# Patient Record
Sex: FEMALE | Race: BLACK OR AFRICAN AMERICAN | NOT HISPANIC OR LATINO | Employment: UNEMPLOYED | URBAN - METROPOLITAN AREA
[De-identification: names, ages, dates, MRNs, and addresses within clinical notes are randomized per-mention and may not be internally consistent; named-entity substitution may affect disease eponyms.]

---

## 2017-06-23 DIAGNOSIS — Z12.31 SCREENING MAMMOGRAM, ENCOUNTER FOR: Primary | ICD-10-CM

## 2017-06-23 DIAGNOSIS — Z13.820 SPECIAL SCREENING FOR OSTEOPOROSIS: Primary | ICD-10-CM

## 2018-12-18 ENCOUNTER — HOSPITAL ENCOUNTER (OUTPATIENT)
Facility: HOSPITAL | Age: 68
Discharge: HOME | End: 2018-12-20
Attending: EMERGENCY MEDICINE | Admitting: INTERNAL MEDICINE
Payer: MEDICARE

## 2018-12-18 DIAGNOSIS — R10.9 ABDOMINAL PAIN: ICD-10-CM

## 2018-12-18 DIAGNOSIS — N17.9 AKI (ACUTE KIDNEY INJURY): ICD-10-CM

## 2018-12-18 DIAGNOSIS — E86.0 DEHYDRATION: ICD-10-CM

## 2018-12-18 DIAGNOSIS — E11.69 HYPERLIPIDEMIA ASSOCIATED WITH TYPE 2 DIABETES MELLITUS: Chronic | ICD-10-CM

## 2018-12-18 DIAGNOSIS — R10.84 GENERALIZED ABDOMINAL PAIN: ICD-10-CM

## 2018-12-18 DIAGNOSIS — E78.5 HYPERLIPIDEMIA ASSOCIATED WITH TYPE 2 DIABETES MELLITUS: Chronic | ICD-10-CM

## 2018-12-18 DIAGNOSIS — R53.1 WEAKNESS: ICD-10-CM

## 2018-12-18 DIAGNOSIS — I16.1 HYPERTENSIVE EMERGENCY: Primary | ICD-10-CM

## 2018-12-18 LAB
ALBUMIN SERPL BCP-MCNC: 3.1 G/DL
ALP SERPL-CCNC: 138 U/L
ALT SERPL W/O P-5'-P-CCNC: 17 U/L
ANION GAP SERPL CALC-SCNC: 15 MMOL/L
AST SERPL-CCNC: 20 U/L
BACTERIA #/AREA URNS HPF: ABNORMAL /HPF
BASOPHILS # BLD AUTO: 0.03 K/UL
BASOPHILS NFR BLD: 0.4 %
BILIRUB SERPL-MCNC: 0.5 MG/DL
BILIRUB UR QL STRIP: ABNORMAL
BNP SERPL-MCNC: 184 PG/ML
BUN SERPL-MCNC: 64 MG/DL
CALCIUM SERPL-MCNC: 10.8 MG/DL
CHLORIDE SERPL-SCNC: 105 MMOL/L
CLARITY UR: ABNORMAL
CO2 SERPL-SCNC: 16 MMOL/L
COLOR UR: YELLOW
CREAT SERPL-MCNC: 3.6 MG/DL
DIFFERENTIAL METHOD: ABNORMAL
EOSINOPHIL # BLD AUTO: 0.3 K/UL
EOSINOPHIL NFR BLD: 3.9 %
ERYTHROCYTE [DISTWIDTH] IN BLOOD BY AUTOMATED COUNT: 14.9 %
EST. GFR  (AFRICAN AMERICAN): 14 ML/MIN/1.73 M^2
EST. GFR  (NON AFRICAN AMERICAN): 12 ML/MIN/1.73 M^2
GLUCOSE SERPL-MCNC: 116 MG/DL
GLUCOSE UR QL STRIP: NEGATIVE
HCT VFR BLD AUTO: 37.5 %
HGB BLD-MCNC: 11.9 G/DL
HGB UR QL STRIP: ABNORMAL
HYALINE CASTS #/AREA URNS LPF: 0 /LPF
KETONES UR QL STRIP: ABNORMAL
LACTATE SERPL-SCNC: 1 MMOL/L
LEUKOCYTE ESTERASE UR QL STRIP: NEGATIVE
LIPASE SERPL-CCNC: 57 U/L
LYMPHOCYTES # BLD AUTO: 1.7 K/UL
LYMPHOCYTES NFR BLD: 22.4 %
MAGNESIUM SERPL-MCNC: 1.8 MG/DL
MCH RBC QN AUTO: 27.4 PG
MCHC RBC AUTO-ENTMCNC: 31.7 G/DL
MCV RBC AUTO: 86 FL
MICROSCOPIC COMMENT: ABNORMAL
MONOCYTES # BLD AUTO: 1.1 K/UL
MONOCYTES NFR BLD: 14.3 %
NEUTROPHILS # BLD AUTO: 4.5 K/UL
NEUTROPHILS NFR BLD: 58.9 %
NITRITE UR QL STRIP: NEGATIVE
PH UR STRIP: 6 [PH] (ref 5–8)
PHOSPHATE SERPL-MCNC: 5.2 MG/DL
PLATELET # BLD AUTO: 399 K/UL
PMV BLD AUTO: 9.4 FL
POCT GLUCOSE: 103 MG/DL (ref 70–110)
POTASSIUM SERPL-SCNC: 5.2 MMOL/L
PROT SERPL-MCNC: 7.4 G/DL
PROT UR QL STRIP: ABNORMAL
RBC # BLD AUTO: 4.34 M/UL
RBC #/AREA URNS HPF: 0 /HPF (ref 0–4)
SODIUM SERPL-SCNC: 136 MMOL/L
SP GR UR STRIP: >=1.03 (ref 1–1.03)
SQUAMOUS #/AREA URNS HPF: 6 /HPF
TROPONIN I SERPL DL<=0.01 NG/ML-MCNC: 0.01 NG/ML
TSH SERPL DL<=0.005 MIU/L-ACNC: 1.5 UIU/ML
URN SPEC COLLECT METH UR: ABNORMAL
UROBILINOGEN UR STRIP-ACNC: 1 EU/DL
WBC # BLD AUTO: 7.64 K/UL
WBC #/AREA URNS HPF: 1 /HPF (ref 0–5)

## 2018-12-18 PROCEDURE — 84484 ASSAY OF TROPONIN QUANT: CPT | Mod: 91

## 2018-12-18 PROCEDURE — 83735 ASSAY OF MAGNESIUM: CPT

## 2018-12-18 PROCEDURE — 85025 COMPLETE CBC W/AUTO DIFF WBC: CPT

## 2018-12-18 PROCEDURE — 80053 COMPREHEN METABOLIC PANEL: CPT

## 2018-12-18 PROCEDURE — 87205 SMEAR GRAM STAIN: CPT

## 2018-12-18 PROCEDURE — 93010 ELECTROCARDIOGRAM REPORT: CPT | Mod: ,,, | Performed by: STUDENT IN AN ORGANIZED HEALTH CARE EDUCATION/TRAINING PROGRAM

## 2018-12-18 PROCEDURE — 84443 ASSAY THYROID STIM HORMONE: CPT

## 2018-12-18 PROCEDURE — 83880 ASSAY OF NATRIURETIC PEPTIDE: CPT

## 2018-12-18 PROCEDURE — G0378 HOSPITAL OBSERVATION PER HR: HCPCS

## 2018-12-18 PROCEDURE — 84100 ASSAY OF PHOSPHORUS: CPT

## 2018-12-18 PROCEDURE — 25000003 PHARM REV CODE 250: Performed by: EMERGENCY MEDICINE

## 2018-12-18 PROCEDURE — 96374 THER/PROPH/DIAG INJ IV PUSH: CPT

## 2018-12-18 PROCEDURE — 81000 URINALYSIS NONAUTO W/SCOPE: CPT

## 2018-12-18 PROCEDURE — 99285 EMERGENCY DEPT VISIT HI MDM: CPT | Mod: 25

## 2018-12-18 PROCEDURE — 83605 ASSAY OF LACTIC ACID: CPT

## 2018-12-18 PROCEDURE — 82962 GLUCOSE BLOOD TEST: CPT

## 2018-12-18 PROCEDURE — 83690 ASSAY OF LIPASE: CPT

## 2018-12-18 PROCEDURE — 96361 HYDRATE IV INFUSION ADD-ON: CPT

## 2018-12-18 PROCEDURE — 93005 ELECTROCARDIOGRAM TRACING: CPT

## 2018-12-18 RX ORDER — METOPROLOL TARTRATE 1 MG/ML
5 INJECTION, SOLUTION INTRAVENOUS
Status: COMPLETED | OUTPATIENT
Start: 2018-12-18 | End: 2018-12-18

## 2018-12-18 RX ADMIN — METOPROLOL TARTRATE 5 MG: 1 INJECTION, SOLUTION INTRAVENOUS at 11:12

## 2018-12-18 RX ADMIN — SODIUM CHLORIDE 500 ML: 0.9 INJECTION, SOLUTION INTRAVENOUS at 08:12

## 2018-12-19 LAB
ALBUMIN SERPL BCP-MCNC: 2.4 G/DL
ALP SERPL-CCNC: 103 U/L
ALT SERPL W/O P-5'-P-CCNC: 13 U/L
ANION GAP SERPL CALC-SCNC: 10 MMOL/L
AORTIC ROOT ANNULUS: 2.92 CM
AORTIC VALVE CUSP SEPERATION: 1.82 CM
AST SERPL-CCNC: 17 U/L
AV INDEX (PROSTH): 0.81
AV MEAN GRADIENT: 7.68 MMHG
AV PEAK GRADIENT: 14.44 MMHG
AV VALVE AREA: 2.06 CM2
BASOPHILS # BLD AUTO: 0.02 K/UL
BASOPHILS NFR BLD: 0.3 %
BILIRUB SERPL-MCNC: 0.4 MG/DL
BSA FOR ECHO PROCEDURE: 1.93 M2
BUN SERPL-MCNC: 64 MG/DL
CALCIUM SERPL-MCNC: 9.7 MG/DL
CHLORIDE SERPL-SCNC: 109 MMOL/L
CO2 SERPL-SCNC: 16 MMOL/L
CREAT SERPL-MCNC: 3.5 MG/DL
CREAT UR-MCNC: 235 MG/DL
CV ECHO LV RWT: 0.69 CM
DIFFERENTIAL METHOD: ABNORMAL
DOP CALC AO PEAK VEL: 1.9 M/S
DOP CALC AO VTI: 27.81 CM
DOP CALC LVOT AREA: 2.54 CM2
DOP CALC LVOT DIAMETER: 1.8 CM
DOP CALC LVOT STROKE VOLUME: 57.23 CM3
DOP CALCLVOT PEAK VEL VTI: 22.5 CM
E WAVE DECELERATION TIME: 191.35 MSEC
E/A RATIO: 0.57
ECHO LV POSTERIOR WALL: 1.26 CM (ref 0.6–1.1)
EOSINOPHIL # BLD AUTO: 0.3 K/UL
EOSINOPHIL NFR BLD: 4.5 %
EOSINOPHIL URNS QL WRIGHT STN: NORMAL
ERYTHROCYTE [DISTWIDTH] IN BLOOD BY AUTOMATED COUNT: 15 %
EST. GFR  (AFRICAN AMERICAN): 15 ML/MIN/1.73 M^2
EST. GFR  (NON AFRICAN AMERICAN): 13 ML/MIN/1.73 M^2
ESTIMATED AVG GLUCOSE: 148 MG/DL
FERRITIN SERPL-MCNC: 94 NG/ML
FOLATE SERPL-MCNC: 5.4 NG/ML
FRACTIONAL SHORTENING: 36 % (ref 28–44)
GLUCOSE SERPL-MCNC: 106 MG/DL
HAV IGM SERPL QL IA: NEGATIVE
HBA1C MFR BLD HPLC: 6.8 %
HBV CORE IGM SERPL QL IA: NEGATIVE
HBV SURFACE AG SERPL QL IA: NEGATIVE
HCT VFR BLD AUTO: 30.3 %
HCV AB SERPL QL IA: NEGATIVE
HGB BLD-MCNC: 9.8 G/DL
HIV 1+2 AB+HIV1 P24 AG SERPL QL IA: NEGATIVE
INTERVENTRICULAR SEPTUM: 1.38 CM (ref 0.6–1.1)
IRON SERPL-MCNC: 37 UG/DL
LA MAJOR: 4.55 CM
LA MINOR: 4.97 CM
LA WIDTH: 3.27 CM
LEFT ATRIUM SIZE: 3.95 CM
LEFT ATRIUM VOLUME INDEX: 28.8 ML/M2
LEFT ATRIUM VOLUME: 52.16 CM3
LEFT INTERNAL DIMENSION IN SYSTOLE: 2.34 CM (ref 2.1–4)
LEFT VENTRICLE DIASTOLIC VOLUME INDEX: 30.85 ML/M2
LEFT VENTRICLE DIASTOLIC VOLUME: 55.79 ML
LEFT VENTRICLE MASS INDEX: 92.1 G/M2
LEFT VENTRICLE SYSTOLIC VOLUME INDEX: 10.5 ML/M2
LEFT VENTRICLE SYSTOLIC VOLUME: 18.97 ML
LEFT VENTRICULAR INTERNAL DIMENSION IN DIASTOLE: 3.64 CM (ref 3.5–6)
LEFT VENTRICULAR MASS: 166.54 G
LYMPHOCYTES # BLD AUTO: 1.9 K/UL
LYMPHOCYTES NFR BLD: 30.3 %
MAGNESIUM SERPL-MCNC: 1.9 MG/DL
MCH RBC QN AUTO: 27.8 PG
MCHC RBC AUTO-ENTMCNC: 32.3 G/DL
MCV RBC AUTO: 86 FL
MONOCYTES # BLD AUTO: 0.6 K/UL
MONOCYTES NFR BLD: 8.9 %
MV PEAK A VEL: 1.21 M/S
MV PEAK E VEL: 0.69 M/S
NEUTROPHILS # BLD AUTO: 3.5 K/UL
NEUTROPHILS NFR BLD: 55.8 %
PHOSPHATE SERPL-MCNC: 5.3 MG/DL
PISA TR MAX VEL: 3.09 M/S
PLATELET # BLD AUTO: 328 K/UL
PMV BLD AUTO: 9.2 FL
POCT GLUCOSE: 133 MG/DL (ref 70–110)
POCT GLUCOSE: 168 MG/DL (ref 70–110)
POCT GLUCOSE: 195 MG/DL (ref 70–110)
POCT GLUCOSE: 94 MG/DL (ref 70–110)
POTASSIUM SERPL-SCNC: 4.7 MMOL/L
PROT SERPL-MCNC: 6 G/DL
PULM VEIN S/D RATIO: 1.91
PV PEAK D VEL: 0.32 M/S
PV PEAK S VEL: 0.61 M/S
PV PEAK VELOCITY: 1.51 CM/S
RA MAJOR: 3.48 CM
RA PRESSURE: 3 MMHG
RBC # BLD AUTO: 3.53 M/UL
RIGHT VENTRICULAR END-DIASTOLIC DIMENSION: 2.63 CM
SATURATED IRON: 13 %
SODIUM SERPL-SCNC: 135 MMOL/L
SODIUM UR-SCNC: 30 MMOL/L
TOTAL IRON BINDING CAPACITY: 277 UG/DL
TR MAX PG: 38.19 MMHG
TRANSFERRIN SERPL-MCNC: 187 MG/DL
TROPONIN I SERPL DL<=0.01 NG/ML-MCNC: 0.01 NG/ML
TV REST PULMONARY ARTERY PRESSURE: 41 MMHG
UUN UR-MCNC: 545 MG/DL
WBC # BLD AUTO: 6.27 K/UL

## 2018-12-19 PROCEDURE — 94761 N-INVAS EAR/PLS OXIMETRY MLT: CPT

## 2018-12-19 PROCEDURE — 25000003 PHARM REV CODE 250: Performed by: STUDENT IN AN ORGANIZED HEALTH CARE EDUCATION/TRAINING PROGRAM

## 2018-12-19 PROCEDURE — 84300 ASSAY OF URINE SODIUM: CPT

## 2018-12-19 PROCEDURE — 97530 THERAPEUTIC ACTIVITIES: CPT

## 2018-12-19 PROCEDURE — 80053 COMPREHEN METABOLIC PANEL: CPT

## 2018-12-19 PROCEDURE — 84165 PROTEIN E-PHORESIS SERUM: CPT

## 2018-12-19 PROCEDURE — 97530 THERAPEUTIC ACTIVITIES: CPT | Performed by: PHYSICAL THERAPIST

## 2018-12-19 PROCEDURE — G0378 HOSPITAL OBSERVATION PER HR: HCPCS

## 2018-12-19 PROCEDURE — 82746 ASSAY OF FOLIC ACID SERUM: CPT

## 2018-12-19 PROCEDURE — 97161 PT EVAL LOW COMPLEX 20 MIN: CPT | Performed by: PHYSICAL THERAPIST

## 2018-12-19 PROCEDURE — G8979 MOBILITY GOAL STATUS: HCPCS | Mod: CJ | Performed by: PHYSICAL THERAPIST

## 2018-12-19 PROCEDURE — 36415 COLL VENOUS BLD VENIPUNCTURE: CPT

## 2018-12-19 PROCEDURE — 84540 ASSAY OF URINE/UREA-N: CPT

## 2018-12-19 PROCEDURE — G8988 SELF CARE GOAL STATUS: HCPCS | Mod: CH

## 2018-12-19 PROCEDURE — 84165 PROTEIN E-PHORESIS SERUM: CPT | Mod: 26,,, | Performed by: PATHOLOGY

## 2018-12-19 PROCEDURE — G8987 SELF CARE CURRENT STATUS: HCPCS | Mod: CL

## 2018-12-19 PROCEDURE — 97165 OT EVAL LOW COMPLEX 30 MIN: CPT

## 2018-12-19 PROCEDURE — 82607 VITAMIN B-12: CPT

## 2018-12-19 PROCEDURE — 83036 HEMOGLOBIN GLYCOSYLATED A1C: CPT

## 2018-12-19 PROCEDURE — 83735 ASSAY OF MAGNESIUM: CPT

## 2018-12-19 PROCEDURE — 84100 ASSAY OF PHOSPHORUS: CPT

## 2018-12-19 PROCEDURE — 97116 GAIT TRAINING THERAPY: CPT | Performed by: PHYSICAL THERAPIST

## 2018-12-19 PROCEDURE — 80074 ACUTE HEPATITIS PANEL: CPT

## 2018-12-19 PROCEDURE — 63600175 PHARM REV CODE 636 W HCPCS: Performed by: STUDENT IN AN ORGANIZED HEALTH CARE EDUCATION/TRAINING PROGRAM

## 2018-12-19 PROCEDURE — 85025 COMPLETE CBC W/AUTO DIFF WBC: CPT

## 2018-12-19 PROCEDURE — 86703 HIV-1/HIV-2 1 RESULT ANTBDY: CPT

## 2018-12-19 PROCEDURE — 82570 ASSAY OF URINE CREATININE: CPT

## 2018-12-19 PROCEDURE — 83540 ASSAY OF IRON: CPT

## 2018-12-19 PROCEDURE — G8978 MOBILITY CURRENT STATUS: HCPCS | Mod: CK | Performed by: PHYSICAL THERAPIST

## 2018-12-19 PROCEDURE — 82728 ASSAY OF FERRITIN: CPT

## 2018-12-19 PROCEDURE — 87522 HEPATITIS C REVRS TRNSCRPJ: CPT

## 2018-12-19 RX ORDER — OXYCODONE AND ACETAMINOPHEN 5; 325 MG/1; MG/1
1 TABLET ORAL EVERY 8 HOURS PRN
Status: DISCONTINUED | OUTPATIENT
Start: 2018-12-19 | End: 2018-12-20 | Stop reason: HOSPADM

## 2018-12-19 RX ORDER — HEPARIN SODIUM 5000 [USP'U]/ML
5000 INJECTION, SOLUTION INTRAVENOUS; SUBCUTANEOUS EVERY 8 HOURS
Status: DISCONTINUED | OUTPATIENT
Start: 2018-12-19 | End: 2018-12-20 | Stop reason: HOSPADM

## 2018-12-19 RX ORDER — GLUCAGON 1 MG
1 KIT INJECTION
Status: DISCONTINUED | OUTPATIENT
Start: 2018-12-19 | End: 2018-12-20 | Stop reason: HOSPADM

## 2018-12-19 RX ORDER — MAGNESIUM SULFATE 1 G/100ML
1 INJECTION INTRAVENOUS ONCE
Status: COMPLETED | OUTPATIENT
Start: 2018-12-19 | End: 2018-12-19

## 2018-12-19 RX ORDER — LOSARTAN POTASSIUM 50 MG/1
100 TABLET ORAL DAILY
Status: DISCONTINUED | OUTPATIENT
Start: 2018-12-19 | End: 2018-12-20 | Stop reason: HOSPADM

## 2018-12-19 RX ORDER — GLIPIZIDE 10 MG/1
5 TABLET ORAL
Status: ON HOLD | COMMUNITY
End: 2018-12-26 | Stop reason: HOSPADM

## 2018-12-19 RX ORDER — AMLODIPINE BESYLATE 10 MG/1
10 TABLET ORAL NIGHTLY
Status: ON HOLD | COMMUNITY
End: 2019-06-28 | Stop reason: HOSPADM

## 2018-12-19 RX ORDER — CLONIDINE HYDROCHLORIDE 0.2 MG/1
0.2 TABLET ORAL NIGHTLY
Status: ON HOLD | COMMUNITY
End: 2019-04-08 | Stop reason: HOSPADM

## 2018-12-19 RX ORDER — INSULIN ASPART 100 [IU]/ML
0-5 INJECTION, SOLUTION INTRAVENOUS; SUBCUTANEOUS
Status: DISCONTINUED | OUTPATIENT
Start: 2018-12-19 | End: 2018-12-20 | Stop reason: HOSPADM

## 2018-12-19 RX ORDER — IBUPROFEN 200 MG
24 TABLET ORAL
Status: DISCONTINUED | OUTPATIENT
Start: 2018-12-19 | End: 2018-12-20 | Stop reason: HOSPADM

## 2018-12-19 RX ORDER — SODIUM CHLORIDE 9 MG/ML
INJECTION, SOLUTION INTRAVENOUS CONTINUOUS
Status: DISCONTINUED | OUTPATIENT
Start: 2018-12-19 | End: 2018-12-19

## 2018-12-19 RX ORDER — LOSARTAN POTASSIUM 50 MG/1
100 TABLET ORAL DAILY
Status: DISCONTINUED | OUTPATIENT
Start: 2018-12-19 | End: 2018-12-19

## 2018-12-19 RX ORDER — METOPROLOL TARTRATE 1 MG/ML
5 INJECTION, SOLUTION INTRAVENOUS
Status: DISCONTINUED | OUTPATIENT
Start: 2018-12-19 | End: 2018-12-19

## 2018-12-19 RX ORDER — OXYCODONE AND ACETAMINOPHEN 5; 325 MG/1; MG/1
0.5 TABLET ORAL EVERY 8 HOURS PRN
Status: DISCONTINUED | OUTPATIENT
Start: 2018-12-19 | End: 2018-12-19

## 2018-12-19 RX ORDER — ATORVASTATIN CALCIUM 40 MG/1
40 TABLET, FILM COATED ORAL DAILY
Status: DISCONTINUED | OUTPATIENT
Start: 2018-12-19 | End: 2018-12-20 | Stop reason: HOSPADM

## 2018-12-19 RX ORDER — SODIUM CHLORIDE 0.9 % (FLUSH) 0.9 %
5 SYRINGE (ML) INJECTION
Status: DISCONTINUED | OUTPATIENT
Start: 2018-12-19 | End: 2018-12-20 | Stop reason: HOSPADM

## 2018-12-19 RX ORDER — LABETALOL HCL 20 MG/4 ML
20 SYRINGE (ML) INTRAVENOUS EVERY 4 HOURS PRN
Status: DISCONTINUED | OUTPATIENT
Start: 2018-12-19 | End: 2018-12-19

## 2018-12-19 RX ORDER — AMLODIPINE BESYLATE 5 MG/1
10 TABLET ORAL NIGHTLY
Status: DISCONTINUED | OUTPATIENT
Start: 2018-12-19 | End: 2018-12-20 | Stop reason: HOSPADM

## 2018-12-19 RX ORDER — IBUPROFEN 200 MG
16 TABLET ORAL
Status: DISCONTINUED | OUTPATIENT
Start: 2018-12-19 | End: 2018-12-20 | Stop reason: HOSPADM

## 2018-12-19 RX ORDER — ERGOCALCIFEROL 1.25 MG/1
50000 CAPSULE ORAL
COMMUNITY
End: 2019-02-04

## 2018-12-19 RX ORDER — LEVOCETIRIZINE DIHYDROCHLORIDE 5 MG/1
5 TABLET, FILM COATED ORAL NIGHTLY
Status: ON HOLD | COMMUNITY
End: 2018-12-25

## 2018-12-19 RX ORDER — LABETALOL HCL 20 MG/4 ML
10 SYRINGE (ML) INTRAVENOUS EVERY 4 HOURS PRN
Status: DISCONTINUED | OUTPATIENT
Start: 2018-12-19 | End: 2018-12-19

## 2018-12-19 RX ORDER — ATORVASTATIN CALCIUM 40 MG/1
40 TABLET, FILM COATED ORAL DAILY
COMMUNITY

## 2018-12-19 RX ORDER — FUROSEMIDE 40 MG/1
40 TABLET ORAL DAILY
Status: ON HOLD | COMMUNITY
End: 2019-01-16 | Stop reason: HOSPADM

## 2018-12-19 RX ORDER — LOSARTAN POTASSIUM 100 MG/1
100 TABLET ORAL DAILY
Status: ON HOLD | COMMUNITY
End: 2019-01-16 | Stop reason: HOSPADM

## 2018-12-19 RX ORDER — CLONIDINE HYDROCHLORIDE 0.2 MG/1
0.2 TABLET ORAL NIGHTLY
Status: DISCONTINUED | OUTPATIENT
Start: 2018-12-19 | End: 2018-12-20 | Stop reason: HOSPADM

## 2018-12-19 RX ADMIN — AMLODIPINE BESYLATE 10 MG: 5 TABLET ORAL at 08:12

## 2018-12-19 RX ADMIN — LOSARTAN POTASSIUM 100 MG: 50 TABLET, FILM COATED ORAL at 09:12

## 2018-12-19 RX ADMIN — HEPARIN SODIUM 5000 UNITS: 5000 INJECTION, SOLUTION INTRAVENOUS; SUBCUTANEOUS at 03:12

## 2018-12-19 RX ADMIN — CLONIDINE HYDROCHLORIDE 0.2 MG: 0.2 TABLET ORAL at 08:12

## 2018-12-19 RX ADMIN — AMLODIPINE BESYLATE 10 MG: 5 TABLET ORAL at 01:12

## 2018-12-19 RX ADMIN — SODIUM CHLORIDE: 0.9 INJECTION, SOLUTION INTRAVENOUS at 09:12

## 2018-12-19 RX ADMIN — CLONIDINE HYDROCHLORIDE 0.2 MG: 0.2 TABLET ORAL at 01:12

## 2018-12-19 RX ADMIN — MAGNESIUM SULFATE 1 G: 1 INJECTION INTRAVENOUS at 01:12

## 2018-12-19 RX ADMIN — HEPARIN SODIUM 5000 UNITS: 5000 INJECTION, SOLUTION INTRAVENOUS; SUBCUTANEOUS at 05:12

## 2018-12-19 RX ADMIN — LABETALOL HYDROCHLORIDE 20 MG: 5 INJECTION, SOLUTION INTRAVENOUS at 01:12

## 2018-12-19 RX ADMIN — HEPARIN SODIUM 5000 UNITS: 5000 INJECTION, SOLUTION INTRAVENOUS; SUBCUTANEOUS at 10:12

## 2018-12-19 RX ADMIN — ATORVASTATIN CALCIUM 40 MG: 40 TABLET, FILM COATED ORAL at 09:12

## 2018-12-19 RX ADMIN — OXYCODONE HYDROCHLORIDE AND ACETAMINOPHEN 1 TABLET: 5; 325 TABLET ORAL at 01:12

## 2018-12-19 NOTE — ED PROVIDER NOTES
"Encounter Date: 12/18/2018    SCRIBE #1 NOTE: I, Rikki Peralta, am scribing for, and in the presence of,  Dr. Aroldo Villareal. I have scribed the entire note.       History     Chief Complaint   Patient presents with    Weakness     68y F ambulatory to ED with c/o decreased appetite, vomitting, and falls for "months." pt also c/o back and chest pains "due to gas" and generally "not feeling well."     Brittaney Joseph is a 68 y.o. female who  has a past medical history of Diabetes mellitus, Hypertension, and Renal disorder.    The patient presents to the ED with decreased appetite x 1 month. Patient reports she is "tired of not eating" and was last seen at [facility] with reported "high kidney function". She admits to urinary frequency, back pain for years that is unchanged, and generally "feeling bad". Patient notes she had chest pain earlier today that resolved spontaneously and denies fever, cough, chills, SOB, abd pain, dysuria, leg swelling, focal numbness/tingling, or black/blood stool. States she last took Diabetes medication yesterday night but does not take it regularly because she "cant take medication without any food". States she does not take blood pressure medication because she does not feel well. Patient last ate a little piece of potato yesterday. She denies history of smoking, alcohol, or drug use.      The history is provided by the patient.     Review of patient's allergies indicates:  No Known Allergies  Past Medical History:   Diagnosis Date    Diabetes mellitus     Hypertension     Renal disorder      Past Surgical History:   Procedure Laterality Date    CHOLECYSTECTOMY       History reviewed. No pertinent family history.  Social History     Tobacco Use    Smoking status: Never Smoker   Substance Use Topics    Alcohol use: No     Frequency: Never    Drug use: No     Review of Systems   Constitutional: Positive for appetite change. Negative for chills and fever.        + weight loss   HENT: " Negative for congestion, rhinorrhea and sore throat.    Eyes: Negative for redness and visual disturbance.   Respiratory: Negative for cough, shortness of breath and wheezing.    Cardiovascular: Negative for chest pain and palpitations.   Gastrointestinal: Negative for abdominal pain, blood in stool, diarrhea, nausea and vomiting.   Genitourinary: Positive for frequency. Negative for dysuria and hematuria.   Musculoskeletal: Positive for back pain. Negative for myalgias and neck pain.   Skin: Negative for rash.   Neurological: Negative for dizziness, weakness and light-headedness.   Psychiatric/Behavioral: Negative for confusion.   All other systems reviewed and are negative.      Physical Exam     Initial Vitals [12/18/18 1939]   BP Pulse Resp Temp SpO2   (!) 207/86 106 16 99.3 °F (37.4 °C) 99 %      MAP       --         Physical Exam    Nursing note and vitals reviewed.  Constitutional: She appears well-developed and well-nourished. She is not diaphoretic. No distress.   Frail appearing.   HENT:   Head: Normocephalic and atraumatic.   Eyes: Conjunctivae and EOM are normal. Pupils are equal, round, and reactive to light.   Neck: Normal range of motion. Neck supple.   Cardiovascular: Normal rate, regular rhythm, normal heart sounds and intact distal pulses. Exam reveals no gallop and no friction rub.    No murmur heard.  Pulmonary/Chest: Breath sounds normal. No respiratory distress. She has no wheezes. She has no rales.   Abdominal: Soft. She exhibits no distension and no mass. There is no tenderness.   Musculoskeletal: Normal range of motion. She exhibits no edema.   Neurological: She is alert and oriented to person, place, and time. She has normal strength. No cranial nerve deficit or sensory deficit. GCS eye subscore is 4. GCS verbal subscore is 5. GCS motor subscore is 6.   CN 2-12 intact  Finger to nose within normal limits  Negative romberg  Gait normal  Equal strength to bilateral upper extremities,  SILT  Equal strength to bilateral lower extremities, SILT     Skin: Skin is warm and dry.         ED Course   Procedures  Labs Reviewed   CBC W/ AUTO DIFFERENTIAL - Abnormal; Notable for the following components:       Result Value    Hemoglobin 11.9 (*)     MCHC 31.7 (*)     RDW 14.9 (*)     Platelets 399 (*)     Mono # 1.1 (*)     All other components within normal limits   COMPREHENSIVE METABOLIC PANEL - Abnormal; Notable for the following components:    Potassium 5.2 (*)     CO2 16 (*)     Glucose 116 (*)     BUN, Bld 64 (*)     Creatinine 3.6 (*)     Calcium 10.8 (*)     Albumin 3.1 (*)     Alkaline Phosphatase 138 (*)     eGFR if  14 (*)     eGFR if non  12 (*)     All other components within normal limits   B-TYPE NATRIURETIC PEPTIDE - Abnormal; Notable for the following components:     (*)     All other components within normal limits   URINALYSIS, REFLEX TO URINE CULTURE - Abnormal; Notable for the following components:    Appearance, UA Cloudy (*)     Specific Gravity, UA >=1.030 (*)     Protein, UA 3+ (*)     Ketones, UA 1+ (*)     Bilirubin (UA) 2+ (*)     Occult Blood UA Trace (*)     All other components within normal limits    Narrative:     Preferred Collection Type->Urine, Clean Catch   PHOSPHORUS - Abnormal; Notable for the following components:    Phosphorus 5.2 (*)     All other components within normal limits   URINALYSIS MICROSCOPIC - Abnormal; Notable for the following components:    Bacteria, UA Moderate (*)     All other components within normal limits    Narrative:     Preferred Collection Type->Urine, Clean Catch   TROPONIN I   MAGNESIUM   PHOSPHORUS   MAGNESIUM   LIPASE   TSH   LIPASE   TSH   LACTIC ACID, PLASMA   TROPONIN I   POCT GLUCOSE   POCT GLUCOSE MONITORING CONTINUOUS     EKG Readings: (Independently Interpreted)   EKG: Rate: 100 bpm. Sinus tachycardia. No ST segment changes. Peaked T waves in v2.       Imaging Results          X-Ray Chest AP  Portable (Final result)  Result time 12/18/18 21:11:03    Final result by Raven Barrientos MD (12/18/18 21:11:03)                 Impression:      Minimal right basilar scarring or atelectasis, otherwise no acute cardiopulmonary process identified.      Electronically signed by: Raven Barrientos MD  Date:    12/18/2018  Time:    21:11             Narrative:    EXAMINATION:  XR CHEST AP PORTABLE    CLINICAL HISTORY:  weakness;    TECHNIQUE:  Single frontal view of the chest was performed.    COMPARISON:  None    FINDINGS:  Cardiac silhouette is normal in size.  There is elevation of the right hemidiaphragm.  Lungs are expanded.  Minimal scarring or plate atelectasis is seen within the right lower lobe.  No evidence of focal consolidative process, pneumothorax, or significant effusion.  No acute osseous abnormality identified.  Degenerative changes are noted involving the bilateral AC joints.                                 Medical Decision Making:   Differential Diagnosis:   Differential Diagnosis includes, but is not limited to:  , anemia/blood loss, cardiogenic shock, arrhythmia, orthostatic hypotension, dehydration, medication side effect, vitamin deficiency, liver disease, hypothyroidism, drug intoxication/withdrawal, metabolic derangement.    Clinical Tests:   Lab Tests: Reviewed and Ordered  Radiological Study: Ordered and Reviewed  Medical Tests: Ordered and Reviewed  ED Management:  9:33 PM  Discussed case with NP, Ricco, who reviewed patient's chart, history, labs, and exam.   Patient is not a CDU candidate.  Discussed with [facility] for admission.    68-year-old female history of generalized weakness for the past month patient is a CKD patient however has not followed up with nephrology for the past year she has not been compliant with her hypertension and diabetes medications additionally.  Patient's labs show decreased bicarb elevated specific gravity proteinuria elevated creatinine potassium phosphorus.   Patient's EKG is without significant ST changes mild peaked t waves in one lead.  Patient was given IV fluids given patient's history of renal disease and lab findings and non med compliance I believe the patient would benefit from observation for IV fluids for dehydration and nephrology follow up.                   ED Course as of Dec 18 1956   Tue Dec 18, 2018   1936 Sort note: Brittaney Joseph nontoxic/afebrile 68 y.o.  presented to the ED with c/o generalized weakness and fatigue for approximately one month.  Patient does complain of some generalized arthralgias.  Patient's family also reports decreased appetite and vomiting over this time.  Patient states that she has had decreased appetite as she does not tolerate by mouth.  She has not taken her medicines in some time.  Family reports frequent falls.  Patient is alert and oriented x3 at this time.  Patient does report a history of hypertension and chronic kidney  problems however is poor historian    Patient seen and medically screened by Physician assistant in Sort process due to ED crowding.  Appropriate tests and/or medications ordered.  Care transferred to an alternate provider when patient was placed in an Exam Room from the [facility] for physical exam, additional orders, and disposition. 7:38 PM. LC    [LC]      ED Course User Index  [LC] ABHISHEK Celis     Clinical Impression:     1. Hypertensive emergency    2. Weakness    3. Abdominal pain    4. Dehydration    5. Generalized abdominal pain            Disposition:   Disposition: Placed in Observation  Condition: Fair       I, Aroldo Villareal,  personally performed the services described in this documentation. All medical record entries made by the scribe were at my direction and in my presence.  I have reviewed the chart and agree that the record reflects my personal performance and is accurate and complete. Arlodo Villareal M.D. 11:55 PM12/18/2018                   Aroldo Villareal Jr., MD  12/18/18  [id number]       Aroldo Villareal Jr., MD  12/31/18 [id number]

## 2018-12-19 NOTE — PLAN OF CARE
Problem: Occupational Therapy Goal  Goal: Occupational Therapy Goal  Goals to be met by: 01/19/19     Patient will increase functional independence with ADLs by performing:    UE Dressing with Modified [place].  LE Dressing with Modified [place].  Grooming while standing at sink with Modified [place].  Toileting from toilet with Modified [place] for hygiene and clothing management.   Toilet transfer to toilet with Modified [place].  Increased functional strength to WFL for ADLs.    Outcome: Ongoing (interventions implemented as appropriate)  OT eval/tx completed this date. Pt lives w/ niece in trailer w/ 4STE & 0HR; has T/S combo. PLOF: (I) w/o DME w/ all fxnl tasks including sitting tub baths & light IADLs. Currently, pt perf the following: sup-->EOB w/ Mod A; don socks at EOB w/ Max A; standing, amb for short dist w/in room & sidestepping L via RW w/ Min A; SPT EOB-->WC for transport w/ Min A for controlled descent. Pt w/ signif slow mvmt throughout eval jairo w/ amb. Edu/tx re: general safety techs, rec TTB & HHS & HEP. Pt/fly verbalized understanding.    Pt presents w/ decreased overall endurance/conditioning, balance/mobility & coordination w/ subsequent decline in (I)/safety w/ BADLs, fxnl mobility & fxnl t/f's. OT 5x/wk to increase phys/fxnl status & maximize potential to achieve established goals for d/c TBD pending progress & rec TTB & BSC.

## 2018-12-19 NOTE — PT/OT/SLP EVAL
Occupational Therapy   Evaluation/tx    Name: Brittaney Joseph  MRN: [id number]  Admitting Diagnosis:  <principal problem not specified>      Recommendations:     Discharge Recommendations: (TBD pending progress)  Discharge Equipment Recommendations:  commode, tub bench  Barriers to discharge:  Inaccessible home environment    History:     Occupational Profile:  Living Environment: w/ niece in trailer w/ 4STE & 0STE; has T/S combo  Previous level of function: (I) w/o DME   Roles and Routines: sitting tub baths; IADLs  Equipment Used at Home:  none  Assistance upon Discharge: 24hr S/A    Past Medical History:   Diagnosis Date    Diabetes mellitus     Hypertension     Renal disorder        Past Surgical History:   Procedure Laterality Date    CHOLECYSTECTOMY         Subjective     Chief Complaint: weakness  Patient/Family Comments/goals: return to PLOF    Pain/Comfort:  · Pain Rating 1: 0/10  · Pain Rating Post-Intervention 1: 0/10    Patients cultural, spiritual, Buddhism conflicts given the current situation:      Objective:     Communicated with: nsanita prior to session.  Patient found with:  and bed alarm, peripheral IV upon OT entry to room.    General Precautions: Standard, fall   Orthopedic Precautions:N/A   Braces: N/A     Occupational Performance:    Bed Mobility:    · Patient completed Supine to Sit with moderate assistance    Functional Mobility/Transfers:  · Patient completed Sit <> Stand Transfer with minimum assistance  with  rolling walker   · Patient completed Bed <> Chair Transfer using Stand Pivot technique with minimum assistance with rolling walker  · Functional Mobility: via RW for short dist w/in room w/ CGA for balance & Min A for RW manip    Activities of Daily Living:  · Lower Body Dressing: maximal assistance don socks    Cognitive/Visual Perceptual:  AOx4    Physical Exam:  B shldr AROM ~90*; AAROM WFL  B elb-->Ds WFL    Sit balance: F to F+  Stand balance: F-    AMPAC 6 Click ADL:  AMPAC  "Total Score: 13    Treatment & Education:  OT eval/tx completed this date. Pt lives w/ niece in trailer w/ 4STE & 0HR; has T/S combo. PLOF: (I) w/o DME w/ all fxnl tasks including sitting tub baths & light IADLs. Currently, pt perf the following: sup-->EOB w/ Mod A; don socks at EOB w/ Max A; standing, amb for short dist w/in room & sidestepping L via RW w/ Min A; SPT EOB-->WC for transport w/ Min A for controlled descent. Pt w/ signif slow mvmt throughout eval jairo w/ amb. Edu/tx re: general safety techs, rec TTB & HHS & HEP. Pt/fly verbalized understanding.    Education:    Patient left in WC w/ transport with siblings & transport present    Assessment:   Pt presents w/ decreased overall endurance/conditioning, balance/mobility & coordination w/ subsequent decline in (I)/safety w/ BADLs, fxnl mobility & fxnl t/f's. OT 5x/wk to increase phys/fxnl status & maximize potential to achieve established goals for d/c TBD pending progress & rec TTB & BSC.    Brittaney Joseph is a 68 y.o. female with a medical diagnosis of <principal problem not specified>.  She presents with the following performance deficits affecting function: weakness, impaired endurance, impaired self care skills, impaired functional mobilty, decreased coordination, impaired balance, gait instability, decreased upper extremity function, decreased lower extremity function, decreased safety awareness, decreased ROM.      Rehab Prognosis: Good; patient would benefit from acute skilled OT services to address these deficits and reach maximum level of function.         Clinical Decision Makin.  OT Low:  "Pt evaluation falls under low complexity for evaluation coding due to performance deficits noted in 1-3 areas as stated above and 0 co-morbities affecting current functional status. Data obtained from problem focused assessments. No modifications or assistance was required for completion of evaluation. Only brief occupational profile and history " "review completed."     Plan:     Patient to be seen 5 x/week to address the above listed problems via self-care/home management, therapeutic activities, therapeutic exercises  · Plan of Care Expires: 01/19/19  · Plan of Care Reviewed with: patient, sibling    This Plan of care has been discussed with the patient who was involved in its development and understands and is in agreement with the identified goals and treatment plan    GOALS:   Multidisciplinary Problems     Occupational Therapy Goals        Problem: Occupational Therapy Goal    Goal Priority Disciplines Outcome Interventions   Occupational Therapy Goal     OT, PT/OT Ongoing (interventions implemented as appropriate)    Description:  Goals to be met by: 01/19/19     Patient will increase functional independence with ADLs by performing:    UE Dressing with Modified [place].  LE Dressing with Modified [place].  Grooming while standing at sink with Modified [place].  Toileting from toilet with Modified [place] for hygiene and clothing management.   Toilet transfer to toilet with Modified [place].  Increased functional strength to WFL for ADLs.                      Time Tracking:     OT Date of Treatment: 12/19/18  OT Start Time: 1338  OT Stop Time: 1401  OT Total Time (min): 23 min    Billable Minutes:Evaluation 10  Therapeutic Activity 13  Total Time 23    ANNI Reeder  12/19/2018    "

## 2018-12-19 NOTE — NURSING
Admitted 68 year old female patient from ED, brought in to the unit around 0030H via stretcher. Conscious , coherent to time, place date, and situation, with saline lock on the right hand gauge 20 , flushed patent, with clean and dry dressing.  Patient case of hypertensive urgency. BP upon arrival to the floor is 194/95mmHg, asymptomatic. Dr. العراقي informed regarding this admission. Patient has subjective complaint of generalized body ache, 8/10 upon movement and turning, percocet PRN administered noted mild relief. Telemetry NSR with tall T wave lead I, 70-80's. Oxygen saturation 97% on room air. Advised patient to call for any assistance. Safety fall precaution measures noted, Bed alarm ON. Call bell with in reach. Vaccines NOT updated. Serum magnesium (1.8), confirmed with Dr. Cifuentes due dose of Magnesium drip. He said their target mg is 2.0, given via pump. BP closely monitored,  labetalol 20 mg IV given as PRN. Will continue to monitor patient.

## 2018-12-19 NOTE — PT/OT/SLP EVAL
Physical Therapy Evaluation    Patient Name:  Brittaney Joseph   MRN:  [id number]    Recommendations:     Discharge Recommendations:  (Home with HH, HHPT)   Discharge Equipment Recommendations: Quad cane to utilize for up/down steps that have no railing and RW for inside trailer.  Barriers to discharge: None    Assessment:     Brittaney Joseph is a 68 y.o. female admitted with a medical diagnosis of The primary encounter diagnosis was Hypertensive emergency. Diagnoses of Weakness, Abdominal pain, and Dehydration were also pertinent to this visit..  She presents with the following impairments/functional limitations:  weakness, gait instability, impaired endurance, impaired balance, decreased lower extremity function, impaired self care skills, impaired functional mobilty Pt ambulated 20' x 1 and 15' x 1 very slowly with RW. Pt shaky upon 1st standing, decreased after standing/walking.    Rehab Prognosis: Good; patient would benefit from acute skilled PT services to address these deficits and reach maximum level of function.    Recent Surgery: * No surgery found *      Plan:     During this hospitalization, patient to be seen 6 x/week to address the identified rehab impairments via gait training, therapeutic activities, therapeutic exercises, neuromuscular re-education and progress toward the following goals:    GOALS:   Multidisciplinary Problems     Physical Therapy Goals        Problem: Physical Therapy Goal    Goal Priority Disciplines Outcome Goal Variances Interventions   Physical Therapy Goal     PT, PT/OT Ongoing (interventions implemented as appropriate)     Description:  1.  Supine to sit with Mod [place]  2.  Sit at EOB 20 minutes  3.  Ambulate 120' with or without AD with supervision.  4.  Ascend/descend 4 steps with WBQC with HHA/CG                    · Plan of Care Expires:  01/19/19    Subjective     Chief Complaint: not sure she can walk without a RW  Patient/Family Comments/goals: go back  home  Pain/Comfort:  · Pain Rating 1: 0/10    Patients cultural, spiritual, Anabaptism conflicts given the current situation: no    Living Environment:  Pt lives with her niece in a trailer in [place] with 4 steps and no railing  Prior to admission, patients level of function was Independent household ambulation without AD.  Equipment used at home - none.  DME owned (not currently used): none.  Upon discharge, patient will have assistance from her niece and other family.    Objective:     Communicated with nurse prior to session.  Patient found call button in reach, bed alarm on and nurse present peripheral IV  upon PT entry to room.    General Precautions: Standard, fall   Orthopedic Precautions:N/A   Braces: N/A     Exams:  · Pt is oriented x 4.  Pt has BLE ROM WFL.  Pt has 3+/5 hip strength BLE's.    Functional Mobility:  · Bed Mobility:     · Supine to Sit: minimum assistance  · Sit to Supine: stand by assistance  · Transfers:     · Sit to Stand:  stand by assistance with rolling walker  · Gait:  Pt ambulated 20' x 1 and 15' x 1 very slowly with RW. Pt shaky upon 1st standing, decreased after standing/walking.  · Balance: Pt has F+ dynamic standing balance with a RW      Therapeutic Activities and Exercises:   Pt ambulated to/from bathroom with CG/Supervision with RW with slow steps.  Pt sat on commode/EOB 5 min x 2.    AM-PAC 6 CLICK MOBILITY  Total Score:18     Patient left HOB elevated with call button in reach, bed alarm on and sister present.    GOALS:   Multidisciplinary Problems     Physical Therapy Goals        Problem: Physical Therapy Goal    Goal Priority Disciplines Outcome Goal Variances Interventions   Physical Therapy Goal     PT, PT/OT Ongoing (interventions implemented as appropriate)     Description:  1.  Supine to sit with Mod [place]  2.  Sit at EOB 20 minutes  3.  Ambulate 120' with or without AD with supervision.  4.  Ascend/descend 4 steps with WBQC with HHA/CG                     History:     Past Medical History:   Diagnosis Date    Diabetes mellitus     Hypertension     Renal disorder        Past Surgical History:   Procedure Laterality Date    CHOLECYSTECTOMY         Clinical Decision Making:     History  Co-morbidities and personal factors that may impact the plan of care Examination  Body Structures and Functions, activity limitations and participation restrictions that may impact the plan of care Clinical Presentation   Decision Making/ Complexity Score   Co-morbidities:   [] Time since onset of injury / illness / exacerbation  [x] Status of current condition  []Patient's cognitive status and safety concerns    [] Multiple Medical Problems (see med hx)  Personal Factors:   [] Patient's age  [] Prior Level of function   [] Patient's home situation (environment and family support)  [] Patient's level of motivation  [x] Expected progression of patient      HISTORY:(criteria)    [] 87796 - no personal factors/history    [] 37504 - has 1-2 personal factor/comorbidity     [x] 45719 - has >3 personal factor/comorbidity     Body Regions:  [] Objective examination findings  [] Head     []  Neck  [] Trunk   [] Upper Extremity  [x] Lower Extremity    Body Systems:  [] For communication ability, affect, cognition, language, and learning style: the assessment of the ability to make needs known, consciousness, orientation (person, place, and time), expected emotional /behavioral responses, and learning preferences (eg, learning barriers, education  needs)  [] For the neuromuscular system: a general assessment of gross coordinated movement (eg, balance, gait, locomotion, transfers, and transitions) and motor function  (motor control and motor learning)  [x] For the musculoskeletal system: the assessment of gross symmetry, gross range of motion, gross strength, height, and weight  [] For the integumentary system: the assessment of pliability(texture), presence of scar formation, skin color,  and skin integrity  [] For cardiovascular/pulmonary system: the assessment of heart rate, respiratory rate, blood pressure, and edema     Activity limitations:    [] Patient's cognitive status and saf ety concerns          [x] Status of current condition      [] Weight bearing restriction  [] Cardiopulmunary Restriction    Participation Restrictions:   [x] Goals and goal agreement with the patient     [] Rehab potential (prognosis) and probable outcome      Examination of Body System: (criteria)    [] 32212 - addressing 1-2 elements    [x] 92379 - addressing a total of 3 or more elements     [] 62254 -  Addressing a total of 4 or more elements         Clinical Presentation: (criteria)  Stable - 12494     On examination of body system using standardized tests and measures patient presents with 1-2 elements from any of the following: body structures and functions, activity limitations, and/or participation restrictions.  Leading to a clinical presentation that is considered stable and/or uncomplicated                              Clinical Decision Making  (Eval Complexity):  Low- 78615     Time Tracking:     PT Received On: 12/19/18  PT Start Time: 0901     PT Stop Time: 0945  PT Total Time (min): 44 min     Billable Minutes: Evaluation 14, Gait Training 15 and Therapeutic Activity 15      Ofelia Mcnulty, PT  12/19/2018

## 2018-12-19 NOTE — PLAN OF CARE
Pt lives with niece.  She was independent and driving prior to hospital stay.  She now states she needs assistance ambulating.  She has no equipment at home.  She has transportation home.     12/19/18 1620   Discharge Assessment   Assessment Type Discharge Planning Assessment   Assessment information obtained from? Patient   Communicated expected length of stay with patient/caregiver yes   Prior to hospitilization cognitive status: Alert/Oriented;No Deficits   Prior to hospitalization functional status: Independent   Current cognitive status: Alert/Oriented;No Deficits   Current Functional Status: Assistive Equipment;Needs Assistance   Facility Arrived From: home   Lives With other relative(s)   Able to Return to Prior Arrangements yes   Equipment Currently Used at Home none   Do you have any problems affording any of your prescribed medications? No   Is the patient taking medications as prescribed? yes   Does the patient have transportation home? Yes   Does the patient receive services at the [facility]? No   Discharge Plan A Home;Home with family   Discharge Plan B Home;Home with family   Patient/Family in Agreement with Plan yes

## 2018-12-19 NOTE — PROGRESS NOTES
"[facility] Medicine Progress Note     Admitting Team: [facility] Hospitalist Team B  Attending Physician: Kris Reyes MD  Resident: Dr. Morgan Pierre  Intern: Dr. Marisa العراقي     Date of Admit: 2018    Subjective:      Patient doing well this morning. She slept well, is hungry and hoping she will be able to eat this morning without becoming nauseous. She denied CP, SOB, headache. She has no complaints.    Objective:   Last 24 Hour Vital Signs:  BP  Min: 105/58  Max: 207/86  Temp  Av.6 °F (37 °C)  Min: 98 °F (36.7 °C)  Max: 99.3 °F (37.4 °C)  Pulse  Av.8  Min: 85  Max: 106  Resp  Av.7  Min: 16  Max: 20  SpO2  Av.6 %  Min: 97 %  Max: 100 %  Height  Av' 3" (160 cm)  Min: 5' 3" (160 cm)  Max: 5' 3" (160 cm)  Weight  Av.7 kg (177 lb 14.9 oz)  Min: 77.5 kg (170 lb 13.7 oz)  Max: 83.9 kg (185 lb)  Body mass index is 30.27 kg/m².  No intake/output data recorded.    Physical Examination:    BP (!) 105/58 (BP Location: Right arm)   Pulse 87   Temp 98 °F (36.7 °C)   Resp 18   Ht 5' 3" (1.6 m)   Wt 77.5 kg (170 lb 13.7 oz)   SpO2 99%   Breastfeeding? No   BMI 30.27 kg/m²     General Appearance:    Alert, cooperative, no distress, appears older than stated age   Head:    Normocephalic, without obvious abnormality, atraumatic   Eyes:    conjunctiva/corneas clear, EOM's intact   Nose:   Nares normal, septum midline, mucosa normal, no drainage    Throat:   Lips, mucosa, and tongue normal; dentures in place, MMM   Neck:   Supple, symmetrical, normal ROM   Lungs:     Clear to auscultation bilaterally, respirations unlabored    Heart:    Regular rate and rhythm, S1 and S2 normal, no murmur, rub   or gallop   Abdomen:     Soft, non-tender, bowel sounds active   Extremities:   Extremities normal, atraumatic, no cyanosis, BLE edema, 2+ radial and DP pulses bilaterally   Skin:   Warm and dry, no rashes   Neurologic:   A&Ox3, moving all extremities, no facial asymmetry         Laboratory:  Most " Recent Data:  CBC:   Lab Results   Component Value Date    WBC 7.64 12/18/2018    HGB 11.9 (L) 12/18/2018    HCT 37.5 12/18/2018     (H) 12/18/2018    MCV 86 12/18/2018    RDW 14.9 (H) 12/18/2018     WBC Differential: 58.9 % N, 22.4 % L, 14.3 % M, 3.9 % Eo, 0.4 % Baso    BMP:   Lab Results   Component Value Date     12/18/2018    K 5.2 (H) 12/18/2018     12/18/2018    CO2 16 (L) 12/18/2018    BUN 64 (H) 12/18/2018    CREATININE 3.6 (H) 12/18/2018     (H) 12/18/2018    CALCIUM 10.8 (H) 12/18/2018    MG 1.8 12/18/2018    PHOS 5.2 (H) 12/18/2018     LFTs:   Lab Results   Component Value Date    PROT 7.4 12/18/2018    ALBUMIN 3.1 (L) 12/18/2018    BILITOT 0.5 12/18/2018    AST 20 12/18/2018    ALKPHOS 138 (H) 12/18/2018    ALT 17 12/18/2018     Coags: No results found for: INR, PROTIME, PTT  FLP: No results found for: CHOL, HDL, LDLCALC, TRIG, CHOLHDL  DM:   Lab Results   Component Value Date    CREATININE 3.6 (H) 12/18/2018     Thyroid:   Lab Results   Component Value Date    TSH 1.497 12/18/2018     Anemia: No results found for: IRON, TIBC, FERRITIN, TGXQNIUB26, FOLATE  Cardiac:   Lab Results   Component Value Date    TROPONINI 0.015 12/18/2018     (H) 12/18/2018     Urinalysis:   Lab Results   Component Value Date    COLORU Yellow 12/18/2018    SPECGRAV >=1.030 (A) 12/18/2018    NITRITE Negative 12/18/2018    KETONESU 1+ (A) 12/18/2018    UROBILINOGEN 1.0 12/18/2018    WBCUA 1 12/18/2018       Trended Lab Data:  Recent Labs   Lab 12/18/18 2003   WBC 7.64   HGB 11.9*   HCT 37.5   *   MCV 86   RDW 14.9*      K 5.2*      CO2 16*   BUN 64*   CREATININE 3.6*   *   PROT 7.4   ALBUMIN 3.1*   BILITOT 0.5   AST 20   ALKPHOS 138*   ALT 17       Trended Cardiac Data:  Recent Labs   Lab 12/18/18 2003 12/18/18  2341   TROPONINI 0.006 0.015   *  --        Microbiology Data:  None      Radiology:  Imaging Results          X-Ray Abdomen AP 1 View (KUB) (Final  result)  Result time 12/18/18 22:16:06    Final result by Thuan Easton MD (12/18/18 22:16:06)                 Impression:      Nonobstructive bowel gas pattern.      Electronically signed by: Thuan Easton MD  Date:    12/18/2018  Time:    22:16             Narrative:    EXAMINATION:  XR ABDOMEN AP 1 VIEW    CLINICAL HISTORY:  Unspecified abdominal pain    TECHNIQUE:  AP View(s) of the abdomen was performed.    COMPARISON:  None    FINDINGS:  Monitoring leads overlie the lower thorax and abdomen.  Scattered air is seen in nondilated loops of small and large bowel. No central small bowel air fluid levels are appreciated.  No definite large volume of free intraperitoneal air allowing for technique.  Surgical clips project over the right upper abdomen.  There are degenerative changes of the visualized thoracolumbar spine as well as the bilateral sacroiliac joints and hips.  There is platelike atelectasis or scarring at the right lung base.  No large pleural effusion appreciated.                               X-Ray Chest AP Portable (Final result)  Result time 12/18/18 21:11:03    Final result by Raven Barrientos MD (12/18/18 21:11:03)                 Impression:      Minimal right basilar scarring or atelectasis, otherwise no acute cardiopulmonary process identified.      Electronically signed by: Raven Barrientos MD  Date:    12/18/2018  Time:    21:11             Narrative:    EXAMINATION:  XR CHEST AP PORTABLE    CLINICAL HISTORY:  weakness;    TECHNIQUE:  Single frontal view of the chest was performed.    COMPARISON:  None    FINDINGS:  Cardiac silhouette is normal in size.  There is elevation of the right hemidiaphragm.  Lungs are expanded.  Minimal scarring or plate atelectasis is seen within the right lower lobe.  No evidence of focal consolidative process, pneumothorax, or significant effusion.  No acute osseous abnormality identified.  Degenerative changes are noted involving the bilateral AC joints.                                  Assessment:     Brittaney Joseph is a 68 y.o. female with:  Patient Active Problem List    Diagnosis Date Noted    Hypertensive emergency 12/18/2018        Plan:     Hypertensive emergency  - /86 on presentation to ED  194/95 on admit  - troponin 0.006    lactate 1  TSH 1.497  urinalysis with 3+ protein  - CXR without signs of congestion or edema   - continue home amlodipine, clonidine, losartan, and metoprolol  - /69 this morning, continue home meds    CKD with possible ARIES  - Cr 3.6, GFR 14  unclear what patient's baseline is as there are no other labs listed prior to today  urinalysis with 3+ protein  - IVF bolus given in ED  - avoid nephrotoxic agents and renal dose meds as appropriate  - urine sodium 30  urine urea 545  no eosinophils on kayla's stain  - control BP, f/u kidney US and continue to monitor  - Cr 3.5 12/19  IVF @ 75 ml/hr, 500cc    Nausea/Vomiting  - patient not currently nauseous and has not vomited since day prior to admission  - abd xray without signs of obstruction  + BS, abdomen soft, non-distended  - continue to monitor and consider zofran prn if patient develops nausea    Protein gap  - total protein 7.4  albumin 3.1  - f/u HIV, Hep C, sPEP, and uPEP    Hyperkalemia, resolved  - K 5.2 on admission  no concerning features on EKG  - K 4.7 12/19/18    Hyperphosphatemia  - Phos 5.2 on admission  - likely secondary to CKD  - 5.3 12/19, continue to monitor    Normocytic anemia  - Hg 11.9, MCV 86  - f/u iron/anemia profile  - stable, continue to monitor    Thrombocytosis, resolved  - platelets 399 on admission  - likely reactive secondary to CKD  - 328 12/19    Diabetes type 2 complicated by CKD not requiring insulin   - patient taking glipizide at home  no A1c in chart, will order one  - hold glipizide while inpatient   - accuchecks AC & HS with low dose SSI  - A1c 6.8    Lower back pain  - patient stated she has  arthritis and chronic back pain but for the last 2 weeks has been having to take percocet daily for it  - takes 2.5 mg percocet twice a day  - continue percocet prn for her back pain  f/u sPEP and uPEP    Diet: Diabetic  DVT ppx: heparin    Dispo:  Follow up lab work, Kidney US      Code Status:     Full    Zoey Fabian MD  [facility] Internal Medicine HO-1    [facility] Medicine Hospitalist Pager numbers:   [facility] Hospitalist Medicine Team A (Joon/Raysa): [phone]  [facility] Hospitalist Medicine Team B (Amy/Surya):  [phone]

## 2018-12-19 NOTE — PLAN OF CARE
Problem: Adult Inpatient Plan of Care  Goal: Plan of Care Review  Outcome: Ongoing (interventions implemented as appropriate)  Patient's blood pressure stabilized. Telemetry no ectopy noted.Pain relieved with one dose of percocet over the night. Free from fall. IV linepatent.To start 24 hour urine collection this morning.Will endorse patient to day shift Nurse.

## 2018-12-19 NOTE — H&P
"[facility] Medicine H&P Note     Admitting Team: [facility] Hospitalist Team B  Attending Physician: Kris Reyes MD  Resident: Dr. Morgan Pierre  Intern: Dr. Marisa العراقي     Date of Admit: 12/18/2018    Chief Complaint     Weakness (68y F ambulatory to ED with c/o decreased appetite, vomitting, and falls for "months." pt also c/o back and chest pains "due to gas" and generally "not feeling well.")   for 1 month    Subjective:      History of Present Illness:  Brittaney Joseph is a 68 y.o.  female who  has a past medical history of Diabetes mellitus, Hypertension, and Renal disorder.. The patient presented to [facility] on 12/18/2018 with a primary complaint of Weakness (68y F ambulatory to ED with c/o decreased appetite, vomitting, and falls for "months." pt also c/o back and chest pains "due to gas" and generally "not feeling well.")    The patient was in their usual state of health until about a month ago when she noticed decreased appetite and hasn't been eating much. She has noted weight loss, she is unsure how much but her pants are loose and people have been telling her she has lost weight. She stated she was over 200lbs and her last doctor's visit she was 175lbs. For the last couple of weeks she has had generalized weakness. She went to the ED at an OSH a week ago and she stated they couldn't find anything wrong with her. The following day she fell because he foot gave out due to weakness. She denied hitting her head at that time. She has had nausea over the last few days and yesterday morning when she tried to eat breakfast she vomited. She hasn't been taking her medications regularly because she thought she wasn't suppose to take them on an empty stomach. She last took her anti-hypertensives yesterday. She has been having lower back pain for the last couple of weeks. She stated she has a history of lower back pain and arthritis, although she has been having to take percocet for the pain " daily. Positive headache yesterday. She denied nausea or headache currently. She denied abdominal pain, CP, SOB, dysuria.    Past Medical History:  Past Medical History:   Diagnosis Date    Diabetes mellitus     Hypertension     Renal disorder        Past Surgical History:  Past Surgical History:   Procedure Laterality Date    CHOLECYSTECTOMY         Allergies:  Review of patient's allergies indicates:  No Known Allergies    Home Medications:  amLODIPine (NORVASC) 10 MG tablet Take 10 mg by mouth nightly.   atorvastatin (LIPITOR) 40 MG tablet Take 40 mg by mouth once daily.   cloNIDine (CATAPRES) 0.2 MG tablet Take 0.2 mg by mouth nightly.   ergocalciferol (VITAMIN D2) 50,000 unit Cap Take 50,000 Units by mouth every Mon, Wed, Fri.   furosemide (LASIX) 40 MG tablet Take 40 mg by mouth once daily.   glipiZIDE (GLUCOTROL) 10 MG tablet Take 10 mg by mouth daily with breakfast.   levocetirizine (XYZAL) 5 MG tablet Take 5 mg by mouth every evening.   losartan (COZAAR) 100 MG tablet Take 100 mg by mouth once daily.         Family History:  History reviewed. No pertinent family history.    Social History:  Social History     Tobacco Use    Smoking status: Never Smoker   Substance Use Topics    Alcohol use: No     Frequency: Never    Drug use: No       Review of Systems:  Pertinent items are noted in HPI. All other systems are reviewed and are negative.    Health Maintaince :   Primary Care Physician: Leonie Conde     Immunizations:   TDap not UTD    Flu patient believes she is UTD   Pna unsure if UTD    Cancer Screening:  PAP: hysterectomy, no longer gets PAPs  MMG: last year  Colonoscopy: she believes she has had one within the last 10 years     Objective:   Last 24 Hour Vital Signs:  BP  Min: 105/58  Max: 207/86  Temp  Av.6 °F (37 °C)  Min: 98 °F (36.7 °C)  Max: 99.3 °F (37.4 °C)  Pulse  Av.8  Min: 85  Max: 106  Resp  Av.7  Min: 16  Max: 20  SpO2  Av.6 %  Min: 97 %  Max: 100 %  Height  Av'  "3" (160 cm)  Min: 5' 3" (160 cm)  Max: 5' 3" (160 cm)  Weight  Av.7 kg (177 lb 14.9 oz)  Min: 77.5 kg (170 lb 13.7 oz)  Max: 83.9 kg (185 lb)  Body mass index is 30.27 kg/m².  No intake/output data recorded.    Physical Examination:    BP (!) 105/58 (BP Location: Right arm)   Pulse 87   Temp 98 °F (36.7 °C)   Resp 18   Ht 5' 3" (1.6 m)   Wt 77.5 kg (170 lb 13.7 oz)   SpO2 99%   Breastfeeding? No   BMI 30.27 kg/m²     General Appearance:    Alert, cooperative, no distress, appears older than stated age   Head:    Normocephalic, without obvious abnormality, atraumatic   Eyes:    PERRL, conjunctiva/corneas clear, EOM's intact   Nose:   Nares normal, septum midline, mucosa normal, no drainage    Throat:   Lips, mucosa, and tongue normal; dentures in place, clear oropharynx, MMM   Neck:   Supple, symmetrical   Back:     Symmetric, no curvature   Lungs:     Clear to auscultation bilaterally, respirations unlabored   Chest Wall:    No tenderness or deformity    Heart:    Regular rate and rhythm, S1 and S2 normal, no murmur, rub   or gallop   Abdomen:     Soft, non-tender, bowel sounds active   Extremities:   Extremities normal, atraumatic, no cyanosis, BLE edema, 2+ radial and DP pulses bilaterally   Skin:   Warm and dry, no rashes   Neurologic:   A&Ox3, moving all extremities, no facial asymmetry         Laboratory:  Most Recent Data:  CBC:   Lab Results   Component Value Date    WBC 7.64 2018    HGB 11.9 (L) 2018    HCT 37.5 2018     (H) 2018    MCV 86 2018    RDW 14.9 (H) 2018     WBC Differential: 58.9 % N, 22.4 % L, 14.3 % M, 3.9 % Eo, 0.4 % Baso    BMP:   Lab Results   Component Value Date     2018    K 5.2 (H) 2018     2018    CO2 16 (L) 2018    BUN 64 (H) 2018    CREATININE 3.6 (H) 2018     (H) 2018    CALCIUM 10.8 (H) 2018    MG 1.8 2018    PHOS 5.2 (H) 2018     LFTs:   Lab Results "   Component Value Date    PROT 7.4 12/18/2018    ALBUMIN 3.1 (L) 12/18/2018    BILITOT 0.5 12/18/2018    AST 20 12/18/2018    ALKPHOS 138 (H) 12/18/2018    ALT 17 12/18/2018     Coags: No results found for: INR, PROTIME, PTT  FLP: No results found for: CHOL, HDL, LDLCALC, TRIG, CHOLHDL  DM:   Lab Results   Component Value Date    CREATININE 3.6 (H) 12/18/2018     Thyroid:   Lab Results   Component Value Date    TSH 1.497 12/18/2018     Anemia: No results found for: IRON, TIBC, FERRITIN, [id number], FOLATE  Cardiac:   Lab Results   Component Value Date    TROPONINI 0.015 12/18/2018     (H) 12/18/2018     Urinalysis:   Lab Results   Component Value Date    COLORU Yellow 12/18/2018    SPECGRAV >=1.030 (A) 12/18/2018    NITRITE Negative 12/18/2018    KETONESU 1+ (A) 12/18/2018    UROBILINOGEN 1.0 12/18/2018    WBCUA 1 12/18/2018       Trended Lab Data:  Recent Labs   Lab 12/18/18 2003   WBC 7.64   HGB 11.9*   HCT 37.5   *   MCV 86   RDW 14.9*      K 5.2*      CO2 16*   BUN 64*   CREATININE 3.6*   *   PROT 7.4   ALBUMIN 3.1*   BILITOT 0.5   AST 20   ALKPHOS 138*   ALT 17       Trended Cardiac Data:  Recent Labs   Lab 12/18/18 2003 12/18/18  2341   TROPONINI 0.006 0.015   *  --        Microbiology Data:  None    Other Results:  EKG: rate 102, sinus tach, normal axis, no signs of acute ischemia    Radiology:  Imaging Results          X-Ray Abdomen AP 1 View (KUB) (Final result)  Result time 12/18/18 22:16:06    Final result by Thuan Easton MD (12/18/18 22:16:06)                 Impression:      Nonobstructive bowel gas pattern.      Electronically signed by: Thuan Easton MD  Date:    12/18/2018  Time:    22:16             Narrative:    EXAMINATION:  XR ABDOMEN AP 1 VIEW    CLINICAL HISTORY:  Unspecified abdominal pain    TECHNIQUE:  AP View(s) of the abdomen was performed.    COMPARISON:  None    FINDINGS:  Monitoring leads overlie the lower thorax and abdomen.  Scattered  air is seen in nondilated loops of small and large bowel. No central small bowel air fluid levels are appreciated.  No definite large volume of free intraperitoneal air allowing for technique.  Surgical clips project over the right upper abdomen.  There are degenerative changes of the visualized thoracolumbar spine as well as the bilateral sacroiliac joints and hips.  There is platelike atelectasis or scarring at the right lung base.  No large pleural effusion appreciated.                               X-Ray Chest AP Portable (Final result)  Result time 12/18/18 21:11:03    Final result by Raven Barrientos MD (12/18/18 21:11:03)                 Impression:      Minimal right basilar scarring or atelectasis, otherwise no acute cardiopulmonary process identified.      Electronically signed by: Raven Barrientos MD  Date:    12/18/2018  Time:    21:11             Narrative:    EXAMINATION:  XR CHEST AP PORTABLE    CLINICAL HISTORY:  weakness;    TECHNIQUE:  Single frontal view of the chest was performed.    COMPARISON:  None    FINDINGS:  Cardiac silhouette is normal in size.  There is elevation of the right hemidiaphragm.  Lungs are expanded.  Minimal scarring or plate atelectasis is seen within the right lower lobe.  No evidence of focal consolidative process, pneumothorax, or significant effusion.  No acute osseous abnormality identified.  Degenerative changes are noted involving the bilateral AC joints.                                 Assessment:     Brittaney Joseph is a 68 y.o. female with:  Patient Active Problem List    Diagnosis Date Noted    Hypertensive emergency 12/18/2018        Plan:     Hypertensive emergency  - /86 on presentation to ED  194/95 on admit  - troponin 0.006    lactate 1  TSH 1.497  urinalysis with 3+ protein  - CXR without signs of congestion or edema    - continue home amlodipine, clonidine, losartan, and metoprolol  - labetalol prn for SBP >180    CKD with  possible ARIES  - Cr 3.6, GFR 14  unclear what patient's baseline is as there are no other labs listed prior to today  urinalysis with 3+ protein  - IVF bolus given in ED  - avoid nephrotoxic agents and renal dose meds as appropriate  - control BP, kidney US and continue to monitor    Nausea/Vomiting  - patient not currently nauseous and has not vomited since yesterday  - abd xray without signs of obstruction  + BS, abdomen soft, non-distended  - continue to monitor and consider zofran prn if patient develops nausea    Protein gap  - total protein 7.4  albumin 3.1  - will order HIV, Hep C, sPEP, and uPEP    Hyperkalemia  - K 5.2 on admission  no concerning features on EKG  - recheck in AM    Hyperphosphatemia  - Phos 5.2 on admission  - likely secondary to CKD  - recheck in AM    Normocytic anemia  - Hg 11.9, MCV 86  - will get iron/anemia profile  - continue to monitor    Thrombocytosis  - platelets 399 on admission  - likely reactive secondary to CKD  - continue to monitor for improvement    Diabetes type 2 complicated by CKD not requiring insulin   - patient taking glipizide at home  no A1c in chart, will order one  - hold glipizide while inpatient   - accuchecks AC & HS with low dose SSI    Lower back pain  - patient stated she has arthritis and chronic back pain but for the last 2 weeks has been having to take percocet daily for it  - takes 2.5 mg percocet twice a day  - continue percocet prn for her back pain  f/u sPEP and uPEP    Diet: Diabetic  DVT ppx: heparin    Dispo: Admit to [facility] Hospitalist Team B. Follow up lab work, Kidney US.      Code Status:     Full    Zoey Fabian MD  [facility] HO-1    \[facility]\"" Medicine Hospitalist Pager numbers:   \[facility]\"" Hospitalist Medicine Team A (Joon/Raysa): [phone]  \[facility] [place] [facility]\"" Hospitalist Medicine Team B (Amy/Surya):  [phone]

## 2018-12-19 NOTE — PLAN OF CARE
12/19/18 1100   OLMOS Message   Medicare Outpatient and Observation Notification regarding financial responsibility Given to patient/caregiver;Explained to patient/caregiver;Signed/date by patient/caregiver   Date OLMOS was signed 12/19/18   Time OLMOS was signed [id number]

## 2018-12-19 NOTE — PLAN OF CARE
Problem: Physical Therapy Goal  Goal: Physical Therapy Goal  1.  Supine to sit with Mod [place]  2.  Sit at EOB 20 minutes  3.  Ambulate 120' with or without AD with supervision.  4.  Ascend/descend 4 steps with WBQC with HHA/CG  Outcome: Ongoing (interventions implemented as appropriate)  Pt would benefit from PT to progress functional mobility.

## 2018-12-19 NOTE — NURSING
Patient is back to the unit from US. Tele box on. Bed alarm set. IV fluid starts to infuse. Will continue to monitor.

## 2018-12-20 VITALS
HEART RATE: 87 BPM | DIASTOLIC BLOOD PRESSURE: 76 MMHG | OXYGEN SATURATION: 97 % | BODY MASS INDEX: 30.79 KG/M2 | RESPIRATION RATE: 18 BRPM | WEIGHT: 173.75 LBS | SYSTOLIC BLOOD PRESSURE: 145 MMHG | TEMPERATURE: 97 F | HEIGHT: 63 IN

## 2018-12-20 PROBLEM — N17.9 AKI (ACUTE KIDNEY INJURY): Status: ACTIVE | Noted: 2018-12-20

## 2018-12-20 PROBLEM — E66.9 OBESITY (BMI 30-39.9): Status: ACTIVE | Noted: 2018-12-20

## 2018-12-20 PROBLEM — E78.5 HLD (HYPERLIPIDEMIA): Status: ACTIVE | Noted: 2018-12-20

## 2018-12-20 PROBLEM — I10 HTN (HYPERTENSION): Status: ACTIVE | Noted: 2018-12-20

## 2018-12-20 PROBLEM — E11.9 DM2 (DIABETES MELLITUS, TYPE 2): Status: ACTIVE | Noted: 2018-12-20

## 2018-12-20 PROBLEM — N18.30 CKD (CHRONIC KIDNEY DISEASE) STAGE 3, GFR 30-59 ML/MIN: Status: ACTIVE | Noted: 2018-12-20

## 2018-12-20 LAB
ALBUMIN SERPL BCP-MCNC: 2.4 G/DL
ALBUMIN SERPL ELPH-MCNC: 2.48 G/DL
ALP SERPL-CCNC: 104 U/L
ALPHA1 GLOB SERPL ELPH-MCNC: 0.33 G/DL
ALPHA2 GLOB SERPL ELPH-MCNC: 0.77 G/DL
ALT SERPL W/O P-5'-P-CCNC: 12 U/L
ANION GAP SERPL CALC-SCNC: 9 MMOL/L
AST SERPL-CCNC: 15 U/L
B-GLOBULIN SERPL ELPH-MCNC: 0.74 G/DL
BASOPHILS # BLD AUTO: 0.02 K/UL
BASOPHILS NFR BLD: 0.3 %
BILIRUB SERPL-MCNC: 0.3 MG/DL
BUN SERPL-MCNC: 61 MG/DL
CALCIUM SERPL-MCNC: 9.9 MG/DL
CHLORIDE SERPL-SCNC: 109 MMOL/L
CHLORIDE UR-SCNC: 38 MMOL/L
CO2 SERPL-SCNC: 16 MMOL/L
CREAT SERPL-MCNC: 3.2 MG/DL
DIFFERENTIAL METHOD: ABNORMAL
EOSINOPHIL # BLD AUTO: 0.3 K/UL
EOSINOPHIL NFR BLD: 5.5 %
ERYTHROCYTE [DISTWIDTH] IN BLOOD BY AUTOMATED COUNT: 15.3 %
EST. GFR  (AFRICAN AMERICAN): 17 ML/MIN/1.73 M^2
EST. GFR  (NON AFRICAN AMERICAN): 14 ML/MIN/1.73 M^2
GAMMA GLOB SERPL ELPH-MCNC: 1.28 G/DL
GLUCOSE SERPL-MCNC: 126 MG/DL
HCT VFR BLD AUTO: 32 %
HGB BLD-MCNC: 10.2 G/DL
LYMPHOCYTES # BLD AUTO: 1.5 K/UL
LYMPHOCYTES NFR BLD: 26.4 %
MAGNESIUM SERPL-MCNC: 1.7 MG/DL
MCH RBC QN AUTO: 27.5 PG
MCHC RBC AUTO-ENTMCNC: 31.9 G/DL
MCV RBC AUTO: 86 FL
MONOCYTES # BLD AUTO: 0.9 K/UL
MONOCYTES NFR BLD: 15.4 %
NEUTROPHILS # BLD AUTO: 3 K/UL
NEUTROPHILS NFR BLD: 52.2 %
PATHOLOGIST INTERPRETATION SPE: NORMAL
PHOSPHATE SERPL-MCNC: 4.2 MG/DL
PLATELET # BLD AUTO: 331 K/UL
PMV BLD AUTO: 9.3 FL
POCT GLUCOSE: 133 MG/DL (ref 70–110)
POCT GLUCOSE: 217 MG/DL (ref 70–110)
POTASSIUM SERPL-SCNC: 4.8 MMOL/L
POTASSIUM UR-SCNC: 23 MMOL/L
PROT 24H UR-MRATE: 851 MG/SPEC
PROT SERPL-MCNC: 5.6 G/DL
PROT SERPL-MCNC: 6.1 G/DL
PROT UR-MCNC: 162 MG/DL
RBC # BLD AUTO: 3.71 M/UL
SODIUM SERPL-SCNC: 134 MMOL/L
SODIUM UR-SCNC: 45 MMOL/L
URINE COLLECTION DURATION: 24 HR
URINE VOLUME: 525 ML
VIT B12 SERPL-MCNC: 864 NG/L
WBC # BLD AUTO: 5.79 K/UL

## 2018-12-20 PROCEDURE — 97535 SELF CARE MNGMENT TRAINING: CPT

## 2018-12-20 PROCEDURE — 84300 ASSAY OF URINE SODIUM: CPT

## 2018-12-20 PROCEDURE — 84166 PROTEIN E-PHORESIS/URINE/CSF: CPT | Mod: 26,,, | Performed by: PATHOLOGY

## 2018-12-20 PROCEDURE — 63600175 PHARM REV CODE 636 W HCPCS: Performed by: STUDENT IN AN ORGANIZED HEALTH CARE EDUCATION/TRAINING PROGRAM

## 2018-12-20 PROCEDURE — 63600175 PHARM REV CODE 636 W HCPCS: Performed by: INTERNAL MEDICINE

## 2018-12-20 PROCEDURE — 90662 IIV NO PRSV INCREASED AG IM: CPT | Performed by: INTERNAL MEDICINE

## 2018-12-20 PROCEDURE — 36415 COLL VENOUS BLD VENIPUNCTURE: CPT

## 2018-12-20 PROCEDURE — 83735 ASSAY OF MAGNESIUM: CPT

## 2018-12-20 PROCEDURE — G0378 HOSPITAL OBSERVATION PER HR: HCPCS

## 2018-12-20 PROCEDURE — G8989 SELF CARE D/C STATUS: HCPCS | Mod: CK

## 2018-12-20 PROCEDURE — 82436 ASSAY OF URINE CHLORIDE: CPT

## 2018-12-20 PROCEDURE — 94761 N-INVAS EAR/PLS OXIMETRY MLT: CPT

## 2018-12-20 PROCEDURE — 80053 COMPREHEN METABOLIC PANEL: CPT

## 2018-12-20 PROCEDURE — 25000003 PHARM REV CODE 250: Performed by: STUDENT IN AN ORGANIZED HEALTH CARE EDUCATION/TRAINING PROGRAM

## 2018-12-20 PROCEDURE — 84166 PROTEIN E-PHORESIS/URINE/CSF: CPT

## 2018-12-20 PROCEDURE — 97110 THERAPEUTIC EXERCISES: CPT

## 2018-12-20 PROCEDURE — G0008 ADMIN INFLUENZA VIRUS VAC: HCPCS | Performed by: INTERNAL MEDICINE

## 2018-12-20 PROCEDURE — G8988 SELF CARE GOAL STATUS: HCPCS | Mod: CH

## 2018-12-20 PROCEDURE — 84133 ASSAY OF URINE POTASSIUM: CPT

## 2018-12-20 PROCEDURE — 84100 ASSAY OF PHOSPHORUS: CPT

## 2018-12-20 PROCEDURE — 90471 IMMUNIZATION ADMIN: CPT | Performed by: INTERNAL MEDICINE

## 2018-12-20 PROCEDURE — 85025 COMPLETE CBC W/AUTO DIFF WBC: CPT

## 2018-12-20 PROCEDURE — 84156 ASSAY OF PROTEIN URINE: CPT

## 2018-12-20 RX ORDER — ACETAMINOPHEN 500 MG
500 TABLET ORAL ONCE
Status: COMPLETED | OUTPATIENT
Start: 2018-12-20 | End: 2018-12-20

## 2018-12-20 RX ADMIN — INFLUENZA A VIRUS A/MICHIGAN/45/2015 X-275 (H1N1) ANTIGEN (FORMALDEHYDE INACTIVATED), INFLUENZA A VIRUS A/SINGAPORE/INFIMH-16-0019/2016 IVR-186 (H3N2) ANTIGEN (FORMALDEHYDE INACTIVATED), AND INFLUENZA B VIRUS B/MARYLAND/15/2016 BX-69A (A B/COLORADO/6/2017-LIKE VIRUS) ANTIGEN (FORMALDEHYDE INACTIVATED) 0.5 ML: 60; 60; 60 INJECTION, SUSPENSION INTRAMUSCULAR at 03:12

## 2018-12-20 RX ADMIN — INSULIN ASPART 2 UNITS: 100 INJECTION, SOLUTION INTRAVENOUS; SUBCUTANEOUS at 11:12

## 2018-12-20 RX ADMIN — ATORVASTATIN CALCIUM 40 MG: 40 TABLET, FILM COATED ORAL at 08:12

## 2018-12-20 RX ADMIN — LOSARTAN POTASSIUM 100 MG: 50 TABLET, FILM COATED ORAL at 08:12

## 2018-12-20 RX ADMIN — HEPARIN SODIUM 5000 UNITS: 5000 INJECTION, SOLUTION INTRAVENOUS; SUBCUTANEOUS at 06:12

## 2018-12-20 RX ADMIN — HEPARIN SODIUM 5000 UNITS: 5000 INJECTION, SOLUTION INTRAVENOUS; SUBCUTANEOUS at 03:12

## 2018-12-20 RX ADMIN — ACETAMINOPHEN 500 MG: 500 TABLET ORAL at 12:12

## 2018-12-20 NOTE — PLAN OF CARE
Problem: Adult Inpatient Plan of Care  Goal: Plan of Care Review  Outcome: Ongoing (interventions implemented as appropriate)  Plan of care reviewed with patient. Verbal reported of understanding. NSR on monitor with no red alarms noted. No Falls or injuries throughout the shift. TTE and Kidney US completed today. Time urine collection starts at 1028 and keeping with ice. No complaints of pain throughout the shift. Due medications given per order. No acute distress noted. Bed on lowest position with alarm set. Head of bed elevated. Side rails x 2 are up. Patient will be monitored over night.

## 2018-12-20 NOTE — PLAN OF CARE
Problem: Occupational Therapy Goal  Goal: Occupational Therapy Goal  Goals to be met by: 01/19/19     Patient will increase functional independence with ADLs by performing:    UE Dressing with Modified [place].  LE Dressing with Modified [place].  Grooming while standing at sink with Modified [place].  Toileting from toilet with Modified [place] for hygiene and clothing management.   Toilet transfer to toilet with Modified [place].  Increased functional strength to WFL for ADLs.     Outcome: Ongoing (interventions implemented as appropriate)  Brittaney Joseph is a 68 y.o. female with a medical diagnosis of <principal problem not specified>.  She presents with the following performance deficits affecting function are weakness, impaired endurance, impaired self care skills, impaired functional mobilty, gait instability, impaired balance, decreased lower extremity function, decreased safety awareness.  Pt tolerated tx well.  She reports she is feeling stronger, however, poor tolerance of standing activities secondary to LE weakness. Continue OT services to address functional goals, progressing as able.      ANISH Kruse

## 2018-12-20 NOTE — PT/OT/SLP PROGRESS
Physical Therapy      Patient Name:  Brittaney Joseph   MRN:  [id number]    Patient not seen today secondary to (pt had a bad headache and refused rx). Will follow-up tomorrow.    Jone Chacon, PTA

## 2018-12-20 NOTE — PLAN OF CARE
Problem: Adult Inpatient Plan of Care  Goal: Plan of Care Review  Outcome: Ongoing (interventions implemented as appropriate)  Patient stable VS over the night.Telemetry no ectopy noted.No pain medication demand over the night. Free from fall. IV line patent, 24 hour urine collection due at 1015H today.Will endorse patient to day shift Nurse.

## 2018-12-20 NOTE — DISCHARGE SUMMARY
"[facility] Internal Medicine Discharge Summary    Primary Team: [facility] Internal Medicine Team B  Attending Physician: Kris Reyes MD  Resident: Ignacio  Intern: Dulce Maria    Date of Admit: 12/18/2018  Date of Discharge: 12/20/2018    Discharge to: Home with home heatlh  Condition: Improved    Discharge Diagnoses     Patient Active Problem List   Diagnosis    Hypertensive emergency    ARIES (acute kidney injury)    CKD (chronic kidney disease) stage 3, GFR 30-59 ml/min    HTN (hypertension)    HLD (hyperlipidemia)    DM2 (diabetes mellitus, type 2)    Obesity (BMI 30-39.9)       Consultants and Procedures     Consultants:  Nephrology    Procedures:   None    Brief History of Present Illness      Brittaney Joseph is a 68 y.o.  female who  has a past medical history of Diabetes mellitus, Hypertension, and Renal disorder.. The patient presented to [facility] on 12/18/2018 with a primary complaint of Weakness (68y F ambulatory to ED with c/o decreased appetite, vomitting, and falls for "months." pt also c/o back and chest pains "due to gas" and generally "not feeling well.")     The patient was in her usual state of health until about a month ago when she noticed decreased appetite and hasn't been eating much. She has noted weight loss, she is unsure how much but her pants are loose and people have been telling her she has lost weight. She stated she was over 200lbs and her last doctor's visit she was 175lbs. For the last couple of weeks she has had generalized weakness. She went to the ED at an OSH a week ago and she stated they couldn't find anything wrong with her. The following day she fell because he foot gave out due to weakness. She denied hitting her head at that time. She has had nausea over the last few days and yesterday morning when she tried to eat breakfast she vomited. She hasn't been taking her medications regularly because she thought she wasn't suppose to take them on an empty stomach. She " last took her anti-hypertensives yesterday. She has been having lower back pain for the last couple of weeks. She stated she has a history of lower back pain and arthritis, although she has been having to take percocet for the pain daily. Positive headache yesterday. She denied nausea or headache currently. She denied abdominal pain, CP, SOB, dysuria.        For the full HPI please refer to the History & Physical from this admission.    Hospital Course By Problem with Pertinent Findings     Hypertensive emergency  - creatinine of 3.6/ proteinuria as possible evidence of end organ damage   - /86 on presentation to ED  194/95 on admit  - troponin 0.006    lactate 1  TSH 1.497  urinalysis with 3+ protein  - CXR without signs of congestion or edema   - continue home amlodipine, clonidine, and losartan    Weakness  - PT/OT rec'd home health     CKD III/IV with possible ARIES  - Cr 3.6, GFR 14  unclear what patient's baseline is as there are no other labs listed prior to today  urinalysis with 3+ protein  - IVF bolus given in ED  - avoid nephrotoxic agents and renal dose meds as appropriate  - urine sodium 30  urine urea 545  no eosinophils on becker's stain  - urine ultrasound with evidence of cortical thinning  - nephrology consulted, rec follow-up soon with outpatient nephrologist     Non-Anion Gap Metabolic Acidosis  - unclear etiology; denying diarrhea, received a small amount of NS infusion   - urine electrolytes c/w renal pathology     Nausea/Vomiting  - patient not currently nauseous and has not vomited since day prior to admission  - abd xray without signs of obstruction  + BS, abdomen soft, non-distended     Protein gap  - total protein 7.4  albumin 3.1  -HIV, hepatitis panel negative  -f/u SPEP, UPEP      Hyperkalemia, resolved  - K 5.2 on admission  no concerning features on EKG  - resolved      Hyperphosphatemia  - Phos 5.2 on admission  - likely secondary to  CKD     Normocytic anemia  - Hg 11.9, MCV 86  - likely related to CKD   - stable, continue to monitor     Thrombocytosis, resolved  - platelets 399 on admission  - likely due to hemoconcentration      Diabetes type 2 complicated by CKD not requiring insulin   - patient taking glipizide at home  no A1c in chart, will order one  - hold glipizide while inpatient   - accuchecks AC & HS with low dose SSI  - A1c 6.8     Lower back pain  - patient stated she has arthritis and chronic back pain but for the last 2 weeks has been having to take percocet daily for it  - takes 2.5 mg percocet twice a day  -continue percocet prn for her back pain  f/u sPEP and uPEP        Discharge Medications        Medication List      CONTINUE taking these medications    amLODIPine 10 MG tablet  Commonly known as:  NORVASC     atorvastatin 40 MG tablet  Commonly known as:  LIPITOR     cloNIDine 0.2 MG tablet  Commonly known as:  CATAPRES     furosemide 40 MG tablet  Commonly known as:  LASIX     glipiZIDE 10 MG tablet  Commonly known as:  GLUCOTROL     levocetirizine 5 MG tablet  Commonly known as:  XYZAL     losartan 100 MG tablet  Commonly known as:  COZAAR     VITAMIN D2 50,000 unit Cap  Generic drug:  ergocalciferol            Discharge Information:   Diet:  DASH diet    Physical Activity:  Home PT/OT, home health    Instructions:  1. Take all medications as prescribed  2. Keep all follow-up appointments  3. Return to the hospital or call your primary care physicians if any worsening symptoms such as severe weakness, intractable nausea, vomiting, or diarrhea, chest pain, difficulty breathing, or any other other concerning symptoms occur.      Follow-Up Appointments:  Nephrology Dr. Mcdaniel within 2 weeks  PCP Dr. Conde within 1 week    Morgan Pierre  [facility] Internal Medicine, -3

## 2018-12-20 NOTE — PLAN OF CARE
BSC/Tub bench referral sent to [facility]. Pt does not have HH preference, HH referral sent to multiple agencies.       12/20/18 1508   Post-Acute Status   Post-Acute Authorization Home Health/Hospice;HME   HME Status Referrals Sent   Home Health/Hospice Status Referrals Sent   Farrah Roach RN-BC  Transitional Navigator  [phone]

## 2018-12-20 NOTE — PT/OT/SLP DISCHARGE
Occupational Therapy Discharge Summary    Brittaney Joseph  MRN: [id number]   Principal Problem: Hypertensive emergency      Patient Discharged from acute Occupational Therapy on 12/20.  Please refer to prior OT note dated 12/20 for functional status.    Assessment:      Patient appropriate for care in another setting.    Objective:     GOALS:   Multidisciplinary Problems     Occupational Therapy Goals        Problem: Occupational Therapy Goal    Goal Priority Disciplines Outcome Interventions   Occupational Therapy Goal     OT, PT/OT Ongoing (interventions implemented as appropriate)    Description:  Goals to be met by: 01/19/19     Patient will increase functional independence with ADLs by performing:    UE Dressing with Modified [place].  LE Dressing with Modified [place].  Grooming while standing at sink with Modified [place].  Toileting from toilet with Modified [place] for hygiene and clothing management.   Toilet transfer to toilet with Modified [place].  Increased functional strength to WFL for ADLs.                      Reasons for Discontinuation of Therapy Services  Transfer to alternate level of care.      Plan:     Patient Discharged to: Home with Home Health Service    Hesham Bautista OT  12/20/2018

## 2018-12-20 NOTE — PLAN OF CARE
Problem: Occupational Therapy Goal  Goal: Occupational Therapy Goal  Goals to be met by: 01/19/19     Patient will increase functional independence with ADLs by performing:    UE Dressing with Modified [place].  LE Dressing with Modified [place].  Grooming while standing at sink with Modified [place].  Toileting from toilet with Modified [place] for hygiene and clothing management.   Toilet transfer to toilet with Modified [place].  Increased functional strength to WFL for ADLs.     Outcome: Ongoing (interventions implemented as appropriate)  Rec home w/HH therapy and TTB, RW, and BSC

## 2018-12-20 NOTE — PLAN OF CARE
Discharge order noted. [facility] and [place] delivered BSC to pt's bedside. Pt's niece to schedule follow up appointments for pt.      Discharge rounds on patient. Discussed followup appointments, blue discharge folder, discharge nurse will go over home medications and reasons for medications and final discharge instructions. All patient/caregiver questions answered. Patient verbalized understanding.         12/20/18 8205   Final Note   Assessment Type Final Discharge Note   Anticipated Discharge Disposition Home-Health   What phone number can be called within the next 1-3 days to see how you are doing after discharge? [phone]   Hospital Follow Up  Appt(s) scheduled? (pt's niece to make appointments.)   Discharge plans and expectations educations in teach back method with documentation complete? Yes   Right Care Referral Info   Post Acute Recommendation Home-care   Farrah Roach RN-BC  Transitional Navigator  [phone]

## 2018-12-20 NOTE — NURSING
Received patient upon rounds, patient conscious , coherent to time, place date, and situation, with saline lock on the right hand gauge 20 , flushed patent, with clean and dry dressing. Patient has subjective complaint of lower back pain 5/10, percocet offered but refused to take a dose, patient verbalized it makes her so sleepy.Telemetry NSR  70-80's. Oxygen saturation 97% on room air. Advised patient to call for any assistance. Safety fall precaution measures noted, Bed alarm ON. Call bell with in reach.  Will continue to monitor patient.

## 2018-12-20 NOTE — PLAN OF CARE
Problem: Adult Inpatient Plan of Care  Goal: Plan of Care Review  Outcome: Ongoing (interventions implemented as appropriate)  Patient on RA, no repiratory distress noted.  Will continue to monitor.

## 2018-12-20 NOTE — PT/OT/SLP PROGRESS
Occupational Therapy   Treatment    Name: Brittaney Joseph  MRN: [id number]  Admitting Diagnosis:  <principal problem not specified>       Recommendations:     Discharge Recommendations: (TBD)  Discharge Equipment Recommendations:  commode, tub bench  Barriers to discharge:  Inaccessible home environment    Subjective     Pain/Comfort:  · Pain Rating 1: 0/10  · Pain Rating Post-Intervention 1: 0/10    Objective:     Communicated with: RN prior to session.  Patient found with all lines intact and bed alarm, peripheral IV, telemetry upon OT entry to room.    General Precautions: Standard, fall   Orthopedic Precautions:N/A   Braces: N/A     Occupational Performance:    Bed Mobility:    Pt found up in chair.    Functional Mobility/Transfers:  · Patient completed Sit <> Stand Transfer with stand by assistance  with  rolling walker   · Patient completed Bed <> Chair Transfer using Stand Pivot technique with stand by assistance and contact guard assistance with rolling walker  · Functional Mobility: Pt ambulated in room to and from sink with CGA-Min A using RW.    Activities of Daily Living:  · Grooming: contact guard assistance standing at sink      [facility] 6 Click ADL: 15    Treatment & Education:  Pt performed BUE AROM ex 2 x 10 reps all jts/planes. Pt tolerated well without complaints.    Sit<>stand with SBA x 3 trials using RW tolerating ~1 min each stand.     Patient left up in chair with all lines intact, call button in reach, chair alarm on and family present  Education:    Assessment:     Brittaney Joseph is a 68 y.o. female with a medical diagnosis of <principal problem not specified>.  She presents with the following performance deficits affecting function are weakness, impaired endurance, impaired self care skills, impaired functional mobilty, gait instability, impaired balance, decreased lower extremity function, decreased safety awareness.  Pt tolerated tx well.  She reports she is feeling stronger, however,  poor tolerance of standing activities secondary to LE weakness. Continue OT services to address functional goals, progressing as able.      Rehab Prognosis:  Good; patient would benefit from acute skilled OT services to address these deficits and reach maximum level of function.       Plan:     Patient to be seen 5 x/week to address the above listed problems via self-care/home management, therapeutic activities, therapeutic exercises  · Plan of Care Expires: 01/19/19  · Plan of Care Reviewed with: patient    This Plan of care has been discussed with the patient who was involved in its development and understands and is in agreement with the identified goals and treatment plan    GOALS:   Multidisciplinary Problems     Occupational Therapy Goals        Problem: Occupational Therapy Goal    Goal Priority Disciplines Outcome Interventions   Occupational Therapy Goal     OT, PT/OT Ongoing (interventions implemented as appropriate)    Description:  Goals to be met by: 01/19/19     Patient will increase functional independence with ADLs by performing:    UE Dressing with Modified [place].  LE Dressing with Modified [place].  Grooming while standing at sink with Modified [place].  Toileting from toilet with Modified [place] for hygiene and clothing management.   Toilet transfer to toilet with Modified [place].  Increased functional strength to WFL for ADLs.                      Time Tracking:     OT Date of Treatment: 12/20/18  OT Start Time: 1120  OT Stop Time: 1146  OT Total Time (min): 26 min    Billable Minutes:Self Care/Home Management 16  Therapeutic Exercise 10    ANISH Kruse  12/20/2018

## 2018-12-20 NOTE — CONSULTS
"[facility] Nephrology Consult    Consulting Physician:Kris Reyes MD  Date of Admission:12/18/2018  Date of Consult: 12/19/2018    Reason for Consult:  ARIES on CKD    Subjective:  History of Present Illness:  Brittaney Joseph is a 68 y.o.  female who  has a past medical history of Diabetes mellitus, Hypertension, and Renal disorder.. The patient presented on 12/18/2018 with a primary complaint of Weakness (68y F ambulatory to ED with c/o decreased appetite, vomitting, and falls for "months." pt also c/o back and chest pains "due to gas" and generally "not feeling well.")      Pt presents with not feeling well. Decreased appetite for the past few weeks with nausea and vomiting for the past several days. Also has been having some diarrhea. States that she has been drinking fine. Denies SOB, F/C, CP, swelling. She has been loosing weight.     Past Medical History:  Past Medical History:   Diagnosis Date    Diabetes mellitus     Hypertension     Renal disorder        Past Surgical History:  Past Surgical History:   Procedure Laterality Date    CHOLECYSTECTOMY         Allergies:  Review of patient's allergies indicates:  No Known Allergies    Medications:  In-Hospital Scheduled Medications:   amLODIPine  10 mg Oral Nightly    atorvastatin  40 mg Oral Daily    cloNIDine  0.2 mg Oral Nightly    heparin (porcine)  5,000 Units Subcutaneous Q8H    losartan  100 mg Oral Daily     In-Hospital PRN Medications:  dextrose 50%, dextrose 50%, glucagon (human recombinant), glucose, glucose, influenza, insulin aspart U-100, oxyCODONE-acetaminophen, pneumoc 13-margaret conj-dip cr(PF), sodium chloride 0.9%  In-Hospital IV Infusion Medications:    Home Medications:  Prior to Admission medications    Medication Sig Start Date End Date Taking? Authorizing Provider   amLODIPine (NORVASC) 10 MG tablet Take 10 mg by mouth nightly.   Yes Historical Provider, MD   atorvastatin (LIPITOR) 40 MG tablet Take 40 mg by mouth once daily.   Yes " Historical Provider, MD   cloNIDine (CATAPRES) 0.2 MG tablet Take 0.2 mg by mouth nightly.   Yes Historical Provider, MD   ergocalciferol (VITAMIN D2) 50,000 unit Cap Take 50,000 Units by mouth every Mon, Wed, Fri.   Yes Historical Provider, MD   furosemide (LASIX) 40 MG tablet Take 40 mg by mouth once daily.   Yes Historical Provider, MD   glipiZIDE (GLUCOTROL) 10 MG tablet Take 10 mg by mouth daily with breakfast.   Yes Historical Provider, MD   levocetirizine (XYZAL) 5 MG tablet Take 5 mg by mouth every evening.   Yes Historical Provider, MD   losartan (COZAAR) 100 MG tablet Take 100 mg by mouth once daily.   Yes Historical Provider, MD       Family History:  History reviewed. No pertinent family history.    Social History:  Social History     Tobacco Use    Smoking status: Never Smoker   Substance Use Topics    Alcohol use: No     Frequency: Never    Drug use: No       Review of Systems:  General - Denies fevers and chills.  Head - Denies dizziness, lightheadedness, headache  Throat - Denies dysphagia and sore throat  Pulmonary - Denies shortness of breath and cough  Cardiovascular- Denies chest pain and palpitations  Gastrointestonal - Nausea, vomiting, diarrhea  Genitourinary- Denies changes in urination, dysuria  Skin - Denies rashes, skin changes, and itching  Muskuloskeletal - Denies myalgias and arthralgias  Hematologic/Lymphatic - Denies bleeding and bruising  Neurological - Generalized weakness      Health Maintaince :  Immunizations:  Currently on File with [facility]: There is no immunization history for the selected administration types on file for this patient.    Objective:  Last 24 Hour Vital Signs:  BP  Min: 128/71  Max: 165/83  Temp  Av.4 °F (36.3 °C)  Min: 96.7 °F (35.9 °C)  Max: 98.9 °F (37.2 °C)  Pulse  Av.2  Min: 82  Max: 95  Resp  Av  Min: 18  Max: 18  SpO2  Av.3 %  Min: 97 %  Max: 98 %  Weight  Av.8 kg (173 lb 11.6 oz)  Min: 78.8 kg (173 lb 11.6 oz)  Max: 78.8 kg  (173 lb 11.6 oz)  I/O last 3 completed shifts:  In: 2610 [P.O.:380; I.V.:100; IV Piggyback:2130]  Out: 541 [Urine:541]    Physical Examination:  General - alert and cooperative  Head - Normocephalic and atraumatic  Eyes - Normal lids and lashes, anicteric sclera  Neck - No JVD, supple  Lungs - Bilateral crackles.  Heart - Regular rate and rhythm  Abd - Normoactive bowel sounds, nontender, non distended.  Ext - No clubbing, cyanosis. Trace edema  Pulse - 2+ and symmetric  Skin - Warm and dry  Psych - normal mood and affect      Laboratory Results:  Most Recent Data:  CBC:   Lab Results   Component Value Date    WBC 5.79 12/20/2018    HGB 10.2 (L) 12/20/2018    HCT 32.0 (L) 12/20/2018     12/20/2018    MCV 86 12/20/2018    RDW 15.3 (H) 12/20/2018     BMP:   Lab Results   Component Value Date     (L) 12/20/2018    K 4.8 12/20/2018     12/20/2018    CO2 16 (L) 12/20/2018    BUN 61 (H) 12/20/2018     (H) 12/20/2018    CALCIUM 9.9 12/20/2018    MG 1.7 12/20/2018    PHOS 4.2 12/20/2018     LFTs:   Lab Results   Component Value Date    PROT 6.1 12/20/2018    ALBUMIN 2.4 (L) 12/20/2018    BILITOT 0.3 12/20/2018    AST 15 12/20/2018    ALKPHOS 104 12/20/2018    ALT 12 12/20/2018     Coags: No results found for: INR, PROTIME, PTT  FLP: No results found for: CHOL, HDL, LDLCALC, TRIG, CHOLHDL  DM:   Lab Results   Component Value Date    HGBA1C 6.8 (H) 12/19/2018    CREATININE 3.2 (H) 12/20/2018     Thyroid:   Lab Results   Component Value Date    TSH 1.497 12/18/2018     Anemia:   Lab Results   Component Value Date    IRON 37 12/19/2018    TIBC 277 12/19/2018    FERRITIN 94 12/19/2018    XQAUYHIJ11 864 12/19/2018    FOLATE 5.4 12/19/2018     Cardiac:   Lab Results   Component Value Date    TROPONINI 0.015 12/18/2018     (H) 12/18/2018     Urinalysis:   Lab Results   Component Value Date    COLORU Yellow 12/18/2018    SPECGRAV >=1.030 (A) 12/18/2018    NITRITE Negative 12/18/2018    KETONESU 1+  (A) 12/18/2018    UROBILINOGEN 1.0 12/18/2018     Trended Lab Data:  Recent Labs   Lab 12/18/18 2003 12/19/18  0510 12/20/18  0441   WBC 7.64 6.27 5.79   HGB 11.9* 9.8* 10.2*   HCT 37.5 30.3* 32.0*   * 328 331   MCV 86 86 86   RDW 14.9* 15.0* 15.3*    135* 134*   K 5.2* 4.7 4.8    109 109   CO2 16* 16* 16*   BUN 64* 64* 61*   * 106 126*   PROT 7.4 6.0 6.1   ALBUMIN 3.1* 2.4* 2.4*   BILITOT 0.5 0.4 0.3   AST 20 17 15   ALKPHOS 138* 103 104   ALT 17 13 12     Trended Cardiac Data:  Recent Labs   Lab 12/18/18 2003 12/18/18  2341   TROPONINI 0.006 0.015   *  --        Other Results:  Radiology:  X-ray Abdomen Ap 1 View (kub)    Result Date: 12/18/2018  EXAMINATION: XR ABDOMEN AP 1 VIEW CLINICAL HISTORY: Unspecified abdominal pain TECHNIQUE: AP View(s) of the abdomen was performed. COMPARISON: None FINDINGS: Monitoring leads overlie the lower thorax and abdomen.  Scattered air is seen in nondilated loops of small and large bowel. No central small bowel air fluid levels are appreciated.  No definite large volume of free intraperitoneal air allowing for technique.  Surgical clips project over the right upper abdomen.  There are degenerative changes of the visualized thoracolumbar spine as well as the bilateral sacroiliac joints and hips.  There is platelike atelectasis or scarring at the right lung base.  No large pleural effusion appreciated.     Nonobstructive bowel gas pattern. Electronically signed by: Thuan Easton MD Date:    12/18/2018 Time:    22:16    X-ray Chest Ap Portable    Result Date: 12/18/2018  EXAMINATION: XR CHEST AP PORTABLE CLINICAL HISTORY: weakness; TECHNIQUE: Single frontal view of the chest was performed. COMPARISON: None FINDINGS: Cardiac silhouette is normal in size.  There is elevation of the right hemidiaphragm.  Lungs are expanded.  Minimal scarring or plate atelectasis is seen within the right lower lobe.  No evidence of focal consolidative process,  pneumothorax, or significant effusion.  No acute osseous abnormality identified.  Degenerative changes are noted involving the bilateral AC joints.     Minimal right basilar scarring or atelectasis, otherwise no acute cardiopulmonary process identified. Electronically signed by: Raven Barrientos MD Date:    12/18/2018 Time:    21:11    Us Kidney    Result Date: 12/19/2018  EXAMINATION: US KIDNEY CLINICAL HISTORY: ARIES; Hypertensive emergency TECHNIQUE: Ultrasound of the kidneys was performed including color flow and Doppler evaluation of the kidneys. COMPARISON: None. FINDINGS: The kidneys are normal in length.  The right kidney measures 11.1 cm, left kidney measures 10.7 cm.  The arterial resistive indices are within normal limits.  Renal cortical thinning bilaterally.  No renal masses or hydronephrosis.  Bladder grossly unremarkable.     Renal cortical thinning.  No hydronephrosis or renal masses. Electronically signed by: Fantasma Medina MD Date:    12/19/2018 Time:    10:54    Assessment:  Brittaney Joseph is a 68 y.o. female with:  Patient Active Problem List    Diagnosis Date Noted    ARIES (acute kidney injury) 12/20/2018    CKD (chronic kidney disease) stage 3, GFR 30-59 ml/min 12/20/2018    HTN (hypertension) 12/20/2018    HLD (hyperlipidemia) 12/20/2018    DM2 (diabetes mellitus, type 2) 12/20/2018    Obesity (BMI 30-39.9) 12/20/2018    Hypertensive emergency 12/18/2018       Plan:    ARIES - suspect that there is some prerenal component given GI symptoms. Would continue IV fluids vs encouraged increased PO intake.    CKD 3 - Cr back in April was 1.8 pt's daughter had reported to Dr. Mcdaniel's clinic that her primary care had told them that the pt's renal function was worsening. Would try to touch base with pt's PCP to find out what her renal function was. Some of this could be progression of there CKD. Renal US with bilateral cortical thinning.    HTN - continue home meds    Weight loss - Make sure that  "pt is up to date on cancer screening    Crackles on exam - CXR does not show pulmonary edema. "Minimal right basilar scarring or atelectasis." Cardiac echo with hyperdynamic LV with EF 65%. Concentric remodeling. Diastolic dysfunction. Normal RV. Some pulmonary hypertension with pulmonary artery systolic pressure of 41 mm Hg. Consider getting chest CT to evaluate for any underlying lung pathology.    Thank you for allowing us to participate in the care of this patient. Please contact me if you have any questions regarding this consult.    Brannon Watts MD12/20/2018  [facility] Nephrology PGY V  Pager number: [phone]  Cell: [phone]  "

## 2018-12-20 NOTE — PLAN OF CARE
Per approval from [facility] of [facility] , [place] pulled BSC out of DME closet, [place] delivered BSC to pt's bedside, education handout given, pt accepted by [facility].       12/20/18 1713   Post-Acute Status   Post-Acute Authorization HME;Home Health/Hospice   HME Status Set-up Complete   Home Health/Hospice Status Authorization Obtained   Farrah Roach RN-BC  Transitional Navigator  [phone]

## 2018-12-21 LAB
HCV RNA SERPL NAA+PROBE-LOG IU: <1.08 LOG (10) IU/ML
HCV RNA SERPL QL NAA+PROBE: NOT DETECTED IU/ML
HCV RNA SPEC NAA+PROBE-ACNC: <12 IU/ML

## 2018-12-21 NOTE — PT/OT/SLP DISCHARGE
Physical Therapy Discharge Summary    Name: Brittaney Joseph  MRN: [id number]   Principal Problem: Hypertensive emergency     Patient Discharged from acute Physical Therapy on 12/20/2018.  Please refer to prior PT noted date on 12/19/2018 for functional status.     Assessment:     Patient appropriate for care in another setting.    Objective:     GOALS:   Multidisciplinary Problems     Physical Therapy Goals        Problem: Physical Therapy Goal    Goal Priority Disciplines Outcome Goal Variances Interventions   Physical Therapy Goal     PT, PT/OT Ongoing (interventions implemented as appropriate)     Description:  1.  Supine to sit with Mod [place]  2.  Sit at EOB 20 minutes  3.  Ambulate 120' with or without AD with supervision.  4.  Ascend/descend 4 steps with WBQC with HHA/CG                    Reasons for Discontinuation of Therapy Services  Transfer to alternate level of care.      Plan:     Patient Discharged to: Home with Home Health Service.    Brannon Kern, PT  12/21/2018

## 2018-12-21 NOTE — NURSING
Discharge instructions and education provided. Patient verbal states of understanding. IV site and telemetry discontinued without adverse reaction. Patient discharged with her belongings and no acute distress noted.

## 2018-12-24 ENCOUNTER — HOSPITAL ENCOUNTER (INPATIENT)
Facility: HOSPITAL | Age: 68
LOS: 2 days | Discharge: HOME HEALTH | DRG: 638 | End: 2018-12-26
Attending: EMERGENCY MEDICINE | Admitting: INTERNAL MEDICINE
Payer: MEDICARE

## 2018-12-24 DIAGNOSIS — R62.7 FAILURE TO THRIVE IN ADULT: ICD-10-CM

## 2018-12-24 DIAGNOSIS — E87.20 METABOLIC ACIDOSIS, NORMAL ANION GAP (NAG): ICD-10-CM

## 2018-12-24 DIAGNOSIS — N18.30 CKD (CHRONIC KIDNEY DISEASE) STAGE 3, GFR 30-59 ML/MIN: ICD-10-CM

## 2018-12-24 DIAGNOSIS — E16.2 HYPOGLYCEMIA: Primary | ICD-10-CM

## 2018-12-24 DIAGNOSIS — E86.0 DEHYDRATION: ICD-10-CM

## 2018-12-24 DIAGNOSIS — T38.3X1A POISONING BY ORAL SULFONYLUREA DERIVATIVE, ACCIDENTAL OR UNINTENTIONAL, INITIAL ENCOUNTER: ICD-10-CM

## 2018-12-24 DIAGNOSIS — N17.9 AKI (ACUTE KIDNEY INJURY): ICD-10-CM

## 2018-12-24 DIAGNOSIS — R53.81 PHYSICAL DECONDITIONING: ICD-10-CM

## 2018-12-24 DIAGNOSIS — R45.89 SYMPTOMS OF DEPRESSION: ICD-10-CM

## 2018-12-24 DIAGNOSIS — R19.7 DIARRHEA, UNSPECIFIED TYPE: ICD-10-CM

## 2018-12-24 LAB
ALBUMIN SERPL BCP-MCNC: 3.2 G/DL
ALP SERPL-CCNC: 139 U/L
ALT SERPL W/O P-5'-P-CCNC: 37 U/L
ANION GAP SERPL CALC-SCNC: 12 MMOL/L
AST SERPL-CCNC: 37 U/L
BACTERIA #/AREA URNS HPF: ABNORMAL /HPF
BASOPHILS # BLD AUTO: 0.01 K/UL
BASOPHILS NFR BLD: 0.1 %
BILIRUB SERPL-MCNC: 0.3 MG/DL
BILIRUB UR QL STRIP: NEGATIVE
BUN SERPL-MCNC: 61 MG/DL
CALCIUM SERPL-MCNC: 11 MG/DL
CHLORIDE SERPL-SCNC: 106 MMOL/L
CHLORIDE UR-SCNC: 47 MMOL/L
CLARITY UR: ABNORMAL
CO2 SERPL-SCNC: 16 MMOL/L
COLOR UR: YELLOW
CREAT SERPL-MCNC: 3.4 MG/DL
DIFFERENTIAL METHOD: ABNORMAL
EOSINOPHIL # BLD AUTO: 0.1 K/UL
EOSINOPHIL NFR BLD: 1.2 %
ERYTHROCYTE [DISTWIDTH] IN BLOOD BY AUTOMATED COUNT: 15.1 %
EST. GFR  (AFRICAN AMERICAN): 15 ML/MIN/1.73 M^2
EST. GFR  (NON AFRICAN AMERICAN): 13 ML/MIN/1.73 M^2
ESTIMATED AVG GLUCOSE: 137 MG/DL
GLUCOSE SERPL-MCNC: 102 MG/DL
GLUCOSE UR QL STRIP: NEGATIVE
HBA1C MFR BLD HPLC: 6.4 %
HCT VFR BLD AUTO: 41 %
HGB BLD-MCNC: 13.4 G/DL
HGB UR QL STRIP: ABNORMAL
HYALINE CASTS #/AREA URNS LPF: 0 /LPF
KETONES UR QL STRIP: NEGATIVE
LEUKOCYTE ESTERASE UR QL STRIP: NEGATIVE
LYMPHOCYTES # BLD AUTO: 1.2 K/UL
LYMPHOCYTES NFR BLD: 10.1 %
MCH RBC QN AUTO: 28.3 PG
MCHC RBC AUTO-ENTMCNC: 32.7 G/DL
MCV RBC AUTO: 87 FL
MICROSCOPIC COMMENT: ABNORMAL
MONOCYTES # BLD AUTO: 0.8 K/UL
MONOCYTES NFR BLD: 6.9 %
NEUTROPHILS # BLD AUTO: 9.7 K/UL
NEUTROPHILS NFR BLD: 81.5 %
NITRITE UR QL STRIP: NEGATIVE
PATHOLOGIST INTERPRETATION UPE: NORMAL
PH UR STRIP: 6 [PH] (ref 5–8)
PLATELET # BLD AUTO: 390 K/UL
PMV BLD AUTO: 10 FL
POCT GLUCOSE: 106 MG/DL (ref 70–110)
POCT GLUCOSE: 124 MG/DL (ref 70–110)
POCT GLUCOSE: 27 MG/DL (ref 70–110)
POCT GLUCOSE: 50 MG/DL (ref 70–110)
POCT GLUCOSE: 85 MG/DL (ref 70–110)
POTASSIUM SERPL-SCNC: 4.7 MMOL/L
POTASSIUM UR-SCNC: 25 MMOL/L
PROT PATTERN UR ELPH-IMP: NORMAL
PROT SERPL-MCNC: 8.6 G/DL
PROT UR QL STRIP: ABNORMAL
RBC # BLD AUTO: 4.73 M/UL
RBC #/AREA URNS HPF: 1 /HPF (ref 0–4)
SODIUM SERPL-SCNC: 134 MMOL/L
SODIUM UR-SCNC: 41 MMOL/L
SP GR UR STRIP: >=1.03 (ref 1–1.03)
URN SPEC COLLECT METH UR: ABNORMAL
UROBILINOGEN UR STRIP-ACNC: NEGATIVE EU/DL
WBC # BLD AUTO: 11.86 K/UL
WBC #/AREA URNS HPF: 3 /HPF (ref 0–5)

## 2018-12-24 PROCEDURE — 96372 THER/PROPH/DIAG INJ SC/IM: CPT

## 2018-12-24 PROCEDURE — 99285 EMERGENCY DEPT VISIT HI MDM: CPT | Mod: 25

## 2018-12-24 PROCEDURE — 63600175 PHARM REV CODE 636 W HCPCS: Performed by: STUDENT IN AN ORGANIZED HEALTH CARE EDUCATION/TRAINING PROGRAM

## 2018-12-24 PROCEDURE — 85025 COMPLETE CBC W/AUTO DIFF WBC: CPT

## 2018-12-24 PROCEDURE — 11000001 HC ACUTE MED/SURG PRIVATE ROOM

## 2018-12-24 PROCEDURE — 82436 ASSAY OF URINE CHLORIDE: CPT

## 2018-12-24 PROCEDURE — 83036 HEMOGLOBIN GLYCOSYLATED A1C: CPT

## 2018-12-24 PROCEDURE — G0378 HOSPITAL OBSERVATION PER HR: HCPCS

## 2018-12-24 PROCEDURE — 25000003 PHARM REV CODE 250: Performed by: STUDENT IN AN ORGANIZED HEALTH CARE EDUCATION/TRAINING PROGRAM

## 2018-12-24 PROCEDURE — 80053 COMPREHEN METABOLIC PANEL: CPT

## 2018-12-24 PROCEDURE — 81000 URINALYSIS NONAUTO W/SCOPE: CPT

## 2018-12-24 PROCEDURE — 82962 GLUCOSE BLOOD TEST: CPT

## 2018-12-24 PROCEDURE — 25000003 PHARM REV CODE 250: Performed by: EMERGENCY MEDICINE

## 2018-12-24 PROCEDURE — 96361 HYDRATE IV INFUSION ADD-ON: CPT

## 2018-12-24 PROCEDURE — 84133 ASSAY OF URINE POTASSIUM: CPT

## 2018-12-24 PROCEDURE — 84681 ASSAY OF C-PEPTIDE: CPT

## 2018-12-24 PROCEDURE — 96374 THER/PROPH/DIAG INJ IV PUSH: CPT

## 2018-12-24 PROCEDURE — 84300 ASSAY OF URINE SODIUM: CPT

## 2018-12-24 RX ORDER — SODIUM CHLORIDE 0.9 % (FLUSH) 0.9 %
5 SYRINGE (ML) INJECTION
Status: DISCONTINUED | OUTPATIENT
Start: 2018-12-24 | End: 2018-12-26 | Stop reason: HOSPADM

## 2018-12-24 RX ORDER — IBUPROFEN 200 MG
16 TABLET ORAL
Status: DISCONTINUED | OUTPATIENT
Start: 2018-12-24 | End: 2018-12-26 | Stop reason: HOSPADM

## 2018-12-24 RX ORDER — CLONIDINE HYDROCHLORIDE 0.2 MG/1
0.2 TABLET ORAL NIGHTLY
Status: DISCONTINUED | OUTPATIENT
Start: 2018-12-24 | End: 2018-12-26 | Stop reason: HOSPADM

## 2018-12-24 RX ORDER — ERGOCALCIFEROL 1.25 MG/1
50000 CAPSULE ORAL
Status: DISCONTINUED | OUTPATIENT
Start: 2018-12-26 | End: 2018-12-26 | Stop reason: HOSPADM

## 2018-12-24 RX ORDER — CETIRIZINE HYDROCHLORIDE 5 MG/1
5 TABLET ORAL DAILY
Status: DISCONTINUED | OUTPATIENT
Start: 2018-12-25 | End: 2018-12-26 | Stop reason: HOSPADM

## 2018-12-24 RX ORDER — GLUCAGON 1 MG
1 KIT INJECTION
Status: DISCONTINUED | OUTPATIENT
Start: 2018-12-24 | End: 2018-12-26 | Stop reason: HOSPADM

## 2018-12-24 RX ORDER — LOSARTAN POTASSIUM 50 MG/1
100 TABLET ORAL DAILY
Status: DISCONTINUED | OUTPATIENT
Start: 2018-12-25 | End: 2018-12-24

## 2018-12-24 RX ORDER — IBUPROFEN 200 MG
24 TABLET ORAL
Status: DISCONTINUED | OUTPATIENT
Start: 2018-12-24 | End: 2018-12-26 | Stop reason: HOSPADM

## 2018-12-24 RX ORDER — FUROSEMIDE 40 MG/1
40 TABLET ORAL DAILY
Status: DISCONTINUED | OUTPATIENT
Start: 2018-12-25 | End: 2018-12-25

## 2018-12-24 RX ORDER — LOSARTAN POTASSIUM 50 MG/1
100 TABLET ORAL DAILY
Status: DISCONTINUED | OUTPATIENT
Start: 2018-12-24 | End: 2018-12-26 | Stop reason: HOSPADM

## 2018-12-24 RX ORDER — AMLODIPINE BESYLATE 5 MG/1
10 TABLET ORAL NIGHTLY
Status: DISCONTINUED | OUTPATIENT
Start: 2018-12-24 | End: 2018-12-26 | Stop reason: HOSPADM

## 2018-12-24 RX ORDER — HEPARIN SODIUM 5000 [USP'U]/ML
5000 INJECTION, SOLUTION INTRAVENOUS; SUBCUTANEOUS EVERY 12 HOURS
Status: DISCONTINUED | OUTPATIENT
Start: 2018-12-24 | End: 2018-12-26 | Stop reason: HOSPADM

## 2018-12-24 RX ORDER — SODIUM CHLORIDE 9 MG/ML
1000 INJECTION, SOLUTION INTRAVENOUS
Status: COMPLETED | OUTPATIENT
Start: 2018-12-24 | End: 2018-12-24

## 2018-12-24 RX ORDER — ATORVASTATIN CALCIUM 40 MG/1
40 TABLET, FILM COATED ORAL DAILY
Status: DISCONTINUED | OUTPATIENT
Start: 2018-12-25 | End: 2018-12-26 | Stop reason: HOSPADM

## 2018-12-24 RX ADMIN — AMLODIPINE BESYLATE 10 MG: 5 TABLET ORAL at 08:12

## 2018-12-24 RX ADMIN — DEXTROSE MONOHYDRATE 25 G: 25 INJECTION, SOLUTION INTRAVENOUS at 08:12

## 2018-12-24 RX ADMIN — SODIUM CHLORIDE 1000 ML: 0.9 INJECTION, SOLUTION INTRAVENOUS at 06:12

## 2018-12-24 RX ADMIN — HEPARIN SODIUM 5000 UNITS: 5000 INJECTION, SOLUTION INTRAVENOUS; SUBCUTANEOUS at 08:12

## 2018-12-24 RX ADMIN — CLONIDINE HYDROCHLORIDE 0.2 MG: 0.2 TABLET ORAL at 08:12

## 2018-12-24 RX ADMIN — LOSARTAN POTASSIUM 100 MG: 50 TABLET, FILM COATED ORAL at 10:12

## 2018-12-24 NOTE — ED PROVIDER NOTES
Encounter Date: 12/24/2018    SCRIBE #1 NOTE: I, Kaley Abreu, am scribing for, and in the presence of,  Dr. Lefort. I have scribed the entire note.       History     Chief Complaint   Patient presents with    Hypoglycemia     pt was hypoglycemic at home. initial cbg was 38mg/dl. improved to 71 after oral glucose and 1mg glucagon     Brittaney Joseph is a 68 y.o. female who  has a past medical history of Diabetes mellitus, Hypertension, and Renal disorder.    The patient presents to the ED due to hypoglycemia. Confusion and weakness reported by family, constant, gradual onset, severe.  Resolved PTA with administration of glucagon and oral glucose.  Patient also complains of nausea, vomiting, diarrhea, and a poor appetite that has occurred for one month. Recent admit for similar symptoms.  Denies any problems urinating. Per the family, patient has had an abnormally low mood for the past month.            The history is provided by the patient and a relative.     Review of patient's allergies indicates:  No Known Allergies  Past Medical History:   Diagnosis Date    Diabetes mellitus     Hypertension     Renal disorder      Past Surgical History:   Procedure Laterality Date    CHOLECYSTECTOMY       No family history on file.  Social History     Tobacco Use    Smoking status: Never Smoker   Substance Use Topics    Alcohol use: No     Frequency: Never    Drug use: No     Review of Systems   Constitutional: Positive for appetite change and chills. Negative for fever.   HENT: Negative for congestion, rhinorrhea and sore throat.    Eyes: Negative for redness and visual disturbance.   Respiratory: Negative for cough, shortness of breath and wheezing.    Cardiovascular: Negative for chest pain and palpitations.   Gastrointestinal: Positive for diarrhea, nausea and vomiting. Negative for abdominal pain.   Genitourinary: Negative for difficulty urinating, dysuria and hematuria.   Musculoskeletal: Negative for back  pain, myalgias and neck pain.   Skin: Negative for rash.   Neurological: Negative for dizziness, weakness and light-headedness.   Psychiatric/Behavioral: Negative for confusion.       Physical Exam     Initial Vitals [12/24/18 1615]   BP Pulse Resp Temp SpO2   115/86 69 18 98.1 °F (36.7 °C) 98 %      MAP       --         Physical Exam    Nursing note and vitals reviewed.  Constitutional: She appears well-developed and well-nourished. She is not diaphoretic. No distress.   HENT:   Head: Normocephalic and atraumatic.   DMM   Eyes: Conjunctivae and EOM are normal. Pupils are equal, round, and reactive to light.   Neck: Normal range of motion. Neck supple.   Cardiovascular: Normal rate, regular rhythm and normal heart sounds. Exam reveals no gallop and no friction rub.    No murmur heard.  Pulmonary/Chest: Breath sounds normal. She has no wheezes. She has no rhonchi. She has no rales.   Abdominal: Soft. Bowel sounds are normal. There is no tenderness. There is no rebound and no guarding.   Musculoskeletal: Normal range of motion. She exhibits no edema or tenderness.   Lymphadenopathy:     She has no cervical adenopathy.   Neurological: She is alert and oriented to person, place, and time. She has normal strength.   Skin: Skin is dry. Capillary refill takes less than 2 seconds. No rash noted.   Cool to touch.   Psychiatric: She expresses no suicidal ideation.   Flat affect.         ED Course   Critical Care  Date/Time: 12/24/2018 8:11 PM  Performed by: Guy J. Lefort, MD  Authorized by: Guy J. Lefort, MD   Total critical care time (exclusive of procedural time) : 35 minutes  Critical care was necessary to treat or prevent imminent or life-threatening deterioration of the following conditions: metabolic crisis.  Critical care was time spent personally by me on the following activities: blood draw for specimens, ordering and review of laboratory studies, ordering and review of radiographic studies, re-evaluation of  patient's condition, examination of patient, evaluation of patient's response to treatment and obtaining history from patient or surrogate.        Labs Reviewed   CBC W/ AUTO DIFFERENTIAL - Abnormal; Notable for the following components:       Result Value    RDW 15.1 (*)     Platelets 390 (*)     Gran # (ANC) 9.7 (*)     Gran% 81.5 (*)     Lymph% 10.1 (*)     All other components within normal limits   COMPREHENSIVE METABOLIC PANEL   POCT GLUCOSE   POCT GLUCOSE   POCT GLUCOSE MONITORING CONTINUOUS          Imaging Results    None          Medical Decision Making:   History:   Old Medical Records: I decided to obtain old medical records.  Initial Assessment:   VSS, repeat CBG ok, eating, flat affect  Differential Diagnosis:   Hypoglycemia, lytes, dehydration, infection, anorexia  Clinical Tests:   Lab Tests: Ordered and Reviewed  ED Management:  Hypoglycemia in setting of sulfonylurea use, will place in observation with iv hydration, and likely inpatient psychiatric consult for symptoms of depression potentially contributing to ongoing medical concerns.    Despite feeding, CBG dropped to 50 after discussing with admission team.  D50 given.                      Clinical Impression:     1. Hypoglycemia    2. Failure to thrive in adult    3. Symptoms of depression    4. ARIES (acute kidney injury)    5. Dehydration            Disposition:   Disposition: Placed in Observation  Condition: Stable    Scribe attestation I, Dr. Guy Lefort, personally performed the services described in this documentation. All medical record entries made by the scribe were at my direction and in my presence. I have reviewed the chart and agree that the record reflects my personal performance and is accurate and complete. Guy Lefort, MD.  6:59 PM 12/24/2018                      Guy J. Lefort, MD  12/24/18 1901       Guy J. Lefort, MD  12/24/18 2011

## 2018-12-24 NOTE — ED TRIAGE NOTES
Patient states she has had this happen before; she doesn't really remember what all happened remembers feeling bad then being here. Patient noted to have a flat affect.

## 2018-12-24 NOTE — ED NOTES
Tray given to patient and juice due to patient's blood sugar at this time. Family member at bedside giving patient marco.

## 2018-12-25 PROBLEM — R53.81 PHYSICAL DECONDITIONING: Status: ACTIVE | Noted: 2018-12-25

## 2018-12-25 LAB
ALBUMIN SERPL BCP-MCNC: 2.2 G/DL
ALP SERPL-CCNC: 94 U/L
ALT SERPL W/O P-5'-P-CCNC: 23 U/L
ANION GAP SERPL CALC-SCNC: 8 MMOL/L
AST SERPL-CCNC: 20 U/L
BASOPHILS # BLD AUTO: 0.02 K/UL
BASOPHILS NFR BLD: 0.2 %
BILIRUB SERPL-MCNC: 0.3 MG/DL
BUN SERPL-MCNC: 60 MG/DL
CALCIUM SERPL-MCNC: 9.4 MG/DL
CHLORIDE SERPL-SCNC: 108 MMOL/L
CO2 SERPL-SCNC: 16 MMOL/L
CREAT SERPL-MCNC: 3 MG/DL
DIFFERENTIAL METHOD: ABNORMAL
EOSINOPHIL # BLD AUTO: 0.1 K/UL
EOSINOPHIL NFR BLD: 1.6 %
ERYTHROCYTE [DISTWIDTH] IN BLOOD BY AUTOMATED COUNT: 15.3 %
EST. GFR  (AFRICAN AMERICAN): 18 ML/MIN/1.73 M^2
EST. GFR  (NON AFRICAN AMERICAN): 15 ML/MIN/1.73 M^2
GLUCOSE SERPL-MCNC: 65 MG/DL
HCT VFR BLD AUTO: 30.8 %
HGB BLD-MCNC: 9.8 G/DL
LYMPHOCYTES # BLD AUTO: 1.9 K/UL
LYMPHOCYTES NFR BLD: 22.3 %
MCH RBC QN AUTO: 27.5 PG
MCHC RBC AUTO-ENTMCNC: 31.8 G/DL
MCV RBC AUTO: 87 FL
MONOCYTES # BLD AUTO: 0.9 K/UL
MONOCYTES NFR BLD: 10.1 %
NEUTROPHILS # BLD AUTO: 5.6 K/UL
NEUTROPHILS NFR BLD: 65.6 %
PLATELET # BLD AUTO: 317 K/UL
PMV BLD AUTO: 9.4 FL
POCT GLUCOSE: 105 MG/DL (ref 70–110)
POCT GLUCOSE: 124 MG/DL (ref 70–110)
POCT GLUCOSE: 154 MG/DL (ref 70–110)
POCT GLUCOSE: 165 MG/DL (ref 70–110)
POCT GLUCOSE: 184 MG/DL (ref 70–110)
POCT GLUCOSE: 191 MG/DL (ref 70–110)
POCT GLUCOSE: 199 MG/DL (ref 70–110)
POCT GLUCOSE: 246 MG/DL (ref 70–110)
POCT GLUCOSE: 61 MG/DL (ref 70–110)
POTASSIUM SERPL-SCNC: 4.6 MMOL/L
PROT SERPL-MCNC: 5.9 G/DL
RBC # BLD AUTO: 3.56 M/UL
SODIUM SERPL-SCNC: 132 MMOL/L
WBC # BLD AUTO: 8.52 K/UL

## 2018-12-25 PROCEDURE — 25000003 PHARM REV CODE 250: Performed by: STUDENT IN AN ORGANIZED HEALTH CARE EDUCATION/TRAINING PROGRAM

## 2018-12-25 PROCEDURE — 63600175 PHARM REV CODE 636 W HCPCS: Performed by: STUDENT IN AN ORGANIZED HEALTH CARE EDUCATION/TRAINING PROGRAM

## 2018-12-25 PROCEDURE — 80053 COMPREHEN METABOLIC PANEL: CPT

## 2018-12-25 PROCEDURE — 94761 N-INVAS EAR/PLS OXIMETRY MLT: CPT

## 2018-12-25 PROCEDURE — 36415 COLL VENOUS BLD VENIPUNCTURE: CPT

## 2018-12-25 PROCEDURE — 11000001 HC ACUTE MED/SURG PRIVATE ROOM

## 2018-12-25 PROCEDURE — 85025 COMPLETE CBC W/AUTO DIFF WBC: CPT

## 2018-12-25 PROCEDURE — S5010 5% DEXTROSE AND 0.45% SALINE: HCPCS | Performed by: STUDENT IN AN ORGANIZED HEALTH CARE EDUCATION/TRAINING PROGRAM

## 2018-12-25 RX ORDER — DEXTROSE MONOHYDRATE AND SODIUM CHLORIDE 5; .45 G/100ML; G/100ML
INJECTION, SOLUTION INTRAVENOUS CONTINUOUS
Status: DISCONTINUED | OUTPATIENT
Start: 2018-12-25 | End: 2018-12-25

## 2018-12-25 RX ORDER — ACETAMINOPHEN 500 MG
500 TABLET ORAL EVERY 6 HOURS PRN
Status: DISCONTINUED | OUTPATIENT
Start: 2018-12-25 | End: 2018-12-26 | Stop reason: HOSPADM

## 2018-12-25 RX ORDER — DEXTROSE MONOHYDRATE AND SODIUM CHLORIDE 5; .9 G/100ML; G/100ML
INJECTION, SOLUTION INTRAVENOUS CONTINUOUS
Status: DISCONTINUED | OUTPATIENT
Start: 2018-12-25 | End: 2018-12-25

## 2018-12-25 RX ADMIN — FUROSEMIDE 40 MG: 40 TABLET ORAL at 07:12

## 2018-12-25 RX ADMIN — DEXTROSE AND SODIUM CHLORIDE: 5; .9 INJECTION, SOLUTION INTRAVENOUS at 08:12

## 2018-12-25 RX ADMIN — LOSARTAN POTASSIUM 100 MG: 50 TABLET, FILM COATED ORAL at 07:12

## 2018-12-25 RX ADMIN — HEPARIN SODIUM 5000 UNITS: 5000 INJECTION, SOLUTION INTRAVENOUS; SUBCUTANEOUS at 08:12

## 2018-12-25 RX ADMIN — CETIRIZINE HYDROCHLORIDE 5 MG: 5 TABLET ORAL at 07:12

## 2018-12-25 RX ADMIN — AMLODIPINE BESYLATE 10 MG: 5 TABLET ORAL at 08:12

## 2018-12-25 RX ADMIN — CLONIDINE HYDROCHLORIDE 0.2 MG: 0.2 TABLET ORAL at 08:12

## 2018-12-25 RX ADMIN — ACETAMINOPHEN 500 MG: 500 TABLET ORAL at 07:12

## 2018-12-25 RX ADMIN — ATORVASTATIN CALCIUM 40 MG: 40 TABLET, FILM COATED ORAL at 07:12

## 2018-12-25 RX ADMIN — DEXTROSE MONOHYDRATE 12.5 G: 25 INJECTION, SOLUTION INTRAVENOUS at 12:12

## 2018-12-25 RX ADMIN — DEXTROSE MONOHYDRATE 25 G: 25 INJECTION, SOLUTION INTRAVENOUS at 02:12

## 2018-12-25 RX ADMIN — SODIUM BICARBONATE: 84 INJECTION, SOLUTION INTRAVENOUS at 11:12

## 2018-12-25 RX ADMIN — DEXTROSE: 5 SOLUTION INTRAVENOUS at 02:12

## 2018-12-25 NOTE — PLAN OF CARE
Problem: Adult Inpatient Plan of Care  Goal: Plan of Care Review  Outcome: Ongoing (interventions implemented as appropriate)  2132H Received patient from ED per stretcher; awake and orientedx4. Care plan explained and verbalized understanding. On room air, O2 saturation 99%. Hooked to heart monitor running Sinus Rhythm. Complains of chills. Warm blanket provided, verbalized that she feels warm. Ambulated to bedside chair commode with assist. IV site to left antecubital 20G, flushed and saline locked. Blood sugar monitored q4. Instructed on fall risk precaution. Bed in lowest position, call light within reach and instructed to call for help when needed. Sister remain at bedside. Will continue to monitor.

## 2018-12-25 NOTE — NURSING
0223H Patient's younger sister asked Nurse to come and check the patient. Patient is awake with incomprehensible speech, not responding to questions. Blood sugar checked <20mg/dL. D5 given via IV. Blood sugar rechecked 199mg/dL. Patient verbalized that she does not know what happened. Patient drank  a cup of water and ate a cup of vanilla pudding.     Informed Dr. ARINA Corrales with orders to start fluids D5% 100ml/hr and monitor glucose q2 hours. Orders carried out.

## 2018-12-25 NOTE — H&P
U Internal Medicine History and Physical - Resident Note    Admitting Team: Medicine Team A  Attending Physician: Dr. London    Date of Admit: 12/24/2018    Chief Complaint     hypoglycemia    Subjective:      History of Present Illness:  69 y/o F with PMHx of DM2, HTN, and CKD II who presented to [facility] 12/24 for hypoglycemia. Pt was in her USOH until this afternoon when she was sitting with her niece and became unresponsive. Pt is unable to recall specifics of what happened. Niece is not present to relay information. However, niece reported that patient suddenly stopped speaking and became unresponsive during their conversation. EMS was contacted and upon EMS arrival, pt's glucose was measure at 38. She received oral glucose and 1mg of glucagon with improvement to glucose of 71.      Pt was recently discharged four days ago following admission for hypertensive emergency. She reports that since discharge, she has had watery diarrhea occurring about three times daily. Additionally, she reports that she has been having a decreased appetite for weeks. However, she reports eating a peanut butter and jelly sandwich this morning prior to episode of hypoglycemia. Her only diabetic medication is glipizide which she has been taking consistently as prescribed. Pt denies nausea and vomiting over past two days but reports she was experiencing both prior to this. She denies fevers, chills, cough, chest pain, abdominal pain, melena, hematochezia, dysuria, hematuria, or peripheral edema. She has no further complaints or concerns at this time.           Past Medical History:  Past Medical History:   Diagnosis Date    Diabetes mellitus     Hypertension     Renal disorder        Past Surgical History:  Past Surgical History:   Procedure Laterality Date    CHOLECYSTECTOMY         Allergies:  Review of patient's allergies indicates:  No Known Allergies    Home Medications:  Prior to Admission medications    Medication Sig Start  Date End Date Taking? Authorizing Provider   amLODIPine (NORVASC) 10 MG tablet Take 10 mg by mouth nightly.    Historical Provider, MD   atorvastatin (LIPITOR) 40 MG tablet Take 40 mg by mouth once daily.    Historical Provider, MD   cloNIDine (CATAPRES) 0.2 MG tablet Take 0.2 mg by mouth nightly.    Historical Provider, MD   ergocalciferol (VITAMIN D2) 50,000 unit Cap Take 50,000 Units by mouth every Mon, Wed, Fri.    Historical Provider, MD   furosemide (LASIX) 40 MG tablet Take 40 mg by mouth once daily.    Historical Provider, MD   glipiZIDE (GLUCOTROL) 10 MG tablet Take 10 mg by mouth daily with breakfast.    Historical Provider, MD   levocetirizine (XYZAL) 5 MG tablet Take 5 mg by mouth every evening.    Historical Provider, MD   losartan (COZAAR) 100 MG tablet Take 100 mg by mouth once daily.    Historical Provider, MD       Family History:  No family history on file.    Social History:  Social History     Tobacco Use    Smoking status: Never Smoker   Substance Use Topics    Alcohol use: No     Frequency: Never    Drug use: No       Review of Systems:  Pertinent positives and negatives are listed in HPI.  All other systems are reviewed and are negative.    Health Maintaince :   Primary Care Physician: Elton  Immunizations:   TDap not UTD    Fl UTD   Pna unsure if UTD    Cancer Screening:  PAP: no longer indicated s/p hysterectomy  MMG:UTD  Colonoscopy: unsure whether within past 10 years     Objective:   Last 24 Hour Vital Signs:  BP  Min: 115/86  Max: 171/92  Temp  Av.4 °F (36.9 °C)  Min: 98.1 °F (36.7 °C)  Max: 98.6 °F (37 °C)  Pulse  Av.5  Min: 69  Max: 79  Resp  Av  Min: 14  Max: 18  SpO2  Av.3 %  Min: 98 %  Max: 100 %  There is no height or weight on file to calculate BMI.  No intake/output data recorded.    Physical Examination:  General: Alert and awake in NAD intention tremors noted  Head:  Normocephalic and atraumatic  Eyes:  PERRL; EOMi with anicteric sclera and clear  conjunctivae  Mouth:  Oropharynx clear and without exudate; moist mucous membranes  Cardio:  Regular rate and rhythm with normal S1 and S2; no murmurs or rubs  Resp:  CTAB and unlabored; no wheezes, crackles or rhonchi  Abdom: Soft, NTND with normoactive bowel sounds  Extrem: WWP with no clubbing, cyanosis or edema  Skin:  No rashes, lesions, or color changes  Pulses: 2+ and symmetric distally  Neuro:  AAOx3; cooperative and pleasant with no focal deficits    Laboratory:  Most Recent Data:  CBC:   Lab Results   Component Value Date    WBC 11.86 12/24/2018    HGB 13.4 12/24/2018    HCT 41.0 12/24/2018     (H) 12/24/2018    MCV 87 12/24/2018    RDW 15.1 (H) 12/24/2018     BMP:   Lab Results   Component Value Date     (L) 12/24/2018    K 4.7 12/24/2018     12/24/2018    CO2 16 (L) 12/24/2018    BUN 61 (H) 12/24/2018    CREATININE 3.4 (H) 12/24/2018     12/24/2018    CALCIUM 11.0 (H) 12/24/2018    MG 1.7 12/20/2018    PHOS 4.2 12/20/2018     LFTs:   Lab Results   Component Value Date    PROT 8.6 (H) 12/24/2018    ALBUMIN 3.2 (L) 12/24/2018    BILITOT 0.3 12/24/2018    AST 37 12/24/2018    ALKPHOS 139 (H) 12/24/2018    ALT 37 12/24/2018     Urinalysis:   Lab Results   Component Value Date    COLORU Yellow 12/24/2018    SPECGRAV >=1.030 (A) 12/24/2018    NITRITE Negative 12/24/2018    KETONESU Negative 12/24/2018    UROBILINOGEN Negative 12/24/2018    WBCUA 3 12/24/2018       Trended Lab Data:  Recent Labs   Lab 12/19/18  0510 12/20/18  0441 12/24/18  1646   WBC 6.27 5.79 11.86   HGB 9.8* 10.2* 13.4   HCT 30.3* 32.0* 41.0    331 390*   MCV 86 86 87   RDW 15.0* 15.3* 15.1*   * 134* 134*   K 4.7 4.8 4.7    109 106   CO2 16* 16* 16*   BUN 64* 61* 61*   CREATININE 3.5* 3.2* 3.4*    126* 102   PROT 6.0 6.1 8.6*   ALBUMIN 2.4* 2.4* 3.2*   BILITOT 0.4 0.3 0.3   AST 17 15 37   ALKPHOS 103 104 139*   ALT 13 12 37       Trended Cardiac Data:  Recent Labs   Lab 12/18/18 2003  12/18/18  2341   TROPONINI 0.006 0.015   *  --        Microbiology Data:  UA 2+ protein, trace occult blood, moderate bacteria       Assessment:     67 y/o F with PMHx of DM2, HTN, and CKD III who presented to [facility] 12/24 for hypoglycemia who is currently stable.      Plan:     Hypoglycemia  -suddenly unresponsive per family reports  -CBG 38 on EMS arrival with improvement following glucagon and PO glucose administration  -continue to monitor with glucose checks q4h hypoglycemia protocol ordered  -discontinue home glipizide as this combined with decreased PO intake likely precipitated this event      DM2  -last HA1c 6.8 (12/19) well-controlled  -discontinuing home glipizide as above will consider initiation of other medication with less hypoglycemic side effect profile    HTN  -/84 at this time discharged 12/20 following hypertensive emergency hospitalization  -continue home norvasc, clonidine, losartan, and furosemide as prescribed    CKD III  -Cr 3.4 baseline Cr ~3.5  -continue to monitor and avoid nephrotoxic drugs  -outpatient nephrologist appointment scheduled at recent discharge    [facility] 2/2 to RTA  -bicarb 16  -urine AG 19-->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 Diff testing     Protein Gap  -gap of 5.4  -HIV/Hep negative during recent hospitalization spep unremarkable upep not obtained during that time      VTE ppx: Heparin  Diet: Diabetic  Dispo: pending resolution of hypoglycemia after observation    Melani Corrales  [facility] Internal Medicine HO-2  [facility] Medicine Service Team A    [facility] Medicine Hospitalist Pager numbers:   [facility] Hospitalist Medicine Team A (Joon/Raysa): [phone]  [facility] Hospitalist Medicine Team B (Amy/Surya):  [phone]

## 2018-12-25 NOTE — PROGRESS NOTES
"[facility] Internal Medicine- Resident Note    Admitting Team: Medicine Team A  Attending Physician: Dr. London    Date of Admit: 2018      Subjective:   Overnight, pt had another episode of hypoglycemia. Her family member at bedside noticed decreased alertness and CBG measured by nurse ~20. D5 gtt was initiated and patient doing better this AM. She denies any weakness, nausea, vomiting, or abdominal pain. Pt also denies any further episodes of diarrhea. She has no further complaints or concerns at this time.  Objective:   Last 24 Hour Vital Signs:  BP  Min: 115/86  Max: 188/81  Temp  Av °F (36.7 °C)  Min: 96.7 °F (35.9 °C)  Max: 98.6 °F (37 °C)  Pulse  Av.8  Min: 69  Max: 86  Resp  Av.6  Min: 14  Max: 20  SpO2  Av.6 %  Min: 98 %  Max: 100 %  Height  Av' 4" (162.6 cm)  Min: 5' 4" (162.6 cm)  Max: 5' 4" (162.6 cm)  Weight  Av.7 kg (173 lb 6.3 oz)  Min: 77.8 kg (171 lb 8.3 oz)  Max: 79.5 kg (175 lb 4.3 oz)  Body mass index is 30.08 kg/m².  No intake/output data recorded.    Physical Examination:  General: Alert and awake in NAD intention tremors noted  Head:  Normocephalic and atraumatic  Eyes:  PERRL; EOMi with anicteric sclera and clear conjunctivae  Mouth:  Oropharynx clear and without exudate; moist mucous membranes  Cardio:  Regular rate and rhythm with normal S1 and S2; no murmurs or rubs  Resp:  CTAB and unlabored; no wheezes, crackles or rhonchi  Abdom: Soft, NTND with normoactive bowel sounds  Extrem: WWP with no clubbing, cyanosis or edema  Skin:  No rashes, lesions, or color changes  Pulses: 2+ and symmetric distally  Neuro:  AAOx3; cooperative and pleasant with no focal deficits    Laboratory:  Most Recent Data:  CBC:   Lab Results   Component Value Date    WBC 11.86 2018    HGB 13.4 2018    HCT 41.0 2018     (H) 2018    MCV 87 2018    RDW 15.1 (H) 2018     BMP:   Lab Results   Component Value Date     (L) 2018    K 4.7 " 12/24/2018     12/24/2018    CO2 16 (L) 12/24/2018    BUN 61 (H) 12/24/2018    CREATININE 3.4 (H) 12/24/2018     12/24/2018    CALCIUM 11.0 (H) 12/24/2018    MG 1.7 12/20/2018    PHOS 4.2 12/20/2018     LFTs:   Lab Results   Component Value Date    PROT 8.6 (H) 12/24/2018    ALBUMIN 3.2 (L) 12/24/2018    BILITOT 0.3 12/24/2018    AST 37 12/24/2018    ALKPHOS 139 (H) 12/24/2018    ALT 37 12/24/2018     Urinalysis:   Lab Results   Component Value Date    COLORU Yellow 12/24/2018    SPECGRAV >=1.030 (A) 12/24/2018    NITRITE Negative 12/24/2018    KETONESU Negative 12/24/2018    UROBILINOGEN Negative 12/24/2018    WBCUA 3 12/24/2018       Trended Lab Data:  Recent Labs   Lab 12/19/18  0510 12/20/18  0441 12/24/18  1646   WBC 6.27 5.79 11.86   HGB 9.8* 10.2* 13.4   HCT 30.3* 32.0* 41.0    331 390*   MCV 86 86 87   RDW 15.0* 15.3* 15.1*   * 134* 134*   K 4.7 4.8 4.7    109 106   CO2 16* 16* 16*   BUN 64* 61* 61*   CREATININE 3.5* 3.2* 3.4*    126* 102   PROT 6.0 6.1 8.6*   ALBUMIN 2.4* 2.4* 3.2*   BILITOT 0.4 0.3 0.3   AST 17 15 37   ALKPHOS 103 104 139*   ALT 13 12 37       Trended Cardiac Data:  Recent Labs   Lab 12/18/18 2003 12/18/18  2341   TROPONINI 0.006 0.015   *  --        Microbiology Data:  UA 2+ protein, trace occult blood, moderate bacteria       Assessment:     69 y/o F with PMHx of DM2, HTN, and CKD III who presented to [facility] 12/24 for hypoglycemia who is currently stable.      Plan:     Hypoglycemia  -suddenly unresponsive per family reports  -CBG 38 on EMS arrival with improvement following glucagon and PO glucose administration  -serial accuchecks overnight with one accucheck being as low as ~20 started D5 gtt at 100cc/hr will discontinue this AM & monitor for hypoglycemia hypoglycemia protocol ordered  -discontinued home glipizide as this combined with decreased PO intake likely precipitated this event    DM2  -last HA1c 6.8 (12/19)  well-controlled  -discontinued home glipizide as above will consider initiation of other medication with less hypoglycemic side effect profile    HTN  -/84 at this time discharged 12/20 following hypertensive emergency hospitalization  -continue home norvasc, clonidine, losartan, and furosemide as prescribed    CKD III  -Cr 3.4 baseline Cr ~3.5  -continue to monitor and avoid nephrotoxic drugs  -outpatient nephrologist appointment scheduled at recent discharge    [facility]GMA 2/2 to RTA  -bicarb 16  -urine AG 19-->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 Diff testing     Protein Gap  -gap of 5.4  -HIV/Hep negative during recent hospitalization spep unremarkable upep not obtained during that time      VTE ppx: Heparin  Diet: Diabetic  Dispo: pending resolution of hypoglycemia after observation    Melani Corrales  [facility] Internal Medicine HO-2  [facility] Medicine Service Team A    [facility] Medicine Hospitalist Pager numbers:   [facility] Hospitalist Medicine Team A (Joon/Raysa): [phone]  [facility] Hospitalist Medicine Team B (Amy/Surya):  [phone]

## 2018-12-26 VITALS
SYSTOLIC BLOOD PRESSURE: 144 MMHG | TEMPERATURE: 99 F | OXYGEN SATURATION: 99 % | BODY MASS INDEX: 29.92 KG/M2 | RESPIRATION RATE: 19 BRPM | DIASTOLIC BLOOD PRESSURE: 80 MMHG | HEART RATE: 85 BPM | HEIGHT: 64 IN | WEIGHT: 175.25 LBS

## 2018-12-26 LAB
ALBUMIN SERPL BCP-MCNC: 2.2 G/DL
ALP SERPL-CCNC: 92 U/L
ALT SERPL W/O P-5'-P-CCNC: 19 U/L
ANION GAP SERPL CALC-SCNC: 8 MMOL/L
AST SERPL-CCNC: 19 U/L
BASOPHILS # BLD AUTO: 0.02 K/UL
BASOPHILS NFR BLD: 0.3 %
BILIRUB SERPL-MCNC: 0.2 MG/DL
BUN SERPL-MCNC: 56 MG/DL
C PEPTIDE SERPL-MCNC: 13.18 NG/ML
CALCIUM SERPL-MCNC: 9.6 MG/DL
CHLORIDE SERPL-SCNC: 109 MMOL/L
CO2 SERPL-SCNC: 16 MMOL/L
CREAT SERPL-MCNC: 2.9 MG/DL
DIFFERENTIAL METHOD: ABNORMAL
EOSINOPHIL # BLD AUTO: 0.3 K/UL
EOSINOPHIL NFR BLD: 4.1 %
ERYTHROCYTE [DISTWIDTH] IN BLOOD BY AUTOMATED COUNT: 15.5 %
EST. GFR  (AFRICAN AMERICAN): 19 ML/MIN/1.73 M^2
EST. GFR  (NON AFRICAN AMERICAN): 16 ML/MIN/1.73 M^2
GLUCOSE SERPL-MCNC: 132 MG/DL
HCT VFR BLD AUTO: 31 %
HGB BLD-MCNC: 10 G/DL
LYMPHOCYTES # BLD AUTO: 2 K/UL
LYMPHOCYTES NFR BLD: 29.6 %
MCH RBC QN AUTO: 27.8 PG
MCHC RBC AUTO-ENTMCNC: 32.3 G/DL
MCV RBC AUTO: 86 FL
MONOCYTES # BLD AUTO: 1 K/UL
MONOCYTES NFR BLD: 14.2 %
NEUTROPHILS # BLD AUTO: 3.5 K/UL
NEUTROPHILS NFR BLD: 51.7 %
PLATELET # BLD AUTO: 320 K/UL
PMV BLD AUTO: 9.3 FL
POCT GLUCOSE: 137 MG/DL (ref 70–110)
POCT GLUCOSE: 143 MG/DL (ref 70–110)
POCT GLUCOSE: 147 MG/DL (ref 70–110)
POCT GLUCOSE: 149 MG/DL (ref 70–110)
POCT GLUCOSE: 150 MG/DL (ref 70–110)
POCT GLUCOSE: 165 MG/DL (ref 70–110)
POCT GLUCOSE: 228 MG/DL (ref 70–110)
POTASSIUM SERPL-SCNC: 4.6 MMOL/L
PROT SERPL-MCNC: 5.9 G/DL
RBC # BLD AUTO: 3.6 M/UL
SODIUM SERPL-SCNC: 133 MMOL/L
WBC # BLD AUTO: 6.78 K/UL

## 2018-12-26 PROCEDURE — 97165 OT EVAL LOW COMPLEX 30 MIN: CPT

## 2018-12-26 PROCEDURE — 36415 COLL VENOUS BLD VENIPUNCTURE: CPT

## 2018-12-26 PROCEDURE — 97161 PT EVAL LOW COMPLEX 20 MIN: CPT

## 2018-12-26 PROCEDURE — G8987 SELF CARE CURRENT STATUS: HCPCS | Mod: CK

## 2018-12-26 PROCEDURE — 63600175 PHARM REV CODE 636 W HCPCS: Performed by: STUDENT IN AN ORGANIZED HEALTH CARE EDUCATION/TRAINING PROGRAM

## 2018-12-26 PROCEDURE — 25000003 PHARM REV CODE 250: Performed by: STUDENT IN AN ORGANIZED HEALTH CARE EDUCATION/TRAINING PROGRAM

## 2018-12-26 PROCEDURE — G8988 SELF CARE GOAL STATUS: HCPCS | Mod: CH

## 2018-12-26 PROCEDURE — 85025 COMPLETE CBC W/AUTO DIFF WBC: CPT

## 2018-12-26 PROCEDURE — 94761 N-INVAS EAR/PLS OXIMETRY MLT: CPT

## 2018-12-26 PROCEDURE — 97530 THERAPEUTIC ACTIVITIES: CPT

## 2018-12-26 PROCEDURE — 80053 COMPREHEN METABOLIC PANEL: CPT

## 2018-12-26 RX ORDER — GLIPIZIDE 2.5 MG/1
2.5 TABLET, EXTENDED RELEASE ORAL
Qty: 30 TABLET | Refills: 6 | Status: ON HOLD | OUTPATIENT
Start: 2018-12-26 | End: 2019-04-08 | Stop reason: HOSPADM

## 2018-12-26 RX ADMIN — LOSARTAN POTASSIUM 100 MG: 50 TABLET, FILM COATED ORAL at 09:12

## 2018-12-26 RX ADMIN — HEPARIN SODIUM 5000 UNITS: 5000 INJECTION, SOLUTION INTRAVENOUS; SUBCUTANEOUS at 09:12

## 2018-12-26 RX ADMIN — CETIRIZINE HYDROCHLORIDE 5 MG: 5 TABLET ORAL at 09:12

## 2018-12-26 RX ADMIN — ATORVASTATIN CALCIUM 40 MG: 40 TABLET, FILM COATED ORAL at 09:12

## 2018-12-26 NOTE — PLAN OF CARE
Problem: Adult Inpatient Plan of Care  Goal: Plan of Care Review  Outcome: Ongoing (interventions implemented as appropriate)  Patient is awake and orientedx4. Care plan explained and verbalized understanding. On room air, O2 saturation 99%. On continuous heart monitoring.  IV site to left antecubital 20G, IV fluid running at 100cc/hr. Trembling of hands noted. Blood sugar monitored q2. Instructed on fall risk precaution. Ambulated to bedside chair commode with assist. Bed in lowest position, call light within reach and instructed to call for help when needed. Sister remain at bedside. Will continue to monitor.    2009H Due medications given. Patient complains of feeling funny. Trembling still noted. Vital signs recheck, stable. Blood sugar 246mg/dL. Informed Dr. ARINA Pierre with order to decrease IV fluid rate to 50ml/hr. Order carried out. Will continue to monitor.

## 2018-12-26 NOTE — PLAN OF CARE
Problem: Occupational Therapy Goal  Goal: Occupational Therapy Goal  Goals to be met by: 01/26/19     Patient will increase functional independence with ADLs by performing:    UE Dressing with Modified [place].  LE Dressing with Modified [place].  Grooming while standing at sink with Modified [place].  Increased functional strength to WFL for ADLs.    Outcome: Ongoing (interventions implemented as appropriate)  OT eval/tx completed this date. Pt lives w/ niece in trailer w/ 4STE & 0HR; T/S combo. PLOF: MI w/o DME (except PRN A w/ LBD) w/ all fxnl tasks incl sitting tub baths & IADLs. Currently, pt perf the following: sup<->EOB w/ SBA; standing & amb w/in room w/o DME w/ SB-CGA 2/2 pt leaning to hold onto walls, furniture, doors; toileting on toilet w/ MI. Edu/tx re: rec TTB & HEP. Pt/sister verbalized understanding.    Pt presents w/ decreased overall endurance/conditioning, balance/mobility & coordination w/ subsequent decline in (I)/safety w/ BADLs, fxnl mobility & fxnl t/f's. OT 5x/wk to increase phys/fxnl status & maximize potential to achieve established goals for d/c-->home w/ HHOT/PT & rec TTB.

## 2018-12-26 NOTE — PT/OT/SLP EVAL
Physical Therapy Evaluation    Patient Name:  Brittaney Joseph   MRN:  [id number]    Recommendations:     Discharge Recommendations:  other (see comments)(home [facility] home health)   Discharge Equipment Recommendations: walker, rolling   Barriers to discharge: None    Assessment:     Brittaney Joseph is a 68 y.o. female admitted with a medical diagnosis of Hypoglycemia.  She presents with the following impairments/functional limitations:  weakness, gait instability, impaired balance, impaired self care skills, impaired functional mobilty, decreased lower extremity function, pain, impaired endurance . Patient able to ambulate with CG assist with RW ~ 30 ft, patient would benefit from post therapy sessions Home Health.    Rehab Prognosis: Good; patient would benefit from acute skilled PT services to address these deficits and reach maximum level of function.    Recent Surgery: * No surgery found *      Plan:     During this hospitalization, patient to be seen 6 x/week to address the identified rehab impairments via gait training, therapeutic activities, therapeutic exercises and progress toward the following goals:    · Plan of Care Expires:  01/26/19    Subjective     Chief Complaint: generalize weakness  Patient/Family Comments/goals: go home  Pain/Comfort:  · Pain Rating 1: other (see comments)(did not rate on scale)  · Location - Side 1: Bilateral  · Location - Orientation 1: generalized  · Location 1: back  · Pain Addressed 1: Distraction, Reposition    Patients cultural, spiritual, Anglican conflicts given the current situation:      Living Environment:  Lives with niece no concerns  Prior to admission, patients level of function was independent.  Equipment used at home: bedside commode.  DME owned (not currently used): none.  Upon discharge, patient will have assistance from family.    Objective:     Communicated with primary nurse prior to session.  Patient found call button in reach, bed alarm on and sibling   present telemetry, bed alarm  upon PT entry to room.    General Precautions: Standard, fall   Orthopedic Precautions:N/A   Braces: N/A     Exams:  · RLE ROM: WFL  · RLE Strength: +3/5 to 4/5  · LLE ROM: WFL  · LLE Strength: +3/5 to 4/5    Functional Mobility:  · Bed Mobility:     · Supine to Sit: supervision and stand by assistance  · Sit to Supine: contact guard assistance and minimum assistance  · Transfers:     · Sit to Stand:  stand by assistance with rolling walker  · Gait: 30 ft with CG assist and RW  · Balance: fair with RW      Therapeutic Activities and Exercises:   na    AM-PAC 6 CLICK MOBILITY  Total Score:21     Patient left HOB elevated with call button in reach and bed alarm on.    GOALS:   Multidisciplinary Problems     Physical Therapy Goals        Problem: Physical Therapy Goal    Goal Priority Disciplines Outcome Goal Variances Interventions   Physical Therapy Goal     PT, PT/OT Ongoing (interventions implemented as appropriate)     Description:  Goals to be met by: 2019     Patient will increase functional independence with mobility by performin. Supine to sit with Modified [place]  2. Sit to stand transfer with Modified [place]  3. Gait  x 200 feet with Modified [place] using Rolling Walker.                       History:     Past Medical History:   Diagnosis Date    Diabetes mellitus     Hypertension     Renal disorder        Past Surgical History:   Procedure Laterality Date    CHOLECYSTECTOMY         Clinical Decision Making:     History  Co-morbidities and personal factors that may impact the plan of care Examination  Body Structures and Functions, activity limitations and participation restrictions that may impact the plan of care Clinical Presentation   Decision Making/ Complexity Score   Co-morbidities:   [] Time since onset of injury / illness / exacerbation  [] Status of current condition  []Patient's cognitive status and safety concerns    [] Multiple  Medical Problems (see med hx)  Personal Factors:   [] Patient's age  [x] Prior Level of function   [] Patient's home situation (environment and family support)  [x] Patient's level of motivation  [] Expected progression of patient      HISTORY:(criteria)    [] 70721 - no personal factors/history    [x] 58738 - has 1-2 personal factor/comorbidity     [] 76614 - has >3 personal factor/comorbidity     Body Regions:  [] Objective examination findings  [] Head     []  Neck  [] Trunk   [] Upper Extremity  [x] Lower Extremity    Body Systems:  [] For communication ability, affect, cognition, language, and learning style: the assessment of the ability to make needs known, consciousness, orientation (person, place, and time), expected emotional /behavioral responses, and learning preferences (eg, learning barriers, education  needs)  [x] For the neuromuscular system: a general assessment of gross coordinated movement (eg, balance, gait, locomotion, transfers, and transitions) and motor function  (motor control and motor learning)  [x] For the musculoskeletal system: the assessment of gross symmetry, gross range of motion, gross strength, height, and weight  [] For the integumentary system: the assessment of pliability(texture), presence of scar formation, skin color, and skin integrity  [] For cardiovascular/pulmonary system: the assessment of heart rate, respiratory rate, blood pressure, and edema     Activity limitations:    [] Patient's cognitive status and saf ety concerns          [] Status of current condition      [] Weight bearing restriction  [] Cardiopulmunary Restriction    Participation Restrictions:   [] Goals and goal agreement with the patient     [] Rehab potential (prognosis) and probable outcome      Examination of Body System: (criteria)    [x] 62155 - addressing 1-2 elements    [] 28971 - addressing a total of 3 or more elements     [] 56433 -  Addressing a total of 4 or more elements         Clinical  Presentation: (criteria)  Stable - 21196     On examination of body system using standardized tests and measures patient presents with 1-2 elements from any of the following: body structures and functions, activity limitations, and/or participation restrictions.  Leading to a clinical presentation that is considered stable and/or uncomplicated                              Clinical Decision Making  (Eval Complexity):  Low- 26658     Time Tracking:     PT Received On: 12/26/18  PT Start Time: 0955     PT Stop Time: 1010  PT Total Time (min): 15 min     Billable Minutes: Evaluation 15      Brannon Kern, PT  12/26/2018

## 2018-12-26 NOTE — PLAN OF CARE
[facility] has [facility] and will resume with them upon discharge.  She lives with her niece and niece will provide transportation home.  I educated her on always having glucose tabs or hard candy with her at all times to prevent hypoglycemic event.  Will check on walker order prior to discharge.       12/26/18 1528   Discharge Assessment   Assessment Type Discharge Planning Assessment   Confirmed/corrected address and phone number on facesheet? Yes   Assessment information obtained from? Patient   Communicated expected length of stay with patient/caregiver yes   Prior to hospitilization cognitive status: Alert/Oriented;No Deficits   Prior to hospitalization functional status: Independent;Assistive Equipment   Current cognitive status: Alert/Oriented;No Deficits   Current Functional Status: Independent;Assistive Equipment   Facility Arrived From: home   Lives With other relative(s)   Able to Return to Prior Arrangements yes   Is patient able to care for self after discharge? Yes   Patient's perception of discharge disposition home health;home or selfcare   Readmission Within the Last 30 Days no previous admission in last 30 days   Patient currently being followed by outpatient case management? No   Patient currently receives any other outside agency services? No   Equipment Currently Used at Home none   Do you have any problems affording any of your prescribed medications? No   Is the patient taking medications as prescribed? yes   Does the patient have transportation home? Yes   Transportation Anticipated family or friend will provide   Discharge Plan A Home Health;Home with family   Discharge Plan B Home Health;Home with family   Patient/Family in Agreement with Plan yes

## 2018-12-26 NOTE — PLAN OF CARE
Problem: Physical Therapy Goal  Goal: Physical Therapy Goal  Goals to be met by: 2019     Patient will increase functional independence with mobility by performin. Supine to sit with Modified [place]  2. Sit to stand transfer with Modified [place]  3. Gait  x 200 feet with Modified [place] using Rolling Walker.     Outcome: Ongoing (interventions implemented as appropriate)  Recommend Home with Home Health  May benefit from RW

## 2018-12-26 NOTE — DISCHARGE INSTRUCTIONS
Glipizide tablets (English)      Blood Sugar, Low; Hypoglycemia (English)      Diabetes, Managing: The A1C Test (English)      Diabetes, Type 2, Understanding (English)      Getting into a Tub with a Walker, Step-by-Step (English)      Going Through a Door with a Walker, Step-by-Step (English)      Going Up and Down Curbs with a Walker, Step-by-Step (English)      Standing with a Walker (Non-Weight Bearing), Step-by-Step (English)      Using a Walker (Weight Bearing), Step-by-Step (English)      Sitting with a Walker (Weight Bearing), Step-by-Step (English)      Getting into a Car with a Walker, Step-by-Step (English)      Fitting a Walker, Step-by-Step (English)      Falls, Preventing, Moving Safely Using a Cane or Walker (English)

## 2018-12-26 NOTE — PT/OT/SLP EVAL
Occupational Therapy   Evaluation/tx    Name: Brittaney Joseph  MRN: [id number]  Admitting Diagnosis:  Hypoglycemia      Recommendations:     Discharge Recommendations: (home w/ HHOT/PT)  Discharge Equipment Recommendations:  walker, rolling, tub bench  Barriers to discharge:  None    History:     Occupational Profile:  Living Environment: w/ niece in trailer w/ 4STE & 0HR; has T/S combof  Previous level of function: MI w/o DME  Roles and Routines: sitting tub baths; IADLs  Equipment Used at Home:  bedside commode  Assistance upon Discharge: by fly    Past Medical History:   Diagnosis Date    Diabetes mellitus     Hypertension     Renal disorder        Past Surgical History:   Procedure Laterality Date    CHOLECYSTECTOMY         Subjective     Chief Complaint: FTT  Patient/Family Comments/goals: return home    Pain/Comfort:  · Pain Rating 1: 0/10  · Pain Rating Post-Intervention 1: 0/10    Patients cultural, spiritual, Congregational conflicts given the current situation:      Objective:     Communicated with: nsg prior to session.  Patient found with:  and bed alarm, SCD, telemetry upon OT entry to room.    General Precautions: Standard, fall   Orthopedic Precautions:N/A   Braces: N/A     Occupational Performance:    Bed Mobility:    · Patient completed Supine to Sit with stand by assistance  · Patient completed Sit to Supine with stand by assistance    Functional Mobility/Transfers:  · Patient completed Sit <> Stand Transfer with stand by assistance  with  no assistive device   · Patient completed Toilet Transfer Step Transfer technique with modified independence with  no AD  · Functional Mobility: w/o DME around room w/ SBA 2/2 pt using walls/doors/furniture for constant UE support    Activities of Daily Living:  · Lower Body Dressing: maximal assistance socks  · Toileting: modified independence on toilet    Cognitive/Visual Perceptual:  Grossly WFL    Physical Exam:  R UE WFL at 4-/5  L shldr ~140*; elb-->Ds WFL  "at 4-/5    Sit balance: F+  Stand balance: F- to F    AMPAC 6 Click ADL:  [facility] Total Score: 19    Treatment & Education:  OT eval/tx completed this date. Pt lives w/ niece in trailer w/ 4STE & 0HR; T/S combo. PLOF: MI w/o DME (except PRN A w/ LBD) w/ all fxnl tasks incl sitting tub baths & IADLs. Currently, pt perf the following: sup<->EOB w/ SBA; standing & amb w/in room w/o DME w/ SB-CGA 2/2 pt leaning to hold onto walls, furniture, doors; toileting on toilet w/ MI. Edu/tx re: rec TTB & HEP. Pt/sister verbalized understanding.    Education:    Patient left HOB elevated with all lines intact, call button in reach, bed alarm on and sister present    Assessment:   Pt presents w/ decreased overall endurance/conditioning, balance/mobility & coordination w/ subsequent decline in (I)/safety w/ BADLs, fxnl mobility & fxnl t/f's. OT 5x/wk to increase phys/fxnl status & maximize potential to achieve established goals for d/c-->home w/ HHOT/PT & rec TTB.    Brittaney Joseph is a 68 y.o. female with a medical diagnosis of Hypoglycemia.  She presents with the following performance deficits affecting function: weakness, impaired endurance, gait instability, impaired functional mobilty, impaired self care skills, impaired balance, decreased lower extremity function, decreased upper extremity function, decreased coordination, decreased safety awareness, decreased ROM.      Rehab Prognosis: Good; patient would benefit from acute skilled OT services to address these deficits and reach maximum level of function.         Clinical Decision Makin.  OT Low:  "Pt evaluation falls under low complexity for evaluation coding due to performance deficits noted in 1-3 areas as stated above and 0 co-morbities affecting current functional status. Data obtained from problem focused assessments. No modifications or assistance was required for completion of evaluation. Only brief occupational profile and history review completed." "     Plan:     Patient to be seen 5 x/week to address the above listed problems via self-care/home management, therapeutic activities, therapeutic exercises  · Plan of Care Expires: 01/26/19  · Plan of Care Reviewed with: patient, sibling    This Plan of care has been discussed with the patient who was involved in its development and understands and is in agreement with the identified goals and treatment plan    GOALS:   Multidisciplinary Problems     Occupational Therapy Goals        Problem: Occupational Therapy Goal    Goal Priority Disciplines Outcome Interventions   Occupational Therapy Goal     OT, PT/OT Ongoing (interventions implemented as appropriate)    Description:  Goals to be met by: 01/26/19     Patient will increase functional independence with ADLs by performing:    UE Dressing with Modified [place].  LE Dressing with Modified [place].  Grooming while standing at sink with Modified [place].  Increased functional strength to WFL for ADLs.                      Time Tracking:     OT Date of Treatment: 12/26/18  OT Start Time: 1107  OT Stop Time: 1131  OT Total Time (min): 24 min    Billable Minutes:Evaluation 10  Therapeutic Activity 14  Total Time 24    ANNI Reeder  12/26/2018

## 2018-12-26 NOTE — PROGRESS NOTES
[facility] Internal Medicine- Resident Note    Admitting Team: Medicine Team A  Attending Physician: Dr. London    Date of Admit: 2018      Subjective:   IVFs discontinued yesterday evening. Pt's glucose remains in 130-150 range without any further hypoglycemic episodes. She denies any weakness or altered mentation. She has no complaints or concerns at this time.   Objective:   Last 24 Hour Vital Signs:  BP  Min: 130/73  Max: 163/81  Temp  Av.5 °F (36.4 °C)  Min: 96.6 °F (35.9 °C)  Max: 98.4 °F (36.9 °C)  Pulse  Av.2  Min: 69  Max: 84  Resp  Av.3  Min: 18  Max: 20  SpO2  Av.5 %  Min: 95 %  Max: 99 %  Body mass index is 30.08 kg/m².  I/O last 3 completed shifts:  In: 646.7 [P.O.:70; I.V.:576.7]  Out: 850 [Urine:850]    Physical Examination:  General: Alert and awake in NAD essential tremors noted  Head:  Normocephalic and atraumatic  Eyes:  PERRL; EOMi with anicteric sclera and clear conjunctivae  Mouth:  Oropharynx clear and without exudate; moist mucous membranes  Cardio:  Regular rate and rhythm with normal S1 and S2; no murmurs or rubs  Resp:  CTAB and unlabored; no wheezes, crackles or rhonchi  Abdom: Soft, NTND with normoactive bowel sounds  Extrem: WWP with no clubbing, cyanosis or edema  Skin:  No rashes, lesions, or color changes  Pulses: 2+ and symmetric distally  Neuro:  AAOx3; cooperative and pleasant with no focal deficits    Laboratory:  Most Recent Data:  CBC:   Lab Results   Component Value Date    WBC 6.78 2018    HGB 10.0 (L) 2018    HCT 31.0 (L) 2018     2018    MCV 86 2018    RDW 15.5 (H) 2018     BMP:   Lab Results   Component Value Date     (L) 2018    K 4.6 2018     2018    CO2 16 (L) 2018    BUN 60 (H) 2018    CREATININE 3.0 (H) 2018    GLU 65 (L) 2018    CALCIUM 9.4 2018    MG 1.7 2018    PHOS 4.2 2018     LFTs:   Lab Results   Component Value Date    PROT  5.9 (L) 12/25/2018    ALBUMIN 2.2 (L) 12/25/2018    BILITOT 0.3 12/25/2018    AST 20 12/25/2018    ALKPHOS 94 12/25/2018    ALT 23 12/25/2018     Urinalysis:   Lab Results   Component Value Date    COLORU Yellow 12/24/2018    SPECGRAV >=1.030 (A) 12/24/2018    NITRITE Negative 12/24/2018    KETONESU Negative 12/24/2018    UROBILINOGEN Negative 12/24/2018    WBCUA 3 12/24/2018       Trended Lab Data:  Recent Labs   Lab 12/20/18  0441 12/24/18  1646 12/25/18  0642 12/26/18  0551   WBC 5.79 11.86 8.52 6.78   HGB 10.2* 13.4 9.8* 10.0*   HCT 32.0* 41.0 30.8* 31.0*    390* 317 320   MCV 86 87 87 86   RDW 15.3* 15.1* 15.3* 15.5*   * 134* 132*  --    K 4.8 4.7 4.6  --     106 108  --    CO2 16* 16* 16*  --    BUN 61* 61* 60*  --    CREATININE 3.2* 3.4* 3.0*  --    * 102 65*  --    PROT 6.1 8.6* 5.9*  --    ALBUMIN 2.4* 3.2* 2.2*  --    BILITOT 0.3 0.3 0.3  --    AST 15 37 20  --    ALKPHOS 104 139* 94  --    ALT 12 37 23  --        Trended Cardiac Data:  No results for input(s): TROPONINI, CKTOTAL, CKMB, BNP in the last 168 hours.    Microbiology Data:  UA 2+ protein, trace occult blood, moderate bacteria       Assessment:     67 y/o F with PMHx of DM2, HTN, and CKD III who presented to [facility] [place], [place] 12/24 for hypoglycemia who is currently stable.      Plan:     Hypoglycemia  -suddenly unresponsive per family reports  -CBG 38 on EMS arrival with improvement following glucagon and PO glucose administration  -discontinued home glipizide as this combined with decreased PO intake likely precipitated this event  -D5 IVFs discontinued overnight glucose remains >130    DM2  -last HA1c 6.4 well-controlled  -discontinued home glipizide as above will consider initiation of other medication with less hypoglycemic side effect profile    HTN  -/81 at this time discharged 12/20 following hypertensive emergency hospitalization  -continue home norvasc, clonidine, losartan, and furosemide as prescribed    CKD  III  -Cr 3.4 baseline Cr ~3.5  -continue to monitor and avoid nephrotoxic drugs  -outpatient nephrologist appointment scheduled at recent discharge    NAGMA  -bicarb 16 yesterday  -urine AG 19-->consistent with RTA (possibly type IV given hx of hyperkalemia on previous admission)  -diarrhea likely also contributing no further diarrhea given IVFs overnight    Diarrhea  -reports diarrhea occurring up to 3 times daily since recent hospital discharge  -resolved no stool sample available for C. Diff testing    Protein Gap  -gap of 5.4  -HIV/Hep negative during recent hospitalization spep unremarkable upep not obtained during that time      VTE ppx: Heparin  Diet: Diabetic  Dispo:likely home today    Melani Corrales  [facility] Internal Medicine HO-2  [facility] Medicine Service Team A    [facility] Medicine Hospitalist Pager numbers:   [facility] Hospitalist Medicine Team A (Joon/Raysa): [phone]  [facility] Hospitalist Medicine Team B (Amy/Surya):  [phone]

## 2018-12-26 NOTE — PLAN OF CARE
Problem: Adult Inpatient Plan of Care  Goal: Plan of Care Review  Outcome: Ongoing (interventions implemented as appropriate)  Plan of care reviewed with patient miguel angel sister. Patient verbalized understanding. Blood glucose monitoring continued. Fall precautions maintained. Bed in lowest position, call light within reach, 2x bed rails, pt wearing slip resistant socks. Patient notified to ask staff for assistance and pt verbalized complete understanding. Pt on telemetry with no ectopy noted. Will continue to monitor. Pt anticipates discharge.

## 2018-12-26 NOTE — NURSING
Discharge information and education provided, patient voices understanding. Cardiac monitoring and IV catheter discontinued. Discharged via wheelchair, sister at side. No acute distress noted.

## 2018-12-26 NOTE — PLAN OF CARE
Problem: Adult Inpatient Plan of Care  Goal: Plan of Care Review  Outcome: Ongoing (interventions implemented as appropriate)  Plan of care reviewed with patient. Patient verbalized understanding. D5 0.45 NS with bicarbonate infusing at 100 mL/hr. Blood glucose monitoring continued. Fall precautions maintained. Bed in lowest position, call light within reach, 2x bed rails, pt wearing slip resistant socks. Patient notified to ask staff for assistance and pt verbalized complete understanding. Pt on telemetry with no ectopy noted. Will continue to monitor.

## 2019-01-08 ENCOUNTER — OFFICE VISIT (OUTPATIENT)
Dept: FAMILY MEDICINE | Facility: CLINIC | Age: 69
End: 2019-01-08
Payer: MEDICARE

## 2019-01-08 VITALS
BODY MASS INDEX: 27.83 KG/M2 | SYSTOLIC BLOOD PRESSURE: 144 MMHG | HEIGHT: 64 IN | HEART RATE: 87 BPM | WEIGHT: 163 LBS | OXYGEN SATURATION: 96 % | DIASTOLIC BLOOD PRESSURE: 74 MMHG | TEMPERATURE: 98 F

## 2019-01-08 DIAGNOSIS — N18.30 CKD (CHRONIC KIDNEY DISEASE) STAGE 3, GFR 30-59 ML/MIN: ICD-10-CM

## 2019-01-08 DIAGNOSIS — E11.22 TYPE 2 DIABETES MELLITUS WITH STAGE 3 CHRONIC KIDNEY DISEASE, WITHOUT LONG-TERM CURRENT USE OF INSULIN: ICD-10-CM

## 2019-01-08 DIAGNOSIS — N18.30 TYPE 2 DIABETES MELLITUS WITH STAGE 3 CHRONIC KIDNEY DISEASE, WITHOUT LONG-TERM CURRENT USE OF INSULIN: ICD-10-CM

## 2019-01-08 DIAGNOSIS — G89.29 CHRONIC LOW BACK PAIN WITHOUT SCIATICA, UNSPECIFIED BACK PAIN LATERALITY: ICD-10-CM

## 2019-01-08 DIAGNOSIS — I10 HYPERTENSION, UNSPECIFIED TYPE: Primary | ICD-10-CM

## 2019-01-08 DIAGNOSIS — M54.50 CHRONIC LOW BACK PAIN WITHOUT SCIATICA, UNSPECIFIED BACK PAIN LATERALITY: ICD-10-CM

## 2019-01-08 DIAGNOSIS — E78.5 HYPERLIPIDEMIA, UNSPECIFIED HYPERLIPIDEMIA TYPE: ICD-10-CM

## 2019-01-08 PROCEDURE — 3077F PR MOST RECENT SYSTOLIC BLOOD PRESSURE >= 140 MM HG: ICD-10-PCS | Mod: CPTII,S$GLB,, | Performed by: FAMILY MEDICINE

## 2019-01-08 PROCEDURE — 3044F PR MOST RECENT HEMOGLOBIN A1C LEVEL <7.0%: ICD-10-PCS | Mod: CPTII,S$GLB,, | Performed by: FAMILY MEDICINE

## 2019-01-08 PROCEDURE — 3044F HG A1C LEVEL LT 7.0%: CPT | Mod: CPTII,S$GLB,, | Performed by: FAMILY MEDICINE

## 2019-01-08 PROCEDURE — 3078F PR MOST RECENT DIASTOLIC BLOOD PRESSURE < 80 MM HG: ICD-10-PCS | Mod: CPTII,S$GLB,, | Performed by: FAMILY MEDICINE

## 2019-01-08 PROCEDURE — 99214 PR OFFICE/OUTPT VISIT, EST, LEVL IV, 30-39 MIN: ICD-10-PCS | Mod: S$GLB,,, | Performed by: FAMILY MEDICINE

## 2019-01-08 PROCEDURE — 3077F SYST BP >= 140 MM HG: CPT | Mod: CPTII,S$GLB,, | Performed by: FAMILY MEDICINE

## 2019-01-08 PROCEDURE — 3078F DIAST BP <80 MM HG: CPT | Mod: CPTII,S$GLB,, | Performed by: FAMILY MEDICINE

## 2019-01-08 PROCEDURE — 99214 OFFICE O/P EST MOD 30 MIN: CPT | Mod: S$GLB,,, | Performed by: FAMILY MEDICINE

## 2019-01-08 RX ORDER — PANTOPRAZOLE SODIUM 40 MG/1
TABLET, DELAYED RELEASE ORAL
COMMUNITY
Start: 2018-11-14 | End: 2019-02-04

## 2019-01-08 RX ORDER — METOPROLOL TARTRATE 100 MG/1
TABLET ORAL
COMMUNITY
Start: 2018-12-03 | End: 2019-01-22 | Stop reason: SDUPTHER

## 2019-01-08 RX ORDER — OXYCODONE AND ACETAMINOPHEN 10; 325 MG/1; MG/1
TABLET ORAL
Status: ON HOLD | COMMUNITY
Start: 2018-12-14 | End: 2019-04-08 | Stop reason: HOSPADM

## 2019-01-08 NOTE — PROGRESS NOTES
Subjective:      Patient ID: Brittaney Joseph is a 68 y.o. female.    Chief Complaint: Establish Care and Back Pain      HPI   Changing doctors from Dr Conde; here with niece; on  A lot of medicine; stays with another niece in [place]; went into a sugar coma; meds adjusted; was in [facility] twice in December; low sugar,   Has high creatinine and total protein  Back pain and losing weight  Has nephrology Jonas this Thursday  Goes to [facility] for back pain, had shots, no help; had SPE, normal  No inflammation tests    Review of Systems   Constitutional: Positive for appetite change and unexpected weight change.   HENT: Negative.    Respiratory: Negative.    Cardiovascular: Negative.    Gastrointestinal: Positive for constipation.   Endocrine: Negative.    Genitourinary: Negative.    Musculoskeletal: Negative.    Psychiatric/Behavioral: Negative.    All other systems reviewed and are negative.    Objective:     Physical Exam   Constitutional: She is oriented to person, place, and time. She appears well-developed and well-nourished.   ch ill appearing   HENT:   Head: Normocephalic.   Eyes: Conjunctivae and EOM are normal. Pupils are equal, round, and reactive to light.   Neck: Normal range of motion. Neck supple.   Cardiovascular: Normal rate, regular rhythm and normal heart sounds.   Pulmonary/Chest: Effort normal and breath sounds normal.   Musculoskeletal: Normal range of motion.   Neurological: She is alert and oriented to person, place, and time. She has normal reflexes.   Skin: Skin is warm and dry.   Psychiatric: She has a normal mood and affect. Her behavior is normal. Judgment and thought content normal.   Nursing note and vitals reviewed.    Assessment:     1. Hypertension, unspecified type    2. Hyperlipidemia, unspecified hyperlipidemia type    3. CKD (chronic kidney disease) stage 3, GFR 30-59 ml/min    4. Type 2 diabetes mellitus with stage 3 chronic kidney disease, without long-term current  use of insulin    5. Chronic low back pain without sciatica, unspecified back pain laterality      Plan:        Medication List           Accurate as of 1/8/19 11:59 PM. If you have any questions, ask your nurse or doctor.               CONTINUE taking these medications    amLODIPine 10 MG tablet  Commonly known as:  NORVASC     atorvastatin 40 MG tablet  Commonly known as:  LIPITOR     cloNIDine 0.2 MG tablet  Commonly known as:  CATAPRES     furosemide 40 MG tablet  Commonly known as:  LASIX     glipiZIDE 2.5 MG Tr24  Commonly known as:  GLUCOTROL  Take 1 tablet (2.5 mg total) by mouth daily with breakfast.     losartan 100 MG tablet  Commonly known as:  COZAAR     metoprolol tartrate 100 MG tablet  Commonly known as:  LOPRESSOR     oxyCODONE-acetaminophen  mg per tablet  Commonly known as:  PERCOCET     pantoprazole 40 MG tablet  Commonly known as:  PROTONIX     VITAMIN D2 50,000 unit Cap  Generic drug:  ergocalciferol          Hypertension, unspecified type  -     Sedimentation rate; Future  -     STEFANIE Screen w/Reflex; Future; Expected date: 01/08/2019  -     Rheumatoid factor; Future; Expected date: 01/08/2019  -     C-reactive protein; Future; Expected date: 01/08/2019  -     NM Bone Scan Whole Body; Future; Expected date: 01/08/2019    Hyperlipidemia, unspecified hyperlipidemia type  -     Sedimentation rate; Future  -     STEFANIE Screen w/Reflex; Future; Expected date: 01/08/2019  -     Rheumatoid factor; Future; Expected date: 01/08/2019  -     C-reactive protein; Future; Expected date: 01/08/2019  -     NM Bone Scan Whole Body; Future; Expected date: 01/08/2019    CKD (chronic kidney disease) stage 3, GFR 30-59 ml/min  -     Sedimentation rate; Future  -     STEFANIE Screen w/Reflex; Future; Expected date: 01/08/2019  -     Rheumatoid factor; Future; Expected date: 01/08/2019  -     C-reactive protein; Future; Expected date: 01/08/2019  -     NM Bone Scan Whole Body; Future; Expected date: 01/08/2019    Type 2  diabetes mellitus with stage 3 chronic kidney disease, without long-term current use of insulin  -     Sedimentation rate; Future  -     STEFANIE Screen w/Reflex; Future; Expected date: 01/08/2019  -     Rheumatoid factor; Future; Expected date: 01/08/2019  -     C-reactive protein; Future; Expected date: 01/08/2019  -     NM Bone Scan Whole Body; Future; Expected date: 01/08/2019    Chronic low back pain without sciatica, unspecified back pain laterality  -     Sedimentation rate; Future  -     STEFANIE Screen w/Reflex; Future; Expected date: 01/08/2019  -     Rheumatoid factor; Future; Expected date: 01/08/2019  -     C-reactive protein; Future; Expected date: 01/08/2019  -     NM Bone Scan Whole Body; Future; Expected date: 01/08/2019      Arthritis test, bone scan, nenprhololgist, may need steroid and or rheumatologist  No advil or aleve; tylenol only

## 2019-01-10 ENCOUNTER — HOSPITAL ENCOUNTER (INPATIENT)
Facility: HOSPITAL | Age: 69
LOS: 6 days | Discharge: HOME HEALTH | DRG: 280 | End: 2019-01-16
Attending: SURGERY | Admitting: HOSPITALIST
Payer: MEDICARE

## 2019-01-10 DIAGNOSIS — E87.5 HYPERKALEMIA: Primary | ICD-10-CM

## 2019-01-10 DIAGNOSIS — R53.81 PHYSICAL DECONDITIONING: ICD-10-CM

## 2019-01-10 DIAGNOSIS — E11.69 HYPERLIPIDEMIA ASSOCIATED WITH TYPE 2 DIABETES MELLITUS: Chronic | ICD-10-CM

## 2019-01-10 DIAGNOSIS — N17.9 ACUTE RENAL FAILURE SUPERIMPOSED ON STAGE 4 CHRONIC KIDNEY DISEASE: ICD-10-CM

## 2019-01-10 DIAGNOSIS — N18.4 ACUTE RENAL FAILURE SUPERIMPOSED ON STAGE 4 CHRONIC KIDNEY DISEASE: ICD-10-CM

## 2019-01-10 DIAGNOSIS — I26.09 OTHER CHRONIC PULMONARY EMBOLISM WITH ACUTE COR PULMONALE: ICD-10-CM

## 2019-01-10 DIAGNOSIS — R07.9 CHEST PAIN: ICD-10-CM

## 2019-01-10 DIAGNOSIS — R06.02 SOB (SHORTNESS OF BREATH): ICD-10-CM

## 2019-01-10 DIAGNOSIS — E83.52 HYPERCALCEMIA: ICD-10-CM

## 2019-01-10 DIAGNOSIS — R07.9 CHEST PAIN, UNSPECIFIED TYPE: ICD-10-CM

## 2019-01-10 DIAGNOSIS — N17.9 ACUTE RENAL FAILURE SUPERIMPOSED ON STAGE 4 CHRONIC KIDNEY DISEASE, UNSPECIFIED ACUTE RENAL FAILURE TYPE: ICD-10-CM

## 2019-01-10 DIAGNOSIS — I27.82 OTHER CHRONIC PULMONARY EMBOLISM WITH ACUTE COR PULMONALE: ICD-10-CM

## 2019-01-10 DIAGNOSIS — I21.4 NSTEMI (NON-ST ELEVATED MYOCARDIAL INFARCTION): ICD-10-CM

## 2019-01-10 DIAGNOSIS — I26.99 PE (PULMONARY THROMBOEMBOLISM): ICD-10-CM

## 2019-01-10 DIAGNOSIS — N17.1 ACUTE RENAL FAILURE WITH ACUTE CORTICAL NECROSIS: ICD-10-CM

## 2019-01-10 DIAGNOSIS — E78.5 HYPERLIPIDEMIA ASSOCIATED WITH TYPE 2 DIABETES MELLITUS: Chronic | ICD-10-CM

## 2019-01-10 DIAGNOSIS — I50.32 CHRONIC DIASTOLIC HEART FAILURE: ICD-10-CM

## 2019-01-10 DIAGNOSIS — N18.4 ACUTE RENAL FAILURE SUPERIMPOSED ON STAGE 4 CHRONIC KIDNEY DISEASE, UNSPECIFIED ACUTE RENAL FAILURE TYPE: ICD-10-CM

## 2019-01-10 DIAGNOSIS — R79.89 ELEVATED TROPONIN: ICD-10-CM

## 2019-01-10 DIAGNOSIS — I10 ESSENTIAL HYPERTENSION: Chronic | ICD-10-CM

## 2019-01-10 PROBLEM — I16.1 HYPERTENSIVE EMERGENCY: Status: RESOLVED | Noted: 2018-12-18 | Resolved: 2019-01-10

## 2019-01-10 PROBLEM — E16.2 HYPOGLYCEMIA: Status: RESOLVED | Noted: 2018-12-24 | Resolved: 2019-01-10

## 2019-01-10 PROBLEM — E11.649 TYPE 2 DIABETES MELLITUS WITH HYPOGLYCEMIA, WITHOUT LONG-TERM CURRENT USE OF INSULIN: Chronic | Status: ACTIVE | Noted: 2018-12-20

## 2019-01-10 LAB
ALBUMIN SERPL BCP-MCNC: 4.4 G/DL
ALP SERPL-CCNC: 143 U/L
ALT SERPL W/O P-5'-P-CCNC: 20 U/L
ANION GAP SERPL CALC-SCNC: 12 MMOL/L
ANION GAP SERPL CALC-SCNC: 17 MMOL/L
APTT BLDCRRT: 24.5 SEC
AST SERPL-CCNC: 26 U/L
BACTERIA #/AREA URNS AUTO: ABNORMAL /HPF
BASOPHILS # BLD AUTO: 0.03 K/UL
BASOPHILS NFR BLD: 0.3 %
BILIRUB SERPL-MCNC: 0.6 MG/DL
BILIRUB UR QL STRIP: NEGATIVE
BUN SERPL-MCNC: 74 MG/DL
BUN SERPL-MCNC: 76 MG/DL
CALCIUM SERPL-MCNC: 12.4 MG/DL
CALCIUM SERPL-MCNC: 13 MG/DL
CHLORIDE SERPL-SCNC: 102 MMOL/L
CHLORIDE SERPL-SCNC: 99 MMOL/L
CLARITY UR REFRACT.AUTO: CLEAR
CO2 SERPL-SCNC: 18 MMOL/L
CO2 SERPL-SCNC: 21 MMOL/L
COLOR UR AUTO: YELLOW
CREAT SERPL-MCNC: 4.45 MG/DL
CREAT SERPL-MCNC: 4.87 MG/DL
DIFFERENTIAL METHOD: ABNORMAL
EOSINOPHIL # BLD AUTO: 0.1 K/UL
EOSINOPHIL NFR BLD: 0.9 %
ERYTHROCYTE [DISTWIDTH] IN BLOOD BY AUTOMATED COUNT: 14.8 %
EST. GFR  (AFRICAN AMERICAN): 11.1 ML/MIN/1.73 M^2
EST. GFR  (AFRICAN AMERICAN): 9.9 ML/MIN/1.73 M^2
EST. GFR  (NON AFRICAN AMERICAN): 8.6 ML/MIN/1.73 M^2
EST. GFR  (NON AFRICAN AMERICAN): 9.6 ML/MIN/1.73 M^2
GLUCOSE SERPL-MCNC: 138 MG/DL
GLUCOSE SERPL-MCNC: 215 MG/DL
GLUCOSE UR QL STRIP: NEGATIVE
HCT VFR BLD AUTO: 39.3 %
HGB BLD-MCNC: 12.8 G/DL
HGB UR QL STRIP: ABNORMAL
HYALINE CASTS UR QL AUTO: 2 /LPF
INR PPP: 1.1
KETONES UR QL STRIP: NEGATIVE
LACTATE SERPL-SCNC: 2.9 MMOL/L
LACTATE SERPL-SCNC: 3.1 MMOL/L
LEUKOCYTE ESTERASE UR QL STRIP: NEGATIVE
LYMPHOCYTES # BLD AUTO: 1.9 K/UL
LYMPHOCYTES NFR BLD: 17.3 %
MCH RBC QN AUTO: 28 PG
MCHC RBC AUTO-ENTMCNC: 32.6 G/DL
MCV RBC AUTO: 86 FL
MICROSCOPIC COMMENT: ABNORMAL
MONOCYTES # BLD AUTO: 1.3 K/UL
MONOCYTES NFR BLD: 11.7 %
NEUTROPHILS # BLD AUTO: 7.5 K/UL
NEUTROPHILS NFR BLD: 69.5 %
NITRITE UR QL STRIP: NEGATIVE
NT-PROBNP: 1200 PG/ML
PH UR STRIP: 5 [PH] (ref 5–8)
PLATELET # BLD AUTO: 297 K/UL
PMV BLD AUTO: 10.1 FL
POCT GLUCOSE: 145 MG/DL (ref 70–110)
POCT GLUCOSE: 156 MG/DL (ref 70–110)
POTASSIUM SERPL-SCNC: 5.4 MMOL/L
POTASSIUM SERPL-SCNC: 6.2 MMOL/L
PROT SERPL-MCNC: 9.2 G/DL
PROT UR QL STRIP: ABNORMAL
PROTHROMBIN TIME: 11.8 SEC
RBC # BLD AUTO: 4.57 M/UL
RBC #/AREA URNS AUTO: 2 /HPF (ref 0–4)
SODIUM SERPL-SCNC: 134 MMOL/L
SODIUM SERPL-SCNC: 135 MMOL/L
SP GR UR STRIP: 1.02 (ref 1–1.03)
TROPONIN I SERPL DL<=0.01 NG/ML-MCNC: 2.3 NG/ML
TROPONIN I SERPL DL<=0.01 NG/ML-MCNC: 6.63 NG/ML
URN SPEC COLLECT METH UR: ABNORMAL
UROBILINOGEN UR STRIP-ACNC: NEGATIVE EU/DL
WAXY CASTS UR QL COMP ASSIST: 1 /LPF
WBC # BLD AUTO: 10.83 K/UL
WBC #/AREA URNS AUTO: 1 /HPF (ref 0–5)

## 2019-01-10 PROCEDURE — 25000003 PHARM REV CODE 250: Mod: ER | Performed by: SURGERY

## 2019-01-10 PROCEDURE — 83880 ASSAY OF NATRIURETIC PEPTIDE: CPT | Mod: ER

## 2019-01-10 PROCEDURE — 93010 ELECTROCARDIOGRAM REPORT: CPT | Mod: 76,,, | Performed by: INTERNAL MEDICINE

## 2019-01-10 PROCEDURE — 63600175 PHARM REV CODE 636 W HCPCS: Performed by: NURSE PRACTITIONER

## 2019-01-10 PROCEDURE — 85610 PROTHROMBIN TIME: CPT | Mod: ER

## 2019-01-10 PROCEDURE — 63600175 PHARM REV CODE 636 W HCPCS: Mod: ER | Performed by: SURGERY

## 2019-01-10 PROCEDURE — 36415 COLL VENOUS BLD VENIPUNCTURE: CPT

## 2019-01-10 PROCEDURE — 82962 GLUCOSE BLOOD TEST: CPT | Mod: ER

## 2019-01-10 PROCEDURE — 11000001 HC ACUTE MED/SURG PRIVATE ROOM

## 2019-01-10 PROCEDURE — 85730 THROMBOPLASTIN TIME PARTIAL: CPT

## 2019-01-10 PROCEDURE — 96374 THER/PROPH/DIAG INJ IV PUSH: CPT | Mod: ER

## 2019-01-10 PROCEDURE — 25000003 PHARM REV CODE 250: Performed by: NURSE PRACTITIONER

## 2019-01-10 PROCEDURE — 85025 COMPLETE CBC W/AUTO DIFF WBC: CPT | Mod: ER

## 2019-01-10 PROCEDURE — 63600175 PHARM REV CODE 636 W HCPCS: Mod: ER

## 2019-01-10 PROCEDURE — 93010 EKG 12-LEAD: ICD-10-PCS | Mod: ,,, | Performed by: INTERNAL MEDICINE

## 2019-01-10 PROCEDURE — 83605 ASSAY OF LACTIC ACID: CPT | Mod: ER

## 2019-01-10 PROCEDURE — 99291 CRITICAL CARE FIRST HOUR: CPT | Mod: 25,ER

## 2019-01-10 PROCEDURE — 94761 N-INVAS EAR/PLS OXIMETRY MLT: CPT

## 2019-01-10 PROCEDURE — 96361 HYDRATE IV INFUSION ADD-ON: CPT | Mod: ER

## 2019-01-10 PROCEDURE — 83605 ASSAY OF LACTIC ACID: CPT | Mod: 91

## 2019-01-10 PROCEDURE — 87040 BLOOD CULTURE FOR BACTERIA: CPT | Mod: 59,ER

## 2019-01-10 PROCEDURE — 96375 TX/PRO/DX INJ NEW DRUG ADDON: CPT | Mod: ER

## 2019-01-10 PROCEDURE — 80048 BASIC METABOLIC PNL TOTAL CA: CPT | Mod: ER

## 2019-01-10 PROCEDURE — 93010 ELECTROCARDIOGRAM REPORT: CPT | Mod: ,,, | Performed by: INTERNAL MEDICINE

## 2019-01-10 PROCEDURE — 84484 ASSAY OF TROPONIN QUANT: CPT | Mod: ER

## 2019-01-10 PROCEDURE — 93005 ELECTROCARDIOGRAM TRACING: CPT | Mod: ER

## 2019-01-10 PROCEDURE — 81000 URINALYSIS NONAUTO W/SCOPE: CPT | Mod: ER

## 2019-01-10 PROCEDURE — 80053 COMPREHEN METABOLIC PANEL: CPT | Mod: ER

## 2019-01-10 PROCEDURE — 93005 ELECTROCARDIOGRAM TRACING: CPT

## 2019-01-10 PROCEDURE — 84484 ASSAY OF TROPONIN QUANT: CPT | Mod: 91

## 2019-01-10 RX ORDER — ASPIRIN 325 MG
325 TABLET ORAL ONCE
Status: COMPLETED | OUTPATIENT
Start: 2019-01-10 | End: 2019-01-10

## 2019-01-10 RX ORDER — ONDANSETRON 2 MG/ML
4 INJECTION INTRAMUSCULAR; INTRAVENOUS
Status: COMPLETED | OUTPATIENT
Start: 2019-01-10 | End: 2019-01-10

## 2019-01-10 RX ORDER — SODIUM BICARBONATE 1 MEQ/ML
50 SYRINGE (ML) INTRAVENOUS
Status: COMPLETED | OUTPATIENT
Start: 2019-01-10 | End: 2019-01-10

## 2019-01-10 RX ORDER — ATORVASTATIN CALCIUM 40 MG/1
40 TABLET, FILM COATED ORAL DAILY
Status: DISCONTINUED | OUTPATIENT
Start: 2019-01-11 | End: 2019-01-11

## 2019-01-10 RX ORDER — CLOPIDOGREL BISULFATE 75 MG/1
300 TABLET ORAL ONCE
Status: COMPLETED | OUTPATIENT
Start: 2019-01-10 | End: 2019-01-10

## 2019-01-10 RX ORDER — PANTOPRAZOLE SODIUM 40 MG/1
40 TABLET, DELAYED RELEASE ORAL DAILY
Status: DISCONTINUED | OUTPATIENT
Start: 2019-01-11 | End: 2019-01-16 | Stop reason: HOSPADM

## 2019-01-10 RX ORDER — LOSARTAN POTASSIUM 50 MG/1
100 TABLET ORAL DAILY
Status: DISCONTINUED | OUTPATIENT
Start: 2019-01-11 | End: 2019-01-10

## 2019-01-10 RX ORDER — HEPARIN SODIUM,PORCINE/D5W 25000/250
12 INTRAVENOUS SOLUTION INTRAVENOUS CONTINUOUS
Status: DISCONTINUED | OUTPATIENT
Start: 2019-01-10 | End: 2019-01-11

## 2019-01-10 RX ORDER — CLOPIDOGREL BISULFATE 75 MG/1
75 TABLET ORAL DAILY
Status: DISCONTINUED | OUTPATIENT
Start: 2019-01-11 | End: 2019-01-11

## 2019-01-10 RX ORDER — CLOPIDOGREL BISULFATE 75 MG/1
300 TABLET ORAL DAILY
Status: DISCONTINUED | OUTPATIENT
Start: 2019-01-11 | End: 2019-01-10

## 2019-01-10 RX ORDER — METOPROLOL TARTRATE 50 MG/1
100 TABLET ORAL DAILY
Status: DISCONTINUED | OUTPATIENT
Start: 2019-01-11 | End: 2019-01-11

## 2019-01-10 RX ORDER — NAPROXEN SODIUM 220 MG/1
81 TABLET, FILM COATED ORAL DAILY
Status: DISCONTINUED | OUTPATIENT
Start: 2019-01-11 | End: 2019-01-16 | Stop reason: HOSPADM

## 2019-01-10 RX ORDER — ONDANSETRON 2 MG/ML
4 INJECTION INTRAMUSCULAR; INTRAVENOUS EVERY 8 HOURS PRN
Status: DISCONTINUED | OUTPATIENT
Start: 2019-01-10 | End: 2019-01-16 | Stop reason: HOSPADM

## 2019-01-10 RX ORDER — SODIUM CHLORIDE 0.9 % (FLUSH) 0.9 %
5 SYRINGE (ML) INJECTION
Status: DISCONTINUED | OUTPATIENT
Start: 2019-01-10 | End: 2019-01-16 | Stop reason: HOSPADM

## 2019-01-10 RX ORDER — INSULIN ASPART 100 [IU]/ML
0-5 INJECTION, SOLUTION INTRAVENOUS; SUBCUTANEOUS
Status: DISCONTINUED | OUTPATIENT
Start: 2019-01-10 | End: 2019-01-16 | Stop reason: HOSPADM

## 2019-01-10 RX ORDER — AMOXICILLIN 250 MG
1 CAPSULE ORAL 2 TIMES DAILY PRN
Status: DISCONTINUED | OUTPATIENT
Start: 2019-01-10 | End: 2019-01-16 | Stop reason: HOSPADM

## 2019-01-10 RX ORDER — HYDROCODONE BITARTRATE AND ACETAMINOPHEN 500; 5 MG/1; MG/1
TABLET ORAL CONTINUOUS
Status: DISCONTINUED | OUTPATIENT
Start: 2019-01-10 | End: 2019-01-10

## 2019-01-10 RX ORDER — ACETAMINOPHEN 325 MG/1
650 TABLET ORAL EVERY 4 HOURS PRN
Status: DISCONTINUED | OUTPATIENT
Start: 2019-01-10 | End: 2019-01-16 | Stop reason: HOSPADM

## 2019-01-10 RX ORDER — NITROGLYCERIN 0.4 MG/1
0.4 TABLET SUBLINGUAL EVERY 5 MIN PRN
Status: DISCONTINUED | OUTPATIENT
Start: 2019-01-10 | End: 2019-01-16 | Stop reason: HOSPADM

## 2019-01-10 RX ORDER — SODIUM CHLORIDE 9 MG/ML
INJECTION, SOLUTION INTRAVENOUS CONTINUOUS
Status: DISCONTINUED | OUTPATIENT
Start: 2019-01-10 | End: 2019-01-11

## 2019-01-10 RX ORDER — ONDANSETRON 2 MG/ML
INJECTION INTRAMUSCULAR; INTRAVENOUS
Status: COMPLETED
Start: 2019-01-10 | End: 2019-01-10

## 2019-01-10 RX ADMIN — SODIUM CHLORIDE 1000 ML: 0.9 INJECTION, SOLUTION INTRAVENOUS at 03:01

## 2019-01-10 RX ADMIN — ONDANSETRON 4 MG: 2 INJECTION, SOLUTION INTRAMUSCULAR; INTRAVENOUS at 07:01

## 2019-01-10 RX ADMIN — ONDANSETRON 4 MG: 2 INJECTION INTRAMUSCULAR; INTRAVENOUS at 07:01

## 2019-01-10 RX ADMIN — ASPIRIN 325 MG ORAL TABLET 325 MG: 325 PILL ORAL at 11:01

## 2019-01-10 RX ADMIN — CLOPIDOGREL BISULFATE 300 MG: 75 TABLET ORAL at 11:01

## 2019-01-10 RX ADMIN — SODIUM CHLORIDE: 0.9 INJECTION, SOLUTION INTRAVENOUS at 11:01

## 2019-01-10 RX ADMIN — SODIUM BICARBONATE 50 MEQ: 84 INJECTION INTRAVENOUS at 04:01

## 2019-01-10 RX ADMIN — HEPARIN SODIUM AND DEXTROSE 12 UNITS/KG/HR: 10000; 5 INJECTION INTRAVENOUS at 11:01

## 2019-01-10 RX ADMIN — INSULIN HUMAN 5 UNITS: 100 INJECTION, SOLUTION PARENTERAL at 04:01

## 2019-01-10 RX ADMIN — SODIUM POLYSTYRENE SULFONATE 15 G: 15 SUSPENSION ORAL; RECTAL at 04:01

## 2019-01-10 NOTE — ED NOTES
Pt assisted onto bedside commode for a urine specimen. Pt put out 100ml of yellow clear urine that does have a foul odor. Pt assisted back in to bed with no difficulty

## 2019-01-10 NOTE — Clinical Note
The catheter is inserted to the  ostial RPA. is inserted in to the The R PA. EKOS cath in place and secured..

## 2019-01-10 NOTE — ED PROVIDER NOTES
"Encounter Date: 1/10/2019       History     Chief Complaint   Patient presents with    Fatigue     "I was at the kidney doctor for my follow up today and I took sick and got weak and I was SOB"      Patient was brought by ambulance for evaluation of weakness and dizziness.  She states that she was at her kidney doctor's office for appointment and felt very fatigued and weak.  She did not have any chest pain. She states she is mildly short of breath      The history is provided by the patient.     Review of patient's allergies indicates:  No Known Allergies  Past Medical History:   Diagnosis Date    Chronic kidney disease     Chronic low back pain     Diabetes mellitus, type 2     Hypertension      Past Surgical History:   Procedure Laterality Date    CHOLECYSTECTOMY       No family history on file.  Social History     Tobacco Use    Smoking status: Never Smoker   Substance Use Topics    Alcohol use: No     Frequency: Never    Drug use: No     Review of Systems   Constitutional: Positive for appetite change and fatigue.   HENT: Negative.    Eyes: Negative.    Respiratory: Negative.    Cardiovascular: Positive for leg swelling.   Gastrointestinal: Negative.    Endocrine: Negative.    Genitourinary: Negative.    Musculoskeletal: Negative.    Skin: Negative.    Allergic/Immunologic: Negative.    Neurological: Negative.    Hematological: Negative.    Psychiatric/Behavioral: Negative.        Physical Exam     Initial Vitals [01/10/19 1505]   BP Pulse Resp Temp SpO2   (!) 140/79 108 (!) 22 97.5 °F (36.4 °C) 99 %      MAP       --         Physical Exam    Nursing note and vitals reviewed.  Constitutional: She appears well-developed and well-nourished.   HENT:   Head: Normocephalic.   Eyes: Conjunctivae are normal.   Neck: Normal range of motion.   Cardiovascular: Regular rhythm, normal heart sounds and intact distal pulses.   Sinus tachycardia one hundred ten   Pulmonary/Chest:   Decreased respiratory effort "   Abdominal: Soft.   Musculoskeletal: Normal range of motion.   Neurological: She is oriented to person, place, and time. GCS score is 15. GCS eye subscore is 4. GCS verbal subscore is 5. GCS motor subscore is 6.   Skin: Skin is warm and dry.   Psychiatric: She has a normal mood and affect.   Depressed         ED Course   Critical Care  Date/Time: 1/10/2019 5:22 PM  Performed by: ARINA Gilbert III, MD  Authorized by: ARINA Gilbert III, MD   Direct patient critical care time: 40 minutes  Other critical care time: 20 minutes  Total critical care time (exclusive of procedural time) : 60 minutes        Labs Reviewed   CBC W/ AUTO DIFFERENTIAL - Abnormal; Notable for the following components:       Result Value    RDW 14.8 (*)     Mono # 1.3 (*)     Lymph% 17.3 (*)     All other components within normal limits   COMPREHENSIVE METABOLIC PANEL - Abnormal; Notable for the following components:    Sodium 134 (*)     Potassium 6.2 (*)     CO2 18 (*)     Glucose 215 (*)     BUN, Bld 76 (*)     Creatinine 4.87 (*)     Calcium 13.0 (*)     Total Protein 9.2 (*)     Alkaline Phosphatase 143 (*)     Anion Gap 17 (*)     eGFR if  9.9 (*)     eGFR if non  8.6 (*)     All other components within normal limits    Narrative:     Ca critical result(s) called and verbal readback obtained from   Linda Dominguez R.N., 01/10/2019 15:51   LACTIC ACID, PLASMA - Abnormal; Notable for the following components:    Lactate (Lactic Acid) 3.1 (*)     All other components within normal limits   TROPONIN I - Abnormal; Notable for the following components:    Troponin I 2.300 (*)     All other components within normal limits   URINALYSIS, REFLEX TO URINE CULTURE - Abnormal; Notable for the following components:    Protein, UA 2+ (*)     Occult Blood UA 1+ (*)     All other components within normal limits    Narrative:     Preferred Collection Type->Urine, Clean Catch   NT-PRO NATRIURETIC PEPTIDE - Abnormal;  Notable for the following components:    NT-proBNP 1200 (*)     All other components within normal limits   URINALYSIS MICROSCOPIC - Abnormal; Notable for the following components:    Bacteria, UA Moderate (*)     Hyaline Casts, UA 2 (*)     Waxy Casts, UA 1 (*)     All other components within normal limits    Narrative:     Preferred Collection Type->Urine, Clean Catch   CULTURE, BLOOD   CULTURE, BLOOD   PROTIME-INR     EKG Readings: (Independently Interpreted)   Rhythm: Sinus Tachycardia.   Nonspecific T-wave abnormality       Imaging Results          X-Ray Chest AP Portable (Final result)  Result time 01/10/19 15:33:07    Final result by Cayden Olmos III, MD (01/10/19 15:33:07)                 Impression:      No acute cardiopulmonary abnormality suggested.      Electronically signed by: Cayden Olmos MD  Date:    01/10/2019  Time:    15:33             Narrative:    EXAMINATION:  XR CHEST AP PORTABLE    CLINICAL HISTORY:  sob;    COMPARISON:  December    FINDINGS:  The heart size is normal with mild tortuosity of the thoracic aorta.  Patient is rotated.  Lung fields are clear.                                 Medical Decision Making:   Initial Assessment:   Acute renal failure with hypercalcemia and hyperkalemia and elevated troponin, probable non STEMI  ED Management:  Patient with metabolic acidosis and hyperkalemia and hypercalcemia and elevated troponin which could represent a non STEMI recommend transfer to [facility]  Other:   I have discussed this case with another health care provider.       <> Summary of the Discussion: Horace Mcclendon accepts patient                      Clinical Impression:   The primary encounter diagnosis was Hyperkalemia. Diagnoses of SOB (shortness of breath), Hypercalcemia, Acute renal failure with acute cortical necrosis, and Elevated troponin were also pertinent to this visit.      Disposition:   Disposition: Admitted  Condition:  Critical                        C. Jeb Gilbert III, MD  01/10/19 [id number]

## 2019-01-10 NOTE — Clinical Note
All wires, cath removed at this time.  (2) 6 fr sheath secured to R neck. Ekos Cath secured in place.

## 2019-01-10 NOTE — Clinical Note
The catheter is inserted to the  ostial LPA. is inserted in to the To L PA. EKOS cath inserted and secured..

## 2019-01-11 PROBLEM — R07.9 CHEST PAIN: Status: ACTIVE | Noted: 2019-01-11

## 2019-01-11 LAB
ALLENS TEST: ABNORMAL
AMYLASE SERPL-CCNC: 30 U/L
ANION GAP SERPL CALC-SCNC: 12 MMOL/L
ANION GAP SERPL CALC-SCNC: 16 MMOL/L
AORTIC ROOT ANNULUS: 2.72 CM
APTT BLDCRRT: 47.1 SEC
APTT BLDCRRT: 50.6 SEC
APTT BLDCRRT: 51.4 SEC
AV INDEX (PROSTH): 0.87
AV MEAN GRADIENT: 9.33 MMHG
AV PEAK GRADIENT: 13.1 MMHG
AV VALVE AREA: 2.24 CM2
BASOPHILS # BLD AUTO: 0.04 K/UL
BASOPHILS NFR BLD: 0.4 %
BSA FOR ECHO PROCEDURE: 1.87 M2
BUN SERPL-MCNC: 71 MG/DL
BUN SERPL-MCNC: 72 MG/DL
CALCIUM SERPL-MCNC: 10.6 MG/DL
CALCIUM SERPL-MCNC: 11.3 MG/DL
CHLORIDE SERPL-SCNC: 104 MMOL/L
CHLORIDE SERPL-SCNC: 105 MMOL/L
CHOLEST SERPL-MCNC: 180 MG/DL
CHOLEST/HDLC SERPL: 6.7 {RATIO}
CO2 SERPL-SCNC: 14 MMOL/L
CO2 SERPL-SCNC: 18 MMOL/L
CREAT SERPL-MCNC: 4.3 MG/DL
CREAT SERPL-MCNC: 4.4 MG/DL
CV ECHO LV RWT: 0.63 CM
DELSYS: ABNORMAL
DIFFERENTIAL METHOD: ABNORMAL
DOP CALC AO PEAK VEL: 1.81 M/S
DOP CALC AO VTI: 25.99 CM
DOP CALC LVOT AREA: 2.57 CM2
DOP CALC LVOT DIAMETER: 1.81 CM
DOP CALC LVOT STROKE VOLUME: 58.28 CM3
DOP CALCLVOT PEAK VEL VTI: 22.66 CM
E WAVE DECELERATION TIME: 255.6 MSEC
E/A RATIO: 0.56
ECHO LV POSTERIOR WALL: 1.1 CM (ref 0.6–1.1)
EOSINOPHIL # BLD AUTO: 0.2 K/UL
EOSINOPHIL NFR BLD: 1.9 %
ERYTHROCYTE [DISTWIDTH] IN BLOOD BY AUTOMATED COUNT: 14.8 %
EST. GFR  (AFRICAN AMERICAN): 11 ML/MIN/1.73 M^2
EST. GFR  (AFRICAN AMERICAN): 12 ML/MIN/1.73 M^2
EST. GFR  (NON AFRICAN AMERICAN): 10 ML/MIN/1.73 M^2
EST. GFR  (NON AFRICAN AMERICAN): 10 ML/MIN/1.73 M^2
FLOW: 2
FRACTIONAL SHORTENING: 74 % (ref 28–44)
GLUCOSE SERPL-MCNC: 129 MG/DL
GLUCOSE SERPL-MCNC: 161 MG/DL
HCO3 UR-SCNC: 16.1 MMOL/L (ref 24–28)
HCT VFR BLD AUTO: 35.2 %
HDLC SERPL-MCNC: 27 MG/DL
HDLC SERPL: 15 %
HGB BLD-MCNC: 11.4 G/DL
INTERVENTRICULAR SEPTUM: 1.4 CM (ref 0.6–1.1)
IVRT: 0.12 MSEC
LA MAJOR: 4.45 CM
LA MINOR: 4.64 CM
LA WIDTH: 3.01 CM
LACTATE SERPL-SCNC: 1.5 MMOL/L
LDLC SERPL CALC-MCNC: 126 MG/DL
LEFT ATRIUM SIZE: 3.2 CM
LEFT ATRIUM VOLUME INDEX: 20.5 ML/M2
LEFT ATRIUM VOLUME: 37.19 CM3
LEFT INTERNAL DIMENSION IN SYSTOLE: 0.9 CM (ref 2.1–4)
LEFT VENTRICLE DIASTOLIC VOLUME INDEX: 20.09 ML/M2
LEFT VENTRICLE DIASTOLIC VOLUME: 36.4 ML
LEFT VENTRICLE MASS INDEX: 79.8 G/M2
LEFT VENTRICLE SYSTOLIC VOLUME INDEX: 9.1 ML/M2
LEFT VENTRICLE SYSTOLIC VOLUME: 16.5 ML
LEFT VENTRICULAR INTERNAL DIMENSION IN DIASTOLE: 3.5 CM (ref 3.5–6)
LEFT VENTRICULAR MASS: 144.64 G
LIPASE SERPL-CCNC: 29 U/L
LYMPHOCYTES # BLD AUTO: 3 K/UL
LYMPHOCYTES NFR BLD: 30.9 %
MCH RBC QN AUTO: 28.3 PG
MCHC RBC AUTO-ENTMCNC: 32.4 G/DL
MCV RBC AUTO: 87 FL
MODE: ABNORMAL
MONOCYTES # BLD AUTO: 1.5 K/UL
MONOCYTES NFR BLD: 15.1 %
MV PEAK A VEL: 0.98 M/S
MV PEAK E VEL: 0.55 M/S
NEUTROPHILS # BLD AUTO: 5.1 K/UL
NEUTROPHILS NFR BLD: 51.6 %
NONHDLC SERPL-MCNC: 153 MG/DL
PCO2 BLDA: 30 MMHG (ref 35–45)
PH SMN: 7.34 [PH] (ref 7.35–7.45)
PISA TR MAX VEL: 2.59 M/S
PLATELET # BLD AUTO: 259 K/UL
PMV BLD AUTO: 10.1 FL
PO2 BLDA: 85 MMHG (ref 80–100)
POC BE: -10 MMOL/L
POC SATURATED O2: 96 % (ref 95–100)
POC TCO2: 17 MMOL/L (ref 23–27)
POCT GLUCOSE: 139 MG/DL (ref 70–110)
POCT GLUCOSE: 140 MG/DL (ref 70–110)
POCT GLUCOSE: 166 MG/DL (ref 70–110)
POCT GLUCOSE: 177 MG/DL (ref 70–110)
POTASSIUM SERPL-SCNC: 5.3 MMOL/L
POTASSIUM SERPL-SCNC: 5.5 MMOL/L
PULM VEIN S/D RATIO: 1.59
PV PEAK D VEL: 0.17 M/S
PV PEAK S VEL: 0.27 M/S
RA MAJOR: 4.53 CM
RBC # BLD AUTO: 4.03 M/UL
RIGHT VENTRICULAR END-DIASTOLIC DIMENSION: 5 CM
SAMPLE: ABNORMAL
SITE: ABNORMAL
SODIUM SERPL-SCNC: 134 MMOL/L
SODIUM SERPL-SCNC: 135 MMOL/L
SP02: 96
TR MAX PG: 26.83 MMHG
TRIGL SERPL-MCNC: 135 MG/DL
TROPONIN I SERPL DL<=0.01 NG/ML-MCNC: 4.08 NG/ML
TROPONIN I SERPL DL<=0.01 NG/ML-MCNC: 5.71 NG/ML
TV PEAK SYSTOLIC PULMONARY ARTERY PRESSURE: 28 MMHG
WBC # BLD AUTO: 9.85 K/UL

## 2019-01-11 PROCEDURE — 63600175 PHARM REV CODE 636 W HCPCS: Performed by: INTERNAL MEDICINE

## 2019-01-11 PROCEDURE — 99223 1ST HOSP IP/OBS HIGH 75: CPT | Mod: 25,,, | Performed by: INTERNAL MEDICINE

## 2019-01-11 PROCEDURE — 82803 BLOOD GASES ANY COMBINATION: CPT

## 2019-01-11 PROCEDURE — 93005 ELECTROCARDIOGRAM TRACING: CPT

## 2019-01-11 PROCEDURE — 36415 COLL VENOUS BLD VENIPUNCTURE: CPT

## 2019-01-11 PROCEDURE — 82150 ASSAY OF AMYLASE: CPT

## 2019-01-11 PROCEDURE — 99223 PR INITIAL HOSPITAL CARE,LEVL III: ICD-10-PCS | Mod: 25,,, | Performed by: INTERNAL MEDICINE

## 2019-01-11 PROCEDURE — C1769 GUIDE WIRE: HCPCS | Performed by: INTERNAL MEDICINE

## 2019-01-11 PROCEDURE — 99152 MOD SED SAME PHYS/QHP 5/>YRS: CPT | Performed by: INTERNAL MEDICINE

## 2019-01-11 PROCEDURE — 85025 COMPLETE CBC W/AUTO DIFF WBC: CPT

## 2019-01-11 PROCEDURE — 99152 MOD SED SAME PHYS/QHP 5/>YRS: CPT | Mod: ,,, | Performed by: INTERNAL MEDICINE

## 2019-01-11 PROCEDURE — 37211 THROMBOLYTIC ART THERAPY: CPT | Mod: 50,,, | Performed by: INTERNAL MEDICINE

## 2019-01-11 PROCEDURE — 83690 ASSAY OF LIPASE: CPT

## 2019-01-11 PROCEDURE — 85730 THROMBOPLASTIN TIME PARTIAL: CPT | Mod: 91

## 2019-01-11 PROCEDURE — 25000003 PHARM REV CODE 250: Performed by: INTERNAL MEDICINE

## 2019-01-11 PROCEDURE — C1894 INTRO/SHEATH, NON-LASER: HCPCS | Performed by: INTERNAL MEDICINE

## 2019-01-11 PROCEDURE — 63600175 PHARM REV CODE 636 W HCPCS: Mod: JG | Performed by: INTERNAL MEDICINE

## 2019-01-11 PROCEDURE — 94761 N-INVAS EAR/PLS OXIMETRY MLT: CPT

## 2019-01-11 PROCEDURE — 83605 ASSAY OF LACTIC ACID: CPT

## 2019-01-11 PROCEDURE — 20000000 HC ICU ROOM

## 2019-01-11 PROCEDURE — C1757 CATH, THROMBECTOMY/EMBOLECT: HCPCS | Performed by: INTERNAL MEDICINE

## 2019-01-11 PROCEDURE — 84484 ASSAY OF TROPONIN QUANT: CPT | Mod: 91

## 2019-01-11 PROCEDURE — 80048 BASIC METABOLIC PNL TOTAL CA: CPT

## 2019-01-11 PROCEDURE — 25000003 PHARM REV CODE 250: Performed by: NURSE PRACTITIONER

## 2019-01-11 PROCEDURE — 63600175 PHARM REV CODE 636 W HCPCS: Performed by: NURSE PRACTITIONER

## 2019-01-11 PROCEDURE — 37211 PR ATERIAL THROMBOLYTIC INFUSION, INITIAL DAY, S&I: ICD-10-PCS | Mod: 50,,, | Performed by: INTERNAL MEDICINE

## 2019-01-11 PROCEDURE — 36600 WITHDRAWAL OF ARTERIAL BLOOD: CPT

## 2019-01-11 PROCEDURE — 63600175 PHARM REV CODE 636 W HCPCS: Performed by: HOSPITALIST

## 2019-01-11 PROCEDURE — 99153 MOD SED SAME PHYS/QHP EA: CPT | Performed by: INTERNAL MEDICINE

## 2019-01-11 PROCEDURE — 36014 PLACE CATHETER IN ARTERY: CPT | Mod: 51,50,, | Performed by: INTERNAL MEDICINE

## 2019-01-11 PROCEDURE — 80048 BASIC METABOLIC PNL TOTAL CA: CPT | Mod: 91

## 2019-01-11 PROCEDURE — 37211 THROMBOLYTIC ART THERAPY: CPT | Mod: 50 | Performed by: INTERNAL MEDICINE

## 2019-01-11 PROCEDURE — 36014 PLACE CATHETER IN ARTERY: CPT | Mod: 50 | Performed by: INTERNAL MEDICINE

## 2019-01-11 PROCEDURE — 85730 THROMBOPLASTIN TIME PARTIAL: CPT

## 2019-01-11 PROCEDURE — 27000221 HC OXYGEN, UP TO 24 HOURS

## 2019-01-11 PROCEDURE — 93010 EKG 12-LEAD: ICD-10-PCS | Mod: 76,,, | Performed by: INTERNAL MEDICINE

## 2019-01-11 PROCEDURE — 93010 ELECTROCARDIOGRAM REPORT: CPT | Mod: ,,, | Performed by: INTERNAL MEDICINE

## 2019-01-11 PROCEDURE — 93010 ELECTROCARDIOGRAM REPORT: CPT | Mod: 76,,, | Performed by: INTERNAL MEDICINE

## 2019-01-11 PROCEDURE — 99152 PR MOD CONSCIOUS SEDATION, SAME PHYS, 5+ YRS, FIRST 15 MIN: ICD-10-PCS | Mod: ,,, | Performed by: INTERNAL MEDICINE

## 2019-01-11 PROCEDURE — 36014 PR PLACE CATH IN LT/RT PULM ART: ICD-10-PCS | Mod: 51,50,, | Performed by: INTERNAL MEDICINE

## 2019-01-11 PROCEDURE — 84484 ASSAY OF TROPONIN QUANT: CPT

## 2019-01-11 PROCEDURE — 80061 LIPID PANEL: CPT

## 2019-01-11 PROCEDURE — 97802 MEDICAL NUTRITION INDIV IN: CPT

## 2019-01-11 RX ORDER — MIDAZOLAM HYDROCHLORIDE 1 MG/ML
INJECTION, SOLUTION INTRAMUSCULAR; INTRAVENOUS
Status: DISCONTINUED | OUTPATIENT
Start: 2019-01-11 | End: 2019-01-11 | Stop reason: HOSPADM

## 2019-01-11 RX ORDER — METOPROLOL TARTRATE 50 MG/1
100 TABLET ORAL 2 TIMES DAILY
Status: DISCONTINUED | OUTPATIENT
Start: 2019-01-11 | End: 2019-01-16 | Stop reason: HOSPADM

## 2019-01-11 RX ORDER — HEPARIN SODIUM 200 [USP'U]/100ML
INJECTION, SOLUTION INTRAVENOUS
Status: DISCONTINUED | OUTPATIENT
Start: 2019-01-11 | End: 2019-01-11

## 2019-01-11 RX ORDER — SODIUM CHLORIDE 9 MG/ML
INJECTION, SOLUTION INTRAVENOUS
Status: DISCONTINUED | OUTPATIENT
Start: 2019-01-11 | End: 2019-01-16 | Stop reason: HOSPADM

## 2019-01-11 RX ORDER — OXYCODONE AND ACETAMINOPHEN 5; 325 MG/1; MG/1
1 TABLET ORAL EVERY 6 HOURS PRN
Status: DISCONTINUED | OUTPATIENT
Start: 2019-01-11 | End: 2019-01-16 | Stop reason: HOSPADM

## 2019-01-11 RX ORDER — METOPROLOL TARTRATE 1 MG/ML
5 INJECTION, SOLUTION INTRAVENOUS ONCE
Status: COMPLETED | OUTPATIENT
Start: 2019-01-11 | End: 2019-01-11

## 2019-01-11 RX ORDER — BIVALIRUDIN 250 MG/5ML
INJECTION, POWDER, LYOPHILIZED, FOR SOLUTION INTRAVENOUS
Status: DISCONTINUED | OUTPATIENT
Start: 2019-01-11 | End: 2019-01-16 | Stop reason: HOSPADM

## 2019-01-11 RX ORDER — ATORVASTATIN CALCIUM 40 MG/1
80 TABLET, FILM COATED ORAL DAILY
Status: DISCONTINUED | OUTPATIENT
Start: 2019-01-11 | End: 2019-01-16 | Stop reason: HOSPADM

## 2019-01-11 RX ORDER — FENTANYL CITRATE 50 UG/ML
INJECTION, SOLUTION INTRAMUSCULAR; INTRAVENOUS
Status: DISCONTINUED | OUTPATIENT
Start: 2019-01-11 | End: 2019-01-11 | Stop reason: HOSPADM

## 2019-01-11 RX ORDER — MORPHINE SULFATE 4 MG/ML
2 INJECTION, SOLUTION INTRAMUSCULAR; INTRAVENOUS ONCE
Status: COMPLETED | OUTPATIENT
Start: 2019-01-11 | End: 2019-01-11

## 2019-01-11 RX ORDER — METOPROLOL TARTRATE 25 MG/1
25 TABLET, FILM COATED ORAL 2 TIMES DAILY
Status: DISCONTINUED | OUTPATIENT
Start: 2019-01-11 | End: 2019-01-11

## 2019-01-11 RX ORDER — SODIUM CHLORIDE 9 MG/ML
INJECTION, SOLUTION INTRAVENOUS CONTINUOUS
Status: DISCONTINUED | OUTPATIENT
Start: 2019-01-11 | End: 2019-01-11

## 2019-01-11 RX ORDER — LIDOCAINE HCL/EPINEPHRINE/PF 2%-1:200K
VIAL (ML) INJECTION
Status: DISCONTINUED | OUTPATIENT
Start: 2019-01-11 | End: 2019-01-11 | Stop reason: HOSPADM

## 2019-01-11 RX ORDER — NITROGLYCERIN 20 MG/100ML
10 INJECTION INTRAVENOUS CONTINUOUS
Status: DISCONTINUED | OUTPATIENT
Start: 2019-01-11 | End: 2019-01-16 | Stop reason: HOSPADM

## 2019-01-11 RX ORDER — MORPHINE SULFATE 10 MG/ML
2 INJECTION, SOLUTION INTRAMUSCULAR; INTRAVENOUS ONCE
Status: DISCONTINUED | OUTPATIENT
Start: 2019-01-11 | End: 2019-01-11

## 2019-01-11 RX ADMIN — PANTOPRAZOLE SODIUM 40 MG: 40 TABLET, DELAYED RELEASE ORAL at 09:01

## 2019-01-11 RX ADMIN — NITROGLYCERIN 0.4 MG: 0.4 TABLET, ORALLY DISINTEGRATING SUBLINGUAL at 01:01

## 2019-01-11 RX ADMIN — METOPROLOL TARTRATE 5 MG: 1 INJECTION, SOLUTION INTRAVENOUS at 01:01

## 2019-01-11 RX ADMIN — NITROGLYCERIN 0.4 MG: 0.4 TABLET, ORALLY DISINTEGRATING SUBLINGUAL at 07:01

## 2019-01-11 RX ADMIN — NITROGLYCERIN 10 MCG/MIN: 20 INJECTION INTRAVENOUS at 10:01

## 2019-01-11 RX ADMIN — NITROGLYCERIN 0.4 MG: 0.4 TABLET, ORALLY DISINTEGRATING SUBLINGUAL at 06:01

## 2019-01-11 RX ADMIN — SODIUM CHLORIDE: 0.9 INJECTION, SOLUTION INTRAVENOUS at 02:01

## 2019-01-11 RX ADMIN — SODIUM BICARBONATE: 84 INJECTION, SOLUTION INTRAVENOUS at 05:01

## 2019-01-11 RX ADMIN — METOPROLOL TARTRATE 100 MG: 50 TABLET ORAL at 09:01

## 2019-01-11 RX ADMIN — BIVALIRUDIN 1.75 MG/KG/HR: 250 INJECTION, POWDER, LYOPHILIZED, FOR SOLUTION INTRAVENOUS at 09:01

## 2019-01-11 RX ADMIN — ATORVASTATIN CALCIUM 40 MG: 40 TABLET, FILM COATED ORAL at 11:01

## 2019-01-11 RX ADMIN — OXYCODONE HYDROCHLORIDE AND ACETAMINOPHEN 1 TABLET: 5; 325 TABLET ORAL at 08:01

## 2019-01-11 RX ADMIN — METOPROLOL TARTRATE 100 MG: 50 TABLET ORAL at 08:01

## 2019-01-11 RX ADMIN — BIVALIRUDIN 1.75 MG/KG/HR: 250 INJECTION, POWDER, LYOPHILIZED, FOR SOLUTION INTRAVENOUS at 05:01

## 2019-01-11 RX ADMIN — MORPHINE SULFATE 2 MG: 4 INJECTION INTRAVENOUS at 02:01

## 2019-01-11 RX ADMIN — ASPIRIN 81 MG 81 MG: 81 TABLET ORAL at 09:01

## 2019-01-11 RX ADMIN — ALTEPLASE 1 MG/HR: 2.2 INJECTION, POWDER, LYOPHILIZED, FOR SOLUTION INTRAVENOUS at 05:01

## 2019-01-11 RX ADMIN — BIVALIRUDIN 0.2 MG/KG/HR: 250 INJECTION, POWDER, LYOPHILIZED, FOR SOLUTION INTRAVENOUS at 09:01

## 2019-01-11 RX ADMIN — ACETAMINOPHEN 650 MG: 325 TABLET ORAL at 12:01

## 2019-01-11 RX ADMIN — ATORVASTATIN CALCIUM 40 MG: 40 TABLET, FILM COATED ORAL at 09:01

## 2019-01-11 RX ADMIN — CLOPIDOGREL BISULFATE 75 MG: 75 TABLET ORAL at 09:01

## 2019-01-11 NOTE — SUBJECTIVE & OBJECTIVE
Past Medical History:   Diagnosis Date    Chronic kidney disease     Chronic low back pain     Diabetes mellitus, type 2     Hypertension        Past Surgical History:   Procedure Laterality Date    CHOLECYSTECTOMY         Review of patient's allergies indicates:  No Known Allergies    No current facility-administered medications on file prior to encounter.      Current Outpatient Medications on File Prior to Encounter   Medication Sig    amLODIPine (NORVASC) 10 MG tablet Take 10 mg by mouth nightly.    atorvastatin (LIPITOR) 40 MG tablet Take 40 mg by mouth once daily.    cloNIDine (CATAPRES) 0.2 MG tablet Take 0.2 mg by mouth nightly.    furosemide (LASIX) 40 MG tablet Take 40 mg by mouth once daily.    glipiZIDE (GLUCOTROL) 2.5 MG TR24 Take 1 tablet (2.5 mg total) by mouth daily with breakfast.    losartan (COZAAR) 100 MG tablet Take 100 mg by mouth once daily.    metoprolol tartrate (LOPRESSOR) 100 MG tablet     oxyCODONE-acetaminophen (PERCOCET)  mg per tablet     pantoprazole (PROTONIX) 40 MG tablet     ergocalciferol (VITAMIN D2) 50,000 unit Cap Take 50,000 Units by mouth every Mon, Wed, Fri.     Family History     None        Tobacco Use    Smoking status: Never Smoker   Substance and Sexual Activity    Alcohol use: No     Frequency: Never    Drug use: No    Sexual activity: Not on file     Review of Systems   Constitution: Negative for chills, decreased appetite, diaphoresis, fever and weakness.   Cardiovascular: Positive for chest pain (lower chest pain ). Negative for claudication, cyanosis, dyspnea on exertion, irregular heartbeat, leg swelling, near-syncope, orthopnea, palpitations, paroxysmal nocturnal dyspnea and syncope.   Respiratory: Negative for cough, hemoptysis, shortness of breath and wheezing.    Gastrointestinal: Positive for abdominal pain. Negative for bloating, constipation, diarrhea, melena, nausea and vomiting.   Neurological: Positive for dizziness.      Objective:     Vital Signs (Most Recent):  Temp: 96.7 °F (35.9 °C) (19 0747)  Pulse: 91 (19 1130)  Resp: 17 (19 1130)  BP: 135/79 (19 1130)  SpO2: 98 % (19 1130) Vital Signs (24h Range):  Temp:  [96.2 °F (35.7 °C)-97.5 °F (36.4 °C)] 96.7 °F (35.9 °C)  Pulse:  [] 91  Resp:  [17-29] 17  SpO2:  [88 %-100 %] 98 %  BP: (121-161)/(72-91) 135/79     Weight: 77.4 kg (170 lb 10.2 oz)  Body mass index is 29.29 kg/m².    SpO2: 98 %  O2 Device (Oxygen Therapy): nasal cannula      Intake/Output Summary (Last 24 hours) at 2019 1147  Last data filed at 2019 0800  Gross per 24 hour   Intake 0 ml   Output 300 ml   Net -300 ml       Lines/Drains/Airways     Peripheral Intravenous Line                 Peripheral IV - Single Lumen 01/10/19 1310 Right Wrist less than 1 day         Peripheral IV - Single Lumen 01/10/19 1514 Left Wrist less than 1 day                Physical Exam   Constitutional: She is oriented to person, place, and time. She appears well-developed and well-nourished. No distress.   Cardiovascular: Normal rate and regular rhythm. Exam reveals no gallop.   No murmur heard.  Pulmonary/Chest: Effort normal and breath sounds normal. No respiratory distress. She has no wheezes.   Abdominal: Soft. Bowel sounds are normal. She exhibits no distension. There is no tenderness.   Neurological: She is alert and oriented to person, place, and time.   Skin: Skin is warm and dry.       Significant Labs:     Recent Labs   Lab 19  0555   *   K 5.5*      CO2 14*   BUN 71*   CREATININE 4.4*     Recent Labs   Lab 19  0555   WBC 9.85   RBC 4.03   HGB 11.4*   HCT 35.2*      MCV 87   MCH 28.3   MCHC 32.4     Recent Labs   Lab 19  0728   TROPONINI 4.080*       Significant Imagin2018 Echocardiogram: 2D echo with color flow doppler:     · Increased (hyperdynamic) left ventricular systolic function. The estimated ejection fraction is 65%  · Concentric  left ventricular remodeling consistent with hypertensive heart disease  · Grade I (mild) left ventricular diastolic dysfunction consistent with impaired relaxation.  · Normal right ventricular systolic function.  · Mild tricuspid regurgitation.  · The estimated PA systolic pressure is 41 mm Hg  · Normal central venous pressure (3 mm Hg).    Repeat TTE pending

## 2019-01-11 NOTE — ASSESSMENT & PLAN NOTE
-possible atypical presentation  -troponin elevated with peak at 6.631 and trending down currently  -EKG with no acute changes  -placed on IV Tridil drip

## 2019-01-11 NOTE — SUBJECTIVE & OBJECTIVE
Past Medical History:   Diagnosis Date    Chronic kidney disease     Chronic low back pain     Diabetes mellitus, type 2     Hypertension        Past Surgical History:   Procedure Laterality Date    CHOLECYSTECTOMY         Review of patient's allergies indicates:  No Known Allergies    No current facility-administered medications on file prior to encounter.      Current Outpatient Medications on File Prior to Encounter   Medication Sig    amLODIPine (NORVASC) 10 MG tablet Take 10 mg by mouth nightly.    atorvastatin (LIPITOR) 40 MG tablet Take 40 mg by mouth once daily.    cloNIDine (CATAPRES) 0.2 MG tablet Take 0.2 mg by mouth nightly.    furosemide (LASIX) 40 MG tablet Take 40 mg by mouth once daily.    glipiZIDE (GLUCOTROL) 2.5 MG TR24 Take 1 tablet (2.5 mg total) by mouth daily with breakfast.    losartan (COZAAR) 100 MG tablet Take 100 mg by mouth once daily.    metoprolol tartrate (LOPRESSOR) 100 MG tablet     oxyCODONE-acetaminophen (PERCOCET)  mg per tablet     pantoprazole (PROTONIX) 40 MG tablet     ergocalciferol (VITAMIN D2) 50,000 unit Cap Take 50,000 Units by mouth every Mon, Wed, Fri.     Family History     None        Tobacco Use    Smoking status: Never Smoker   Substance and Sexual Activity    Alcohol use: No     Frequency: Never    Drug use: No    Sexual activity: Not on file     Review of Systems   Constitutional: Positive for appetite change and fatigue. Negative for chills, diaphoresis, fever and unexpected weight change.   HENT: Negative for congestion.    Eyes: Negative for photophobia.   Respiratory: Positive for shortness of breath (with exertion ). Negative for cough, chest tightness and wheezing.    Cardiovascular: Positive for chest pain. Negative for palpitations and leg swelling.   Gastrointestinal: Negative for abdominal pain, diarrhea, nausea and vomiting.   Genitourinary: Negative for dysuria, flank pain, frequency and hematuria.   Musculoskeletal: Negative  for back pain and myalgias.   Skin: Negative for color change.   Neurological: Positive for weakness. Negative for dizziness, syncope, light-headedness and headaches.   Psychiatric/Behavioral: Negative for confusion.     Objective:     Vital Signs (Most Recent):  Temp: 96.7 °F (35.9 °C) (01/11/19 0747)  Pulse: 90 (01/11/19 1115)  Resp: (!) 21 (01/11/19 1115)  BP: 127/72 (01/11/19 1115)  SpO2: 99 % (01/11/19 1115) Vital Signs (24h Range):  Temp:  [96.2 °F (35.7 °C)-97.5 °F (36.4 °C)] 96.7 °F (35.9 °C)  Pulse:  [] 90  Resp:  [17-29] 21  SpO2:  [88 %-100 %] 99 %  BP: (121-161)/(72-91) 127/72     Weight: 77.4 kg (170 lb 10.2 oz)  Body mass index is 29.29 kg/m².    Physical Exam   Constitutional: She is oriented to person, place, and time. She appears well-developed and well-nourished.   HENT:   Head: Normocephalic and atraumatic.   Eyes: Conjunctivae are normal. Pupils are equal, round, and reactive to light.   Neck: Normal range of motion. No JVD present.   Cardiovascular: Normal rate, regular rhythm, normal heart sounds and intact distal pulses.   Pulmonary/Chest: Effort normal. No respiratory distress. She has no wheezes.   Abdominal: Soft. Bowel sounds are normal. She exhibits no distension. There is no tenderness. There is no guarding.   Musculoskeletal: Normal range of motion. She exhibits no edema (trace edema to bilateral lower extremities ).   Neurological: She is alert and oriented to person, place, and time. No cranial nerve deficit.   Skin: Skin is warm and dry. Capillary refill takes less than 2 seconds.   Psychiatric: She has a normal mood and affect. Her behavior is normal.         CRANIAL NERVES     CN III, IV, VI   Pupils are equal, round, and reactive to light.       Significant Labs:   BMP:   Recent Labs   Lab 01/11/19  0555   *   *   K 5.5*      CO2 14*   BUN 71*   CREATININE 4.4*   CALCIUM 11.3*     CBC:   Recent Labs   Lab 01/10/19  1315 01/11/19  0555   WBC 10.83 9.85    HGB 12.8 11.4*   HCT 39.3 35.2*    259     Lactic Acid:   Recent Labs   Lab 01/10/19  1315 01/10/19  2014   LACTATE 3.1* 2.9*     Troponin:   Recent Labs   Lab 01/10/19  2014 01/11/19  0228 01/11/19  0728   TROPONINI 6.631* 5.714* 4.080*       Significant Imaging: I have reviewed all pertinent imaging results/findings within the past 24 hours.

## 2019-01-11 NOTE — ASSESSMENT & PLAN NOTE
-K+ 6.2 upon admisson; received insulin, D50 and Kaexylate  -repeat K+ 5.4-5.5  -further management per primary team and Nephrology

## 2019-01-11 NOTE — PLAN OF CARE
Principal Problem:Acute renal failure superimposed on stage 4 chronic kidney disease    Pt is a 30 day readmit, [facility], IP, Dehydration 12/24--12/26/19    TN met bedside with pt and niece in ICU    Pt lives at home with nimakayla, Kallie Holman, who provides transportation to [facility]. Pt is current with , [facility]. Pt has BSC, RW, Glucometer. Pt would like to continue with [facility] on D/C.    Pt denies any needs or concerns at this time. Discharge planning brochure and business card provided. CM numbers written on white board. Pt encouraged to call with any questions or needs. CM will continue to follow patient throughout the transitions of care, and assist with any discharge needs.    Future Appointments   Date Time Provider Department Center   1/14/2019 10:00 AM [facility] NM1 LIMIT 450 LBS [facility]   1/14/2019 10:40 AM LAB, [facility] LAB [facility]   1/14/2019  1:00 PM [facility] NM1 LIMIT 450 LBS [facility]   1/17/2019  3:40 PM Charles Argueta MD [facility]          01/11/19 1528   Discharge Assessment   Assessment Type Discharge Planning Assessment   Confirmed/corrected address and phone number on facesheet? Yes   Assessment information obtained from? Patient   Expected Length of Stay (days) 3   Communicated expected length of stay with patient/caregiver yes   Prior to hospitilization cognitive status: Alert/Oriented   Prior to hospitalization functional status: Needs Assistance;Assistive Equipment   Current cognitive status: Alert/Oriented   Current Functional Status: Needs Assistance;Assistive Equipment   Facility Arrived From: (home)   Lives With other relative(s)  (niece: Kallie Holman [phone])   Able to Return to Prior Arrangements yes   Is patient able to care for self after discharge? Yes   Who are your caregiver(s) and their phone number(s)? Niece: Kallie Holman [phone]   Patient's perception of discharge disposition home health  (Current  with [facility])   Readmission Within the Last 30 Days other (see comments)  (last admit: [facility], IP, Dehydration 12/24--12/26/18)   Patient currently being followed by outpatient case management? No   Patient currently receives any other outside agency services? No   Equipment Currently Used at Home walker, rolling;glucometer;bedside commode   Do you have any problems affording any of your prescribed medications? No   Is the patient taking medications as prescribed? yes   Does the patient have transportation home? Yes   Transportation Anticipated family or friend will provide  (Niece: Kallie Holman [phone])   Dialysis Name and Scheduled days N/A   Does the patient receive services at the [facility]? No   Discharge Plan A Home Health   Discharge Plan B Home with family   DME Needed Upon Discharge  (TBD)   Patient/Family in Agreement with Plan yes     Mitra Raymundo RN Transitional Navigator  [phone]

## 2019-01-11 NOTE — ASSESSMENT & PLAN NOTE
Controlled, A1C 6.4 12/24/18     -per chart review glipizide discontinued due to episodic hypoglycemic events   -monitor blood sugars AC &HS, diabetic diet, low dose SSI

## 2019-01-11 NOTE — NURSING
Dr. Day at bedside. MD clarified nursing communication order- stop tPa and Angiomax at 2300, measure PA pressure, pull EKOs at 0000, then give Eliquis around 0300. Updated Stella Willis RN on plan of care.   Janny Villareal RN

## 2019-01-11 NOTE — HPI
Brittaney Joseph is a 69 yo  female with HTN, HLD, chronic kidney disease stage 4, Type 2 Diabetes Mellitus, diastolic heart failure and chronic back pain. Her primary care physician is Dr. Charles Argueta. Her nephrologist is Dr. Mcdaniel. She lives with her niece in [place], [place]. She presented to [facility] 1/10/19 with complaints of generalized weakness, fatigue, dizziness, SOB and nausea/vomiting. The patient reports acute onset of symptoms while at nephrologist appointment prior to arrival. She denies chest pain, palpitations, unilateral weakness and syncopal events. Initial labs remarkable for K (6.2), BUN/CR (76/4.87) increased from previous BUN/CR (56/2.9 on 12/26/18), anion gap (17),   lactic (3.1), Troponin (2.3) and BNP (1200). EKG shows sinus tachycardia with non-specific T wave abnormality. VSS. CXR shows no acute abnormality. Pt received insulin, bicarb and kayexalate for management of hyperkalemia and acidosis. She also received 1 L NS bolus. Pt admitted to [facility] medicine for further evaluation.

## 2019-01-11 NOTE — BRIEF OP NOTE
S/p bilateral EKOS catheter         TPA 1 mg/hr in each catheter   Angiomax       Total of 6 hours

## 2019-01-11 NOTE — ED NOTES
Attempted to call report,  states charge nurse is moving a bed to another floor and has not assigned the bed yet, name and number taken states will call back before shift change.

## 2019-01-11 NOTE — SUBJECTIVE & OBJECTIVE
Past Medical History:   Diagnosis Date    Chronic kidney disease     Chronic low back pain     Diabetes mellitus, type 2     Hypertension        Past Surgical History:   Procedure Laterality Date    CHOLECYSTECTOMY         Review of patient's allergies indicates:  No Known Allergies    No current facility-administered medications on file prior to encounter.      Current Outpatient Medications on File Prior to Encounter   Medication Sig    amLODIPine (NORVASC) 10 MG tablet Take 10 mg by mouth nightly.    atorvastatin (LIPITOR) 40 MG tablet Take 40 mg by mouth once daily.    cloNIDine (CATAPRES) 0.2 MG tablet Take 0.2 mg by mouth nightly.    furosemide (LASIX) 40 MG tablet Take 40 mg by mouth once daily.    glipiZIDE (GLUCOTROL) 2.5 MG TR24 Take 1 tablet (2.5 mg total) by mouth daily with breakfast.    losartan (COZAAR) 100 MG tablet Take 100 mg by mouth once daily.    metoprolol tartrate (LOPRESSOR) 100 MG tablet     oxyCODONE-acetaminophen (PERCOCET)  mg per tablet     pantoprazole (PROTONIX) 40 MG tablet     ergocalciferol (VITAMIN D2) 50,000 unit Cap Take 50,000 Units by mouth every Mon, Wed, Fri.     Family History     None        Tobacco Use    Smoking status: Never Smoker   Substance and Sexual Activity    Alcohol use: No     Frequency: Never    Drug use: No    Sexual activity: Not on file     Review of Systems   Constitutional: Positive for appetite change, fatigue and unexpected weight change. Negative for chills, diaphoresis and fever.   HENT: Negative for congestion and ear pain.    Eyes: Negative for photophobia.   Respiratory: Positive for shortness of breath (with exertion ). Negative for cough, chest tightness and wheezing.    Cardiovascular: Negative for chest pain, palpitations and leg swelling.   Gastrointestinal: Negative for abdominal pain, diarrhea, nausea and vomiting.   Genitourinary: Negative for dysuria, flank pain, frequency and hematuria.   Musculoskeletal: Negative  for back pain and myalgias.   Skin: Negative for color change.   Neurological: Positive for weakness. Negative for dizziness, syncope, light-headedness and headaches.   Psychiatric/Behavioral: Negative for confusion.     Objective:     Vital Signs (Most Recent):  Temp: 97.3 °F (36.3 °C) (01/10/19 2008)  Pulse: (!) 111 (01/10/19 2010)  Resp: 20 (01/10/19 2008)  BP: 126/77 (01/10/19 2008)  SpO2: (!) 92 % (01/10/19 2037) Vital Signs (24h Range):  Temp:  [97.3 °F (36.3 °C)-97.5 °F (36.4 °C)] 97.3 °F (36.3 °C)  Pulse:  [102-115] 111  Resp:  [18-29] 20  SpO2:  [88 %-99 %] 92 %  BP: (123-151)/(72-89) 126/77     Weight: 77.4 kg (170 lb 10.2 oz)  Body mass index is 29.29 kg/m².    Physical Exam   Constitutional: She is oriented to person, place, and time. She appears well-developed and well-nourished.   HENT:   Head: Normocephalic and atraumatic.   Eyes: Conjunctivae are normal. Pupils are equal, round, and reactive to light.   Neck: Normal range of motion. No JVD present.   Cardiovascular: Normal rate, regular rhythm, normal heart sounds and intact distal pulses.   Pulmonary/Chest: Effort normal. No respiratory distress. She has no wheezes.   Abdominal: Soft. Bowel sounds are normal. She exhibits no distension. There is no tenderness. There is no guarding.   Musculoskeletal: Normal range of motion. She exhibits edema (trace edema to bilateral lower extremities ). She exhibits no tenderness.   Neurological: She is alert and oriented to person, place, and time. No cranial nerve deficit.   Skin: Skin is warm and dry. Capillary refill takes less than 2 seconds.   Psychiatric: She has a normal mood and affect. Her behavior is normal.         CRANIAL NERVES     CN III, IV, VI   Pupils are equal, round, and reactive to light.       Significant Labs:   BMP:   Recent Labs   Lab 01/10/19  1825   *   *   K 5.4*      CO2 21*   BUN 74*   CREATININE 4.45*   CALCIUM 12.4*     CBC:   Recent Labs   Lab 01/10/19  0325    WBC 10.83   HGB 12.8   HCT 39.3        Lactic Acid:   Recent Labs   Lab 01/10/19  1315 01/10/19  2014   LACTATE 3.1* 2.9*     Troponin:   Recent Labs   Lab 01/10/19  1315 01/10/19  2014   TROPONINI 2.300* 6.631*       Significant Imaging: I have reviewed all pertinent imaging results/findings within the past 24 hours.

## 2019-01-11 NOTE — H&P
"[facility] Medicine  History & Physical    Patient Name: Brittaney Joseph  MRN: [id number]  Admission Date: 1/10/2019  Attending Physician: Hoang Mcclendon DO   Primary Care Provider: Charles Argueta MD         Patient information was obtained from patient, past medical records and ER records.     Subjective:     Principal Problem:Acute renal failure superimposed on stage 4 chronic kidney disease    Chief Complaint:   Chief Complaint   Patient presents with    Fatigue     "I was at the kidney doctor for my follow up today and I took sick and got weak and I was SOB"         HPI: Brittaney Joseph is a 69 yo  female with HTN, HLD, chronic kidney disease stage 4, Type 2 Diabetes Mellitus, diastolic heart failure and chronic back pain. Her primary care physician is Dr. Charles Argueta. Her nephrologist is Dr. Mcdaniel. She lives with her niece in [place], [place]. She presented to [facility] 1/10/19 with complaints of generalized weakness, fatigue, dizziness, SOB and nausea/vomiting. The patient reports acute onset of symptoms while at nephrologist appointment prior to arrival. She denies chest pain, palpitations, unilateral weakness and syncopal events. Initial labs remarkable for K (6.2), BUN/CR (76/4.87) increased from previous BUN/CR (56/2.9 on 12/26/18), anion gap (17),   lactic (3.1), Troponin (2.3) and BNP (1200). EKG shows sinus tachycardia with non-specific T wave abnormality. VSS. CXR shows no acute abnormality. Pt received insulin, bicarb and kayexalate for management of hyperkalemia and acidosis. She also received 1 L NS bolus. Pt admitted to [facility] medicine for further evaluation.     Past Medical History:   Diagnosis Date    Chronic kidney disease     Chronic low back pain     Diabetes mellitus, type 2     Hypertension        Past Surgical History:   Procedure Laterality Date    CHOLECYSTECTOMY         Review of patient's allergies indicates:  No " Known Allergies    No current facility-administered medications on file prior to encounter.      Current Outpatient Medications on File Prior to Encounter   Medication Sig    amLODIPine (NORVASC) 10 MG tablet Take 10 mg by mouth nightly.    atorvastatin (LIPITOR) 40 MG tablet Take 40 mg by mouth once daily.    cloNIDine (CATAPRES) 0.2 MG tablet Take 0.2 mg by mouth nightly.    furosemide (LASIX) 40 MG tablet Take 40 mg by mouth once daily.    glipiZIDE (GLUCOTROL) 2.5 MG TR24 Take 1 tablet (2.5 mg total) by mouth daily with breakfast.    losartan (COZAAR) 100 MG tablet Take 100 mg by mouth once daily.    metoprolol tartrate (LOPRESSOR) 100 MG tablet     oxyCODONE-acetaminophen (PERCOCET)  mg per tablet     pantoprazole (PROTONIX) 40 MG tablet     ergocalciferol (VITAMIN D2) 50,000 unit Cap Take 50,000 Units by mouth every Mon, Wed, Fri.     Family History     None        Tobacco Use    Smoking status: Never Smoker   Substance and Sexual Activity    Alcohol use: No     Frequency: Never    Drug use: No    Sexual activity: Not on file     Review of Systems   Constitutional: Positive for appetite change, fatigue and unexpected weight change. Negative for chills, diaphoresis and fever.   HENT: Negative for congestion and ear pain.    Eyes: Negative for photophobia.   Respiratory: Positive for shortness of breath (with exertion ). Negative for cough, chest tightness and wheezing.    Cardiovascular: Negative for chest pain, palpitations and leg swelling.   Gastrointestinal: Negative for abdominal pain, diarrhea, nausea and vomiting.   Genitourinary: Negative for dysuria, flank pain, frequency and hematuria.   Musculoskeletal: Negative for back pain and myalgias.   Skin: Negative for color change.   Neurological: Positive for weakness. Negative for dizziness, syncope, light-headedness and headaches.   Psychiatric/Behavioral: Negative for confusion.     Objective:     Vital Signs (Most Recent):  Temp:  97.3 °F (36.3 °C) (01/10/19 2008)  Pulse: (!) 111 (01/10/19 2010)  Resp: 20 (01/10/19 2008)  BP: 126/77 (01/10/19 2008)  SpO2: (!) 92 % (01/10/19 2037) Vital Signs (24h Range):  Temp:  [97.3 °F (36.3 °C)-97.5 °F (36.4 °C)] 97.3 °F (36.3 °C)  Pulse:  [102-115] 111  Resp:  [18-29] 20  SpO2:  [88 %-99 %] 92 %  BP: (123-151)/(72-89) 126/77     Weight: 77.4 kg (170 lb 10.2 oz)  Body mass index is 29.29 kg/m².    Physical Exam   Constitutional: She is oriented to person, place, and time. She appears well-developed and well-nourished.   HENT:   Head: Normocephalic and atraumatic.   Eyes: Conjunctivae are normal. Pupils are equal, round, and reactive to light.   Neck: Normal range of motion. No JVD present.   Cardiovascular: Normal rate, regular rhythm, normal heart sounds and intact distal pulses.   Pulmonary/Chest: Effort normal. No respiratory distress. She has no wheezes.   Abdominal: Soft. Bowel sounds are normal. She exhibits no distension. There is no tenderness. There is no guarding.   Musculoskeletal: Normal range of motion. She exhibits edema (trace edema to bilateral lower extremities ). She exhibits no tenderness.   Neurological: She is alert and oriented to person, place, and time. No cranial nerve deficit.   Skin: Skin is warm and dry. Capillary refill takes less than 2 seconds.   Psychiatric: She has a normal mood and affect. Her behavior is normal.         CRANIAL NERVES     CN III, IV, VI   Pupils are equal, round, and reactive to light.       Significant Labs:   BMP:   Recent Labs   Lab 01/10/19  1825   *   *   K 5.4*      CO2 21*   BUN 74*   CREATININE 4.45*   CALCIUM 12.4*     CBC:   Recent Labs   Lab 01/10/19  1315   WBC 10.83   HGB 12.8   HCT 39.3        Lactic Acid:   Recent Labs   Lab 01/10/19  1315 01/10/19  2014   LACTATE 3.1* 2.9*     Troponin:   Recent Labs   Lab 01/10/19  1315 01/10/19  2014   TROPONINI 2.300* 6.631*       Significant Imaging: I have reviewed all  pertinent imaging results/findings within the past 24 hours.    Assessment/Plan:     * Acute renal failure superimposed on stage 4 chronic kidney disease    Hyperkalemia   Hypercalcemia   Metabolic Acidosis     BUN/CR (76/4.87) on admission with anion gap (17) and K (6.2). BUN/CR previously (56/2.9) on 12/26/18.  Pt received insulin, bicarb, kayexalate and IVF. Repeat K (5.4). No acute changes noted on telemetry. Pt reports no changes to urinary output.     -consult nephrology   -avoid nephrotoxic meds, renal dose meds   -gentle hydration   -repeat am BMP   -strict intake/output and daily weights        NSTEMI (non-ST elevated myocardial infarction)    Initial troponin 2.3, trended up to 6.631. Pt denies chest pain on exam. No acute events noted on telemetry. Cardiology consulted.  Dr. Cuevas recommends full dose ASA, loading dose plavix, continue BB,  Nitro prn chest pain and supplemental O2. Pt started on Heparin ACS protocol.     -trend cardiac enzymes   -monitor tele  -check lipid panel   -cardiology to see patient in am        Chronic diastolic heart failure    Echo 12/19/18 shows LVEF 65% with diastolic dysfunction and left ventricular remodeling consistent with hypertensive heart disease. No evidence of volume overload on exam.   -TTE in am      Type 2 diabetes mellitus with hypoglycemia, without long-term current use of insulin    Controlled, A1C 6.4 12/24/18     -per chart review glipizide discontinued due to episodic hypoglycemic events   -monitor blood sugars AC &HS, diabetic diet, low dose SSI            Essential hypertension    HLD     --151   -per chart review patient taking metoprolol, losartan, clonidine, amlodipine and lasix daily   -hold ARB and diuretics for now   -continue BB   -verify all home meds with family in am        VTE Risk Mitigation (From admission, onward)        Ordered     heparin 25,000 units in dextrose 5% 250 mL (100 units/mL) infusion LOW INTENSITY nomogram - [facility]   Continuous      01/10/19 2118     heparin 25,000 units in dextrose 5% (100 units/ml) IV bolus from bag - ADDITIONAL PRN BOLUS - 60 units/kg (max bolus 4000 units)  As needed (PRN)      01/10/19 2118     heparin 25,000 units in dextrose 5% (100 units/ml) IV bolus from bag - ADDITIONAL PRN BOLUS - 30 units/kg (max bolus 4000 units)  As needed (PRN)      01/10/19 2118     Place sequential compression device  Until discontinued      01/10/19 1958     IP VTE LOW RISK PATIENT  Once      01/10/19 1958             Arlette Pascual NP  Department of Hospital Medicine   [facility]

## 2019-01-11 NOTE — HOSPITAL COURSE
The pt seen at bedside, she is complaining of chest pain, on an off during the night. Cards evaluated the pt, given her positive cp, elevated trop, and acute renal failure, decision is to move pt to icu.starte tridil drip, asa, plavx, renal input prior to a prcedure with dye. Etc  Pt updated, agrees to plan  1/12 pt is S/P EHOS for PE. She was in ICU post procedure, now being transferred to tele ed. She is doing much better. She is doing well. No mre CP or SOB as prior to the procedure. Sister at bedside as well.  PT/OT in a day or two  1/13 pt improving, working with PT/OT, more likely SNF candidate, cr down to 3.5 from 4.4 bun down to 65 from 74. No cp with activity or rest  1/14 pt is doing ok, no CP or SOB. Bun cr is improving, down to 3.3 from as high as 4.4. P/OT recommended SNF, will consult the  for snf placement, after the rounds, pt were more interested in home health. willdc honme with hh once bun/cr is a little bit better.  1/15 patient stated she wants to go home. she declined SNF  1/16 for possible discharge today with home health

## 2019-01-11 NOTE — DISCHARGE SUMMARY
[facility] Discharge Summary    Primary Team: [facility]   Attending Physician: Surya  Resident: Antoni    Date of Admit: 12/24/2018  Date of Discharge: 12/26/18    Discharge to: Home  Condition: Stable    Discharge Diagnoses     Patient Active Problem List   Diagnosis    Acute renal failure superimposed on stage 4 chronic kidney disease    Essential hypertension    Hyperlipidemia associated with type 2 diabetes mellitus    Type 2 diabetes mellitus with hypoglycemia, without long-term current use of insulin    Obesity (BMI 30-39.9)    Metabolic acidosis, normal anion gap (NAG)    Physical deconditioning    Chronic low back pain    Hyperkalemia    Chronic diastolic heart failure    NSTEMI (non-ST elevated myocardial infarction)    Weakness       Consultants and Procedures     Consultants:  None    Procedures:   None    Brief History of Present Illness   67 y/o F with PMHx of DM2, HTN, and CKD II who presented to [facility] [place] for hypoglycemia. Pt was in her USOH until day of arrival when she became unresponsive while conversing with her niece. EMS was contacted and upon EMS arrival, pt's glucose was measure at 38. She received oral glucose and 1mg of glucagon with improvement to glucose of 71.       Pt was discharged four days prior for hypertensive emergency. Since discharge, she has had watery diarrhea occurring about three times daily. Additionally, she reports that she has been having a decreased appetite for weeks. However, she reports eating a peanut butter and jelly sandwich prior to episode of hypoglycemia. Her only diabetic medication is glipizide which she has been taking consistently as prescribed. Pt denied nausea and vomiting over past two days but reports she was experiencing both prior to this. She denied fevers, chills, cough, chest pain, abdominal pain, melena, hematochezia, dysuria, hematuria, or peripheral edema. She had no further complaints or concerns at this time.        For the full  HPI please refer to the History & Physical from this admission.    Hospital Course By Problem with Pertinent Findings     Hypoglycemia  -suddenly unresponsive per family reports  -CBG 38 on EMS arrival with improvement following glucagon and PO glucose administration  -discontinued home glipizide as this combined with decreased PO intake likely precipitated this event  -D5 IVFs discontinued night prior to discharge glucose remained >130  -discharged home on significantly reduced dose of glipizide to 2.5mg daily     DM2  -last HA1c 6.4 well-controlled  -home glipizide reduced to 2.5 mg daily at discharge to prevent further hypoglycemic episodes     HTN  -/81 at this time discharged 12/20 following hypertensive emergency hospitalization  -continue home norvasc, clonidine, losartan, and furosemide as prescribed     CKD III  -Cr 3.4 baseline Cr ~3.5  -continue to monitor and avoid nephrotoxic drugs  -outpatient nephrologist appointment scheduled at recent discharge     [facility]  -bicarb 16 yesterday  -urine AG 19-->consistent with RTA (possibly type IV given hx of hyperkalemia on previous admission)  -diarrhea likely also contributing no further diarrhea given IVFs overnight  -upcoming appointment with nephrologist recommend consideration of initiating oral bicarb tablets at that time      Diarrhea  -reports diarrhea occurring up to 3 times daily since recent hospital discharge  -resolved no stool sample available for C. Diff testing     Protein Gap  -gap of 5.4  -HIV/Hep negative during recent hospitalization spep unremarkable upep not obtained during that time        Discharge Medications        Medication List      START taking these medications    glipiZIDE 2.5 MG Tr24  Commonly known as:  GLUCOTROL  Take 1 tablet (2.5 mg total) by mouth daily with breakfast.  Replaces:  glipiZIDE 10 MG tablet        CONTINUE taking these medications    amLODIPine 10 MG tablet  Commonly known as:  NORVASC      atorvastatin 40 MG tablet  Commonly known as:  LIPITOR     cloNIDine 0.2 MG tablet  Commonly known as:  CATAPRES     furosemide 40 MG tablet  Commonly known as:  LASIX     losartan 100 MG tablet  Commonly known as:  COZAAR     VITAMIN D2 50,000 unit Cap  Generic drug:  ergocalciferol        STOP taking these medications    glipiZIDE 10 MG tablet  Commonly known as:  GLUCOTROL  Replaced by:  glipiZIDE 2.5 MG Tr24           Where to Get Your Medications      You can get these medications from any pharmacy    Bring a paper prescription for each of these medications  · glipiZIDE 2.5 MG Tr24         Discharge Information:   Diet:  Diabetic    Physical Activity:  As tolerated    Instructions:  1. Take all medications as prescribed  2. Keep all follow-up appointments  3. Return to the hospital or call your primary care physicians if any worsening symptoms such as persistent hypoglycemia occur.      Follow-Up Appointments:  Nephrology 1/10 (Monica Corrales  \[facility]\"" Internal Medicine, -2

## 2019-01-11 NOTE — ASSESSMENT & PLAN NOTE
HLD     --151   -per chart review patient taking metoprolol, losartan, clonidine, amlodipine and lasix daily   -hold ARB and diuretics for now   -continue BB   -verify all home meds with family in am

## 2019-01-11 NOTE — ASSESSMENT & PLAN NOTE
-recent echo with normal LVEF  -BNP 1200 with no acute volume overload on CXR; does not appear volume overloaded on exam  -repeat TTE pending this AM; continue BB therapy; BP acceptable at this time  -on IVF per primary team; will continue to monitor fluid volume status closely

## 2019-01-11 NOTE — NURSING
Patient complaining of chest pain 7/10. Dr Christiansen notified MD ordered stat troponin and another dose of  Nitroglycerin X3. Nurse administered nitro X3 and patient stated she had no relief. Vital signs are stable. Will notify MD.

## 2019-01-11 NOTE — ASSESSMENT & PLAN NOTE
Trop 6 to 4   On nitro PRN.  Cards is following.  Could be aypical NSTEMI given her gender and dx with DMII  Transfer to icu.  tridil drip per cards  Asa,bb, statin, o2,,orphine for pain

## 2019-01-11 NOTE — CONSULTS
"  [facility]  Adult Nutrition  Consult Note    SUMMARY     Recommendations    Recommendation:  1. Add Suplena bid.   2. Encourage good intake at meals    Goals:   Pt will consume a t least 50% intake at meals  Nutrition Goal Status: new  Communication of RD Recs: reviewed with RN(Stella)    Reason for Assessment  Reason For Assessment: consult(poor appetite)  Diagnosis: (acute renal failure)  Relevant Medical History: HTN, CKD, DM, cholecystectomy  General Information Comments: Pt on Renal Cardiac diet. Noted pt with poor intake at meals. Attempted to speak to pt x2. Pt was pff unit then ot with MD. will follow up to assess and complete NFPE.  Nutrition Discharge Planning: pt to d/c on Renal Cardiac diet    Nutrition Risk Screen  Nutrition Risk Screen: other (see comments)(patient reports poor appetite)    Nutrition/Diet History  Food Preferences: unable to assess  Spiritual, Cultural Beliefs, Anabaptism Practices, Values that Affect Care: no  Factors Affecting Nutritional Intake: decreased appetite    Anthropometrics  Temp: 97.3 °F (36.3 °C)  Height Method: Stated  Height: 5' 4" (162.6 cm)  Height (inches): 64 in  Weight Method: Bed Scale  Weight: 75.8 kg (167 lb 1.7 oz)  Weight (lb): 167.11 lb  Ideal Body Weight (IBW), Female: 120 lb  % Ideal Body Weight, Female (lb): 139.26 lb  BMI (Calculated): 28.7  BMI Grade: 25 - 29.9 - overweight       Lab/Procedures/Meds  Pertinent Labs Reviewed: reviewed  Pertinent Labs Comments: Na 135L, K 5.5H, BUN 71H, Crea 4.4H, Glu 129H  Pertinent Medications Reviewed: reviewed  Pertinent Medications Comments: aspirin, insulin    Estimated/Assessed Needs    Weight Used For Calorie Calculations: 75.8 kg (167 lb 1.7 oz)  Energy Calorie Requirements (kcal): 1895 (25 kcal/kg)  Energy Need Method: Kcal/kg  Protein Requirements: 60g (0.8g/kg)  Weight Used For Protein Calculations: 75.8 kg (167 lb 1.7 oz)  Estimated Fluid Requirement Method: RDA Method  RDA Method (mL): " 1895     Nutrition Prescription Ordered  Current Diet Order: Renal Cardiac    Evaluation of Received Nutrient/Fluid Intake  I/O: reviewed  Energy Calories Required: not meeting needs  Protein Required: not meeting needs  Fluid Required: not meeting needs  Comments: LBM 1/7  % Intake of Estimated Energy Needs: 0 - 25 %  % Meal Intake: 0 - 25 %    Nutrition Risk  Level of Risk/Frequency of Follow-up: (2xweekly)     Assessment and Plan  Nutrition Problem  Inadequate energy intake    Related to (etiology):   Decreased appetite    Signs and Symptoms (as evidenced by):   Poor intake at meals    Interventions:  commerical beverage    Nutrition Diagnosis Status:   New     Monitor and Evaluation  Food and Nutrient Intake: food and beverage intake  Food and Nutrient Adminstration: diet order  Physical Activity and Function: nutrition-related ADLs and IADLs  Anthropometric Measurements: weight  Biochemical Data, Medical Tests and Procedures: electrolyte and renal panel  Nutrition-Focused Physical Findings: overall appearance     Malnutrition Assessment  Unable to complete at this time-will follow up                Nutrition Follow-Up    RD Follow-up?: Yes

## 2019-01-11 NOTE — HOSPITAL COURSE
1/10/2019 Presented to the ER with complaints of epigastric pain. Initial labs with K 6.2, BUN/CR 76/4.87)increased from previous BUN/CR 56/2.9 on 12/26/18 ?baseline, lactic acid 3.1, Troponin 2.3 and BNP 1200. Initial EKG with ST normal axis and nonspecific T wave abnormality to lateral leads CXR with no acute findings. Admitted to [facility] Medicine with ARF and NSTEMI. Received insulin, bicarb and kayexalate for management of hyperkalemia and acidosis as well as 1 liter NS bolus with continuous drip at 75cc/hr.   1/11/2019 Continued intermittent chest pain overnight with relief with SL NTG and IV Morphine. Repeat troponin with trend up to 6.631 with slight trend down this AM to 5.714-4.080. Repeat EKG with no acute findings. SBP 120s-150s overnight with HR 110s. BMP with K+ 5.5 BUN 71 creatinine 4.4. Transferred to ICU for IV Tridil drip. Echocardiogram with normal LVEF, RV strain and evidence of Licona's sign concerning for PE. VQ scan done with high probability of PE. Chest pain etiology felt to be related to demand etiology in setting of PE rather than ACS. Remained NPO for EKOS catheter directed TPA today   1/12: s/p EKOS cath placement for b/l PE that was confirmed by VQ.   ekos catheters removed, pt now transitioned to PO eliquis. Notes to be feel better today. RIJ site is clean and intact. No SOB  1/13 transferred to floor. Doing well. No acute issues. Encouraged to walk around room   1/14/2019 HR and BP stable. Remains on 2LPM NC. Reports resolution of chest pain. Complains of SOB with exertion. No arrhythmias noted on telemetry. Plan for BLE venous ultrasound today. Continue Eliquis. Will try to wean off O2. Need ambulatory pulse ox evaluation

## 2019-01-11 NOTE — NURSING
Attempted to call Dr. Boateng X2. No answer. Handoff given to FRANCIS Best. Nurse to resume care.         Please utilize secure chat if phone line is busy

## 2019-01-11 NOTE — ASSESSMENT & PLAN NOTE
-SBP 120s-150s overnight  -continue Metoprolol BID for now  -no ACEI due to renal function  -monitor BP

## 2019-01-11 NOTE — ASSESSMENT & PLAN NOTE
-presented with upper epigastric pain with intermittent episodes continuing overnight with relief with SL NTG and IV Morphine  -initial troponin 2.300 with trend up to 6.631 with trend down to 5.714-4.080 this AM; EKG with nonspecific T wave changes to lateral leads  -concerned about atypical cardiac presentation in diabetic female  -agree with continuation of IV Heparin drip along with ASA, Plavix, BB, statin therapy; will transfer to ICU to place on IV Tridil drip for pain relief  -plan for medical management for now due to renal function; will await Nephrology evaluation

## 2019-01-11 NOTE — CONSULTS
NEPHROLOGY CONSULT NOTE    HPI & INTERVAL HISTORY:    Past Medical History:   Diagnosis Date    Chronic kidney disease     Chronic low back pain     Diabetes mellitus, type 2     Hypertension       Past Surgical History:   Procedure Laterality Date    CHOLECYSTECTOMY        Review of patient's allergies indicates:  No Known Allergies   Medications Prior to Admission   Medication Sig Dispense Refill Last Dose    amLODIPine (NORVASC) 10 MG tablet Take 10 mg by mouth nightly.   1/9/2019    atorvastatin (LIPITOR) 40 MG tablet Take 40 mg by mouth once daily.   1/9/2019    cloNIDine (CATAPRES) 0.2 MG tablet Take 0.2 mg by mouth nightly.   1/9/2019    furosemide (LASIX) 40 MG tablet Take 40 mg by mouth once daily.   1/9/2019    glipiZIDE (GLUCOTROL) 2.5 MG TR24 Take 1 tablet (2.5 mg total) by mouth daily with breakfast. 30 tablet 6 1/9/2019    losartan (COZAAR) 100 MG tablet Take 100 mg by mouth once daily.   1/9/2019    metoprolol tartrate (LOPRESSOR) 100 MG tablet    1/9/2019    oxyCODONE-acetaminophen (PERCOCET)  mg per tablet    1/10/2019    pantoprazole (PROTONIX) 40 MG tablet    1/9/2019    ergocalciferol (VITAMIN D2) 50,000 unit Cap Take 50,000 Units by mouth every Mon, Wed, Fri.   Taking       Social History     Socioeconomic History    Marital status: Single     Spouse name: Not on file    Number of children: Not on file    Years of education: Not on file    Highest education level: Not on file   Social Needs    Financial resource strain: Not on file    Food insecurity - worry: Not on file    Food insecurity - inability: Not on file    Transportation needs - medical: Not on file    Transportation needs - non-medical: Not on file   Occupational History    Not on file   Tobacco Use    Smoking status: Never Smoker   Substance and Sexual Activity    Alcohol use: No     Frequency: Never    Drug use: No    Sexual activity: Not on file   Other Topics Concern    Not on file   Social  History Narrative    Not on file        MEDS   aspirin  81 mg Oral Daily    atorvastatin  80 mg Oral Daily    clopidogrel  75 mg Oral Daily    metoprolol tartrate  100 mg Oral BID    pantoprazole  40 mg Oral Daily                  CONTINOUS INFUSIONS:      Intake/Output Summary (Last 24 hours) at 1/11/2019 1156  Last data filed at 1/11/2019 0800  Gross per 24 hour   Intake 0 ml   Output 300 ml   Net -300 ml        HEMODYNAMICS:    Temp:  [96.2 °F (35.7 °C)-97.5 °F (36.4 °C)] 96.7 °F (35.9 °C)  Pulse:  [] 91  Resp:  [17-29] 17  SpO2:  [88 %-100 %] 98 %  BP: (121-161)/(72-91) 135/79   Gen: NAD  C/o chest pain  No SOB  No cough  No nausea  No vomiting  Poor intake  No fever  No chills  No abdominal pain  Cards: pulse 90  Pul: diminished breath sounds  Abdomen soft  Ext: trace edema   Skin: dry   LABS   Lab Results   Component Value Date    WBC 9.85 01/11/2019    HGB 11.4 (L) 01/11/2019    HCT 35.2 (L) 01/11/2019    MCV 87 01/11/2019     01/11/2019        Recent Labs   Lab 01/10/19  1315  01/11/19  0555   *   < > 129*   CALCIUM 13.0*   < > 11.3*   ALBUMIN 4.4  --   --    PROT 9.2*  --   --    *   < > 135*   K 6.2*   < > 5.5*   CO2 18*   < > 14*   CL 99   < > 105   BUN 76*   < > 71*   CREATININE 4.87*   < > 4.4*   ALKPHOS 143*  --   --    ALT 20  --   --    AST 26  --   --    BILITOT 0.6  --   --     < > = values in this interval not displayed.      Lab Results   Component Value Date    CALCIUM 11.3 (H) 01/11/2019    PHOS 4.2 12/20/2018      Lab Results   Component Value Date    IRON 37 12/19/2018    TIBC 277 12/19/2018    FERRITIN 94 12/19/2018        ABG  No results for input(s): PH, PO2, PCO2, HCO3, BE in the last 168 hours.      IMAGING:  CXR    ASSESSMENT / PLAN  NSTEMI (non-ST elevated myocardial infarction  Echo 2019  Left Ventricle Normal ejection fraction at 65%. Concentric remodeling observed. Grade I (mild) left ventricular diastolic dysfunction consistent with impaired  relaxation. Normal left atrial pressure.   Right Ventricle Normal ejection fraction. The right ventricle is severely dilated. Wall motion abnormal. There is apical sparing of the right ventricle. The findings are consistent with Licona's sign.   Left Atrium Normal atrial size. .   Right Atrium Normal atrial size.   Aortic Valve There is focal thickening of the aortic valve. Mobility is normal   Mitral Valve The following structural abnormalities were observed: rheumatic valve disease. There is moderate valve leaflet thickening. Normal leaflet mobility.   Tricuspid Valve The tricuspid valve is normal. Mild regurgitation. Mobility is normal.   Pulmonic Valve Normal valve structure. Mild regurgitation. Mobility is normal.   IVC/SVC Inferior vena cava is not well visualized.   Pericardium No pericardial effusion.   Echo 2018-  · Increased (hyperdynamic) left ventricular systolic function. The estimated ejection fraction is 65%  · Concentric left ventricular remodeling consistent with hypertensive heart disease  · Grade I (mild) left ventricular diastolic dysfunction consistent with impaired relaxation.  · Normal right ventricular systolic function.  · Mild tricuspid regurgitation.  · The estimated PA systolic pressure is 41 mm Hg  · Normal central venous pressure (3 mm Hg).    ARIES/CKD 4/A3  UA protein 2+, blood 1+, RBC 2, WBC 1  US-  The kidneys are normal in length.  The right kidney measures 11.1 cm, left kidney measures 10.7 cm.  The arterial resistive indices are within normal limits.  Renal cortical thinning bilaterally.  No renal masses or hydronephrosis.  Bladder grossly unremarkable.  Creatinine 4.4  BUN 71  Metabolic acidosis  Lactic acid 2.9, repeat - 1.5   ABG  PH - 7.337  PCO2 - 30  HCO3 - 16   PO2 - 96   Hyponatremia 135  Potassium 5.5  Hypercalcemia 11.3  CXR-  No acute cardiopulmonary abnormality suggested.  ·   · Avoid nephrotoxic agents, hypotension, hypovolemia.  · Weight daily  · I and O.  · Gentle  hydration.  · Will follow up.

## 2019-01-11 NOTE — PROGRESS NOTES
[facility] Hospital Medicine  Progress Note    Patient Name: Brittaney Joseph  MRN: [id number]  Patient Class: IP- Inpatient   Admission Date: 1/10/2019  Length of Stay: 1 days  Attending Physician: Hoang Mcclendon DO  Primary Care Provider: Charles Argueta MD        Subjective:     Principal Problem:Acute renal failure superimposed on stage 4 chronic kidney disease    HPI:  Brittaney Joseph is a 67 yo  female with HTN, HLD, chronic kidney disease stage 4, Type 2 Diabetes Mellitus, diastolic heart failure and chronic back pain. Her primary care physician is Dr. Charles Argueta. Her nephrologist is Dr. Mcdaniel. She lives with her niece in [place], [place]. She [facility] with complaints of generalized weakness, fatigue, dizziness, SOB and nausea/vomiting. The patient reports acute onset of symptoms while at nephrologist appointment prior to arrival. She denies chest pain, palpitations, unilateral weakness and syncopal events. Initial labs remarkable for K (6.2), BUN/CR (76/4.87) increased from previous BUN/CR (56/2.9 on 12/26/18), anion gap (17),   lactic (3.1), Troponin (2.3) and BNP (1200). EKG shows sinus tachycardia with non-specific T wave abnormality. VSS. CXR shows no acute abnormality. Pt received insulin, bicarb and kayexalate for management of hyperkalemia and acidosis. She also received 1 L NS bolus. Pt admitted to [facility] for further evaluation.     Hospital Course:  The pt seen at bedside, she is complaining of chest pain, on an off during the night. Cards evaluated the pt, given her positive cp, elevated trop, and acute renal failure, decision is to move pt to icu.starte tridil drip, asa, plavx, renal input prior to a prcedure with dye. Etc  Pt updated, agrees to plan    Past Medical History:   Diagnosis Date    Chronic kidney disease     Chronic low back pain     Diabetes mellitus, type 2     Hypertension        Past  Surgical History:   Procedure Laterality Date    CHOLECYSTECTOMY         Review of patient's allergies indicates:  No Known Allergies    No current facility-administered medications on file prior to encounter.      Current Outpatient Medications on File Prior to Encounter   Medication Sig    amLODIPine (NORVASC) 10 MG tablet Take 10 mg by mouth nightly.    atorvastatin (LIPITOR) 40 MG tablet Take 40 mg by mouth once daily.    cloNIDine (CATAPRES) 0.2 MG tablet Take 0.2 mg by mouth nightly.    furosemide (LASIX) 40 MG tablet Take 40 mg by mouth once daily.    glipiZIDE (GLUCOTROL) 2.5 MG TR24 Take 1 tablet (2.5 mg total) by mouth daily with breakfast.    losartan (COZAAR) 100 MG tablet Take 100 mg by mouth once daily.    metoprolol tartrate (LOPRESSOR) 100 MG tablet     oxyCODONE-acetaminophen (PERCOCET)  mg per tablet     pantoprazole (PROTONIX) 40 MG tablet     ergocalciferol (VITAMIN D2) 50,000 unit Cap Take 50,000 Units by mouth every Mon, Wed, Fri.     Family History     None        Tobacco Use    Smoking status: Never Smoker   Substance and Sexual Activity    Alcohol use: No     Frequency: Never    Drug use: No    Sexual activity: Not on file     Review of Systems   Constitutional: Positive for appetite change and fatigue. Negative for chills, diaphoresis, fever and unexpected weight change.   HENT: Negative for congestion.    Eyes: Negative for photophobia.   Respiratory: Positive for shortness of breath (with exertion ). Negative for cough, chest tightness and wheezing.    Cardiovascular: Positive for chest pain. Negative for palpitations and leg swelling.   Gastrointestinal: Negative for abdominal pain, diarrhea, nausea and vomiting.   Genitourinary: Negative for dysuria, flank pain, frequency and hematuria.   Musculoskeletal: Negative for back pain and myalgias.   Skin: Negative for color change.   Neurological: Positive for weakness. Negative for dizziness, syncope, light-headedness  and headaches.   Psychiatric/Behavioral: Negative for confusion.     Objective:     Vital Signs (Most Recent):  Temp: 96.7 °F (35.9 °C) (01/11/19 0747)  Pulse: 90 (01/11/19 1115)  Resp: (!) 21 (01/11/19 1115)  BP: 127/72 (01/11/19 1115)  SpO2: 99 % (01/11/19 1115) Vital Signs (24h Range):  Temp:  [96.2 °F (35.7 °C)-97.5 °F (36.4 °C)] 96.7 °F (35.9 °C)  Pulse:  [] 90  Resp:  [17-29] 21  SpO2:  [88 %-100 %] 99 %  BP: (121-161)/(72-91) 127/72     Weight: 77.4 kg (170 lb 10.2 oz)  Body mass index is 29.29 kg/m².    Physical Exam   Constitutional: She is oriented to person, place, and time. She appears well-developed and well-nourished.   HENT:   Head: Normocephalic and atraumatic.   Eyes: Conjunctivae are normal. Pupils are equal, round, and reactive to light.   Neck: Normal range of motion. No JVD present.   Cardiovascular: Normal rate, regular rhythm, normal heart sounds and intact distal pulses.   Pulmonary/Chest: Effort normal. No respiratory distress. She has no wheezes.   Abdominal: Soft. Bowel sounds are normal. She exhibits no distension. There is no tenderness. There is no guarding.   Musculoskeletal: Normal range of motion. She exhibits no edema (trace edema to bilateral lower extremities ).   Neurological: She is alert and oriented to person, place, and time. No cranial nerve deficit.   Skin: Skin is warm and dry. Capillary refill takes less than 2 seconds.   Psychiatric: She has a normal mood and affect. Her behavior is normal.         CRANIAL NERVES     CN III, IV, VI   Pupils are equal, round, and reactive to light.       Significant Labs:   BMP:   Recent Labs   Lab 01/11/19  0555   *   *   K 5.5*      CO2 14*   BUN 71*   CREATININE 4.4*   CALCIUM 11.3*     CBC:   Recent Labs   Lab 01/10/19  1315 01/11/19  0555   WBC 10.83 9.85   HGB 12.8 11.4*   HCT 39.3 35.2*    259     Lactic Acid:   Recent Labs   Lab 01/10/19  1315 01/10/19  2014   LACTATE 3.1* 2.9*     Troponin:    Recent Labs   Lab 01/10/19  2014 01/11/19  0228 01/11/19  0728   TROPONINI 6.631* 5.714* 4.080*       Significant Imaging: I have reviewed all pertinent imaging results/findings within the past 24 hours.    Assessment/Plan:      * Acute renal failure superimposed on stage 4 chronic kidney disease    Hyperkalemia   Hypercalcemia   Metabolic Acidosis     BUN/CR (76/4.87) on admission with anion gap (17) and K (6.2). BUN/CR previously (56/2.9) on 12/26/18.  Pt received insulin, bicarb, kayexalate and IVF. Repeat K (5.4). No acute changes noted on telemetry. Pt reports no changes to urinary output.     -consult nephrology   -avoid nephrotoxic meds, renal dose meds   -gentle hydration   -repeat am BMP   -strict intake/output and daily weights        Chest pain    Trop 6 to 4   On nitro PRN.  Cards is following.  Could be aypical NSTEMI given her gender and dx with DMII  Transfer to icu.  tridil drip per cards  Asa,bb, statin, o2,,orphine for pain       Essential hypertension    HLD     --151   -per chart review patient taking metoprolol, losartan, clonidine, amlodipine and lasix daily   -hold ARB and diuretics for now   -continue BB   -verify all home meds with family in am      Type 2 diabetes mellitus with hypoglycemia, without long-term current use of insulin    Controlled, A1C 6.4 12/24/18     -per chart review glipizide discontinued due to episodic hypoglycemic events   -monitor blood sugars AC &HS, diabetic diet, low dose SSI            Chronic diastolic heart failure    Echo 12/19/18 shows LVEF 65% with diastolic dysfunction and left ventricular remodeling consistent with hypertensive heart disease. No evidence of volume overload on exam.   -TTE in am      NSTEMI (non-ST elevated myocardial infarction)    Initial troponin 2.3, trended up to 6.631. Pt denies chest pain on exam. No acute events noted on telemetry. Cardiology consulted.  Dr. Cuevas recommends full dose ASA, loading dose plavix, continue BB,   Nitro prn chest pain and supplemental O2. Pt started on Heparin ACS protocol.     -trend cardiac enzymes   -monitor tele  -check lipid panel   -cardiology to see patient in am        Metabolic acidosis, normal anion gap (NAG)    conult nephrology         VTE Risk Mitigation (From admission, onward)        Ordered     heparin 25,000 units in dextrose 5% 250 mL (100 units/mL) infusion LOW INTENSITY nomogram - [facility]  Continuous      01/10/19 2118     heparin 25,000 units in dextrose 5% (100 units/ml) IV bolus from bag - ADDITIONAL PRN BOLUS - 60 units/kg (max bolus 4000 units)  As needed (PRN)      01/10/19 2118     heparin 25,000 units in dextrose 5% (100 units/ml) IV bolus from bag - ADDITIONAL PRN BOLUS - 30 units/kg (max bolus 4000 units)  As needed (PRN)      01/10/19 2118     Place sequential compression device  Until discontinued      01/10/19 1958     IP VTE LOW RISK PATIENT  Once      01/10/19 1958          Critical care time spent on the evaluation and treatment of severe organ dysfunction, review of pertinent labs and imaging studies, discussions with consulting providers and discussions with patient/family: 65 minutes.    Hoang Mcclendon DO  Department of Hospital Medicine   [facility]

## 2019-01-11 NOTE — CONSULTS
[facility]  Cardiology  Consult Note    Patient Name: Brittaney Joseph  MRN: [id number]  Admission Date: 1/10/2019  Hospital Length of Stay: 1 days  Code Status: Full Code   Attending Provider: Hoang Mcclendon DO   Consulting Provider: SANDY Villegas, GLADYS  Primary Care Physician: Charles Argueta MD  Principal Problem:Acute renal failure superimposed on stage 4 chronic kidney disease    Patient information was obtained from patient, past medical records and ER records.     Inpatient consult to [facility]  Consult performed by: SANDY Fu, GLADYS  Consult ordered by: SANDY Fu, GLADYS  Reason for consult: chest pain; NSTEMI         Subjective:     Chief Complaint:  Chest/epigastric pain      HPI:   67yo female with history of HTN, HLP, DMII, chronic diastolic heart failure and CKD who presented to the ER with complaints of epigastric pain. She was experiencing mild pain during my HPI making it limited. She complains of upper epigastric pain described as a burning sensation that does not radiate to her chest, back, neck or jaw. She reports the pain occurs intermittently and relieves spontaneously but then will recur. She is uncertain if the episodes are worse. She reports recurrent intermittent episodes overnight with relief with SL NTG but recurrence. During my AM assessment, she was complaining of pain 7 out of 10.      Hospital Course:    1/10/2019 Presented to the ER with complaints of epigastric pain. Initial labs with K 6.2, BUN/CR 76/4.87)increased from previous BUN/CR 56/2.9 on 12/26/18 ?baseline, lactic acid 3.1, Troponin 2.3 and BNP 1200. Initial EKG with ST normal axis and nonspecific T wave abnormality to lateral leads CXR with no acute findings. Admitted to [facility] with ARF and NSTEMI. Received insulin, bicarb and kayexalate for management of hyperkalemia and acidosis as well as 1 liter NS bolus with continuous drip at 75cc/hr.   1/11/2019  Continued intermittent chest pain overnight with relief with SL NTG and IV Morphine. Repeat troponin with trend up to 6.631 with slight trend down this AM to 5.714-4.080. Repeat EKG with no acute findings. SBP 120s-150s overnight with HR 110s. BMP with K+ 5.5 BUN 71 creatinine 4.4. Transferred to ICU for IV Tridil drip  Past Medical History:   Diagnosis Date    Chronic kidney disease     Chronic low back pain     Diabetes mellitus, type 2     Hypertension        Past Surgical History:   Procedure Laterality Date    CHOLECYSTECTOMY         Review of patient's allergies indicates:  No Known Allergies    No current facility-administered medications on file prior to encounter.      Current Outpatient Medications on File Prior to Encounter   Medication Sig    amLODIPine (NORVASC) 10 MG tablet Take 10 mg by mouth nightly.    atorvastatin (LIPITOR) 40 MG tablet Take 40 mg by mouth once daily.    cloNIDine (CATAPRES) 0.2 MG tablet Take 0.2 mg by mouth nightly.    furosemide (LASIX) 40 MG tablet Take 40 mg by mouth once daily.    glipiZIDE (GLUCOTROL) 2.5 MG TR24 Take 1 tablet (2.5 mg total) by mouth daily with breakfast.    losartan (COZAAR) 100 MG tablet Take 100 mg by mouth once daily.    metoprolol tartrate (LOPRESSOR) 100 MG tablet     oxyCODONE-acetaminophen (PERCOCET)  mg per tablet     pantoprazole (PROTONIX) 40 MG tablet     ergocalciferol (VITAMIN D2) 50,000 unit Cap Take 50,000 Units by mouth every Mon, Wed, Fri.     Family History     None        Tobacco Use    Smoking status: Never Smoker   Substance and Sexual Activity    Alcohol use: No     Frequency: Never    Drug use: No    Sexual activity: Not on file     Review of Systems   Constitution: Negative for chills, decreased appetite, diaphoresis, fever and weakness.   Cardiovascular: Positive for chest pain (lower chest pain ). Negative for claudication, cyanosis, dyspnea on exertion, irregular heartbeat, leg swelling, near-syncope,  orthopnea, palpitations, paroxysmal nocturnal dyspnea and syncope.   Respiratory: Negative for cough, hemoptysis, shortness of breath and wheezing.    Gastrointestinal: Positive for abdominal pain. Negative for bloating, constipation, diarrhea, melena, nausea and vomiting.   Neurological: Positive for dizziness.     Objective:     Vital Signs (Most Recent):  Temp: 96.7 °F (35.9 °C) (01/11/19 0747)  Pulse: 91 (01/11/19 1130)  Resp: 17 (01/11/19 1130)  BP: 135/79 (01/11/19 1130)  SpO2: 98 % (01/11/19 1130) Vital Signs (24h Range):  Temp:  [96.2 °F (35.7 °C)-97.5 °F (36.4 °C)] 96.7 °F (35.9 °C)  Pulse:  [] 91  Resp:  [17-29] 17  SpO2:  [88 %-100 %] 98 %  BP: (121-161)/(72-91) 135/79     Weight: 77.4 kg (170 lb 10.2 oz)  Body mass index is 29.29 kg/m².    SpO2: 98 %  O2 Device (Oxygen Therapy): nasal cannula      Intake/Output Summary (Last 24 hours) at 1/11/2019 1147  Last data filed at 1/11/2019 0800  Gross per 24 hour   Intake 0 ml   Output 300 ml   Net -300 ml       Lines/Drains/Airways     Peripheral Intravenous Line                 Peripheral IV - Single Lumen 01/10/19 1310 Right Wrist less than 1 day         Peripheral IV - Single Lumen 01/10/19 1514 Left Wrist less than 1 day                Physical Exam   Constitutional: She is oriented to person, place, and time. She appears well-developed and well-nourished. No distress.   Cardiovascular: Normal rate and regular rhythm. Exam reveals no gallop.   No murmur heard.  Pulmonary/Chest: Effort normal and breath sounds normal. No respiratory distress. She has no wheezes.   Abdominal: Soft. Bowel sounds are normal. She exhibits no distension. There is no tenderness.   Neurological: She is alert and oriented to person, place, and time.   Skin: Skin is warm and dry.       Significant Labs:     Recent Labs   Lab 01/11/19  0555   *   K 5.5*      CO2 14*   BUN 71*   CREATININE 4.4*     Recent Labs   Lab 01/11/19  0555   WBC 9.85   RBC 4.03   HGB 11.4*    HCT 35.2*      MCV 87   MCH 28.3   MCHC 32.4     Recent Labs   Lab 19  0728   TROPONINI 4.080*       Significant Imagin2018 Echocardiogram: 2D echo with color flow doppler:     · Increased (hyperdynamic) left ventricular systolic function. The estimated ejection fraction is 65%  · Concentric left ventricular remodeling consistent with hypertensive heart disease  · Grade I (mild) left ventricular diastolic dysfunction consistent with impaired relaxation.  · Normal right ventricular systolic function.  · Mild tricuspid regurgitation.  · The estimated PA systolic pressure is 41 mm Hg  · Normal central venous pressure (3 mm Hg).    Repeat TTE pending     Assessment and Plan:     * Acute renal failure superimposed on stage 4 chronic kidney disease    -creatinine 4.45 upon admission and 4.4 this AM  -previous baseline during admission last month 2.9-3.6; uncertain of true baseline  -on IVF per primary team with Nephrology consulted  -will continue to hold nephrotoxic agents for now      Chest pain    -possible atypical presentation  -troponin elevated with peak at 6.631 and trending down currently  -EKG with no acute changes  -placed on IV Tridil drip      NSTEMI (non-ST elevated myocardial infarction)    -presented with upper epigastric pain with intermittent episodes continuing overnight with relief with SL NTG and IV Morphine  -initial troponin 2.300 with trend up to 6.631 with trend down to 5.714-4.080 this AM; EKG with nonspecific T wave changes to lateral leads  -concerned about atypical cardiac presentation in diabetic female  -agree with continuation of IV Heparin drip along with ASA, Plavix, BB, statin therapy; will transfer to ICU to place on IV Tridil drip for pain relief  -plan for medical management for now due to renal function; will await Nephrology evaluation     Chronic diastolic heart failure    -recent echo with normal LVEF  -BNP 1200 with no acute volume overload on CXR; does  not appear volume overloaded on exam  -repeat TTE pending this AM; continue BB therapy; BP acceptable at this time  -on IVF per primary team; will continue to monitor fluid volume status closely      Hyperkalemia    -K+ 6.2 upon admisson; received insulin, D50 and Kaexylate  -repeat K+ 5.4-5.5  -further management per primary team and Nephrology      Hyperlipidemia associated with type 2 diabetes mellitus    -up titrated Lipitor from 40mg to 80mg  -continue high intensity statin therapy  -FLP with      Essential hypertension    -SBP 120s-150s overnight  -continue Metoprolol BID for now  -no ACEI due to renal function  -monitor BP          VTE Risk Mitigation (From admission, onward)        Ordered     heparin 25,000 units in dextrose 5% 250 mL (100 units/mL) infusion LOW INTENSITY nomogram - [facility]  Continuous      01/10/19 2118     heparin 25,000 units in dextrose 5% (100 units/ml) IV bolus from bag - ADDITIONAL PRN BOLUS - 60 units/kg (max bolus 4000 units)  As needed (PRN)      01/10/19 2118     heparin 25,000 units in dextrose 5% (100 units/ml) IV bolus from bag - ADDITIONAL PRN BOLUS - 30 units/kg (max bolus 4000 units)  As needed (PRN)      01/10/19 2118     Place sequential compression device  Until discontinued      01/10/19 1958     IP VTE LOW RISK PATIENT  Once      01/10/19 1958          Thank you for your consult. I will follow-up with patient. Please contact us if you have any additional questions.    Marisela Trammell, APRN, ANP  Cardiology   [facility]

## 2019-01-11 NOTE — PLAN OF CARE
Problem: Adult Inpatient Plan of Care  Goal: Plan of Care Review  Outcome: Ongoing (interventions implemented as appropriate)  Plan of care reviewed with patient. She had two episodes of chest pain throughout night. NP CHARITY Pascual notified both times. Nurse provided  NP with phone number for STAT cardiology consult. Patient did report relief after taking SL nitro. She's on continuous heart monitoring, NSR. Heparin drip infusing at 12 u/kg/hr. Normal saline infusing at 75 cc/hr. Fall precautions enforced, bed locked and low side rails x 3, call bell within reach will continue to monitor.

## 2019-01-11 NOTE — HPI
69yo female with history of HTN, HLP, DMII, chronic diastolic heart failure and CKD who presented to the ER with complaints of epigastric pain. She was experiencing mild pain during my HPI making it limited. She complains of upper epigastric pain described as a burning sensation that does not radiate to her chest, back, neck or jaw. She reports the pain occurs intermittently and relieves spontaneously but then will recur. She is uncertain if the episodes are worse. She reports recurrent intermittent episodes overnight with relief with SL NTG but recurrence. During my AM assessment, she was complaining of pain 7 out of 10.

## 2019-01-11 NOTE — ASSESSMENT & PLAN NOTE
-creatinine 4.45 upon admission and 4.4 this AM  -previous baseline during admission last month 2.9-3.6; uncertain of true baseline  -on IVF per primary team with Nephrology consulted  -will continue to hold nephrotoxic agents for now

## 2019-01-11 NOTE — ED NOTES
Pt had a large bowel movement. Pt became very nauseated. Dr. Turner informed verbal order for 4 mg of zofran iv. Administered meds as ordered. Pt placed in pamper for transport. Pt aox4. Skin w/d. resp even and nonlabored. Conversing with ease.

## 2019-01-11 NOTE — ASSESSMENT & PLAN NOTE
Hyperkalemia   Hypercalcemia   Metabolic Acidosis     BUN/CR (76/4.87) on admission with anion gap (17) and K (6.2). BUN/CR previously (56/2.9) on 12/26/18.  Pt received insulin, bicarb, kayexalate and IVF. Repeat K (5.4). No acute changes noted on telemetry. Pt reports no changes to urinary output.     -consult nephrology   -avoid nephrotoxic meds, renal dose meds   -gentle hydration   -repeat am BMP   -strict intake/output and daily weights

## 2019-01-11 NOTE — ED NOTES
Rasheed arrived to transport pt to [facility]. Pt states she needs to use restroom prior to transport. Pt assisted onto bedside commode.

## 2019-01-11 NOTE — ASSESSMENT & PLAN NOTE
Initial troponin 2.3, trended up to 6.631. Pt denies chest pain on exam. No acute events noted on telemetry. Cardiology consulted.  Dr. Cuevas recommends full dose ASA, loading dose plavix, continue BB,  Nitro prn chest pain and supplemental O2. Pt started on Heparin ACS protocol.     -trend cardiac enzymes   -monitor tele  -check lipid panel   -cardiology to see patient in am

## 2019-01-11 NOTE — NURSING
CHARITY Pascual NP notified of chest pain. Sublingual nitroglycerin given X3. Morphine administered.  EKG done and troponin collected. Patient now rating pain a 4/10. Will continue to monitor.

## 2019-01-11 NOTE — PLAN OF CARE
Problem: Oral Intake Inadequate  Goal: Improved Oral Intake  Outcome: Ongoing (interventions implemented as appropriate)  Recommendation:  1. Add Suplena bid.   2. Encourage good intake at meals    Goals:   Pt will consume a t least 50% intake at meals  Nutrition Goal Status: new  Communication of RD Recs: reviewed with RN(Stella)

## 2019-01-11 NOTE — PLAN OF CARE
Problem: Adult Inpatient Plan of Care  Goal: Plan of Care Review  Outcome: Ongoing (interventions implemented as appropriate)  Plan of care reviewed with patient and family; questions answered by ICU RN. Pt on tridil drip titrating for chest pain relief. Pt able to reposition self in bed and assisted as needed. Pt transported to and from  scan on portable monitor with RN. Pt remains free from falls.

## 2019-01-11 NOTE — ASSESSMENT & PLAN NOTE
Echo 12/19/18 shows LVEF 65% with diastolic dysfunction and left ventricular remodeling consistent with hypertensive heart disease. No evidence of volume overload on exam.   -TTE in am

## 2019-01-12 PROBLEM — I26.99 PE (PULMONARY THROMBOEMBOLISM): Status: ACTIVE | Noted: 2019-01-12

## 2019-01-12 PROBLEM — I26.09 PULMONARY EMBOLISM WITH ACUTE COR PULMONALE: Status: ACTIVE | Noted: 2019-01-12

## 2019-01-12 LAB
ANION GAP SERPL CALC-SCNC: 11 MMOL/L
APTT BLDCRRT: 41.5 SEC
BASOPHILS # BLD AUTO: 0.03 K/UL
BASOPHILS NFR BLD: 0.3 %
BUN SERPL-MCNC: 65 MG/DL
CALCIUM SERPL-MCNC: 10.1 MG/DL
CHLORIDE SERPL-SCNC: 105 MMOL/L
CO2 SERPL-SCNC: 19 MMOL/L
CREAT SERPL-MCNC: 3.5 MG/DL
DIFFERENTIAL METHOD: ABNORMAL
EOSINOPHIL # BLD AUTO: 0.5 K/UL
EOSINOPHIL NFR BLD: 5.2 %
ERYTHROCYTE [DISTWIDTH] IN BLOOD BY AUTOMATED COUNT: 15.2 %
EST. GFR  (AFRICAN AMERICAN): 15 ML/MIN/1.73 M^2
EST. GFR  (NON AFRICAN AMERICAN): 13 ML/MIN/1.73 M^2
GLUCOSE SERPL-MCNC: 117 MG/DL
HCT VFR BLD AUTO: 33 %
HGB BLD-MCNC: 10.6 G/DL
LYMPHOCYTES # BLD AUTO: 2.3 K/UL
LYMPHOCYTES NFR BLD: 23.1 %
MCH RBC QN AUTO: 28.2 PG
MCHC RBC AUTO-ENTMCNC: 32.1 G/DL
MCV RBC AUTO: 88 FL
MONOCYTES # BLD AUTO: 1.3 K/UL
MONOCYTES NFR BLD: 13.7 %
NEUTROPHILS # BLD AUTO: 5.6 K/UL
NEUTROPHILS NFR BLD: 57.4 %
PLATELET # BLD AUTO: 235 K/UL
PMV BLD AUTO: 9.8 FL
POCT GLUCOSE: 131 MG/DL (ref 70–110)
POCT GLUCOSE: 182 MG/DL (ref 70–110)
POCT GLUCOSE: 99 MG/DL (ref 70–110)
POTASSIUM SERPL-SCNC: 4.4 MMOL/L
RBC # BLD AUTO: 3.76 M/UL
SODIUM SERPL-SCNC: 135 MMOL/L
WBC # BLD AUTO: 9.73 K/UL

## 2019-01-12 PROCEDURE — 25000003 PHARM REV CODE 250: Performed by: NURSE PRACTITIONER

## 2019-01-12 PROCEDURE — 36415 COLL VENOUS BLD VENIPUNCTURE: CPT

## 2019-01-12 PROCEDURE — 80048 BASIC METABOLIC PNL TOTAL CA: CPT

## 2019-01-12 PROCEDURE — 99233 SBSQ HOSP IP/OBS HIGH 50: CPT | Mod: ,,, | Performed by: STUDENT IN AN ORGANIZED HEALTH CARE EDUCATION/TRAINING PROGRAM

## 2019-01-12 PROCEDURE — 99233 PR SUBSEQUENT HOSPITAL CARE,LEVL III: ICD-10-PCS | Mod: ,,, | Performed by: STUDENT IN AN ORGANIZED HEALTH CARE EDUCATION/TRAINING PROGRAM

## 2019-01-12 PROCEDURE — 21400001 HC TELEMETRY ROOM

## 2019-01-12 PROCEDURE — 25000003 PHARM REV CODE 250: Performed by: INTERNAL MEDICINE

## 2019-01-12 PROCEDURE — 94761 N-INVAS EAR/PLS OXIMETRY MLT: CPT

## 2019-01-12 PROCEDURE — 85730 THROMBOPLASTIN TIME PARTIAL: CPT

## 2019-01-12 PROCEDURE — 25000003 PHARM REV CODE 250: Performed by: STUDENT IN AN ORGANIZED HEALTH CARE EDUCATION/TRAINING PROGRAM

## 2019-01-12 PROCEDURE — 85025 COMPLETE CBC W/AUTO DIFF WBC: CPT

## 2019-01-12 RX ORDER — SIMETHICONE 80 MG
80 TABLET,CHEWABLE ORAL EVERY 6 HOURS PRN
Status: DISCONTINUED | OUTPATIENT
Start: 2019-01-12 | End: 2019-01-16 | Stop reason: HOSPADM

## 2019-01-12 RX ORDER — SIMETHICONE 125 MG
125 TABLET,CHEWABLE ORAL EVERY 6 HOURS PRN
Status: DISCONTINUED | OUTPATIENT
Start: 2019-01-12 | End: 2019-01-12

## 2019-01-12 RX ORDER — AMLODIPINE BESYLATE 5 MG/1
5 TABLET ORAL DAILY
Status: DISCONTINUED | OUTPATIENT
Start: 2019-01-12 | End: 2019-01-13

## 2019-01-12 RX ADMIN — ACETAMINOPHEN 650 MG: 325 TABLET ORAL at 08:01

## 2019-01-12 RX ADMIN — APIXABAN 10 MG: 5 TABLET, FILM COATED ORAL at 08:01

## 2019-01-12 RX ADMIN — SIMETHICONE CHEW TAB 80 MG 80 MG: 80 TABLET ORAL at 08:01

## 2019-01-12 RX ADMIN — APIXABAN 10 MG: 5 TABLET, FILM COATED ORAL at 02:01

## 2019-01-12 RX ADMIN — METOPROLOL TARTRATE 100 MG: 50 TABLET ORAL at 08:01

## 2019-01-12 RX ADMIN — SODIUM BICARBONATE: 84 INJECTION, SOLUTION INTRAVENOUS at 06:01

## 2019-01-12 RX ADMIN — PANTOPRAZOLE SODIUM 40 MG: 40 TABLET, DELAYED RELEASE ORAL at 08:01

## 2019-01-12 RX ADMIN — ASPIRIN 81 MG 81 MG: 81 TABLET ORAL at 08:01

## 2019-01-12 RX ADMIN — OXYCODONE HYDROCHLORIDE AND ACETAMINOPHEN 1 TABLET: 5; 325 TABLET ORAL at 09:01

## 2019-01-12 RX ADMIN — ATORVASTATIN CALCIUM 80 MG: 40 TABLET, FILM COATED ORAL at 08:01

## 2019-01-12 RX ADMIN — AMLODIPINE BESYLATE 5 MG: 5 TABLET ORAL at 11:01

## 2019-01-12 NOTE — NURSING
Patient transferred to floor from ICU bed without incident. Patient and sister oriented to room and placed on cardiac monitoring. Sodium bicarb restarted at 75 mL/hour. NADN at time of arrival. Will continue to monitor patient.

## 2019-01-12 NOTE — PROGRESS NOTES
[facility] Hospital Medicine  Progress Note    Patient Name: Brittaney Joseph  MRN: [id number]  Patient Class: IP- Inpatient   Admission Date: 1/10/2019  Length of Stay: 2 days  Attending Physician: Hoang Mcclendon DO  Primary Care Provider: Charles Argueta MD        Subjective:     Principal Problem:Acute renal failure superimposed on stage 4 chronic kidney disease    HPI:  Brittaney Joseph is a 69 yo  female with HTN, HLD, chronic kidney disease stage 4, Type 2 Diabetes Mellitus, diastolic heart failure and chronic back pain. Her primary care physician is Dr. Charles Argueta. Her nephrologist is Dr. Mcdaniel. She lives with her niece in [place], [place]. She presented to [facility] 1/10/19 with complaints of generalized weakness, fatigue, dizziness, SOB and nausea/vomiting. The patient reports acute onset of symptoms while at nephrologist appointment prior to arrival. She denies chest pain, palpitations, unilateral weakness and syncopal events. Initial labs remarkable for K (6.2), BUN/CR (76/4.87) increased from previous BUN/CR (56/2.9 on 12/26/18), anion gap (17),   lactic (3.1), Troponin (2.3) and BNP (1200). EKG shows sinus tachycardia with non-specific T wave abnormality. VSS. CXR shows no acute abnormality. Pt received insulin, bicarb and kayexalate for management of hyperkalemia and acidosis. She also received 1 L NS bolus. Pt admitted to [facility] for further evaluation.     Hospital Course:  The pt seen at bedside, she is complaining of chest pain, on an off during the night. Cards evaluated the pt, given her positive cp, elevated trop, and acute renal failure, decision is to move pt to icu.starte tridil drip, asa, plavx, renal input prior to a prcedure with dye. Etc  Pt updated, agrees to plan  1/12 pt is S/P EHOS for PE. She was in ICU post procedure, now being transferred to [facility] ed. She is doing much better. She is doing well. No mre CP or SOB as  prior to the procedure. Sister at bedside as well.  PT/OT in a day or two    Past Medical History:   Diagnosis Date    Chronic kidney disease     Chronic low back pain     Diabetes mellitus, type 2     Hypertension        Past Surgical History:   Procedure Laterality Date    CHOLECYSTECTOMY         Review of patient's allergies indicates:  No Known Allergies    No current facility-administered medications on file prior to encounter.      Current Outpatient Medications on File Prior to Encounter   Medication Sig    amLODIPine (NORVASC) 10 MG tablet Take 10 mg by mouth nightly.    atorvastatin (LIPITOR) 40 MG tablet Take 40 mg by mouth once daily.    cloNIDine (CATAPRES) 0.2 MG tablet Take 0.2 mg by mouth nightly.    furosemide (LASIX) 40 MG tablet Take 40 mg by mouth once daily.    glipiZIDE (GLUCOTROL) 2.5 MG TR24 Take 1 tablet (2.5 mg total) by mouth daily with breakfast.    losartan (COZAAR) 100 MG tablet Take 100 mg by mouth once daily.    metoprolol tartrate (LOPRESSOR) 100 MG tablet     oxyCODONE-acetaminophen (PERCOCET)  mg per tablet     pantoprazole (PROTONIX) 40 MG tablet     ergocalciferol (VITAMIN D2) 50,000 unit Cap Take 50,000 Units by mouth every Mon, Wed, Fri.     Family History     None        Tobacco Use    Smoking status: Never Smoker   Substance and Sexual Activity    Alcohol use: No     Frequency: Never    Drug use: No    Sexual activity: Not on file     Review of Systems   Constitutional: Positive for appetite change. Negative for chills, diaphoresis, fatigue, fever and unexpected weight change.   HENT: Negative for congestion.    Eyes: Negative for photophobia.   Respiratory: Negative for cough, chest tightness, shortness of breath (with exertion ) and wheezing.    Cardiovascular: Negative for chest pain, palpitations and leg swelling.   Gastrointestinal: Negative for abdominal pain, diarrhea, nausea and vomiting.   Genitourinary: Negative for dysuria, flank pain,  frequency and hematuria.   Musculoskeletal: Negative for back pain and myalgias.   Skin: Negative for color change.   Neurological: Positive for weakness. Negative for dizziness, syncope, light-headedness and headaches.   Psychiatric/Behavioral: Negative for confusion.     Objective:     Vital Signs (Most Recent):  Temp: 97.6 °F (36.4 °C) (01/12/19 1115)  Pulse: 68 (01/12/19 1100)  Resp: (!) 23 (01/12/19 1100)  BP: 138/70 (01/12/19 1100)  SpO2: 99 % (01/12/19 1100) Vital Signs (24h Range):  Temp:  [97.6 °F (36.4 °C)-99.4 °F (37.4 °C)] 97.6 °F (36.4 °C)  Pulse:  [68-94] 68  Resp:  [17-42] 23  SpO2:  [60 %-100 %] 99 %  BP: (111-182)/(58-94) 138/70     Weight: 75.8 kg (167 lb 1.7 oz)  Body mass index is 28.68 kg/m².    Physical Exam   Constitutional: She is oriented to person, place, and time. She appears well-developed and well-nourished.   HENT:   Head: Normocephalic and atraumatic.   Eyes: Conjunctivae are normal. Pupils are equal, round, and reactive to light.   Neck: Normal range of motion. No JVD present.   Cardiovascular: Normal rate, regular rhythm, normal heart sounds and intact distal pulses.   Pulmonary/Chest: Effort normal. No respiratory distress. She has no wheezes.   Abdominal: Soft. Bowel sounds are normal. She exhibits no distension. There is no tenderness. There is no guarding.   Musculoskeletal: Normal range of motion. She exhibits no edema (trace edema to bilateral lower extremities ).   Neurological: She is alert and oriented to person, place, and time. No cranial nerve deficit.   Skin: Skin is warm and dry. Capillary refill takes less than 2 seconds.   Psychiatric: She has a normal mood and affect. Her behavior is normal.         CRANIAL NERVES     CN III, IV, VI   Pupils are equal, round, and reactive to light.       Significant Labs:   BMP:   Recent Labs   Lab 01/12/19  1349   *   *   K 4.4      CO2 19*   BUN 65*   CREATININE 3.5*   CALCIUM 10.1     CBC:   Recent Labs   Lab  01/11/19  0555 01/12/19  1349   WBC 9.85 9.73   HGB 11.4* 10.6*   HCT 35.2* 33.0*    235     Lactic Acid:   Recent Labs   Lab 01/10/19  2014 01/11/19  1314   LACTATE 2.9* 1.5     Troponin:   Recent Labs   Lab 01/10/19  2014 01/11/19  0228 01/11/19  0728   TROPONINI 6.631* 5.714* 4.080*       Significant Imaging: I have reviewed all pertinent imaging results/findings within the past 24 hours.    Assessment/Plan:      * Acute renal failure superimposed on stage 4 chronic kidney disease    Hyperkalemia   Hypercalcemia   Metabolic Acidosis     BUN/CR (76/4.87) on admission with anion gap (17) and K (6.2). BUN/CR previously (56/2.9) on 12/26/18.  Pt received insulin, bicarb, kayexalate and IVF. Repeat K (5.4). No acute changes noted on telemetry. Pt reports no changes to urinary output.     -consult nephrology   -avoid nephrotoxic meds, renal dose meds   -gentle hydration   -repeat am BMP   -strict intake/output and daily weights        Pulmonary embolism with acute cor pulmonale    tx'ed with kb's  Now on eliquis.  Appreciate cards input       Chest pain    Trop 6 to 4   On nitro PRN.  Cards is following.  Could be aypical NSTEMI given her gender and dx with DMII  Transfer to icu.  tridil drip per cards  Asa,bb, statin, o2,,orphine for pain    Resolved with tx of PE       Essential hypertension    HLD     --151   -per chart review patient taking metoprolol, losartan, clonidine, amlodipine and lasix daily   -hold ARB and diuretics for now   -continue BB   -verify all home meds with family in am      Type 2 diabetes mellitus with hypoglycemia, without long-term current use of insulin    Controlled, A1C 6.4 12/24/18     -per chart review glipizide discontinued due to episodic hypoglycemic events   -monitor blood sugars AC &HS, diabetic diet, low dose SSI            Chronic diastolic heart failure    Echo 12/19/18 shows LVEF 65% with diastolic dysfunction and left ventricular remodeling consistent with  hypertensive heart disease. No evidence of volume overload on exam.   -TTE in am      NSTEMI (non-ST elevated myocardial infarction)    Initial troponin 2.3, trended up to 6.631. Pt denies chest pain on exam. No acute events noted on telemetry. Cardiology consulted.  Dr. Cuevas recommends full dose ASA, loading dose plavix, continue BB,  Nitro prn chest pain and supplemental O2. Pt started on Heparin ACS protocol.     -trend cardiac enzymes   -monitor tele  -check lipid panel   -cardiology to see patient in am     Resolving with the tx of pe       Metabolic acidosis, normal anion gap (NAG)    conult nephrology         VTE Risk Mitigation (From admission, onward)        Ordered     apixaban tablet 10 mg  2 times daily      01/11/19 1731     Place sequential compression device  Until discontinued      01/10/19 1958     IP VTE LOW RISK PATIENT  Once      01/10/19 1958              Hoang Mcclendon DO  Department of Hospital Medicine   [facility]

## 2019-01-12 NOTE — SUBJECTIVE & OBJECTIVE
Past Medical History:   Diagnosis Date    Chronic kidney disease     Chronic low back pain     Diabetes mellitus, type 2     Hypertension        Past Surgical History:   Procedure Laterality Date    CHOLECYSTECTOMY         Review of patient's allergies indicates:  No Known Allergies    No current facility-administered medications on file prior to encounter.      Current Outpatient Medications on File Prior to Encounter   Medication Sig    amLODIPine (NORVASC) 10 MG tablet Take 10 mg by mouth nightly.    atorvastatin (LIPITOR) 40 MG tablet Take 40 mg by mouth once daily.    cloNIDine (CATAPRES) 0.2 MG tablet Take 0.2 mg by mouth nightly.    furosemide (LASIX) 40 MG tablet Take 40 mg by mouth once daily.    glipiZIDE (GLUCOTROL) 2.5 MG TR24 Take 1 tablet (2.5 mg total) by mouth daily with breakfast.    losartan (COZAAR) 100 MG tablet Take 100 mg by mouth once daily.    metoprolol tartrate (LOPRESSOR) 100 MG tablet     oxyCODONE-acetaminophen (PERCOCET)  mg per tablet     pantoprazole (PROTONIX) 40 MG tablet     ergocalciferol (VITAMIN D2) 50,000 unit Cap Take 50,000 Units by mouth every Mon, Wed, Fri.     Family History     None        Tobacco Use    Smoking status: Never Smoker   Substance and Sexual Activity    Alcohol use: No     Frequency: Never    Drug use: No    Sexual activity: Not on file     Review of Systems   Constitutional: Positive for appetite change. Negative for chills, diaphoresis, fatigue, fever and unexpected weight change.   HENT: Negative for congestion.    Eyes: Negative for photophobia.   Respiratory: Negative for cough, chest tightness, shortness of breath (with exertion ) and wheezing.    Cardiovascular: Negative for chest pain, palpitations and leg swelling.   Gastrointestinal: Negative for abdominal pain, diarrhea, nausea and vomiting.   Genitourinary: Negative for dysuria, flank pain, frequency and hematuria.   Musculoskeletal: Negative for back pain and myalgias.    Skin: Negative for color change.   Neurological: Positive for weakness. Negative for dizziness, syncope, light-headedness and headaches.   Psychiatric/Behavioral: Negative for confusion.     Objective:     Vital Signs (Most Recent):  Temp: 97.6 °F (36.4 °C) (01/12/19 1115)  Pulse: 68 (01/12/19 1100)  Resp: (!) 23 (01/12/19 1100)  BP: 138/70 (01/12/19 1100)  SpO2: 99 % (01/12/19 1100) Vital Signs (24h Range):  Temp:  [97.6 °F (36.4 °C)-99.4 °F (37.4 °C)] 97.6 °F (36.4 °C)  Pulse:  [68-94] 68  Resp:  [17-42] 23  SpO2:  [60 %-100 %] 99 %  BP: (111-182)/(58-94) 138/70     Weight: 75.8 kg (167 lb 1.7 oz)  Body mass index is 28.68 kg/m².    Physical Exam   Constitutional: She is oriented to person, place, and time. She appears well-developed and well-nourished.   HENT:   Head: Normocephalic and atraumatic.   Eyes: Conjunctivae are normal. Pupils are equal, round, and reactive to light.   Neck: Normal range of motion. No JVD present.   Cardiovascular: Normal rate, regular rhythm, normal heart sounds and intact distal pulses.   Pulmonary/Chest: Effort normal. No respiratory distress. She has no wheezes.   Abdominal: Soft. Bowel sounds are normal. She exhibits no distension. There is no tenderness. There is no guarding.   Musculoskeletal: Normal range of motion. She exhibits no edema (trace edema to bilateral lower extremities ).   Neurological: She is alert and oriented to person, place, and time. No cranial nerve deficit.   Skin: Skin is warm and dry. Capillary refill takes less than 2 seconds.   Psychiatric: She has a normal mood and affect. Her behavior is normal.         CRANIAL NERVES     CN III, IV, VI   Pupils are equal, round, and reactive to light.       Significant Labs:   BMP:   Recent Labs   Lab 01/12/19  1349   *   *   K 4.4      CO2 19*   BUN 65*   CREATININE 3.5*   CALCIUM 10.1     CBC:   Recent Labs   Lab 01/11/19  0555 01/12/19  1349   WBC 9.85 9.73   HGB 11.4* 10.6*   HCT 35.2* 33.0*     235     Lactic Acid:   Recent Labs   Lab 01/10/19  2014 01/11/19  1314   LACTATE 2.9* 1.5     Troponin:   Recent Labs   Lab 01/10/19  2014 01/11/19  0228 01/11/19  0728   TROPONINI 6.631* 5.714* 4.080*       Significant Imaging: I have reviewed all pertinent imaging results/findings within the past 24 hours.

## 2019-01-12 NOTE — PLAN OF CARE
Problem: Adult Inpatient Plan of Care  Goal: Plan of Care Review  POC discussed with the patient and the need for current care, she verbalized understanding.

## 2019-01-12 NOTE — PROGRESS NOTES
[facility]  Cardiology  Progress Note    Patient Name: Brittaney Joseph  MRN: [id number]  Admission Date: 1/10/2019  Hospital Length of Stay: 2 days  Code Status: Full Code   Attending Physician: Hoang Mcclendon DO   Primary Care Physician: Charles Argueta MD  Expected Discharge Date:   Principal Problem:Acute renal failure superimposed on stage 4 chronic kidney disease    Subjective:     1/12: s/p EKOS cath placement for b/l PE that was confirmed by VQ.   ekos catheters removed, pt now transitioned to PO eliquis. Notes to be feel better today. RIJ site is clean and intact. No SOB    Review of Systems   Constitution: Negative for decreased appetite, weakness and malaise/fatigue.   Cardiovascular: Positive for chest pain and dyspnea on exertion.   Respiratory: Positive for cough and shortness of breath.    Musculoskeletal: Negative for muscle weakness.   Gastrointestinal: Negative for bloating and abdominal pain.   Neurological: Negative for dizziness, focal weakness and light-headedness.   Psychiatric/Behavioral: Negative for depression. The patient is not nervous/anxious.      Objective:     Vital Signs (Most Recent):  Temp: 99.4 °F (37.4 °C) (01/12/19 0730)  Pulse: 89 (01/12/19 0800)  Resp: 19 (01/12/19 0800)  BP: (!) 167/86 (01/12/19 0800)  SpO2: 99 % (01/12/19 0800) Vital Signs (24h Range):  Temp:  [97.3 °F (36.3 °C)-99.4 °F (37.4 °C)] 99.4 °F (37.4 °C)  Pulse:  [] 89  Resp:  [16-42] 19  SpO2:  [60 %-100 %] 99 %  BP: (111-182)/(58-94) 167/86     Weight: 75.8 kg (167 lb 1.7 oz)  Body mass index is 28.68 kg/m².    SpO2: 99 %  O2 Device (Oxygen Therapy): room air      Intake/Output Summary (Last 24 hours) at 1/12/2019 0847  Last data filed at 1/11/2019 1800  Gross per 24 hour   Intake 897.5 ml   Output 200 ml   Net 697.5 ml       Lines/Drains/Airways     Peripheral Intravenous Line                 Peripheral IV - Single Lumen 01/10/19 1310 Right Wrist 1 day         Peripheral IV - Single Lumen  01/10/19 1514 Left Wrist 1 day                Physical Exam   Constitutional: She is oriented to person, place, and time. She appears well-developed and well-nourished.   HENT:   Head: Normocephalic and atraumatic.   Eyes: Conjunctivae and EOM are normal.   Neck: Normal range of motion. Neck supple. No JVD present.   Cardiovascular: Normal rate, regular rhythm and normal heart sounds.   No murmur heard.  Pulmonary/Chest: Effort normal and breath sounds normal. No respiratory distress. She has no wheezes. She has no rales.   Abdominal: Soft. Bowel sounds are normal. She exhibits no distension.   Musculoskeletal: Normal range of motion. She exhibits no edema.   Neurological: She is alert and oriented to person, place, and time.   Skin: Skin is warm and dry. No erythema.   Psychiatric: She has a normal mood and affect. Her behavior is normal. Judgment and thought content normal.   Nursing note and vitals reviewed.      Significant Labs:   BMP:   Recent Labs   Lab 01/10/19  1825 01/11/19  0555 01/11/19  1314   * 129* 161*   * 135* 134*   K 5.4* 5.5* 5.3*    105 104   CO2 21* 14* 18*   BUN 74* 71* 72*   CREATININE 4.45* 4.4* 4.3*   CALCIUM 12.4* 11.3* 10.6*   , CBC   Recent Labs   Lab 01/10/19  1315 01/11/19  0555   WBC 10.83 9.85   HGB 12.8 11.4*   HCT 39.3 35.2*    259   , Lipid Panel   Recent Labs   Lab 01/11/19  0555   CHOL 180   HDL 27*   LDLCALC 126.0   TRIG 135   CHOLHDL 15.0*    and Troponin   Recent Labs   Lab 01/10/19  2014 01/11/19  0228 01/11/19  0728   TROPONINI 6.631* 5.714* 4.080*       Significant Imaging: Echocardiogram:   2D echo with color flow doppler: No results found for this or any previous visit. and Transthoracic echo (TTE) complete (Cupid Only):   · Normal left ventricular systolic function. The estimated ejection fraction is 65%  · Grade I (mild) left ventricular diastolic dysfunction consistent with impaired relaxation.  · Concentric left ventricular  remodeling.  · Thickened aortic and mitral valve leaflets appear rheumatic  · Severe right ventricular enlargement with Positive Licona's sign - Right heart strain - Consider Pulmonary Embolism  · Normal right ventricular systolic function.       Assessment and Plan:     Acute PE  S/p EKOS and angiomax  - now on eliquis, will repeat echo to eval RV strain either next week or as outpt    NSTEMI  - like 2nd to PE    CP: improved    HTN  - SBP ~160  - on lopressor, will add norvasc    Stable to transfer to floor, home soon, ok for home from cardiac standpoint from tomorrow on.  Needs to be able to walk w/o severe WHITTEN    Active Diagnoses:    Diagnosis Date Noted POA    PRINCIPAL PROBLEM:  Acute renal failure superimposed on stage 4 chronic kidney disease [N17.9, N18.4] 12/20/2018 Yes    Chest pain [R07.9] 01/11/2019 Yes    Elevated troponin [R74.8]  Unknown    SOB (shortness of breath) [R06.02]  Unknown    Hyperkalemia [E87.5] 01/10/2019 Yes    Chronic diastolic heart failure [I50.32] 01/10/2019 Yes    NSTEMI (non-ST elevated myocardial infarction) [I21.4] 01/10/2019 Yes    Hypercalcemia [E83.52] 01/10/2019 Yes    Metabolic acidosis, normal anion gap (NAG) [E87.2] 12/24/2018 Yes    Essential hypertension [I10] 12/20/2018 Yes     Chronic    Hyperlipidemia associated with type 2 diabetes mellitus [E11.69, E78.5] 12/20/2018 Yes     Chronic    Type 2 diabetes mellitus with hypoglycemia, without long-term current use of insulin [E11.649] 12/20/2018 Yes     Chronic      Problems Resolved During this Admission:       VTE Risk Mitigation (From admission, onward)        Ordered     apixaban tablet 10 mg  2 times daily      01/11/19 1731     Place sequential compression device  Until discontinued      01/10/19 1958     IP VTE LOW RISK PATIENT  Once      01/10/19 1958          Skyler Arizmendi MD  Cardiology  [facility]

## 2019-01-12 NOTE — PROGRESS NOTES
"Noted small amount of urine for the 12 hours that she have had, IVF's infusing. Bladder Scan performed, noted 440cc  Of urine. Patient asked, if she would like too try to void once again. She verbalized, "she would like that".  "

## 2019-01-12 NOTE — PROGRESS NOTES
Pt received back from cath lab; ekos catheter setup by cath lab team. Site c/d/i. Pt assessed. Pt and family updated.  See doc flowsheets

## 2019-01-12 NOTE — ASSESSMENT & PLAN NOTE
Trop 6 to 4   On nitro PRN.  Cards is following.  Could be aypical NSTEMI given her gender and dx with DMII  Transfer to icu.  tridil drip per cards  Asa,bb, statin, o2,,orphine for pain    Resolved with tx of PE

## 2019-01-12 NOTE — PROGRESS NOTES
Pt transported from 260 to 429 via bed with portable telemetry in place. Pt and family updated. Pt brought to new room, bed in low position, siderails raised x 3, call light in reach. Lorna nurse from 4rth floor at bedside assuming care.

## 2019-01-12 NOTE — PROGRESS NOTES
Dr. Mcclendon called and updated on pt; lab's unsuccessful attempt to draw blood and pt's hypertension. Per md, he will come round. Will continue to monitor

## 2019-01-12 NOTE — PLAN OF CARE
Patient lying in bed, resting quietly. No S/S of pain or discomfort at this time. NADN. Verbal, able to make needs known. Sister at bedside. Tele NSR. Sodium bicarb drip infusing @ 50 mL/hour. No diabetic distress noted at this time. Plan of care and medications reviewed with patient and patient's sister- both verbalize understanding. Bed in lowest position, side rails up x 2, call light within reach. Will continue to monitor patient.

## 2019-01-12 NOTE — ASSESSMENT & PLAN NOTE
Initial troponin 2.3, trended up to 6.631. Pt denies chest pain on exam. No acute events noted on telemetry. Cardiology consulted.  Dr. Cuevas recommends full dose ASA, loading dose plavix, continue BB,  Nitro prn chest pain and supplemental O2. Pt started on Heparin ACS protocol.     -trend cardiac enzymes   -monitor tele  -check lipid panel   -cardiology to see patient in am     Resolving with the tx of pe

## 2019-01-12 NOTE — PROGRESS NOTES
Progress Note  Nephrology      Consult Requested By: Hoang Mcclendon DO      SUBJECTIVE:     Overnight events  Patient is a 68 y.o. female     Patient Active Problem List   Diagnosis    Acute renal failure superimposed on stage 4 chronic kidney disease    Essential hypertension    Hyperlipidemia associated with type 2 diabetes mellitus    Type 2 diabetes mellitus with hypoglycemia, without long-term current use of insulin    Obesity (BMI 30-39.9)    Metabolic acidosis, normal anion gap (NAG)    Physical deconditioning    Chronic low back pain    Hyperkalemia    Chronic diastolic heart failure    NSTEMI (non-ST elevated myocardial infarction)    Weakness    Hypercalcemia    Chest pain    Elevated troponin    SOB (shortness of breath)    Pulmonary embolism with acute cor pulmonale     Past Medical History:   Diagnosis Date    Chronic kidney disease     Chronic low back pain     Diabetes mellitus, type 2     Hypertension               OBJECTIVE:     Vitals:    01/12/19 0900 01/12/19 1000 01/12/19 1100 01/12/19 1115   BP: (!) 173/79 (!) 156/74 138/70    BP Location: Left arm Left arm Left arm    Patient Position: Lying Lying Lying    Pulse: 91 78 68    Resp: 17 (!) 21 (!) 23    Temp:    97.6 °F (36.4 °C)   TempSrc:    Oral   SpO2: 96% 100% 99%    Weight:       Height:           Temp: 97.6 °F (36.4 °C) (01/12/19 1115)  Pulse: 68 (01/12/19 1100)  Resp: (!) 23 (01/12/19 1100)  BP: 138/70 (01/12/19 1100)  SpO2: 99 % (01/12/19 1100)    Date 01/12/19 0700 - 01/13/19 0659   Shift 5416-2037 5025-5329 5871-2961 24 Hour Total   INTAKE   P.O. 200   200   Shift Total(mL/kg) 200(2.6)   200(2.6)   OUTPUT   Urine(mL/kg/hr) 210   210   Shift Total(mL/kg) 210(2.8)   210(2.8)   Weight (kg) 75.8 75.8 75.8 75.8             Medications:   amLODIPine  5 mg Oral Daily    apixaban  10 mg Oral BID    aspirin  81 mg Oral Daily    atorvastatin  80 mg Oral Daily    metoprolol tartrate  100 mg Oral BID    pantoprazole  40  mg Oral Daily      sodium chloride 0.9% 50 mL/hr (01/11/19 1629)    alteplase (ACTIVASE) 10 mg in 0.9% NaCl 100 mL 1 mg (01/11/19 1630)    alteplase (ACTIVASE) infusion 1 mg/hr (01/11/19 1745)    alteplase (ACTIVASE) infusion 1 mg/hr (01/11/19 1745)    bivalirudin (ANGIOMAX) IV bolus      bivalirudin      nitroGLYCERIN 120 mcg/min (01/11/19 1400)    custom IV infusion builder 75 mL/hr at 01/12/19 0900               Physical Exam:  General appearance:NAD  Feeling better  No CP, no SOB  Lungs: diminished breath sounds  Heart: pulse 79  Abdomen: soft  Extremities: no edema  Skin:dry    ABG  Labs reviewed  Recent Results (from the past 336 hour(s))   Basic metabolic panel    Collection Time: 01/11/19  1:14 PM   Result Value Ref Range    Sodium 134 (L) 136 - 145 mmol/L    Potassium 5.3 (H) 3.5 - 5.1 mmol/L    Chloride 104 95 - 110 mmol/L    CO2 18 (L) 23 - 29 mmol/L    BUN, Bld 72 (H) 8 - 23 mg/dL    Creatinine 4.3 (H) 0.5 - 1.4 mg/dL    Calcium 10.6 (H) 8.7 - 10.5 mg/dL    Anion Gap 12 8 - 16 mmol/L   Basic metabolic panel    Collection Time: 01/11/19  5:55 AM   Result Value Ref Range    Sodium 135 (L) 136 - 145 mmol/L    Potassium 5.5 (H) 3.5 - 5.1 mmol/L    Chloride 105 95 - 110 mmol/L    CO2 14 (L) 23 - 29 mmol/L    BUN, Bld 71 (H) 8 - 23 mg/dL    Creatinine 4.4 (H) 0.5 - 1.4 mg/dL    Calcium 11.3 (H) 8.7 - 10.5 mg/dL    Anion Gap 16 8 - 16 mmol/L   Basic metabolic panel    Collection Time: 01/10/19  6:25 PM   Result Value Ref Range    Sodium 135 (L) 136 - 145 mmol/L    Potassium 5.4 (H) 3.5 - 5.1 mmol/L    Chloride 102 95 - 110 mmol/L    CO2 21 (L) 23 - 29 mmol/L    BUN, Bld 74 (H) 7 - 17 mg/dL    Creatinine 4.45 (H) 0.50 - 1.40 mg/dL    Calcium 12.4 (HH) 8.7 - 10.5 mg/dL    Anion Gap 12 8 - 16 mmol/L     Recent Results (from the past 336 hour(s))   CBC auto differential    Collection Time: 01/11/19  5:55 AM   Result Value Ref Range    WBC 9.85 3.90 - 12.70 K/uL    Hemoglobin 11.4 (L) 12.0 - 16.0 g/dL     Hematocrit 35.2 (L) 37.0 - 48.5 %    Platelets 259 150 - 350 K/uL   CBC auto differential    Collection Time: 01/10/19  1:15 PM   Result Value Ref Range    WBC 10.83 3.90 - 12.70 K/uL    Hemoglobin 12.8 12.0 - 16.0 g/dL    Hematocrit 39.3 37.0 - 48.5 %    Platelets 297 150 - 350 K/uL     Urinalysis  No results for input(s): COLORU, CLARITYU, SPECGRAV, PHUR, PROTEINUA, GLUCOSEU, BILIRUBINCON, BLOODU, WBCU, RBCU, BACTERIA, MUCUS, NITRITE, LEUKOCYTESUR, UROBILINOGEN, HYALINECASTS in the last 24 hours.    Diagnostic Results:  X-Ray: Reviewed  US: Reviewed  Echo: Reviewed  ACCESS    ASSESSMENT/PLAN:     ARIES/CKD 4/A3  UA protein 2+, blood 1+, RBC 2, WBC 1  US-  The kidneys are normal in length.  The right kidney measures 11.1 cm, left kidney measures 10.7 cm.  The arterial resistive indices are within normal limits.  Renal cortical thinning bilaterally.  No renal masses or hydronephrosis.  Bladder grossly unremarkable.  Creatinine 4.3  BUN 72  Metabolic acidosis  Hyponatremia 134  Potassium 5.3  Hypercalcemia 10.6  · Avoid nephrotoxic agents, hypotension, hypovolemia.  · Weight daily  · I and O.  · Gentle hydration.  · Will follow up.

## 2019-01-13 LAB
POCT GLUCOSE: 137 MG/DL (ref 70–110)
POCT GLUCOSE: 143 MG/DL (ref 70–110)
POCT GLUCOSE: 189 MG/DL (ref 70–110)
POCT GLUCOSE: 196 MG/DL (ref 70–110)
POCT GLUCOSE: 221 MG/DL (ref 70–110)

## 2019-01-13 PROCEDURE — 25000003 PHARM REV CODE 250: Performed by: NURSE PRACTITIONER

## 2019-01-13 PROCEDURE — 25000003 PHARM REV CODE 250: Performed by: INTERNAL MEDICINE

## 2019-01-13 PROCEDURE — 99232 SBSQ HOSP IP/OBS MODERATE 35: CPT | Mod: ,,, | Performed by: STUDENT IN AN ORGANIZED HEALTH CARE EDUCATION/TRAINING PROGRAM

## 2019-01-13 PROCEDURE — 21400001 HC TELEMETRY ROOM

## 2019-01-13 PROCEDURE — 97116 GAIT TRAINING THERAPY: CPT

## 2019-01-13 PROCEDURE — 80048 BASIC METABOLIC PNL TOTAL CA: CPT

## 2019-01-13 PROCEDURE — 36415 COLL VENOUS BLD VENIPUNCTURE: CPT

## 2019-01-13 PROCEDURE — 99232 PR SUBSEQUENT HOSPITAL CARE,LEVL II: ICD-10-PCS | Mod: ,,, | Performed by: STUDENT IN AN ORGANIZED HEALTH CARE EDUCATION/TRAINING PROGRAM

## 2019-01-13 PROCEDURE — 25000003 PHARM REV CODE 250: Performed by: HOSPITALIST

## 2019-01-13 PROCEDURE — 97166 OT EVAL MOD COMPLEX 45 MIN: CPT

## 2019-01-13 PROCEDURE — 25000003 PHARM REV CODE 250: Performed by: STUDENT IN AN ORGANIZED HEALTH CARE EDUCATION/TRAINING PROGRAM

## 2019-01-13 PROCEDURE — 94761 N-INVAS EAR/PLS OXIMETRY MLT: CPT

## 2019-01-13 PROCEDURE — 97161 PT EVAL LOW COMPLEX 20 MIN: CPT

## 2019-01-13 RX ORDER — AMLODIPINE BESYLATE 5 MG/1
5 TABLET ORAL 2 TIMES DAILY
Status: DISCONTINUED | OUTPATIENT
Start: 2019-01-13 | End: 2019-01-16 | Stop reason: HOSPADM

## 2019-01-13 RX ORDER — HYDRALAZINE HYDROCHLORIDE 10 MG/1
10 TABLET, FILM COATED ORAL EVERY 8 HOURS PRN
Status: DISCONTINUED | OUTPATIENT
Start: 2019-01-13 | End: 2019-01-16

## 2019-01-13 RX ADMIN — METOPROLOL TARTRATE 100 MG: 50 TABLET ORAL at 08:01

## 2019-01-13 RX ADMIN — AMLODIPINE BESYLATE 5 MG: 5 TABLET ORAL at 08:01

## 2019-01-13 RX ADMIN — ASPIRIN 81 MG 81 MG: 81 TABLET ORAL at 08:01

## 2019-01-13 RX ADMIN — HYDRALAZINE HYDROCHLORIDE 10 MG: 10 TABLET ORAL at 05:01

## 2019-01-13 RX ADMIN — APIXABAN 10 MG: 5 TABLET, FILM COATED ORAL at 08:01

## 2019-01-13 RX ADMIN — OXYCODONE HYDROCHLORIDE AND ACETAMINOPHEN 1 TABLET: 5; 325 TABLET ORAL at 07:01

## 2019-01-13 RX ADMIN — PANTOPRAZOLE SODIUM 40 MG: 40 TABLET, DELAYED RELEASE ORAL at 08:01

## 2019-01-13 RX ADMIN — ATORVASTATIN CALCIUM 80 MG: 40 TABLET, FILM COATED ORAL at 08:01

## 2019-01-13 RX ADMIN — SODIUM BICARBONATE: 84 INJECTION, SOLUTION INTRAVENOUS at 02:01

## 2019-01-13 NOTE — PROGRESS NOTES
Progress Note  Nephrology      Consult Requested By: Hoang Mcclendon DO      SUBJECTIVE:     Overnight events  Patient is a 68 y.o. female     Patient Active Problem List   Diagnosis    Acute renal failure superimposed on stage 4 chronic kidney disease    Essential hypertension    Hyperlipidemia associated with type 2 diabetes mellitus    Type 2 diabetes mellitus with hypoglycemia, without long-term current use of insulin    Obesity (BMI 30-39.9)    Metabolic acidosis, normal anion gap (NAG)    Physical deconditioning    Chronic low back pain    Hyperkalemia    Chronic diastolic heart failure    NSTEMI (non-ST elevated myocardial infarction)    Weakness    Hypercalcemia    Chest pain    Elevated troponin    SOB (shortness of breath)    Pulmonary embolism with acute cor pulmonale    PE (pulmonary thromboembolism)     Past Medical History:   Diagnosis Date    Chronic kidney disease     Chronic low back pain     Diabetes mellitus, type 2     Hypertension               OBJECTIVE:     Vitals:    01/13/19 1200 01/13/19 1217 01/13/19 1508 01/13/19 1600   BP:  (!) 149/70     BP Location:  Right arm     Patient Position:  Sitting     Pulse: 69 69  80   Resp:  19     Temp:  98.1 °F (36.7 °C)     TempSrc:  Oral     SpO2:   97%    Weight:       Height:           Temp: 98.1 °F (36.7 °C) (01/13/19 1217)  Pulse: 80 (01/13/19 1600)  Resp: 19 (01/13/19 1217)  BP: (!) 149/70 (01/13/19 1217)  SpO2: 97 % (01/13/19 1508)    Date 01/13/19 0700 - 01/14/19 0659   Shift 3426-7898 5151-6246 7372-8281 24 Hour Total   INTAKE   Shift Total(mL/kg)       OUTPUT   Urine(mL/kg/hr) 400(0.6)   400   Shift Total(mL/kg) 400(5.1)   400(5.1)   Weight (kg) 78.6 78.6 78.6 78.6             Medications:   amLODIPine  5 mg Oral Daily    apixaban  10 mg Oral BID    aspirin  81 mg Oral Daily    atorvastatin  80 mg Oral Daily    metoprolol tartrate  100 mg Oral BID    pantoprazole  40 mg Oral Daily      sodium chloride 0.9% 50 mL/hr  (01/11/19 1629)    alteplase (ACTIVASE) 10 mg in 0.9% NaCl 100 mL 1 mg (01/11/19 1630)    alteplase (ACTIVASE) infusion 1 mg/hr (01/11/19 1745)    alteplase (ACTIVASE) infusion 1 mg/hr (01/11/19 1745)    bivalirudin (ANGIOMAX) IV bolus      bivalirudin      nitroGLYCERIN 120 mcg/min (01/11/19 1400)    custom IV infusion builder 50 mL/hr at 01/13/19 0238               Physical Exam:  General appearance:NAD  No SOB  No CP  Lungs: diminished breath sounds  Heart: pulse 86  Abdomen: soft  Extremities: trace edema  Skin: dry    Laboratory:  ABG  Labs reviewed  Recent Results (from the past 336 hour(s))   Basic metabolic panel    Collection Time: 01/12/19  1:49 PM   Result Value Ref Range    Sodium 135 (L) 136 - 145 mmol/L    Potassium 4.4 3.5 - 5.1 mmol/L    Chloride 105 95 - 110 mmol/L    CO2 19 (L) 23 - 29 mmol/L    BUN, Bld 65 (H) 8 - 23 mg/dL    Creatinine 3.5 (H) 0.5 - 1.4 mg/dL    Calcium 10.1 8.7 - 10.5 mg/dL    Anion Gap 11 8 - 16 mmol/L   Basic metabolic panel    Collection Time: 01/11/19  1:14 PM   Result Value Ref Range    Sodium 134 (L) 136 - 145 mmol/L    Potassium 5.3 (H) 3.5 - 5.1 mmol/L    Chloride 104 95 - 110 mmol/L    CO2 18 (L) 23 - 29 mmol/L    BUN, Bld 72 (H) 8 - 23 mg/dL    Creatinine 4.3 (H) 0.5 - 1.4 mg/dL    Calcium 10.6 (H) 8.7 - 10.5 mg/dL    Anion Gap 12 8 - 16 mmol/L   Basic metabolic panel    Collection Time: 01/11/19  5:55 AM   Result Value Ref Range    Sodium 135 (L) 136 - 145 mmol/L    Potassium 5.5 (H) 3.5 - 5.1 mmol/L    Chloride 105 95 - 110 mmol/L    CO2 14 (L) 23 - 29 mmol/L    BUN, Bld 71 (H) 8 - 23 mg/dL    Creatinine 4.4 (H) 0.5 - 1.4 mg/dL    Calcium 11.3 (H) 8.7 - 10.5 mg/dL    Anion Gap 16 8 - 16 mmol/L     Recent Results (from the past 336 hour(s))   CBC auto differential    Collection Time: 01/12/19  1:49 PM   Result Value Ref Range    WBC 9.73 3.90 - 12.70 K/uL    Hemoglobin 10.6 (L) 12.0 - 16.0 g/dL    Hematocrit 33.0 (L) 37.0 - 48.5 %    Platelets 235 150 - 350  K/uL   CBC auto differential    Collection Time: 01/11/19  5:55 AM   Result Value Ref Range    WBC 9.85 3.90 - 12.70 K/uL    Hemoglobin 11.4 (L) 12.0 - 16.0 g/dL    Hematocrit 35.2 (L) 37.0 - 48.5 %    Platelets 259 150 - 350 K/uL   CBC auto differential    Collection Time: 01/10/19  1:15 PM   Result Value Ref Range    WBC 10.83 3.90 - 12.70 K/uL    Hemoglobin 12.8 12.0 - 16.0 g/dL    Hematocrit 39.3 37.0 - 48.5 %    Platelets 297 150 - 350 K/uL     Urinalysis  No results for input(s): COLORU, CLARITYU, SPECGRAV, PHUR, PROTEINUA, GLUCOSEU, BILIRUBINCON, BLOODU, WBCU, RBCU, BACTERIA, MUCUS, NITRITE, LEUKOCYTESUR, UROBILINOGEN, HYALINECASTS in the last 24 hours.    Diagnostic Results:  X-Ray: Reviewed  US: Reviewed  Echo: Reviewed  ACCESS    ASSESSMENT/PLAN:   Acute PE.  ARIES/CKD 4/A3  UA protein 2+, blood 1+, RBC 2, WBC 1  US-  The kidneys are normal in length.  The right kidney measures 11.1 cm, left kidney measures 10.7 cm.  The arterial resistive indices are within normal limits.  Renal cortical thinning bilaterally.  No renal masses or hydronephrosis.  Bladder grossly unremarkable.  Creatinine 3.5  BUN  65  Metabolic acidosis  Hyponatremia 135  Potassium 4.4  /83  Norvasc 5 mg bid  · Avoid nephrotoxic agents, hypotension, hypovolemia.  · Weight daily  · I and O.  · Will follow up.

## 2019-01-13 NOTE — PT/OT/SLP EVAL
Occupational Therapy   Evaluation    Name: Brittaney Joseph  MRN: [id number]  Admitting Diagnosis:  Acute renal failure superimposed on stage 4 chronic kidney disease 2 Days Post-Op  Pre-op Diagnosis: SOB (shortness of breath) [R06.02] s/p Procedure(s):  Thrombolysis, PA  EKOS, Pumoart/DVT   Recommendations:     Discharge Recommendations: nursing facility, skilled  Discharge Equipment Recommendations:  tub bench  Barriers to discharge:       Assessment:     Brittaney Joseph is a 68 y.o. female with a medical diagnosis of Acute renal failure superimposed on stage 4 chronic kidney disease; Pre-op Diagnosis: SOB (shortness of breath) [R06.02] s/p Procedure(s):Thrombolysis, PA  EKOS, Pumoart/DVT.  She presents with poor activity tolerance, mild SOB on exertion, mild shakiness and high fall risk.  Pt. requires extensive assist with ADLS and limited assist with mobility. SNF recommended. Continue with OT POC. Performance deficits affecting function: weakness, impaired endurance, impaired self care skills, gait instability, impaired balance, impaired functional mobilty, decreased upper extremity function, decreased lower extremity function, decreased safety awareness.      Rehab Prognosis: Good; patient would benefit from acute skilled OT services to address these deficits and reach maximum level of function.       Plan:     Patient to be seen 5 x/week to address the above listed problems via self-care/home management, therapeutic exercises, therapeutic activities  · Plan of Care Expires: 02/13/19  · Plan of Care Reviewed with: patient, sibling    Subjective     Chief Complaint: no appetite, not eating much  Patient/Family Comments/goals: none    Occupational Profile:  Living Environment: lives with niece in trailer with 4 steps with no rails ; has tub/shower combo.   Previous level of function: For past 2 months since onset of illness, pt. Been needing assist with ADLS and ambulation using RW.  Prior to 2 months ago, pt  was fully indep with ADLS,  ambulated indep without a device and was driving.  Roles and Routines: sedentary since been ill   Equipment Used at Home:  walker, rolling, bedside commode  Assistance upon Discharge: niece home all day with pt.    Pain/Comfort:  · Pain Rating 1: 0/10  · Pain Rating Post-Intervention 1: 0/10    Patients cultural, spiritual, Muslim conflicts given the current situation: no    Objective:     Communicated with: nurse prior to session.  Patient found up in chair with bed alarm, telemetry, peripheral IV upon OT entry to room.    General Precautions: Standard, fall, diabetic   Orthopedic Precautions:N/A   Braces: N/A     Occupational Performance:    Bed Mobility:    · Patient completed Rolling/Turning to Right with stand by assistance and with side rail  · Patient completed Scooting/Bridging with stand by assistance and with side rail  · Patient completed Supine to Sit with stand by assistance and with side rail    Functional Mobility/Transfers:  · Patient completed Sit <> Stand Transfer with contact guard assistance  with  rolling walker   · Patient completed Bed <> Chair Transfer using Step Transfer technique with minimum assistance with rolling walker  · Functional Mobility: Min assist with RW in room, just past door out to hallway and back to BS chair on opposite side of the room; slow, mildly SOB and slightly shaky.    Activities of Daily Living:  · Feeding:  minimum assistance sister assisted pt   · Grooming: moderate assistance fatigu  · Upper Body Dressing:  NT  · Lower Body Dressing: total assistance socks  · Toileting:  NT    Cognitive/Visual Perceptual:  Cognitive/Psychosocial Skills:     -       Oriented to: Person, Place, Time and Situation   -       Follows Commands/attention:Follows one-step commands  -       Communication: clear/fluent  -       Memory: No Deficits noted  -       Safety awareness/insight to disability: impaired   -       Mood/Affect/Coping skills/emotional  control: Cooperative and Flat affect  Visual/Perceptual:      -Intact .    Physical Exam:  Balance:    -       sitting;  good  dynamic;  fair plus  standing;  fair  dynamic;  poor plus  Postural examination/scapula alignment:    -       Rounded shoulders  Edema:  Moderate RLE and LLE mild  Dominant hand:    -       right  Upper Extremity Range of Motion:     -       Right Upper Extremity: Deficits: AROM 90 shld and distally wfl  -       Left Upper Extremity: Deficits: wfl  Upper Extremity Strength:    -       Right Upper Extremity: Deficits: shld 3-/5; distally 4-/5  -       Left Upper Extremity: Deficits: 4-/5   Strength:    -       Right Upper Extremity: Deficits: good-  -       Left Upper Extremity: Deficits: good-    AMPAC 6 Click ADL:  AMPAC Total Score: 13    Treatment & Education:  Role fo OT and POC  Education:    Patient left up in chair with all lines intact, call button in reach, nurse notified and sister present    GOALS:   Multidisciplinary Problems     Occupational Therapy Goals        Problem: Occupational Therapy Goal    Goal Priority Disciplines Outcome Interventions   Occupational Therapy Goal     OT, PT/OT Ongoing (interventions implemented as appropriate)    Description:  Goals to be met by: 1/31/2019    Patient will increase functional independence with ADLs by performing:    Feeding with Modified [place].  UE Dressing with Modified [place].  LE Dressing with Modified [place].  Grooming while standing with Modified [place].  Toileting from toilet with Modified [place] for hygiene and clothing management.   Step transfer with Modified [place]  Toilet transfer to toilet with Modified [place].  Increased functional strength to WFL for BUE.  Upper extremity exercise program 10 reps per handout, with independence.                      History:     Past Medical History:   Diagnosis Date    Chronic kidney disease     Chronic low back pain     Diabetes mellitus,  "type 2     Hypertension        Past Surgical History:   Procedure Laterality Date    CHOLECYSTECTOMY         Clinical Decision Makin.  OT Mod:  "Pt evaluation falls under moderate complexity for evaluation coding due to identification of 3-5 performance deficits noted as stated above. Eval required Min/Mod assistance to complete on this date and detailed assessment(s) were utilized. Moreover, an expanded review of history and occupational profile obtained with additional review of cognitive, physical and psychosocial hx."     Time Tracking:     OT Date of Treatment: 19  OT Start Time: 1030  OT Stop Time: 1053  OT Total Time (min): 23 min-COTX with PT    Billable Minutes:Evaluation 23  Total Time 23    Miguelina Scruggs OT  2019    "

## 2019-01-13 NOTE — PROGRESS NOTES
[facility]  Cardiology  Progress Note    Patient Name: Brittaney Joseph  MRN: [id number]  Admission Date: 1/10/2019  Hospital Length of Stay: 3 days  Code Status: Full Code   Attending Physician: Hoang Mcclendon DO   Primary Care Physician: Charles Argueta MD  Expected Discharge Date:   Principal Problem:Acute renal failure superimposed on stage 4 chronic kidney disease    Subjective:     1/12: s/p EKOS cath placement for b/l PE that was confirmed by VQ.   ekos catheters removed, pt now transitioned to PO eliquis. Notes to be feel better today. RIJ site is clean and intact. No SOB    1/13: now in tele bed. Doing well. No acute issues. Encouraged to walk around room.  Review of Systems   Constitution: Negative for decreased appetite, weakness and malaise/fatigue.   Cardiovascular: Positive for chest pain and dyspnea on exertion.   Respiratory: Positive for cough and shortness of breath.    Musculoskeletal: Negative for muscle weakness.   Gastrointestinal: Negative for bloating and abdominal pain.   Neurological: Negative for dizziness, focal weakness and light-headedness.   Psychiatric/Behavioral: Negative for depression. The patient is not nervous/anxious.      Objective:     Vital Signs (Most Recent):  Temp: 98.2 °F (36.8 °C) (01/13/19 0916)  Pulse: 75 (01/13/19 0916)  Resp: 19 (01/13/19 0916)  BP: (!) 172/78 (01/13/19 0916)  SpO2: 97 % (01/13/19 0728) Vital Signs (24h Range):  Temp:  [96.2 °F (35.7 °C)-98.2 °F (36.8 °C)] 98.2 °F (36.8 °C)  Pulse:  [63-81] 75  Resp:  [14-23] 19  SpO2:  [97 %-99 %] 97 %  BP: (138-172)/(70-82) 172/78     Weight: 78.6 kg (173 lb 4.5 oz)  Body mass index is 29.74 kg/m².    SpO2: 97 %  O2 Device (Oxygen Therapy): room air      Intake/Output Summary (Last 24 hours) at 1/13/2019 1047  Last data filed at 1/13/2019 0900  Gross per 24 hour   Intake 2517.5 ml   Output 978 ml   Net 1539.5 ml       Lines/Drains/Airways     Peripheral Intravenous Line                 Peripheral  IV - Single Lumen 01/10/19 1310 Right Wrist 2 days         Peripheral IV - Single Lumen 01/10/19 1514 Left Wrist 2 days                Physical Exam   Constitutional: She is oriented to person, place, and time. She appears well-developed and well-nourished.   HENT:   Head: Normocephalic and atraumatic.   Eyes: Conjunctivae and EOM are normal.   Neck: Normal range of motion. Neck supple. No JVD present.   Cardiovascular: Normal rate, regular rhythm and normal heart sounds.   No murmur heard.  Pulmonary/Chest: Effort normal and breath sounds normal. No respiratory distress. She has no wheezes. She has no rales.   Abdominal: Soft. Bowel sounds are normal. She exhibits no distension.   Musculoskeletal: Normal range of motion. She exhibits no edema.   Neurological: She is alert and oriented to person, place, and time.   Skin: Skin is warm and dry. No erythema.   Psychiatric: She has a normal mood and affect. Her behavior is normal. Judgment and thought content normal.   Nursing note and vitals reviewed.      Significant Labs:   BMP:   Recent Labs   Lab 01/11/19  1314 01/12/19  1349   * 117*   * 135*   K 5.3* 4.4    105   CO2 18* 19*   BUN 72* 65*   CREATININE 4.3* 3.5*   CALCIUM 10.6* 10.1   , CBC   Recent Labs   Lab 01/12/19  1349   WBC 9.73   HGB 10.6*   HCT 33.0*      , Lipid Panel   No results for input(s): CHOL, HDL, LDLCALC, TRIG, CHOLHDL in the last 48 hours. and Troponin   No results for input(s): TROPONINI in the last 48 hours.    Significant Imaging: Echocardiogram:   2D echo with color flow doppler: No results found for this or any previous visit. and Transthoracic echo (TTE) complete (Cupid Only):   · Normal left ventricular systolic function. The estimated ejection fraction is 65%  · Grade I (mild) left ventricular diastolic dysfunction consistent with impaired relaxation.  · Concentric left ventricular remodeling.  · Thickened aortic and mitral valve leaflets appear  rheumatic  · Severe right ventricular enlargement with Positive Licona's sign - Right heart strain - Consider Pulmonary Embolism  · Normal right ventricular systolic function.       Assessment and Plan:         Active Diagnoses:    Diagnosis Date Noted POA    PRINCIPAL PROBLEM:  Acute renal failure superimposed on stage 4 chronic kidney disease [N17.9, N18.4] 12/20/2018 Yes    Pulmonary embolism with acute cor pulmonale [I26.09] 01/12/2019 Yes    PE (pulmonary thromboembolism) [I26.99] 01/12/2019 Unknown    Chest pain [R07.9] 01/11/2019 Yes    Elevated troponin [R74.8]  Yes    SOB (shortness of breath) [R06.02]  Yes    Hyperkalemia [E87.5] 01/10/2019 Yes    Chronic diastolic heart failure [I50.32] 01/10/2019 Yes    NSTEMI (non-ST elevated myocardial infarction) [I21.4] 01/10/2019 Yes    Hypercalcemia [E83.52] 01/10/2019 Yes    Metabolic acidosis, normal anion gap (NAG) [E87.2] 12/24/2018 Yes    Essential hypertension [I10] 12/20/2018 Yes     Chronic    Hyperlipidemia associated with type 2 diabetes mellitus [E11.69, E78.5] 12/20/2018 Yes     Chronic    Type 2 diabetes mellitus with hypoglycemia, without long-term current use of insulin [E11.649] 12/20/2018 Yes     Chronic      Problems Resolved During this Admission:     Acute PE  S/p EKOS and angiomax  - now on eliquis, will repeat echo to eval RV strain either next week or as outpt in 3-4 weeks    NSTEMI  - like 2nd to PE  - c/w asa/statin/BB    CP: resolved    HTN  - SBP ~160  - on lopressor, c/w norvasc, add hydralazine PRN.    ok for home from cardiac standpoint  Follow up cardiology outpt in 2-3 weeks.    VTE Risk Mitigation (From admission, onward)        Ordered     apixaban tablet 10 mg  2 times daily      01/11/19 1731     Place sequential compression device  Until discontinued      01/10/19 1958     IP VTE LOW RISK PATIENT  Once      01/10/19 1958          Skyler Arizmendi MD  Cardiology  [facility]

## 2019-01-13 NOTE — ASSESSMENT & PLAN NOTE
Hyperkalemia   Hypercalcemia   Metabolic Acidosis     BUN/CR (76/4.87) on admission with anion gap (17) and K (6.2). BUN/CR previously (56/2.9) on 12/26/18.  Pt received insulin, bicarb, kayexalate and IVF. Repeat K (5.4). No acute changes noted on telemetry. Pt reports no changes to urinary output.     -consult nephrology   -avoid nephrotoxic meds, renal dose meds   -gentle hydration   -repeat am BMP   -strict intake/output and daily weights     Improving see notes from today 's HPI

## 2019-01-13 NOTE — SUBJECTIVE & OBJECTIVE
Past Medical History:   Diagnosis Date    Chronic kidney disease     Chronic low back pain     Diabetes mellitus, type 2     Hypertension        Past Surgical History:   Procedure Laterality Date    CHOLECYSTECTOMY         Review of patient's allergies indicates:  No Known Allergies    No current facility-administered medications on file prior to encounter.      Current Outpatient Medications on File Prior to Encounter   Medication Sig    amLODIPine (NORVASC) 10 MG tablet Take 10 mg by mouth nightly.    atorvastatin (LIPITOR) 40 MG tablet Take 40 mg by mouth once daily.    cloNIDine (CATAPRES) 0.2 MG tablet Take 0.2 mg by mouth nightly.    furosemide (LASIX) 40 MG tablet Take 40 mg by mouth once daily.    glipiZIDE (GLUCOTROL) 2.5 MG TR24 Take 1 tablet (2.5 mg total) by mouth daily with breakfast.    losartan (COZAAR) 100 MG tablet Take 100 mg by mouth once daily.    metoprolol tartrate (LOPRESSOR) 100 MG tablet     oxyCODONE-acetaminophen (PERCOCET)  mg per tablet     pantoprazole (PROTONIX) 40 MG tablet     ergocalciferol (VITAMIN D2) 50,000 unit Cap Take 50,000 Units by mouth every Mon, Wed, Fri.     Family History     None        Tobacco Use    Smoking status: Never Smoker   Substance and Sexual Activity    Alcohol use: No     Frequency: Never    Drug use: No    Sexual activity: Not on file     Review of Systems   Constitutional: Positive for appetite change. Negative for chills, diaphoresis, fatigue, fever and unexpected weight change.   HENT: Negative for congestion.    Eyes: Negative for photophobia.   Respiratory: Negative for cough, chest tightness, shortness of breath (with exertion ) and wheezing.    Cardiovascular: Negative for chest pain, palpitations and leg swelling.   Gastrointestinal: Negative for abdominal pain, diarrhea, nausea and vomiting.   Genitourinary: Negative for dysuria, flank pain, frequency and hematuria.   Musculoskeletal: Negative for back pain and myalgias.    Skin: Negative for color change.   Neurological: Negative for dizziness, syncope, weakness, light-headedness and headaches.   Psychiatric/Behavioral: Negative for confusion.     Objective:     Vital Signs (Most Recent):  Temp: 98.1 °F (36.7 °C) (01/13/19 1217)  Pulse: 69 (01/13/19 1217)  Resp: 19 (01/13/19 1217)  BP: (!) 149/70 (01/13/19 1217)  SpO2: 97 % (01/13/19 1152) Vital Signs (24h Range):  Temp:  [96.2 °F (35.7 °C)-98.2 °F (36.8 °C)] 98.1 °F (36.7 °C)  Pulse:  [63-81] 69  Resp:  [14-19] 19  SpO2:  [97 %-98 %] 97 %  BP: (146-172)/(70-82) 149/70     Weight: 78.6 kg (173 lb 4.5 oz)  Body mass index is 29.74 kg/m².    Physical Exam   Constitutional: She is oriented to person, place, and time. She appears well-developed and well-nourished.   HENT:   Head: Normocephalic and atraumatic.   Eyes: Conjunctivae are normal. Pupils are equal, round, and reactive to light.   Neck: Normal range of motion. No JVD present.   Cardiovascular: Normal rate, regular rhythm, normal heart sounds and intact distal pulses.   Pulmonary/Chest: Effort normal. No respiratory distress. She has no wheezes.   Abdominal: Soft. Bowel sounds are normal. She exhibits no distension. There is no tenderness. There is no guarding.   Musculoskeletal: Normal range of motion. She exhibits no edema (trace edema to bilateral lower extremities ).   Neurological: She is alert and oriented to person, place, and time. No cranial nerve deficit.   Skin: Skin is warm and dry. Capillary refill takes less than 2 seconds.   Psychiatric: She has a normal mood and affect. Her behavior is normal.         CRANIAL NERVES     CN III, IV, VI   Pupils are equal, round, and reactive to light.       Significant Labs:   BMP:   Recent Labs   Lab 01/12/19  1349   *   *   K 4.4      CO2 19*   BUN 65*   CREATININE 3.5*   CALCIUM 10.1     CBC:   Recent Labs   Lab 01/12/19  1349   WBC 9.73   HGB 10.6*   HCT 33.0*        Lactic Acid:   No results for  input(s): LACTATE in the last 48 hours.  Troponin:   No results for input(s): TROPONINI in the last 48 hours.    Significant Imaging: I have reviewed all pertinent imaging results/findings within the past 24 hours.

## 2019-01-13 NOTE — PLAN OF CARE
Problem: Physical Therapy Goal  Goal: Physical Therapy Goal  Goals to be met by: 18     Patient will increase functional independence with mobility by performin. Supine <> sit with Modified [place]  2. Sit to stand transfer with Supervision  3. Bed to chair transfer with Supervision using Rolling Walker  4. Gait  x 150 feet with Supervision using Rolling Walker.   5. Assess stair negotiation when appropriate as pt has 4 YESSICA with no rails.    Outcome: Ongoing (interventions implemented as appropriate)  PT evaluation completed, note to follow. Pt ambulated 35 ft with RW and min A with increased time. Pt presents with decreased endurance and strength and reporting she has been in/out of the hospital many times recently and has been weak for ~2 months now. Prior to 2 months ago pt was independent without AD. Recommending SNF placement upon d/c.

## 2019-01-13 NOTE — PLAN OF CARE
Problem: Adult Inpatient Plan of Care  Goal: Patient-Specific Goal (Individualization)  Patient lying in bed, resting quietly.Tele NSR. Sodium bicarb drip infusing @ 50 mL/hour. . Plan of care and medications reviewed with patient and patient's sister- both verbalize understanding. Bed in lowest position, side rails up x 2, call light within reach. Will continue to monitor patient.

## 2019-01-13 NOTE — PROGRESS NOTES
[facility] Hospital Medicine  Progress Note    Patient Name: Brittaney Joseph  MRN: [id number]  Patient Class: IP- Inpatient   Admission Date: 1/10/2019  Length of Stay: 3 days  Attending Physician: Hoang Mcclendon DO  Primary Care Provider: Charles Argueta MD        Subjective:     Principal Problem:Acute renal failure superimposed on stage 4 chronic kidney disease    HPI:  Brittaney Joseph is a 69 yo  female with HTN, HLD, chronic kidney disease stage 4, Type 2 Diabetes Mellitus, diastolic heart failure and chronic back pain. Her primary care physician is Dr. Charles Argueta. Her nephrologist is Dr. Mcdaniel. She lives with her niece in [place], [place]. She presented to [facility] 1/10/19 with complaints of generalized weakness, fatigue, dizziness, SOB and nausea/vomiting. The patient reports acute onset of symptoms while at nephrologist appointment prior to arrival. She denies chest pain, palpitations, unilateral weakness and syncopal events. Initial labs remarkable for K (6.2), BUN/CR (76/4.87) increased from previous BUN/CR (56/2.9 on 12/26/18), anion gap (17),   lactic (3.1), Troponin (2.3) and BNP (1200). EKG shows sinus tachycardia with non-specific T wave abnormality. VSS. CXR shows no acute abnormality. Pt received insulin, bicarb and kayexalate for management of hyperkalemia and acidosis. She also received 1 L NS bolus. Pt admitted to [facility] medicine for further evaluation.     Hospital Course:  The pt seen at bedside, she is complaining of chest pain, on an off during the night. Cards evaluated the pt, given her positive cp, elevated trop, and acute renal failure, decision is to move pt to icu.starte tridil drip, asa, plavx, renal input prior to a prcedure with dye. Etc  Pt updated, agrees to plan  1/12 pt is S/P EHOS for PE. She was in ICU post procedure, now being transferred to [facility] ed. She is doing much better. She is doing well. No mre CP or SOB as  prior to the procedure. Sister at bedside as well.  PT/OT in a day or two  1/13 pt improving, working with PT/OT, more likely SNF candidate, cr down to 3.5 from 4.4 bun down to 65 from 74. No cp with activity or rest    Past Medical History:   Diagnosis Date    Chronic kidney disease     Chronic low back pain     Diabetes mellitus, type 2     Hypertension        Past Surgical History:   Procedure Laterality Date    CHOLECYSTECTOMY         Review of patient's allergies indicates:  No Known Allergies    No current facility-administered medications on file prior to encounter.      Current Outpatient Medications on File Prior to Encounter   Medication Sig    amLODIPine (NORVASC) 10 MG tablet Take 10 mg by mouth nightly.    atorvastatin (LIPITOR) 40 MG tablet Take 40 mg by mouth once daily.    cloNIDine (CATAPRES) 0.2 MG tablet Take 0.2 mg by mouth nightly.    furosemide (LASIX) 40 MG tablet Take 40 mg by mouth once daily.    glipiZIDE (GLUCOTROL) 2.5 MG TR24 Take 1 tablet (2.5 mg total) by mouth daily with breakfast.    losartan (COZAAR) 100 MG tablet Take 100 mg by mouth once daily.    metoprolol tartrate (LOPRESSOR) 100 MG tablet     oxyCODONE-acetaminophen (PERCOCET)  mg per tablet     pantoprazole (PROTONIX) 40 MG tablet     ergocalciferol (VITAMIN D2) 50,000 unit Cap Take 50,000 Units by mouth every Mon, Wed, Fri.     Family History     None        Tobacco Use    Smoking status: Never Smoker   Substance and Sexual Activity    Alcohol use: No     Frequency: Never    Drug use: No    Sexual activity: Not on file     Review of Systems   Constitutional: Positive for appetite change. Negative for chills, diaphoresis, fatigue, fever and unexpected weight change.   HENT: Negative for congestion.    Eyes: Negative for photophobia.   Respiratory: Negative for cough, chest tightness, shortness of breath (with exertion ) and wheezing.    Cardiovascular: Negative for chest pain, palpitations and leg  swelling.   Gastrointestinal: Negative for abdominal pain, diarrhea, nausea and vomiting.   Genitourinary: Negative for dysuria, flank pain, frequency and hematuria.   Musculoskeletal: Negative for back pain and myalgias.   Skin: Negative for color change.   Neurological: Negative for dizziness, syncope, weakness, light-headedness and headaches.   Psychiatric/Behavioral: Negative for confusion.     Objective:     Vital Signs (Most Recent):  Temp: 98.1 °F (36.7 °C) (01/13/19 1217)  Pulse: 69 (01/13/19 1217)  Resp: 19 (01/13/19 1217)  BP: (!) 149/70 (01/13/19 1217)  SpO2: 97 % (01/13/19 1152) Vital Signs (24h Range):  Temp:  [96.2 °F (35.7 °C)-98.2 °F (36.8 °C)] 98.1 °F (36.7 °C)  Pulse:  [63-81] 69  Resp:  [14-19] 19  SpO2:  [97 %-98 %] 97 %  BP: (146-172)/(70-82) 149/70     Weight: 78.6 kg (173 lb 4.5 oz)  Body mass index is 29.74 kg/m².    Physical Exam   Constitutional: She is oriented to person, place, and time. She appears well-developed and well-nourished.   HENT:   Head: Normocephalic and atraumatic.   Eyes: Conjunctivae are normal. Pupils are equal, round, and reactive to light.   Neck: Normal range of motion. No JVD present.   Cardiovascular: Normal rate, regular rhythm, normal heart sounds and intact distal pulses.   Pulmonary/Chest: Effort normal. No respiratory distress. She has no wheezes.   Abdominal: Soft. Bowel sounds are normal. She exhibits no distension. There is no tenderness. There is no guarding.   Musculoskeletal: Normal range of motion. She exhibits no edema (trace edema to bilateral lower extremities ).   Neurological: She is alert and oriented to person, place, and time. No cranial nerve deficit.   Skin: Skin is warm and dry. Capillary refill takes less than 2 seconds.   Psychiatric: She has a normal mood and affect. Her behavior is normal.         CRANIAL NERVES     CN III, IV, VI   Pupils are equal, round, and reactive to light.       Significant Labs:   BMP:   Recent Labs   Lab  01/12/19  1349   *   *   K 4.4      CO2 19*   BUN 65*   CREATININE 3.5*   CALCIUM 10.1     CBC:   Recent Labs   Lab 01/12/19  1349   WBC 9.73   HGB 10.6*   HCT 33.0*        Lactic Acid:   No results for input(s): LACTATE in the last 48 hours.  Troponin:   No results for input(s): TROPONINI in the last 48 hours.    Significant Imaging: I have reviewed all pertinent imaging results/findings within the past 24 hours.    Assessment/Plan:      * Acute renal failure superimposed on stage 4 chronic kidney disease    Hyperkalemia   Hypercalcemia   Metabolic Acidosis     BUN/CR (76/4.87) on admission with anion gap (17) and K (6.2). BUN/CR previously (56/2.9) on 12/26/18.  Pt received insulin, bicarb, kayexalate and IVF. Repeat K (5.4). No acute changes noted on telemetry. Pt reports no changes to urinary output.     -consult nephrology   -avoid nephrotoxic meds, renal dose meds   -gentle hydration   -repeat am BMP   -strict intake/output and daily weights     Improving see notes from today 's HPI       Pulmonary embolism with acute cor pulmonale    tx'ed with kb's  Now on eliquis.  Appreciate cards input       Chest pain    Trop 6 to 4   On nitro PRN.  Cards is following.  Could be aypical NSTEMI given her gender and dx with DMII  Transfer to icu.  tridil drip per cards  Asa,bb, statin, o2,,orphine for pain    Resolved with tx of PE       Essential hypertension    HLD     --151   -per chart review patient taking metoprolol, losartan, clonidine, amlodipine and lasix daily   -hold ARB and diuretics for now   -continue BB   -verify all home meds with family in am      Type 2 diabetes mellitus with hypoglycemia, without long-term current use of insulin    Controlled, A1C 6.4 12/24/18     -per chart review glipizide discontinued due to episodic hypoglycemic events   -monitor blood sugars AC &HS, diabetic diet, low dose SSI            Chronic diastolic heart failure    Echo 12/19/18 shows LVEF  65% with diastolic dysfunction and left ventricular remodeling consistent with hypertensive heart disease. No evidence of volume overload on exam.   -TTE in am      NSTEMI (non-ST elevated myocardial infarction)    Initial troponin 2.3, trended up to 6.631. Pt denies chest pain on exam. No acute events noted on telemetry. Cardiology consulted.  Dr. Cuevas recommends full dose ASA, loading dose plavix, continue BB,  Nitro prn chest pain and supplemental O2. Pt started on Heparin ACS protocol.     -trend cardiac enzymes   -monitor tele  -check lipid panel   -cardiology to see patient in am     Resolving with the tx of pe       Metabolic acidosis, normal anion gap (NAG)    conult nephrology         VTE Risk Mitigation (From admission, onward)        Ordered     apixaban tablet 10 mg  2 times daily      01/11/19 1731     Place sequential compression device  Until discontinued      01/10/19 1958     IP VTE LOW RISK PATIENT  Once      01/10/19 1958              Hoang Mcclendon DO  Department of Hospital Medicine   [facility]

## 2019-01-13 NOTE — PLAN OF CARE
Problem: Occupational Therapy Goal  Goal: Occupational Therapy Goal  Goals to be met by: 1/31/2019    Patient will increase functional independence with ADLs by performing:    Feeding with Modified [place].  UE Dressing with Modified [place].  LE Dressing with Modified [place].  Grooming while standing with Modified [place].  Toileting from toilet with Modified [place] for hygiene and clothing management.   Step transfer with Modified [place]  Toilet transfer to toilet with Modified [place].  Increased functional strength to WFL for BUE.  Upper extremity exercise program 10 reps per handout, with independence.    Outcome: Ongoing (interventions implemented as appropriate)  OT initial eval completed.  Pt would benefit from SNF post acute.  Pt has BSC and RW at home and niece will purchase her a shower chair vs TTB  per pt.'s report.  Continue with OT POC.

## 2019-01-13 NOTE — PT/OT/SLP EVAL
Physical Therapy Evaluation and Treatment    Patient Name:  Brittaney Joseph   MRN:  [id number]    Recommendations:     Discharge Recommendations:  nursing facility, skilled   Discharge Equipment Recommendations: tub bench   Barriers to discharge: Inaccessible home    Assessment:     Brittaney Joseph is a 68 y.o. female admitted with a medical diagnosis of Acute renal failure superimposed on stage 4 chronic kidney disease.  She presents with the following impairments/functional limitations:  weakness, impaired endurance, impaired self care skills, impaired functional mobilty, gait instability, impaired balance, decreased lower extremity function. Pt ambulated 35 ft with RW and min A with increased time. Pt presents with decreased endurance and strength and reporting she has been in/out of the hospital many times recently and has been weak for ~2 months now. Prior to 2 months ago pt was independent without AD. Recommending SNF placement upon d/c.    Rehab Prognosis: Good; patient would benefit from acute skilled PT services to address these deficits and reach maximum level of function.    Recent Surgery: Procedure(s) (LRB):  Thrombolysis, PA (N/A)  EKOS, Pumoart/DVT 2 Days Post-Op    Plan:     During this hospitalization, patient to be seen 6 x/week to address the identified rehab impairments via gait training, therapeutic activities, therapeutic exercises and progress toward the following goals:    · Plan of Care Expires:  02/13/19    Subjective     Chief Complaint: being in and out of the hospital   Patient/Family Comments/goals: pt reporting this is her 4th hospital admission recently  Pain/Comfort:  · Pain Rating 1: 0/10  · Pain Rating Post-Intervention 1: 0/10    Patients cultural, spiritual, Jehovah's witness conflicts given the current situation: no    Living Environment:  Pt lives with her niece in a trailer with 4 YESSICA with no rails and tub/shower combo.  Prior to admission, patients level of function was requiring  supervision/CGA for gait with RW, and intermittent assist with bathing, dressing, and toileting. Prior to 2 months ago pt was independent without AD for all mobility and ADLs.  Equipment used at home: walker, rolling, bedside commode.  DME owned (not currently used): none.  Upon discharge, patient will have assistance from her niece who pt reports is home with her at all times.    Objective:     Communicated with FRANCIS Allen prior to session.  Patient found all lines intact peripheral IV, bed alarm, telemetry  upon PT entry to room.    General Precautions: Standard, fall   Orthopedic Precautions:N/A   Braces: N/A     Exams:  · Cognitive Exam:  Patient is oriented to Person, Place, Time and Situation  · Flat affect, answers yes/no majority but also provides short answers to questions  · Gross Motor Coordination:  Pt with mild BUE/BLE tremors noted during gait-- pt reporting she has had these tremors since becoming weak ~2 months ago  · Postural Exam:  Patient presented with the following abnormalities:    · -       Rounded shoulders  · Sensation:    · -       Intact  · Skin Integrity/Edema:      · -       Skin integrity: healed mid-chest incision  · -       Edema: min edema LLE, moderate edema RLE  · RLE ROM: WFL  · RLE Strength: Deficits: ankle/knee grossly 4/5, hip grossly 3+/5  · LLE ROM: WFL  · LLE Strength: Deficits: ankle/knee grossly 4/5, hip grossly 3+/5    Functional Mobility:  · Bed Mobility:     · Scooting: contact guard assistance  · Supine to Sit: contact guard assistance and bed rail with increased time  · Transfers:     · Sit to Stand:  contact guard assistance with rolling walker  · Bed to Chair: contact guard assistance and minimum assistance with  rolling walker  using  Step Transfer  · Gait: 35 ft with RW and CGA/min A for seadying and balance. Pt ambulates at significantly decreased krystina with decreased step length and foot clearance bilaterally with mild tremos noted. Cues for upright posture  and forward gaze and to increased foot clearance and step length as able.      Therapeutic Activities and Exercises:  Pt ambulated minimally in kaur then ambulated to bedside chair.   Left up in chair with LEs reclined and educated on BUE/BLE exercises to complete.     AM-PAC 6 CLICK MOBILITY  Total Score:17     Patient left up in chair with all lines intact, call button in reach, RN notified and pt's sister present.    GOALS:   Multidisciplinary Problems     Physical Therapy Goals        Problem: Physical Therapy Goal    Goal Priority Disciplines Outcome Goal Variances Interventions   Physical Therapy Goal     PT, PT/OT Ongoing (interventions implemented as appropriate)     Description:  Goals to be met by: 18     Patient will increase functional independence with mobility by performin. Supine <> sit with Modified [place]  2. Sit to stand transfer with Supervision  3. Bed to chair transfer with Supervision using Rolling Walker  4. Gait  x 150 feet with Supervision using Rolling Walker.   5. Assess stair negotiation when appropriate as pt has 4 YESSICA with no rails.                      History:     Past Medical History:   Diagnosis Date    Chronic kidney disease     Chronic low back pain     Diabetes mellitus, type 2     Hypertension        Past Surgical History:   Procedure Laterality Date    CHOLECYSTECTOMY         Clinical Decision Making:     History  Co-morbidities and personal factors that may impact the plan of care Examination  Body Structures and Functions, activity limitations and participation restrictions that may impact the plan of care Clinical Presentation   Decision Making/ Complexity Score   Co-morbidities:   [x] Time since onset of injury / illness / exacerbation  [] Status of current condition  []Patient's cognitive status and safety concerns    [x] Multiple Medical Problems (see med hx)  Personal Factors:   [] Patient's age  [] Prior Level of function   [] Patient's home  situation (environment and family support)  [] Patient's level of motivation  [] Expected progression of patient      HISTORY:(criteria)    [] 66641 - no personal factors/history    [x] 49624 - has 1-2 personal factor/comorbidity     [] 91967 - has >3 personal factor/comorbidity     Body Regions:  [] Objective examination findings  [] Head     []  Neck  [] Trunk   [x] Upper Extremity  [x] Lower Extremity    Body Systems:  [] For communication ability, affect, cognition, language, and learning style: the assessment of the ability to make needs known, consciousness, orientation (person, place, and time), expected emotional /behavioral responses, and learning preferences (eg, learning barriers, education  needs)  [x] For the neuromuscular system: a general assessment of gross coordinated movement (eg, balance, gait, locomotion, transfers, and transitions) and motor function  (motor control and motor learning)  [x] For the musculoskeletal system: the assessment of gross symmetry, gross range of motion, gross strength, height, and weight  [] For the integumentary system: the assessment of pliability(texture), presence of scar formation, skin color, and skin integrity  [x] For cardiovascular/pulmonary system: the assessment of heart rate, respiratory rate, blood pressure, and edema     Activity limitations:    [] Patient's cognitive status and saf ety concerns          [] Status of current condition      [] Weight bearing restriction  [] Cardiopulmunary Restriction    Participation Restrictions:   [] Goals and goal agreement with the patient     [] Rehab potential (prognosis) and probable outcome      Examination of Body System: (criteria)    [] 18602 - addressing 1-2 elements    [x] 29717 - addressing a total of 3 or more elements     [] 10952 -  Addressing a total of 4 or more elements         Clinical Presentation: (criteria)  Stable - 27069     On examination of body system using standardized tests and measures  patient presents with 3 or more elements from any of the following: body structures and functions, activity limitations, and/or participation restrictions.  Leading to a clinical presentation that is considered stable and/or uncomplicated                              Clinical Decision Making  (Eval Complexity):  Low- 67943     Time Tracking:     PT Received On: 01/13/19  PT Start Time: 1029     PT Stop Time: 1055  PT Total Time (min): 26 min     Billable Minutes: Evaluation 15 and Gait Training 11      Sofia Pineda, PT  01/13/2019

## 2019-01-14 LAB
ANION GAP SERPL CALC-SCNC: 6 MMOL/L
ANION GAP SERPL CALC-SCNC: 9 MMOL/L
APTT BLDCRRT: 28.4 SEC
BASOPHILS # BLD AUTO: 0.03 K/UL
BASOPHILS NFR BLD: 0.4 %
BUN SERPL-MCNC: 45 MG/DL
BUN SERPL-MCNC: 51 MG/DL
CALCIUM SERPL-MCNC: 10.2 MG/DL
CALCIUM SERPL-MCNC: 9.7 MG/DL
CHLORIDE SERPL-SCNC: 104 MMOL/L
CHLORIDE SERPL-SCNC: 105 MMOL/L
CO2 SERPL-SCNC: 22 MMOL/L
CO2 SERPL-SCNC: 23 MMOL/L
CREAT SERPL-MCNC: 3 MG/DL
CREAT SERPL-MCNC: 3.3 MG/DL
DIFFERENTIAL METHOD: ABNORMAL
EOSINOPHIL # BLD AUTO: 0.3 K/UL
EOSINOPHIL NFR BLD: 3.8 %
ERYTHROCYTE [DISTWIDTH] IN BLOOD BY AUTOMATED COUNT: 14.9 %
EST. GFR  (AFRICAN AMERICAN): 16 ML/MIN/1.73 M^2
EST. GFR  (AFRICAN AMERICAN): 18 ML/MIN/1.73 M^2
EST. GFR  (NON AFRICAN AMERICAN): 14 ML/MIN/1.73 M^2
EST. GFR  (NON AFRICAN AMERICAN): 15 ML/MIN/1.73 M^2
GLUCOSE SERPL-MCNC: 160 MG/DL
GLUCOSE SERPL-MCNC: 165 MG/DL
HCT VFR BLD AUTO: 29.4 %
HGB BLD-MCNC: 9.5 G/DL
LYMPHOCYTES # BLD AUTO: 1.7 K/UL
LYMPHOCYTES NFR BLD: 21.9 %
MCH RBC QN AUTO: 28.2 PG
MCHC RBC AUTO-ENTMCNC: 32.3 G/DL
MCV RBC AUTO: 87 FL
MONOCYTES # BLD AUTO: 1 K/UL
MONOCYTES NFR BLD: 12.4 %
NEUTROPHILS # BLD AUTO: 4.9 K/UL
NEUTROPHILS NFR BLD: 61.4 %
PLATELET # BLD AUTO: 271 K/UL
PMV BLD AUTO: 9.6 FL
POCT GLUCOSE: 158 MG/DL (ref 70–110)
POCT GLUCOSE: 185 MG/DL (ref 70–110)
POCT GLUCOSE: 186 MG/DL (ref 70–110)
POTASSIUM SERPL-SCNC: 3.8 MMOL/L
POTASSIUM SERPL-SCNC: 4.8 MMOL/L
RBC # BLD AUTO: 3.37 M/UL
SODIUM SERPL-SCNC: 134 MMOL/L
SODIUM SERPL-SCNC: 135 MMOL/L
WBC # BLD AUTO: 7.9 K/UL

## 2019-01-14 PROCEDURE — 25000003 PHARM REV CODE 250: Performed by: INTERNAL MEDICINE

## 2019-01-14 PROCEDURE — 25000003 PHARM REV CODE 250: Performed by: NURSE PRACTITIONER

## 2019-01-14 PROCEDURE — 99233 SBSQ HOSP IP/OBS HIGH 50: CPT | Mod: ,,, | Performed by: NURSE PRACTITIONER

## 2019-01-14 PROCEDURE — 25000003 PHARM REV CODE 250: Performed by: HOSPITALIST

## 2019-01-14 PROCEDURE — 85025 COMPLETE CBC W/AUTO DIFF WBC: CPT

## 2019-01-14 PROCEDURE — 94761 N-INVAS EAR/PLS OXIMETRY MLT: CPT

## 2019-01-14 PROCEDURE — 97110 THERAPEUTIC EXERCISES: CPT

## 2019-01-14 PROCEDURE — 97535 SELF CARE MNGMENT TRAINING: CPT

## 2019-01-14 PROCEDURE — 80048 BASIC METABOLIC PNL TOTAL CA: CPT

## 2019-01-14 PROCEDURE — 99233 PR SUBSEQUENT HOSPITAL CARE,LEVL III: ICD-10-PCS | Mod: ,,, | Performed by: NURSE PRACTITIONER

## 2019-01-14 PROCEDURE — 21400001 HC TELEMETRY ROOM

## 2019-01-14 PROCEDURE — 97116 GAIT TRAINING THERAPY: CPT

## 2019-01-14 PROCEDURE — 85730 THROMBOPLASTIN TIME PARTIAL: CPT

## 2019-01-14 PROCEDURE — 63600175 PHARM REV CODE 636 W HCPCS: Performed by: STUDENT IN AN ORGANIZED HEALTH CARE EDUCATION/TRAINING PROGRAM

## 2019-01-14 PROCEDURE — 97530 THERAPEUTIC ACTIVITIES: CPT

## 2019-01-14 PROCEDURE — 36415 COLL VENOUS BLD VENIPUNCTURE: CPT

## 2019-01-14 PROCEDURE — A4216 STERILE WATER/SALINE, 10 ML: HCPCS | Performed by: NURSE PRACTITIONER

## 2019-01-14 RX ORDER — HYDRALAZINE HYDROCHLORIDE 20 MG/ML
10 INJECTION INTRAMUSCULAR; INTRAVENOUS ONCE
Status: COMPLETED | OUTPATIENT
Start: 2019-01-14 | End: 2019-01-14

## 2019-01-14 RX ADMIN — METOPROLOL TARTRATE 100 MG: 50 TABLET ORAL at 08:01

## 2019-01-14 RX ADMIN — APIXABAN 10 MG: 5 TABLET, FILM COATED ORAL at 08:01

## 2019-01-14 RX ADMIN — OXYCODONE HYDROCHLORIDE AND ACETAMINOPHEN 1 TABLET: 5; 325 TABLET ORAL at 06:01

## 2019-01-14 RX ADMIN — ASPIRIN 81 MG 81 MG: 81 TABLET ORAL at 08:01

## 2019-01-14 RX ADMIN — AMLODIPINE BESYLATE 5 MG: 5 TABLET ORAL at 08:01

## 2019-01-14 RX ADMIN — HYDRALAZINE HYDROCHLORIDE 10 MG: 10 TABLET ORAL at 05:01

## 2019-01-14 RX ADMIN — Medication 5 ML: at 08:01

## 2019-01-14 RX ADMIN — ACETAMINOPHEN 650 MG: 325 TABLET ORAL at 08:01

## 2019-01-14 RX ADMIN — PANTOPRAZOLE SODIUM 40 MG: 40 TABLET, DELAYED RELEASE ORAL at 08:01

## 2019-01-14 RX ADMIN — OXYCODONE HYDROCHLORIDE AND ACETAMINOPHEN 1 TABLET: 5; 325 TABLET ORAL at 05:01

## 2019-01-14 RX ADMIN — HYDRALAZINE HYDROCHLORIDE 10 MG: 20 INJECTION INTRAMUSCULAR; INTRAVENOUS at 05:01

## 2019-01-14 RX ADMIN — ATORVASTATIN CALCIUM 80 MG: 40 TABLET, FILM COATED ORAL at 08:01

## 2019-01-14 RX ADMIN — HYDRALAZINE HYDROCHLORIDE 10 MG: 10 TABLET ORAL at 01:01

## 2019-01-14 NOTE — PT/OT/SLP PROGRESS
Physical Therapy Treatment    Patient Name:  Brittaney Joseph   MRN:  [id number]    Recommendations:     Discharge Recommendations:  nursing facility, skilled(but pt refusing placement therefore recommending HH PT/OT if pt d/c home)   Discharge Equipment Recommendations: tub bench   Barriers to discharge: None    Assessment:     Brittaney Joseph is a 68 y.o. female admitted with a medical diagnosis of Acute renal failure superimposed on stage 4 chronic kidney disease.  She presents with the following impairments/functional limitations:  weakness, impaired endurance, impaired self care skills, impaired functional mobilty, gait instability, impaired balance, decreased lower extremity function. Pt making good progress with functional mobility and activity tolerance this date. Pt ambulated up to 100 ft with RW and supervision. Recommending SNF but pt stating she does not want to d/c to SNF and wants to go home. If pt d/c home recommending HH PT/OT.    Rehab Prognosis: Good; patient would benefit from acute skilled PT services to address these deficits and reach maximum level of function.    Recent Surgery: Procedure(s) (LRB):  Thrombolysis, PA (N/A)  EKOS, Pumoart/DVT 3 Days Post-Op    Plan:     During this hospitalization, patient to be seen 6 x/week to address the identified rehab impairments via gait training, therapeutic activities, therapeutic exercises and progress toward the following goals:    · Plan of Care Expires:  02/13/19    Subjective     Chief Complaint: none  Patient/Family Comments/goals: pt is agreeable to participate in therapy session  Pain/Comfort:  · Pain Rating 1: 0/10  · Pain Rating Post-Intervention 1: 0/10      Objective:     Communicated with FRANCIS Renteria prior to session.  Patient found up in chair telemetry, peripheral IV  upon PT entry to room.     General Precautions: Standard, fall, diabetic   Orthopedic Precautions:N/A   Braces: N/A     Functional Mobility:  · Transfers:     · Sit to Stand:   supervision with rolling walker  · Gait: 65 ft, 90 ft, and 100 ft with RW and supervision with decreased krystina and step length intially then improved with continued gait and ambulating with slightly increased krystina. Pt on RA and SaO2 was 90-97% throughout.      AM-PAC 6 CLICK MOBILITY  Turning over in bed (including adjusting bedclothes, sheets and blankets)?: 3  Sitting down on and standing up from a chair with arms (e.g., wheelchair, bedside commode, etc.): 3  Moving from lying on back to sitting on the side of the bed?: 3  Moving to and from a bed to a chair (including a wheelchair)?: 3  Need to walk in hospital room?: 3  Climbing 3-5 steps with a railing?: 3  Basic Mobility Total Score: 18       Therapeutic Activities and Exercises:  Pt up in chair when PT entered.  Completed BLE seated exercises x 10 reps: APs, LAQs, hip flexion, and hip abduction.  Pt ambulated 3 bouts with RW and seated rest break between bouts.  Pt on RA throughout and SaO2 was 90-97% throughout.     Patient left up in chair with all lines intact, call button in reach, RN notified and pt's sister present..    GOALS:   Multidisciplinary Problems     Physical Therapy Goals        Problem: Physical Therapy Goal    Goal Priority Disciplines Outcome Goal Variances Interventions   Physical Therapy Goal     PT, PT/OT Ongoing (interventions implemented as appropriate)     Description:  Goals to be met by: 18     Patient will increase functional independence with mobility by performin. Supine <> sit with Modified [place]  2. Sit to stand transfer with Supervision MET 2019  3. Bed to chair transfer with Supervision using Rolling Walker  4. Gait  x 150 feet with Supervision using Rolling Walker.   5. Assess stair negotiation when appropriate as pt has 4 YESSCIA with no rails.                       Time Tracking:     PT Received On: 19  PT Start Time: 1025     PT Stop Time: 1056  PT Total Time (min): 31 min     Billable  Minutes: Gait Training 16 and Therapeutic Exercise 15                   Sofia Pineda, PT  01/14/2019

## 2019-01-14 NOTE — PLAN OF CARE
Problem: Physical Therapy Goal  Goal: Physical Therapy Goal  Goals to be met by: 18     Patient will increase functional independence with mobility by performin. Supine <> sit with Modified [place]  2. Sit to stand transfer with Supervision MET 2019  3. Bed to chair transfer with Supervision using Rolling Walker  4. Gait  x 150 feet with Supervision using Rolling Walker.   5. Assess stair negotiation when appropriate as pt has 4 YESSICA with no rails.     Outcome: Ongoing (interventions implemented as appropriate)  Pt making good progress with functional mobility and activity tolerance this date. Pt ambulated up to 100 ft with RW and supervision. Recommending SNF but pt stating she does not want to d/c to SNF and wants to go home. If pt d/c home recommending HH PT/OT.

## 2019-01-14 NOTE — PLAN OF CARE
Rounded on patient. Discussed SNF- sister at bedside  Discussed SNF benefits  Patient does NOT want SNF. Patient lives at home with niece and has other family members that help her at home. Has all DME. Uses [facility]. Called niece and discussed SNF also- she wants patient to go home with home health    Future Appointments   Date Time Provider Department Center   1/14/2019  1:00 PM [facility] NM1 LIMIT 450 LBS [facility]   1/17/2019  3:40 PM Charles Argueta MD [facility]          01/14/19 1128   Discharge Reassessment   Assessment Type Discharge Planning Reassessment   Provided patient/caregiver education on the expected discharge date and the discharge plan Yes   Discharge Plan A Home;Home with family;Home Health     Micaeal Shaffer RN, CCM, CMSRN  RN Transition Navigator  [phone]

## 2019-01-14 NOTE — PLAN OF CARE
Patient lying in bed, A/O. HOB elevated. No S/S of pain or discomfort noted at this time. NADN. Verbal, able to make needs known. Answers questions appropriately. Tele NSR. No diabetic distress noted. Elevated B/Ps noted this shift- Dr. Mccelndon notified and new order for Hydralazine PRN obtained. Plan of care and medications reviewed with patient and patient's sister- both verbalize understanding. Bed in lowest position, side rails up x 2, call light within reach. Will continue to monitor patient.

## 2019-01-14 NOTE — PT/OT/SLP PROGRESS
Occupational Therapy   Treatment    Name: Brittaney Joseph  MRN: [id number]  Admitting Diagnosis:  Acute renal failure superimposed on stage 4 chronic kidney disease  3 Days Post-Op    Recommendations:     Discharge Recommendations: nursing facility, skilled  Discharge Equipment Recommendations:  tub bench  Barriers to discharge:  None    Assessment:   Patient tolerated activity well, but did demonstrate decreased balance during standing tasks at sink. Patient would benefit from SNF upon D/C but does not seem agreeable, despite maximum encouragement and education.     Brittaney Joseph is a 68 y.o. female with a medical diagnosis of Acute renal failure superimposed on stage 4 chronic kidney disease. Performance deficits affecting function are weakness, impaired endurance, impaired self care skills, impaired functional mobilty, gait instability, impaired balance, decreased lower extremity function, decreased upper extremity function, decreased safety awareness, impaired cardiopulmonary response to activity, impaired coordination.     Rehab Prognosis:  Good; patient would benefit from acute skilled OT services to address these deficits and reach maximum level of function.       Plan:     Patient to be seen 5 x/week to address the above listed problems via self-care/home management, therapeutic exercises, therapeutic activities  · Plan of Care Expires: 02/13/19  · Plan of Care Reviewed with: patient    Subjective     Pain/Comfort:  · Pain Rating 1: 0/10  · Pain Rating Post-Intervention 1: 0/10    Objective:     Communicated with: nurseMyra prior to session.  Patient found HOB elevated with bed alarm, telemetry, peripheral IV upon OT entry to room.    General Precautions: Standard, fall, diabetic   Orthopedic Precautions:N/A   Braces: N/A      Bed Mobility:    · Patient completed Scooting/Bridging with stand by assistance  · Patient completed Supine to Sit with minimum assistance and patient pulling up on therapist's  hand     Functional Mobility/Transfers:  · Patient completed Sit <> Stand Transfer with contact guard assistance  with  hand-held assist   · Patient completed Bed <> Chair Transfer using Step Transfer technique with contact guard assistance with rolling walker  · Patient completed Toilet Transfer Step Transfer technique with contact guard assistance with  hand-held assist and bedside commode     Activities of Daily Living:  · Grooming: set-up    · Toileting: set-up and CGA for balance      [facility] 6 Click ADL: 16    Treatment & Education:  Patient educated on SNF benefits and encouraged to discuss with family. Bed mob as noted above. Patient tolerated G/H tasks standing at sink with 1 slight posterior LOB and required CGA to correct. Patient ambulated to bedside chair using RW. VCs to keep RW on ground and mgmt techniques.     Patient left up in chair with all lines intact, call button in reach, nurse notified and sister presentEducation:      GOALS:   Multidisciplinary Problems     Occupational Therapy Goals        Problem: Occupational Therapy Goal    Goal Priority Disciplines Outcome Interventions   Occupational Therapy Goal     OT, PT/OT Ongoing (interventions implemented as appropriate)    Description:  Goals to be met by: 1/31/2019    Patient will increase functional independence with ADLs by performing:    Feeding with Modified [place].  UE Dressing with Modified [place].  LE Dressing with Modified [place].  Grooming while standing with Modified [place].  Toileting from toilet with Modified [place] for hygiene and clothing management.   Step transfer with Modified [place]  Toilet transfer to toilet with Modified [place].  Increased functional strength to WFL for BUE.  Upper extremity exercise program 10 reps per handout, with independence.                      Time Tracking:     OT Date of Treatment: 01/14/19  OT Start Time: 0930  OT Stop Time: 1011  OT Total Time (min): 41  min    Billable Minutes:Self Care/Home Management 30  Therapeutic Activity 11    ANISH Rdz  1/14/2019

## 2019-01-14 NOTE — ASSESSMENT & PLAN NOTE
-echo with RV strain and Licona's sign  -VQ scan with high probability PE; no CTA due to renal function  -successful EKOS catheter directed TPA on 1/11/2019 with improvement in pain  -EKOS catheter removed after 6-8 hours; transitioned to oral Eliquis and remains on Eliquis  -will continue Eliquis for now; will discuss if alternative anticoagulation needed given creatinine remaining >2.5   -remains on NC at 2LPM; will order ambulatory pulse ox

## 2019-01-14 NOTE — ASSESSMENT & PLAN NOTE
-presented with upper epigastric pain with intermittent episodes continuing  with relief with SL NTG and IV Morphine initially; placed on IV Tridil with no improvement;   -initial troponin 2.300 with trend up to 6.631 with trend down to 5.714-4.080; EKG with nonspecific T wave changes to lateral leads  -initially concerned about ischemic etiology; echo with normal LVEF and findings concerning for PE  -NSTEMI demand related to in setting of PE; treated with EKOS catheter; continue ASA, statin and BB therapy; no need for Plavix due to demand etiology as well as bleeding risk with ASA and Eliquis

## 2019-01-14 NOTE — PLAN OF CARE
01/14/19 1218   Medicare Message   Important Message from Medicare regarding Discharge Appeal Rights Given to patient/caregiver;Explained to patient/caregiver;Signed/date by patient/caregiver   Date IMM was signed 01/14/19   Time IMM was signed 1218

## 2019-01-14 NOTE — SUBJECTIVE & OBJECTIVE
Past Medical History:   Diagnosis Date    Chronic kidney disease     Chronic low back pain     Diabetes mellitus, type 2     Hypertension        Past Surgical History:   Procedure Laterality Date    CHOLECYSTECTOMY         Review of patient's allergies indicates:  No Known Allergies    No current facility-administered medications on file prior to encounter.      Current Outpatient Medications on File Prior to Encounter   Medication Sig    amLODIPine (NORVASC) 10 MG tablet Take 10 mg by mouth nightly.    atorvastatin (LIPITOR) 40 MG tablet Take 40 mg by mouth once daily.    cloNIDine (CATAPRES) 0.2 MG tablet Take 0.2 mg by mouth nightly.    furosemide (LASIX) 40 MG tablet Take 40 mg by mouth once daily.    glipiZIDE (GLUCOTROL) 2.5 MG TR24 Take 1 tablet (2.5 mg total) by mouth daily with breakfast.    losartan (COZAAR) 100 MG tablet Take 100 mg by mouth once daily.    metoprolol tartrate (LOPRESSOR) 100 MG tablet     oxyCODONE-acetaminophen (PERCOCET)  mg per tablet     pantoprazole (PROTONIX) 40 MG tablet     ergocalciferol (VITAMIN D2) 50,000 unit Cap Take 50,000 Units by mouth every Mon, Wed, Fri.     Family History     None        Tobacco Use    Smoking status: Never Smoker   Substance and Sexual Activity    Alcohol use: No     Frequency: Never    Drug use: No    Sexual activity: Not on file     Review of Systems   Constitutional: Negative for fatigue (improving by the day), fever and unexpected weight change.   HENT: Negative for congestion.    Eyes: Negative for photophobia.   Respiratory: Negative for cough, chest tightness, shortness of breath (with exertion ) and wheezing.    Cardiovascular: Negative for chest pain, palpitations and leg swelling.   Gastrointestinal: Negative for abdominal pain, diarrhea, nausea and vomiting.   Genitourinary: Negative for dysuria, flank pain, frequency and hematuria.   Musculoskeletal: Negative for back pain and myalgias.   Skin: Negative for color  change.   Neurological: Negative for dizziness, syncope, weakness, light-headedness and headaches.   Psychiatric/Behavioral: Negative for confusion.     Objective:     Vital Signs (Most Recent):  Temp: 96.9 °F (36.1 °C) (01/14/19 1242)  Pulse: 69 (01/14/19 1242)  Resp: 18 (01/14/19 1242)  BP: (!) 164/77 (01/14/19 1242)  SpO2: 98 % (01/14/19 1228) Vital Signs (24h Range):  Temp:  [96.9 °F (36.1 °C)-98.5 °F (36.9 °C)] 96.9 °F (36.1 °C)  Pulse:  [64-86] 69  Resp:  [18-20] 18  SpO2:  [93 %-98 %] 98 %  BP: (160-190)/() 164/77     Weight: 78.6 kg (173 lb 4.5 oz)  Body mass index is 29.74 kg/m².    Physical Exam   Constitutional: She is oriented to person, place, and time. She appears well-developed and well-nourished.   HENT:   Head: Normocephalic and atraumatic.   Eyes: Conjunctivae are normal. Pupils are equal, round, and reactive to light.   Neck: Normal range of motion. No JVD present.   Cardiovascular: Normal rate, regular rhythm, normal heart sounds and intact distal pulses.   Pulmonary/Chest: Effort normal. No respiratory distress. She has no wheezes.   Abdominal: Soft. Bowel sounds are normal. She exhibits no distension. There is no tenderness. There is no guarding.   Musculoskeletal: Normal range of motion. She exhibits no edema (trace edema to bilateral lower extremities ).   Neurological: She is alert and oriented to person, place, and time. No cranial nerve deficit.   Skin: Skin is warm and dry.   Psychiatric: She has a normal mood and affect. Her behavior is normal.         CRANIAL NERVES     CN III, IV, VI   Pupils are equal, round, and reactive to light.       Significant Labs:   BMP:   Recent Labs   Lab 01/14/19  0838   *   *   K 3.8      CO2 22*   BUN 45*   CREATININE 3.0*   CALCIUM 10.2     CBC:   Recent Labs   Lab 01/14/19  0838   WBC 7.90   HGB 9.5*   HCT 29.4*        Lactic Acid:   No results for input(s): LACTATE in the last 48 hours.  Troponin:   No results for  input(s): TROPONINI in the last 48 hours.    Significant Imaging: I have reviewed all pertinent imaging results/findings within the past 24 hours.

## 2019-01-14 NOTE — PROGRESS NOTES
"[facility] Progress Note    Consulting Physician:oHang Mcclendon DO  Date of Admission:1/10/2019  Date of Consult: 1/11/2019    Reason for Consult:  ARIES on CKD    Subjective:  History of Present Illness:  Brittaney Joseph is a 68 y.o.  female who  has a past medical history of Chronic kidney disease, Chronic low back pain, Diabetes mellitus, type 2, and Hypertension.. The patient presented on 1/10/2019 with a primary complaint of Fatigue ("I was at the kidney doctor for my follow up today and I took sick and got weak and I was SOB" )    Pt states that her breathing is improved today. No CP, N/V/D. Denies dysuria. S/P EKOS catheter treatment for PE.    Medications:  In-Hospital Scheduled Medications:   amLODIPine  5 mg Oral BID    apixaban  10 mg Oral BID    aspirin  81 mg Oral Daily    atorvastatin  80 mg Oral Daily    metoprolol tartrate  100 mg Oral BID    pantoprazole  40 mg Oral Daily     In-Hospital PRN Medications:  sodium chloride 0.9%, acetaminophen, alteplase (ACTIVASE) 10 mg in 0.9% NaCl 100 mL, bivalirudin (ANGIOMAX) IV bolus, bivalirudin, hydrALAZINE, insulin aspart U-100, nitroGLYCERIN, ondansetron, oxyCODONE-acetaminophen, senna-docusate 8.6-50 mg, simethicone, sodium chloride 0.9%  In-Hospital IV Infusion Medications:   sodium chloride 0.9% 50 mL/hr (01/11/19 1629)    alteplase (ACTIVASE) 10 mg in 0.9% NaCl 100 mL 1 mg (01/11/19 1630)    alteplase (ACTIVASE) infusion 1 mg/hr (01/11/19 1745)    alteplase (ACTIVASE) infusion 1 mg/hr (01/11/19 1745)    bivalirudin (ANGIOMAX) IV bolus      bivalirudin      nitroGLYCERIN 120 mcg/min (01/11/19 1400)     Home Medications:  Prior to Admission medications    Medication Sig Start Date End Date Taking? Authorizing Provider   amLODIPine (NORVASC) 10 MG tablet Take 10 mg by mouth nightly.   Yes Historical Provider, MD   atorvastatin (LIPITOR) 40 MG tablet Take 40 mg by mouth once daily.   Yes Historical Provider, MD   cloNIDine (CATAPRES) 0.2 MG " tablet Take 0.2 mg by mouth nightly.   Yes Historical Provider, MD   furosemide (LASIX) 40 MG tablet Take 40 mg by mouth once daily.   Yes Historical Provider, MD   glipiZIDE (GLUCOTROL) 2.5 MG TR24 Take 1 tablet (2.5 mg total) by mouth daily with breakfast. 18 Yes Melani Corrales MD   losartan (COZAAR) 100 MG tablet Take 100 mg by mouth once daily.   Yes Historical Provider, MD   metoprolol tartrate (LOPRESSOR) 100 MG tablet  12/3/18  Yes Historical Provider, MD   oxyCODONE-acetaminophen (PERCOCET)  mg per tablet  18  Yes Historical Provider, MD   pantoprazole (PROTONIX) 40 MG tablet  18  Yes Historical Provider, MD   ergocalciferol (VITAMIN D2) 50,000 unit Cap Take 50,000 Units by mouth every Mon, Wed, Fri.    Historical Provider, MD       Review of Systems:  General - Denies fevers and chills.  Head - Denies dizziness, lightheadedness, headache  Throat - Denies dysphagia and sore throat  Pulmonary - Denies shortness of breath and cough  Cardiovascular- Denies chest pain and palpitations  Gastrointestonal - Denies nausea, vomiting, diarrhea, constipation  Genitourinary- Denies changes in urination, dysuria, and hematuria  Skin - Denies rashes, skin changes, and itching  Muskuloskeletal - Denies myalgias and arthralgias  Hematologic/Lymphatic - Denies bleeding and bruising.    Objective:  Last 24 Hour Vital Signs:  BP  Min: 160/72  Max: 190/106  Temp  Av.6 °F (36.4 °C)  Min: 96.9 °F (36.1 °C)  Max: 98.5 °F (36.9 °C)  Pulse  Av.7  Min: 64  Max: 86  Resp  Av.3  Min: 18  Max: 20  SpO2  Av.4 %  Min: 93 %  Max: 98 %  I/O last 3 completed shifts:  In: 2572.5 [P.O.:430; I.V.:2142.5]  Out: 1778 [Urine:]    Physical Examination:  General - alert and cooperative  Head - Normocephalic and atraumatic  Eyes - Normal lids and lashes, anicteric sclera  Neck - No JVD, supple  Lungs - Clear to auscultation bilaterally  Heart - Regular rate and rhythm  Abd - Normoactive  bowel sounds, nontender, non distended.  Ext - No clubbing, cyanosis, or edema  Pulse - 2+ and symmetric  Skin - Warm and dry  Psych - normal mood and affect      Laboratory Results:  Most Recent Data:  CBC:   Lab Results   Component Value Date    WBC 7.90 01/14/2019    HGB 9.5 (L) 01/14/2019    HCT 29.4 (L) 01/14/2019     01/14/2019    MCV 87 01/14/2019    RDW 14.9 (H) 01/14/2019       BMP:   Lab Results   Component Value Date     (L) 01/14/2019    K 3.8 01/14/2019     01/14/2019    CO2 22 (L) 01/14/2019    BUN 45 (H) 01/14/2019    CREATININE 3.0 (H) 01/14/2019     (H) 01/14/2019    CALCIUM 10.2 01/14/2019    MG 1.7 12/20/2018    PHOS 4.2 12/20/2018       LFTs:   Lab Results   Component Value Date    PROT 9.2 (H) 01/10/2019    ALBUMIN 4.4 01/10/2019    BILITOT 0.6 01/10/2019    AST 26 01/10/2019    ALKPHOS 143 (H) 01/10/2019    ALT 20 01/10/2019       Coags:   Lab Results   Component Value Date    INR 1.1 01/10/2019       FLP:   Lab Results   Component Value Date    CHOL 180 01/11/2019    HDL 27 (L) 01/11/2019    LDLCALC 126.0 01/11/2019    TRIG 135 01/11/2019    CHOLHDL 15.0 (L) 01/11/2019       DM:   Lab Results   Component Value Date    HGBA1C 6.4 (H) 12/24/2018    HGBA1C 6.8 (H) 12/19/2018    LDLCALC 126.0 01/11/2019       Thyroid:   Lab Results   Component Value Date    TSH 1.497 12/18/2018       Anemia:   Lab Results   Component Value Date    IRON 37 12/19/2018    TIBC 277 12/19/2018    FERRITIN 94 12/19/2018    JHDPCVWH15 864 12/19/2018    FOLATE 5.4 12/19/2018       Cardiac:   Lab Results   Component Value Date    TROPONINI 4.080 (H) 01/11/2019     (H) 12/18/2018       Urinalysis:  No results for input(s): COLORU, CLARITYU, SPECGRAV, PHUR, PROTEINUA, GLUCOSEU, BLOODU, WBCU, RBCU, BACTERIA, MUCUS in the last 24 hours.    Invalid input(s):  BILIRUBINCON    Trended Lab Data:  Recent Labs   Lab 01/10/19  1315  01/11/19  0555  01/12/19  1349 01/13/19  2331 01/14/19  0838   WBC  10.83  --  9.85  --  9.73  --  7.90   HGB 12.8  --  11.4*  --  10.6*  --  9.5*   HCT 39.3  --  35.2*  --  33.0*  --  29.4*     --  259  --  235  --  271   MCV 86  --  87  --  88  --  87   RDW 14.8*  --  14.8*  --  15.2*  --  14.9*   *   < > 135*   < > 135* 134* 135*   K 6.2*   < > 5.5*   < > 4.4 4.8 3.8   CL 99   < > 105   < > 105 105 104   CO2 18*   < > 14*   < > 19* 23 22*   BUN 76*   < > 71*   < > 65* 51* 45*   CREATININE 4.87*   < > 4.4*   < > 3.5* 3.3* 3.0*   ANIONGAP 17*   < > 16   < > 11 6* 9   *   < > 129*   < > 117* 165* 160*   PROT 9.2*  --   --   --   --   --   --    ALBUMIN 4.4  --   --   --   --   --   --    BILITOT 0.6  --   --   --   --   --   --    AST 26  --   --   --   --   --   --    ALKPHOS 143*  --   --   --   --   --   --    ALT 20  --   --   --   --   --   --     < > = values in this interval not displayed.       Trended Cardiac Data:  Recent Labs   Lab 01/10/19  2014 01/11/19  0228 01/11/19  0728   TROPONINI 6.631* 5.714* 4.080*       Microbiology Data:  Microbiology Results (last 7 days)     Procedure Component Value Units Date/Time    Blood culture [[id number]] Collected:  01/10/19 1518    Order Status:  Completed Specimen:  Blood from Wrist, Left Updated:  01/13/19 2212     Blood Culture, Routine No Growth to date     Blood Culture, Routine No Growth to date     Blood Culture, Routine No Growth to date     Blood Culture, Routine No Growth to date    Blood culture [[id number]] Collected:  01/10/19 1510    Order Status:  Completed Specimen:  Blood from Wrist, Right Updated:  01/13/19 2212     Blood Culture, Routine No Growth to date     Blood Culture, Routine No Growth to date     Blood Culture, Routine No Growth to date     Blood Culture, Routine No Growth to date          Other Results:  Radiology:  X-ray Abdomen Ap 1 View (kub)    Result Date: 12/18/2018  EXAMINATION: XR ABDOMEN AP 1 VIEW CLINICAL HISTORY: Unspecified abdominal pain TECHNIQUE: AP View(s) of the abdomen  was performed. COMPARISON: None FINDINGS: Monitoring leads overlie the lower thorax and abdomen.  Scattered air is seen in nondilated loops of small and large bowel. No central small bowel air fluid levels are appreciated.  No definite large volume of free intraperitoneal air allowing for technique.  Surgical clips project over the right upper abdomen.  There are degenerative changes of the visualized thoracolumbar spine as well as the bilateral sacroiliac joints and hips.  There is platelike atelectasis or scarring at the right lung base.  No large pleural effusion appreciated.     Nonobstructive bowel gas pattern. Electronically signed by: Thuan Easton MD Date:    12/18/2018 Time:    22:16    Us Abdomen Complete    Result Date: 1/11/2019  EXAMINATION: US ABDOMEN COMPLETE CLINICAL HISTORY: weight loss , abd pain; TECHNIQUE: Complete abdominal ultrasound (including pancreas, liver, gallbladder, common bile duct, spleen, aorta, IVC, and kidneys) was performed. COMPARISON: None FINDINGS: Liver: Normal in size, measuring 12.2 cm. Homogeneous echotexture.  No focal hepatic lesions. Gallbladder: Surgically removed Biliary system: The common duct is not dilated, measuring 5.3 mm.  No intrahepatic ductal dilatation. Spleen: Normal in size with a homogeneous echotexture, measuring 6.9 cm. Pancreas: The visualized portions of pancreas appear normal. Right kidney: Normal in size with no hydronephrosis, measuring 10.0 cm. Left kidney:  Normal in size with no hydronephrosis, measuring 9.6 cm. There is renal cortical thinning as described on prior renal ultrasound 12/19/2018. Aorta: No aneurysm. Inferior vena cava: Normal in appearance. Miscellaneous: No ascites.     No significant abnormality identified to account for patient's weight loss and abdominal pain. Status post cholecystectomy. Electronically signed by: Henrique Dickey MD Date:    01/11/2019 Time:    13:22    X-ray Chest Ap Portable    Result Date:  1/10/2019  EXAMINATION: XR CHEST AP PORTABLE CLINICAL HISTORY: sob; COMPARISON: December FINDINGS: The heart size is normal with mild tortuosity of the thoracic aorta.  Patient is rotated.  Lung fields are clear.     No acute cardiopulmonary abnormality suggested. Electronically signed by: Cayden Olmos MD Date:    01/10/2019 Time:    15:33    X-ray Chest Ap Portable    Result Date: 12/18/2018  EXAMINATION: XR CHEST AP PORTABLE CLINICAL HISTORY: weakness; TECHNIQUE: Single frontal view of the chest was performed. COMPARISON: None FINDINGS: Cardiac silhouette is normal in size.  There is elevation of the right hemidiaphragm.  Lungs are expanded.  Minimal scarring or plate atelectasis is seen within the right lower lobe.  No evidence of focal consolidative process, pneumothorax, or significant effusion.  No acute osseous abnormality identified.  Degenerative changes are noted involving the bilateral AC joints.     Minimal right basilar scarring or atelectasis, otherwise no acute cardiopulmonary process identified. Electronically signed by: Raven Barrientos MD Date:    12/18/2018 Time:    21:11    Us Kidney    Result Date: 12/19/2018  EXAMINATION: US KIDNEY CLINICAL HISTORY: ARIES; Hypertensive emergency TECHNIQUE: Ultrasound of the kidneys was performed including color flow and Doppler evaluation of the kidneys. COMPARISON: None. FINDINGS: The kidneys are normal in length.  The right kidney measures 11.1 cm, left kidney measures 10.7 cm.  The arterial resistive indices are within normal limits.  Renal cortical thinning bilaterally.  No renal masses or hydronephrosis.  Bladder grossly unremarkable.     Renal cortical thinning.  No hydronephrosis or renal masses. Electronically signed by: Fantasma Medina MD Date:    12/19/2018 Time:    10:54    Nm Lung Ventilation Perfusion Imaging    Result Date: 1/11/2019  EXAMINATION: NM LUNG VENTILATION AND PERFUSION IMAGING CLINICAL HISTORY: Chest pain, acute, PE suspected, intermed  prob, negative D-dimer; TECHNIQUE: 15 mCi of Xenon were placed in the nebulizer. Following the inhalation Xenon in aerosol and the subsequent IV administration of 5 mCi of Tc-99m-MAA, multiple images of the thorax were obtained in various projections. COMPARISON: Radiograph 01/10/2019. FINDINGS: Multiple segmental mismatch perfusion defects identified throughout the right and left lungs.  No corresponding radiographic abnormality.     This represents a high probability of pulmonary embolism. This report was flagged in Epic as abnormal. Electronically signed by: Dariel Crystal MD Date:    01/11/2019 Time:    15:02      Assessment and Plan:    1. ARIES stage 3 on CKD stage 4 - Cr appears to be back to baseline. Follow up with nephrology out pt as previously scheduled.    Thank you for allowing us to participate in the care of this patient. Please contact me at [phone] if you have any questions regarding this consult.    Brannon Watts MD  [facility] Internal Medicine PGY-V  Pager number: [phone]  Cell: [phone]

## 2019-01-14 NOTE — SUBJECTIVE & OBJECTIVE
Review of Systems   Constitution: Negative for chills, decreased appetite, diaphoresis, fever and weakness.   Cardiovascular: Positive for dyspnea on exertion. Negative for chest pain, claudication, cyanosis, irregular heartbeat, leg swelling, near-syncope, orthopnea, palpitations, paroxysmal nocturnal dyspnea and syncope.   Respiratory: Negative for cough, hemoptysis, shortness of breath and wheezing.    Gastrointestinal: Negative for bloating, abdominal pain, constipation, diarrhea, melena, nausea and vomiting.   Neurological: Negative for dizziness.     Objective:     Vital Signs (Most Recent):  Temp: 97.6 °F (36.4 °C) (01/14/19 0734)  Pulse: 86 (01/14/19 0734)  Resp: 18 (01/14/19 0734)  BP: (!) 160/72 (01/14/19 0734)  SpO2: (!) 93 % (01/14/19 0256) Vital Signs (24h Range):  Temp:  [97 °F (36.1 °C)-98.5 °F (36.9 °C)] 97.6 °F (36.4 °C)  Pulse:  [64-86] 86  Resp:  [18-20] 18  SpO2:  [93 %-97 %] 93 %  BP: (149-190)/() 160/72     Weight: 78.6 kg (173 lb 4.5 oz)  Body mass index is 29.74 kg/m².     SpO2: (!) 93 %  O2 Device (Oxygen Therapy): room air      Intake/Output Summary (Last 24 hours) at 1/14/2019 0942  Last data filed at 1/14/2019 0600  Gross per 24 hour   Intake 305 ml   Output 1000 ml   Net -695 ml       Lines/Drains/Airways     Peripheral Intravenous Line                 Peripheral IV - Single Lumen 01/10/19 1310 Right Wrist 3 days         Peripheral IV - Single Lumen 01/10/19 1514 Left Wrist 3 days                Physical Exam   Constitutional: She is oriented to person, place, and time. She appears well-developed and well-nourished. No distress.   Cardiovascular: Normal rate and regular rhythm. Exam reveals no gallop.   No murmur heard.  Pulmonary/Chest: Effort normal and breath sounds normal. No respiratory distress. She has no wheezes.   Abdominal: Soft. Bowel sounds are normal. She exhibits no distension. There is no tenderness.   Neurological: She is alert and oriented to person, place, and  time.   Skin: Skin is warm and dry.       Significant Labs:     Recent Labs   Lab 01/14/19  0838   *   K 3.8      CO2 22*   BUN 45*   CREATININE 3.0*     Recent Labs   Lab 01/14/19  0838   WBC 7.90   RBC 3.37*   HGB 9.5*   HCT 29.4*      MCV 87   MCH 28.2   MCHC 32.3       Significant Imaging:   TTE 1/11/2019    · Normal left ventricular systolic function. The estimated ejection fraction is 65%  · Grade I (mild) left ventricular diastolic dysfunction consistent with impaired relaxation.  · Concentric left ventricular remodeling.  · Thickened aortic and mitral valve leaflets appear rheumatic  · Severe right ventricular enlargement with Positive Licona's sign - Right heart strain - Consider Pulmonary Embolism  · Normal right ventricular systolic function.

## 2019-01-14 NOTE — ASSESSMENT & PLAN NOTE
-SBP 140s-190s overnight  -on Metoprolol BID along with Norvasc  -recommend up titration of Norvasc to 10mg po daily; if BP remains elevated recommend changing to Coreg vs addition of Hydralazine and Imdur; no ACEI/ARB due to renal function

## 2019-01-14 NOTE — PLAN OF CARE
Problem: Occupational Therapy Goal  Goal: Occupational Therapy Goal  Goals to be met by: 1/31/2019    Patient will increase functional independence with ADLs by performing:    Feeding with Modified [place].  UE Dressing with Modified [place].  LE Dressing with Modified [place].  Grooming while standing with Modified [place].  Toileting from toilet with Modified [place] for hygiene and clothing management.   Step transfer with Modified [place]  Toilet transfer to toilet with Modified [place].  Increased functional strength to WFL for BUE.  Upper extremity exercise program 10 reps per handout, with independence.     Outcome: Ongoing (interventions implemented as appropriate)  Patient tolerated activity well, but did demonstrate decreased balance during standing tasks at sink. Patient would benefit from SNF upon D/C but does not seem agreeable, despite maximum encouragement and education.

## 2019-01-14 NOTE — ASSESSMENT & PLAN NOTE
-repeat echo with normal LVEF  -BNP 1200 with no acute volume overload on CXR; remains euvolemic on exam   -continue BB and CCB; adjustment of medication regimen as detailed for better afterload reduction

## 2019-01-14 NOTE — ASSESSMENT & PLAN NOTE
-related to PE  -chest pain resolved  -no concern for ACS once echocardiogram obtained  -medical management as detailed previously

## 2019-01-14 NOTE — PROGRESS NOTES
[facility] Medicine  Progress Note    Patient Name: Brittaney Joseph  MRN: [id number]  Patient Class: IP- Inpatient   Admission Date: 1/10/2019  Length of Stay: 4 days  Attending Physician: Hoang Mcclendon DO  Primary Care Provider: Charles Argueta MD        Subjective:     Principal Problem:Acute renal failure superimposed on stage 4 chronic kidney disease    HPI:  Brittaney Joseph is a 67 yo  female with HTN, HLD, chronic kidney disease stage 4, Type 2 Diabetes Mellitus, diastolic heart failure and chronic back pain. Her primary care physician is Dr. Charles Argueta. Her nephrologist is Dr. Mcdaniel. She lives with her niece in [place], [place]. She presented to [facility] 1/10/19 with complaints of generalized weakness, fatigue, dizziness, SOB and nausea/vomiting. The patient reports acute onset of symptoms while at nephrologist appointment prior to arrival. She denies chest pain, palpitations, unilateral weakness and syncopal events. Initial labs remarkable for K (6.2), BUN/CR (76/4.87) increased from previous BUN/CR (56/2.9 on 12/26/18), anion gap (17),   lactic (3.1), Troponin (2.3) and BNP (1200). EKG shows sinus tachycardia with non-specific T wave abnormality. VSS. CXR shows no acute abnormality. Pt received insulin, bicarb and kayexalate for management of hyperkalemia and acidosis. She also received 1 L NS bolus. Pt admitted to [facility] medicine for further evaluation.     Hospital Course:  The pt seen at bedside, she is complaining of chest pain, on an off during the night. Cards evaluated the pt, given her positive cp, elevated trop, and acute renal failure, decision is to move pt to icu.starte tridil drip, asa, plavx, renal input prior to a prcedure with dye. Etc  Pt updated, agrees to plan  1/12 pt is S/P EHOS for PE. She was in ICU post procedure, now being transferred to [facility] ed. She is doing much better. She is doing well. No mre CP or SOB as  prior to the procedure. Sister at bedside as well.  PT/OT in a day or two  1/13 pt improving, working with PT/OT, more likely SNF candidate, cr down to 3.5 from 4.4 bun down to 65 from 74. No cp with activity or rest  1/14 pt is doing ok, no CP or SOB. Bun cr is improving, down to 3.3 from as high as 4.4. P/OT recommended SNF, will consult the  for snf placement, after the rounds, pt were more interested in home health. willdc honme with hh once bun/cr is a little bit better.    Past Medical History:   Diagnosis Date    Chronic kidney disease     Chronic low back pain     Diabetes mellitus, type 2     Hypertension        Past Surgical History:   Procedure Laterality Date    CHOLECYSTECTOMY         Review of patient's allergies indicates:  No Known Allergies    No current facility-administered medications on file prior to encounter.      Current Outpatient Medications on File Prior to Encounter   Medication Sig    amLODIPine (NORVASC) 10 MG tablet Take 10 mg by mouth nightly.    atorvastatin (LIPITOR) 40 MG tablet Take 40 mg by mouth once daily.    cloNIDine (CATAPRES) 0.2 MG tablet Take 0.2 mg by mouth nightly.    furosemide (LASIX) 40 MG tablet Take 40 mg by mouth once daily.    glipiZIDE (GLUCOTROL) 2.5 MG TR24 Take 1 tablet (2.5 mg total) by mouth daily with breakfast.    losartan (COZAAR) 100 MG tablet Take 100 mg by mouth once daily.    metoprolol tartrate (LOPRESSOR) 100 MG tablet     oxyCODONE-acetaminophen (PERCOCET)  mg per tablet     pantoprazole (PROTONIX) 40 MG tablet     ergocalciferol (VITAMIN D2) 50,000 unit Cap Take 50,000 Units by mouth every Mon, Wed, Fri.     Family History     None        Tobacco Use    Smoking status: Never Smoker   Substance and Sexual Activity    Alcohol use: No     Frequency: Never    Drug use: No    Sexual activity: Not on file     Review of Systems   Constitutional: Negative for fatigue (improving by the day), fever and unexpected weight change.    HENT: Negative for congestion.    Eyes: Negative for photophobia.   Respiratory: Negative for cough, chest tightness, shortness of breath (with exertion ) and wheezing.    Cardiovascular: Negative for chest pain, palpitations and leg swelling.   Gastrointestinal: Negative for abdominal pain, diarrhea, nausea and vomiting.   Genitourinary: Negative for dysuria, flank pain, frequency and hematuria.   Musculoskeletal: Negative for back pain and myalgias.   Skin: Negative for color change.   Neurological: Negative for dizziness, syncope, weakness, light-headedness and headaches.   Psychiatric/Behavioral: Negative for confusion.     Objective:     Vital Signs (Most Recent):  Temp: 96.9 °F (36.1 °C) (01/14/19 1242)  Pulse: 69 (01/14/19 1242)  Resp: 18 (01/14/19 1242)  BP: (!) 164/77 (01/14/19 1242)  SpO2: 98 % (01/14/19 1228) Vital Signs (24h Range):  Temp:  [96.9 °F (36.1 °C)-98.5 °F (36.9 °C)] 96.9 °F (36.1 °C)  Pulse:  [64-86] 69  Resp:  [18-20] 18  SpO2:  [93 %-98 %] 98 %  BP: (160-190)/() 164/77     Weight: 78.6 kg (173 lb 4.5 oz)  Body mass index is 29.74 kg/m².    Physical Exam   Constitutional: She is oriented to person, place, and time. She appears well-developed and well-nourished.   HENT:   Head: Normocephalic and atraumatic.   Eyes: Conjunctivae are normal. Pupils are equal, round, and reactive to light.   Neck: Normal range of motion. No JVD present.   Cardiovascular: Normal rate, regular rhythm, normal heart sounds and intact distal pulses.   Pulmonary/Chest: Effort normal. No respiratory distress. She has no wheezes.   Abdominal: Soft. Bowel sounds are normal. She exhibits no distension. There is no tenderness. There is no guarding.   Musculoskeletal: Normal range of motion. She exhibits no edema (trace edema to bilateral lower extremities ).   Neurological: She is alert and oriented to person, place, and time. No cranial nerve deficit.   Skin: Skin is warm and dry.   Psychiatric: She has a normal  mood and affect. Her behavior is normal.         CRANIAL NERVES     CN III, IV, VI   Pupils are equal, round, and reactive to light.       Significant Labs:   BMP:   Recent Labs   Lab 01/14/19  0838   *   *   K 3.8      CO2 22*   BUN 45*   CREATININE 3.0*   CALCIUM 10.2     CBC:   Recent Labs   Lab 01/14/19  0838   WBC 7.90   HGB 9.5*   HCT 29.4*        Lactic Acid:   No results for input(s): LACTATE in the last 48 hours.  Troponin:   No results for input(s): TROPONINI in the last 48 hours.    Significant Imaging: I have reviewed all pertinent imaging results/findings within the past 24 hours.    Assessment/Plan:      * Acute renal failure superimposed on stage 4 chronic kidney disease    Hyperkalemia   Hypercalcemia   Metabolic Acidosis     BUN/CR (76/4.87) on admission with anion gap (17) and K (6.2). BUN/CR previously (56/2.9) on 12/26/18.  Pt received insulin, bicarb, kayexalate and IVF. Repeat K (5.4). No acute changes noted on telemetry. Pt reports no changes to urinary output.     -consult nephrology   -avoid nephrotoxic meds, renal dose meds   -gentle hydration   -repeat am BMP   -strict intake/output and daily weights     Improving see notes from today 's HPI       Pulmonary embolism with acute cor pulmonale    tx'ed with kb's  Now on eliquis.  Appreciate cards input       Chest pain    Trop 6 to 4   On nitro PRN.  Cards is following.  Could be aypical NSTEMI given her gender and dx with DMII  Transfer to icu.  tridil drip per cards  Asa,bb, statin, o2,,orphine for pain    Resolved with tx of PE       Essential hypertension    HLD     --151   -per chart review patient taking metoprolol, losartan, clonidine, amlodipine and lasix daily   -hold ARB and diuretics for now   -continue BB   -verify all home meds with family in am      Type 2 diabetes mellitus with hypoglycemia, without long-term current use of insulin    Controlled, A1C 6.4 12/24/18     -per chart review  glipizide discontinued due to episodic hypoglycemic events   -monitor blood sugars AC &HS, diabetic diet, low dose SSI            Chronic diastolic heart failure    Echo 12/19/18 shows LVEF 65% with diastolic dysfunction and left ventricular remodeling consistent with hypertensive heart disease. No evidence of volume overload on exam.   -TTE in am      NSTEMI (non-ST elevated myocardial infarction)    Initial troponin 2.3, trended up to 6.631. Pt denies chest pain on exam. No acute events noted on telemetry. Cardiology consulted.  Dr. Cuevas recommends full dose ASA, loading dose plavix, continue BB,  Nitro prn chest pain and supplemental O2. Pt started on Heparin ACS protocol.     -trend cardiac enzymes   -monitor tele  -check lipid panel   -cardiology to see patient in am     Resolving with the tx of pe       Metabolic acidosis, normal anion gap (NAG)    conult nephrology         VTE Risk Mitigation (From admission, onward)        Ordered     apixaban tablet 10 mg  2 times daily      01/11/19 1731     Place sequential compression device  Until discontinued      01/10/19 1958     IP VTE LOW RISK PATIENT  Once      01/10/19 1958              Hoang Mcclendon DO  Department of Hospital Medicine   [facility]

## 2019-01-14 NOTE — ASSESSMENT & PLAN NOTE
-creatinine 4.45 upon admission with trend down throughout weekend; down to 3.3 this AM  -previous baseline during admission last month 2.9-3.6; uncertain of true baseline  -given  IVF per primary team with Nephrology consulted  -will continue to hold nephrotoxic agents for now

## 2019-01-14 NOTE — PLAN OF CARE
Problem: Adult Inpatient Plan of Care  Goal: Patient-Specific Goal (Individualization)  Patient lying in bed, resting quietly.Tele NSR. Iv intact . Patient ambulated to the bedside commode with x's 1 assistance. SCD's placed on patient as per md orders.  Plan of care and medications reviewed with patient and patient's sister- both verbalize understanding. Bed in lowest position, side rails up x 2, call light within reach. Will continue to monitor patient.

## 2019-01-14 NOTE — PROGRESS NOTES
[facility]  Cardiology  Progress Note    Patient Name: Brittaney Joseph  MRN: [id number]  Admission Date: 1/10/2019  Hospital Length of Stay: 4 days  Code Status: Full Code   Attending Physician: Hoang Mcclendon DO   Primary Care Physician: Charles Argueta MD  Expected Discharge Date:   Principal Problem:Acute renal failure superimposed on stage 4 chronic kidney disease    Subjective:     Hospital Course:   1/10/2019 Presented to the ER with complaints of epigastric pain. Initial labs with K 6.2, BUN/CR 76/4.87)increased from previous BUN/CR 56/2.9 on 12/26/18 ?baseline, lactic acid 3.1, Troponin 2.3 and BNP 1200. Initial EKG with ST normal axis and nonspecific T wave abnormality to lateral leads CXR with no acute findings. Admitted to [facility] with ARF and NSTEMI. Received insulin, bicarb and kayexalate for management of hyperkalemia and acidosis as well as 1 liter NS bolus with continuous drip at 75cc/hr.   1/11/2019 Continued intermittent chest pain overnight with relief with SL NTG and IV Morphine. Repeat troponin with trend up to 6.631 with slight trend down this AM to 5.714-4.080. Repeat EKG with no acute findings. SBP 120s-150s overnight with HR 110s. BMP with K+ 5.5 BUN 71 creatinine 4.4. Transferred to ICU for IV Tridil drip. Echocardiogram with normal LVEF, RV strain and evidence of Licona's sign concerning for PE. VQ scan done with high probability of PE. Chest pain etiology felt to be related to demand etiology in setting of PE rather than ACS. Remained NPO for EKOS catheter directed TPA today   1/12: s/p EKOS cath placement for b/l PE that was confirmed by VQ.   ekos catheters removed, pt now transitioned to PO eliquis. Notes to be feel better today. RIJ site is clean and intact. No SOB  1/13 transferred to floor. Doing well. No acute issues. Encouraged to walk around room   1/14/2019 HR and BP stable. Remains on 2LPM NC. Reports resolution of chest pain. Complains of SOB  with exertion. No arrhythmias noted on telemetry. Plan for BLE venous ultrasound today. Continue Eliquis. Will try to wean off O2. Need ambulatory pulse ox evaluation             Review of Systems   Constitution: Negative for chills, decreased appetite, diaphoresis, fever and weakness.   Cardiovascular: Positive for dyspnea on exertion. Negative for chest pain, claudication, cyanosis, irregular heartbeat, leg swelling, near-syncope, orthopnea, palpitations, paroxysmal nocturnal dyspnea and syncope.   Respiratory: Negative for cough, hemoptysis, shortness of breath and wheezing.    Gastrointestinal: Negative for bloating, abdominal pain, constipation, diarrhea, melena, nausea and vomiting.   Neurological: Negative for dizziness.     Objective:     Vital Signs (Most Recent):  Temp: 97.6 °F (36.4 °C) (01/14/19 0734)  Pulse: 86 (01/14/19 0734)  Resp: 18 (01/14/19 0734)  BP: (!) 160/72 (01/14/19 0734)  SpO2: (!) 93 % (01/14/19 0256) Vital Signs (24h Range):  Temp:  [97 °F (36.1 °C)-98.5 °F (36.9 °C)] 97.6 °F (36.4 °C)  Pulse:  [64-86] 86  Resp:  [18-20] 18  SpO2:  [93 %-97 %] 93 %  BP: (149-190)/() 160/72     Weight: 78.6 kg (173 lb 4.5 oz)  Body mass index is 29.74 kg/m².     SpO2: (!) 93 %  O2 Device (Oxygen Therapy): room air      Intake/Output Summary (Last 24 hours) at 1/14/2019 0942  Last data filed at 1/14/2019 0600  Gross per 24 hour   Intake 305 ml   Output 1000 ml   Net -695 ml       Lines/Drains/Airways     Peripheral Intravenous Line                 Peripheral IV - Single Lumen 01/10/19 1310 Right Wrist 3 days         Peripheral IV - Single Lumen 01/10/19 1514 Left Wrist 3 days                Physical Exam   Constitutional: She is oriented to person, place, and time. She appears well-developed and well-nourished. No distress.   Cardiovascular: Normal rate and regular rhythm. Exam reveals no gallop.   No murmur heard.  Pulmonary/Chest: Effort normal and breath sounds normal. No respiratory distress. She  has no wheezes.   Abdominal: Soft. Bowel sounds are normal. She exhibits no distension. There is no tenderness.   Neurological: She is alert and oriented to person, place, and time.   Skin: Skin is warm and dry.       Significant Labs:     Recent Labs   Lab 01/14/19  0838   *   K 3.8      CO2 22*   BUN 45*   CREATININE 3.0*     Recent Labs   Lab 01/14/19  0838   WBC 7.90   RBC 3.37*   HGB 9.5*   HCT 29.4*      MCV 87   MCH 28.2   MCHC 32.3       Significant Imaging:   TTE 1/11/2019    · Normal left ventricular systolic function. The estimated ejection fraction is 65%  · Grade I (mild) left ventricular diastolic dysfunction consistent with impaired relaxation.  · Concentric left ventricular remodeling.  · Thickened aortic and mitral valve leaflets appear rheumatic  · Severe right ventricular enlargement with Positive Licona's sign - Right heart strain - Consider Pulmonary Embolism  · Normal right ventricular systolic function.    Assessment and Plan:     Brief HPI: Seen on AM NP rounds with family member at the bedside. Reports chest pain completely resolved. Continues to complain of SOB with exertion. Reviewed cardiac POC with patient and family member as detailed below-verbalized understanding and agrees with POC     * Acute renal failure superimposed on stage 4 chronic kidney disease    -creatinine 4.45 upon admission with trend down throughout weekend; down to 3.3 this AM  -previous baseline during admission last month 2.9-3.6; uncertain of true baseline  -given  IVF per primary team with Nephrology consulted  -will continue to hold nephrotoxic agents for now      Pulmonary embolism with acute cor pulmonale    -echo with RV strain and Licona's sign  -VQ scan with high probability PE; no CTA due to renal function  -successful EKOS catheter directed TPA on 1/11/2019 with improvement in pain  -EKOS catheter removed after 6-8 hours; transitioned to oral Eliquis and remains on Eliquis  -will  continue Eliquis for now; will discuss if alternative anticoagulation needed given creatinine remaining >2.5   -remains on NC at 2LPM; will order ambulatory pulse ox      Chest pain    -related to PE  -chest pain resolved  -no concern for ACS once echocardiogram obtained  -medical management as detailed previously      NSTEMI (non-ST elevated myocardial infarction)    -presented with upper epigastric pain with intermittent episodes continuing  with relief with SL NTG and IV Morphine initially; placed on IV Tridil with no improvement;   -initial troponin 2.300 with trend up to 6.631 with trend down to 5.714-4.080; EKG with nonspecific T wave changes to lateral leads  -initially concerned about ischemic etiology; echo with normal LVEF and findings concerning for PE  -NSTEMI demand related to in setting of PE; treated with EKOS catheter; continue ASA, statin and BB therapy; no need for Plavix due to demand etiology as well as bleeding risk with ASA and Eliquis      Chronic diastolic heart failure    -repeat echo with normal LVEF  -BNP 1200 with no acute volume overload on CXR; remains euvolemic on exam   -continue BB and CCB; adjustment of medication regimen as detailed for better afterload reduction      Hyperkalemia    -K+ 6.2 upon admisson; received insulin, D50 and Kaexylate  -repeat K+ 5.4-5.5  -further management per primary team and Nephrology      Hyperlipidemia associated with type 2 diabetes mellitus    -continue high intensity statin therapy  -FLP with      Essential hypertension    -SBP 140s-190s overnight  -on Metoprolol BID along with Norvasc  -recommend up titration of Norvasc to 10mg po daily; if BP remains elevated recommend changing to Coreg vs addition of Hydralazine and Imdur; no ACEI/ARB due to renal function           VTE Risk Mitigation (From admission, onward)        Ordered     apixaban tablet 10 mg  2 times daily      01/11/19 1731     Place sequential compression device  Until  discontinued      01/10/19 1958     IP VTE LOW RISK PATIENT  Once      01/10/19 1958          SANDY Villegas, ANP  Cardiology  [facility]

## 2019-01-15 ENCOUNTER — TELEPHONE (OUTPATIENT)
Dept: FAMILY MEDICINE | Facility: CLINIC | Age: 69
End: 2019-01-15

## 2019-01-15 LAB
ANION GAP SERPL CALC-SCNC: 8 MMOL/L
APTT BLDCRRT: 33.1 SEC
BACTERIA BLD CULT: NORMAL
BACTERIA BLD CULT: NORMAL
BASOPHILS # BLD AUTO: 0.03 K/UL
BASOPHILS NFR BLD: 0.5 %
BUN SERPL-MCNC: 40 MG/DL
CALCIUM SERPL-MCNC: 10.2 MG/DL
CHLORIDE SERPL-SCNC: 102 MMOL/L
CO2 SERPL-SCNC: 22 MMOL/L
CREAT SERPL-MCNC: 3 MG/DL
DIFFERENTIAL METHOD: ABNORMAL
EOSINOPHIL # BLD AUTO: 0.4 K/UL
EOSINOPHIL NFR BLD: 6 %
ERYTHROCYTE [DISTWIDTH] IN BLOOD BY AUTOMATED COUNT: 15.1 %
EST. GFR  (AFRICAN AMERICAN): 18 ML/MIN/1.73 M^2
EST. GFR  (NON AFRICAN AMERICAN): 15 ML/MIN/1.73 M^2
GLUCOSE SERPL-MCNC: 156 MG/DL
HCT VFR BLD AUTO: 29.1 %
HGB BLD-MCNC: 9.3 G/DL
LYMPHOCYTES # BLD AUTO: 2.2 K/UL
LYMPHOCYTES NFR BLD: 32.5 %
MCH RBC QN AUTO: 27.8 PG
MCHC RBC AUTO-ENTMCNC: 32 G/DL
MCV RBC AUTO: 87 FL
MONOCYTES # BLD AUTO: 0.6 K/UL
MONOCYTES NFR BLD: 9.3 %
NEUTROPHILS # BLD AUTO: 3.4 K/UL
NEUTROPHILS NFR BLD: 51.5 %
PLATELET # BLD AUTO: 279 K/UL
PMV BLD AUTO: 9.2 FL
POCT GLUCOSE: 152 MG/DL (ref 70–110)
POCT GLUCOSE: 160 MG/DL (ref 70–110)
POCT GLUCOSE: 185 MG/DL (ref 70–110)
POCT GLUCOSE: 224 MG/DL (ref 70–110)
POTASSIUM SERPL-SCNC: 4 MMOL/L
RBC # BLD AUTO: 3.35 M/UL
SODIUM SERPL-SCNC: 132 MMOL/L
WBC # BLD AUTO: 6.64 K/UL

## 2019-01-15 PROCEDURE — 25000003 PHARM REV CODE 250: Performed by: HOSPITALIST

## 2019-01-15 PROCEDURE — 25000003 PHARM REV CODE 250: Performed by: INTERNAL MEDICINE

## 2019-01-15 PROCEDURE — 97116 GAIT TRAINING THERAPY: CPT

## 2019-01-15 PROCEDURE — 80048 BASIC METABOLIC PNL TOTAL CA: CPT

## 2019-01-15 PROCEDURE — 85025 COMPLETE CBC W/AUTO DIFF WBC: CPT

## 2019-01-15 PROCEDURE — 21400001 HC TELEMETRY ROOM

## 2019-01-15 PROCEDURE — 85730 THROMBOPLASTIN TIME PARTIAL: CPT

## 2019-01-15 PROCEDURE — 63600175 PHARM REV CODE 636 W HCPCS: Performed by: NURSE PRACTITIONER

## 2019-01-15 PROCEDURE — 25000003 PHARM REV CODE 250: Performed by: NURSE PRACTITIONER

## 2019-01-15 PROCEDURE — 94761 N-INVAS EAR/PLS OXIMETRY MLT: CPT

## 2019-01-15 PROCEDURE — 97110 THERAPEUTIC EXERCISES: CPT

## 2019-01-15 PROCEDURE — 36415 COLL VENOUS BLD VENIPUNCTURE: CPT

## 2019-01-15 RX ADMIN — PANTOPRAZOLE SODIUM 40 MG: 40 TABLET, DELAYED RELEASE ORAL at 08:01

## 2019-01-15 RX ADMIN — STANDARDIZED SENNA CONCENTRATE AND DOCUSATE SODIUM 1 TABLET: 8.6; 5 TABLET, FILM COATED ORAL at 05:01

## 2019-01-15 RX ADMIN — APIXABAN 10 MG: 5 TABLET, FILM COATED ORAL at 08:01

## 2019-01-15 RX ADMIN — METOPROLOL TARTRATE 100 MG: 50 TABLET ORAL at 09:01

## 2019-01-15 RX ADMIN — AMLODIPINE BESYLATE 5 MG: 5 TABLET ORAL at 08:01

## 2019-01-15 RX ADMIN — ACETAMINOPHEN 650 MG: 325 TABLET ORAL at 03:01

## 2019-01-15 RX ADMIN — METOPROLOL TARTRATE 100 MG: 50 TABLET ORAL at 08:01

## 2019-01-15 RX ADMIN — ATORVASTATIN CALCIUM 80 MG: 40 TABLET, FILM COATED ORAL at 08:01

## 2019-01-15 RX ADMIN — APIXABAN 10 MG: 5 TABLET, FILM COATED ORAL at 09:01

## 2019-01-15 RX ADMIN — ASPIRIN 81 MG 81 MG: 81 TABLET ORAL at 08:01

## 2019-01-15 RX ADMIN — AMLODIPINE BESYLATE 5 MG: 5 TABLET ORAL at 09:01

## 2019-01-15 RX ADMIN — OXYCODONE HYDROCHLORIDE AND ACETAMINOPHEN 1 TABLET: 5; 325 TABLET ORAL at 05:01

## 2019-01-15 RX ADMIN — INSULIN ASPART 2 UNITS: 100 INJECTION, SOLUTION INTRAVENOUS; SUBCUTANEOUS at 09:01

## 2019-01-15 RX ADMIN — HYDRALAZINE HYDROCHLORIDE 10 MG: 10 TABLET ORAL at 05:01

## 2019-01-15 NOTE — PLAN OF CARE
Problem: Adult Inpatient Plan of Care  Goal: Plan of Care Review  Breath sounds clear, no complaints of shortness of breath.  On apixaban for VTE.  Ate food from home last night.  Repositions self.  Pain controlled with medications.  Telemetry with SR/ST. Blood pressure controlled with medications.  Fall precautions maintained.  Bed alarm on.  Family remained at bedside.  Continue with plan of care.

## 2019-01-15 NOTE — TELEPHONE ENCOUNTER
----- Message from Speedy Mckeon sent at 1/15/2019 12:19 PM CST -----  Contact: Wanda ([email or web address])/[phone]  Patient's niece called to state the patient is currently in [facility] hospital as an in-patient so cannot make her appointment with your office this week.

## 2019-01-15 NOTE — PLAN OF CARE
Problem: Physical Therapy Goal  Goal: Physical Therapy Goal  Goals to be met by: 18     Patient will increase functional independence with mobility by performin. Supine <> sit with Modified [place]  2. Sit to stand transfer with Supervision MET 2019  3. Bed to chair transfer with Supervision using Rolling Walker  4. Gait  x 150 feet with Supervision using Rolling Walker. MET 1/15/2019  5. Assess stair negotiation when appropriate as pt has 4 YESSICA with no rails.    Updated:  1. Gait x 200 ft with RW and distant supervision    Outcome: Ongoing (interventions implemented as appropriate)  Pt making good progress with activity tolerance as she ambulated 170 ft with RW and SBA/supervision. Pt with no overt LOB or instability, with no complaints throughout session. Pt refusing SNF placement, therefore  PT/OT upon d/c.

## 2019-01-15 NOTE — PT/OT/SLP PROGRESS
Physical Therapy Treatment    Patient Name:  Brittaney Joseph   MRN:  [id number]    Recommendations:     Discharge Recommendations:  (pt refusing SNF therefore HH PT/OT)   Discharge Equipment Recommendations: tub bench   Barriers to discharge: None    Assessment:     Brittaney Joseph is a 68 y.o. female admitted with a medical diagnosis of Acute renal failure superimposed on stage 4 chronic kidney disease.  She presents with the following impairments/functional limitations:  weakness, impaired endurance, impaired self care skills, impaired functional mobilty, impaired balance, gait instability, impaired cardiopulmonary response to activity. Pt making good progress with activity tolerance as she ambulated 170 ft with RW and SBA/supervision. Pt with no overt LOB or instability, with no complaints throughout session. Pt refusing SNF placement, therefore HH PT/OT upon d/c.    Rehab Prognosis: Good; patient would benefit from acute skilled PT services to address these deficits and reach maximum level of function.    Recent Surgery: Procedure(s) (LRB):  Thrombolysis, PA (N/A)  EKOS, Pumoart/DVT 4 Days Post-Op    Plan:     During this hospitalization, patient to be seen 6 x/week to address the identified rehab impairments via gait training, therapeutic activities, therapeutic exercises and progress toward the following goals:    · Plan of Care Expires:  02/13/19    Subjective     Chief Complaint: wanting to eat lunch  Patient/Family Comments/goals: pt agreeable to 1 bout of gait then stated she wants to eat her lunch (delivered during her session)  Pain/Comfort:  · Pain Rating 1: 0/10  · Pain Rating Post-Intervention 1: 0/10      Objective:     Communicated with FRANCIS Best prior to session.  Patient found up in chair telemetry  upon PT entry to room.     General Precautions: Standard, diabetic, fall   Orthopedic Precautions:N/A   Braces: N/A     Functional Mobility:  · Transfers:     · Sit to Stand:  supervision with rolling  walker  · Gait: 170 ft with RW and supervision/SBA with no overt LOB or instability and pt ambulating at normal krystina. No complaints throughout.      AM-PAC 6 CLICK MOBILITY  Turning over in bed (including adjusting bedclothes, sheets and blankets)?: 3  Sitting down on and standing up from a chair with arms (e.g., wheelchair, bedside commode, etc.): 3  Moving from lying on back to sitting on the side of the bed?: 3  Moving to and from a bed to a chair (including a wheelchair)?: 3  Need to walk in hospital room?: 3  Climbing 3-5 steps with a railing?: 3  Basic Mobility Total Score: 18       Therapeutic Activities and Exercises:  Pt up in chair and was agreeable to participate in gait training as reported above. Following gait pt requested to eat lunch.  Left up in chair with lunch tray positioned in front of her.    Patient left up in chair with all lines intact, call button in reach, chair alarm on and RN notified..    GOALS:   Multidisciplinary Problems     Physical Therapy Goals        Problem: Physical Therapy Goal    Goal Priority Disciplines Outcome Goal Variances Interventions   Physical Therapy Goal     PT, PT/OT Ongoing (interventions implemented as appropriate)     Description:  Goals to be met by: 18     Patient will increase functional independence with mobility by performin. Supine <> sit with Modified [place]  2. Sit to stand transfer with Supervision MET 2019  3. Bed to chair transfer with Supervision using Rolling Walker  4. Gait  x 150 feet with Supervision using Rolling Walker. MET 1/15/2019  5. Assess stair negotiation when appropriate as pt has 4 YESSICA with no rails.    Updated:  1. Gait x 200 ft with RW and distant supervision                      Time Tracking:     PT Received On: 01/15/19  PT Start Time: 1204     PT Stop Time: 1217  PT Total Time (min): 13 min     Billable Minutes: Gait Training 10    Treatment Type: Treatment  PT/PTA: PT     PTA Visit Number: 0     Sofia  BRIANNA Pineda, PT  01/15/2019

## 2019-01-15 NOTE — SUBJECTIVE & OBJECTIVE
Interval History: awake and alert. Family by bedside, patient declined discharge to SNF and stated she wants to be discharge home.     Possible discharge to home with home health      Review of Systems   Constitutional: Negative for fatigue (improving by the day) and fever.   Eyes: Negative for photophobia.   Respiratory: Negative for cough, shortness of breath (with exertion ) and wheezing.    Cardiovascular: Negative for palpitations and leg swelling.   Gastrointestinal: Negative for abdominal pain, diarrhea, nausea and vomiting.   Genitourinary: Negative for dysuria, flank pain and hematuria.   Musculoskeletal: Negative for back pain and myalgias.   Skin: Negative for color change.   Neurological: Negative for dizziness, syncope, weakness, light-headedness and headaches.   Psychiatric/Behavioral: Negative for confusion.     Objective:     Vital Signs (Most Recent):  Temp: 97.6 °F (36.4 °C) (01/15/19 0545)  Pulse: 80 (01/15/19 0545)  Resp: 16 (01/15/19 0545)  BP: (!) 171/77 (01/15/19 0545)  SpO2: 95 % (01/15/19 0040) Vital Signs (24h Range):  Temp:  [96.9 °F (36.1 °C)-97.6 °F (36.4 °C)] 97.6 °F (36.4 °C)  Pulse:  [67-86] 80  Resp:  [14-20] 16  SpO2:  [95 %-98 %] 95 %  BP: (159-189)/(72-82) 171/77     Weight: 82.1 kg (180 lb 16 oz)  Body mass index is 31.07 kg/m².    Intake/Output Summary (Last 24 hours) at 1/15/2019 0727  Last data filed at 1/15/2019 0543  Gross per 24 hour   Intake 620 ml   Output 1000 ml   Net -380 ml      Physical Exam   Constitutional: She is oriented to person, place, and time. She appears well-developed and well-nourished.   HENT:   Head: Normocephalic and atraumatic.   Eyes: Conjunctivae are normal. Pupils are equal, round, and reactive to light.   Neck: Normal range of motion. No JVD present.   Cardiovascular: Normal rate, regular rhythm, normal heart sounds and intact distal pulses.   Pulmonary/Chest: Effort normal. No respiratory distress. She has no wheezes.   Abdominal: Soft. Bowel  sounds are normal. She exhibits no distension. There is no tenderness. There is no guarding.   Musculoskeletal: Normal range of motion. She exhibits no edema (trace edema to bilateral lower extremities ).   Neurological: She is alert and oriented to person, place, and time. No cranial nerve deficit.   Skin: Skin is warm and dry.   Psychiatric: She has a normal mood and affect. Her behavior is normal.       Significant Labs:   ABGs: No results for input(s): PH, PCO2, HCO3, POCSATURATED, BE, TOTALHB, COHB, METHB, O2HB, POCFIO2 in the last 48 hours.  CBC:   Recent Labs   Lab 01/14/19  0838 01/15/19  0409   WBC 7.90 6.64   HGB 9.5* 9.3*   HCT 29.4* 29.1*    279     CMP:   Recent Labs   Lab 01/13/19  2331 01/14/19  0838 01/15/19  0409   * 135* 132*   K 4.8 3.8 4.0    104 102   CO2 23 22* 22*   * 160* 156*   BUN 51* 45* 40*   CREATININE 3.3* 3.0* 3.0*   CALCIUM 9.7 10.2 10.2   ANIONGAP 6* 9 8   EGFRNONAA 14* 15* 15*     Cardiac Markers: No results for input(s): CKMB, MYOGLOBIN, BNP, TROPISTAT in the last 48 hours.  Coagulation:   Recent Labs   Lab 01/15/19  0409   APTT 33.1*     Troponin: No results for input(s): TROPONINI in the last 48 hours.  TSH:   Recent Labs   Lab 12/18/18 2003   TSH 1.497       Significant Imaging: none

## 2019-01-15 NOTE — TELEPHONE ENCOUNTER
Left message for niece confirming receipt of message advising us that her aunt is now hospitalized.

## 2019-01-15 NOTE — PLAN OF CARE
Problem: Occupational Therapy Goal  Goal: Occupational Therapy Goal  Goals to be met by: 1/31/2019    Patient will increase functional independence with ADLs by performing:    Feeding with Modified [place].  UE Dressing with Modified [place].  LE Dressing with Modified [place].  Grooming while standing with Modified [place].  Toileting from toilet with Modified [place] for hygiene and clothing management.   Step transfer with Modified [place]  Toilet transfer to toilet with Modified [place].  Increased functional strength to WFL for BUE.  Upper extremity exercise program 10 reps per handout, with independence.     Outcome: Ongoing (interventions implemented as appropriate)  Patient reporting headache which is limiting OOB/OOC participation. Patient issued min resistance theraband along with HEP. Will benefit from continued skilled OT to address functional deficits.

## 2019-01-15 NOTE — PLAN OF CARE
01/15/19 1638   Medicare Message   Important Message from Medicare regarding Discharge Appeal Rights Given to patient/caregiver;Explained to patient/caregiver;Signed/date by patient/caregiver   Date IMM was signed 01/15/19   Time IMM was signed 1200

## 2019-01-15 NOTE — PHYSICIAN QUERY
PT Name: Brittaney Joseph  MR #: [id number]  Physician Query Form - Renal Condition Clarification     CDS/: Margaret Gibbs               Contact information:     This form is a permanent document in the medical record.     QueryDate: January 15, 2019    By submitting this query, we are merely seeking further clarification of documentation. Please utilize your independent clinical judgment when addressing the question(s) below.    The Medical record contains the following:   Indicator Supporting Clinical Findings Location in Medical Record    Kidney (Renal) Insufficiency     x Kidney (Renal) Failure / Injury Acute renal failure superimposed on CKD 4    Clinical impression:  Acute renal failure with acute cortical necrosis   1/14 Hosp med note      1/10 ED MD note      Nephrotoxic Agents      BUN/Creatinine GFR Bun/Cr=  72/ 4.3   GFR= 12  Bun/Cr= 65/ 3.5    GFR= 15  Bun/Cr= 51/ 3.3    GFR= 16  Bun/Cr= 45/3.0     GFR= 18  Bun/Cr= 40/ 3.0    GFR= 18 1/11 Lab  1/12 L:ab  1/13 Lab  1/14 Lab  1/14 Lab   x Urine: Casts         Eosinophils Hyaline cast= 2  Waxy cast= 1  1/10 Urinalysis    Dehydration      Nausea/Vomiting      Dialysis/CRRT     x Treatment: Renal consult  Renal US  Daily BMP 1/10-14 NSG orders   x Other:  Specific gravity= 1.020    US-  The kidneys are normal in length.  The right kidney measures 11.1 cm, left kidney measures 10.7 cm.  The arterial resistive indices are within normal limits.    Renal cortical thinning bilaterally.   No  renal masses or hydronephrosis.  Bladder grossly unremarkable.     Pulmonary embolism with acute cor pulmonale       NSTEMI 1/10 Urinalysis    1/13 Renal MD note                1/14 Hosp med note   Acute Kidney Injury / Acute Renal Failure has different defining criteria. A generally accepted guideline  is:   A greater than 100% (2X) rise in serum creatinine from baseline* occurring during the course of a single hospital stay.   *Baseline as determined by the providers  judgment and consideration of previous lab values and other documentation, if available.    A diagnosis of Acute Kidney Injury/ Acute Renal Failure should incorporate abnormal labs and clinical findings that are clinically significant      References: 1. Radu et al. Acute renal failure-definition, outcome measures, animal models, fluid therapy and information technology needs: the Second International Consensus Conference of the Acute Dialysis Quality Initiative (ADQI) Group. Crit Care 2004; 8:B204; 2. Renan et al. Acute Kidney Injury Network: report of an initiative to improve outcomes in acute kidney injury. Crit Care 2007; 11:R31; 3. Kidney Disease: Improving Global Outcomes (KDIGO). Acute Kidney Injury Work Group. KDIGO clinical practice guidelines for acute kidney injury. Kidney Int Suppl 2012; 2:1.    The clinical guidelines noted below is only a system guideline, it does not replace the providers clinical judgment.    Provider, please specify the diagnosis or diagnoses associated with above clinical findings.    [   ] Acute Kidney Failure/Injury with Acute Cortical Necrosis  Rare; usually caused by significantly diminished renal arterial perfusion secondary to vascular spasm, microvascular injury, or intravascular coagulation       [   ] Acute Kidney Failure/Injury with Tubular Necrosis  Damage to the tubule cells of the kidney. Common triggers: shock, hypotension, IV contrast, rhabdomyolysis, medications   [  x Other Acute Kidney Failure/Injury (please specify): ____________     [   ] Unspecified Acute Kidney Failure/Injury      [   ] Other (please specify): _________________________________   [   ]  Clinically Undetermined       Please document in your progress notes daily for the duration of treatment until resolved and include in your discharge summary.

## 2019-01-15 NOTE — PT/OT/SLP PROGRESS
"Occupational Therapy  Visit Attempt    Patient Name:  Brittaney Joseph   MRN:  [id number]    Patient not seen today secondary to watching TV -- "I'm watching my stories and you came and disturbed me." Will follow-up later, as time permits.     ANISH Rdz  1/15/2019  "

## 2019-01-15 NOTE — PROGRESS NOTES
"[facility] Progress Note    Consulting Physician:Aparna Serrano*  Date of Admission:1/10/2019  Date of Consult: 1/11/2019    Reason for Consult:  ARIES on CKD    Subjective:  History of Present Illness:  Brittaney Joseph is a 68 y.o.  female who  has a past medical history of Chronic kidney disease, Chronic low back pain, Diabetes mellitus, type 2, and Hypertension.. The patient presented on 1/10/2019 with a primary complaint of Fatigue ("I was at the kidney doctor for my follow up today and I took sick and got weak and I was SOB" )    Pt states that her breathing is improved today. No CP, N/V/D. Denies dysuria. S/P EKOS catheter treatment for PE. Feeling well enough to go home.    Medications:  In-Hospital Scheduled Medications:   amLODIPine  5 mg Oral BID    apixaban  10 mg Oral BID    aspirin  81 mg Oral Daily    atorvastatin  80 mg Oral Daily    metoprolol tartrate  100 mg Oral BID    pantoprazole  40 mg Oral Daily     In-Hospital PRN Medications:  sodium chloride 0.9%, acetaminophen, alteplase (ACTIVASE) 10 mg in 0.9% NaCl 100 mL, bivalirudin (ANGIOMAX) IV bolus, bivalirudin, hydrALAZINE, insulin aspart U-100, nitroGLYCERIN, ondansetron, oxyCODONE-acetaminophen, senna-docusate 8.6-50 mg, simethicone, sodium chloride 0.9%  In-Hospital IV Infusion Medications:   sodium chloride 0.9% 50 mL/hr (01/11/19 1629)    alteplase (ACTIVASE) 10 mg in 0.9% NaCl 100 mL 1 mg (01/11/19 1630)    alteplase (ACTIVASE) infusion 1 mg/hr (01/11/19 1745)    alteplase (ACTIVASE) infusion 1 mg/hr (01/11/19 1745)    bivalirudin (ANGIOMAX) IV bolus      bivalirudin      nitroGLYCERIN 120 mcg/min (01/11/19 1400)     Home Medications:  Prior to Admission medications    Medication Sig Start Date End Date Taking? Authorizing Provider   amLODIPine (NORVASC) 10 MG tablet Take 10 mg by mouth nightly.   Yes Historical Provider, MD   atorvastatin (LIPITOR) 40 MG tablet Take 40 mg by mouth once daily.   Yes Historical " Provider, MD   cloNIDine (CATAPRES) 0.2 MG tablet Take 0.2 mg by mouth nightly.   Yes Historical Provider, MD   furosemide (LASIX) 40 MG tablet Take 40 mg by mouth once daily.   Yes Historical Provider, MD   glipiZIDE (GLUCOTROL) 2.5 MG TR24 Take 1 tablet (2.5 mg total) by mouth daily with breakfast. 18 Yes Melani Corrales MD   losartan (COZAAR) 100 MG tablet Take 100 mg by mouth once daily.   Yes Historical Provider, MD   metoprolol tartrate (LOPRESSOR) 100 MG tablet  12/3/18  Yes Historical Provider, MD   oxyCODONE-acetaminophen (PERCOCET)  mg per tablet  18  Yes Historical Provider, MD   pantoprazole (PROTONIX) 40 MG tablet  18  Yes Historical Provider, MD   ergocalciferol (VITAMIN D2) 50,000 unit Cap Take 50,000 Units by mouth every Mon, Wed, Fri.    Historical Provider, MD       Review of Systems:  General - Denies fevers and chills.  Head - Denies dizziness, lightheadedness, headache  Throat - Denies dysphagia and sore throat  Pulmonary - Denies shortness of breath and cough  Cardiovascular- Denies chest pain and palpitations  Gastrointestonal - Denies nausea, vomiting, diarrhea, constipation  Genitourinary- Denies changes in urination, dysuria, and hematuria  Skin - Denies rashes, skin changes, and itching  Muskuloskeletal - Denies myalgias and arthralgias  Hematologic/Lymphatic - Denies bleeding and bruising.    Objective:  Last 24 Hour Vital Signs:  BP  Min: 156/71  Max: 189/82  Temp  Av.4 °F (36.3 °C)  Min: 96.9 °F (36.1 °C)  Max: 98.2 °F (36.8 °C)  Pulse  Av.3  Min: 67  Max: 80  Resp  Av.7  Min: 14  Max: 20  SpO2  Av.5 %  Min: 95 %  Max: 98 %  Weight  Av.1 kg (180 lb 16 oz)  Min: 82.1 kg (180 lb 16 oz)  Max: 82.1 kg (180 lb 16 oz)  I/O last 3 completed shifts:  In: 1345 [P.O.:1345]  Out: 1700 [Urine:1700]    Physical Examination:  General - alert and cooperative  Head - Normocephalic and atraumatic  Eyes - Normal lids and lashes, anicteric  sclera  Neck - No JVD, supple  Lungs - Clear to auscultation bilaterally  Heart - Regular rate and rhythm  Abd - Normoactive bowel sounds, nontender, non distended.  Ext - No clubbing, cyanosis, or edema  Pulse - 2+ and symmetric  Skin - Warm and dry  Psych - normal mood and affect      Laboratory Results:  Most Recent Data:  CBC:   Lab Results   Component Value Date    WBC 6.64 01/15/2019    HGB 9.3 (L) 01/15/2019    HCT 29.1 (L) 01/15/2019     01/15/2019    MCV 87 01/15/2019    RDW 15.1 (H) 01/15/2019       BMP:   Lab Results   Component Value Date     (L) 01/15/2019    K 4.0 01/15/2019     01/15/2019    CO2 22 (L) 01/15/2019    BUN 40 (H) 01/15/2019    CREATININE 3.0 (H) 01/15/2019     (H) 01/15/2019    CALCIUM 10.2 01/15/2019    MG 1.7 12/20/2018    PHOS 4.2 12/20/2018       LFTs:   Lab Results   Component Value Date    PROT 9.2 (H) 01/10/2019    ALBUMIN 4.4 01/10/2019    BILITOT 0.6 01/10/2019    AST 26 01/10/2019    ALKPHOS 143 (H) 01/10/2019    ALT 20 01/10/2019       Coags:   Lab Results   Component Value Date    INR 1.1 01/10/2019       FLP:   Lab Results   Component Value Date    CHOL 180 01/11/2019    HDL 27 (L) 01/11/2019    LDLCALC 126.0 01/11/2019    TRIG 135 01/11/2019    CHOLHDL 15.0 (L) 01/11/2019       DM:   Lab Results   Component Value Date    HGBA1C 6.4 (H) 12/24/2018    HGBA1C 6.8 (H) 12/19/2018    LDLCALC 126.0 01/11/2019       Thyroid:   Lab Results   Component Value Date    TSH 1.497 12/18/2018       Anemia:   Lab Results   Component Value Date    IRON 37 12/19/2018    TIBC 277 12/19/2018    FERRITIN 94 12/19/2018    BIDSPDKT90 864 12/19/2018    FOLATE 5.4 12/19/2018       Cardiac:   Lab Results   Component Value Date    TROPONINI 4.080 (H) 01/11/2019     (H) 12/18/2018       Urinalysis:  No results for input(s): COLORU, CLARITYU, SPECGRAV, PHUR, PROTEINUA, GLUCOSEU, BLOODU, WBCU, RBCU, BACTERIA, MUCUS in the last 24 hours.    Invalid input(s):   BILIRUBINCON    Trended Lab Data:  Recent Labs   Lab 01/10/19  1315  01/12/19  1349 01/13/19  2331 01/14/19  0838 01/15/19  0409   WBC 10.83   < > 9.73  --  7.90 6.64   HGB 12.8   < > 10.6*  --  9.5* 9.3*   HCT 39.3   < > 33.0*  --  29.4* 29.1*      < > 235  --  271 279   MCV 86   < > 88  --  87 87   RDW 14.8*   < > 15.2*  --  14.9* 15.1*   *   < > 135* 134* 135* 132*   K 6.2*   < > 4.4 4.8 3.8 4.0   CL 99   < > 105 105 104 102   CO2 18*   < > 19* 23 22* 22*   BUN 76*   < > 65* 51* 45* 40*   CREATININE 4.87*   < > 3.5* 3.3* 3.0* 3.0*   ANIONGAP 17*   < > 11 6* 9 8   *   < > 117* 165* 160* 156*   PROT 9.2*  --   --   --   --   --    ALBUMIN 4.4  --   --   --   --   --    BILITOT 0.6  --   --   --   --   --    AST 26  --   --   --   --   --    ALKPHOS 143*  --   --   --   --   --    ALT 20  --   --   --   --   --     < > = values in this interval not displayed.       Trended Cardiac Data:  Recent Labs   Lab 01/10/19  2014 01/11/19  0228 01/11/19  0728   TROPONINI 6.631* 5.714* 4.080*       Microbiology Data:  Microbiology Results (last 7 days)     Procedure Component Value Units Date/Time    Blood culture [[id number]] Collected:  01/10/19 1510    Order Status:  Completed Specimen:  Blood from Wrist, Right Updated:  01/14/19 2212     Blood Culture, Routine No Growth to date     Blood Culture, Routine No Growth to date     Blood Culture, Routine No Growth to date     Blood Culture, Routine No Growth to date     Blood Culture, Routine No Growth to date    Blood culture [[id number]] Collected:  01/10/19 1518    Order Status:  Completed Specimen:  Blood from Wrist, Left Updated:  01/14/19 2212     Blood Culture, Routine No Growth to date     Blood Culture, Routine No Growth to date     Blood Culture, Routine No Growth to date     Blood Culture, Routine No Growth to date     Blood Culture, Routine No Growth to date          Other Results:  Radiology:  X-ray Abdomen Ap 1 View (kub)    Result Date:  12/18/2018  EXAMINATION: XR ABDOMEN AP 1 VIEW CLINICAL HISTORY: Unspecified abdominal pain TECHNIQUE: AP View(s) of the abdomen was performed. COMPARISON: None FINDINGS: Monitoring leads overlie the lower thorax and abdomen.  Scattered air is seen in nondilated loops of small and large bowel. No central small bowel air fluid levels are appreciated.  No definite large volume of free intraperitoneal air allowing for technique.  Surgical clips project over the right upper abdomen.  There are degenerative changes of the visualized thoracolumbar spine as well as the bilateral sacroiliac joints and hips.  There is platelike atelectasis or scarring at the right lung base.  No large pleural effusion appreciated.     Nonobstructive bowel gas pattern. Electronically signed by: Thuan Easton MD Date:    12/18/2018 Time:    22:16    Us Abdomen Complete    Result Date: 1/11/2019  EXAMINATION: US ABDOMEN COMPLETE CLINICAL HISTORY: weight loss , abd pain; TECHNIQUE: Complete abdominal ultrasound (including pancreas, liver, gallbladder, common bile duct, spleen, aorta, IVC, and kidneys) was performed. COMPARISON: None FINDINGS: Liver: Normal in size, measuring 12.2 cm. Homogeneous echotexture.  No focal hepatic lesions. Gallbladder: Surgically removed Biliary system: The common duct is not dilated, measuring 5.3 mm.  No intrahepatic ductal dilatation. Spleen: Normal in size with a homogeneous echotexture, measuring 6.9 cm. Pancreas: The visualized portions of pancreas appear normal. Right kidney: Normal in size with no hydronephrosis, measuring 10.0 cm. Left kidney:  Normal in size with no hydronephrosis, measuring 9.6 cm. There is renal cortical thinning as described on prior renal ultrasound 12/19/2018. Aorta: No aneurysm. Inferior vena cava: Normal in appearance. Miscellaneous: No ascites.     No significant abnormality identified to account for patient's weight loss and abdominal pain. Status post cholecystectomy.  Electronically signed by: Henrique Dickey MD Date:    01/11/2019 Time:    13:22    X-ray Chest Ap Portable    Result Date: 1/10/2019  EXAMINATION: XR CHEST AP PORTABLE CLINICAL HISTORY: sob; COMPARISON: December FINDINGS: The heart size is normal with mild tortuosity of the thoracic aorta.  Patient is rotated.  Lung fields are clear.     No acute cardiopulmonary abnormality suggested. Electronically signed by: Cayden Olmos MD Date:    01/10/2019 Time:    15:33    X-ray Chest Ap Portable    Result Date: 12/18/2018  EXAMINATION: XR CHEST AP PORTABLE CLINICAL HISTORY: weakness; TECHNIQUE: Single frontal view of the chest was performed. COMPARISON: None FINDINGS: Cardiac silhouette is normal in size.  There is elevation of the right hemidiaphragm.  Lungs are expanded.  Minimal scarring or plate atelectasis is seen within the right lower lobe.  No evidence of focal consolidative process, pneumothorax, or significant effusion.  No acute osseous abnormality identified.  Degenerative changes are noted involving the bilateral AC joints.     Minimal right basilar scarring or atelectasis, otherwise no acute cardiopulmonary process identified. Electronically signed by: Raven Barrientos MD Date:    12/18/2018 Time:    21:11    Us Kidney    Result Date: 12/19/2018  EXAMINATION: US KIDNEY CLINICAL HISTORY: ARIES; Hypertensive emergency TECHNIQUE: Ultrasound of the kidneys was performed including color flow and Doppler evaluation of the kidneys. COMPARISON: None. FINDINGS: The kidneys are normal in length.  The right kidney measures 11.1 cm, left kidney measures 10.7 cm.  The arterial resistive indices are within normal limits.  Renal cortical thinning bilaterally.  No renal masses or hydronephrosis.  Bladder grossly unremarkable.     Renal cortical thinning.  No hydronephrosis or renal masses. Electronically signed by: Fantasma Medina MD Date:    12/19/2018 Time:    10:54    Nm Lung Ventilation Perfusion Imaging    Result Date:  1/11/2019  EXAMINATION: NM LUNG VENTILATION AND PERFUSION IMAGING CLINICAL HISTORY: Chest pain, acute, PE suspected, intermed prob, negative D-dimer; TECHNIQUE: 15 mCi of Xenon were placed in the nebulizer. Following the inhalation Xenon in aerosol and the subsequent IV administration of 5 mCi of Tc-99m-MAA, multiple images of the thorax were obtained in various projections. COMPARISON: Radiograph 01/10/2019. FINDINGS: Multiple segmental mismatch perfusion defects identified throughout the right and left lungs.  No corresponding radiographic abnormality.     This represents a high probability of pulmonary embolism. This report was flagged in Epic as abnormal. Electronically signed by: Dariel Crystal MD Date:    01/11/2019 Time:    15:02      Assessment and Plan:    1. ARIES stage 3 on CKD stage 4 - Cr appears to be back to baseline. Follow up with nephrology out pt as previously scheduled.    Thank you for allowing us to participate in the care of this patient. Please contact me at [phone] if you have any questions regarding this consult.    Brannon Watts MD  [facility] Internal Medicine PGY-V  Pager number: [phone]  Cell: [phone]

## 2019-01-15 NOTE — PT/OT/SLP PROGRESS
Occupational Therapy   Treatment    Name: Brittaney Joseph  MRN: [id number]  Admitting Diagnosis:  Acute renal failure superimposed on stage 4 chronic kidney disease  4 Days Post-Op    Recommendations:     Discharge Recommendations: nursing facility, skilled(Patient refusing SNF, therefore will need HH OT/PT)  Discharge Equipment Recommendations:  tub bench  Barriers to discharge:  None    Assessment:   Patient reporting headache which is limiting OOB/OOC participation. Patient issued min resistance theraband along with HEP. Will benefit from continued skilled OT to address functional deficits.     Brittaney Joseph is a 68 y.o. female with a medical diagnosis of Acute renal failure superimposed on stage 4 chronic kidney disease.Performance deficits affecting function are weakness, impaired endurance, impaired self care skills, impaired functional mobilty, gait instability, decreased upper extremity function, decreased lower extremity function, decreased coordination, pain, decreased safety awareness.     Rehab Prognosis:  Good; patient would benefit from acute skilled OT services to address these deficits and reach maximum level of function.       Plan:     Patient to be seen 5 x/week to address the above listed problems via self-care/home management, therapeutic activities, therapeutic exercises  · Plan of Care Expires: 02/13/19  · Plan of Care Reviewed with: patient, sibling    Subjective     Pain/Comfort:  · Pain Rating 1: 9/10  · Location - Orientation 1: generalized  · Location 1: head  · Pain Addressed 1: Cessation of Activity, Nurse notified    Objective:     Communicated with: nurseEstephania prior to session.  Patient found up in chair with telemetry upon OT entry to room.    General Precautions: Standard, fall, diabetic   Orthopedic Precautions:N/A   Braces: N/A     AMPAC 6 Click ADL: 17    Treatment & Education:  Patient UIC and with c/o headache. Nsg notified of same. Issued min resistance theraband along  with HEP printout. Reviewed with patient and all questions answered.     Patient left up in chair with all lines intact, call button in reach, chair alarm on, nurse notified and sister presentEducation:      GOALS:   Multidisciplinary Problems     Occupational Therapy Goals        Problem: Occupational Therapy Goal    Goal Priority Disciplines Outcome Interventions   Occupational Therapy Goal     OT, PT/OT Ongoing (interventions implemented as appropriate)    Description:  Goals to be met by: 1/31/2019    Patient will increase functional independence with ADLs by performing:    Feeding with Modified [place].  UE Dressing with Modified [place].  LE Dressing with Modified [place].  Grooming while standing with Modified [place].  Toileting from toilet with Modified [place] for hygiene and clothing management.   Step transfer with Modified [place]  Toilet transfer to toilet with Modified [place].  Increased functional strength to WFL for BUE.  Upper extremity exercise program 10 reps per handout, with independence.                      Time Tracking:     OT Date of Treatment: 01/15/19  OT Start Time: 1426  OT Stop Time: 1439  OT Total Time (min): 13 min    Billable Minutes:Therapeutic Exercise 13    ANISH Rdz  1/15/2019

## 2019-01-15 NOTE — PLAN OF CARE
Problem: Adult Inpatient Plan of Care  Goal: Plan of Care Review  Outcome: Ongoing (interventions implemented as appropriate)  Plan of care reviewed with patient and sister today. Pt weaned off 02 and is 96-97% on RA.  US done today and found with 0 evidence of thrombosis.Hydralazine given x1 for elevated BP. Pt denies headache. Medicated x1 for pain in back. Medication effective. Verbalizes to staff understanding. NSR on monitor with 0 red alarms noted.  No acute distress observed at this time. Side rails x2, bed in low position, call bell within reach. Bed alarm in place for patient safety.

## 2019-01-15 NOTE — PROGRESS NOTES
"[facility]  Adult Nutrition  Progress Note    SUMMARY       Recommendations    1. Add ADA restriction to diet order if BG levels problematic   2. Honor preferences as able; pt does not want oral supplements at this time.   3. RD to monitor    Goals: Pt will consume a t least 50% intake at meals  Nutrition Goal Status: progressing towards goal  Communication of RD Recs: reviewed with RN    Reason for Assessment    Reason For Assessment: RD follow-up  Diagnosis: (acute renal failure)  Relevant Medical History: HTN, CKD, DM, cholecystectomy  Interdisciplinary Rounds: did not attend    General Information Comments: Diet advanced to Regular. Pt c/o continued poor appetite; reports poor appetite PTA as well for a few months.  Noted recent adm weights indicate weight increases.  NFPE performed: adequate/excess fat mass, adequate LBM. Pt does not like oral supplements, would rather eat food. Tolerating 25-50% of meals. Encouraged always available menu options to increase intake, encouraged supplement use to aid with intake. Pt denied need for further education on DM.     Nutrition Discharge Planning: ADA diet to meet estimated needs.     Nutrition Risk Screen    Nutrition Risk Screen: no indicators present    Nutrition/Diet History    Patient Reported Diet/Restrictions/Preferences: general  Food Preferences: denied  Spiritual, Cultural Beliefs, Restoration Practices, Values that Affect Care: no  Factors Affecting Nutritional Intake: decreased appetite    Anthropometrics    Temp: 97.9 °F (36.6 °C)  Height Method: Stated  Height: 5' 4" (162.6 cm)  Height (inches): 64 in  Weight Method: Bed Scale  Weight: 82.1 kg (180 lb 16 oz)  Weight (lb): 181 lb  Ideal Body Weight (IBW), Female: 120 lb  % Ideal Body Weight, Female (lb): 150.83 lb  BMI (Calculated): 31.1  BMI Grade: 30 - 34.9- obesity - grade I       Lab/Procedures/Meds    Pertinent Labs Reviewed: reviewed  Pertinent Labs Comments: Na 135L, K 5.5H, BUN 71H, " Crea 4.4H, Glu 129H  Pertinent Medications Reviewed: reviewed  Pertinent Medications Comments: aspirin, insulin    Physical Findings/Assessment     Overall: Nourished   Skin: Intact    Estimated/Assessed Needs    Weight Used For Calorie Calculations: 75.8 kg (167 lb 1.7 oz)  Energy Calorie Requirements (kcal): 1895 (25 kcal/kg)  Energy Need Method: Kcal/kg  Protein Requirements: 60g (0.8g/kg)  Weight Used For Protein Calculations: 75.8 kg (167 lb 1.7 oz)     Estimated Fluid Requirement Method: RDA Method  RDA Method (mL): 1895      Nutrition Prescription Ordered    Current Diet Order: Reg    Evaluation of Received Nutrient/Fluid Intake    I/O: reviewed  Energy Calories Required: not meeting needs  Protein Required: not meeting needs  Fluid Required: (per MD)  Comments: LBM: 1/10  Tolerance: tolerating(small amts)  % Intake of Estimated Energy Needs: 25-50 %  % Meal Intake: 25 - 50 %    Nutrition Risk    Level of Risk/Frequency of Follow-up: (1 x week)     Assessment and Plan    Nutrition Problem  Inadequate energy intake    Related to (etiology):   Decreased appetite    Signs and Symptoms (as evidenced by):   25-50% po intake with meals    Interventions:  Collaboration with providers    Nutrition Diagnosis Status:   New       Monitor and Evaluation    Food and Nutrient Intake: food and beverage intake  Food and Nutrient Adminstration: diet order  Physical Activity and Function: nutrition-related ADLs and IADLs  Anthropometric Measurements: weight  Biochemical Data, Medical Tests and Procedures: electrolyte and renal panel  Nutrition-Focused Physical Findings: overall appearance     Malnutrition Assessment                 Orbital Region (Subcutaneous Fat Loss): well nourished  Upper Arm Region (Subcutaneous Fat Loss): well nourished  Thoracic and Lumbar Region: well nourished   [place] Region (Muscle Loss): well nourished  Clavicle Bone Region (Muscle Loss): well nourished  Clavicle and Acromion Bone Region (Muscle  Loss): well nourished  Scapular Bone Region (Muscle Loss): well nourished  Dorsal Hand (Muscle Loss): well nourished  Patellar Region (Muscle Loss): well nourished  Anterior Thigh Region (Muscle Loss): well nourished  Posterior Calf Region (Muscle Loss): well nourished       Subcutaneous Fat Loss (Final Summary): well nourished  Muscle Loss Evaluation (Final Summary): well nourished         Nutrition Follow-Up    RD Follow-up?: Yes

## 2019-01-16 ENCOUNTER — TELEPHONE (OUTPATIENT)
Dept: FAMILY MEDICINE | Facility: CLINIC | Age: 69
End: 2019-01-16

## 2019-01-16 VITALS
DIASTOLIC BLOOD PRESSURE: 85 MMHG | BODY MASS INDEX: 50.02 KG/M2 | SYSTOLIC BLOOD PRESSURE: 151 MMHG | WEIGHT: 293 LBS | OXYGEN SATURATION: 97 % | HEIGHT: 64 IN | TEMPERATURE: 98 F | HEART RATE: 84 BPM | RESPIRATION RATE: 18 BRPM

## 2019-01-16 PROBLEM — E87.20 METABOLIC ACIDOSIS, NORMAL ANION GAP (NAG): Status: RESOLVED | Noted: 2018-12-24 | Resolved: 2019-01-16

## 2019-01-16 PROBLEM — N18.4 ACUTE RENAL FAILURE SUPERIMPOSED ON STAGE 4 CHRONIC KIDNEY DISEASE: Status: RESOLVED | Noted: 2018-12-20 | Resolved: 2019-01-16

## 2019-01-16 PROBLEM — N17.9 ACUTE RENAL FAILURE SUPERIMPOSED ON STAGE 4 CHRONIC KIDNEY DISEASE: Status: RESOLVED | Noted: 2018-12-20 | Resolved: 2019-01-16

## 2019-01-16 PROBLEM — R07.9 CHEST PAIN: Status: RESOLVED | Noted: 2019-01-11 | Resolved: 2019-01-16

## 2019-01-16 PROBLEM — E83.52 HYPERCALCEMIA: Status: RESOLVED | Noted: 2019-01-10 | Resolved: 2019-01-16

## 2019-01-16 PROBLEM — I21.4 NSTEMI (NON-ST ELEVATED MYOCARDIAL INFARCTION): Status: RESOLVED | Noted: 2019-01-10 | Resolved: 2019-01-16

## 2019-01-16 PROBLEM — E87.5 HYPERKALEMIA: Status: RESOLVED | Noted: 2019-01-10 | Resolved: 2019-01-16

## 2019-01-16 LAB
ANION GAP SERPL CALC-SCNC: 8 MMOL/L
APTT BLDCRRT: 32.2 SEC
BASOPHILS # BLD AUTO: 0.03 K/UL
BASOPHILS NFR BLD: 0.5 %
BUN SERPL-MCNC: 33 MG/DL
CALCIUM SERPL-MCNC: 10.5 MG/DL
CHLORIDE SERPL-SCNC: 104 MMOL/L
CO2 SERPL-SCNC: 22 MMOL/L
CREAT SERPL-MCNC: 3 MG/DL
DIFFERENTIAL METHOD: ABNORMAL
EOSINOPHIL # BLD AUTO: 0.3 K/UL
EOSINOPHIL NFR BLD: 5.2 %
ERYTHROCYTE [DISTWIDTH] IN BLOOD BY AUTOMATED COUNT: 15.1 %
EST. GFR  (AFRICAN AMERICAN): 18 ML/MIN/1.73 M^2
EST. GFR  (NON AFRICAN AMERICAN): 15 ML/MIN/1.73 M^2
GLUCOSE SERPL-MCNC: 164 MG/DL
HCT VFR BLD AUTO: 30.5 %
HGB BLD-MCNC: 9.8 G/DL
LYMPHOCYTES # BLD AUTO: 1.9 K/UL
LYMPHOCYTES NFR BLD: 29.6 %
MCH RBC QN AUTO: 27.8 PG
MCHC RBC AUTO-ENTMCNC: 32.1 G/DL
MCV RBC AUTO: 86 FL
MONOCYTES # BLD AUTO: 0.9 K/UL
MONOCYTES NFR BLD: 13.6 %
NEUTROPHILS # BLD AUTO: 3.3 K/UL
NEUTROPHILS NFR BLD: 50.9 %
PLATELET # BLD AUTO: 298 K/UL
PMV BLD AUTO: 9.1 FL
POCT GLUCOSE: 161 MG/DL (ref 70–110)
POCT GLUCOSE: 171 MG/DL (ref 70–110)
POTASSIUM SERPL-SCNC: 4.2 MMOL/L
RBC # BLD AUTO: 3.53 M/UL
SODIUM SERPL-SCNC: 134 MMOL/L
WBC # BLD AUTO: 6.48 K/UL

## 2019-01-16 PROCEDURE — 36415 COLL VENOUS BLD VENIPUNCTURE: CPT

## 2019-01-16 PROCEDURE — 25000003 PHARM REV CODE 250: Performed by: NURSE PRACTITIONER

## 2019-01-16 PROCEDURE — 97530 THERAPEUTIC ACTIVITIES: CPT

## 2019-01-16 PROCEDURE — 85025 COMPLETE CBC W/AUTO DIFF WBC: CPT

## 2019-01-16 PROCEDURE — 25000003 PHARM REV CODE 250: Performed by: HOSPITALIST

## 2019-01-16 PROCEDURE — 80048 BASIC METABOLIC PNL TOTAL CA: CPT

## 2019-01-16 PROCEDURE — 25000003 PHARM REV CODE 250: Performed by: INTERNAL MEDICINE

## 2019-01-16 PROCEDURE — 85730 THROMBOPLASTIN TIME PARTIAL: CPT

## 2019-01-16 PROCEDURE — 94761 N-INVAS EAR/PLS OXIMETRY MLT: CPT

## 2019-01-16 PROCEDURE — 97116 GAIT TRAINING THERAPY: CPT

## 2019-01-16 PROCEDURE — 63600175 PHARM REV CODE 636 W HCPCS: Performed by: FAMILY MEDICINE

## 2019-01-16 PROCEDURE — 97535 SELF CARE MNGMENT TRAINING: CPT

## 2019-01-16 PROCEDURE — 97110 THERAPEUTIC EXERCISES: CPT

## 2019-01-16 RX ORDER — HYDRALAZINE HYDROCHLORIDE 25 MG/1
25 TABLET, FILM COATED ORAL EVERY 8 HOURS
Qty: 90 TABLET | Refills: 11 | Status: ON HOLD | OUTPATIENT
Start: 2019-01-16 | End: 2019-04-08 | Stop reason: HOSPADM

## 2019-01-16 RX ORDER — HYDRALAZINE HYDROCHLORIDE 25 MG/1
25 TABLET, FILM COATED ORAL EVERY 8 HOURS
Status: DISCONTINUED | OUTPATIENT
Start: 2019-01-16 | End: 2019-01-16 | Stop reason: HOSPADM

## 2019-01-16 RX ORDER — AMOXICILLIN 250 MG
1 CAPSULE ORAL 2 TIMES DAILY PRN
COMMUNITY
Start: 2019-01-16 | End: 2019-05-09

## 2019-01-16 RX ORDER — NITROGLYCERIN 0.4 MG/1
0.4 TABLET SUBLINGUAL EVERY 5 MIN PRN
Qty: 25 TABLET | Refills: 1 | Status: SHIPPED | OUTPATIENT
Start: 2019-01-16 | End: 2021-08-26

## 2019-01-16 RX ORDER — NAPROXEN SODIUM 220 MG/1
81 TABLET, FILM COATED ORAL DAILY
Refills: 0 | COMMUNITY
Start: 2019-01-16 | End: 2020-08-03

## 2019-01-16 RX ORDER — HYDRALAZINE HYDROCHLORIDE 20 MG/ML
10 INJECTION INTRAMUSCULAR; INTRAVENOUS EVERY 6 HOURS PRN
Status: DISCONTINUED | OUTPATIENT
Start: 2019-01-16 | End: 2019-01-16 | Stop reason: HOSPADM

## 2019-01-16 RX ADMIN — ASPIRIN 81 MG 81 MG: 81 TABLET ORAL at 09:01

## 2019-01-16 RX ADMIN — ACETAMINOPHEN 650 MG: 325 TABLET ORAL at 09:01

## 2019-01-16 RX ADMIN — HYDRALAZINE HYDROCHLORIDE 10 MG: 20 INJECTION INTRAMUSCULAR; INTRAVENOUS at 07:01

## 2019-01-16 RX ADMIN — APIXABAN 10 MG: 5 TABLET, FILM COATED ORAL at 09:01

## 2019-01-16 RX ADMIN — PANTOPRAZOLE SODIUM 40 MG: 40 TABLET, DELAYED RELEASE ORAL at 09:01

## 2019-01-16 RX ADMIN — STANDARDIZED SENNA CONCENTRATE AND DOCUSATE SODIUM 1 TABLET: 8.6; 5 TABLET, FILM COATED ORAL at 06:01

## 2019-01-16 RX ADMIN — METOPROLOL TARTRATE 100 MG: 50 TABLET ORAL at 09:01

## 2019-01-16 RX ADMIN — AMLODIPINE BESYLATE 5 MG: 5 TABLET ORAL at 09:01

## 2019-01-16 RX ADMIN — ATORVASTATIN CALCIUM 80 MG: 40 TABLET, FILM COATED ORAL at 09:01

## 2019-01-16 RX ADMIN — HYDRALAZINE HYDROCHLORIDE 10 MG: 10 TABLET ORAL at 02:01

## 2019-01-16 NOTE — NURSING
Spoke with Dr. De La Torre via secure chat this morning about elevated blood pressure during the night; See new orders.  Administered prn hydralazine 10 mg IVP per MD's recommendations, day shift nurse notified, will reassess, reporting off.

## 2019-01-16 NOTE — SUBJECTIVE & OBJECTIVE
Interval History: awake and alert. Family by bedside.     Reported elevated BP. Schedule hydralazine PO  Possible discharge to home with home health      Review of Systems   Constitutional: Negative for fatigue and fever.   Respiratory: Negative for cough and shortness of breath.    Cardiovascular: Negative for palpitations and leg swelling.   Gastrointestinal: Negative for abdominal pain, diarrhea, nausea and vomiting.   Genitourinary: Negative for dysuria, flank pain and hematuria.   Musculoskeletal: Negative for back pain and myalgias.   Skin: Negative for color change.   Neurological: Negative for dizziness, syncope, weakness, light-headedness and headaches.   Psychiatric/Behavioral: Negative for confusion.     Objective:     Vital Signs (Most Recent):  Temp: 98.6 °F (37 °C) (01/16/19 0614)  Pulse: 80 (01/16/19 0614)  Resp: 18 (01/16/19 0614)  BP: (!) 182/90 (01/16/19 0614)  SpO2: 98 % (01/16/19 0355) Vital Signs (24h Range):  Temp:  [96.4 °F (35.8 °C)-98.6 °F (37 °C)] 98.6 °F (37 °C)  Pulse:  [67-84] 80  Resp:  [18-20] 18  SpO2:  [95 %-99 %] 98 %  BP: (148-206)/(71-94) 182/90     Weight: (!) 187.7 kg (413 lb 12.9 oz)  Body mass index is 71.03 kg/m².    Intake/Output Summary (Last 24 hours) at 1/16/2019 0713  Last data filed at 1/16/2019 0614  Gross per 24 hour   Intake 375 ml   Output 1650 ml   Net -1275 ml      Physical Exam   Constitutional: She is oriented to person, place, and time. She appears well-developed and well-nourished.   HENT:   Head: Normocephalic and atraumatic.   Neck: Normal range of motion. No JVD present.   Cardiovascular: Normal rate, regular rhythm, normal heart sounds and intact distal pulses.   Pulmonary/Chest: Effort normal. No respiratory distress. She has no wheezes.   Abdominal: Soft. Bowel sounds are normal. She exhibits no distension. There is no tenderness.   Musculoskeletal: Normal range of motion. She exhibits no edema (trace edema to bilateral lower extremities ).    Neurological: She is alert and oriented to person, place, and time. No cranial nerve deficit.   Skin: Skin is warm and dry.   Psychiatric: She has a normal mood and affect. Her behavior is normal.       Significant Labs:   ABGs: No results for input(s): PH, PCO2, HCO3, POCSATURATED, BE, TOTALHB, COHB, METHB, O2HB, POCFIO2 in the last 48 hours.  CBC:   Recent Labs   Lab 01/14/19  0838 01/15/19  0409 01/16/19  0655   WBC 7.90 6.64 6.48   HGB 9.5* 9.3* 9.8*   HCT 29.4* 29.1* 30.5*    279 298     CMP:   Recent Labs   Lab 01/14/19  0838 01/15/19  0409   * 132*   K 3.8 4.0    102   CO2 22* 22*   * 156*   BUN 45* 40*   CREATININE 3.0* 3.0*   CALCIUM 10.2 10.2   ANIONGAP 9 8   EGFRNONAA 15* 15*     Cardiac Markers: No results for input(s): CKMB, MYOGLOBIN, BNP, TROPISTAT in the last 48 hours.  Coagulation:   Recent Labs   Lab 01/15/19  0409   APTT 33.1*     Troponin: No results for input(s): TROPONINI in the last 48 hours.  TSH:   Recent Labs   Lab 12/18/18 2003   TSH 1.497       Significant Imaging: none

## 2019-01-16 NOTE — PT/OT/SLP PROGRESS
Occupational Therapy   Treatment    Name: Brittaney Joseph  MRN: [id number]  Admitting Diagnosis:  Acute renal failure superimposed on stage 4 chronic kidney disease  5 Days Post-Op    Recommendations:     Discharge Recommendations: (Patient refusing SNF, will need HH OT/PT)  Discharge Equipment Recommendations:  tub bench  Barriers to discharge:  None    Assessment:   Patient requires encouragement for activity, but is improving with mobility. Will benefit from HH OT/PT upon D/C, since patient is refusing SNF.     Brittaney Joseph is a 68 y.o. female with a medical diagnosis of Acute renal failure superimposed on stage 4 chronic kidney disease. Performance deficits affecting function are weakness, impaired endurance, impaired self care skills, impaired functional mobilty, gait instability, impaired balance, decreased safety awareness, decreased coordination, decreased upper extremity function, decreased lower extremity function, impaired fine motor, impaired coordination.     Rehab Prognosis:  Good; patient would benefit from acute skilled OT services to address these deficits and reach maximum level of function.       Plan:     Patient to be seen 5 x/week to address the above listed problems via self-care/home management, therapeutic activities, therapeutic exercises  · Plan of Care Expires: 02/13/19  · Plan of Care Reviewed with: patient    Subjective     Pain/Comfort:  · Pain Rating 1: (still reports headache that comes and goes)    Objective:     Communicated with: nurseEstephania prior to session.  Patient found HOB elevated with telemetry, peripheral IV upon OT entry to room.    General Precautions: Standard, diabetic, fall   Orthopedic Precautions:N/A   Braces: N/A     Bed Mobility:    · Patient completed Scooting/Bridging with stand by assistance  · Patient completed Supine to Sit with contact guard assistance     Functional Mobility/Transfers:  · Patient completed Sit <> Stand Transfer with contact guard  assistance  with  rolling walker   · Patient completed Bed <> Chair Transfer using Step Transfer technique with contact guard assistance with rolling walker    Activities of Daily Living:  · Grooming: set-up and CGA for standing balance at sink    · Upper Body Dressing: contact guard assistance      [facility] 6 Click ADL: 18    Treatment & Education:  Patient with bed mob as noted above. Ambulated to sink for various G/H tasks. B hand tremors noted throughout. Patient ambulated to bedside chair and encouraged to sit up. Educated on use of theraband and HEP.     Patient left up in chair with all lines intact, call button in reach, nurse notified and sister presentEducation:      GOALS:   Multidisciplinary Problems     Occupational Therapy Goals        Problem: Occupational Therapy Goal    Goal Priority Disciplines Outcome Interventions   Occupational Therapy Goal     OT, PT/OT Ongoing (interventions implemented as appropriate)    Description:  Goals to be met by: 1/31/2019    Patient will increase functional independence with ADLs by performing:    Feeding with Modified [place].  UE Dressing with Modified [place].  LE Dressing with Modified [place].  Grooming while standing with Modified [place].  Toileting from toilet with Modified [place] for hygiene and clothing management.   Step transfer with Modified [place]  Toilet transfer to toilet with Modified [place].  Increased functional strength to WFL for BUE.  Upper extremity exercise program 10 reps per handout, with independence.                      Time Tracking:     OT Date of Treatment: 01/16/19  OT Start Time: 1005  OT Stop Time: 1029  OT Total Time (min): 24 min    Billable Minutes:Self Care/Home Management 14  Therapeutic Activity 10    ANISH Rdz  1/16/2019

## 2019-01-16 NOTE — PROGRESS NOTES
[facility] Medicine  Progress Note    Patient Name: Brittaney Joseph  MRN: [id number]  Patient Class: IP- Inpatient   Admission Date: 1/10/2019  Length of Stay: 5 days  Attending Physician: Aparna Serrano*  Primary Care Provider: Charles Argueta MD        Subjective:     Principal Problem:Acute renal failure superimposed on stage 4 chronic kidney disease    HPI:  Brittaney Joseph is a 67 yo  female with HTN, HLD, chronic kidney disease stage 4, Type 2 Diabetes Mellitus, diastolic heart failure and chronic back pain. Her primary care physician is Dr. Charles Argueta. Her nephrologist is Dr. Mcdaniel. She lives with her niece in [place], [place]. She presented to [facility]/10/19 with complaints of generalized weakness, fatigue, dizziness, SOB and nausea/vomiting. The patient reports acute onset of symptoms while at nephrologist appointment prior to arrival. She denies chest pain, palpitations, unilateral weakness and syncopal events. Initial labs remarkable for K (6.2), BUN/CR (76/4.87) increased from previous BUN/CR (56/2.9 on 12/26/18), anion gap (17),   lactic (3.1), Troponin (2.3) and BNP (1200). EKG shows sinus tachycardia with non-specific T wave abnormality. VSS. CXR shows no acute abnormality. Pt received insulin, bicarb and kayexalate for management of hyperkalemia and acidosis. She also received 1 L NS bolus. Pt admitted to [facility] medicine for further evaluation.     Hospital Course:  The pt seen at bedside, she is complaining of chest pain, on an off during the night. Cards evaluated the pt, given her positive cp, elevated trop, and acute renal failure, decision is to move pt to icu.starte tridil drip, asa, plavx, renal input prior to a prcedure with dye. Etc  Pt updated, agrees to plan  1/12 pt is S/P EHOS for PE. She was in ICU post procedure, now being transferred to [facility] ed. She is doing much better. She is doing well. No mre CP or SOB  as prior to the procedure. Sister at bedside as well.  PT/OT in a day or two  1/13 pt improving, working with PT/OT, more likely SNF candidate, cr down to 3.5 from 4.4 bun down to 65 from 74. No cp with activity or rest  1/14 pt is doing ok, no CP or SOB. Bun cr is improving, down to 3.3 from as high as 4.4. P/OT recommended SNF, will consult the  for snf placement, after the rounds, pt were more interested in home health. willdc honme with hh once bun/cr is a little bit better.    Interval History: awake and alert. Family by bedside, patient declined discharge to SNF and stated she wants to be discharge home.     Possible discharge to home with home health      Review of Systems   Constitutional: Negative for fatigue (improving by the day) and fever.   Eyes: Negative for photophobia.   Respiratory: Negative for cough, shortness of breath (with exertion ) and wheezing.    Cardiovascular: Negative for palpitations and leg swelling.   Gastrointestinal: Negative for abdominal pain, diarrhea, nausea and vomiting.   Genitourinary: Negative for dysuria, flank pain and hematuria.   Musculoskeletal: Negative for back pain and myalgias.   Skin: Negative for color change.   Neurological: Negative for dizziness, syncope, weakness, light-headedness and headaches.   Psychiatric/Behavioral: Negative for confusion.     Objective:     Vital Signs (Most Recent):  Temp: 97.6 °F (36.4 °C) (01/15/19 0545)  Pulse: 80 (01/15/19 0545)  Resp: 16 (01/15/19 0545)  BP: (!) 171/77 (01/15/19 0545)  SpO2: 95 % (01/15/19 0040) Vital Signs (24h Range):  Temp:  [96.9 °F (36.1 °C)-97.6 °F (36.4 °C)] 97.6 °F (36.4 °C)  Pulse:  [67-86] 80  Resp:  [14-20] 16  SpO2:  [95 %-98 %] 95 %  BP: (159-189)/(72-82) 171/77     Weight: 82.1 kg (180 lb 16 oz)  Body mass index is 31.07 kg/m².    Intake/Output Summary (Last 24 hours) at 1/15/2019 5862  Last data filed at 1/15/2019 0543  Gross per 24 hour   Intake 620 ml   Output 1000 ml   Net -380 ml      Physical  Exam   Constitutional: She is oriented to person, place, and time. She appears well-developed and well-nourished.   HENT:   Head: Normocephalic and atraumatic.   Eyes: Conjunctivae are normal. Pupils are equal, round, and reactive to light.   Neck: Normal range of motion. No JVD present.   Cardiovascular: Normal rate, regular rhythm, normal heart sounds and intact distal pulses.   Pulmonary/Chest: Effort normal. No respiratory distress. She has no wheezes.   Abdominal: Soft. Bowel sounds are normal. She exhibits no distension. There is no tenderness. There is no guarding.   Musculoskeletal: Normal range of motion. She exhibits no edema (trace edema to bilateral lower extremities ).   Neurological: She is alert and oriented to person, place, and time. No cranial nerve deficit.   Skin: Skin is warm and dry.   Psychiatric: She has a normal mood and affect. Her behavior is normal.       Significant Labs:   ABGs: No results for input(s): PH, PCO2, HCO3, POCSATURATED, BE, TOTALHB, COHB, METHB, O2HB, POCFIO2 in the last 48 hours.  CBC:   Recent Labs   Lab 01/14/19  0838 01/15/19  0409   WBC 7.90 6.64   HGB 9.5* 9.3*   HCT 29.4* 29.1*    279     CMP:   Recent Labs   Lab 01/13/19  2331 01/14/19  0838 01/15/19  0409   * 135* 132*   K 4.8 3.8 4.0    104 102   CO2 23 22* 22*   * 160* 156*   BUN 51* 45* 40*   CREATININE 3.3* 3.0* 3.0*   CALCIUM 9.7 10.2 10.2   ANIONGAP 6* 9 8   EGFRNONAA 14* 15* 15*     Cardiac Markers: No results for input(s): CKMB, MYOGLOBIN, BNP, TROPISTAT in the last 48 hours.  Coagulation:   Recent Labs   Lab 01/15/19  0409   APTT 33.1*     Troponin: No results for input(s): TROPONINI in the last 48 hours.  TSH:   Recent Labs   Lab 12/18/18 2003   TSH 1.497       Significant Imaging: none    Assessment/Plan:      * Acute renal failure superimposed on stage 4 chronic kidney disease    Hyperkalemia   Hypercalcemia   Metabolic Acidosis     BUN/CR (76/4.87) on admission with anion gap  (17) and K (6.2). BUN/CR previously (56/2.9) on 12/26/18.  Pt received insulin, bicarb, kayexalate and IVF. Repeat K (5.4). No acute changes noted on telemetry. Pt reports no changes to urinary output.     -consult nephrology   -avoid nephrotoxic meds, renal dose meds   -gentle hydration   -repeat am BMP   -strict intake/output and daily weights        PE (pulmonary thromboembolism)    This represents a high probability of pulmonary embolism.  Continue Eliquis       Pulmonary embolism with acute cor pulmonale    tx'ed with kb's  Now on eliquis.  Appreciate cards input       Chest pain    Trop 6 to 4   On nitro PRN.  Cards is following.  Could be aypical NSTEMI given her gender and dx with DMII  Transfer to icu.  tridil drip per cards  Asa,bb, statin, o2,,orphine for pain    Resolved with tx of PE       NSTEMI (non-ST elevated myocardial infarction)    Chest pain  Elevated troponin  SOB  Initial troponin 2.3, trended up to 6.631.   Pt denies chest pain on exam.   Consult carda recommends full dose ASA, loading dose plavix, continue BB,  Nitro prn chest pain and supplemental O2.   Pt started on Heparin ACS protocol. D/c            Chronic diastolic heart failure    Echo 12/19/18 shows LVEF 65% with diastolic dysfunction and left ventricular remodeling consistent with hypertensive heart disease. No evidence of volume overload on exam.   -TTE  EF 65 %      Hyperkalemia           Metabolic acidosis, normal anion gap (NAG)    conult nephrology       Type 2 diabetes mellitus with hypoglycemia, without long-term current use of insulin    HbA1c 6.4 on 12/24/18     -per chart review glipizide discontinued due to episodic hypoglycemic events   - accucheck  AC &HS  Low dose SSI  diabetic diet           Essential hypertension    HLD     -per chart review patient taking metoprolol, losartan, clonidine, amlodipine and lasix daily   -hold ARB and diuretics for now   -continue BB          VTE Risk Mitigation (From admission,  onward)        Ordered     apixaban tablet 10 mg  2 times daily      01/11/19 1731     Place sequential compression device  Until discontinued      01/10/19 1958     IP VTE LOW RISK PATIENT  Once      01/10/19 1958              Aparna Serrano MD  Department of Hospital Medicine   [facility]

## 2019-01-16 NOTE — TELEPHONE ENCOUNTER
Patient scheduled       ----- Message from Marian Wilkinson sent at 1/16/2019  3:13 PM CST -----  Contact: [phone] /self  Patient is out of the hospital and needs a hospital follow up in a week. Please call to schedule. Thanks

## 2019-01-16 NOTE — ASSESSMENT & PLAN NOTE
HLD     -per chart review patient taking metoprolol, losartan, clonidine, amlodipine and lasix daily   -hold ARB and diuretics for now   -continue BB

## 2019-01-16 NOTE — PLAN OF CARE
Problem: Adult Inpatient Plan of Care  Goal: Plan of Care Review  Outcome: Ongoing (interventions implemented as appropriate)  Plan of care reviewed with patient. Patient voiced understanding. NSR on monitor. No acute distress noted. Side rails x 2, bed in lowest position, call bell within reach, pt advised to call for assistance. Maintain bed alarm for patient safety.

## 2019-01-16 NOTE — PROGRESS NOTES
[facility] Medicine  Progress Note    Patient Name: Brittaney Joseph  MRN: [id number]  Patient Class: IP- Inpatient   Admission Date: 1/10/2019  Length of Stay: 6 days  Attending Physician: Aparna Serrano*  Primary Care Provider: Charles Argueta MD        Subjective:     Principal Problem:Acute renal failure superimposed on stage 4 chronic kidney disease    HPI:  Brittaney Joseph is a 67 yo  female with HTN, HLD, chronic kidney disease stage 4, Type 2 Diabetes Mellitus, diastolic heart failure and chronic back pain. Her primary care physician is Dr. Charles Argueta. Her nephrologist is Dr. Mcdaniel. She lives with her niece in [place], [place]. She presented to [facility] 1/10/19 with complaints of generalized weakness, fatigue, dizziness, SOB and nausea/vomiting. The patient reports acute onset of symptoms while at nephrologist appointment prior to arrival. She denies chest pain, palpitations, unilateral weakness and syncopal events. Initial labs remarkable for K (6.2), BUN/CR (76/4.87) increased from previous BUN/CR (56/2.9 on 12/26/18), anion gap (17),   lactic (3.1), Troponin (2.3) and BNP (1200). EKG shows sinus tachycardia with non-specific T wave abnormality. VSS. CXR shows no acute abnormality. Pt received insulin, bicarb and kayexalate for management of hyperkalemia and acidosis. She also received 1 L NS bolus. Pt admitted to [facility] medicine for further evaluation.     Hospital Course:  The pt seen at bedside, she is complaining of chest pain, on an off during the night. Cards evaluated the pt, given her positive cp, elevated trop, and acute renal failure, decision is to move pt to icu.starte tridil drip, asa, plavx, renal input prior to a prcedure with dye. Etc  Pt updated, agrees to plan  1/12 pt is S/P EHOS for PE. She was in ICU post procedure, now being transferred to [facility] ed. She is doing much better. She is doing well. No mre CP or SOB  as prior to the procedure. Sister at bedside as well.  PT/OT in a day or two  1/13 pt improving, working with PT/OT, more likely SNF candidate, cr down to 3.5 from 4.4 bun down to 65 from 74. No cp with activity or rest  1/14 pt is doing ok, no CP or SOB. Bun cr is improving, down to 3.3 from as high as 4.4. P/OT recommended SNF, will consult the  for snf placement, after the rounds, pt were more interested in home health. willdc honme with hh once bun/cr is a little bit better.    Interval History: awake and alert. Family by bedside.     Reported elevated BP. Schedule hydralazine PO  Possible discharge to home with home health      Review of Systems   Constitutional: Negative for fatigue and fever.   Respiratory: Negative for cough and shortness of breath.    Cardiovascular: Negative for palpitations and leg swelling.   Gastrointestinal: Negative for abdominal pain, diarrhea, nausea and vomiting.   Genitourinary: Negative for dysuria, flank pain and hematuria.   Musculoskeletal: Negative for back pain and myalgias.   Skin: Negative for color change.   Neurological: Negative for dizziness, syncope, weakness, light-headedness and headaches.   Psychiatric/Behavioral: Negative for confusion.     Objective:     Vital Signs (Most Recent):  Temp: 98.6 °F (37 °C) (01/16/19 0614)  Pulse: 80 (01/16/19 0614)  Resp: 18 (01/16/19 0614)  BP: (!) 182/90 (01/16/19 0614)  SpO2: 98 % (01/16/19 0355) Vital Signs (24h Range):  Temp:  [96.4 °F (35.8 °C)-98.6 °F (37 °C)] 98.6 °F (37 °C)  Pulse:  [67-84] 80  Resp:  [18-20] 18  SpO2:  [95 %-99 %] 98 %  BP: (148-206)/(71-94) 182/90     Weight: (!) 187.7 kg (413 lb 12.9 oz)  Body mass index is 71.03 kg/m².    Intake/Output Summary (Last 24 hours) at 1/16/2019 0713  Last data filed at 1/16/2019 0614  Gross per 24 hour   Intake 375 ml   Output 1650 ml   Net -1275 ml      Physical Exam   Constitutional: She is oriented to person, place, and time. She appears well-developed and  well-nourished.   HENT:   Head: Normocephalic and atraumatic.   Neck: Normal range of motion. No JVD present.   Cardiovascular: Normal rate, regular rhythm, normal heart sounds and intact distal pulses.   Pulmonary/Chest: Effort normal. No respiratory distress. She has no wheezes.   Abdominal: Soft. Bowel sounds are normal. She exhibits no distension. There is no tenderness.   Musculoskeletal: Normal range of motion. She exhibits no edema (trace edema to bilateral lower extremities ).   Neurological: She is alert and oriented to person, place, and time. No cranial nerve deficit.   Skin: Skin is warm and dry.   Psychiatric: She has a normal mood and affect. Her behavior is normal.       Significant Labs:   ABGs: No results for input(s): PH, PCO2, HCO3, POCSATURATED, BE, TOTALHB, COHB, METHB, O2HB, POCFIO2 in the last 48 hours.  CBC:   Recent Labs   Lab 01/14/19  0838 01/15/19  0409 01/16/19  0655   WBC 7.90 6.64 6.48   HGB 9.5* 9.3* 9.8*   HCT 29.4* 29.1* 30.5*    279 298     CMP:   Recent Labs   Lab 01/14/19  0838 01/15/19  0409   * 132*   K 3.8 4.0    102   CO2 22* 22*   * 156*   BUN 45* 40*   CREATININE 3.0* 3.0*   CALCIUM 10.2 10.2   ANIONGAP 9 8   EGFRNONAA 15* 15*     Cardiac Markers: No results for input(s): CKMB, MYOGLOBIN, BNP, TROPISTAT in the last 48 hours.  Coagulation:   Recent Labs   Lab 01/15/19  0409   APTT 33.1*     Troponin: No results for input(s): TROPONINI in the last 48 hours.  TSH:   Recent Labs   Lab 12/18/18 2003   TSH 1.497       Significant Imaging: none    Assessment/Plan:      * Acute renal failure superimposed on stage 4 chronic kidney disease    Hyperkalemia   Hypercalcemia   Metabolic Acidosis     BUN/CR (76/4.87) on admission with anion gap (17) and K (6.2). BUN/CR previously (56/2.9) on 12/26/18.  Pt received insulin, bicarb, kayexalate and IVF. Repeat K (5.4). No acute changes noted on telemetry. Pt reports no changes to urinary output.     -consult  nephrology   -avoid nephrotoxic meds, renal dose meds   -gentle hydration   -repeat am BMP   -strict intake/output and daily weights        PE (pulmonary thromboembolism)    This represents a high probability of pulmonary embolism.  Continue Eliquis       Pulmonary embolism with acute cor pulmonale    tx'ed with kb's  Now on eliquis.  Appreciate cards input       Chest pain    Trop 6 to 4   On nitro PRN.  Cards is following.  Could be aypical NSTEMI given her gender and dx with DMII  Transfer to icu.  tridil drip per cards  Asa,bb, statin, o2,,orphine for pain    Resolved with tx of PE       NSTEMI (non-ST elevated myocardial infarction)    Chest pain  Elevated troponin  SOB  Initial troponin 2.3, trended up to 6.631.   Pt denies chest pain on exam.   Consult carda recommends full dose ASA, loading dose plavix, continue BB,  Nitro prn chest pain and supplemental O2.   Pt started on Heparin ACS protocol. D/c            Chronic diastolic heart failure    Echo 12/19/18 shows LVEF 65% with diastolic dysfunction and left ventricular remodeling consistent with hypertensive heart disease. No evidence of volume overload on exam.   -TTE  EF 65 %      Hyperkalemia           Metabolic acidosis, normal anion gap (NAG)    conult nephrology       Type 2 diabetes mellitus with hypoglycemia, without long-term current use of insulin    HbA1c 6.4 on 12/24/18     -per chart review glipizide discontinued due to episodic hypoglycemic events   - accucheck  AC &HS  Low dose SSI  diabetic diet           Essential hypertension    HLD     -per chart review patient taking metoprolol, losartan, clonidine, amlodipine and lasix daily   -hold ARB and diuretics for now   -continue BB   - Add hydralazine         VTE Risk Mitigation (From admission, onward)        Ordered     apixaban tablet 10 mg  2 times daily      01/11/19 1731     Place sequential compression device  Until discontinued      01/10/19 1958     IP VTE LOW RISK PATIENT  Once       01/10/19 1958              Aparna N Innocent-MD Marleni  Department of Hospital Medicine   [facility]

## 2019-01-16 NOTE — PLAN OF CARE
Future Appointments   Date Time Provider Department Center   1/25/2019 10:30 AM Marcy Byrd MD [facility] IM [facility]   1/30/2019  9:40 AM Skyler Arizmendi MD [facility] CARDIO [place]     Discharge rounds on patient. Discussed followup appointments, blue discharge folder, discharge nurse will go over home medications and reasons for medications and final discharge instructions. All patient/caregiver questions answered. Patient verbalized understanding.      Patient has all DME equipment- patient had been offered SNF but had declined- has help from family at home       01/16/19 1030   Final Note   Assessment Type Final Discharge Note   Anticipated Discharge Disposition Home-Health   Hospital Follow Up  Appt(s) scheduled? Yes   Discharge plans and expectations educations in teach back method with documentation complete? Yes   Right Care Referral Info   Post Acute Recommendation Home-care   Referral Type home health   Facility Name [facility]     Micaela Shaffer, RN, CCM, CMSRN  RN Transition Navigator  [phone]

## 2019-01-16 NOTE — ASSESSMENT & PLAN NOTE
Chest pain  Elevated troponin  SOB  Initial troponin 2.3, trended up to 6.631.   Pt denies chest pain on exam.   Consult carda recommends full dose ASA, loading dose plavix, continue BB,  Nitro prn chest pain and supplemental O2.   Pt started on Heparin ACS protocol. D/c

## 2019-01-16 NOTE — PT/OT/SLP PROGRESS
Physical Therapy Treatment and Discharge    Patient Name:  Brittaney Joseph   MRN:  [id number]    Recommendations:     Discharge Recommendations:  (HH PT/OT -- refused SNF)   Discharge Equipment Recommendations: tub bench   Barriers to discharge: None    Assessment:     Brittaney Joseph is a 68 y.o. female admitted with a medical diagnosis of Acute renal failure superimposed on stage 4 chronic kidney disease.  She presents with the following impairments/functional limitations:  impaired endurance, impaired self care skills, impaired functional mobilty, gait instability, impaired balance, decreased lower extremity function, decreased upper extremity function, impaired coordination. Pt made good progress during IP stay as she was able to ambulate 200 ft with RW and supervision this date. Pt d/c home with HH PT/OT, no additional DME needs.    Rehab Prognosis: Good; patient would benefit from acute skilled PT services to address these deficits and reach maximum level of function.    Recent Surgery: Procedure(s) (LRB):  Thrombolysis, PA (N/A)  EKOS, Pumoart/DVT 5 Days Post-Op    Plan:     During this hospitalization, patient to be seen 6 x/week to address the identified rehab impairments via gait training, therapeutic activities, therapeutic exercises and progress toward the following goals:    · Plan of Care Expires:  01/16/19    Subjective     Chief Complaint: none  Patient/Family Comments/goals: pt denies SOB, chest pain, or any other pain throughout session  Pain/Comfort:  · Pain Rating 1: 0/10  · Pain Rating Post-Intervention 1: 0/10      Objective:     Communicated with FRANCIS Best prior to session.  Patient found up in recliner chair telemetry  upon PT entry to room.     General Precautions: Standard, fall, diabetic   Orthopedic Precautions:N/A   Braces: N/A     Functional Mobility:  · Transfers:     · Sit to Stand:  modified independence with rolling walker  · Gait: 200 ft and 150 ft with RW and supervision- no c/o  pain or SOB throughout and no instability during gait training.      AM-PAC 6 CLICK MOBILITY  Turning over in bed (including adjusting bedclothes, sheets and blankets)?: 3  Sitting down on and standing up from a chair with arms (e.g., wheelchair, bedside commode, etc.): 3  Moving from lying on back to sitting on the side of the bed?: 3  Moving to and from a bed to a chair (including a wheelchair)?: 3  Need to walk in hospital room?: 3  Climbing 3-5 steps with a railing?: 3  Basic Mobility Total Score: 18       Therapeutic Activities and Exercises:  BLE seated exercises x 10-12 reps: APs, LAQs, hip flexion, and hip abduction/adduction.  Pt ambulated 2 bouts with ~5-6 min seated rest break between bouts.  Pt stating no questions/concerns regarding d/c home and stating she is ready to go home.    Patient left up in chair with all lines intact, call button in reach, chair alarm on, RN notified and pt's sister present..    GOALS:   Multidisciplinary Problems     Physical Therapy Goals     Not on file          Multidisciplinary Problems (Resolved)        Problem: Physical Therapy Goal    Goal Priority Disciplines Outcome Goal Variances Interventions   Physical Therapy Goal   (Resolved)     PT, PT/OT Outcome(s) achieved     Description:  Goals to be met by: 18     Patient will increase functional independence with mobility by performin. Supine <> sit with Modified [place]  2. Sit to stand transfer with Supervision MET 2019  3. Bed to chair transfer with Supervision using Rolling Walker  4. Gait  x 150 feet with Supervision using Rolling Walker. MET 1/15/2019  5. Assess stair negotiation when appropriate as pt has 4 YESSICA with no rails.    Updated:  1. Gait x 200 ft with RW and distant supervision   MET 2019                    Time Tracking:     PT Received On: 19  PT Start Time: 1129     PT Stop Time: 1155  PT Total Time (min): 26 min     Billable Minutes: Gait Training 13 and Therapeutic  Exercise 13    Treatment Type: Treatment  PT/PTA: PT     PTA Visit Number: 0     Sofia Pineda, PT  01/16/2019

## 2019-01-16 NOTE — ASSESSMENT & PLAN NOTE
HbA1c 6.4 on 12/24/18     -per chart review glipizide discontinued due to episodic hypoglycemic events   - marita  AC &HS  Low dose SSI  diabetic diet

## 2019-01-16 NOTE — PLAN OF CARE
Problem: Adult Inpatient Plan of Care  Goal: Plan of Care Review  Outcome: Ongoing (interventions implemented as appropriate)  End of shift note:    Shift uneventful  Blood pressure elevated throughout the night in the 170's-180's/80's-90's  Administered prn hydralazine x1.  Accucheck 224, covered per sliding scale insulin  Pt unsure of last bowel movement; administered prn senna this morning for constipation  Remains NSR on the monitor  No other significant changes; Prepared to transfer care to oncoming day nurse

## 2019-01-16 NOTE — NURSING
[facility] RN clinician visited patient at hospital bedside.  Patient and PT were present upon my visit.  Educated patient on purpose of [facility].  Discussed with patient that she would be followed by [facility] physician for about 30 days post hospital discharge, rather than [facility] physician.  Also, discussed that she should call clinic for any medical needs or urgent visits while under the care of [facility] physician.  Patient given and agreed on scheduled appointment time and date.  Informed patient that if discharge is postponed then [facility] appointment may be rescheduled for a later date.  Patient given [facility] introduction sheet containing clinic phone number.  Patient given the opportunity to ask questions, verbalized understanding of all information given as stated above.

## 2019-01-16 NOTE — PLAN OF CARE
Problem: Occupational Therapy Goal  Goal: Occupational Therapy Goal  Goals to be met by: 1/31/2019    Patient will increase functional independence with ADLs by performing:    Feeding with Modified [place].  UE Dressing with Modified [place].  LE Dressing with Modified [place].  Grooming while standing with Modified [place].  Toileting from toilet with Modified [place] for hygiene and clothing management.   Step transfer with Modified [place]  Toilet transfer to toilet with Modified [place].  Increased functional strength to WFL for BUE.  Upper extremity exercise program 10 reps per handout, with independence.     Outcome: Ongoing (interventions implemented as appropriate)  Patient requires encouragement for activity, but is improving with mobility. Will benefit from HH OT/PT upon D/C, since patient is refusing SNF.

## 2019-01-16 NOTE — PLAN OF CARE
Problem: Physical Therapy Goal  Goal: Physical Therapy Goal  Goals to be met by: 18     Patient will increase functional independence with mobility by performin. Supine <> sit with Modified [place]  2. Sit to stand transfer with Supervision MET 2019  3. Bed to chair transfer with Supervision using Rolling Walker  4. Gait  x 150 feet with Supervision using Rolling Walker. MET 1/15/2019  5. Assess stair negotiation when appropriate as pt has 4 YESSICA with no rails.    Updated:  1. Gait x 200 ft with RW and distant supervision   MET 2019  Outcome: Outcome(s) achieved Date Met: 19  Pt made good progress during IP stay as she was able to ambulate 200 ft with RW and supervision this date. Pt d/c home with HH PT/OT, no additional DME needs.

## 2019-01-16 NOTE — ASSESSMENT & PLAN NOTE
HLD     -per chart review patient taking metoprolol, losartan, clonidine, amlodipine and lasix daily   -hold ARB and diuretics for now   -continue BB   - Add hydralazine

## 2019-01-16 NOTE — PLAN OF CARE
Problem: Adult Inpatient Plan of Care  Goal: Plan of Care Review  Outcome: Outcome(s) achieved Date Met: 01/16/19  Discharge instruction and education provided. Patient voices understanding. IV site removed, cath tip intact. Telemetry discontinued without adverse reaction. Pharmacy delivered medication at bedside. Pt waiting for ride.

## 2019-01-16 NOTE — ASSESSMENT & PLAN NOTE
Echo 12/19/18 shows LVEF 65% with diastolic dysfunction and left ventricular remodeling consistent with hypertensive heart disease. No evidence of volume overload on exam.   -TTE  EF 65 %

## 2019-01-16 NOTE — DISCHARGE SUMMARY
[facility]  Discharge Summary      Patient Name: Brittaney Joseph  MRN: [id number]  Admission Date: 1/10/2019  Hospital Length of Stay: 6 days  Discharge Date and Time: 1/16/2019  2:17 PM  Attending Physician: Aparna Serrano*   Discharging Provider: Aparna Serrano MD  Primary Care Provider: Charles Argueta MD      HPI:   Brittaney Joseph is a 67 yo  female with HTN, HLD, chronic kidney disease stage 4, Type 2 Diabetes Mellitus, diastolic heart failure and chronic back pain. Her primary care physician is Dr. Charles Argueta. Her nephrologist is Dr. Mcdaniel. She lives with her niece in [place], [place]. She presented to [facility] 1/10/19 with complaints of generalized weakness, fatigue, dizziness, SOB and nausea/vomiting. The patient reports acute onset of symptoms while at nephrologist appointment prior to arrival. She denies chest pain, palpitations, unilateral weakness and syncopal events. Initial labs remarkable for K (6.2), BUN/CR (76/4.87) increased from previous BUN/CR (56/2.9 on 12/26/18), anion gap (17),   lactic (3.1), Troponin (2.3) and BNP (1200). EKG shows sinus tachycardia with non-specific T wave abnormality. VSS. CXR shows no acute abnormality. Pt received insulin, bicarb and kayexalate for management of hyperkalemia and acidosis. She also received 1 L NS bolus. Pt admitted to [facility] medicine for further evaluation.     Procedure(s) (LRB):  Thrombolysis, PA (N/A)  EKOS, Pumoart/DVT      Hospital Course:   The pt seen at bedside, she is complaining of chest pain, on an off during the night. Cards evaluated the pt, given her positive cp, elevated trop, and acute renal failure, decision is to move pt to icu.starte tridil drip, asa, plavx, renal input prior to a prcedure with dye. Etc  Pt updated, agrees to plan  1/12 pt is S/P EHOS for PE. She was in ICU post procedure, now being transferred to [facility] ed. She is doing  much better. She is doing well. No mre CP or SOB as prior to the procedure. Sister at bedside as well.  PT/OT in a day or two  1/13 pt improving, working with PT/OT, more likely SNF candidate, cr down to 3.5 from 4.4 bun down to 65 from 74. No cp with activity or rest  1/14 pt is doing ok, no CP or SOB. Bun cr is improving, down to 3.3 from as high as 4.4. P/OT recommended SNF, will consult the  for snf placement, after the rounds, pt were more interested in home health. willdc honme with hh once bun/cr is a little bit better.  1/15 patient stated she wants to go home. she declined SNF  1/16 for possible discharge today with home health     Consults:   Consults (From admission, onward)        Status Ordering Provider     Inpatient consult to Cardiology-[facility]  Once     Provider:  (Not yet assigned)    Completed GURPREET GANNON     Inpatient consult to Nephrology-[facility]  Once     Provider:  Saida Mcdaniel MD    Acknowledged FEMI STANLEY     Inpatient consult to Registered Dietitian/Nutritionist  Once     Provider:  (Not yet assigned)    Completed PRISCILLA MOSCOSO     Inpatient consult to Social Work  Once     Provider:  (Not yet assigned)    Acknowledged PRISCILLA MOSCOSO     IP consult to case management  Once     Provider:  (Not yet assigned)    Completed PRISCILLA MOSCOSO          PE (pulmonary thromboembolism)    This represents a high probability of pulmonary embolism.  Continue Eliquis       Pulmonary embolism with acute cor pulmonale    tx'ed with kb's  Now on eliquis.  Appreciate cards input       Chronic diastolic heart failure    Echo 12/19/18 shows LVEF 65% with diastolic dysfunction and left ventricular remodeling consistent with hypertensive heart disease. No evidence of volume overload on exam.   -TTE  EF 65 %      Type 2 diabetes mellitus with hypoglycemia, without long-term current use of insulin    HbA1c 6.4 on 12/24/18     -per chart review glipizide discontinued due to episodic hypoglycemic  events   - accucheck  AC &HS  Low dose SSI  diabetic diet           Essential hypertension    HLD     -per chart review patient taking metoprolol, losartan, clonidine, amlodipine and lasix daily   -hold ARB and diuretics for now   -continue BB   - Add hydralazine         Final Active Diagnoses:    Diagnosis Date Noted POA    Pulmonary embolism with acute cor pulmonale [I26.09] 01/12/2019 Yes    PE (pulmonary thromboembolism) [I26.99] 01/12/2019 Yes    Chronic diastolic heart failure [I50.32] 01/10/2019 Yes    Essential hypertension [I10] 12/20/2018 Yes     Chronic    Hyperlipidemia associated with type 2 diabetes mellitus [E11.69, E78.5] 12/20/2018 Yes     Chronic    Type 2 diabetes mellitus with hypoglycemia, without long-term current use of insulin [E11.649] 12/20/2018 Yes     Chronic      Problems Resolved During this Admission:    Diagnosis Date Noted Date Resolved POA    PRINCIPAL PROBLEM:  Acute renal failure superimposed on stage 4 chronic kidney disease [N17.9, N18.4] 12/20/2018 01/16/2019 Yes    Chest pain [R07.9] 01/11/2019 01/16/2019 Yes    Elevated troponin [R74.8]  01/16/2019 Yes    SOB (shortness of breath) [R06.02]  01/16/2019 Yes    Hyperkalemia [E87.5] 01/10/2019 01/16/2019 Yes    NSTEMI (non-ST elevated myocardial infarction) [I21.4] 01/10/2019 01/16/2019 Yes    Hypercalcemia [E83.52] 01/10/2019 01/16/2019 Yes    Metabolic acidosis, normal anion gap (NAG) [E87.2] 12/24/2018 01/16/2019 Yes       Discharged Condition: stable    Disposition: Home-Health Care [facility]    Follow Up:  Follow-up Information     Charles Argueta MD. Schedule an appointment as soon as possible for a visit in 7 days.    Specialty:  Family Medicine  Contact information:  [address] [place] [address] [phone]             Saida Mcdaniel MD. Schedule an appointment as soon as possible for a visit in 1 week.    Specialty:  Nephrology  Why:  Kidney follow Up  Contact information:  [address] [facility]  [address]  [facility]  Harv[place] SCOTT  [phone]             Roger Day MD. Schedule an appointment as soon as possible for a visit in 1 week.    Specialties:  INTERVENTIONAL CARDIOLOGY, Cardiology  Why:  Cardiac FU  Contact information:  [address]  [address]  [place] [phone]                 Patient Instructions:      Ambulatory referral to Home Health   Referral Priority: Routine Referral Type: Home Health   Referral Reason: Specialty Services Required   Requested Specialty: Home Health Services   Number of Visits Requested: 1     Diet Cardiac     SUBSEQUENT HOME HEALTH ORDERS   Order Comments: Subsequent Home Health Orders    Current Medications:  Current Facility-Administered Medications:  0.9%  NaCl infusion, , , Continuous PRN, Roger Day MD, Last Rate: 50 mL/hr at 01/11/19 1629, 50 mL/hr at 01/11/19 1629  acetaminophen tablet 650 mg, 650 mg, Oral, Q4H PRN, Arlette Pascual, NP, 650 mg at 01/15/19 1500  alteplase (CATHFLO ACTIVASE) 1 mg in sodium chloride 0.9% 100 mL infusion, , , Continuous PRN, Roegr Day MD, Last Rate: 10 mL/hr at 01/11/19 1630, 1 mg at 01/11/19 1630  alteplase (CATHFLO ACTIVASE) 10 mg in sodium chloride 0.9% 100 mL infusion, 1 mg/hr, Intra-Catheter, Continuous, Roger Day MD, Last Rate: 10 mL/hr at 01/11/19 1745, 1 mg/hr at 01/11/19 1745  alteplase (CATHFLO ACTIVASE) 10 mg in sodium chloride 0.9% 100 mL infusion, 1 mg/hr, Intra-Catheter, Continuous, Roger Day MD, Last Rate: 10 mL/hr at 01/11/19 1745, 1 mg/hr at 01/11/19 1745  amLODIPine tablet 5 mg, 5 mg, Oral, BID, Lachelle Urias MD, 5 mg at 01/15/19 2121  apixaban tablet 10 mg, 10 mg, Oral, BID, Roger Day MD, 10 mg at 01/15/19 2121  aspirin chewable tablet 81 mg, 81 mg, Oral, Daily, Arlette Pascual, NP, 81 mg at 01/15/19 0817  atorvastatin tablet 80 mg, 80 mg, Oral, Daily, SANDY Fu, ANP, 80 mg at 01/15/19 0817  bivalirudin (ANGIOMAX) 56.85 mg in dextrose  5 % 50 mL, , , Continuous PRN, Roger Day MD, 56.85 mg at 01/11/19 1631  bivalirudin injection, , , Continuous PRN, Roger Day MD, 1.75 mg/kg/hr at 01/11/19 1633  hydrALAZINE injection 10 mg, 10 mg, Intravenous, Q6H PRN, Aparna Serrano MD, 10 mg at 01/16/19 0720  hydrALAZINE tablet 25 mg, 25 mg, Oral, Q8H, Aparna Serrano MD  insulin aspart U-100 pen 0-5 Units, 0-5 Units, Subcutaneous, QID (AC + HS) PRN, Arlette Pascual NP, 2 Units at 01/15/19 2121  metoprolol tartrate (LOPRESSOR) tablet 100 mg, 100 mg, Oral, BID, SANDY Fu, ANP, 100 mg at 01/15/19 2121  nitroGLYCERIN 50 mg in dextrose 5 % 250 mL infusion (TITRATING), 10 mcg/min, Intravenous, Continuous, SANDY Fu, ANP, Last Rate: 36 mL/hr at 01/11/19 1400, 120 mcg/min at 01/11/19 1400  nitroGLYCERIN SL tablet 0.4 mg, 0.4 mg, Sublingual, Q5 Min PRN, Arlette Pascual NP, 0.4 mg at 01/11/19 0729  ondansetron injection 4 mg, 4 mg, Intravenous, Q8H PRN, Arlette Pascual NP  oxyCODONE-acetaminophen 5-325 mg per tablet 1 tablet, 1 tablet, Oral, Q6H PRN, Arlette Pascual NP, 1 tablet at 01/15/19 0552  pantoprazole EC tablet 40 mg, 40 mg, Oral, Daily, Arlette Pascual NP, 40 mg at 01/15/19 0817  senna-docusate 8.6-50 mg per tablet 1 tablet, 1 tablet, Oral, BID PRN, Arlette Pascual NP, 1 tablet at 01/16/19 0613  simethicone chewable tablet 80 mg, 80 mg, Oral, Q6H PRN, Arlette Pascual, MITCHEL, 80 mg at 01/12/19 2054  sodium chloride 0.9% flush 5 mL, 5 mL, Intravenous, PRN, Arlette Pascual NP, 5 mL at 01/14/19 2035        Nursing:   Diabetic Care:   SN to perform and educate Diabetic management with blood glucose monitoring:    Diet:   diabetic diet 1800 calorie    Activities:   activity as tolerated    Labs:      Referrals/ Consults  Physical Therapy to evaluate and treat. Evaluate for home safety and equipment needs; Establish/upgrade home exercise program. Perform / instruct on therapeutic  exercises, gait training, transfer training, and Range of Motion.  Occupational Therapy to evaluate and treat. Evaluate home environment for safety and equipment needs. Perform/Instruct on transfers, ADL training, ROM, and therapeutic exercises.    Home Health Aide:  Physical Therapy Three times weekly and Occupational Therapy Three times weekly     Order Specific Question Answer Comments   What Home Health Agency is the patient currently using? Other/External      SUBSEQUENT HOME HEALTH ORDERS   Order Comments: Subsequent Home Health Orders    Current Medications:  Current Facility-Administered Medications:  0.9%  NaCl infusion, , , Continuous PRN, Roger Day MD, Last Rate: 50 mL/hr at 01/11/19 1629, 50 mL/hr at 01/11/19 1629  acetaminophen tablet 650 mg, 650 mg, Oral, Q4H PRN, Arlette Pascual NP, 650 mg at 01/15/19 1500  alteplase (CATHFLO ACTIVASE) 1 mg in sodium chloride 0.9% 100 mL infusion, , , Continuous PRN, Roger Day MD, Last Rate: 10 mL/hr at 01/11/19 1630, 1 mg at 01/11/19 1630  alteplase (CATHFLO ACTIVASE) 10 mg in sodium chloride 0.9% 100 mL infusion, 1 mg/hr, Intra-Catheter, Continuous, Roger Day MD, Last Rate: 10 mL/hr at 01/11/19 1745, 1 mg/hr at 01/11/19 1745  alteplase (CATHFLO ACTIVASE) 10 mg in sodium chloride 0.9% 100 mL infusion, 1 mg/hr, Intra-Catheter, Continuous, Roger Day MD, Last Rate: 10 mL/hr at 01/11/19 1745, 1 mg/hr at 01/11/19 1745  amLODIPine tablet 5 mg, 5 mg, Oral, BID, Lachelle Urias MD, 5 mg at 01/15/19 2121  apixaban tablet 10 mg, 10 mg, Oral, BID, Roger Day MD, 10 mg at 01/15/19 2121  aspirin chewable tablet 81 mg, 81 mg, Oral, Daily, Arlette Pascual NP, 81 mg at 01/15/19 0817  atorvastatin tablet 80 mg, 80 mg, Oral, Daily, SANDY Fu, ANP, 80 mg at 01/15/19 0817  bivalirudin (ANGIOMAX) 56.85 mg in dextrose 5 % 50 mL, , , Continuous PRN, Roger Day MD, 56.85 mg at 01/11/19 1631  bivalirudin injection, , , Continuous PRN,  Roger Day MD, 1.75 mg/kg/hr at 01/11/19 1633  hydrALAZINE injection 10 mg, 10 mg, Intravenous, Q6H PRN, Aparna Serrano MD, 10 mg at 01/16/19 0720  hydrALAZINE tablet 25 mg, 25 mg, Oral, Q8H, Aparna Serrano MD  insulin aspart U-100 pen 0-5 Units, 0-5 Units, Subcutaneous, QID (AC + HS) PRN, Arlette Pascual NP, 2 Units at 01/15/19 2121  metoprolol tartrate (LOPRESSOR) tablet 100 mg, 100 mg, Oral, BID, SANDY Fu, GLADYS, 100 mg at 01/15/19 2121  nitroGLYCERIN 50 mg in dextrose 5 % 250 mL infusion (TITRATING), 10 mcg/min, Intravenous, Continuous, SANDY Fu, GLADYS, Last Rate: 36 mL/hr at 01/11/19 1400, 120 mcg/min at 01/11/19 1400  nitroGLYCERIN SL tablet 0.4 mg, 0.4 mg, Sublingual, Q5 Min PRN, Arlette Pascual NP, 0.4 mg at 01/11/19 0729  ondansetron injection 4 mg, 4 mg, Intravenous, Q8H PRN, Arlette Pascual NP  oxyCODONE-acetaminophen 5-325 mg per tablet 1 tablet, 1 tablet, Oral, Q6H PRN, Arlette Pascual NP, 1 tablet at 01/15/19 0552  pantoprazole EC tablet 40 mg, 40 mg, Oral, Daily, Arlette Pascual NP, 40 mg at 01/15/19 0817  senna-docusate 8.6-50 mg per tablet 1 tablet, 1 tablet, Oral, BID PRN, Arlette Pascual NP, 1 tablet at 01/16/19 0613  simethicone chewable tablet 80 mg, 80 mg, Oral, Q6H PRN, Arlette Pascual NP, 80 mg at 01/12/19 2054  sodium chloride 0.9% flush 5 mL, 5 mL, Intravenous, PRN, Arlette Pascual NP, 5 mL at 01/14/19 2035        Nursing:   Diabetic Care:   SN to perform and educate Diabetic management with blood glucose monitoring:    Diet:   diabetic diet 1800 calorie    Activities:   activity as tolerated    Labs:      Referrals/ Consults  Physical Therapy to evaluate and treat. Evaluate for home safety and equipment needs; Establish/upgrade home exercise program. Perform / instruct on therapeutic exercises, gait training, transfer training, and Range of Motion.  Occupational Therapy to evaluate and treat. Evaluate  home environment for safety and equipment needs. Perform/Instruct on transfers, ADL training, ROM, and therapeutic exercises.    Home Health Aide:  Nursing Three times weekly, Physical Therapy Three times weekly and Occupational Therapy Three times weekly     Order Specific Question Answer Comments   What Home Health Agency is the patient currently using? Other/External      Activity as tolerated       Significant Diagnostic Studies: {    Pending Diagnostic Studies:     None         Medications:  Reconciled Home Medications:      Medication List      START taking these medications    apixaban 5 mg Tab  Commonly known as:  ELIQUIS  Take 2 tablets (10 mg total) by mouth 2 (two) times daily.     aspirin 81 MG Chew  Take 1 tablet (81 mg total) by mouth once daily.     hydrALAZINE 25 MG tablet  Commonly known as:  APRESOLINE  Take 1 tablet (25 mg total) by mouth every 8 (eight) hours.     nitroGLYCERIN 0.4 MG SL tablet  Commonly known as:  NITROSTAT  Place 1 tablet (0.4 mg total) under the tongue every 5 (five) minutes as needed for Chest pain.     senna-docusate 8.6-50 mg 8.6-50 mg per tablet  Commonly known as:  PERICOLACE  Take 1 tablet by mouth 2 (two) times daily as needed for Constipation.        CONTINUE taking these medications    amLODIPine 10 MG tablet  Commonly known as:  NORVASC  Take 10 mg by mouth nightly.     atorvastatin 40 MG tablet  Commonly known as:  LIPITOR  Take 40 mg by mouth once daily.     cloNIDine 0.2 MG tablet  Commonly known as:  CATAPRES  Take 0.2 mg by mouth nightly.     glipiZIDE 2.5 MG Tr24  Commonly known as:  GLUCOTROL  Take 1 tablet (2.5 mg total) by mouth daily with breakfast.     metoprolol tartrate 100 MG tablet  Commonly known as:  LOPRESSOR     oxyCODONE-acetaminophen  mg per tablet  Commonly known as:  PERCOCET     pantoprazole 40 MG tablet  Commonly known as:  PROTONIX     VITAMIN D2 50,000 unit Cap  Generic drug:  ergocalciferol  Take 50,000 Units by mouth every Mon, Wed,  Fri.        STOP taking these medications    furosemide 40 MG tablet  Commonly known as:  LASIX     losartan 100 MG tablet  Commonly known as:  COZAAR            Indwelling Lines/Drains at time of discharge:   Lines/Drains/Airways          None          Time spent on the discharge of patient: 45 minutes  Patient was seen and examined on the date of discharge and determined to be suitable for discharge.         Aparna Serrano MD  Department of Hospital Medicine  [facility]

## 2019-01-18 ENCOUNTER — PATIENT OUTREACH (OUTPATIENT)
Dept: ADMINISTRATIVE | Facility: CLINIC | Age: 69
End: 2019-01-18

## 2019-01-18 DIAGNOSIS — E11.9 TYPE 2 DIABETES MELLITUS WITHOUT COMPLICATION, UNSPECIFIED WHETHER LONG TERM INSULIN USE: ICD-10-CM

## 2019-01-18 DIAGNOSIS — Z12.39 BREAST CANCER SCREENING: ICD-10-CM

## 2019-01-18 NOTE — PATIENT INSTRUCTIONS
Apixaban oral tablets  What is this medicine?  APIXABAN (a PIX a ban) is an anticoagulant (blood thinner). It is used to lower the chance of stroke in people with a medical condition called atrial fibrillation. It is also used to treat or prevent blood clots in the lungs or in the veins.  How should I use this medicine?  Take this medicine by mouth with a glass of water. Follow the directions on the prescription label. You can take it with or without food. If it upsets your stomach, take it with food. Take your medicine at regular intervals. Do not take it more often than directed. Do not stop taking except on your doctor's advice. Stopping this medicine may increase your risk of a blot clot. Be sure to refill your prescription before you run out of medicine.  Talk to your pediatrician regarding the use of this medicine in children. Special care may be needed.  What side effects may I notice from receiving this medicine?  Side effects that you should report to your doctor or health care professional as soon as possible:  · allergic reactions like skin rash, itching or hives, swelling of the face, lips, or tongue  · signs and symptoms of bleeding such as bloody or black, tarry stools; red or dark-brown urine; spitting up blood or brown material that looks like coffee grounds; red spots on the skin; unusual bruising or bleeding from the eye, gums, or nose  What may interact with this medicine?  This medicine may interact with the following:  · aspirin and aspirin-like medicines  · certain medicines for fungal infections like ketoconazole and itraconazole  · certain medicines for seizures like carbamazepine and phenytoin  · certain medicines that treat or prevent blood clots like warfarin, enoxaparin, and dalteparin  · clarithromycin  · NSAIDs, medicines for pain and inflammation, like ibuprofen or naproxen  · rifampin  · ritonavir  · Ardoch's wort  What if I miss a dose?  If you miss a dose, take it as soon as you  can. If it is almost time for your next dose, take only that dose. Do not take double or extra doses.  Where should I keep my medicine?  Keep out of the reach of children.  Store at room temperature between 20 and 25 degrees C (68 and 77 degrees F). Throw away any unused medicine after the expiration date.  What should I tell my health care provider before I take this medicine?  They need to know if you have any of these conditions:  · bleeding disorders  · bleeding in the brain  · blood in your stools (black or tarry stools) or if you have blood in your vomit  · history of stomach bleeding  · kidney disease  · liver disease  · mechanical heart valve  · an unusual or allergic reaction to apixaban, other medicines, foods, dyes, or preservatives  · pregnant or trying to get pregnant  · breast-feeding  What should I watch for while using this medicine?  Notify your doctor or health care professional and seek emergency treatment if you develop breathing problems; changes in vision; chest pain; severe, sudden headache; pain, swelling, warmth in the leg; trouble speaking; sudden numbness or weakness of the face, arm, or leg. These can be signs that your condition has gotten worse.  If you are going to have surgery, tell your doctor or health care professional that you are taking this medicine.  Tell your health care professional that you use this medicine before you have a spinal or epidural procedure. Sometimes people who take this medicine have bleeding problems around the spine when they have a spinal or epidural procedure. This bleeding is very rare. If you have a spinal or epidural procedure while on this medicine, call your health care professional immediately if you have back pain, numbness or tingling (especially in your legs and feet), muscle weakness, paralysis, or loss of bladder or bowel control.  Avoid sports and activities that might cause injury while you are using this medicine. Severe falls or injuries  can cause unseen bleeding. Be careful when using sharp tools or knives. Consider using an electric razor. Take special care brushing or flossing your teeth. Report any injuries, bruising, or red spots on the skin to your doctor or health care professional.  NOTE:This sheet is a summary. It may not cover all possible information. If you have questions about this medicine, talk to your doctor, pharmacist, or health care provider. Copyright© 2019 [employer]

## 2019-01-22 NOTE — TELEPHONE ENCOUNTER
----- Message from Maria Eugenia Espitia sent at 1/22/2019 11:50 AM CST -----  Patient's niece, Wanda, called.   No. [phone]   Patient needs new script for Metoprolol 10mg, 2 daily.   [facility] in [place]

## 2019-01-23 RX ORDER — METOPROLOL TARTRATE 100 MG/1
100 TABLET ORAL 2 TIMES DAILY
Qty: 180 TABLET | Refills: 1 | Status: ON HOLD | OUTPATIENT
Start: 2019-01-23 | End: 2019-06-28 | Stop reason: HOSPADM

## 2019-01-24 ENCOUNTER — OFFICE VISIT (OUTPATIENT)
Dept: FAMILY MEDICINE | Facility: CLINIC | Age: 69
End: 2019-01-24
Payer: MEDICARE

## 2019-01-24 VITALS
BODY MASS INDEX: 28.7 KG/M2 | SYSTOLIC BLOOD PRESSURE: 138 MMHG | HEIGHT: 64 IN | TEMPERATURE: 98 F | HEART RATE: 83 BPM | DIASTOLIC BLOOD PRESSURE: 70 MMHG | WEIGHT: 168.13 LBS | OXYGEN SATURATION: 100 %

## 2019-01-24 DIAGNOSIS — N18.30 CKD (CHRONIC KIDNEY DISEASE) STAGE 3, GFR 30-59 ML/MIN: ICD-10-CM

## 2019-01-24 DIAGNOSIS — Z12.11 COLON CANCER SCREENING: ICD-10-CM

## 2019-01-24 DIAGNOSIS — I50.32 CHRONIC DIASTOLIC HEART FAILURE: ICD-10-CM

## 2019-01-24 DIAGNOSIS — E11.22 TYPE 2 DIABETES MELLITUS WITH STAGE 3 CHRONIC KIDNEY DISEASE, WITHOUT LONG-TERM CURRENT USE OF INSULIN: ICD-10-CM

## 2019-01-24 DIAGNOSIS — I10 ESSENTIAL HYPERTENSION: Chronic | ICD-10-CM

## 2019-01-24 DIAGNOSIS — I27.82 OTHER CHRONIC PULMONARY EMBOLISM WITH ACUTE COR PULMONALE: ICD-10-CM

## 2019-01-24 DIAGNOSIS — N18.30 TYPE 2 DIABETES MELLITUS WITH STAGE 3 CHRONIC KIDNEY DISEASE, WITHOUT LONG-TERM CURRENT USE OF INSULIN: ICD-10-CM

## 2019-01-24 DIAGNOSIS — I26.99 PE (PULMONARY THROMBOEMBOLISM): Primary | ICD-10-CM

## 2019-01-24 DIAGNOSIS — I26.09 OTHER CHRONIC PULMONARY EMBOLISM WITH ACUTE COR PULMONALE: ICD-10-CM

## 2019-01-24 PROBLEM — E11.649 TYPE 2 DIABETES MELLITUS WITH HYPOGLYCEMIA, WITHOUT LONG-TERM CURRENT USE OF INSULIN: Chronic | Status: RESOLVED | Noted: 2018-12-20 | Resolved: 2019-01-24

## 2019-01-24 PROCEDURE — 3078F DIAST BP <80 MM HG: CPT | Mod: CPTII,S$GLB,, | Performed by: FAMILY MEDICINE

## 2019-01-24 PROCEDURE — 3075F PR MOST RECENT SYSTOLIC BLOOD PRESS GE 130-139MM HG: ICD-10-PCS | Mod: CPTII,S$GLB,, | Performed by: FAMILY MEDICINE

## 2019-01-24 PROCEDURE — 3078F PR MOST RECENT DIASTOLIC BLOOD PRESSURE < 80 MM HG: ICD-10-PCS | Mod: CPTII,S$GLB,, | Performed by: FAMILY MEDICINE

## 2019-01-24 PROCEDURE — 3044F HG A1C LEVEL LT 7.0%: CPT | Mod: CPTII,S$GLB,, | Performed by: FAMILY MEDICINE

## 2019-01-24 PROCEDURE — 99214 OFFICE O/P EST MOD 30 MIN: CPT | Mod: S$GLB,,, | Performed by: FAMILY MEDICINE

## 2019-01-24 PROCEDURE — 3044F PR MOST RECENT HEMOGLOBIN A1C LEVEL <7.0%: ICD-10-PCS | Mod: CPTII,S$GLB,, | Performed by: FAMILY MEDICINE

## 2019-01-24 PROCEDURE — 99214 PR OFFICE/OUTPT VISIT, EST, LEVL IV, 30-39 MIN: ICD-10-PCS | Mod: S$GLB,,, | Performed by: FAMILY MEDICINE

## 2019-01-24 PROCEDURE — 3075F SYST BP GE 130 - 139MM HG: CPT | Mod: CPTII,S$GLB,, | Performed by: FAMILY MEDICINE

## 2019-01-24 NOTE — PROGRESS NOTES
Subjective:      Patient ID: Brittaney Joseph is a 68 y.o. female.    Chief Complaint: Hospital Follow Up      HPI   [facility] 2 blood clots in her lungs; here with family  Saw Dr Edgar since josh, her nephrologist; is not on dialysis  On Eliquis; smalller dose of glipizide, down to 2.5 mg;   Review of Systems   Constitutional: Negative.    HENT: Negative.    Respiratory: Negative.    Cardiovascular: Negative.    Gastrointestinal: Negative.    Endocrine: Negative.    Genitourinary: Negative.    Musculoskeletal: Negative.    Psychiatric/Behavioral: Negative.    All other systems reviewed and are negative.    Objective:     Physical Exam   Constitutional: She is oriented to person, place, and time. She appears well-developed and well-nourished.   HENT:   Head: Normocephalic.   Eyes: Conjunctivae and EOM are normal. Pupils are equal, round, and reactive to light.   Neck: Normal range of motion. Neck supple.   Cardiovascular: Normal rate, regular rhythm and normal heart sounds.   Pulmonary/Chest: Effort normal and breath sounds normal.   Musculoskeletal: Normal range of motion.   Neurological: She is alert and oriented to person, place, and time. She has normal reflexes.   Skin: Skin is warm and dry.   Psychiatric: She has a normal mood and affect. Her behavior is normal. Judgment and thought content normal.   Nursing note and vitals reviewed.    Assessment:     1. PE (pulmonary thromboembolism)    2. Other chronic pulmonary embolism with acute cor pulmonale    3. Chronic diastolic heart failure    4. Essential hypertension    5. Type 2 diabetes mellitus with stage 3 chronic kidney disease, without long-term current use of insulin    6. CKD (chronic kidney disease) stage 3, GFR 30-59 ml/min      Plan:        Medication List           Accurate as of 1/24/19 12:58 PM. If you have any questions, ask your nurse or doctor.               CHANGE how you take these medications    ELIQUIS 5 mg (74 tabs)  Dspk  Generic drug:  apixaban  Take 2 tablets (10 mg total) by mouth 2 (two) times daily for 7 days, then take 1 tablet (5 mg total) by mouth two times a day thereafter. Follow package instructions  What changed:  Another medication with the same name was removed. Continue taking this medication, and follow the directions you see here.  Changed by:  Charles Argueta MD        CONTINUE taking these medications    amLODIPine 10 MG tablet  Commonly known as:  NORVASC     aspirin 81 MG Chew  Take 1 tablet (81 mg total) by mouth once daily.     atorvastatin 40 MG tablet  Commonly known as:  LIPITOR     cloNIDine 0.2 MG tablet  Commonly known as:  CATAPRES     glipiZIDE 2.5 MG Tr24  Commonly known as:  GLUCOTROL  Take 1 tablet (2.5 mg total) by mouth daily with breakfast.     hydrALAZINE 25 MG tablet  Commonly known as:  APRESOLINE  Take 1 tablet (25 mg total) by mouth every 8 (eight) hours.     metoprolol tartrate 100 MG tablet  Commonly known as:  LOPRESSOR  Take 1 tablet (100 mg total) by mouth 2 (two) times daily.     nitroGLYCERIN 0.4 MG SL tablet  Commonly known as:  NITROSTAT  Place 1 tablet (0.4 mg total) under the tongue every 5 (five) minutes as needed for Chest pain. if second dose needed call 911     oxyCODONE-acetaminophen  mg per tablet  Commonly known as:  PERCOCET     pantoprazole 40 MG tablet  Commonly known as:  PROTONIX     senna-docusate 8.6-50 mg 8.6-50 mg per tablet  Commonly known as:  PERICOLACE  Take 1 tablet by mouth 2 (two) times daily as needed for Constipation.     VITAMIN D2 50,000 unit Cap  Generic drug:  ergocalciferol          PE (pulmonary thromboembolism)  -     CBC auto differential; Future; Expected date: 01/24/2019  -     Comprehensive metabolic panel; Future; Expected date: 01/24/2019  -     Hemoglobin A1c; Future  -     Lipid panel; Future  -     Vitamin D; Future    Other chronic pulmonary embolism with acute cor pulmonale  -     CBC auto differential; Future; Expected date:  01/24/2019  -     Comprehensive metabolic panel; Future; Expected date: 01/24/2019  -     Hemoglobin A1c; Future  -     Lipid panel; Future  -     Vitamin D; Future    Chronic diastolic heart failure  -     CBC auto differential; Future; Expected date: 01/24/2019  -     Comprehensive metabolic panel; Future; Expected date: 01/24/2019  -     Hemoglobin A1c; Future  -     Lipid panel; Future  -     Vitamin D; Future    Essential hypertension  -     CBC auto differential; Future; Expected date: 01/24/2019  -     Comprehensive metabolic panel; Future; Expected date: 01/24/2019  -     Hemoglobin A1c; Future  -     Lipid panel; Future  -     Vitamin D; Future    Type 2 diabetes mellitus with stage 3 chronic kidney disease, without long-term current use of insulin  -     CBC auto differential; Future; Expected date: 01/24/2019  -     Comprehensive metabolic panel; Future; Expected date: 01/24/2019  -     Hemoglobin A1c; Future  -     Lipid panel; Future  -     Vitamin D; Future    CKD (chronic kidney disease) stage 3, GFR 30-59 ml/min      Unprovoked PEs; non smoker; has appt with card next week  Has plenty of eliquis  See me 3 weekas  Check labs: cbc, cmp, lipid a1c, vit d  No vitd pill for now

## 2019-02-04 ENCOUNTER — OFFICE VISIT (OUTPATIENT)
Dept: CARDIOLOGY | Facility: CLINIC | Age: 69
End: 2019-02-04
Payer: MEDICARE

## 2019-02-04 VITALS
WEIGHT: 164.19 LBS | SYSTOLIC BLOOD PRESSURE: 119 MMHG | HEART RATE: 64 BPM | DIASTOLIC BLOOD PRESSURE: 65 MMHG | BODY MASS INDEX: 28.03 KG/M2 | HEIGHT: 64 IN | OXYGEN SATURATION: 99 %

## 2019-02-04 DIAGNOSIS — I10 ESSENTIAL HYPERTENSION: Chronic | ICD-10-CM

## 2019-02-04 DIAGNOSIS — I26.99 PE (PULMONARY THROMBOEMBOLISM): Primary | ICD-10-CM

## 2019-02-04 DIAGNOSIS — E11.69 HYPERLIPIDEMIA ASSOCIATED WITH TYPE 2 DIABETES MELLITUS: Chronic | ICD-10-CM

## 2019-02-04 DIAGNOSIS — I26.09 OTHER CHRONIC PULMONARY EMBOLISM WITH ACUTE COR PULMONALE: ICD-10-CM

## 2019-02-04 DIAGNOSIS — E78.5 HYPERLIPIDEMIA ASSOCIATED WITH TYPE 2 DIABETES MELLITUS: Chronic | ICD-10-CM

## 2019-02-04 DIAGNOSIS — I27.82 OTHER CHRONIC PULMONARY EMBOLISM WITH ACUTE COR PULMONALE: ICD-10-CM

## 2019-02-04 PROCEDURE — 3074F PR MOST RECENT SYSTOLIC BLOOD PRESSURE < 130 MM HG: ICD-10-PCS | Mod: CPTII,S$GLB,, | Performed by: INTERNAL MEDICINE

## 2019-02-04 PROCEDURE — 99214 OFFICE O/P EST MOD 30 MIN: CPT | Mod: S$GLB,,, | Performed by: INTERNAL MEDICINE

## 2019-02-04 PROCEDURE — 1101F PR PT FALLS ASSESS DOC 0-1 FALLS W/OUT INJ PAST YR: ICD-10-PCS | Mod: CPTII,S$GLB,, | Performed by: INTERNAL MEDICINE

## 2019-02-04 PROCEDURE — 3078F PR MOST RECENT DIASTOLIC BLOOD PRESSURE < 80 MM HG: ICD-10-PCS | Mod: CPTII,S$GLB,, | Performed by: INTERNAL MEDICINE

## 2019-02-04 PROCEDURE — 3044F PR MOST RECENT HEMOGLOBIN A1C LEVEL <7.0%: ICD-10-PCS | Mod: CPTII,S$GLB,, | Performed by: INTERNAL MEDICINE

## 2019-02-04 PROCEDURE — 3078F DIAST BP <80 MM HG: CPT | Mod: CPTII,S$GLB,, | Performed by: INTERNAL MEDICINE

## 2019-02-04 PROCEDURE — 3044F HG A1C LEVEL LT 7.0%: CPT | Mod: CPTII,S$GLB,, | Performed by: INTERNAL MEDICINE

## 2019-02-04 PROCEDURE — 99214 PR OFFICE/OUTPT VISIT, EST, LEVL IV, 30-39 MIN: ICD-10-PCS | Mod: S$GLB,,, | Performed by: INTERNAL MEDICINE

## 2019-02-04 PROCEDURE — 1101F PT FALLS ASSESS-DOCD LE1/YR: CPT | Mod: CPTII,S$GLB,, | Performed by: INTERNAL MEDICINE

## 2019-02-04 PROCEDURE — 3074F SYST BP LT 130 MM HG: CPT | Mod: CPTII,S$GLB,, | Performed by: INTERNAL MEDICINE

## 2019-02-04 NOTE — LETTER
February 4, 2019      Skyler Arizmendi MD  [address]  [address]  [facility] [address]           [facility]  [address] [place] [address]  Phone: [phone]  Fax: [phone]          Patient: Brittaney Joseph   MR Number: [id number]   YOB: 1950   Date of Visit: 2/4/2019       Dear Dr. Skyler Arizmendi:    Thank you for referring Brittaney Joseph to me for evaluation. Attached you will find relevant portions of my assessment and plan of care.    If you have questions, please do not hesitate to call me. I look forward to following Brittaney Joseph along with you.    Sincerely,    Bri Prater MD    Enclosure  CC:  No Recipients    If you would like to receive this communication electronically, please contact [email or web address] or [phone] to request more information on [employer] Link access.    For providers and/or their staff who would like to refer a patient to [facility], please contact us through our one-stop-shop provider referral line, [facility], at [phone].    If you feel you have received this communication in error or would no longer like to receive these types of communications, please e-mail [email or web address]

## 2019-02-04 NOTE — PROGRESS NOTES
Subjective:   Patient ID:  Brittaney Joseph is a 68 y.o. female who presents for follow-up of Hospital Follow Up      Problem List Items Addressed This Visit        Cardiac/Vascular    Essential hypertension (Chronic)    Hyperlipidemia associated with type 2 diabetes mellitus (Chronic)       Hematology    Pulmonary embolism with acute cor pulmonale    PE (pulmonary thromboembolism) - Primary          HPI: Patient with the above medical problems is here for f/u. She was admitted for PE requiring bilateral EKOS catheter. First occurrence with thrombus. No family history. No recent travels or hospitalization before recently. She is not a smoker. No chest pain. No dyspnea. She is feeling weak. BP is controlled.     Review of Systems   Constitution: Negative.   HENT: Negative.    Eyes: Negative.    Cardiovascular: Negative.    Respiratory: Negative.    Endocrine: Negative.    Hematologic/Lymphatic: Negative.    Skin: Negative.    Musculoskeletal: Negative.    Gastrointestinal: Negative.    Neurological: Negative.    Psychiatric/Behavioral: Negative.    Allergic/Immunologic: Negative.        Patient's Medications   New Prescriptions    No medications on file   Previous Medications    AMLODIPINE (NORVASC) 10 MG TABLET    Take 10 mg by mouth nightly.    APIXABAN (ELIQUIS) 5 MG (74 TABS) DSPK    Take 2 tablets (10 mg total) by mouth 2 (two) times daily for 7 days, then take 1 tablet (5 mg total) by mouth two times a day thereafter. Follow package instructions    ASPIRIN 81 MG CHEW    Take 1 tablet (81 mg total) by mouth once daily.    ATORVASTATIN (LIPITOR) 40 MG TABLET    Take 40 mg by mouth once daily.    CLONIDINE (CATAPRES) 0.2 MG TABLET    Take 0.2 mg by mouth nightly.    GLIPIZIDE (GLUCOTROL) 2.5 MG TR24    Take 1 tablet (2.5 mg total) by mouth daily with breakfast.    HYDRALAZINE (APRESOLINE) 25 MG TABLET    Take 1 tablet (25 mg total) by mouth every 8 (eight) hours.    METOPROLOL TARTRATE (LOPRESSOR) 100 MG TABLET     Take 1 tablet (100 mg total) by mouth 2 (two) times daily.    NITROGLYCERIN (NITROSTAT) 0.4 MG SL TABLET    Place 1 tablet (0.4 mg total) under the tongue every 5 (five) minutes as needed for Chest pain. if second dose needed call 911    OXYCODONE-ACETAMINOPHEN (PERCOCET)  MG PER TABLET        SENNA-DOCUSATE 8.6-50 MG (PERICOLACE) 8.6-50 MG PER TABLET    Take 1 tablet by mouth 2 (two) times daily as needed for Constipation.   Modified Medications    No medications on file   Discontinued Medications    ERGOCALCIFEROL (VITAMIN D2) 50,000 UNIT CAP    Take 50,000 Units by mouth every Mon, Wed, Fri.    PANTOPRAZOLE (PROTONIX) 40 MG TABLET           Objective:   Physical Exam   Constitutional: She is oriented to person, place, and time. She appears well-developed and well-nourished. No distress.   Examination of the digits showed no clubbing or cyanosis   HENT:   Head: Normocephalic and atraumatic.   Eyes: Conjunctivae are normal. Pupils are equal, round, and reactive to light. Right eye exhibits no discharge.   Neck: Normal range of motion. Neck supple. No JVD present. No thyromegaly present.   No carotid bruits   Cardiovascular: Normal rate, regular rhythm, S1 normal, S2 normal, normal heart sounds, intact distal pulses and normal pulses. PMI is not displaced. Exam reveals no gallop, no friction rub and no opening snap.   No murmur heard.  Pulmonary/Chest: Effort normal and breath sounds normal. No respiratory distress. She has no wheezes. She has no rales. She exhibits no tenderness.   Abdominal: Soft. Bowel sounds are normal. She exhibits no distension and no mass. There is no tenderness. There is no guarding.   No hepatosplenomegaly   Musculoskeletal: Normal range of motion. She exhibits no edema or tenderness.   Lymphadenopathy:     She has no cervical adenopathy.   Neurological: She is alert and oriented to person, place, and time.   Skin: Skin is warm. No rash noted. She is not diaphoretic. No erythema.    Psychiatric: She has a normal mood and affect.   Nursing note and vitals reviewed.      ECGs reviewed- NSR with LVH  LABS reviewed  Imaging including Echoes reviewed- normal ef with DD and RV strain    Assessment:     1. PE (pulmonary thromboembolism)    2. Other chronic pulmonary embolism with acute cor pulmonale    3. Hyperlipidemia associated with type 2 diabetes mellitus    4. Essential hypertension        Plan:     Continue current medications  F/u in 3 months. Repeat CT chest before stopping Eliquis  Activity as tolerate

## 2019-02-07 ENCOUNTER — TELEPHONE (OUTPATIENT)
Dept: ADMINISTRATIVE | Facility: CLINIC | Age: 69
End: 2019-02-07

## 2019-02-19 ENCOUNTER — TELEPHONE (OUTPATIENT)
Dept: FAMILY MEDICINE | Facility: CLINIC | Age: 69
End: 2019-02-19

## 2019-02-19 NOTE — TELEPHONE ENCOUNTER
Called pt back not available will try and call back later pt would like to be seen tomorrow but we have nothing need to know if she can be seen by NP on Thursday? Will try back later

## 2019-02-19 NOTE — TELEPHONE ENCOUNTER
----- Message from Marian Wilkinson sent at 2/19/2019 10:25 AM CST -----  Contact: [phone]/self  Patient would like to be seen tomorrow.  She is coming down with the flu. Please call to schedule with anyone in the office. Thanks

## 2019-02-19 NOTE — TELEPHONE ENCOUNTER
----- Message from Aimee Donohue sent at 2/19/2019  4:08 PM CST -----  Contact: Fax  Dr. Argueta    Please review document  scanned into patient's media.    Re:[facility] discharge summary 2-15-19

## 2019-02-28 ENCOUNTER — OFFICE VISIT (OUTPATIENT)
Dept: FAMILY MEDICINE | Facility: CLINIC | Age: 69
End: 2019-02-28
Payer: MEDICARE

## 2019-02-28 ENCOUNTER — TELEPHONE (OUTPATIENT)
Dept: FAMILY MEDICINE | Facility: CLINIC | Age: 69
End: 2019-02-28

## 2019-02-28 VITALS
SYSTOLIC BLOOD PRESSURE: 140 MMHG | OXYGEN SATURATION: 97 % | HEIGHT: 64 IN | TEMPERATURE: 99 F | DIASTOLIC BLOOD PRESSURE: 80 MMHG | BODY MASS INDEX: 25.93 KG/M2 | WEIGHT: 151.88 LBS | HEART RATE: 111 BPM

## 2019-02-28 DIAGNOSIS — A08.4 VIRAL GASTROENTERITIS: Primary | ICD-10-CM

## 2019-02-28 PROCEDURE — 96372 PR INJECTION,THERAP/PROPH/DIAG2ST, IM OR SUBCUT: ICD-10-PCS | Mod: S$GLB,,, | Performed by: FAMILY MEDICINE

## 2019-02-28 PROCEDURE — 3079F PR MOST RECENT DIASTOLIC BLOOD PRESSURE 80-89 MM HG: ICD-10-PCS | Mod: CPTII,S$GLB,, | Performed by: FAMILY MEDICINE

## 2019-02-28 PROCEDURE — 1101F PT FALLS ASSESS-DOCD LE1/YR: CPT | Mod: CPTII,S$GLB,, | Performed by: FAMILY MEDICINE

## 2019-02-28 PROCEDURE — 1101F PR PT FALLS ASSESS DOC 0-1 FALLS W/OUT INJ PAST YR: ICD-10-PCS | Mod: CPTII,S$GLB,, | Performed by: FAMILY MEDICINE

## 2019-02-28 PROCEDURE — 96372 THER/PROPH/DIAG INJ SC/IM: CPT | Mod: S$GLB,,, | Performed by: FAMILY MEDICINE

## 2019-02-28 PROCEDURE — 99213 OFFICE O/P EST LOW 20 MIN: CPT | Mod: 25,S$GLB,, | Performed by: FAMILY MEDICINE

## 2019-02-28 PROCEDURE — 3077F SYST BP >= 140 MM HG: CPT | Mod: CPTII,S$GLB,, | Performed by: FAMILY MEDICINE

## 2019-02-28 PROCEDURE — 3079F DIAST BP 80-89 MM HG: CPT | Mod: CPTII,S$GLB,, | Performed by: FAMILY MEDICINE

## 2019-02-28 PROCEDURE — 3077F PR MOST RECENT SYSTOLIC BLOOD PRESSURE >= 140 MM HG: ICD-10-PCS | Mod: CPTII,S$GLB,, | Performed by: FAMILY MEDICINE

## 2019-02-28 PROCEDURE — 99213 PR OFFICE/OUTPT VISIT, EST, LEVL III, 20-29 MIN: ICD-10-PCS | Mod: 25,S$GLB,, | Performed by: FAMILY MEDICINE

## 2019-02-28 RX ORDER — LOPERAMIDE HYDROCHLORIDE 2 MG/1
CAPSULE ORAL
Qty: 24 CAPSULE | Refills: 0 | Status: ON HOLD | OUTPATIENT
Start: 2019-02-28 | End: 2019-04-01 | Stop reason: HOSPADM

## 2019-02-28 RX ORDER — ONDANSETRON 4 MG/1
4 TABLET, FILM COATED ORAL EVERY 8 HOURS PRN
Qty: 20 TABLET | Refills: 0 | Status: SHIPPED | OUTPATIENT
Start: 2019-02-28 | End: 2019-08-06

## 2019-02-28 RX ORDER — PROMETHAZINE HYDROCHLORIDE 25 MG/ML
25 INJECTION, SOLUTION INTRAMUSCULAR; INTRAVENOUS
Status: COMPLETED | OUTPATIENT
Start: 2019-02-28 | End: 2019-02-28

## 2019-02-28 RX ADMIN — PROMETHAZINE HYDROCHLORIDE 25 MG: 25 INJECTION, SOLUTION INTRAMUSCULAR; INTRAVENOUS at 05:02

## 2019-02-28 NOTE — PROGRESS NOTES
Chief Complaint  Chief Complaint   Patient presents with    Nausea       HPI  Brittaney Joseph is a 68 y.o. female with multiple medical diagnoses as listed in the medical history and problem list that presents for nausea and vomiting for 5 days.  Patient reports 1 episode of water diarrhea today.  She feels nauseaous and has no appetite.  She has thrown up several times.  She denies having eaten any undercooked food, leftover food or having eaten in a restaurant.  She has had no known exposures to anyone with similar symptoms. No fever.       PAST MEDICAL HISTORY:  Past Medical History:   Diagnosis Date    Chronic kidney disease     Chronic low back pain     Diabetes mellitus, type 2     Hypertension        PAST SURGICAL HISTORY:  Past Surgical History:   Procedure Laterality Date    CHOLECYSTECTOMY      EKOS, [place]/DVT  1/11/2019    Performed by Roger [facility] at [facility] CATH LAB/EP    Thrombolysis, PA N/A 1/11/2019    Performed by Roger Day MD at [facility] CATH LAB/EP       SOCIAL HISTORY:  Social History     Socioeconomic History    Marital status: Single     Spouse name: Not on file    Number of children: Not on file    Years of education: Not on file    Highest education level: Not on file   Social Needs    Financial resource strain: Not on file    Food insecurity - worry: Not on file    Food insecurity - inability: Not on file    Transportation needs - medical: Not on file    Transportation needs - non-medical: Not on file   Occupational History    Not on file   Tobacco Use    Smoking status: Never Smoker    Smokeless tobacco: Never Used   Substance and Sexual Activity    Alcohol use: No     Frequency: Never    Drug use: No    Sexual activity: Not on file   Other Topics Concern    Not on file   Social History Narrative    Not on file       FAMILY HISTORY:  History reviewed. No pertinent family history.    ALLERGIES AND MEDICATIONS: updated and reviewed.  Review of patient's  allergies indicates:  No Known Allergies  Current Outpatient Medications   Medication Sig Dispense Refill    amLODIPine (NORVASC) 10 MG tablet Take 10 mg by mouth nightly.      apixaban (ELIQUIS) 5 mg (74 tabs) DsPk Take 2 tablets (10 mg total) by mouth 2 (two) times daily for 7 days, then take 1 tablet (5 mg total) by mouth two times a day thereafter. Follow package instructions 74 tablet 0    aspirin 81 MG Chew Take 1 tablet (81 mg total) by mouth once daily.  0    atorvastatin (LIPITOR) 40 MG tablet Take 40 mg by mouth once daily.      cloNIDine (CATAPRES) 0.2 MG tablet Take 0.2 mg by mouth nightly.      glipiZIDE (GLUCOTROL) 2.5 MG TR24 Take 1 tablet (2.5 mg total) by mouth daily with breakfast. 30 tablet 6    hydrALAZINE (APRESOLINE) 25 MG tablet Take 1 tablet (25 mg total) by mouth every 8 (eight) hours. 90 tablet 11    metoprolol tartrate (LOPRESSOR) 100 MG tablet Take 1 tablet (100 mg total) by mouth 2 (two) times daily. 180 tablet 1    nitroGLYCERIN (NITROSTAT) 0.4 MG SL tablet Place 1 tablet (0.4 mg total) under the tongue every 5 (five) minutes as needed for Chest pain. if second dose needed call 911 25 tablet 1    oxyCODONE-acetaminophen (PERCOCET)  mg per tablet       senna-docusate 8.6-50 mg (PERICOLACE) 8.6-50 mg per tablet Take 1 tablet by mouth 2 (two) times daily as needed for Constipation.      loperamide (IMODIUM) 2 mg capsule Take 1 tablet after each loose stool.  Do not exceed 8 tablets in a day. 24 capsule 0    ondansetron (ZOFRAN) 4 MG tablet Take 1 tablet (4 mg total) by mouth every 8 (eight) hours as needed for Nausea. 20 tablet 0     Current Facility-Administered Medications   Medication Dose Route Frequency Provider Last Rate Last Dose    promethazine injection 25 mg  25 mg Intramuscular 1 time in Clinic/HOD DO THEODORE Osman  Review of Systems   Constitutional: Negative for activity change, appetite change, chills and fatigue.   HENT: Negative  "for congestion, ear discharge, ear pain, rhinorrhea, sinus pain, sore throat and trouble swallowing.    Eyes: Negative for photophobia, pain, redness, itching and visual disturbance.   Respiratory: Negative for cough, chest tightness, shortness of breath and wheezing.    Cardiovascular: Negative for chest pain, palpitations and leg swelling.   Gastrointestinal: Negative for abdominal distention, abdominal pain, blood in stool, diarrhea, nausea and vomiting.   Genitourinary: Negative for dysuria, pelvic pain, vaginal bleeding, vaginal discharge and vaginal pain.   Musculoskeletal: Negative for arthralgias, back pain, gait problem and neck pain.   Skin: Negative for color change, pallor and rash.   Neurological: Negative for dizziness, tremors, weakness, light-headedness, numbness and headaches.   Psychiatric/Behavioral: Negative for agitation, behavioral problems, confusion and sleep disturbance.           PHYSICAL EXAM  Vitals:    02/28/19 1631   BP: (!) 140/80   BP Location: Right arm   Patient Position: Sitting   Pulse: (!) 111   Temp: 98.6 °F (37 °C)   TempSrc: Oral   SpO2: 97%   Weight: 68.9 kg (151 lb 14.4 oz)   Height: 5' 4" (1.626 m)    Body mass index is 26.07 kg/m².  Weight: 68.9 kg (151 lb 14.4 oz)   Height: 5' 4" (162.6 cm)     Physical Exam   Constitutional: She appears well-developed and well-nourished.   HENT:   Head: Normocephalic.   Eyes: Conjunctivae are normal.   Neck: Normal range of motion. Neck supple.   Cardiovascular: Normal rate, regular rhythm and normal heart sounds.   Pulmonary/Chest: Effort normal and breath sounds normal.   Neurological: She is alert.   Skin: Skin is warm and dry.   Psychiatric: Her behavior is normal.         Health Maintenance       Date Due Completion Date    DEXA SCAN 04/24/2019 (Originally 7/3/1991) ---    Zoster Vaccine 04/30/2019 (Originally 7/3/2011) ---    Pneumococcal Vaccine (65+ High/Highest Risk) (1 of 2 - PCV13) 04/30/2019 (Originally 7/3/2016) ---    Foot " Exam 04/30/2019 (Originally 7/3/1961) ---    Mammogram 04/30/2019 (Originally 12/9/2015) 12/9/2013    Colonoscopy 04/30/2019 (Originally 7/3/2001) ---    TETANUS VACCINE 05/31/2019 (Originally 7/3/1969) ---    Eye Exam 05/31/2019 (Originally 7/3/1961) ---    Hemoglobin A1c 06/24/2019 12/24/2018    Urine Microalbumin 12/19/2019 12/19/2018    Lipid Panel 01/11/2020 1/11/2019    Low Dose Statin 02/28/2020 2/28/2019            Assessment & Plan      Brittaney BINGHAM was seen today for nausea.    Diagnoses and all orders for this visit:    Viral gastroenteritis  -     ondansetron (ZOFRAN) 4 MG tablet; Take 1 tablet (4 mg total) by mouth every 8 (eight) hours as needed for Nausea.  -     loperamide (IMODIUM) 2 mg capsule; Take 1 tablet after each loose stool.  Do not exceed 8 tablets in a day.  -     promethazine injection 25 mg          Follow-up: No Follow-up on file.

## 2019-02-28 NOTE — TELEPHONE ENCOUNTER
Spoke with pt's caregiver Kenney in regards to message. Pt has been vomiting x 1 week at least 2 times daily. She complains of abdominal pain and diarrhea. Appt has been scheduled for pt today for 4 pm. Kenney will call back to confirm if she has transportation to arrive to appt today.

## 2019-02-28 NOTE — TELEPHONE ENCOUNTER
----- Message from Desire Soto sent at 2/28/2019  1:28 PM CST -----  Contact: Wanda - [phone]  Patient needs to be seen today or tomorrow she has been throwing up with the smell of food. Please advise

## 2019-03-09 ENCOUNTER — OFFICE VISIT (OUTPATIENT)
Dept: FAMILY MEDICINE | Facility: CLINIC | Age: 69
End: 2019-03-09
Payer: MEDICARE

## 2019-03-09 VITALS
HEIGHT: 64 IN | DIASTOLIC BLOOD PRESSURE: 80 MMHG | TEMPERATURE: 99 F | HEART RATE: 122 BPM | SYSTOLIC BLOOD PRESSURE: 138 MMHG | OXYGEN SATURATION: 96 % | BODY MASS INDEX: 25.29 KG/M2 | WEIGHT: 148.13 LBS

## 2019-03-09 DIAGNOSIS — R11.0 NAUSEA: Primary | ICD-10-CM

## 2019-03-09 DIAGNOSIS — I26.99 OTHER PULMONARY EMBOLISM WITHOUT ACUTE COR PULMONALE, UNSPECIFIED CHRONICITY: ICD-10-CM

## 2019-03-09 PROCEDURE — 3079F PR MOST RECENT DIASTOLIC BLOOD PRESSURE 80-89 MM HG: ICD-10-PCS | Mod: CPTII,S$GLB,, | Performed by: FAMILY MEDICINE

## 2019-03-09 PROCEDURE — 99213 PR OFFICE/OUTPT VISIT, EST, LEVL III, 20-29 MIN: ICD-10-PCS | Mod: S$GLB,,, | Performed by: FAMILY MEDICINE

## 2019-03-09 PROCEDURE — 1101F PR PT FALLS ASSESS DOC 0-1 FALLS W/OUT INJ PAST YR: ICD-10-PCS | Mod: CPTII,S$GLB,, | Performed by: FAMILY MEDICINE

## 2019-03-09 PROCEDURE — 1101F PT FALLS ASSESS-DOCD LE1/YR: CPT | Mod: CPTII,S$GLB,, | Performed by: FAMILY MEDICINE

## 2019-03-09 PROCEDURE — 99213 OFFICE O/P EST LOW 20 MIN: CPT | Mod: S$GLB,,, | Performed by: FAMILY MEDICINE

## 2019-03-09 PROCEDURE — 3075F SYST BP GE 130 - 139MM HG: CPT | Mod: CPTII,S$GLB,, | Performed by: FAMILY MEDICINE

## 2019-03-09 PROCEDURE — 3075F PR MOST RECENT SYSTOLIC BLOOD PRESS GE 130-139MM HG: ICD-10-PCS | Mod: CPTII,S$GLB,, | Performed by: FAMILY MEDICINE

## 2019-03-09 PROCEDURE — 3079F DIAST BP 80-89 MM HG: CPT | Mod: CPTII,S$GLB,, | Performed by: FAMILY MEDICINE

## 2019-03-09 RX ORDER — SUCRALFATE 1 G/10ML
1 SUSPENSION ORAL 4 TIMES DAILY
Qty: 414 ML | Refills: 3 | Status: SHIPPED | OUTPATIENT
Start: 2019-03-09 | End: 2019-05-09

## 2019-03-09 NOTE — PROGRESS NOTES
Chief Complaint  Chief Complaint   Patient presents with    Anorexia    Fatigue     Sleeping a lot     Emesis    Chest Pain       HPI  Brittaney Joseph is a 68 y.o. female with multiple medical diagnoses as listed in the medical history and problem list that presents for nausea and vomiting for 2 weeks.  She feels nauseaous and has no appetite.  She has thrown up several times.  She denies having eaten any undercooked food, leftover food or having eaten in a restaurant.  She has had no known exposures to anyone with similar symptoms. No fever.   I saw her for this about 1 week ago and thought it was a viral gastroenteritis.  She was treated symptomatically with zofran and immodium.  There has been no interval improvement.  The only new medication in the past month is eliquis which commonly causes nausea.  She does complain of intermittant chest pain and takes nitroglycerin to relieve that.  She has not used antacids.      PAST MEDICAL HISTORY:  Past Medical History:   Diagnosis Date    Chronic kidney disease     Chronic low back pain     Diabetes mellitus, type 2     Hypertension        PAST SURGICAL HISTORY:  Past Surgical History:   Procedure Laterality Date    CHOLECYSTECTOMY      EKOS, Pumoart/DVT  1/11/2019    Performed [facility] LAB[facility]EP    Thrombolysis, PA N/A 1/11/2019    Performed by Roger Day MD at [facility] CATH LAB/EP       SOCIAL HISTORY:  Social History     Socioeconomic History    Marital status: Single     Spouse name: Not on file    Number of children: Not on file    Years of education: Not on file    Highest education level: Not on file   Social Needs    Financial resource strain: Not on file    Food insecurity - worry: Not on file    Food insecurity - inability: Not on file    Transportation needs - medical: Not on file    Transportation needs - non-medical: Not on file   Occupational History    Not on file   Tobacco Use    Smoking status: Never  Smoker    Smokeless tobacco: Never Used   Substance and Sexual Activity    Alcohol use: No     Frequency: Never    Drug use: No    Sexual activity: Not on file   Other Topics Concern    Not on file   Social History Narrative    Not on file       FAMILY HISTORY:  History reviewed. No pertinent family history.    ALLERGIES AND MEDICATIONS: updated and reviewed.  Review of patient's allergies indicates:  No Known Allergies  Current Outpatient Medications   Medication Sig Dispense Refill    amLODIPine (NORVASC) 10 MG tablet Take 10 mg by mouth nightly.      aspirin 81 MG Chew Take 1 tablet (81 mg total) by mouth once daily.  0    atorvastatin (LIPITOR) 40 MG tablet Take 40 mg by mouth once daily.      cloNIDine (CATAPRES) 0.2 MG tablet Take 0.2 mg by mouth nightly.      glipiZIDE (GLUCOTROL) 2.5 MG TR24 Take 1 tablet (2.5 mg total) by mouth daily with breakfast. 30 tablet 6    hydrALAZINE (APRESOLINE) 25 MG tablet Take 1 tablet (25 mg total) by mouth every 8 (eight) hours. 90 tablet 11    loperamide (IMODIUM) 2 mg capsule Take 1 tablet after each loose stool.  Do not exceed 8 tablets in a day. 24 capsule 0    metoprolol tartrate (LOPRESSOR) 100 MG tablet Take 1 tablet (100 mg total) by mouth 2 (two) times daily. 180 tablet 1    nitroGLYCERIN (NITROSTAT) 0.4 MG SL tablet Place 1 tablet (0.4 mg total) under the tongue every 5 (five) minutes as needed for Chest pain. if second dose needed call 911 25 tablet 1    ondansetron (ZOFRAN) 4 MG tablet Take 1 tablet (4 mg total) by mouth every 8 (eight) hours as needed for Nausea. 20 tablet 0    oxyCODONE-acetaminophen (PERCOCET)  mg per tablet       senna-docusate 8.6-50 mg (PERICOLACE) 8.6-50 mg per tablet Take 1 tablet by mouth 2 (two) times daily as needed for Constipation.      rivaroxaban (XARELTO) 10 mg Tab Take 1 tablet (10 mg total) by mouth daily with dinner or evening meal. 30 tablet 3    sucralfate (CARAFATE) 100 mg/mL suspension Take 10 mLs  "(1 g total) by mouth 4 (four) times daily. 414 mL 3     No current facility-administered medications for this visit.          ROS  Review of Systems   Constitutional: Negative for activity change, appetite change, chills and fatigue.   HENT: Negative for congestion, ear discharge, ear pain, rhinorrhea, sinus pain, sore throat and trouble swallowing.    Eyes: Negative for photophobia, pain, redness, itching and visual disturbance.   Respiratory: Negative for cough, chest tightness, shortness of breath and wheezing.    Cardiovascular: Negative for chest pain, palpitations and leg swelling.   Gastrointestinal: Negative for abdominal distention, abdominal pain, blood in stool, diarrhea, nausea and vomiting.   Genitourinary: Negative for dysuria, pelvic pain, vaginal bleeding, vaginal discharge and vaginal pain.   Musculoskeletal: Negative for arthralgias, back pain, gait problem and neck pain.   Skin: Negative for color change, pallor and rash.   Neurological: Negative for dizziness, tremors, weakness, light-headedness, numbness and headaches.   Psychiatric/Behavioral: Negative for agitation, behavioral problems, confusion and sleep disturbance.           PHYSICAL EXAM  Vitals:    03/09/19 1056   BP: 138/80   Pulse: (!) 122   Temp: 98.7 °F (37.1 °C)   SpO2: 96%   Weight: 67.2 kg (148 lb 2.4 oz)   Height: 5' 4" (1.626 m)    Body mass index is 25.43 kg/m².  Weight: 67.2 kg (148 lb 2.4 oz)   Height: 5' 4" (162.6 cm)     Physical Exam   Constitutional: She appears well-developed and well-nourished.   HENT:   Head: Normocephalic.   Eyes: Conjunctivae are normal.   Neck: Normal range of motion. Neck supple.   Cardiovascular: Normal rate, regular rhythm and normal heart sounds.   Pulmonary/Chest: Effort normal and breath sounds normal.   Neurological: She is alert.   Skin: Skin is warm and dry.   Psychiatric: Her behavior is normal.         Health Maintenance       Date Due Completion Date    DEXA SCAN 04/24/2019 (Originally " 7/3/1991) ---    Zoster Vaccine 04/30/2019 (Originally 7/3/2011) ---    Pneumococcal Vaccine (65+ High/Highest Risk) (1 of 2 - PCV13) 04/30/2019 (Originally 7/3/2016) ---    Foot Exam 04/30/2019 (Originally 7/3/1961) ---    Mammogram 04/30/2019 (Originally 12/9/2015) 12/9/2013    Colonoscopy 04/30/2019 (Originally 7/3/2001) ---    TETANUS VACCINE 05/31/2019 (Originally 7/3/1969) ---    Eye Exam 05/31/2019 (Originally 7/3/1961) ---    Hemoglobin A1c 06/24/2019 12/24/2018    Urine Microalbumin 12/19/2019 12/19/2018    Lipid Panel 01/11/2020 1/11/2019    Low Dose Statin 02/28/2020 2/28/2019            Assessment & Plan      Brittaney BIGNHAM was seen today for anorexia, fatigue, emesis and chest pain.    Diagnoses and all orders for this visit:    Nausea  -     H. PYLORI ANTIBODY, IGG; Future  -     CBC auto differential; Future  -     Comprehensive metabolic panel; Future    Other pulmonary embolism without acute cor pulmonale, unspecified chronicity  -     rivaroxaban (XARELTO) 10 mg Tab; Take 1 tablet (10 mg total) by mouth daily with dinner or evening meal.  - I'll switch her eliquis to xarelto as it sometimes doesn't cause as much nausea.   Other orders  -     sucralfate (CARAFATE) 100 mg/mL suspension; Take 10 mLs (1 g total) by mouth 4 (four) times daily.          Follow-up: No Follow-up on file.

## 2019-03-12 ENCOUNTER — TELEPHONE (OUTPATIENT)
Dept: FAMILY MEDICINE | Facility: CLINIC | Age: 69
End: 2019-03-12

## 2019-03-12 ENCOUNTER — LAB VISIT (OUTPATIENT)
Dept: LAB | Facility: HOSPITAL | Age: 69
End: 2019-03-12
Attending: FAMILY MEDICINE
Payer: MEDICARE

## 2019-03-12 DIAGNOSIS — E83.52 HYPERCALCEMIA: Primary | ICD-10-CM

## 2019-03-12 DIAGNOSIS — R11.0 NAUSEA: ICD-10-CM

## 2019-03-12 LAB
ALBUMIN SERPL BCP-MCNC: 4 G/DL
ALP SERPL-CCNC: 101 U/L
ALT SERPL W/O P-5'-P-CCNC: 21 U/L
ANION GAP SERPL CALC-SCNC: 15 MMOL/L
AST SERPL-CCNC: 44 U/L
BASOPHILS # BLD AUTO: 0.03 K/UL
BASOPHILS NFR BLD: 0.4 %
BILIRUB SERPL-MCNC: 0.7 MG/DL
BUN SERPL-MCNC: 83 MG/DL
CALCIUM SERPL-MCNC: 13.2 MG/DL
CHLORIDE SERPL-SCNC: 103 MMOL/L
CO2 SERPL-SCNC: 20 MMOL/L
CREAT SERPL-MCNC: 7.52 MG/DL
DIFFERENTIAL METHOD: ABNORMAL
EOSINOPHIL # BLD AUTO: 0.1 K/UL
EOSINOPHIL NFR BLD: 1.7 %
ERYTHROCYTE [DISTWIDTH] IN BLOOD BY AUTOMATED COUNT: 15.5 %
EST. GFR  (AFRICAN AMERICAN): 5.9 ML/MIN/1.73 M^2
EST. GFR  (NON AFRICAN AMERICAN): 5.1 ML/MIN/1.73 M^2
GLUCOSE SERPL-MCNC: 84 MG/DL
HCT VFR BLD AUTO: 38.4 %
HGB BLD-MCNC: 12.2 G/DL
LYMPHOCYTES # BLD AUTO: 2.4 K/UL
LYMPHOCYTES NFR BLD: 28.8 %
MCH RBC QN AUTO: 28.2 PG
MCHC RBC AUTO-ENTMCNC: 31.8 G/DL
MCV RBC AUTO: 89 FL
MONOCYTES # BLD AUTO: 0.8 K/UL
MONOCYTES NFR BLD: 9.9 %
NEUTROPHILS # BLD AUTO: 4.9 K/UL
NEUTROPHILS NFR BLD: 59.1 %
PLATELET # BLD AUTO: 390 K/UL
PMV BLD AUTO: 10.2 FL
POTASSIUM SERPL-SCNC: 4.3 MMOL/L
PROT SERPL-MCNC: 7.8 G/DL
RBC # BLD AUTO: 4.32 M/UL
SODIUM SERPL-SCNC: 138 MMOL/L
WBC # BLD AUTO: 8.22 K/UL

## 2019-03-12 PROCEDURE — 86677 HELICOBACTER PYLORI ANTIBODY: CPT | Mod: PO

## 2019-03-12 PROCEDURE — 85025 COMPLETE CBC W/AUTO DIFF WBC: CPT | Mod: PO

## 2019-03-12 PROCEDURE — 80053 COMPREHEN METABOLIC PANEL: CPT | Mod: PO

## 2019-03-12 NOTE — TELEPHONE ENCOUNTER
Patient's family notified.  She should contact her nephrologist ASAP for an appointment.  I ordered additional labs that she will do tomorrow.

## 2019-03-13 ENCOUNTER — TELEPHONE (OUTPATIENT)
Dept: FAMILY MEDICINE | Facility: CLINIC | Age: 69
End: 2019-03-13

## 2019-03-13 ENCOUNTER — LAB VISIT (OUTPATIENT)
Dept: LAB | Facility: HOSPITAL | Age: 69
End: 2019-03-13
Attending: FAMILY MEDICINE
Payer: MEDICARE

## 2019-03-13 DIAGNOSIS — E83.52 HYPERCALCEMIA: ICD-10-CM

## 2019-03-13 LAB
CA-I BLDV-SCNC: 1.71 MMOL/L
PTH-INTACT SERPL-MCNC: 65 PG/ML

## 2019-03-13 PROCEDURE — 82652 VIT D 1 25-DIHYDROXY: CPT | Mod: PO

## 2019-03-13 PROCEDURE — 82330 ASSAY OF CALCIUM: CPT | Mod: PO

## 2019-03-13 PROCEDURE — 83970 ASSAY OF PARATHORMONE: CPT | Mod: PO

## 2019-03-13 NOTE — TELEPHONE ENCOUNTER
----- Message from Maria Eugenia Espitia sent at 3/13/2019 11:58 AM CDT -----  No. [phone]    Patient has completed her labs.  She asked to speak to the nurse.    Please call.

## 2019-03-14 LAB — H PYLORI IGG SERPL QL IA: NEGATIVE

## 2019-03-18 ENCOUNTER — TELEPHONE (OUTPATIENT)
Dept: FAMILY MEDICINE | Facility: CLINIC | Age: 69
End: 2019-03-18

## 2019-03-18 LAB — 1,25(OH)2D3 SERPL-MCNC: 54 PG/ML

## 2019-03-18 NOTE — TELEPHONE ENCOUNTER
Pt's inessaece Wanda stated that pt is refusing to take any of her medications and eat. She is only drinking approximately 1 to 2 oz of fluid per day. Would like to know what to do. Roldan stated that this has been going on for a few weeks now. Pt does not have home health at this time.

## 2019-03-18 NOTE — TELEPHONE ENCOUNTER
----- Message from Marian Wilkinson sent at 3/18/2019  2:59 PM CDT -----  Contact: [phone]/Dea/niece  Patient's niece is requesting a call back. Patient is not eating or taking her medication. Thanks

## 2019-03-19 NOTE — TELEPHONE ENCOUNTER
Wanda has been notified and verbalized understanding of below information. She will reach out to pt's nephrologist.

## 2019-03-28 ENCOUNTER — HOSPITAL ENCOUNTER (INPATIENT)
Facility: HOSPITAL | Age: 69
LOS: 4 days | Discharge: HOME HEALTH | DRG: 291 | End: 2019-04-01
Attending: EMERGENCY MEDICINE | Admitting: HOSPITALIST
Payer: MEDICARE

## 2019-03-28 DIAGNOSIS — N18.9 ACUTE RENAL FAILURE SUPERIMPOSED ON CHRONIC KIDNEY DISEASE, UNSPECIFIED CKD STAGE, UNSPECIFIED ACUTE RENAL FAILURE TYPE: Primary | ICD-10-CM

## 2019-03-28 DIAGNOSIS — Z99.2 ESRD NEEDING DIALYSIS: ICD-10-CM

## 2019-03-28 DIAGNOSIS — N17.9 ACUTE RENAL FAILURE SUPERIMPOSED ON CHRONIC KIDNEY DISEASE, UNSPECIFIED CKD STAGE, UNSPECIFIED ACUTE RENAL FAILURE TYPE: Primary | ICD-10-CM

## 2019-03-28 DIAGNOSIS — N18.6 ESRD NEEDING DIALYSIS: ICD-10-CM

## 2019-03-28 DIAGNOSIS — R53.1 WEAKNESS: ICD-10-CM

## 2019-03-28 PROBLEM — I50.32 CHRONIC DIASTOLIC HEART FAILURE: Chronic | Status: ACTIVE | Noted: 2019-01-10

## 2019-03-28 PROBLEM — I26.09 PULMONARY EMBOLISM WITH ACUTE COR PULMONALE: Status: RESOLVED | Noted: 2019-01-11 | Resolved: 2019-03-28

## 2019-03-28 PROBLEM — I26.09 PULMONARY EMBOLISM WITH ACUTE COR PULMONALE: Status: ACTIVE | Noted: 2019-01-11

## 2019-03-28 PROBLEM — N18.4 TYPE 2 DIABETES MELLITUS WITH STAGE 4 CHRONIC KIDNEY DISEASE, WITHOUT LONG-TERM CURRENT USE OF INSULIN: Chronic | Status: ACTIVE | Noted: 2018-12-20

## 2019-03-28 PROBLEM — N19 UREMIA: Status: ACTIVE | Noted: 2019-03-28

## 2019-03-28 PROBLEM — Z86.711 HISTORY OF PULMONARY EMBOLISM: Status: ACTIVE | Noted: 2019-01-11

## 2019-03-28 LAB
ALBUMIN SERPL BCP-MCNC: 3.3 G/DL (ref 3.5–5.2)
ALP SERPL-CCNC: 88 U/L (ref 55–135)
ALT SERPL W/O P-5'-P-CCNC: 18 U/L (ref 10–44)
ANION GAP SERPL CALC-SCNC: 18 MMOL/L (ref 8–16)
APTT BLDCRRT: 25.6 SEC (ref 21–32)
AST SERPL-CCNC: 23 U/L (ref 10–40)
BACTERIA #/AREA URNS HPF: ABNORMAL /HPF
BASOPHILS # BLD AUTO: 0.03 K/UL (ref 0–0.2)
BASOPHILS NFR BLD: 0.5 % (ref 0–1.9)
BILIRUB SERPL-MCNC: 0.5 MG/DL (ref 0.1–1)
BILIRUB UR QL STRIP: ABNORMAL
BUN SERPL-MCNC: 110 MG/DL (ref 8–23)
CALCIUM SERPL-MCNC: 11.3 MG/DL (ref 8.7–10.5)
CHLORIDE SERPL-SCNC: 98 MMOL/L (ref 95–110)
CLARITY UR: CLEAR
CO2 SERPL-SCNC: 16 MMOL/L (ref 23–29)
COLOR UR: YELLOW
CREAT SERPL-MCNC: 6.6 MG/DL (ref 0.5–1.4)
DIFFERENTIAL METHOD: ABNORMAL
EOSINOPHIL # BLD AUTO: 0.1 K/UL (ref 0–0.5)
EOSINOPHIL NFR BLD: 1.8 % (ref 0–8)
ERYTHROCYTE [DISTWIDTH] IN BLOOD BY AUTOMATED COUNT: 15.5 % (ref 11.5–14.5)
EST. GFR  (AFRICAN AMERICAN): 7 ML/MIN/1.73 M^2
EST. GFR  (NON AFRICAN AMERICAN): 6 ML/MIN/1.73 M^2
GLUCOSE SERPL-MCNC: 109 MG/DL (ref 70–110)
GLUCOSE UR QL STRIP: NEGATIVE
HCT VFR BLD AUTO: 37.2 % (ref 37–48.5)
HGB BLD-MCNC: 12.3 G/DL (ref 12–16)
HGB UR QL STRIP: NEGATIVE
HYALINE CASTS #/AREA URNS LPF: 0 /LPF
INR PPP: 1 (ref 0.8–1.2)
KETONES UR QL STRIP: NEGATIVE
LEUKOCYTE ESTERASE UR QL STRIP: NEGATIVE
LYMPHOCYTES # BLD AUTO: 1.9 K/UL (ref 1–4.8)
LYMPHOCYTES NFR BLD: 31.6 % (ref 18–48)
MAGNESIUM SERPL-MCNC: 1.8 MG/DL (ref 1.6–2.6)
MCH RBC QN AUTO: 28.9 PG (ref 27–31)
MCHC RBC AUTO-ENTMCNC: 33.1 G/DL (ref 32–36)
MCV RBC AUTO: 87 FL (ref 82–98)
MICROSCOPIC COMMENT: ABNORMAL
MONOCYTES # BLD AUTO: 0.6 K/UL (ref 0.3–1)
MONOCYTES NFR BLD: 10.1 % (ref 4–15)
NEUTROPHILS # BLD AUTO: 3.4 K/UL (ref 1.8–7.7)
NEUTROPHILS NFR BLD: 55.8 % (ref 38–73)
NITRITE UR QL STRIP: NEGATIVE
PH UR STRIP: 6 [PH] (ref 5–8)
PHOSPHATE SERPL-MCNC: 4.9 MG/DL (ref 2.7–4.5)
PLATELET # BLD AUTO: 285 K/UL (ref 150–350)
PMV BLD AUTO: 10.1 FL (ref 9.2–12.9)
POCT GLUCOSE: 109 MG/DL (ref 70–110)
POTASSIUM SERPL-SCNC: 4.3 MMOL/L (ref 3.5–5.1)
PROT SERPL-MCNC: 7.8 G/DL (ref 6–8.4)
PROT UR QL STRIP: ABNORMAL
PROTHROMBIN TIME: 10.1 SEC (ref 9–12.5)
RBC # BLD AUTO: 4.26 M/UL (ref 4–5.4)
RBC #/AREA URNS HPF: 2 /HPF (ref 0–4)
SODIUM SERPL-SCNC: 132 MMOL/L (ref 136–145)
SP GR UR STRIP: >=1.03 (ref 1–1.03)
URN SPEC COLLECT METH UR: ABNORMAL
UROBILINOGEN UR STRIP-ACNC: NEGATIVE EU/DL
WBC # BLD AUTO: 6.14 K/UL (ref 3.9–12.7)
WBC #/AREA URNS HPF: 0 /HPF (ref 0–5)

## 2019-03-28 PROCEDURE — 84100 ASSAY OF PHOSPHORUS: CPT

## 2019-03-28 PROCEDURE — 81000 URINALYSIS NONAUTO W/SCOPE: CPT

## 2019-03-28 PROCEDURE — 11000001 HC ACUTE MED/SURG PRIVATE ROOM

## 2019-03-28 PROCEDURE — 85610 PROTHROMBIN TIME: CPT

## 2019-03-28 PROCEDURE — 93005 ELECTROCARDIOGRAM TRACING: CPT

## 2019-03-28 PROCEDURE — 80053 COMPREHEN METABOLIC PANEL: CPT

## 2019-03-28 PROCEDURE — 85730 THROMBOPLASTIN TIME PARTIAL: CPT

## 2019-03-28 PROCEDURE — 99285 EMERGENCY DEPT VISIT HI MDM: CPT

## 2019-03-28 PROCEDURE — 85025 COMPLETE CBC W/AUTO DIFF WBC: CPT

## 2019-03-28 PROCEDURE — 83735 ASSAY OF MAGNESIUM: CPT

## 2019-03-28 PROCEDURE — 25000003 PHARM REV CODE 250: Performed by: HOSPITALIST

## 2019-03-28 PROCEDURE — 63600175 PHARM REV CODE 636 W HCPCS: Performed by: HOSPITALIST

## 2019-03-28 PROCEDURE — 93010 ELECTROCARDIOGRAM REPORT: CPT | Mod: ,,, | Performed by: STUDENT IN AN ORGANIZED HEALTH CARE EDUCATION/TRAINING PROGRAM

## 2019-03-28 PROCEDURE — 93010 EKG 12-LEAD: ICD-10-PCS | Mod: ,,, | Performed by: STUDENT IN AN ORGANIZED HEALTH CARE EDUCATION/TRAINING PROGRAM

## 2019-03-28 RX ORDER — AMLODIPINE BESYLATE 5 MG/1
10 TABLET ORAL NIGHTLY
Status: DISCONTINUED | OUTPATIENT
Start: 2019-03-28 | End: 2019-03-28

## 2019-03-28 RX ORDER — ONDANSETRON 4 MG/1
4 TABLET, ORALLY DISINTEGRATING ORAL EVERY 8 HOURS PRN
Status: DISCONTINUED | OUTPATIENT
Start: 2019-03-28 | End: 2019-04-01 | Stop reason: HOSPADM

## 2019-03-28 RX ORDER — GLUCAGON 1 MG
1 KIT INJECTION
Status: DISCONTINUED | OUTPATIENT
Start: 2019-03-28 | End: 2019-04-01 | Stop reason: HOSPADM

## 2019-03-28 RX ORDER — AMOXICILLIN 250 MG
1 CAPSULE ORAL 2 TIMES DAILY PRN
Status: DISCONTINUED | OUTPATIENT
Start: 2019-03-28 | End: 2019-04-01 | Stop reason: HOSPADM

## 2019-03-28 RX ORDER — INSULIN ASPART 100 [IU]/ML
0-5 INJECTION, SOLUTION INTRAVENOUS; SUBCUTANEOUS
Status: DISCONTINUED | OUTPATIENT
Start: 2019-03-28 | End: 2019-04-01 | Stop reason: HOSPADM

## 2019-03-28 RX ORDER — RAMELTEON 8 MG/1
8 TABLET ORAL NIGHTLY PRN
Status: DISCONTINUED | OUTPATIENT
Start: 2019-03-28 | End: 2019-04-01 | Stop reason: HOSPADM

## 2019-03-28 RX ORDER — DEXTROSE 50 % IN WATER (D50W) INTRAVENOUS SYRINGE
25
Status: DISCONTINUED | OUTPATIENT
Start: 2019-03-28 | End: 2019-04-01 | Stop reason: HOSPADM

## 2019-03-28 RX ORDER — ACETAMINOPHEN 500 MG
1000 TABLET ORAL EVERY 8 HOURS PRN
Status: DISCONTINUED | OUTPATIENT
Start: 2019-03-28 | End: 2019-04-01 | Stop reason: HOSPADM

## 2019-03-28 RX ORDER — NITROGLYCERIN 0.4 MG/1
0.4 TABLET SUBLINGUAL EVERY 5 MIN PRN
Status: DISCONTINUED | OUTPATIENT
Start: 2019-03-28 | End: 2019-04-01 | Stop reason: HOSPADM

## 2019-03-28 RX ORDER — METOPROLOL TARTRATE 50 MG/1
100 TABLET ORAL 2 TIMES DAILY
Status: DISCONTINUED | OUTPATIENT
Start: 2019-03-28 | End: 2019-03-28

## 2019-03-28 RX ORDER — IBUPROFEN 200 MG
24 TABLET ORAL
Status: DISCONTINUED | OUTPATIENT
Start: 2019-03-28 | End: 2019-04-01 | Stop reason: HOSPADM

## 2019-03-28 RX ORDER — DEXTROSE 50 % IN WATER (D50W) INTRAVENOUS SYRINGE
12.5
Status: DISCONTINUED | OUTPATIENT
Start: 2019-03-28 | End: 2019-04-01 | Stop reason: HOSPADM

## 2019-03-28 RX ORDER — CLONIDINE HYDROCHLORIDE 0.2 MG/1
0.2 TABLET ORAL NIGHTLY
Status: DISCONTINUED | OUTPATIENT
Start: 2019-03-28 | End: 2019-04-01 | Stop reason: HOSPADM

## 2019-03-28 RX ORDER — SUCRALFATE 1 G/10ML
1 SUSPENSION ORAL 4 TIMES DAILY
Status: DISCONTINUED | OUTPATIENT
Start: 2019-03-29 | End: 2019-04-01 | Stop reason: HOSPADM

## 2019-03-28 RX ORDER — NAPROXEN SODIUM 220 MG/1
81 TABLET, FILM COATED ORAL DAILY
Status: DISCONTINUED | OUTPATIENT
Start: 2019-03-29 | End: 2019-04-01 | Stop reason: HOSPADM

## 2019-03-28 RX ORDER — HYDRALAZINE HYDROCHLORIDE 25 MG/1
25 TABLET, FILM COATED ORAL EVERY 8 HOURS
Status: DISCONTINUED | OUTPATIENT
Start: 2019-03-28 | End: 2019-04-01 | Stop reason: HOSPADM

## 2019-03-28 RX ORDER — ACETAMINOPHEN 325 MG/1
650 TABLET ORAL EVERY 6 HOURS PRN
Status: DISCONTINUED | OUTPATIENT
Start: 2019-03-28 | End: 2019-04-01 | Stop reason: HOSPADM

## 2019-03-28 RX ORDER — ATORVASTATIN CALCIUM 40 MG/1
40 TABLET, FILM COATED ORAL DAILY
Status: DISCONTINUED | OUTPATIENT
Start: 2019-03-29 | End: 2019-04-01 | Stop reason: HOSPADM

## 2019-03-28 RX ORDER — SODIUM CHLORIDE 0.9 % (FLUSH) 0.9 %
10 SYRINGE (ML) INJECTION
Status: DISCONTINUED | OUTPATIENT
Start: 2019-03-28 | End: 2019-04-01 | Stop reason: HOSPADM

## 2019-03-28 RX ORDER — IBUPROFEN 200 MG
16 TABLET ORAL
Status: DISCONTINUED | OUTPATIENT
Start: 2019-03-28 | End: 2019-04-01 | Stop reason: HOSPADM

## 2019-03-28 RX ORDER — HEPARIN SODIUM 5000 [USP'U]/ML
5000 INJECTION, SOLUTION INTRAVENOUS; SUBCUTANEOUS EVERY 8 HOURS
Status: DISCONTINUED | OUTPATIENT
Start: 2019-03-28 | End: 2019-04-01 | Stop reason: HOSPADM

## 2019-03-28 RX ADMIN — CLONIDINE HYDROCHLORIDE 0.2 MG: 0.2 TABLET ORAL at 11:03

## 2019-03-28 RX ADMIN — HEPARIN SODIUM 5000 UNITS: 5000 INJECTION, SOLUTION INTRAVENOUS; SUBCUTANEOUS at 11:03

## 2019-03-28 RX ADMIN — ONDANSETRON 4 MG: 4 TABLET, ORALLY DISINTEGRATING ORAL at 11:03

## 2019-03-28 RX ADMIN — HYDRALAZINE HYDROCHLORIDE 25 MG: 25 TABLET, FILM COATED ORAL at 11:03

## 2019-03-28 RX ADMIN — RAMELTEON 8 MG: 8 TABLET, FILM COATED ORAL at 11:03

## 2019-03-28 NOTE — ED PROVIDER NOTES
Encounter Date: 3/28/2019    SCRIBE #1 NOTE: I, Devin Fan, am scribing for, and in the presence of,  Dr. Bernardo. I have scribed the entire note.       History     Chief Complaint   Patient presents with    abnormal LABS     sent for admission from kidney MD      69 y/o F brought to ED by family. Patient was seen by her nephrologist earlier today, Dr. Goff, and family states they were told to come to the ED for dialysis. They report decreased appetite over the last few weeks, as well as some noncompliance with her medications. Patient denies any symptoms at this time. History somewhat limited by dementia. Patient denies complaints at this time.     The history is provided by the patient.     Review of patient's allergies indicates:  No Known Allergies  Past Medical History:   Diagnosis Date    Chronic kidney disease     Chronic low back pain     Diabetes mellitus, type 2     Hypertension      Past Surgical History:   Procedure Laterality Date    CHOLECYSTECTOMY      EKOS, Pumoart/DVT  1/11/2019    [facility] at [facility] C[facility] LAB/EP    Thrombolysis, PA N/A 1/11/2019    Performed by [facility] at [facility] CATH LAB/EP     No family history on file.  Social History     Tobacco Use    Smoking status: Never Smoker    Smokeless tobacco: Never Used   Substance Use Topics    Alcohol use: No     Frequency: Never    Drug use: No     Review of Systems   Unable to perform ROS: Dementia       Physical Exam     Initial Vitals [03/28/19 1854]   BP Pulse Resp Temp SpO2   (!) 145/80 (!) 125 (!) 22 98.5 °F (36.9 °C) 100 %      MAP       --         Physical Exam    Nursing note and vitals reviewed.  Constitutional: She appears well-developed and well-nourished. No distress.   HENT:   Head: Normocephalic and atraumatic.   Oropharynx clear; Dry MM   Eyes: Conjunctivae and EOM are normal. Pupils are equal, round, and reactive to light.   Neck: Normal range of motion. Neck supple. No tracheal  deviation present.   Cardiovascular: Regular rhythm, normal heart sounds and intact distal pulses.   Tachycardic; 2+ DP pulses to B/L LE   Pulmonary/Chest: Breath sounds normal. No respiratory distress. She has no wheezes. She has no rhonchi. She has no rales.   Abdominal: Soft. Bowel sounds are normal. She exhibits no distension. There is no tenderness.   Musculoskeletal: Normal range of motion. She exhibits edema. She exhibits no tenderness.   2+ pitting edema to B/L LE   Neurological: She is alert. She has normal strength. No cranial nerve deficit.   Oriented to person and place    Skin: Skin is warm and dry. Capillary refill takes less than 2 seconds.         ED Course   Procedures  Labs Reviewed   CBC W/ AUTO DIFFERENTIAL - Abnormal; Notable for the following components:       Result Value    RDW 15.5 (*)     All other components within normal limits   COMPREHENSIVE METABOLIC PANEL   URINALYSIS   APTT   PROTIME-INR   MAGNESIUM   PHOSPHORUS   URINALYSIS MICROSCOPIC   POCT GLUCOSE   POCT GLUCOSE MONITORING CONTINUOUS     EKG Readings: (Independently Interpreted)   Initial Reading: No STEMI. Previous EKG: Compared with most recent EKG Previous EKG Date: 1/11/19 (Nonspecific change). Rhythm: Sinus Tachycardia. Heart Rate: 114. Ectopy: No Ectopy. Conduction: LVH. ST Segments: Normal ST Segments. T Waves: Normal. Axis: Normal.            X-Rays:   Independently Interpreted Readings:   Other Readings:  Imaging interpreted by radiologist and visualized by me    Imaging Results          X-Ray Chest AP Portable (Final result)  Result time 03/28/19 19:45:34    Final result by Zi Beauchamp MD (03/28/19 19:45:34)                 Impression:      No acute findings.    No significant change from prior study.      Electronically signed by: Zi Beauchamp MD  Date:    03/28/2019  Time:    19:45             Narrative:    EXAMINATION:  XR CHEST AP PORTABLE    CLINICAL HISTORY:  Weakness;    TECHNIQUE:  Single frontal view  of the chest was performed.    COMPARISON:  January 10, 2019.    FINDINGS:  Mildly tortuous aorta, similar to prior.  Underinflated lungs with hypoventilatory change.    Heart and lungs otherwise appear unchanged when allowing for differences in technique and positioning.                                Medical Decision Making:   Initial Assessment:   69 y/o F sent to ED by nephrologist for admission for dialysis  Differential Diagnosis:   Hyperkalemia, fluid overload, electrolyte dyscrasia  Independently Interpreted Test(s):   I have ordered and independently interpreted X-rays - see prior notes.  I have ordered and independently interpreted EKG Reading(s) - see prior notes  Clinical Tests:   Lab Tests: Reviewed       <> Summary of Lab: C/W prior  ED Management:  D/W Dr. Urias, who is on for Dr. Mcdaniel, requests admission by hospitalist with consult to nephrology in the AM. D/W DR. Ann, who will see and admit the patient. Patient and family comfortable with admission at this time.                       Clinical Impression:       ICD-10-CM ICD-9-CM   1. Acute renal failure superimposed on chronic kidney disease, unspecified CKD stage, unspecified acute renal failure type N17.9 584.9    N18.9 585.9   2. Weakness R53.1 780.79   3. ESRD needing dialysis N18.6 585.6    Z99.2          Disposition:   Disposition: Placed in Observation  Condition: Stable       Scribe attestation I, Dr. Gerardo Bernardo, personally performed the services described in this documentation. All medical record entries made by the scribe were at my direction and in my presence.  I have reviewed the chart and agree that the record reflects my personal performance and is accurate and complete. Gerardo Bernardo MD.  3:21 PM 03/29/2019                     Gerardo Bernardo MD  03/29/19 1521

## 2019-03-29 ENCOUNTER — ANESTHESIA (OUTPATIENT)
Dept: SURGERY | Facility: HOSPITAL | Age: 69
DRG: 291 | End: 2019-03-29
Payer: MEDICARE

## 2019-03-29 ENCOUNTER — ANESTHESIA EVENT (OUTPATIENT)
Dept: SURGERY | Facility: HOSPITAL | Age: 69
DRG: 291 | End: 2019-03-29
Payer: MEDICARE

## 2019-03-29 PROBLEM — N17.9 ACUTE RENAL FAILURE SUPERIMPOSED ON CHRONIC KIDNEY DISEASE: Status: ACTIVE | Noted: 2019-03-29

## 2019-03-29 PROBLEM — N18.9 ACUTE RENAL FAILURE SUPERIMPOSED ON CHRONIC KIDNEY DISEASE: Status: ACTIVE | Noted: 2019-03-29

## 2019-03-29 LAB
25(OH)D3+25(OH)D2 SERPL-MCNC: 17 NG/ML (ref 30–96)
ALBUMIN SERPL BCP-MCNC: 2.8 G/DL (ref 3.5–5.2)
ANION GAP SERPL CALC-SCNC: 14 MMOL/L (ref 8–16)
BUN SERPL-MCNC: 118 MG/DL (ref 8–23)
CALCIUM SERPL-MCNC: 10.9 MG/DL (ref 8.7–10.5)
CHLORIDE SERPL-SCNC: 103 MMOL/L (ref 95–110)
CO2 SERPL-SCNC: 16 MMOL/L (ref 23–29)
CREAT SERPL-MCNC: 6.6 MG/DL (ref 0.5–1.4)
EST. GFR  (AFRICAN AMERICAN): 7 ML/MIN/1.73 M^2
EST. GFR  (NON AFRICAN AMERICAN): 6 ML/MIN/1.73 M^2
GLUCOSE SERPL-MCNC: 81 MG/DL (ref 70–110)
HBV CORE AB SERPL QL IA: NEGATIVE
HBV SURFACE AB SER-ACNC: NEGATIVE M[IU]/ML
HBV SURFACE AG SERPL QL IA: NEGATIVE
MAGNESIUM SERPL-MCNC: 2 MG/DL (ref 1.6–2.6)
PHOSPHATE SERPL-MCNC: 4.9 MG/DL (ref 2.7–4.5)
POCT GLUCOSE: 70 MG/DL (ref 70–110)
POCT GLUCOSE: 75 MG/DL (ref 70–110)
POCT GLUCOSE: 90 MG/DL (ref 70–110)
POCT GLUCOSE: 94 MG/DL (ref 70–110)
POCT GLUCOSE: 98 MG/DL (ref 70–110)
POTASSIUM SERPL-SCNC: 4.7 MMOL/L (ref 3.5–5.1)
PTH-INTACT SERPL-MCNC: 42.3 PG/ML (ref 9–77)
SODIUM SERPL-SCNC: 133 MMOL/L (ref 136–145)

## 2019-03-29 PROCEDURE — 86580 TB INTRADERMAL TEST: CPT | Performed by: INTERNAL MEDICINE

## 2019-03-29 PROCEDURE — 25000003 PHARM REV CODE 250: Performed by: SURGERY

## 2019-03-29 PROCEDURE — 25000003 PHARM REV CODE 250: Performed by: INTERNAL MEDICINE

## 2019-03-29 PROCEDURE — 11000001 HC ACUTE MED/SURG PRIVATE ROOM

## 2019-03-29 PROCEDURE — 77001 CHG FLUOROGUIDE CNTRL VEN ACCESS,PLACE,REPLACE,REMOVE: ICD-10-PCS | Mod: 26,,, | Performed by: SURGERY

## 2019-03-29 PROCEDURE — 87340 HEPATITIS B SURFACE AG IA: CPT

## 2019-03-29 PROCEDURE — 86704 HEP B CORE ANTIBODY TOTAL: CPT

## 2019-03-29 PROCEDURE — 36000706: Performed by: SURGERY

## 2019-03-29 PROCEDURE — 36415 COLL VENOUS BLD VENIPUNCTURE: CPT

## 2019-03-29 PROCEDURE — 37000009 HC ANESTHESIA EA ADD 15 MINS: Performed by: SURGERY

## 2019-03-29 PROCEDURE — C1750 CATH, HEMODIALYSIS,LONG-TERM: HCPCS | Performed by: SURGERY

## 2019-03-29 PROCEDURE — 63600175 PHARM REV CODE 636 W HCPCS: Performed by: SURGERY

## 2019-03-29 PROCEDURE — 99222 PR INITIAL HOSPITAL CARE,LEVL II: ICD-10-PCS | Mod: 25,GC,, | Performed by: SURGERY

## 2019-03-29 PROCEDURE — 37000008 HC ANESTHESIA 1ST 15 MINUTES: Performed by: SURGERY

## 2019-03-29 PROCEDURE — 71000039 HC RECOVERY, EACH ADD'L HOUR: Performed by: SURGERY

## 2019-03-29 PROCEDURE — 76937 US GUIDE VASCULAR ACCESS: CPT | Mod: 26,,, | Performed by: SURGERY

## 2019-03-29 PROCEDURE — 99222 1ST HOSP IP/OBS MODERATE 55: CPT | Mod: 25,GC,, | Performed by: SURGERY

## 2019-03-29 PROCEDURE — 63600175 PHARM REV CODE 636 W HCPCS: Performed by: INTERNAL MEDICINE

## 2019-03-29 PROCEDURE — 83735 ASSAY OF MAGNESIUM: CPT

## 2019-03-29 PROCEDURE — 36558 PR INSERT TUNNELED CV CATH W/O PORT OR PUMP: ICD-10-PCS | Mod: RT,,, | Performed by: SURGERY

## 2019-03-29 PROCEDURE — 25000003 PHARM REV CODE 250: Performed by: HOSPITALIST

## 2019-03-29 PROCEDURE — 63600175 PHARM REV CODE 636 W HCPCS: Performed by: NURSE ANESTHETIST, CERTIFIED REGISTERED

## 2019-03-29 PROCEDURE — 76937 PR  US GUIDE, VASCULAR ACCESS: ICD-10-PCS | Mod: 26,,, | Performed by: SURGERY

## 2019-03-29 PROCEDURE — 80100016 HC MAINTENANCE HEMODIALYSIS

## 2019-03-29 PROCEDURE — 71000033 HC RECOVERY, INTIAL HOUR: Performed by: SURGERY

## 2019-03-29 PROCEDURE — 36000707: Performed by: SURGERY

## 2019-03-29 PROCEDURE — 77001 FLUOROGUIDE FOR VEIN DEVICE: CPT | Mod: 26,,, | Performed by: SURGERY

## 2019-03-29 PROCEDURE — 86706 HEP B SURFACE ANTIBODY: CPT

## 2019-03-29 PROCEDURE — 80069 RENAL FUNCTION PANEL: CPT

## 2019-03-29 PROCEDURE — 36558 INSERT TUNNELED CV CATH: CPT | Mod: RT,,, | Performed by: SURGERY

## 2019-03-29 PROCEDURE — 63600175 PHARM REV CODE 636 W HCPCS: Performed by: HOSPITALIST

## 2019-03-29 PROCEDURE — 83970 ASSAY OF PARATHORMONE: CPT

## 2019-03-29 PROCEDURE — 82306 VITAMIN D 25 HYDROXY: CPT

## 2019-03-29 DEVICE — SET CATH HEMODIALYSIS 15.5FR: Type: IMPLANTABLE DEVICE | Site: CHEST | Status: FUNCTIONAL

## 2019-03-29 RX ORDER — CEFAZOLIN SODIUM 1 G/3ML
INJECTION, POWDER, FOR SOLUTION INTRAMUSCULAR; INTRAVENOUS
Status: DISCONTINUED | OUTPATIENT
Start: 2019-03-29 | End: 2019-03-29

## 2019-03-29 RX ORDER — HEPARIN SODIUM 1000 [USP'U]/ML
INJECTION, SOLUTION INTRAVENOUS; SUBCUTANEOUS
Status: DISCONTINUED | OUTPATIENT
Start: 2019-03-29 | End: 2019-03-29 | Stop reason: HOSPADM

## 2019-03-29 RX ORDER — SODIUM CHLORIDE 9 MG/ML
INJECTION, SOLUTION INTRAVENOUS
Status: DISCONTINUED | OUTPATIENT
Start: 2019-03-29 | End: 2019-04-01 | Stop reason: HOSPADM

## 2019-03-29 RX ORDER — PHENYLEPHRINE HYDROCHLORIDE 10 MG/ML
INJECTION INTRAVENOUS
Status: DISCONTINUED | OUTPATIENT
Start: 2019-03-29 | End: 2019-03-29

## 2019-03-29 RX ORDER — SODIUM CHLORIDE 9 MG/ML
INJECTION, SOLUTION INTRAVENOUS ONCE
Status: COMPLETED | OUTPATIENT
Start: 2019-03-29 | End: 2019-03-29

## 2019-03-29 RX ORDER — PROPOFOL 10 MG/ML
VIAL (ML) INTRAVENOUS
Status: DISCONTINUED | OUTPATIENT
Start: 2019-03-29 | End: 2019-03-29

## 2019-03-29 RX ORDER — SODIUM CHLORIDE 9 MG/ML
INJECTION, SOLUTION INTRAVENOUS ONCE
Status: DISCONTINUED | OUTPATIENT
Start: 2019-03-29 | End: 2019-04-01 | Stop reason: HOSPADM

## 2019-03-29 RX ORDER — PROPOFOL 10 MG/ML
VIAL (ML) INTRAVENOUS CONTINUOUS PRN
Status: DISCONTINUED | OUTPATIENT
Start: 2019-03-29 | End: 2019-03-29

## 2019-03-29 RX ORDER — FUROSEMIDE 40 MG/1
80 TABLET ORAL 2 TIMES DAILY
Status: DISCONTINUED | OUTPATIENT
Start: 2019-03-29 | End: 2019-04-01 | Stop reason: HOSPADM

## 2019-03-29 RX ORDER — LIDOCAINE HYDROCHLORIDE 10 MG/ML
INJECTION INFILTRATION; PERINEURAL
Status: DISCONTINUED | OUTPATIENT
Start: 2019-03-29 | End: 2019-03-29 | Stop reason: HOSPADM

## 2019-03-29 RX ORDER — BUPIVACAINE HYDROCHLORIDE 2.5 MG/ML
INJECTION, SOLUTION EPIDURAL; INFILTRATION; INTRACAUDAL
Status: DISCONTINUED | OUTPATIENT
Start: 2019-03-29 | End: 2019-03-29 | Stop reason: HOSPADM

## 2019-03-29 RX ORDER — PANTOPRAZOLE SODIUM 40 MG/1
40 TABLET, DELAYED RELEASE ORAL DAILY
COMMUNITY
End: 2019-05-27 | Stop reason: SDUPTHER

## 2019-03-29 RX ORDER — HEPARIN SODIUM 1000 [USP'U]/ML
4300 INJECTION, SOLUTION INTRAVENOUS; SUBCUTANEOUS
Status: DISCONTINUED | OUTPATIENT
Start: 2019-03-29 | End: 2019-04-01 | Stop reason: HOSPADM

## 2019-03-29 RX ADMIN — HEPARIN SODIUM 4300 UNITS: 1000 INJECTION, SOLUTION INTRAVENOUS; SUBCUTANEOUS at 07:03

## 2019-03-29 RX ADMIN — SODIUM CHLORIDE 500 ML: 0.9 INJECTION, SOLUTION INTRAVENOUS at 07:03

## 2019-03-29 RX ADMIN — ATORVASTATIN CALCIUM 40 MG: 40 TABLET, FILM COATED ORAL at 09:03

## 2019-03-29 RX ADMIN — SUCRALFATE 1 G: 1 SUSPENSION ORAL at 08:03

## 2019-03-29 RX ADMIN — SUCRALFATE 1 G: 1 SUSPENSION ORAL at 09:03

## 2019-03-29 RX ADMIN — PROPOFOL 30 MG: 10 INJECTION, EMULSION INTRAVENOUS at 02:03

## 2019-03-29 RX ADMIN — PHENYLEPHRINE HYDROCHLORIDE 100 MCG: 10 INJECTION INTRAVENOUS at 03:03

## 2019-03-29 RX ADMIN — SUCRALFATE 1 G: 1 SUSPENSION ORAL at 01:03

## 2019-03-29 RX ADMIN — ACETAMINOPHEN 1000 MG: 500 TABLET ORAL at 08:03

## 2019-03-29 RX ADMIN — ASPIRIN 81 MG 81 MG: 81 TABLET ORAL at 09:03

## 2019-03-29 RX ADMIN — PHENYLEPHRINE HYDROCHLORIDE 100 MCG: 10 INJECTION INTRAVENOUS at 02:03

## 2019-03-29 RX ADMIN — CLONIDINE HYDROCHLORIDE 0.2 MG: 0.2 TABLET ORAL at 08:03

## 2019-03-29 RX ADMIN — PROPOFOL 30 MG: 10 INJECTION, EMULSION INTRAVENOUS at 03:03

## 2019-03-29 RX ADMIN — TUBERCULIN PURIFIED PROTEIN DERIVATIVE 5 UNITS: 5 INJECTION INTRADERMAL at 01:03

## 2019-03-29 RX ADMIN — NEPHROCAP 1 CAPSULE: 1 CAP ORAL at 01:03

## 2019-03-29 RX ADMIN — HEPARIN SODIUM 5000 UNITS: 5000 INJECTION, SOLUTION INTRAVENOUS; SUBCUTANEOUS at 09:03

## 2019-03-29 RX ADMIN — SODIUM CHLORIDE: 0.9 INJECTION, SOLUTION INTRAVENOUS at 02:03

## 2019-03-29 RX ADMIN — HEPARIN SODIUM 5000 UNITS: 5000 INJECTION, SOLUTION INTRAVENOUS; SUBCUTANEOUS at 05:03

## 2019-03-29 RX ADMIN — HYDRALAZINE HYDROCHLORIDE 25 MG: 25 TABLET, FILM COATED ORAL at 09:03

## 2019-03-29 RX ADMIN — PROPOFOL 50 MCG/KG/MIN: 10 INJECTION, EMULSION INTRAVENOUS at 02:03

## 2019-03-29 RX ADMIN — CEFAZOLIN 2 G: 330 INJECTION, POWDER, FOR SOLUTION INTRAMUSCULAR; INTRAVENOUS at 02:03

## 2019-03-29 NOTE — HPI
Brittaney Joseph is a 68 y.o. black woman with hypertension, diabetes mellitus type 2, chronic diastolic heart failure, chronic kidney disease stage 4, hyperlipidemia, chronic low back pain, history of pulmonary embolism on 1/11/19 status post thrombectomy. She wears upper and lower dentures. She lives with her niece. Her primary care physician is Dr. Charles Argueta. Her nephrologist is Dr. Saida Mcdaniel.    Labs on 3/12/19 showed BUN and creatinine 83 and 7.52. She was sent to [facility] on 3/28/19 by Dr. Mcdaniel to initiate dialysis. She reported a few weeks of worsening nausea, decreased appetite, peripheral edema, and delirium. She was not eating so stopped taking her medications for the past week. Labs showed  and creatinine 6.6. She was admitted to [facility].

## 2019-03-29 NOTE — PLAN OF CARE
Patient has met PACU discharge criteria, VSS, denies pain. Family updated by phone. Released from PACU by Dr. Akbar

## 2019-03-29 NOTE — PLAN OF CARE
TN called Dr. Edgar's office regarding what OP HD unit he is affiliated with to set up pt Op HD, per staff, he goes to [facility] dialysis in [place] and Markie, office gave [facility] admission number [phone] ([facility]) for OP HD set up. TN to notify NEY.

## 2019-03-29 NOTE — ASSESSMENT & PLAN NOTE
Chronic diastolic heart failure  Takes clonidine, amlodipine, hydralazine, metoprolol. Has not been eating or drinking so stopped taking. Continue clonidine and hydralazine. Hold amlodipine and metoprolol.

## 2019-03-29 NOTE — PLAN OF CARE
TN attempted to complete DCA and get dialysis unit preferences from patient , Pt not in room, pt in surgery per chart, TN will follow up with patient.

## 2019-03-29 NOTE — SUBJECTIVE & OBJECTIVE
Interval History: awake and alert. Sister by beside. Patient has no complaint. Plan for surgery placement of HD catheter today.   Appreciates nephrology rec's     Review of Systems   Constitutional: Negative for appetite change, chills and fever.   HENT: Negative for trouble swallowing and voice change.    Eyes: Negative for pain and redness.   Respiratory: Negative for cough and shortness of breath.    Cardiovascular: Positive for leg swelling. Negative for chest pain.   Gastrointestinal: Negative for blood in stool and nausea.   Genitourinary: Negative for difficulty urinating and dysuria.   Musculoskeletal: Negative for neck pain and neck stiffness.   Skin: Negative for rash and wound.   Neurological: Negative for seizures and syncope.   Psychiatric/Behavioral: Positive for confusion. Negative for dysphoric mood and self-injury.     Objective:     Vital Signs (Most Recent):  Temp: 97.1 °F (36.2 °C) (03/29/19 0825)  Pulse: 94 (03/29/19 0825)  Resp: 14 (03/29/19 0825)  BP: 131/72 (03/29/19 0825)  SpO2: 100 % (03/28/19 2234) Vital Signs (24h Range):  Temp:  [96.3 °F (35.7 °C)-98.6 °F (37 °C)] 97.1 °F (36.2 °C)  Pulse:  [] 94  Resp:  [11-22] 14  SpO2:  [100 %] 100 %  BP: (113-145)/(60-80) 131/72     Weight: 66.1 kg (145 lb 11.6 oz)  Body mass index is 25.01 kg/m².    Intake/Output Summary (Last 24 hours) at 3/29/2019 0917  Last data filed at 3/29/2019 0545  Gross per 24 hour   Intake 125 ml   Output 0 ml   Net 125 ml      Physical Exam   Constitutional: She appears well-developed. No distress.   HENT:   Head: Normocephalic and atraumatic.   Eyes: Conjunctivae are normal. No scleral icterus.   Neck: Neck supple. No tracheal deviation present.   Cardiovascular: Regular rhythm. Exam reveals no friction rub.   Pulmonary/Chest: Effort normal. No respiratory distress.   Abdominal: Soft. She exhibits no distension. There is no tenderness. There is no guarding.   Musculoskeletal: She exhibits edema. She exhibits no  tenderness.   Neurological: She is alert.   asterixis   Skin: Skin is warm and dry.   Psychiatric: She has a normal mood and affect. Thought content normal.   Nursing note and vitals reviewed.      Significant Labs:   CBC:   Recent Labs   Lab 03/28/19 1923   WBC 6.14   HGB 12.3   HCT 37.2        CMP:   Recent Labs   Lab 03/28/19 1924 03/29/19  0557   * 133*   K 4.3 4.7   CL 98 103   CO2 16* 16*    81   * 118*   CREATININE 6.6* 6.6*   CALCIUM 11.3* 10.9*   PROT 7.8  --    ALBUMIN 3.3* 2.8*   BILITOT 0.5  --    ALKPHOS 88  --    AST 23  --    ALT 18  --    ANIONGAP 18* 14   EGFRNONAA 6* 6*     Coagulation:   Recent Labs   Lab 03/28/19 1923   INR 1.0   APTT 25.6     Troponin: No results for input(s): TROPONINI in the last 48 hours.  TSH:   Recent Labs   Lab 12/18/18 2003   TSH 1.497       Significant Imaging: I have reviewed all pertinent imaging results/findings within the past 24 hours.

## 2019-03-29 NOTE — CONSULTS
[employer] renal fellow ELYSE WEI    Consult note    Reason for Consult:     ESRD    Subjective:      History of Present Illness:  Brittaney Joseph is a 68 y.o. AA female who  has a past medical history of Chronic kidney disease, Chronic low back pain, Diabetes mellitus, type 2, Hypertension, and Pulmonary embolism with acute cor pulmonale (1/11/2019).. The patient presented to the [facility] on 3/28/2019 with a primary complaint of abnormal LABS (sent for admission from kidney MD )    This is a 67 yo AA female who is being admitted for initiation of dialysis; she was sent from outpt nephrology clinic (Dr. Edgar) after labwork saw a progression in her renal disease; in a addition, she has uremic Sx including n/v/asterixis/confusion/decreased urine output/decreased po intake for a couple of weeks.    This aM, the pt denies sob; she endorses decreased appetite; pt knows why she is here, but I believe she has poor insight into her medical condiitons      Past Medical History:  Past Medical History:   Diagnosis Date    Chronic kidney disease     Chronic low back pain     Diabetes mellitus, type 2     Hypertension     Pulmonary embolism with acute cor pulmonale 1/11/2019       Past Surgical History:  Past Surgical History:   Procedure Laterality Date    CHOLECYSTECTOMY      EKOS, Pumoart/DVT  1/11/2019    Performed by Roger Day MD at [facility] CATH LAB/EP    Thrombolysis, PA N/A 1/11/2019    Performed by Roger Day MD at [facility] CATH LAB/EP       Allergies:  Review of patient's allergies indicates:  No Known Allergies    Medications:   In-Hospital Scheduled Medications:   sodium chloride 0.9%   Intravenous Once    sodium chloride 0.9%   Intravenous Once    aspirin  81 mg Oral Daily    atorvastatin  40 mg Oral Daily    cloNIDine  0.2 mg Oral Nightly    heparin (porcine)  5,000 Units Subcutaneous Q8H    hydrALAZINE  25 mg Oral Q8H    sucralfate  1 g Oral QID      In-Hospital PRN Medications:  sodium chloride  0.9%, sodium chloride 0.9%, acetaminophen, acetaminophen, dextrose 50 % in water (D50W), dextrose 50 % in water (D50W), glucagon (human recombinant), glucose, glucose, insulin aspart U-100, nitroGLYCERIN, ondansetron, ramelteon, senna-docusate 8.6-50 mg, sodium chloride 0.9%   In-Hospital IV Infusion Medications:     Home Medications:  Prior to Admission medications    Medication Sig Start Date End Date Taking? Authorizing Provider   amLODIPine (NORVASC) 10 MG tablet Take 10 mg by mouth nightly.   Yes Historical Provider, MD   aspirin 81 MG Chew Take 1 tablet (81 mg total) by mouth once daily. 1/16/19 1/16/20 Yes Aparna Serrano MD   atorvastatin (LIPITOR) 40 MG tablet Take 40 mg by mouth once daily.   Yes Historical Provider, MD   cloNIDine (CATAPRES) 0.2 MG tablet Take 0.2 mg by mouth nightly.   Yes Historical Provider, MD   hydrALAZINE (APRESOLINE) 25 MG tablet Take 1 tablet (25 mg total) by mouth every 8 (eight) hours. 1/16/19 1/16/20 Yes Aparna Serrano MD   metoprolol tartrate (LOPRESSOR) 100 MG tablet Take 1 tablet (100 mg total) by mouth 2 (two) times daily. 1/23/19  Yes Charles Argueta MD   glipiZIDE (GLUCOTROL) 2.5 MG TR24 Take 1 tablet (2.5 mg total) by mouth daily with breakfast. 12/26/18 12/26/19  Melani Corrales MD   loperamide (IMODIUM) 2 mg capsule Take 1 tablet after each loose stool.  Do not exceed 8 tablets in a day. 2/28/19   Laurie Pérez,    nitroGLYCERIN (NITROSTAT) 0.4 MG SL tablet Place 1 tablet (0.4 mg total) under the tongue every 5 (five) minutes as needed for Chest pain. if second dose needed call 911 1/16/19 1/16/20  Aparna Serrano MD   ondansetron (ZOFRAN) 4 MG tablet Take 1 tablet (4 mg total) by mouth every 8 (eight) hours as needed for Nausea. 2/28/19   Laurie Pérez,    oxyCODONE-acetaminophen (PERCOCET)  mg per tablet  12/14/18   Historical Provider, MD   rivaroxaban (XARELTO) 10 mg Tab Take 1 tablet (10 mg total) by mouth  "daily with dinner or evening meal. 3/9/19   Laurie Pérez DO   senna-docusate 8.6-50 mg (PERICOLACE) 8.6-50 mg per tablet Take 1 tablet by mouth 2 (two) times daily as needed for Constipation. 19   Aparna Serrano MD   sucralfate (CARAFATE) 100 mg/mL suspension Take 10 mLs (1 g total) by mouth 4 (four) times daily. 3/9/19   Laurie Pérez DO       Family History:  History reviewed. No pertinent family history.    Social History:  Social History     Tobacco Use    Smoking status: Never Smoker    Smokeless tobacco: Never Used   Substance Use Topics    Alcohol use: No     Frequency: Never    Drug use: No       Review of Systems: All other systems are reviewed and are negative.    Health Maintenance:     Immunizations:   Currently on File:   Most Recent Immunizations   Administered Date(s) Administered    Influenza - High Dose 2018          Objective:   Last 24 Hour Vital Signs:  BP  Min: 113/62  Max: 145/80  Temp  Av.6 °F (36.4 °C)  Min: 96.3 °F (35.7 °C)  Max: 98.6 °F (37 °C)  Pulse  Av.4  Min: 81  Max: 125  Resp  Av.6  Min: 11  Max: 22  SpO2  Av %  Min: 100 %  Max: 100 %  Height  Av' 4" (162.6 cm)  Min: 5' 4" (162.6 cm)  Max: 5' 4" (162.6 cm)  Weight  Av.8 kg (147 lb 3.5 oz)  Min: 66.1 kg (145 lb 11.6 oz)  Max: 67.1 kg (148 lb)  I/O last 3 completed shifts:  In: 125 [P.O.:125]  Out: 0     Physical Examination:  GEN - AA; NAD  CHEST - CTA B  HEART - tachy; 2/6 systolic murmur over much of precordium; no rub  ABD - soft; nonttp  EXTR  -warm; trace edema to shins B  NEURO - proximal and distal muscle weakness; + asterixis    Laboratory Results:    Trended Lab Data:  Recent Labs   Lab 19  0557   WBC 6.14  --   --    HGB 12.3  --   --    HCT 37.2  --   --      --   --    MCV 87  --   --    RDW 15.5*  --   --    NA  --  132* 133*   K  --  4.3 4.7   CL  --  98 103   CO2  --  16* 16*   BUN  --  110* 118*   GLU  -- "  109 81   PROT  --  7.8  --    ALBUMIN  --  3.3* 2.8*   BILITOT  --  0.5  --    AST  --  23  --    ALKPHOS  --  88  --    ALT  --  18  --        Trended Cardiac Data:  No results for input(s): TROPONINI, CKTOTAL, CKMB, BNP in the last 168 hours.    .    Radiology:  X-ray Chest Ap Portable    Result Date: 3/28/2019  EXAMINATION: XR CHEST AP PORTABLE CLINICAL HISTORY: Weakness; TECHNIQUE: Single frontal view of the chest was performed. COMPARISON: January 10, 2019. FINDINGS: Mildly tortuous aorta, similar to prior.  Underinflated lungs with hypoventilatory change. Heart and lungs otherwise appear unchanged when allowing for differences in technique and positioning.     No acute findings. No significant change from prior study. Electronically signed by: Zi Beauchamp MD Date:    03/28/2019 Time:    19:45           Assessment/Plan     ESRD  -will initiate 2 consecutive days of IHD once permacath is placed  -sonam place on nephrocap and nepro; lasix oral 80mg BID  -orders placed to SW for outpt HD chair; ppd/hep serologies ordered    Hypercalcemia  -PTH/Vit D ordered  -will dialyze against low calcium bath    Anemia  -will eval for epo with iHD prn    CKD-MBD  -PTH/Vit D ordered  -zemplar with iHD prn  -will cont to evaluate need for phosphorus binders     H/O PE  -would rec to continue outpt anticoag once permcath is placed    Eder Mcghee II  [employer] renal [facility]  [phone]

## 2019-03-29 NOTE — PLAN OF CARE
Problem: Adult Inpatient Plan of Care  Goal: Plan of Care Review  Outcome: Ongoing (interventions implemented as appropriate)     03/29/19 0536   Plan of Care Review   Plan of Care Reviewed With patient   POC reviewed with the pt, verbalized understanding.  Disoriented to time, reoriented pt during shift.  Pt had x1 vomiting episode at admission on the floor, prn Zofran given; moderate relief noted.  Sleeping medication given.  No complaint of pain or any other distress noted through out night.  On continuous cardiac monitor, SR to ST with HR in 80s-110s.  Blood glucose monitored.  Consult to nephrology and general surgery in am.  No urine overnight, bladder scan done; residual 284 mL noted, keep monitoring I/O.  Fall precaution explained, contract signed. Safety maintained, free of falls throughout shift.  Instructed to call for any assistance.  Will continue to monitor.

## 2019-03-29 NOTE — PLAN OF CARE
Chest x-ray result reviewed by Dr. Cano and judson to be used for Dialysis. Tatianna in dialysis notified and pt transported in bed to dialysis

## 2019-03-29 NOTE — PROGRESS NOTES
[facility] Hospital Medicine  Progress Note    Patient Name: Brittaney Joseph  MRN: [id number]  Patient Class: IP- Inpatient   Admission Date: 3/28/2019  Length of Stay: 1 days  Attending Physician: Aparna Serrano*  Primary Care Provider: Charles Argueta MD        Subjective:     Principal Problem:ESRD needing dialysis    HPI:  Brittaney Joseph is a 68 y.o. black woman with hypertension, diabetes mellitus type 2, chronic diastolic heart failure, chronic kidney disease stage 4, hyperlipidemia, chronic low back pain, history of pulmonary embolism on 1/11/19 status post thrombectomy. She wears upper and lower dentures. She lives with her niece. Her primary care physician is Dr. Charles Argueta. Her nephrologist is Dr. Saida Mcdaniel.    Labs on 3/12/19 showed BUN and creatinine 83 and 7.52. She was sent to [facility] on 3/28/19 by Dr. Mcdaniel to initiate dialysis. She reported a few weeks of worsening nausea, decreased appetite, peripheral edema, and delirium. She was not eating so stopped taking her medications for the past week. Labs showed  and creatinine 6.6. She was admitted to [facility].    Hospital Course:  No notes on file    Interval History: awake and alert. Sister by beside. Patient has no complaint. Plan for surgery placement of HD catheter today.   Appreciates nephrology rec's     Review of Systems   Constitutional: Negative for appetite change, chills and fever.   HENT: Negative for trouble swallowing and voice change.    Eyes: Negative for pain and redness.   Respiratory: Negative for cough and shortness of breath.    Cardiovascular: Positive for leg swelling. Negative for chest pain.   Gastrointestinal: Negative for blood in stool and nausea.   Genitourinary: Negative for difficulty urinating and dysuria.   Musculoskeletal: Negative for neck pain and neck stiffness.   Skin: Negative for rash and wound.    Neurological: Negative for seizures and syncope.   Psychiatric/Behavioral: Positive for confusion. Negative for dysphoric mood and self-injury.     Objective:     Vital Signs (Most Recent):  Temp: 97.1 °F (36.2 °C) (03/29/19 0825)  Pulse: 94 (03/29/19 0825)  Resp: 14 (03/29/19 0825)  BP: 131/72 (03/29/19 0825)  SpO2: 100 % (03/28/19 2234) Vital Signs (24h Range):  Temp:  [96.3 °F (35.7 °C)-98.6 °F (37 °C)] 97.1 °F (36.2 °C)  Pulse:  [] 94  Resp:  [11-22] 14  SpO2:  [100 %] 100 %  BP: (113-145)/(60-80) 131/72     Weight: 66.1 kg (145 lb 11.6 oz)  Body mass index is 25.01 kg/m².    Intake/Output Summary (Last 24 hours) at 3/29/2019 0917  Last data filed at 3/29/2019 0545  Gross per 24 hour   Intake 125 ml   Output 0 ml   Net 125 ml      Physical Exam   Constitutional: She appears well-developed. No distress.   HENT:   Head: Normocephalic and atraumatic.   Eyes: Conjunctivae are normal. No scleral icterus.   Neck: Neck supple. No tracheal deviation present.   Cardiovascular: Regular rhythm. Exam reveals no friction rub.   Pulmonary/Chest: Effort normal. No respiratory distress.   Abdominal: Soft. She exhibits no distension. There is no tenderness. There is no guarding.   Musculoskeletal: She exhibits edema. She exhibits no tenderness.   Neurological: She is alert.   asterixis   Skin: Skin is warm and dry.   Psychiatric: She has a normal mood and affect. Thought content normal.   Nursing note and vitals reviewed.      Significant Labs:   CBC:   Recent Labs   Lab 03/28/19 1923   WBC 6.14   HGB 12.3   HCT 37.2        CMP:   Recent Labs   Lab 03/28/19 1924 03/29/19  0557   * 133*   K 4.3 4.7   CL 98 103   CO2 16* 16*    81   * 118*   CREATININE 6.6* 6.6*   CALCIUM 11.3* 10.9*   PROT 7.8  --    ALBUMIN 3.3* 2.8*   BILITOT 0.5  --    ALKPHOS 88  --    AST 23  --    ALT 18  --    ANIONGAP 18* 14   EGFRNONAA 6* 6*     Coagulation:   Recent Labs   Lab 03/28/19 1923   INR 1.0   APTT 25.6      Troponin: No results for input(s): TROPONINI in the last 48 hours.  TSH:   Recent Labs   Lab 12/18/18 2003   TSH 1.497       Significant Imaging: I have reviewed all pertinent imaging results/findings within the past 24 hours.    Assessment/Plan:      * ESRD needing dialysis  Uremia  Consult General Surgery for dialysis catheter placement.  Consult nephrology    Uremia  Consult nephrology      History of pulmonary embolism  Hold home rivaroxaban. Give heparin.  Resume rivaroxaban after dialysis catheter placement.    Chronic diastolic heart failure  Plan for HD      Chronic low back pain  Acetaminophen prn.    Controlled type 2 diabetes mellitus with stage 4 chronic kidney disease, without long-term current use of insulin  Discontinue home glipizide.   Insulin aspart sliding scale.   Monitor serum glucose.    Hyperlipidemia associated with type 2 diabetes mellitus  Continue home atorvastatin.    Essential hypertension  Chronic diastolic heart failure  Takes clonidine, amlodipine, hydralazine, metoprolol. Has not been eating or drinking so stopped taking. Continue clonidine and hydralazine. Hold amlodipine and metoprolol.      VTE Risk Mitigation (From admission, onward)        Ordered     heparin (porcine) injection 5,000 Units  Every 8 hours      03/28/19 2239     IP VTE HIGH RISK PATIENT  Once      03/28/19 2239              Aparna Serrano MD  Department of Hospital Medicine   [facility]

## 2019-03-29 NOTE — PLAN OF CARE
Problem: Adult Inpatient Plan of Care  Goal: Plan of Care Review  Outcome: Ongoing (interventions implemented as appropriate)  Plan of care reviewed with patient and sister this am. Voiced understanding. NSR on monitor with no red alarms noted. Patient in surgery at this time for tunnel cath placement.

## 2019-03-29 NOTE — H&P
[facility] Hospital Medicine  History & Physical    Patient Name: Brittaney Joseph  MRN: [id number]  Admission Date: 3/28/2019  Attending Physician: Aparna Serrano*   Primary Care Provider: Charles Argueta MD         Patient information was obtained from patient, relative(s), past medical records and ER records.     Subjective:     Principal Problem:ESRD needing dialysis    Chief Complaint:   Chief Complaint   Patient presents with    abnormal LABS     sent for admission from kidney MD         HPI: Brittaney Joseph is a 68 y.o. black woman with hypertension, diabetes mellitus type 2, chronic diastolic heart failure, chronic kidney disease stage 4, hyperlipidemia, chronic low back pain, history of pulmonary embolism on 1/11/19 status post thrombectomy. She wears upper and lower dentures. She lives with her niece. Her primary care physician is Dr. Charles Argueta. Her nephrologist is Dr. Saida Mcdaniel.    Labs on 3/12/19 showed BUN and creatinine 83 and 7.52. She was sent to [facility] on 3/28/19 by Dr. Mcdaniel to initiate dialysis. She reported a few weeks of worsening nausea, decreased appetite, peripheral edema, and delirium. She was not eating so stopped taking her medications for the past week. Labs showed  and creatinine 6.6. She was admitted to [facility].    Past Medical History:   Diagnosis Date    Chronic kidney disease     Chronic low back pain     Diabetes mellitus, type 2     Hypertension     Pulmonary embolism with acute cor pulmonale 1/11/2019       Past Surgical History:   Procedure Laterality Date    CHOLECYSTECTOMY      EKOS, Pumoart/DVT  1/11/2019    [facility] C[facility] LAB/EP    Thrombolysis, PA N/A 1/11/2019    Performed by Roger Day MD at [facility] CATH LAB/EP       Review of patient's allergies indicates:  No Known Allergies    No current facility-administered  medications on file prior to encounter.      Current Outpatient Medications on File Prior to Encounter   Medication Sig    amLODIPine (NORVASC) 10 MG tablet Take 10 mg by mouth nightly.    aspirin 81 MG Chew Take 1 tablet (81 mg total) by mouth once daily.    atorvastatin (LIPITOR) 40 MG tablet Take 40 mg by mouth once daily.    cloNIDine (CATAPRES) 0.2 MG tablet Take 0.2 mg by mouth nightly.    hydrALAZINE (APRESOLINE) 25 MG tablet Take 1 tablet (25 mg total) by mouth every 8 (eight) hours.    metoprolol tartrate (LOPRESSOR) 100 MG tablet Take 1 tablet (100 mg total) by mouth 2 (two) times daily.    glipiZIDE (GLUCOTROL) 2.5 MG TR24 Take 1 tablet (2.5 mg total) by mouth daily with breakfast.    loperamide (IMODIUM) 2 mg capsule Take 1 tablet after each loose stool.  Do not exceed 8 tablets in a day.    nitroGLYCERIN (NITROSTAT) 0.4 MG SL tablet Place 1 tablet (0.4 mg total) under the tongue every 5 (five) minutes as needed for Chest pain. if second dose needed call 911    ondansetron (ZOFRAN) 4 MG tablet Take 1 tablet (4 mg total) by mouth every 8 (eight) hours as needed for Nausea.    oxyCODONE-acetaminophen (PERCOCET)  mg per tablet     rivaroxaban (XARELTO) 10 mg Tab Take 1 tablet (10 mg total) by mouth daily with dinner or evening meal.    senna-docusate 8.6-50 mg (PERICOLACE) 8.6-50 mg per tablet Take 1 tablet by mouth 2 (two) times daily as needed for Constipation.    sucralfate (CARAFATE) 100 mg/mL suspension Take 10 mLs (1 g total) by mouth 4 (four) times daily.     Family History     None        Tobacco Use    Smoking status: Never Smoker    Smokeless tobacco: Never Used   Substance and Sexual Activity    Alcohol use: No     Frequency: Never    Drug use: No    Sexual activity: Not on file     Review of Systems   Constitutional: Positive for appetite change. Negative for chills and fever.   HENT: Negative for trouble swallowing and voice change.    Eyes: Negative for pain and  redness.   Respiratory: Negative for cough and shortness of breath.    Cardiovascular: Positive for leg swelling. Negative for chest pain.   Gastrointestinal: Positive for nausea. Negative for blood in stool.   Genitourinary: Negative for difficulty urinating and dysuria.   Musculoskeletal: Negative for neck pain and neck stiffness.   Skin: Negative for rash and wound.   Neurological: Negative for seizures and syncope.   Psychiatric/Behavioral: Positive for confusion. Negative for self-injury.     Objective:     Vital Signs (Most Recent):  Temp: 97.3 °F (36.3 °C) (03/28/19 2234)  Pulse: (!) 118 (03/28/19 2239)  Resp: 18 (03/28/19 2234)  BP: 133/74 (03/28/19 2234)  SpO2: 100 % (03/28/19 2234) Vital Signs (24h Range):  Temp:  [97.3 °F (36.3 °C)-98.6 °F (37 °C)] 97.3 °F (36.3 °C)  Pulse:  [111-125] 118  Resp:  [11-22] 18  SpO2:  [100 %] 100 %  BP: (118-145)/(60-80) 133/74     Weight: 67.1 kg (147 lb 14.9 oz)  Body mass index is 25.39 kg/m².    Physical Exam   Constitutional: She appears well-developed. No distress.   HENT:   Head: Normocephalic and atraumatic.   Eyes: Conjunctivae are normal. No scleral icterus.   Neck: Neck supple. No tracheal deviation present.   Cardiovascular: Regular rhythm. Tachycardia present. Exam reveals friction rub.   Pulmonary/Chest: Effort normal. No respiratory distress.   Abdominal: Soft. She exhibits no distension. There is no tenderness. There is no guarding.   Musculoskeletal: She exhibits edema. She exhibits no tenderness.   Neurological: She is alert. She displays tremor.   asterixis   Skin: Skin is warm and dry.   Psychiatric: She has a normal mood and affect. Thought content normal.   Nursing note and vitals reviewed.          Significant Labs: All pertinent labs within the past 24 hours have been reviewed.    Significant Imaging: I have reviewed all pertinent imaging results/findings within the past 24 hours.   X-Ray Chest AP Portable 3/28/19: FINDINGS:  Mildly tortuous aorta,  similar to prior.  Underinflated lungs with hypoventilatory change.  Heart and lungs otherwise appear unchanged when allowing for differences in technique and positioning.    Assessment/Plan:     * ESRD needing dialysis  Uremia  Consult General Surgery for dialysis catheter placement.    History of pulmonary embolism  Hold home rivaroxaban. Give heparin. Resume rivaroxaban after dialysis catheter placement.    Chronic low back pain  Acetaminophen prn.    Controlled type 2 diabetes mellitus with stage 4 chronic kidney disease, without long-term current use of insulin  Discontinue home glipizide. Insulin aspart sliding scale. Monitor serum glucose.    Hyperlipidemia associated with type 2 diabetes mellitus  Continue home atorvastatin.    Essential hypertension  Chronic diastolic heart failure  Takes clonidine, amlodipine, hydralazine, metoprolol. Has not been eating or drinking so stopped taking. Continue clonidine and hydralazine. Hold amlodipine and metoprolol.      VTE Risk Mitigation (From admission, onward)        Ordered     heparin (porcine) injection 5,000 Units  Every 8 hours      03/28/19 2239     IP VTE HIGH RISK PATIENT  Once      03/28/19 2239             Brandon Ann MD  Department of Hospital Medicine   [facility]

## 2019-03-29 NOTE — CONSULTS
[facility]  INPATIENT H&P    REASON FOR CONSULT/ADMISSION:  Tunneled dialysis catheter    HPI: Brittaney Joseph is a 68 y.o. female with a history of hypertension, diabetes, chronic diastolic heart failure, hyperlipidemia, pulmonary embolus status post endovascular thrombectomy currently on Xarelto and chronic kidney disease which has recently progressed to end-stage renal disease.  She is admitted per request of her nephrologist for worsening labs.  There are plans to initiate hemodialysis during this hospitalization.  Patient denies previous trauma or previous lines to neck or upper extremities.  She has not discussed long-term dialysis as of yet.    I have reviewed the patient's chart including prior progress notes, procedures and testing.     ROS:   Review of Systems   Constitutional: Negative for activity change, chills and fever.   HENT: Negative for congestion, nosebleeds and trouble swallowing.    Eyes: Negative for photophobia, discharge and visual disturbance.   Respiratory: Negative for apnea, chest tightness and shortness of breath.    Cardiovascular: Positive for leg swelling. Negative for chest pain and palpitations.   Gastrointestinal: Negative for abdominal distention, abdominal pain, nausea and vomiting.   Genitourinary: Negative for difficulty urinating, dysuria and hematuria.   Musculoskeletal: Negative for arthralgias, gait problem, neck pain and neck stiffness.   Skin: Negative for color change, pallor, rash and wound.   Neurological: Positive for tremors. Negative for seizures, syncope and light-headedness.   Psychiatric/Behavioral: Negative for agitation, behavioral problems and confusion.       PROBLEM LIST:  Patient Active Problem List   Diagnosis    Essential hypertension    Hyperlipidemia associated with type 2 diabetes mellitus    Controlled type 2 diabetes mellitus with stage 4 chronic kidney disease, without long-term current use of insulin    Obesity (BMI  30-39.9)    Physical deconditioning    Chronic low back pain    Chronic diastolic heart failure    Weakness    History of pulmonary embolism    ESRD needing dialysis    Uremia         HISTORY  Past Medical History:   Diagnosis Date    Chronic kidney disease     Chronic low back pain     Diabetes mellitus, type 2     Hypertension     Pulmonary embolism with acute cor pulmonale 1/11/2019       Past Surgical History:   Procedure Laterality Date    CHOLECYSTECTOMY      EKOS, Pumoart/DVT  1/11/2019    Performed by Roger [facility] at [facility] CATH LAB/EP    Thrombolysis, PA N/A 1/11/2019    Performed by Roger Day MD at [facility] CATH LAB/EP       Social History     Tobacco Use    Smoking status: Never Smoker    Smokeless tobacco: Never Used   Substance Use Topics    Alcohol use: No     Frequency: Never    Drug use: No       History reviewed. No pertinent family history.      MEDS:  No current facility-administered medications on file prior to encounter.      Current Outpatient Medications on File Prior to Encounter   Medication Sig Dispense Refill    amLODIPine (NORVASC) 10 MG tablet Take 10 mg by mouth nightly.      aspirin 81 MG Chew Take 1 tablet (81 mg total) by mouth once daily.  0    atorvastatin (LIPITOR) 40 MG tablet Take 40 mg by mouth once daily.      cloNIDine (CATAPRES) 0.2 MG tablet Take 0.2 mg by mouth nightly.      hydrALAZINE (APRESOLINE) 25 MG tablet Take 1 tablet (25 mg total) by mouth every 8 (eight) hours. 90 tablet 11    metoprolol tartrate (LOPRESSOR) 100 MG tablet Take 1 tablet (100 mg total) by mouth 2 (two) times daily. 180 tablet 1    glipiZIDE (GLUCOTROL) 2.5 MG TR24 Take 1 tablet (2.5 mg total) by mouth daily with breakfast. 30 tablet 6    loperamide (IMODIUM) 2 mg capsule Take 1 tablet after each loose stool.  Do not exceed 8 tablets in a day. 24 capsule 0    nitroGLYCERIN (NITROSTAT) 0.4 MG SL tablet Place 1 tablet (0.4 mg total) under the tongue every 5 (five)  minutes as needed for Chest pain. if second dose needed call 911 25 tablet 1    ondansetron (ZOFRAN) 4 MG tablet Take 1 tablet (4 mg total) by mouth every 8 (eight) hours as needed for Nausea. 20 tablet 0    oxyCODONE-acetaminophen (PERCOCET)  mg per tablet       rivaroxaban (XARELTO) 10 mg Tab Take 1 tablet (10 mg total) by mouth daily with dinner or evening meal. 30 tablet 3    senna-docusate 8.6-50 mg (PERICOLACE) 8.6-50 mg per tablet Take 1 tablet by mouth 2 (two) times daily as needed for Constipation.      sucralfate (CARAFATE) 100 mg/mL suspension Take 10 mLs (1 g total) by mouth 4 (four) times daily. 414 mL 3       ALLERGIES:  Review of patient's allergies indicates:  No Known Allergies      VITALS:  Temp:  [96.3 °F (35.7 °C)-98.6 °F (37 °C)] 97.6 °F (36.4 °C)  Pulse:  [] 94  Resp:  [11-22] 16  SpO2:  [100 %] 100 %  BP: (109-145)/(59-80) 109/59    I/O last 3 completed shifts:  In: 125 [P.O.:125]  Out: 0       PHYSICAL EXAM:  Physical Exam   Constitutional: She is oriented to person, place, and time. She appears well-developed and well-nourished. No distress.   HENT:   Head: Normocephalic and atraumatic.   Nose: Nose normal.   Eyes: Conjunctivae and EOM are normal. No scleral icterus.   Neck: Normal range of motion. Neck supple. No tracheal tenderness present. No tracheal deviation present.   Cardiovascular: Normal rate, regular rhythm and intact distal pulses.   Pulmonary/Chest: Effort normal and breath sounds normal. No accessory muscle usage or stridor. No respiratory distress. Right breast exhibits no inverted nipple, no mass, no nipple discharge, no skin change and no tenderness. Left breast exhibits no inverted nipple, no mass, no nipple discharge, no skin change and no tenderness. Breasts are symmetrical.   Abdominal: Soft. Normal appearance. She exhibits no distension, no ascites and no mass. There is no tenderness. There is no rebound. No hernia. Hernia confirmed negative in the  ventral area, confirmed negative in the right inguinal area and confirmed negative in the left inguinal area.   Musculoskeletal: Normal range of motion. She exhibits edema. She exhibits no deformity.   asterixis    Lymphadenopathy:     She has no axillary adenopathy. No inguinal adenopathy noted on the right or left side.        Right: No inguinal and no supraclavicular adenopathy present.        Left: No inguinal and no supraclavicular adenopathy present.   Neurological: She is alert and oriented to person, place, and time. She exhibits normal muscle tone.   Skin: Skin is warm and dry. No rash noted. She is not diaphoretic. No erythema.   Psychiatric: Her behavior is normal. Judgment and thought content normal.   Flat affect   Vitals reviewed.        LABS:  Lab Results   Component Value Date    WBC 6.14 03/28/2019    RBC 4.26 03/28/2019    HGB 12.3 03/28/2019    HCT 37.2 03/28/2019     03/28/2019     Lab Results   Component Value Date    GLU 81 03/29/2019     (L) 03/29/2019    K 4.7 03/29/2019     03/29/2019    CO2 16 (L) 03/29/2019     (H) 03/29/2019    CREATININE 6.6 (H) 03/29/2019    CALCIUM 10.9 (H) 03/29/2019     Lab Results   Component Value Date    ALT 18 03/28/2019    AST 23 03/28/2019    ALKPHOS 88 03/28/2019    BILITOT 0.5 03/28/2019     Lab Results   Component Value Date    MG 2.0 03/29/2019    PHOS 4.9 (H) 03/29/2019       STUDIES:  Echo: 1/1/19  TTE 1/11/19  · Normal left ventricular systolic function. The estimated ejection fraction is 65%  · Grade I (mild) left ventricular diastolic dysfunction consistent with impaired relaxation.  · Concentric left ventricular remodeling.  · Thickened aortic and mitral valve leaflets appear rheumatic  · Severe right ventricular enlargement with Positive Licona's sign - Right heart strain - Consider Pulmonary Embolism  · Normal right ventricular systolic function.        ASSESSMENT & PLAN:  68 y.o. female with new onset of end-stage renal  disease requiring hemodialysis  - general surgery consulted for placement of PermCath for initiation of hemodialysis  - we discussed the risk and benefits of placement of a tunneled dialysis catheter, the risks included pain, bleeding, scarring, infection, failure of the device, arrhythmia, injury to the artery along, development of a thrombus surrounding the catheter; patient will be at an elevated risk of bleeding given her recent anticoagulation therapy for PE  - after reviewing the procedure and associated risks the patient agreed to proceed with surgery to place the tunneled dialysis catheter  - will plan for the operating room this afternoon under local/mac  - consent obtained  - keep NPO for the time being  - catheter should be okay to use postoperatively as long as postop chest x-ray confirms adequate positioning  - recommend vein mapping and vascular surgery consult if long-term hemodialysis through AV fistula is to be considered, if the patient wishes to have peritoneal dialysis catheter we can see her in our general surgery clinic

## 2019-03-29 NOTE — ED NOTES
Patient identifiers for Brittaney Joseph checked and correct.  LOC: The patient is awake, alert and aware of environment, the patient is oriented to person and place and speaking appropriately.  APPEARANCE: Patient resting comfortably and in no acute distress, patient is clean and well groomed, patient's clothing are properly fastened.  SKIN: The skin is warm and dry, patient has normal skin turgor and moist mucus membranes, skin intact, no breakdown or brusing noted.  MUSKULOSKELETAL: Patient moving all extremities well, no obvious swelling or deformities noted.  RESPIRATORY: Airway is open and patent, respirations are spontaneous, patient has a normal effort and rate.  CARDIAC: Patient tachycardic,  periphreal edema noted.  NEUROLOGIC: PERRL, facial expression is symmetrical, bilateral hand grasp equal and even, normal sensation in all extremities when touched with a finger.

## 2019-03-29 NOTE — ANESTHESIA POSTPROCEDURE EVALUATION
Anesthesia Post Evaluation    Patient: Brittaney Joseph    Procedure(s) Performed: Procedure(s) (LRB):  Insertion,catheter,tunneled (Right)    Final Anesthesia Type: MAC  Patient location during evaluation: PACU  Patient participation: Yes- Able to Participate  Level of consciousness: awake and alert, oriented and awake  Post-procedure vital signs: reviewed and stable  Pain management: adequate  Airway patency: patent  PONV status at discharge: No PONV  Anesthetic complications: no      Cardiovascular status: blood pressure returned to baseline  Respiratory status: unassisted and room air  Hydration status: euvolemic  Follow-up not needed.          Vitals Value Taken Time   /56 3/29/2019  4:37 PM   Temp 36.4 °C (97.5 °F) 3/29/2019  3:43 PM   Pulse 84 3/29/2019  4:41 PM   Resp 14 3/29/2019  4:13 PM   SpO2 98 % 3/29/2019  3:50 PM   Vitals shown include unvalidated device data.      No case tracking events are documented in the log.      Pain/Gregroy Score: Gregory Score: 10 (3/29/2019  4:25 PM)

## 2019-03-29 NOTE — PROGRESS NOTES
.Pharmacy New Medication Education    Patient accepted medication education.    Pharmacy educated patient on name and purpose of medications and possible side effects, using the teach-back method.     Current Scheduled Medications   Collapse  Hide   (From admission, onward)   Ordered   Start Stop   03/29/19 1059  furosemide tablet 80 mg 80 mg, Oral, 2 times daily      03/29/19 1800 --   03/29/19 1059  vitamin renal formula (B-complex-vitamin c-folic acid) 1 mg per capsule 1 capsule 1 capsule, Oral, Daily      03/29/19 1200 --   03/29/19 0956  0.9% NaCl infusion Intravenous, 100 mL/hr, Once     Phase of Care: Dialysis    03/29/19 1100 --   03/29/19 0957  0.9% NaCl infusion Intravenous, 100 mL/hr, Once     Phase of Care: Dialysis    03/29/19 1100 --   03/28/19 2239  aspirin chewable tablet 81 mg 81 mg, Oral, Daily     Phase of Care: Admission    03/29/19 0900 --   03/28/19 2239  atorvastatin tablet 40 mg 40 mg, Oral, Daily     Phase of Care: Admission    03/29/19 0900 --   03/28/19 2239  sucralfate 100 mg/mL suspension 1 g 1 g, Oral, 4 times daily     Phase of Care: Admission    03/29/19 0900 --   03/28/19 2239  cloNIDine tablet 0.2 mg 0.2 mg, Oral, Nightly     Phase of Care: Admission    03/28/19 2245 --   03/28/19 2239  hydrALAZINE tablet 25 mg 25 mg, Oral, Every 8 hours     Phase of Care: Admission    03/28/19 2245 --   03/28/19 2239  heparin (porcine) injection 5,000 Units 5,000 Units, Subcutaneous, Every 8 hours     Phase of Care: Admission    03/28/19 2245 --   Current PRN Medications   Collapse  Hide   (From admission, onward)   Ordered   Start Stop   03/29/19 0957  0.9% NaCl infusion Intravenous, As needed (PRN)     Phase of Care: Dialysis    03/29/19 1056 --   03/29/19 0956  0.9% NaCl infusion Intravenous, As needed (PRN)     Phase of Care: Dialysis    03/29/19 1055 --   03/28/19 2239  nitroGLYCERIN SL tablet 0.4 mg 0.4 mg, Sublingual, Every 5 min PRN     Phase of Care: Admission    03/28/19 3468 --    03/28/19 2239  ondansetron disintegrating tablet 4 mg 4 mg, Oral, Every 8 hours PRN     Phase of Care: Admission    03/28/19 2239 --   03/28/19 2239  senna-docusate 8.6-50 mg per tablet 1 tablet 1 tablet, Oral, 2 times daily PRN     Phase of Care: Admission    03/28/19 2239 --   03/28/19 2239  ramelteon tablet 8 mg 8 mg, Oral, Nightly PRN     Phase of Care: Admission    03/28/19 2239 --   03/28/19 2239  sodium chloride 0.9% flush 10 mL 10 mL, Intravenous, As needed (PRN)     Phase of Care: Admission    03/28/19 2239 --   03/28/19 2239  glucose chewable tablet 16 g 16 g, Oral, As needed (PRN)     Phase of Care: Admission    03/28/19 2239 --   03/28/19 2239  glucose chewable tablet 24 g 24 g, Oral, As needed (PRN)     Phase of Care: Admission    03/28/19 2239 --   03/28/19 2239  dextrose 50 % in water (D50W) injection 12.5 g 12.5 g, Intravenous, As needed (PRN)     Phase of Care: Admission    03/28/19 2239 --   03/28/19 2239  dextrose 50 % in water (D50W) injection 25 g 25 g, Intravenous, As needed (PRN)     Phase of Care: Admission    03/28/19 2239 --   03/28/19 2239  glucagon (human recombinant) injection 1 mg 1 mg, Intramuscular, As needed (PRN)     Phase of Care: Admission    03/28/19 2239 --   03/28/19 2239  insulin aspart U-100 pen 0-5 Units 0-5 Units, Subcutaneous, Before meals & nightly PRN     Phase of Care: Admission    03/28/19 2239 --   03/28/19 2239  acetaminophen tablet 650 mg 650 mg, Oral, Every 6 hours PRN     Phase of Care: Admission    03/28/19 2239 --   03/28/19 2239  acetaminophen tablet 1,000 mg 1,000 mg, Oral, Every 8 hours PRN     Phase of Care: Admission            Learners of pharmacy medication education included:  Patient    Patient +/- learner response:  Verbalized Understanding, Teachback

## 2019-03-29 NOTE — TRANSFER OF CARE
"Anesthesia Transfer of Care Note    Patient: Brittaney Joseph    Procedure(s) Performed: Procedure(s) (LRB):  Insertion,catheter,tunneled (Right)    Patient location: PACU    Anesthesia Type: MAC    Transport from OR: Transported from OR on room air with adequate spontaneous ventilation    Post pain: adequate analgesia    Post assessment: no apparent anesthetic complications    Post vital signs: stable    Level of consciousness: awake and alert    Nausea/Vomiting: no nausea/vomiting    Complications: none    Transfer of care protocol was followed      Last vitals:   Visit Vitals  /63   Pulse 92   Temp 36.4 °C (97.5 °F) (Skin)   Resp 18   Ht 5' 4" (1.626 m)   Wt 66.1 kg (145 lb 11.6 oz)   SpO2 98%   Breastfeeding? No   BMI 25.01 kg/m²     "

## 2019-03-29 NOTE — OP NOTE
DATE OF PROCEDURE:  03/29/2019    PREOPERATIVE DIAGNOSES:   Acute on chronic renal failure.    POSTOPERATIVE DIAGNOSES:  Acute on chronic renal failure.    PROCEDURE:  Insertion of right internal jugular vein tunneled dialysis   catheter.    SURGEON:  Michelet Cano M.D.    ASSISTANT:  None    ANESTHESIA:  Local/MAC.    PREP:  Chlorhexidine.    SPECIMEN:  None.    ESTIMATED BLOOD LOSS:  15 cc    FINDINGS:  1.  Right internal jugular vein accessed under ultrasound guidance with return of dark red nonpulsatile blood  2.  Guidewire passed with ease into the venous system and confirmed on fluoroscopy  3.  Tunneled dialysis catheter place with tip at the atrial caval junction under fluoroscopic guidance  4.  Both ports aspirated and flushed with ease    INDICATIONS:  The patient is an 68-year-old female who was found to have  acute on chronic kidney failure with progression to end-stage renal disease.  The patient was agreeable to the initiation of hemodialysis.    The risks of the procedure were described to the patient including bleeding,   infection, pain, scarring, wound complications, potential injury to structures   in the neck or chest warranting more extensive surgery and potential need for   further interventions.  The patient demonstrated understanding of these risks   and a consent form was obtained.    PROCEDURE IN DETAIL:  The patient was identified in the Preoperative Unit and   taken back to the Operating Room and laid supine on the operating room table.    IV antibiotics were administered prior to the administration of the anesthesia.    MAC anesthesia was administered without complication.  The patient was then   prepped and draped in a standard sterile fashion.  Timeout procedure was   performed in accordance with hospital protocol.  An ultrasound was used to   identify the right internal jugular vein.  The images were interpreted by me   and stored for further review.  The internal jugular vein was  accessed using a   needle. The wire was passed without difficulty, we confirmed appropriate positioning  Using fluoroscopic guidance.  An incision in the right upper chest was   made approximately 0.5 cm using a #15 blade scalpel.  The tunneler was passed   from this incision towards the neck incision without any issue.  Once the   PermCath cuff was in the appropriate position at the chest site, the initial   dilator was passed over the wire and confirmed with fluoroscopic guidance.  The   internal jugular vein was then sequentially dilated and once the final dilator   was passed, the peel-away sheath was then inserted over the wire using Seldinger   technique.  The wire and dilator were removed.  The catheter was then placed   into the peel-away sheath and peel-away sheath was removed while the catheter   was threaded forward.  Once this was complete, final fluoroscopic image did   confirm that the catheter was in appropriate position in the atrial caval junction.  Both ports of the catheter withdrew and flushed very easily.    Each port was then locked with 2.5 mL of 1000 units per mL IV heparin.  The neck   incision was closed using 4-0 Monocryl suture in an interrupted fashion.  The   chest incision was closed using 4-0 Monocryl used.    The catheter was fixed to the right chest wall using 3-0 nylon suture.  Dry   sterile dressings were then applied.  The patient tolerated the procedure well   and there were no complications.  She was awakened from anesthesia and returned   to the Postoperative Recovery Unit in stable condition.  At the end of the case,   hemostasis was confirmed and sponge, instrument and needle counts were correct   on 2 occasions.  I was present and scrubbed throughout the entirety of the case.    During all vein manipulation, the patient was in the Trendelenburg position.    COMPLICATIONS:  None.    CONDITION:  Stable.

## 2019-03-29 NOTE — NURSING
R IJ CVC accessed for HD. Venous port aspirates and flushes extremely sluggishly. -200 maximum. Dr. Mcghee notified.

## 2019-03-29 NOTE — SUBJECTIVE & OBJECTIVE
Past Medical History:   Diagnosis Date    Chronic kidney disease     Chronic low back pain     Diabetes mellitus, type 2     Hypertension     Pulmonary embolism with acute cor pulmonale 1/11/2019       Past Surgical History:   Procedure Laterality Date    CHOLECYSTECTOMY      EKOS, Pumoart/DVT  1/11/2019    Performed by Roger Day MD at [facility] CATH LAB/EP    Thrombolysis, PA N/A 1/11/2019    Performed by Roger Day MD at [facility] CATH LAB/EP       Review of patient's allergies indicates:  No Known Allergies    No current facility-administered medications on file prior to encounter.      Current Outpatient Medications on File Prior to Encounter   Medication Sig    amLODIPine (NORVASC) 10 MG tablet Take 10 mg by mouth nightly.    aspirin 81 MG Chew Take 1 tablet (81 mg total) by mouth once daily.    atorvastatin (LIPITOR) 40 MG tablet Take 40 mg by mouth once daily.    cloNIDine (CATAPRES) 0.2 MG tablet Take 0.2 mg by mouth nightly.    hydrALAZINE (APRESOLINE) 25 MG tablet Take 1 tablet (25 mg total) by mouth every 8 (eight) hours.    metoprolol tartrate (LOPRESSOR) 100 MG tablet Take 1 tablet (100 mg total) by mouth 2 (two) times daily.    glipiZIDE (GLUCOTROL) 2.5 MG TR24 Take 1 tablet (2.5 mg total) by mouth daily with breakfast.    loperamide (IMODIUM) 2 mg capsule Take 1 tablet after each loose stool.  Do not exceed 8 tablets in a day.    nitroGLYCERIN (NITROSTAT) 0.4 MG SL tablet Place 1 tablet (0.4 mg total) under the tongue every 5 (five) minutes as needed for Chest pain. if second dose needed call 911    ondansetron (ZOFRAN) 4 MG tablet Take 1 tablet (4 mg total) by mouth every 8 (eight) hours as needed for Nausea.    oxyCODONE-acetaminophen (PERCOCET)  mg per tablet     rivaroxaban (XARELTO) 10 mg Tab Take 1 tablet (10 mg total) by mouth daily with dinner or evening meal.    senna-docusate 8.6-50 mg (PERICOLACE) 8.6-50 mg per tablet Take 1 tablet by mouth 2 (two) times daily as  needed for Constipation.    sucralfate (CARAFATE) 100 mg/mL suspension Take 10 mLs (1 g total) by mouth 4 (four) times daily.     Family History     None        Tobacco Use    Smoking status: Never Smoker    Smokeless tobacco: Never Used   Substance and Sexual Activity    Alcohol use: No     Frequency: Never    Drug use: No    Sexual activity: Not on file     Review of Systems   Constitutional: Positive for appetite change. Negative for chills and fever.   HENT: Negative for trouble swallowing and voice change.    Eyes: Negative for pain and redness.   Respiratory: Negative for cough and shortness of breath.    Cardiovascular: Positive for leg swelling. Negative for chest pain.   Gastrointestinal: Positive for nausea. Negative for blood in stool.   Genitourinary: Negative for difficulty urinating and dysuria.   Musculoskeletal: Negative for neck pain and neck stiffness.   Skin: Negative for rash and wound.   Neurological: Negative for seizures and syncope.   Psychiatric/Behavioral: Positive for confusion. Negative for self-injury.     Objective:     Vital Signs (Most Recent):  Temp: 97.3 °F (36.3 °C) (03/28/19 2234)  Pulse: (!) 118 (03/28/19 2239)  Resp: 18 (03/28/19 2234)  BP: 133/74 (03/28/19 2234)  SpO2: 100 % (03/28/19 2234) Vital Signs (24h Range):  Temp:  [97.3 °F (36.3 °C)-98.6 °F (37 °C)] 97.3 °F (36.3 °C)  Pulse:  [111-125] 118  Resp:  [11-22] 18  SpO2:  [100 %] 100 %  BP: (118-145)/(60-80) 133/74     Weight: 67.1 kg (147 lb 14.9 oz)  Body mass index is 25.39 kg/m².    Physical Exam   Constitutional: She appears well-developed. No distress.   HENT:   Head: Normocephalic and atraumatic.   Eyes: Conjunctivae are normal. No scleral icterus.   Neck: Neck supple. No tracheal deviation present.   Cardiovascular: Regular rhythm. Tachycardia present. Exam reveals friction rub.   Pulmonary/Chest: Effort normal. No respiratory distress.   Abdominal: Soft. She exhibits no distension. There is no tenderness.  There is no guarding.   Musculoskeletal: She exhibits edema. She exhibits no tenderness.   Neurological: She is alert. She displays tremor.   asterixis   Skin: Skin is warm and dry.   Psychiatric: She has a normal mood and affect. Thought content normal.   Nursing note and vitals reviewed.          Significant Labs: All pertinent labs within the past 24 hours have been reviewed.    Significant Imaging: I have reviewed all pertinent imaging results/findings within the past 24 hours.   X-Ray Chest AP Portable 3/28/19: FINDINGS:  Mildly tortuous aorta, similar to prior.  Underinflated lungs with hypoventilatory change.  Heart and lungs otherwise appear unchanged when allowing for differences in technique and positioning.

## 2019-03-29 NOTE — ANESTHESIA PREPROCEDURE EVALUATION
03/29/2019  Brittaney Joseph is a 68 y.o., female with history of ESRD in need of dialyais, DMII, and HTN admitted for uremic symptoms for placement of tunneled catheter under local/MAC.    In January, patient had submassive/massive PE requiring catheter directed thrombolysis. Takes xarelto as an outpatient    Past Medical History:   Diagnosis Date    Chronic kidney disease     Chronic low back pain     Diabetes mellitus, type 2     Hypertension     Pulmonary embolism with acute cor pulmonale 1/11/2019     Past Surgical History:   Procedure Laterality Date    CHOLECYSTECTOMY      EKOS, Pumoart/DVT  1/11/2019    Performed [facility] CATH LAB/EP    Thrombolysis, PA N/A 1/11/2019    Performed by Roger Day MD at [facility] CATH LAB/EP         Anesthesia Evaluation    I have reviewed the Patient Summary Reports.    I have reviewed the Nursing Notes.   I have reviewed the Medications.     Review of Systems  Anesthesia Hx:  No problems with previous Anesthesia Denies Hx of Anesthetic complications  Denies Family Hx of Anesthesia complications.   Denies Personal Hx of Anesthesia complications.   Social:  Non-Smoker, No Alcohol Use    Hematology/Oncology:  Hematology Normal   Oncology Normal     EENT/Dental:EENT/Dental Normal   Cardiovascular:   Exercise tolerance: good Hypertension    Pulmonary:  Pulmonary Normal    Renal/:   Chronic Renal Disease, ESRD    Hepatic/GI:  Hepatic/GI Normal    Musculoskeletal:  Musculoskeletal Normal    Neurological:  Neurology Normal    Endocrine:   Diabetes, well controlled    Dermatological:  Skin Normal    Psych:  Psychiatric Normal           Physical Exam           Mental Status:  Mental Status Findings:       Recent Labs   Lab 03/28/19  1923   WBC 6.14   RBC 4.26   HGB 12.3   HCT 37.2      MCV 87   MCH 28.9   MCHC 33.1       Chemistry         Component Value Date/Time     (L) 03/29/2019 0557    K 4.7 03/29/2019 0557     03/29/2019 0557    CO2 16 (L) 03/29/2019 0557     (H) 03/29/2019 0557    CREATININE 6.6 (H) 03/29/2019 0557    GLU 81 03/29/2019 0557        Component Value Date/Time    CALCIUM 10.9 (H) 03/29/2019 0557    ALKPHOS 88 03/28/2019 1924    AST 23 03/28/2019 1924    ALT 18 03/28/2019 1924    BILITOT 0.5 03/28/2019 1924    ESTGFRAFRICA 7 (A) 03/29/2019 0557    EGFRNONAA 6 (A) 03/29/2019 0557        TTE 1/11/19  · Normal left ventricular systolic function. The estimated ejection fraction is 65%  · Grade I (mild) left ventricular diastolic dysfunction consistent with impaired relaxation.  · Concentric left ventricular remodeling.  · Thickened aortic and mitral valve leaflets appear rheumatic  · Severe right ventricular enlargement with Positive Licona's sign - Right heart strain - Consider Pulmonary Embolism  · Normal right ventricular systolic function.      Anesthesia Plan  Type of Anesthesia, risks & benefits discussed:  Anesthesia Type:  MAC  Patient's Preference:   Intra-op Monitoring Plan: standard ASA monitors  Intra-op Monitoring Plan Comments:   Post Op Pain Control Plan: per primary service following discharge from PACU  Post Op Pain Control Plan Comments:   Induction:   IV  Beta Blocker:  Patient is not currently on a Beta-Blocker (No further documentation required).       Informed Consent: Patient understands risks and agrees with Anesthesia plan.  Questions answered. Anesthesia consent signed with patient.  ASA Score: 3     Day of Surgery Review of History & Physical: I have interviewed and examined the patient. I have reviewed the patient's H&P dated:  There are no significant changes.          Ready For Surgery From Anesthesia Perspective.

## 2019-03-30 LAB
ALBUMIN SERPL BCP-MCNC: 2.4 G/DL (ref 3.5–5.2)
ANION GAP SERPL CALC-SCNC: 10 MMOL/L (ref 8–16)
BUN SERPL-MCNC: 71 MG/DL (ref 8–23)
CALCIUM SERPL-MCNC: 9.5 MG/DL (ref 8.7–10.5)
CHLORIDE SERPL-SCNC: 105 MMOL/L (ref 95–110)
CO2 SERPL-SCNC: 18 MMOL/L (ref 23–29)
CREAT SERPL-MCNC: 4.6 MG/DL (ref 0.5–1.4)
EST. GFR  (AFRICAN AMERICAN): 11 ML/MIN/1.73 M^2
EST. GFR  (NON AFRICAN AMERICAN): 9 ML/MIN/1.73 M^2
GLUCOSE SERPL-MCNC: 100 MG/DL (ref 70–110)
MAGNESIUM SERPL-MCNC: 1.7 MG/DL (ref 1.6–2.6)
PHOSPHATE SERPL-MCNC: 3.4 MG/DL (ref 2.7–4.5)
POCT GLUCOSE: 103 MG/DL (ref 70–110)
POCT GLUCOSE: 105 MG/DL (ref 70–110)
POCT GLUCOSE: 69 MG/DL (ref 70–110)
POCT GLUCOSE: 81 MG/DL (ref 70–110)
POTASSIUM SERPL-SCNC: 3.8 MMOL/L (ref 3.5–5.1)
SODIUM SERPL-SCNC: 133 MMOL/L (ref 136–145)

## 2019-03-30 PROCEDURE — 83735 ASSAY OF MAGNESIUM: CPT

## 2019-03-30 PROCEDURE — 11000001 HC ACUTE MED/SURG PRIVATE ROOM

## 2019-03-30 PROCEDURE — 25000003 PHARM REV CODE 250: Performed by: HOSPITALIST

## 2019-03-30 PROCEDURE — 63600175 PHARM REV CODE 636 W HCPCS: Performed by: HOSPITALIST

## 2019-03-30 PROCEDURE — 36415 COLL VENOUS BLD VENIPUNCTURE: CPT

## 2019-03-30 PROCEDURE — 80069 RENAL FUNCTION PANEL: CPT

## 2019-03-30 PROCEDURE — 25000003 PHARM REV CODE 250: Performed by: INTERNAL MEDICINE

## 2019-03-30 PROCEDURE — 80100016 HC MAINTENANCE HEMODIALYSIS

## 2019-03-30 RX ORDER — SODIUM CHLORIDE 9 MG/ML
INJECTION, SOLUTION INTRAVENOUS ONCE
Status: COMPLETED | OUTPATIENT
Start: 2019-04-01 | End: 2019-04-01

## 2019-03-30 RX ORDER — SODIUM CHLORIDE 9 MG/ML
INJECTION, SOLUTION INTRAVENOUS
Status: DISCONTINUED | OUTPATIENT
Start: 2019-04-01 | End: 2019-04-01 | Stop reason: HOSPADM

## 2019-03-30 RX ADMIN — HYDRALAZINE HYDROCHLORIDE 25 MG: 25 TABLET, FILM COATED ORAL at 10:03

## 2019-03-30 RX ADMIN — HEPARIN SODIUM 5000 UNITS: 5000 INJECTION, SOLUTION INTRAVENOUS; SUBCUTANEOUS at 10:03

## 2019-03-30 RX ADMIN — FUROSEMIDE 80 MG: 40 TABLET ORAL at 09:03

## 2019-03-30 RX ADMIN — ASPIRIN 81 MG 81 MG: 81 TABLET ORAL at 09:03

## 2019-03-30 RX ADMIN — CLONIDINE HYDROCHLORIDE 0.2 MG: 0.2 TABLET ORAL at 10:03

## 2019-03-30 RX ADMIN — FUROSEMIDE 80 MG: 40 TABLET ORAL at 06:03

## 2019-03-30 RX ADMIN — SUCRALFATE 1 G: 1 SUSPENSION ORAL at 06:03

## 2019-03-30 RX ADMIN — SUCRALFATE 1 G: 1 SUSPENSION ORAL at 11:03

## 2019-03-30 RX ADMIN — NEPHROCAP 1 CAPSULE: 1 CAP ORAL at 09:03

## 2019-03-30 RX ADMIN — ACETAMINOPHEN 1000 MG: 500 TABLET ORAL at 09:03

## 2019-03-30 RX ADMIN — ATORVASTATIN CALCIUM 40 MG: 40 TABLET, FILM COATED ORAL at 09:03

## 2019-03-30 RX ADMIN — SUCRALFATE 1 G: 1 SUSPENSION ORAL at 09:03

## 2019-03-30 RX ADMIN — HEPARIN SODIUM 5000 UNITS: 5000 INJECTION, SOLUTION INTRAVENOUS; SUBCUTANEOUS at 05:03

## 2019-03-30 RX ADMIN — HYDRALAZINE HYDROCHLORIDE 25 MG: 25 TABLET, FILM COATED ORAL at 05:03

## 2019-03-30 NOTE — PROGRESS NOTES
[facility] Hospital Medicine  Progress Note    Patient Name: Brittaney Joseph  MRN: [id number]  Patient Class: IP- Inpatient   Admission Date: 3/28/2019  Length of Stay: 2 days  Attending Physician: Aparna Serrano*  Primary Care Provider: Charles Argueta MD        Subjective:     Principal Problem:ESRD needing dialysis    HPI:  Brittaney Joseph is a 68 y.o. black woman with hypertension, diabetes mellitus type 2, chronic diastolic heart failure, chronic kidney disease stage 4, hyperlipidemia, chronic low back pain, history of pulmonary embolism on 1/11/19 status post thrombectomy. She wears upper and lower dentures. She lives with her niece. Her primary care physician is Dr. Charles Argueta. Her nephrologist is Dr. Saida Mcdaniel.    Labs on 3/12/19 showed BUN and creatinine 83 and 7.52. She was sent to [facility] Emergency Department on 3/28/19 by Dr. Mcdaniel to initiate dialysis. She reported a few weeks of worsening nausea, decreased appetite, peripheral edema, and delirium. She was not eating so stopped taking her medications for the past week. Labs showed  and creatinine 6.6. She was admitted to [facility].    Hospital Course:  3/30 s/p tunneled cath placement and HD yesterday. continue supplement per nephrology    Interval History: awake and alert. Sister by beside,   Patient's sister  patient ambulate and dress up with assistance at home, able to feed self only.   S/p tunneled cath placement and HD yesterday.   Appreciates nephrology rec's HD for 2 consecutive days.       Review of Systems   Constitutional: Negative for appetite change, chills and fever.   Respiratory: Negative for cough and shortness of breath.    Cardiovascular: Positive for leg swelling. Negative for chest pain.   Gastrointestinal: Negative for blood in stool and nausea.   Genitourinary: Negative for dysuria.   Musculoskeletal: Negative for neck pain and neck  stiffness.   Skin: Negative for rash and wound.   Neurological: Negative for seizures and syncope.   Psychiatric/Behavioral: Negative for confusion and dysphoric mood.     Objective:     Vital Signs (Most Recent):  Temp: 96.5 °F (35.8 °C) (03/30/19 0731)  Pulse: 76 (03/30/19 0731)  Resp: 18 (03/30/19 0731)  BP: 124/69 (03/30/19 0731)  SpO2: 97 % (03/30/19 0534) Vital Signs (24h Range):  Temp:  [96.5 °F (35.8 °C)-97.9 °F (36.6 °C)] 96.5 °F (35.8 °C)  Pulse:  [] 76  Resp:  [14-18] 18  SpO2:  [96 %-100 %] 97 %  BP: (109-182)/(55-89) 124/69     Weight: 62.4 kg (137 lb 9.1 oz)  Body mass index is 23.61 kg/m².    Intake/Output Summary (Last 24 hours) at 3/30/2019 0846  Last data filed at 3/30/2019 0535  Gross per 24 hour   Intake 1748 ml   Output 750 ml   Net 998 ml      Physical Exam   Constitutional: She appears well-developed. No distress.   HENT:   Head: Normocephalic and atraumatic.   Eyes: Conjunctivae are normal. No scleral icterus.   Neck: Neck supple. No tracheal deviation present.   Cardiovascular: Regular rhythm. Exam reveals no friction rub.   Pulmonary/Chest: Effort normal. No respiratory distress.   Abdominal: Soft. She exhibits no distension. There is no tenderness. There is no guarding.   Musculoskeletal: She exhibits edema. She exhibits no tenderness.   Neurological: She is alert.   asterixis   Skin: Skin is warm and dry.   Psychiatric: She has a normal mood and affect. Thought content normal.   Nursing note and vitals reviewed.      Significant Labs:   CBC:   Recent Labs   Lab 03/28/19 1923   WBC 6.14   HGB 12.3   HCT 37.2        CMP:   Recent Labs   Lab 03/28/19 1924 03/29/19  0557 03/30/19  0501   * 133* 133*   K 4.3 4.7 3.8   CL 98 103 105   CO2 16* 16* 18*    81 100   * 118* 71*   CREATININE 6.6* 6.6* 4.6*   CALCIUM 11.3* 10.9* 9.5   PROT 7.8  --   --    ALBUMIN 3.3* 2.8* 2.4*   BILITOT 0.5  --   --    ALKPHOS 88  --   --    AST 23  --   --    ALT 18  --   --     ANIONGAP 18* 14 10   EGFRNONAA 6* 6* 9*     Coagulation:   Recent Labs   Lab 03/28/19 1923   INR 1.0   APTT 25.6     Troponin: No results for input(s): TROPONINI in the last 48 hours.  TSH:   Recent Labs   Lab 12/18/18 2003   TSH 1.497       Significant Imaging: I have reviewed all pertinent imaging results/findings within the past 24 hours.    Assessment/Plan:      * ESRD needing dialysis  Uremia  Hypercalcemia  Consult General Surgery for dialysis catheter placement.  Consult nephrology  S/p tunneled cath placement   Started HD 3/29  Nepro carp and nepro  Continue Lasix  Consult liz management for outpatient HD placement    Uremia  Consult nephrology      History of pulmonary embolism  Hold home rivaroxaban. Give heparin.  Resume rivaroxaban after dialysis catheter placement.    Chronic diastolic heart failure  Plan for HD      Chronic low back pain  Acetaminophen prn.    Controlled type 2 diabetes mellitus with stage 4 chronic kidney disease, without long-term current use of insulin  Discontinue home glipizide.   Insulin aspart sliding scale.   Monitor serum glucose.    Hyperlipidemia associated with type 2 diabetes mellitus  Continue home atorvastatin.    Essential hypertension  Chronic diastolic heart failure  Takes clonidine, amlodipine, hydralazine, metoprolol. Has not been eating or drinking so stopped taking. Continue clonidine and hydralazine. Hold amlodipine and metoprolol.      VTE Risk Mitigation (From admission, onward)        Ordered     heparin (porcine) injection 4,300 Units  As needed (PRN)      03/29/19 1927     heparin (porcine) injection 5,000 Units  Every 8 hours      03/28/19 2239     IP VTE HIGH RISK PATIENT  Once      03/28/19 2239              Aparna Serrano MD  Department of Hospital Medicine   [facility]

## 2019-03-30 NOTE — PROGRESS NOTES
[employer] renal fellow ELYSE WEI    Consult note    Reason for Consult:     ESRD    Subjective:      History of Present Illness:  Brittaney Joseph is a 68 y.o. AA female who  has a past medical history of Chronic kidney disease, Chronic low back pain, Diabetes mellitus, type 2, Hypertension, and Pulmonary embolism with acute cor pulmonale (2019).. The patient presented to the [facility] on 3/28/2019 with a primary complaint of abnormal LABS (sent for admission from kidney MD )    This is a 69 yo AA female who is being admitted for initiation of dialysis; she was sent from outpt nephrology clinic (Dr. Edgar) after labwork saw a progression in her renal disease; in a addition, she has uremic Sx including n/v/asterixis/confusion/decreased urine output/decreased po intake for a couple of weeks.    This aM, the pt denies sob; she endorses decreased appetite; pt knows why she is here, but I believe she has poor insight into her medical condiitons      Interim History:  Pt reports that she is breathing ok today. Denies SOB, CP, N/V. No issues with dialysis yesterday. Currently on dialysis without acute complaints.    Review of Systems: All other systems are reviewed and are negative.       Objective:   Last 24 Hour Vital Signs:  BP  Min: 116/68  Max: 182/89  Temp  Av.1 °F (36.2 °C)  Min: 96.5 °F (35.8 °C)  Max: 97.9 °F (36.6 °C)  Pulse  Av  Min: 64  Max: 105  Resp  Av.4  Min: 14  Max: 18  SpO2  Av.6 %  Min: 96 %  Max: 100 %  Weight  Av.4 kg (137 lb 9.1 oz)  Min: 62.4 kg (137 lb 9.1 oz)  Max: 62.4 kg (137 lb 9.1 oz)  I/O last 3 completed shifts:  In: 1873 [P.O.:673; I.V.:700; Other:500]  Out: 750 [Urine:250; Other:500]    Physical Examination:  GEN - AA; NAD  CHEST - CTA B  HEART - tachy; 2/6 systolic murmur over much of precordium; no rub  ABD - soft; nonttp  EXTR  -warm; trace edema to shins B  NEURO - proximal and distal muscle weakness; + asterixis    Laboratory Results:    Trended Lab  Data:  Recent Labs   Lab 03/28/19  1923 03/28/19  1924 03/29/19  0557 03/30/19  0501   WBC 6.14  --   --   --    HGB 12.3  --   --   --    HCT 37.2  --   --   --      --   --   --    MCV 87  --   --   --    RDW 15.5*  --   --   --    NA  --  132* 133* 133*   K  --  4.3 4.7 3.8   CL  --  98 103 105   CO2  --  16* 16* 18*   BUN  --  110* 118* 71*   GLU  --  109 81 100   PROT  --  7.8  --   --    ALBUMIN  --  3.3* 2.8* 2.4*   BILITOT  --  0.5  --   --    AST  --  23  --   --    ALKPHOS  --  88  --   --    ALT  --  18  --   --        Trended Cardiac Data:  No results for input(s): TROPONINI, CKTOTAL, CKMB, BNP in the last 168 hours.    .    Radiology:  X-ray Chest Ap Portable    Result Date: 3/28/2019  EXAMINATION: XR CHEST AP PORTABLE CLINICAL HISTORY: Weakness; TECHNIQUE: Single frontal view of the chest was performed. COMPARISON: January 10, 2019. FINDINGS: Mildly tortuous aorta, similar to prior.  Underinflated lungs with hypoventilatory change. Heart and lungs otherwise appear unchanged when allowing for differences in technique and positioning.     No acute findings. No significant change from prior study. Electronically signed by: Zi Beauchamp MD Date:    03/28/2019 Time:    19:45           Assessment/Plan     ESRD  -2/3 IHD initiation today. Will do 3rd dialysis on Monday.  -sonam place on nephrocap and nepro; lasix oral 80mg BID  -orders placed to SW for outpt HD chair; ppd/hep serologies ordered    Hypercalcemia  -PTH/Vit D ordered  -will dialyze against low calcium bath    Anemia  -will eval for epo with iHD prn    CKD-MBD  -PTH 42.3  -Vit D 17. She will need to be started on weekly ergocalciferol  -zemplar with iHD prn  -will cont to evaluate need for phosphorus binders     H/O PE  -would rec to continue outpt anticoag once permcath is placed    Brannon Watts MD, 3/30/2019 1:06 PM  [employer] Nephrology PGY-V  Pager: [phone]  Cell: [phone]

## 2019-03-30 NOTE — PLAN OF CARE
Problem: Electrolyte Imbalance (Acute Kidney Injury/Impairment)  Goal: Serum Electrolyte Balance  Outcome: Ongoing (interventions implemented as appropriate)  Continue to monitor lab values. Monitor U/O and V/S.

## 2019-03-30 NOTE — PLAN OF CARE
Chief Complaint   Patient presents with    abnormal LABS       sent for admission from kidney MD      Pt needs assist with ADLs, current with [facility], has BSC, RW, Glucometer. Pt's niece, Kallie Holman lives with her and assists with care. Kallie also provides transportation to app.     [place] discussed with pt her choice of dialysis locations, She would prefer [facility] in [place].    Pt denies any needs or concerns at this time. Discharge planning brochure and business card provided. CM numbers written on white board. Pt encouraged to call with any questions or needs. CM will continue to follow patient throughout the transitions of care, and assist with any discharge needs.    Future Appointments   Date Time Provider Department Center   4/12/2019  1:20 PM Michelet Cano Jr., MD [facility] ARNA[facility]   5/6/2019  9:45 AM Bri Prater MD [facility] CARDIO [place]          03/30/19 1600   Discharge Assessment   Assessment Type Discharge Planning Assessment   Confirmed/corrected address and phone number on facesheet? Yes   Assessment information obtained from? Patient;Caregiver  (pt and niece: Kallie Holman [phone])   Expected Length of Stay (days) 3   Communicated expected length of stay with patient/caregiver yes   Prior to hospitilization cognitive status: Alert/Oriented   Prior to hospitalization functional status: Needs Assistance;Assistive Equipment   Current cognitive status: Alert/Oriented   Current Functional Status: Needs Assistance;Assistive Equipment   Facility Arrived From: (home)   Lives With other relative(s)  (niece: Kallie Holman [phone])   Able to Return to Prior Arrangements yes   Is patient able to care for self after discharge? Unable to determine at this time (comments)   Who are your caregiver(s) and their phone number(s)? niece: Kallie Holman [phone]   Patient's perception of discharge disposition home health  ([facility])   Readmission Within  the Last 30 Days no previous admission in last 30 days   Patient currently being followed by outpatient case management? No   Patient currently receives any other outside agency services? No   Equipment Currently Used at Home bedside commode;walker, rolling;glucometer   Do you have any problems affording any of your prescribed medications? No   Is the patient taking medications as prescribed? yes   Does the patient have transportation home? Yes   Transportation Anticipated family or friend will provide   Dialysis Name and Scheduled days to be arranged with [facility]   Does the patient receive services at the Coumadin Clinic? No   Discharge Plan A Home Health   Discharge Plan B Home with family   DME Needed Upon Discharge  none   Patient/Family in Agreement with Plan yes     Mitra Raymundo RN Transitional Navigator  [phone]

## 2019-03-30 NOTE — ASSESSMENT & PLAN NOTE
Uremia  Hypercalcemia  Consult General Surgery for dialysis catheter placement.  Consult nephrology  S/p tunneled cath placement   Started HD 3/29  Maged jordan and nepro  Continue Lasix  Consult liz management for outpatient HD placement

## 2019-03-30 NOTE — SUBJECTIVE & OBJECTIVE
Interval History: awake and alert. Sister by beside,   Patient's sister  patient ambulate and dress up with assistance at home, able to feed self only.   S/p tunneled cath placement and HD yesterday.   Appreciates nephrology rec's HD for 2 consecutive days.       Review of Systems   Constitutional: Negative for appetite change, chills and fever.   Respiratory: Negative for cough and shortness of breath.    Cardiovascular: Positive for leg swelling. Negative for chest pain.   Gastrointestinal: Negative for blood in stool and nausea.   Genitourinary: Negative for dysuria.   Musculoskeletal: Negative for neck pain and neck stiffness.   Skin: Negative for rash and wound.   Neurological: Negative for seizures and syncope.   Psychiatric/Behavioral: Negative for confusion and dysphoric mood.     Objective:     Vital Signs (Most Recent):  Temp: 96.5 °F (35.8 °C) (03/30/19 0731)  Pulse: 76 (03/30/19 0731)  Resp: 18 (03/30/19 0731)  BP: 124/69 (03/30/19 0731)  SpO2: 97 % (03/30/19 0534) Vital Signs (24h Range):  Temp:  [96.5 °F (35.8 °C)-97.9 °F (36.6 °C)] 96.5 °F (35.8 °C)  Pulse:  [] 76  Resp:  [14-18] 18  SpO2:  [96 %-100 %] 97 %  BP: (109-182)/(55-89) 124/69     Weight: 62.4 kg (137 lb 9.1 oz)  Body mass index is 23.61 kg/m².    Intake/Output Summary (Last 24 hours) at 3/30/2019 0846  Last data filed at 3/30/2019 0535  Gross per 24 hour   Intake 1748 ml   Output 750 ml   Net 998 ml      Physical Exam   Constitutional: She appears well-developed. No distress.   HENT:   Head: Normocephalic and atraumatic.   Eyes: Conjunctivae are normal. No scleral icterus.   Neck: Neck supple. No tracheal deviation present.   Cardiovascular: Regular rhythm. Exam reveals no friction rub.   Pulmonary/Chest: Effort normal. No respiratory distress.   Abdominal: Soft. She exhibits no distension. There is no tenderness. There is no guarding.   Musculoskeletal: She exhibits edema. She exhibits no tenderness.   Neurological: She is  alert.   asterixis   Skin: Skin is warm and dry.   Psychiatric: She has a normal mood and affect. Thought content normal.   Nursing note and vitals reviewed.      Significant Labs:   CBC:   Recent Labs   Lab 03/28/19 1923   WBC 6.14   HGB 12.3   HCT 37.2        CMP:   Recent Labs   Lab 03/28/19 1924 03/29/19  0557 03/30/19  0501   * 133* 133*   K 4.3 4.7 3.8   CL 98 103 105   CO2 16* 16* 18*    81 100   * 118* 71*   CREATININE 6.6* 6.6* 4.6*   CALCIUM 11.3* 10.9* 9.5   PROT 7.8  --   --    ALBUMIN 3.3* 2.8* 2.4*   BILITOT 0.5  --   --    ALKPHOS 88  --   --    AST 23  --   --    ALT 18  --   --    ANIONGAP 18* 14 10   EGFRNONAA 6* 6* 9*     Coagulation:   Recent Labs   Lab 03/28/19 1923   INR 1.0   APTT 25.6     Troponin: No results for input(s): TROPONINI in the last 48 hours.  TSH:   Recent Labs   Lab 12/18/18 2003   TSH 1.497       Significant Imaging: I have reviewed all pertinent imaging results/findings within the past 24 hours.

## 2019-03-30 NOTE — NURSING
Patient BG is 69. Apple juice given to patient. Patient is asymptomatic. Will recheck BG in 30 minutes.

## 2019-03-30 NOTE — HOSPITAL COURSE
3/30 s/p tunneled cath placement and HD yesterday. continue supplement per nephrology  4/1 replace K in HD. medically ready for d/c awaiting HD outpt chair placement.

## 2019-03-31 LAB
ALBUMIN SERPL BCP-MCNC: 2.4 G/DL (ref 3.5–5.2)
ANION GAP SERPL CALC-SCNC: 8 MMOL/L (ref 8–16)
BUN SERPL-MCNC: 34 MG/DL (ref 8–23)
CALCIUM SERPL-MCNC: 8.8 MG/DL (ref 8.7–10.5)
CHLORIDE SERPL-SCNC: 102 MMOL/L (ref 95–110)
CO2 SERPL-SCNC: 23 MMOL/L (ref 23–29)
CREAT SERPL-MCNC: 3.4 MG/DL (ref 0.5–1.4)
EST. GFR  (AFRICAN AMERICAN): 15 ML/MIN/1.73 M^2
EST. GFR  (NON AFRICAN AMERICAN): 13 ML/MIN/1.73 M^2
GLUCOSE SERPL-MCNC: 83 MG/DL (ref 70–110)
MAGNESIUM SERPL-MCNC: 1.5 MG/DL (ref 1.6–2.6)
PHOSPHATE SERPL-MCNC: 2.6 MG/DL (ref 2.7–4.5)
POCT GLUCOSE: 100 MG/DL (ref 70–110)
POCT GLUCOSE: 101 MG/DL (ref 70–110)
POCT GLUCOSE: 108 MG/DL (ref 70–110)
POCT GLUCOSE: 121 MG/DL (ref 70–110)
POCT GLUCOSE: 82 MG/DL (ref 70–110)
POTASSIUM SERPL-SCNC: 3.4 MMOL/L (ref 3.5–5.1)
SODIUM SERPL-SCNC: 133 MMOL/L (ref 136–145)

## 2019-03-31 PROCEDURE — 25000003 PHARM REV CODE 250: Performed by: SURGERY

## 2019-03-31 PROCEDURE — 63600175 PHARM REV CODE 636 W HCPCS: Performed by: HOSPITALIST

## 2019-03-31 PROCEDURE — 83735 ASSAY OF MAGNESIUM: CPT

## 2019-03-31 PROCEDURE — 11000001 HC ACUTE MED/SURG PRIVATE ROOM

## 2019-03-31 PROCEDURE — 25000003 PHARM REV CODE 250: Performed by: HOSPITALIST

## 2019-03-31 PROCEDURE — 80069 RENAL FUNCTION PANEL: CPT

## 2019-03-31 PROCEDURE — 63600175 PHARM REV CODE 636 W HCPCS: Performed by: SURGERY

## 2019-03-31 PROCEDURE — 36415 COLL VENOUS BLD VENIPUNCTURE: CPT

## 2019-03-31 PROCEDURE — 25000003 PHARM REV CODE 250: Performed by: INTERNAL MEDICINE

## 2019-03-31 RX ORDER — OXYCODONE AND ACETAMINOPHEN 10; 325 MG/1; MG/1
1 TABLET ORAL EVERY 6 HOURS PRN
Status: DISCONTINUED | OUTPATIENT
Start: 2019-03-31 | End: 2019-04-01 | Stop reason: HOSPADM

## 2019-03-31 RX ADMIN — FUROSEMIDE 80 MG: 40 TABLET ORAL at 06:03

## 2019-03-31 RX ADMIN — SUCRALFATE 1 G: 1 SUSPENSION ORAL at 01:03

## 2019-03-31 RX ADMIN — ONDANSETRON 4 MG: 4 TABLET, ORALLY DISINTEGRATING ORAL at 11:03

## 2019-03-31 RX ADMIN — SUCRALFATE 1 G: 1 SUSPENSION ORAL at 09:03

## 2019-03-31 RX ADMIN — HEPARIN SODIUM 5000 UNITS: 5000 INJECTION, SOLUTION INTRAVENOUS; SUBCUTANEOUS at 09:03

## 2019-03-31 RX ADMIN — FUROSEMIDE 80 MG: 40 TABLET ORAL at 09:03

## 2019-03-31 RX ADMIN — HYDRALAZINE HYDROCHLORIDE 25 MG: 25 TABLET, FILM COATED ORAL at 06:03

## 2019-03-31 RX ADMIN — NEPHROCAP 1 CAPSULE: 1 CAP ORAL at 09:03

## 2019-03-31 RX ADMIN — HEPARIN SODIUM 5000 UNITS: 5000 INJECTION, SOLUTION INTRAVENOUS; SUBCUTANEOUS at 06:03

## 2019-03-31 RX ADMIN — HEPARIN SODIUM 5000 UNITS: 5000 INJECTION, SOLUTION INTRAVENOUS; SUBCUTANEOUS at 01:03

## 2019-03-31 RX ADMIN — SUCRALFATE 1 G: 1 SUSPENSION ORAL at 06:03

## 2019-03-31 RX ADMIN — HYDRALAZINE HYDROCHLORIDE 25 MG: 25 TABLET, FILM COATED ORAL at 01:03

## 2019-03-31 RX ADMIN — ASPIRIN 81 MG 81 MG: 81 TABLET ORAL at 09:03

## 2019-03-31 RX ADMIN — OXYCODONE HYDROCHLORIDE AND ACETAMINOPHEN 1 TABLET: 10; 325 TABLET ORAL at 09:03

## 2019-03-31 RX ADMIN — ATORVASTATIN CALCIUM 40 MG: 40 TABLET, FILM COATED ORAL at 09:03

## 2019-03-31 NOTE — PLAN OF CARE
Problem: Adult Inpatient Plan of Care  Goal: Plan of Care Review  Plan of care reviewed. Turned and repositioned Q 2 hr. Safety maintained. Bed alarm on and call bell in reach. Medication given this shift as ordered. Tolerated well.No true red alarm noted on telemetry. NSR noted. willl  Continue to monitor.

## 2019-03-31 NOTE — SUBJECTIVE & OBJECTIVE
Interval History: awake and alert. No new complaint.   Had 2 days consecutive of HD. Ca improved.   Awaiting outpt HD chair placement.       Review of Systems   Constitutional: Negative for appetite change, chills and fever.   Respiratory: Negative for cough and shortness of breath.    Cardiovascular: Positive for leg swelling. Negative for chest pain.   Gastrointestinal: Negative for blood in stool and nausea.   Genitourinary: Negative for dysuria.   Skin: Negative for rash and wound.   Neurological: Negative for seizures and syncope.   Psychiatric/Behavioral: Negative for dysphoric mood.     Objective:     Vital Signs (Most Recent):  Temp: 97.5 °F (36.4 °C) (03/31/19 0811)  Pulse: 80 (03/31/19 0811)  Resp: 16 (03/31/19 0811)  BP: 115/69 (03/31/19 0811)  SpO2: 97 % (03/30/19 0534) Vital Signs (24h Range):  Temp:  [96.5 °F (35.8 °C)-99.2 °F (37.3 °C)] 97.5 °F (36.4 °C)  Pulse:  [62-98] 80  Resp:  [16-18] 16  BP: (102-147)/(58-89) 115/69     Weight: 62.4 kg (137 lb 9.1 oz)  Body mass index is 23.61 kg/m².    Intake/Output Summary (Last 24 hours) at 3/31/2019 1019  Last data filed at 3/30/2019 1435  Gross per 24 hour   Intake 500 ml   Output 1000 ml   Net -500 ml      Physical Exam   Constitutional: She appears well-developed. No distress.   HENT:   Head: Normocephalic and atraumatic.   Eyes: No scleral icterus.   Neck: Neck supple. No tracheal deviation present.   Cardiovascular: Regular rhythm. Exam reveals no friction rub.   Pulmonary/Chest: Effort normal. No respiratory distress.   Abdominal: Soft. She exhibits no distension. There is no tenderness. There is no guarding.   Musculoskeletal: She exhibits edema. She exhibits no tenderness.   Neurological: She is alert.   Skin: Skin is warm and dry.   Psychiatric: She has a normal mood and affect. Thought content normal.   Nursing note and vitals reviewed.      Significant Labs:   CBC:   No results for input(s): WBC, HGB, HCT, PLT in the last 48 hours.  CMP:   Recent  Labs   Lab 03/30/19  0501 03/31/19  0635   * 133*   K 3.8 3.4*    102   CO2 18* 23    83   BUN 71* 34*   CREATININE 4.6* 3.4*   CALCIUM 9.5 8.8   ALBUMIN 2.4* 2.4*   ANIONGAP 10 8   EGFRNONAA 9* 13*     Coagulation:   No results for input(s): PT, INR, APTT in the last 48 hours.  Troponin: No results for input(s): TROPONINI in the last 48 hours.  TSH:   Recent Labs   Lab 12/18/18  2003   TSH 1.497       Significant Imaging: I have reviewed all pertinent imaging results/findings within the past 24 hours.

## 2019-03-31 NOTE — PROGRESS NOTES
[facility]  Progress Note    Patient Name: Brittaney Joseph  MRN: [id number]  Patient Class: IP- Inpatient   Admission Date: 3/28/2019  Length of Stay: 3 days  Attending Physician: Aparna Serrano*  Primary Care Provider: Charles Argueta MD        Subjective:     Principal Problem:ESRD needing dialysis    HPI:  Brittaney Joseph is a 68 y.o. black woman with hypertension, diabetes mellitus type 2, chronic diastolic heart failure, chronic kidney disease stage 4, hyperlipidemia, chronic low back pain, history of pulmonary embolism on 1/11/19 status post thrombectomy. She wears upper and lower dentures. She lives with her niece. Her primary care physician is Dr. Charles Argueta. Her nephrologist is Dr. Saida Mcdaniel.    Labs on 3/12/19 showed BUN and creatinine 83 and 7.52. She was sent to [facility] on 3/28/19 by Dr. Mcdaniel to initiate dialysis. She reported a few weeks of worsening nausea, decreased appetite, peripheral edema, and delirium. She was not eating so stopped taking her medications for the past week. Labs showed  and creatinine 6.6. She was admitted to [facility].    Hospital Course:  3/30 s/p tunneled cath placement and HD yesterday. continue supplement per nephrology    Interval History: awake and alert. No new complaint.   Had 2 days consecutive of HD. Ca improved.   Awaiting outpt HD chair placement.       Review of Systems   Constitutional: Negative for appetite change, chills and fever.   Respiratory: Negative for cough and shortness of breath.    Cardiovascular: Positive for leg swelling. Negative for chest pain.   Gastrointestinal: Negative for blood in stool and nausea.   Genitourinary: Negative for dysuria.   Skin: Negative for rash and wound.   Neurological: Negative for seizures and syncope.   Psychiatric/Behavioral: Negative for dysphoric mood.     Objective:     Vital Signs (Most  Recent):  Temp: 97.5 °F (36.4 °C) (03/31/19 0811)  Pulse: 80 (03/31/19 0811)  Resp: 16 (03/31/19 0811)  BP: 115/69 (03/31/19 0811)  SpO2: 97 % (03/30/19 0534) Vital Signs (24h Range):  Temp:  [96.5 °F (35.8 °C)-99.2 °F (37.3 °C)] 97.5 °F (36.4 °C)  Pulse:  [62-98] 80  Resp:  [16-18] 16  BP: (102-147)/(58-89) 115/69     Weight: 62.4 kg (137 lb 9.1 oz)  Body mass index is 23.61 kg/m².    Intake/Output Summary (Last 24 hours) at 3/31/2019 1019  Last data filed at 3/30/2019 1435  Gross per 24 hour   Intake 500 ml   Output 1000 ml   Net -500 ml      Physical Exam   Constitutional: She appears well-developed. No distress.   HENT:   Head: Normocephalic and atraumatic.   Eyes: No scleral icterus.   Neck: Neck supple. No tracheal deviation present.   Cardiovascular: Regular rhythm. Exam reveals no friction rub.   Pulmonary/Chest: Effort normal. No respiratory distress.   Abdominal: Soft. She exhibits no distension. There is no tenderness. There is no guarding.   Musculoskeletal: She exhibits edema. She exhibits no tenderness.   Neurological: She is alert.   Skin: Skin is warm and dry.   Psychiatric: She has a normal mood and affect. Thought content normal.   Nursing note and vitals reviewed.      Significant Labs:   CBC:   No results for input(s): WBC, HGB, HCT, PLT in the last 48 hours.  CMP:   Recent Labs   Lab 03/30/19  0501 03/31/19  0635   * 133*   K 3.8 3.4*    102   CO2 18* 23    83   BUN 71* 34*   CREATININE 4.6* 3.4*   CALCIUM 9.5 8.8   ALBUMIN 2.4* 2.4*   ANIONGAP 10 8   EGFRNONAA 9* 13*     Coagulation:   No results for input(s): PT, INR, APTT in the last 48 hours.  Troponin: No results for input(s): TROPONINI in the last 48 hours.  TSH:   Recent Labs   Lab 12/18/18 2003   TSH 1.497       Significant Imaging: I have reviewed all pertinent imaging results/findings within the past 24 hours.    Assessment/Plan:      * ESRD needing dialysis  Uremia  Hypercalcemia  Consult General Surgery for  dialysis catheter placement.  Consult nephrology  S/p tunneled cath placement   Started HD 3/29  Maged jordan and maged  Continue Lasix  Consult liz management for outpatient HD placement    Uremia  Consult nephrology      History of pulmonary embolism  Hold home rivaroxaban. Give heparin.  Resume rivaroxaban after dialysis catheter placement.    Chronic diastolic heart failure  Plan for HD      Chronic low back pain  Acetaminophen prn.    Controlled type 2 diabetes mellitus with stage 4 chronic kidney disease, without long-term current use of insulin  Discontinue home glipizide.   Insulin aspart sliding scale.   Monitor serum glucose.    Hyperlipidemia associated with type 2 diabetes mellitus  Continue home atorvastatin.    Essential hypertension  Chronic diastolic heart failure  Takes clonidine, amlodipine, hydralazine, metoprolol. Has not been eating or drinking so stopped taking. Continue clonidine and hydralazine. Hold amlodipine and metoprolol.      VTE Risk Mitigation (From admission, onward)        Ordered     heparin (porcine) injection 4,300 Units  As needed (PRN)      03/29/19 1927     heparin (porcine) injection 5,000 Units  Every 8 hours      03/28/19 2239     IP VTE HIGH RISK PATIENT  Once      03/28/19 2239              Aparna Serrano MD  Department of Hospital Medicine   [facility]

## 2019-03-31 NOTE — PLAN OF CARE
Problem: Fall Injury Risk  Goal: Absence of Fall and Fall-Related Injury  Outcome: Ongoing (interventions implemented as appropriate)  Bed alarm on and bed in lowest position.call bell in reach. Q 2 hr maintained for 4Ps.

## 2019-04-01 ENCOUNTER — TELEPHONE (OUTPATIENT)
Dept: FAMILY MEDICINE | Facility: CLINIC | Age: 69
End: 2019-04-01

## 2019-04-01 VITALS
OXYGEN SATURATION: 98 % | BODY MASS INDEX: 23.49 KG/M2 | SYSTOLIC BLOOD PRESSURE: 120 MMHG | RESPIRATION RATE: 17 BRPM | HEIGHT: 64 IN | DIASTOLIC BLOOD PRESSURE: 60 MMHG | WEIGHT: 137.56 LBS | TEMPERATURE: 98 F | HEART RATE: 99 BPM

## 2019-04-01 PROBLEM — N19 UREMIA: Status: RESOLVED | Noted: 2019-03-28 | Resolved: 2019-04-01

## 2019-04-01 LAB
ALBUMIN SERPL BCP-MCNC: 2.4 G/DL (ref 3.5–5.2)
ANION GAP SERPL CALC-SCNC: 10 MMOL/L (ref 8–16)
BUN SERPL-MCNC: 41 MG/DL (ref 8–23)
CALCIUM SERPL-MCNC: 9.4 MG/DL (ref 8.7–10.5)
CHLORIDE SERPL-SCNC: 98 MMOL/L (ref 95–110)
CO2 SERPL-SCNC: 23 MMOL/L (ref 23–29)
CREAT SERPL-MCNC: 4.3 MG/DL (ref 0.5–1.4)
EST. GFR  (AFRICAN AMERICAN): 12 ML/MIN/1.73 M^2
EST. GFR  (NON AFRICAN AMERICAN): 10 ML/MIN/1.73 M^2
GLUCOSE SERPL-MCNC: 80 MG/DL (ref 70–110)
MAGNESIUM SERPL-MCNC: 1.6 MG/DL (ref 1.6–2.6)
PHOSPHATE SERPL-MCNC: 2.7 MG/DL (ref 2.7–4.5)
POCT GLUCOSE: 89 MG/DL (ref 70–110)
POCT GLUCOSE: 94 MG/DL (ref 70–110)
POTASSIUM SERPL-SCNC: 3.3 MMOL/L (ref 3.5–5.1)
SODIUM SERPL-SCNC: 131 MMOL/L (ref 136–145)
TB INDURATION 48 - 72 HR READ: 0 MM

## 2019-04-01 PROCEDURE — 90935 HEMODIALYSIS ONE EVALUATION: CPT

## 2019-04-01 PROCEDURE — 63600175 PHARM REV CODE 636 W HCPCS: Performed by: INTERNAL MEDICINE

## 2019-04-01 PROCEDURE — 80069 RENAL FUNCTION PANEL: CPT

## 2019-04-01 PROCEDURE — 25000003 PHARM REV CODE 250: Performed by: STUDENT IN AN ORGANIZED HEALTH CARE EDUCATION/TRAINING PROGRAM

## 2019-04-01 PROCEDURE — 25000003 PHARM REV CODE 250: Performed by: SURGERY

## 2019-04-01 PROCEDURE — 83735 ASSAY OF MAGNESIUM: CPT

## 2019-04-01 PROCEDURE — 63600175 PHARM REV CODE 636 W HCPCS: Performed by: SURGERY

## 2019-04-01 PROCEDURE — 36415 COLL VENOUS BLD VENIPUNCTURE: CPT

## 2019-04-01 PROCEDURE — 25000003 PHARM REV CODE 250: Performed by: HOSPITALIST

## 2019-04-01 RX ORDER — FUROSEMIDE 80 MG/1
80 TABLET ORAL 2 TIMES DAILY
Qty: 60 TABLET | Refills: 11 | Status: SHIPPED | OUTPATIENT
Start: 2019-04-01 | End: 2019-06-17 | Stop reason: CLARIF

## 2019-04-01 RX ADMIN — ASPIRIN 81 MG 81 MG: 81 TABLET ORAL at 08:04

## 2019-04-01 RX ADMIN — SUCRALFATE 1 G: 1 SUSPENSION ORAL at 08:04

## 2019-04-01 RX ADMIN — HEPARIN SODIUM 5000 UNITS: 5000 INJECTION, SOLUTION INTRAVENOUS; SUBCUTANEOUS at 02:04

## 2019-04-01 RX ADMIN — HYDRALAZINE HYDROCHLORIDE 25 MG: 25 TABLET, FILM COATED ORAL at 02:04

## 2019-04-01 RX ADMIN — SUCRALFATE 1 G: 1 SUSPENSION ORAL at 05:04

## 2019-04-01 RX ADMIN — FUROSEMIDE 80 MG: 40 TABLET ORAL at 08:04

## 2019-04-01 RX ADMIN — OXYCODONE HYDROCHLORIDE AND ACETAMINOPHEN 1 TABLET: 10; 325 TABLET ORAL at 08:04

## 2019-04-01 RX ADMIN — HEPARIN SODIUM 5000 UNITS: 5000 INJECTION, SOLUTION INTRAVENOUS; SUBCUTANEOUS at 05:04

## 2019-04-01 RX ADMIN — SUCRALFATE 1 G: 1 SUSPENSION ORAL at 02:04

## 2019-04-01 RX ADMIN — ATORVASTATIN CALCIUM 40 MG: 40 TABLET, FILM COATED ORAL at 08:04

## 2019-04-01 RX ADMIN — HYDRALAZINE HYDROCHLORIDE 25 MG: 25 TABLET, FILM COATED ORAL at 05:04

## 2019-04-01 RX ADMIN — OXYCODONE HYDROCHLORIDE AND ACETAMINOPHEN 1 TABLET: 10; 325 TABLET ORAL at 05:04

## 2019-04-01 RX ADMIN — HEPARIN SODIUM 4300 UNITS: 1000 INJECTION, SOLUTION INTRAVENOUS; SUBCUTANEOUS at 12:04

## 2019-04-01 RX ADMIN — NEPHROCAP 1 CAPSULE: 1 CAP ORAL at 08:04

## 2019-04-01 RX ADMIN — FUROSEMIDE 80 MG: 40 TABLET ORAL at 05:04

## 2019-04-01 RX ADMIN — SODIUM CHLORIDE: 0.9 INJECTION, SOLUTION INTRAVENOUS at 12:04

## 2019-04-01 NOTE — PLAN OF CARE
TN rounded on patient and patient's sister in room, TN discussed anticipated discharge plan with patient, pt will be discharged once OP HD chair set up, SW working on OP HD chair set up, pt will resume HH with family home care on discharge, TN will continue to follow patient and assess discharge needs.       04/01/19 1342   Discharge Reassessment   Assessment Type Discharge Planning Reassessment   Provided patient/caregiver education on the expected discharge date and the discharge plan Yes

## 2019-04-01 NOTE — SUBJECTIVE & OBJECTIVE
Interval History: awake and alert, sister by bedside. Report no compliant, reported making some urine.   Schedule for HD today. Ready fpr discharge once she has outpt HD chair placement.   Replace K at HD    Review of Systems   Constitutional: Negative for appetite change, chills and fever.   Respiratory: Negative for cough and shortness of breath.    Cardiovascular: Negative for chest pain and leg swelling.   Gastrointestinal: Negative for nausea.   Genitourinary: Negative for dysuria.   Skin: Negative for rash and wound.   Neurological: Negative for seizures and syncope.   Psychiatric/Behavioral: Negative for dysphoric mood.     Objective:     Vital Signs (Most Recent):  Temp: 98.2 °F (36.8 °C) (04/01/19 0732)  Pulse: 92 (04/01/19 0800)  Resp: 18 (04/01/19 0732)  BP: (!) 148/75 (04/01/19 0732)  SpO2: 98 % (04/01/19 0459) Vital Signs (24h Range):  Temp:  [97.7 °F (36.5 °C)-98.6 °F (37 °C)] 98.2 °F (36.8 °C)  Pulse:  [68-99] 92  Resp:  [16-18] 18  SpO2:  [98 %] 98 %  BP: (108-148)/(62-77) 148/75     Weight: 62.4 kg (137 lb 9.1 oz)  Body mass index is 23.61 kg/m².    Intake/Output Summary (Last 24 hours) at 4/1/2019 0955  Last data filed at 4/1/2019 0830  Gross per 24 hour   Intake 180 ml   Output 600 ml   Net -420 ml      Physical Exam   Constitutional: She appears well-developed. No distress.   HENT:   Head: Normocephalic and atraumatic.   Eyes: No scleral icterus.   Neck: Neck supple. No tracheal deviation present.   Cardiovascular: Regular rhythm. Exam reveals no friction rub.   Pulmonary/Chest: Effort normal. No respiratory distress.   Abdominal: Soft. She exhibits no distension. There is no tenderness. There is no guarding.   Musculoskeletal: She exhibits edema. She exhibits no tenderness.   Neurological: She is alert.   Skin: Skin is warm and dry.   Psychiatric: She has a normal mood and affect. Thought content normal.   Nursing note and vitals reviewed.      Significant Labs:   CBC:   No results for input(s):  WBC, HGB, HCT, PLT in the last 48 hours.  CMP:   Recent Labs   Lab 03/31/19  0635 04/01/19  0522   * 131*   K 3.4* 3.3*    98   CO2 23 23   GLU 83 80   BUN 34* 41*   CREATININE 3.4* 4.3*   CALCIUM 8.8 9.4   ALBUMIN 2.4* 2.4*   ANIONGAP 8 10   EGFRNONAA 13* 10*     Coagulation:   No results for input(s): PT, INR, APTT in the last 48 hours.  Troponin: No results for input(s): TROPONINI in the last 48 hours.  TSH:   Recent Labs   Lab 12/18/18  2003   TSH 1.497       Significant Imaging: I have reviewed all pertinent imaging results/findings within the past 24 hours.

## 2019-04-01 NOTE — PLAN OF CARE
met with patient and completed [facility] Acceptance form and faxed it along with face sheet, insurance cards, and patient's ID to Mitra at [phone].

## 2019-04-01 NOTE — TELEPHONE ENCOUNTER
----- Message from Arlette Grewal sent at 4/1/2019  4:51 PM CDT -----  Contact: [phone]/self  Patient needs a hospital follow up scheduled within a week.

## 2019-04-01 NOTE — PLAN OF CARE
04/01/19 0938   Post-Acute Status   Post-Acute Authorization Dialysis   Diaylsis Status Referrals Sent

## 2019-04-01 NOTE — PLAN OF CARE
"Problem: Adult Inpatient Plan of Care  Goal: Plan of Care Review  Outcome: Ongoing (interventions implemented as appropriate)  I have reviewed the plan of care with the patient and her family. The patient complained of back pain. PRN pain medication was administered. Ms. Joseph went to dialysis today. The patient stated that she "dreads" going to dialysis. Education was provided on kidney failure,  adhering to a renal diet, and the importance of dialysis compliance. No true red alarms noted on tele. The patient was turned every 2 hours. Safety measures are in place. The call light is in reach, the bed alarm is on, and family remains at the bedside. The pt verbalizes full understanding of their plan of care.       "

## 2019-04-01 NOTE — PROGRESS NOTES
[facility]  Progress Note    Patient Name: Brittaney Joseph  MRN: [id number]  Patient Class: IP- Inpatient   Admission Date: 3/28/2019  Length of Stay: 4 days  Attending Physician: Aparna Serrano*  Primary Care Provider: Charles Argueta MD        Subjective:     Principal Problem:ESRD needing dialysis    HPI:  Brittaney Joseph is a 68 y.o. black woman with hypertension, diabetes mellitus type 2, chronic diastolic heart failure, chronic kidney disease stage 4, hyperlipidemia, chronic low back pain, history of pulmonary embolism on 1/11/19 status post thrombectomy. She wears upper and lower dentures. She lives with her niece. Her primary care physician is Dr. Charles Argueta. Her nephrologist is Dr. Saida Mcdaniel.    Labs on 3/12/19 showed BUN and creatinine 83 and 7.52. She was sent to [facility] on 3/28/19 by Dr. Mcdaniel to initiate dialysis. She reported a few weeks of worsening nausea, decreased appetite, peripheral edema, and delirium. She was not eating so stopped taking her medications for the past week. Labs showed  and creatinine 6.6. She was admitted to [facility].    Hospital Course:  3/30 s/p tunneled cath placement and HD yesterday. continue supplement per nephrology  4/1 replace K in HD. medically ready for d/c awaiting HD outpt chair placement.     Interval History: awake and alert, sister by bedside. Report no compliant, reported making some urine.   Schedule for HD today. Ready fpr discharge once she has outpt HD chair placement.   Replace K at HD    Review of Systems   Constitutional: Negative for appetite change, chills and fever.   Respiratory: Negative for cough and shortness of breath.    Cardiovascular: Negative for chest pain and leg swelling.   Gastrointestinal: Negative for nausea.   Genitourinary: Negative for dysuria.   Skin: Negative for rash and wound.   Neurological: Negative  for seizures and syncope.   Psychiatric/Behavioral: Negative for dysphoric mood.     Objective:     Vital Signs (Most Recent):  Temp: 98.2 °F (36.8 °C) (04/01/19 0732)  Pulse: 92 (04/01/19 0800)  Resp: 18 (04/01/19 0732)  BP: (!) 148/75 (04/01/19 0732)  SpO2: 98 % (04/01/19 0459) Vital Signs (24h Range):  Temp:  [97.7 °F (36.5 °C)-98.6 °F (37 °C)] 98.2 °F (36.8 °C)  Pulse:  [68-99] 92  Resp:  [16-18] 18  SpO2:  [98 %] 98 %  BP: (108-148)/(62-77) 148/75     Weight: 62.4 kg (137 lb 9.1 oz)  Body mass index is 23.61 kg/m².    Intake/Output Summary (Last 24 hours) at 4/1/2019 0955  Last data filed at 4/1/2019 0830  Gross per 24 hour   Intake 180 ml   Output 600 ml   Net -420 ml      Physical Exam   Constitutional: She appears well-developed. No distress.   HENT:   Head: Normocephalic and atraumatic.   Eyes: No scleral icterus.   Neck: Neck supple. No tracheal deviation present.   Cardiovascular: Regular rhythm. Exam reveals no friction rub.   Pulmonary/Chest: Effort normal. No respiratory distress.   Abdominal: Soft. She exhibits no distension. There is no tenderness. There is no guarding.   Musculoskeletal: She exhibits edema. She exhibits no tenderness.   Neurological: She is alert.   Skin: Skin is warm and dry.   Psychiatric: She has a normal mood and affect. Thought content normal.   Nursing note and vitals reviewed.      Significant Labs:   CBC:   No results for input(s): WBC, HGB, HCT, PLT in the last 48 hours.  CMP:   Recent Labs   Lab 03/31/19  0635 04/01/19  0522   * 131*   K 3.4* 3.3*    98   CO2 23 23   GLU 83 80   BUN 34* 41*   CREATININE 3.4* 4.3*   CALCIUM 8.8 9.4   ALBUMIN 2.4* 2.4*   ANIONGAP 8 10   EGFRNONAA 13* 10*     Coagulation:   No results for input(s): PT, INR, APTT in the last 48 hours.  Troponin: No results for input(s): TROPONINI in the last 48 hours.  TSH:   Recent Labs   Lab 12/18/18 2003   TSH 1.497       Significant Imaging: I have reviewed all pertinent imaging  results/findings within the past 24 hours.    Assessment/Plan:      * ESRD needing dialysis  Uremia  Hypercalcemia  Consult General Surgery for dialysis catheter placement.  Consult nephrology  S/p tunneled cath placement   Started HD 3/29  Maged jordan and maged  Continue Lasix  Consult liz management for outpatient HD placement    Uremia  Consult nephrology      History of pulmonary embolism  Hold home rivaroxaban. Give heparin.  Resume rivaroxaban after dialysis catheter placement.    Chronic diastolic heart failure  Plan for HD      Chronic low back pain  Acetaminophen prn.    Controlled type 2 diabetes mellitus with stage 4 chronic kidney disease, without long-term current use of insulin  Discontinue home glipizide.   Insulin aspart sliding scale.   Monitor serum glucose.    Hyperlipidemia associated with type 2 diabetes mellitus  Continue home atorvastatin.    Essential hypertension  Chronic diastolic heart failure  Takes clonidine, amlodipine, hydralazine, metoprolol. Has not been eating or drinking so stopped taking. Continue clonidine and hydralazine. Hold amlodipine and metoprolol.      VTE Risk Mitigation (From admission, onward)        Ordered     heparin (porcine) injection 4,300 Units  As needed (PRN)      03/29/19 1927     heparin (porcine) injection 5,000 Units  Every 8 hours      03/28/19 2239     IP VTE HIGH RISK PATIENT  Once      03/28/19 2239              Aparna Serrano MD  Department of Hospital Medicine   [facility]

## 2019-04-01 NOTE — PLAN OF CARE
Patient's outpatient HD set up complete.  Pt's HD schedule is [facility] 10:30am    Address:[address] [place], [place] [address]   [place] [address]   Phone: [phone]     04/01/19 1718   Post-Acute Status   Post-Acute Authorization Dialysis   Post-Acute Placement Status Set-up Complete

## 2019-04-01 NOTE — PLAN OF CARE
Problem: Adult Inpatient Plan of Care  Goal: Plan of Care Review  Patient is in bed with eyes closed an appears to be asleep. Sister is at bedside. Scheduled clondine and hyrdralzine held, orders indicate to hold id sbp is less then 115. Patient b/p at 2000 was 108/64, pulse was 68. After being positioned in bed patient became nauseous and vomited, prn Zofran given as ordered by MD. Cardiac monitoring in place. Safety measures in place, bed in lowest position, HOB elevated, call bell in reach, bed alarm on. Will continue to monitor patient during the remainder of shift

## 2019-04-01 NOTE — NURSING
IV site was removed and intact. No active bleeding noted. Discharge instructions, rxs and educational printouts were given to pt and discussed thoroughly. Follow up appointments and dialysis schedule was discussed. All  questions or concerns were addressed. The patient verbalized clear understanding of all discussed. The patient was d/c'd via w/c with escort service, family, and all of their belongings. At present no distress noted.

## 2019-04-01 NOTE — PLAN OF CARE
spoke with Mitra at [facility]- she received all documents faxed to her and Sera RN at [facility] received the clinicals.    Mitra stated the patient's TB skin test  needs to be read before issuing OP HD chair time.     placed call to bedside nurse Jerri and expressed to her the need for TB skin test results. Jerri will clarify ppd info and follow up with , as she was not the nurse to place the PPD.

## 2019-04-01 NOTE — PLAN OF CARE
spoke with Mitra ( with [facility]) and she requested  to fax clinical documents to nurse Zamora at [phone] and to fax the patient's insurance cards, ID and face sheet to [phone]. Mitra will also fax  the acceptance form and have the patient to fill out and then fax back to her.

## 2019-04-01 NOTE — PROGRESS NOTES
U renal fellow ELYSE WEI        Reason for Consult:     ESRD    Subjective:      History of Present Illness:  Brittaney Joseph is a 68 y.o. AA female who  has a past medical history of Chronic kidney disease, Chronic low back pain, Diabetes mellitus, type 2, Hypertension, and Pulmonary embolism with acute cor pulmonale (2019).. The patient presented to the [facility] on 3/28/2019 with a primary complaint of abnormal LABS (sent for admission from kidney MD )    This is a 67 yo AA female who is being admitted for initiation of dialysis; she was sent from outpt nephrology clinic (Dr. Edgar) after labwork saw a progression in her renal disease; in a addition, she has uremic Sx including n/v/asterixis/confusion/decreased urine output/decreased po intake for a couple of weeks.    This aM, the pt denies sob; she endorses decreased appetite; pt knows why she is here, but I believe she has poor insight into her medical condiitons      Interim History:  Pt reports that she is breathing ok today. Denies SOB, CP, N/V. No issues with dialysis yesterday. Currently on dialysis without acute complaints.    Review of Systems: All other systems are reviewed and are negative.       Objective:   Last 24 Hour Vital Signs:  BP  Min: 108/64  Max: 148/75  Temp  Av.2 °F (36.8 °C)  Min: 97.7 °F (36.5 °C)  Max: 98.6 °F (37 °C)  Pulse  Av.3  Min: 68  Max: 99  Resp  Av.7  Min: 16  Max: 18  SpO2  Av %  Min: 98 %  Max: 98 %  I/O last 3 completed shifts:  In: -   Out: 600 [Urine:600]    Physical Examination:  GEN - AA; NAD  CHEST - CTA B  HEART - tachy; 2/6 systolic murmur over much of precordium; no rub  ABD - soft; nonttp  EXTR  -warm; trace edema to shins B  NEURO - proximal and distal muscle weakness; + asterixis    Laboratory Results:    Trended Lab Data:  Recent Labs   Lab 19  1924  19  0501 19  0635 19  0522   WBC 6.14  --   --   --   --   --    HGB 12.3  --   --   --   --    --    HCT 37.2  --   --   --   --   --      --   --   --   --   --    MCV 87  --   --   --   --   --    RDW 15.5*  --   --   --   --   --    NA  --  132*   < > 133* 133* 131*   K  --  4.3   < > 3.8 3.4* 3.3*   CL  --  98   < > 105 102 98   CO2  --  16*   < > 18* 23 23   BUN  --  110*   < > 71* 34* 41*   GLU  --  109   < > 100 83 80   PROT  --  7.8  --   --   --   --    ALBUMIN  --  3.3*   < > 2.4* 2.4* 2.4*   BILITOT  --  0.5  --   --   --   --    AST  --  23  --   --   --   --    ALKPHOS  --  88  --   --   --   --    ALT  --  18  --   --   --   --     < > = values in this interval not displayed.       Trended Cardiac Data:  No results for input(s): TROPONINI, CKTOTAL, CKMB, BNP in the last 168 hours.    .    Radiology:  X-ray Chest Ap Portable    Result Date: 3/28/2019  EXAMINATION: XR CHEST AP PORTABLE CLINICAL HISTORY: Weakness; TECHNIQUE: Single frontal view of the chest was performed. COMPARISON: January 10, 2019. FINDINGS: Mildly tortuous aorta, similar to prior.  Underinflated lungs with hypoventilatory change. Heart and lungs otherwise appear unchanged when allowing for differences in technique and positioning.     No acute findings. No significant change from prior study. Electronically signed by: Zi Beauchamp MD Date:    03/28/2019 Time:    19:45           Assessment/Plan     ESRD  -3/3 IHD initiation today. -sonam place on nephrocap and nepro; lasix oral 80mg BID  -orders placed to  for outpt HD chair; ppd/hep serologies ordered  - we will continue MWF    Hypercalcemia better   -PTH/Vit D ordered  -will dialyze against low calcium bath  - pth 42 wnl  - vit d 54   - not ergocalciferol  - not zemplar with hd    Anemia  -will eval for epo with iHD prn    CKD-MBD  -PTH 42.3  -Vit D 17. She will need to be started on weekly ergocalciferol  --will cont to evaluate need for phosphorus binders     H/O PE  -would rec to continue outpt anticoag once permcath is placed    Osvaldo Mckeon MD,  4/1/2019 1:06 PM  [employer] Nephrology PGY-V

## 2019-04-01 NOTE — PLAN OF CARE
spoke with Mitra at [facility]- the patient's insurance has been verified and cleared financially.  spoke with Sera BLANCO at [facility] Dialysis unit- she is still pending TB skin test results.   Sera provided  with a tentative OP HD schedule: MWF 10:30am. The patient will receive her first treatment on Wednesday April 3. 2019.       spoke with Jerri and she reported to  she will put ppd update in Epic to send to [facility] Dialysis nurse.    5:07-  faxed TB skin test results to Sera BLANCO at [phone] and notified patient and her sister at bedside that dialysis is set up.     04/01/19 1642   Post-Acute Status   Post-Acute Authorization Dialysis   Diaylsis Status (!) Delay for PPD

## 2019-04-01 NOTE — DISCHARGE SUMMARY
[facility] Hospital Medicine  Discharge Summary      Patient Name: Brittaney Joseph  MRN: [id number]  Admission Date: 3/28/2019  Hospital Length of Stay: 4 days  Discharge Date and Time: 4/1/2019  6:59 PM  Attending Physician: Aparna Serrano*   Discharging Provider: Aparna Serrano MD  Primary Care Provider: Charles Argueta MD      HPI:   Brittaney Joseph is a 68 y.o. black woman with hypertension, diabetes mellitus type 2, chronic diastolic heart failure, chronic kidney disease stage 4, hyperlipidemia, chronic low back pain, history of pulmonary embolism on 1/11/19 status post thrombectomy. She wears upper and lower dentures. She lives with her niece. Her primary care physician is Dr. Charles Argueta. Her nephrologist is Dr. Saida Mcdaniel.    Labs on 3/12/19 showed BUN and creatinine 83 and 7.52. She was sent to [facility] on 3/28/19 by Dr. Mcdaniel to initiate dialysis. She reported a few weeks of worsening nausea, decreased appetite, peripheral edema, and delirium. She was not eating so stopped taking her medications for the past week. Labs showed  and creatinine 6.6. She was admitted to [facility].    Procedure(s) (LRB):  Insertion,catheter,tunneled (Right)      Hospital Course:   3/30 s/p tunneled cath placement and HD yesterday. continue supplement per nephrology  4/1 replace K in HD. medically ready for d/c awaiting HD outpt chair placement.      Consults:   Consults (From admission, onward)        Status Ordering Provider     Inpatient consult to General Surgery  Once     Provider:  Rima Cano Jr., MD    Completed KEVIN VELEZ II     Inpatient consult to Nephrology  Once     Provider:  Saida Mcdaniel MD    Completed JOSEPH MONSALVE     Inpatient consult to Social Work  Once     Provider:  (Not yet assigned)    RIMA Garcia JR          * ESRD needing  dialysis  Uremia  Hypercalcemia  Consult General Surgery for dialysis catheter placement.  Consult nephrology  S/p tunneled cath placement   Started HD 3/29  Nepro carp and jesusro  Continue Lasix  Consult liz management for outpatient HD placement    History of pulmonary embolism  Hold home rivaroxaban. Give heparin.  Resume rivaroxaban after dialysis catheter placement.    Chronic diastolic heart failure  Plan for HD      Chronic low back pain  Acetaminophen prn.    Controlled type 2 diabetes mellitus with stage 4 chronic kidney disease, without long-term current use of insulin  Discontinue home glipizide.   Insulin aspart sliding scale.   Monitor serum glucose.    Hyperlipidemia associated with type 2 diabetes mellitus  Continue home atorvastatin.    Essential hypertension  Chronic diastolic heart failure  Takes clonidine, amlodipine, hydralazine, metoprolol. Has not been eating or drinking so stopped taking. Continue clonidine and hydralazine. Hold amlodipine and metoprolol.      Final Active Diagnoses:    Diagnosis Date Noted POA    PRINCIPAL PROBLEM:  ESRD needing dialysis [N18.6, Z99.2] 03/28/2019 Yes    Acute renal failure superimposed on chronic kidney disease [N17.9, N18.9] 03/29/2019 Yes    History of pulmonary embolism [Z86.711] 01/11/2019 Yes    Chronic diastolic heart failure [I50.32] 01/10/2019 Yes     Chronic    Chronic low back pain [M54.5, G89.29]  Yes     Chronic    Essential hypertension [I10] 12/20/2018 Yes     Chronic    Controlled type 2 diabetes mellitus with stage 4 chronic kidney disease, without long-term current use of insulin [E11.22, N18.4] 12/20/2018 Yes     Chronic    Hyperlipidemia associated with type 2 diabetes mellitus [E11.69, E78.5] 12/20/2018 Yes     Chronic      Problems Resolved During this Admission:    Diagnosis Date Noted Date Resolved POA    Uremia [N19] 03/28/2019 04/01/2019 Yes       Discharged Condition: stable    Disposition: Home-Health Care Svc    Follow  Up:  Follow-up Information     Janette Edgar MD.    Specialty:  Nephrology  Contact information:  [address]  [facility]  [place] [address]  [phone]             Charles Argueta MD.    Specialty:  Family Medicine  Why:  Message sent for hospital follow up appointment, Office will notify patient with appointment.  Contact information:  [address] [facility] [address]  [phone]             [facility]. Go on 4/3/2019.    Specialty:  Dialysis Center  Why:  10:30am-                 Outpatient Dialysis Schedule MWF- 10:30am  Contact information:  [address]  [facility] [address]  [phone]             Schedule an appointment as soon as possible for a visit with Charles Argueta MD.    Specialty:  Family Medicine  Contact information:  [address] [place] [address]  [phone]                 Patient Instructions:      Ambulatory referral to Home Health   Referral Priority: Routine Referral Type: Home Health   Referral Reason: Specialty Services Required   Requested Specialty: Home Health Services   Number of Visits Requested: 1     Diet renal     Activity as tolerated         Pending Diagnostic Studies:     None         Medications:  Reconciled Home Medications:      Medication List      START taking these medications    furosemide 80 MG tablet  Commonly known as:  LASIX  Take 1 tablet (80 mg total) by mouth 2 (two) times daily.        CONTINUE taking these medications    amLODIPine 10 MG tablet  Commonly known as:  NORVASC  Take 10 mg by mouth nightly.     aspirin 81 MG Chew  Take 1 tablet (81 mg total) by mouth once daily.     atorvastatin 40 MG tablet  Commonly known as:  LIPITOR  Take 40 mg by mouth once daily.     cloNIDine 0.2 MG tablet  Commonly known as:  CATAPRES  Take 0.2 mg by mouth nightly.     glipiZIDE 2.5 MG Tr24  Commonly known as:  GLUCOTROL  Take 1 tablet (2.5 mg total) by mouth daily with breakfast.     hydrALAZINE 25 MG tablet  Commonly  known as:  APRESOLINE  Take 1 tablet (25 mg total) by mouth every 8 (eight) hours.     metoprolol tartrate 100 MG tablet  Commonly known as:  LOPRESSOR  Take 1 tablet (100 mg total) by mouth 2 (two) times daily.     nitroGLYCERIN 0.4 MG SL tablet  Commonly known as:  NITROSTAT  Place 1 tablet (0.4 mg total) under the tongue every 5 (five) minutes as needed for Chest pain. if second dose needed call 911     ondansetron 4 MG tablet  Commonly known as:  ZOFRAN  Take 1 tablet (4 mg total) by mouth every 8 (eight) hours as needed for Nausea.     oxyCODONE-acetaminophen  mg per tablet  Commonly known as:  PERCOCET     pantoprazole 40 MG tablet  Commonly known as:  PROTONIX  Take 40 mg by mouth once daily.     rivaroxaban 10 mg Tab  Commonly known as:  XARELTO  Take 1 tablet (10 mg total) by mouth daily with dinner or evening meal.     senna-docusate 8.6-50 mg 8.6-50 mg per tablet  Commonly known as:  PERICOLACE  Take 1 tablet by mouth 2 (two) times daily as needed for Constipation.     sucralfate 100 mg/mL suspension  Commonly known as:  CARAFATE  Take 10 mLs (1 g total) by mouth 4 (four) times daily.        STOP taking these medications    loperamide 2 mg capsule  Commonly known as:  IMODIUM            Indwelling Lines/Drains at time of discharge:   Lines/Drains/Airways     Central Venous Catheter Line                 Hemodialysis Catheter 03/29/19 2043 right internal jugular 2 days          Airway                 Airway - Non-Surgical 03/29/19 1404 Mask 3 days                Time spent on the discharge of patient: 35 minutes  Patient was seen and examined on the date of discharge and determined to be suitable for discharge.         Aparna Serrano MD  Department of Hospital Medicine  [facility]

## 2019-04-02 NOTE — PLAN OF CARE
4/2-12:43   HH orders sent to [facility] to resume care.       04/02/19 1243   Post-Acute Status   Post-Acute Authorization Home Health/Hospice   Post-Acute Placement Status Referrals Sent

## 2019-04-03 ENCOUNTER — TELEPHONE (OUTPATIENT)
Dept: FAMILY MEDICINE | Facility: CLINIC | Age: 69
End: 2019-04-03

## 2019-04-03 ENCOUNTER — HOSPITAL ENCOUNTER (INPATIENT)
Facility: HOSPITAL | Age: 69
LOS: 5 days | Discharge: NURSING, REHAB OR LONG-TERM CARE | DRG: 064 | End: 2019-04-08
Attending: FAMILY MEDICINE | Admitting: HOSPITALIST
Payer: MEDICARE

## 2019-04-03 DIAGNOSIS — N18.4 CONTROLLED TYPE 2 DIABETES MELLITUS WITH STAGE 4 CHRONIC KIDNEY DISEASE, WITHOUT LONG-TERM CURRENT USE OF INSULIN: Chronic | ICD-10-CM

## 2019-04-03 DIAGNOSIS — N18.6 END STAGE RENAL DISEASE ON DIALYSIS: ICD-10-CM

## 2019-04-03 DIAGNOSIS — E11.22 CONTROLLED TYPE 2 DIABETES MELLITUS WITH STAGE 4 CHRONIC KIDNEY DISEASE, WITHOUT LONG-TERM CURRENT USE OF INSULIN: Chronic | ICD-10-CM

## 2019-04-03 DIAGNOSIS — I63.9 STROKE: Primary | ICD-10-CM

## 2019-04-03 DIAGNOSIS — I63.81 LACUNAR STROKE, ACUTE: ICD-10-CM

## 2019-04-03 DIAGNOSIS — I63.9 STROKE DUE TO EMBOLISM: ICD-10-CM

## 2019-04-03 DIAGNOSIS — Z99.2 END STAGE RENAL DISEASE ON DIALYSIS: ICD-10-CM

## 2019-04-03 DIAGNOSIS — N18.6 END-STAGE RENAL DISEASE ON HEMODIALYSIS: Chronic | ICD-10-CM

## 2019-04-03 DIAGNOSIS — L89.152 DECUBITUS ULCER OF SACRAL REGION, STAGE 2: ICD-10-CM

## 2019-04-03 DIAGNOSIS — Z99.2 END-STAGE RENAL DISEASE ON HEMODIALYSIS: Chronic | ICD-10-CM

## 2019-04-03 PROBLEM — Z86.711 HISTORY OF PULMONARY EMBOLISM: Chronic | Status: ACTIVE | Noted: 2019-01-11

## 2019-04-03 PROBLEM — N18.9 ACUTE RENAL FAILURE SUPERIMPOSED ON CHRONIC KIDNEY DISEASE: Status: RESOLVED | Noted: 2019-03-29 | Resolved: 2019-04-03

## 2019-04-03 PROBLEM — N17.9 ACUTE RENAL FAILURE SUPERIMPOSED ON CHRONIC KIDNEY DISEASE: Status: RESOLVED | Noted: 2019-03-29 | Resolved: 2019-04-03

## 2019-04-03 LAB
ALBUMIN SERPL BCP-MCNC: 3.3 G/DL (ref 3.5–5.2)
ALP SERPL-CCNC: 102 U/L (ref 38–126)
ALT SERPL W/O P-5'-P-CCNC: 17 U/L (ref 10–44)
ANION GAP SERPL CALC-SCNC: 9 MMOL/L (ref 8–16)
AST SERPL-CCNC: 53 U/L (ref 15–46)
BASOPHILS # BLD AUTO: 0.04 K/UL (ref 0–0.2)
BASOPHILS NFR BLD: 0.4 % (ref 0–1.9)
BILIRUB SERPL-MCNC: 0.7 MG/DL (ref 0.1–1)
BUN SERPL-MCNC: 7 MG/DL (ref 7–17)
CALCIUM SERPL-MCNC: 8.6 MG/DL (ref 8.7–10.5)
CHLORIDE SERPL-SCNC: 97 MMOL/L (ref 95–110)
CHOLEST SERPL-MCNC: 103 MG/DL (ref 120–199)
CHOLEST/HDLC SERPL: 2.3 {RATIO} (ref 2–5)
CO2 SERPL-SCNC: 29 MMOL/L (ref 23–29)
CREAT SERPL-MCNC: 2.22 MG/DL (ref 0.5–1.4)
DIFFERENTIAL METHOD: ABNORMAL
EOSINOPHIL # BLD AUTO: 0.1 K/UL (ref 0–0.5)
EOSINOPHIL NFR BLD: 1.3 % (ref 0–8)
ERYTHROCYTE [DISTWIDTH] IN BLOOD BY AUTOMATED COUNT: 15.6 % (ref 11.5–14.5)
EST. GFR  (AFRICAN AMERICAN): 25.7 ML/MIN/1.73 M^2
EST. GFR  (NON AFRICAN AMERICAN): 22.3 ML/MIN/1.73 M^2
GLUCOSE SERPL-MCNC: 104 MG/DL (ref 70–110)
GLUCOSE SERPL-MCNC: 118 MG/DL (ref 70–110)
HCT VFR BLD AUTO: 34.8 % (ref 37–48.5)
HDLC SERPL-MCNC: 45 MG/DL (ref 40–75)
HDLC SERPL: 43.7 % (ref 20–50)
HGB BLD-MCNC: 11.3 G/DL (ref 12–16)
INR PPP: 1.2 (ref 0.8–1.2)
LDLC SERPL CALC-MCNC: 43.2 MG/DL (ref 63–159)
LYMPHOCYTES # BLD AUTO: 2.4 K/UL (ref 1–4.8)
LYMPHOCYTES NFR BLD: 22.1 % (ref 18–48)
MCH RBC QN AUTO: 28.6 PG (ref 27–31)
MCHC RBC AUTO-ENTMCNC: 32.5 G/DL (ref 32–36)
MCV RBC AUTO: 88 FL (ref 82–98)
MONOCYTES # BLD AUTO: 1.1 K/UL (ref 0.3–1)
MONOCYTES NFR BLD: 10.2 % (ref 4–15)
NEUTROPHILS # BLD AUTO: 7 K/UL (ref 1.8–7.7)
NEUTROPHILS NFR BLD: 65.8 % (ref 38–73)
NONHDLC SERPL-MCNC: 58 MG/DL
PLATELET # BLD AUTO: 265 K/UL (ref 150–350)
PMV BLD AUTO: 9.7 FL (ref 9.2–12.9)
POCT GLUCOSE: 74 MG/DL (ref 70–110)
POTASSIUM SERPL-SCNC: 3.2 MMOL/L (ref 3.5–5.1)
PROT SERPL-MCNC: 6.7 G/DL (ref 6–8.4)
PROTHROMBIN TIME: 13.5 SEC (ref 9–12.5)
RBC # BLD AUTO: 3.95 M/UL (ref 4–5.4)
SODIUM SERPL-SCNC: 135 MMOL/L (ref 136–145)
TRIGL SERPL-MCNC: 74 MG/DL (ref 30–150)
TSH SERPL DL<=0.005 MIU/L-ACNC: 1.53 UIU/ML (ref 0.4–4)
WBC # BLD AUTO: 10.69 K/UL (ref 3.9–12.7)

## 2019-04-03 PROCEDURE — 11000001 HC ACUTE MED/SURG PRIVATE ROOM

## 2019-04-03 PROCEDURE — 93005 ELECTROCARDIOGRAM TRACING: CPT | Mod: ER

## 2019-04-03 PROCEDURE — 94760 N-INVAS EAR/PLS OXIMETRY 1: CPT | Mod: ER

## 2019-04-03 PROCEDURE — G0426 INPT/ED TELECONSULT50: HCPCS | Mod: GT,G0,, | Performed by: PSYCHIATRY & NEUROLOGY

## 2019-04-03 PROCEDURE — 99291 CRITICAL CARE FIRST HOUR: CPT | Mod: 25,ER

## 2019-04-03 PROCEDURE — 80053 COMPREHEN METABOLIC PANEL: CPT | Mod: ER

## 2019-04-03 PROCEDURE — 85610 PROTHROMBIN TIME: CPT | Mod: ER

## 2019-04-03 PROCEDURE — 93010 EKG 12-LEAD: ICD-10-PCS | Mod: ,,, | Performed by: STUDENT IN AN ORGANIZED HEALTH CARE EDUCATION/TRAINING PROGRAM

## 2019-04-03 PROCEDURE — G0426 PR INPT TELEHEALTH CONSULT 50M: ICD-10-PCS | Mod: GT,G0,, | Performed by: PSYCHIATRY & NEUROLOGY

## 2019-04-03 PROCEDURE — 80061 LIPID PANEL: CPT

## 2019-04-03 PROCEDURE — 93010 ELECTROCARDIOGRAM REPORT: CPT | Mod: ,,, | Performed by: STUDENT IN AN ORGANIZED HEALTH CARE EDUCATION/TRAINING PROGRAM

## 2019-04-03 PROCEDURE — 27000221 HC OXYGEN, UP TO 24 HOURS: Mod: ER

## 2019-04-03 PROCEDURE — 84443 ASSAY THYROID STIM HORMONE: CPT | Mod: ER

## 2019-04-03 PROCEDURE — 82962 GLUCOSE BLOOD TEST: CPT | Mod: ER

## 2019-04-03 PROCEDURE — 85025 COMPLETE CBC W/AUTO DIFF WBC: CPT | Mod: ER

## 2019-04-03 RX ORDER — ATORVASTATIN CALCIUM 40 MG/1
80 TABLET, FILM COATED ORAL DAILY
Status: DISCONTINUED | OUTPATIENT
Start: 2019-04-04 | End: 2019-04-05

## 2019-04-03 RX ORDER — DEXTROSE 50 % IN WATER (D50W) INTRAVENOUS SYRINGE
12.5
Status: DISCONTINUED | OUTPATIENT
Start: 2019-04-03 | End: 2019-04-08 | Stop reason: HOSPADM

## 2019-04-03 RX ORDER — NAPROXEN SODIUM 220 MG/1
81 TABLET, FILM COATED ORAL DAILY
Status: DISCONTINUED | OUTPATIENT
Start: 2019-04-04 | End: 2019-04-08 | Stop reason: HOSPADM

## 2019-04-03 RX ORDER — SODIUM CHLORIDE 0.9 % (FLUSH) 0.9 %
10 SYRINGE (ML) INJECTION
Status: DISCONTINUED | OUTPATIENT
Start: 2019-04-03 | End: 2019-04-08 | Stop reason: HOSPADM

## 2019-04-03 RX ORDER — PANTOPRAZOLE SODIUM 40 MG/1
40 TABLET, DELAYED RELEASE ORAL DAILY
Status: DISCONTINUED | OUTPATIENT
Start: 2019-04-04 | End: 2019-04-08 | Stop reason: HOSPADM

## 2019-04-03 RX ORDER — HYDRALAZINE HYDROCHLORIDE 25 MG/1
25 TABLET, FILM COATED ORAL EVERY 8 HOURS PRN
Status: DISCONTINUED | OUTPATIENT
Start: 2019-04-03 | End: 2019-04-08 | Stop reason: HOSPADM

## 2019-04-03 RX ORDER — INSULIN ASPART 100 [IU]/ML
0-5 INJECTION, SOLUTION INTRAVENOUS; SUBCUTANEOUS
Status: DISCONTINUED | OUTPATIENT
Start: 2019-04-03 | End: 2019-04-08 | Stop reason: HOSPADM

## 2019-04-03 RX ORDER — IBUPROFEN 200 MG
24 TABLET ORAL
Status: DISCONTINUED | OUTPATIENT
Start: 2019-04-03 | End: 2019-04-08 | Stop reason: HOSPADM

## 2019-04-03 RX ORDER — IBUPROFEN 200 MG
16 TABLET ORAL
Status: DISCONTINUED | OUTPATIENT
Start: 2019-04-03 | End: 2019-04-08 | Stop reason: HOSPADM

## 2019-04-03 RX ORDER — SUCRALFATE 1 G/10ML
1 SUSPENSION ORAL 4 TIMES DAILY
Status: DISCONTINUED | OUTPATIENT
Start: 2019-04-03 | End: 2019-04-08 | Stop reason: HOSPADM

## 2019-04-03 RX ORDER — GLUCAGON 1 MG
1 KIT INJECTION
Status: DISCONTINUED | OUTPATIENT
Start: 2019-04-03 | End: 2019-04-08 | Stop reason: HOSPADM

## 2019-04-03 RX ORDER — NITROGLYCERIN 0.4 MG/1
0.4 TABLET SUBLINGUAL EVERY 5 MIN PRN
Status: DISCONTINUED | OUTPATIENT
Start: 2019-04-03 | End: 2019-04-08 | Stop reason: HOSPADM

## 2019-04-03 RX ORDER — DEXTROSE 50 % IN WATER (D50W) INTRAVENOUS SYRINGE
25
Status: DISCONTINUED | OUTPATIENT
Start: 2019-04-03 | End: 2019-04-08 | Stop reason: HOSPADM

## 2019-04-03 NOTE — HPI
69 y/o woman with PMH ESRD on HD, T2DM, CHF, PE s/p thrombectomy (Jan 2019), on Xarelto, who developed L facial droop and weakness on 4/2.  She presented to the ED on 4/3, received telestroke consult (NIHSS 6), and was admitted to the hospitalist service.  She was continued on Xarelto, aspirin, statin.    Family reports poor appetite recently and likely missed some doses of Xarelto last week.  No history of stroke or afib.

## 2019-04-03 NOTE — ED NOTES
"Spoke to daughter and niece, states the pt was last known well yesterday 4/3/19 at 4:00pm. Niece states "I forgot to tell her other daughter when she was by the house and I helped her (the pt.) on the sofa that I saw her mouth was all crooked and she was not moving her left arm. I remember to tell them today when she got back from dialysis. " Family denies new symtoms today, "no nothing today. All this happened to her yesterday about like I said, 4pm. She was fine when she was discharged from the hospital Monday night". Per daughter.  "

## 2019-04-03 NOTE — ED PROVIDER NOTES
Encounter Date: 4/3/2019       History     Chief Complaint   Patient presents with    Cerebrovascular Accident     c/o left sided weakness and difficulty speaking that started this morning at home. Pt unable to move left arm with minimal movement to left leg. Left side facial droop noted with slurred speech and delayed response.     68-year-old female presents with a chief complaint of left-sided weakness and facial droop.  Patient has a history of chronic kidney disease as well as diabetes type 2.  Initially patient reports the symptoms started today during dialysis but upon further questioning apparently the symptoms were noted this morning.  After inquiring with family family whom the patient lives with patient reports the symptoms actually started yesterday afternoon at about 4:00 p.m..        Review of patient's allergies indicates:  No Known Allergies  Past Medical History:   Diagnosis Date    Chronic kidney disease     Chronic low back pain     Diabetes mellitus, type 2     Hypertension     Pulmonary embolism with acute cor pulmonale 1/11/2019     Past Surgical History:   Procedure Laterality Date    CHOLECYSTECTOMY      EKOS, Pumoart/DVT  1/11/2019    Performed by Roger Day MD at [facility] CATH LAB/EP    Insertion,catheter,tunneled Right 3/29/2019    Performed by Michelet Cano Jr., MD at [facility] OR    Thrombolysis, PA N/A 1/11/2019    Performed by Roger Day MD at [facility] CATH LAB/EP     History reviewed. No pertinent family history.  Social History     Tobacco Use    Smoking status: Never Smoker    Smokeless tobacco: Never Used   Substance Use Topics    Alcohol use: No     Frequency: Never    Drug use: No     Review of Systems   Constitutional: Negative for chills and fever.   Respiratory: Negative for stridor.    All other systems reviewed and are negative.      Physical Exam     Initial Vitals [04/03/19 1722]   BP Pulse Resp Temp SpO2   121/68 99 18 -- 98 %      MAP       --          Physical Exam    Nursing note and vitals reviewed.  Constitutional: She appears well-developed and well-nourished.   HENT:   Head: Normocephalic and atraumatic.   Eyes: EOM are normal. Pupils are equal, round, and reactive to light.   Neck: Normal range of motion. Neck supple.   Cardiovascular: Normal rate, regular rhythm and normal heart sounds.   Pulmonary/Chest: Breath sounds normal.   Abdominal: Soft. Bowel sounds are normal.   Musculoskeletal: Normal range of motion.   Neurological: She is alert. GCS score is 15. GCS eye subscore is 4. GCS verbal subscore is 5. GCS motor subscore is 6.   Left-sided weakness noted left upper extremity with some left facial droop   Skin: Skin is warm. Capillary refill takes less than 2 seconds.   Psychiatric: She has a normal mood and affect. Her behavior is normal.         ED Course   Critical Care  Date/Time: 4/3/2019 6:33 PM  Performed by: Shannan Vega MD  Authorized by: Lebron Garcia MD   Direct patient critical care time: 15 minutes  Additional history critical care time: 15 minutes  Ordering / reviewing critical care time: 10 minutes  Documentation critical care time: 10 minutes  Consulting other physicians critical care time: 10 minutes  Total critical care time (exclusive of procedural time) : 60 minutes  Critical care was necessary to treat or prevent imminent or life-threatening deterioration of the following conditions: CNS failure or compromise.  Critical care was time spent personally by me on the following activities: discussions with consultants, evaluation of patient's response to treatment, obtaining history from patient or surrogate, ordering and review of laboratory studies, pulse oximetry, review of old charts, development of treatment plan with patient or surrogate, discussions with primary provider, examination of patient, ordering and performing treatments and interventions, ordering and review of radiographic studies and re-evaluation of patient's  condition.  Subsequent provider of critical care: I assumed direction of critical care for this patient from another provider of my specialty.        Labs Reviewed   CBC W/ AUTO DIFFERENTIAL   COMPREHENSIVE METABOLIC PANEL   PROTIME-INR   TSH   LIPID PANEL   POCT GLUCOSE, HAND-HELD DEVICE     EKG Readings: (Independently Interpreted)   Initial Reading: No STEMI.   Normal sinus rhythm at 90 beats per minute normal IA normal QRS normal EKG       Imaging Results          X-Ray Chest AP Portable (In process)                CT Head Without Contrast (In process)               X-Rays:   Independently Interpreted Readings:   Chest X-Ray: Cardiomegaly present.   Head CT: Chronic ischemic changes     Medical Decision Making:   Clinical Tests:   Lab Tests: Ordered and Reviewed  Radiological Study: Ordered and Reviewed  Medical Tests: Ordered and Reviewed                      Clinical Impression:       ICD-10-CM ICD-9-CM   1. Stroke I63.9 434.91   2. End stage renal disease on dialysis N18.6 585.6    Z99.2 V45.11         Disposition:   Disposition: Admitted  Condition: Fair                        Shannan Vega MD  04/03/19 1834

## 2019-04-03 NOTE — TELEPHONE ENCOUNTER
----- Message from Marian Wilkinson sent at 4/3/2019  3:50 PM CDT -----  Contact: [phone]/jean/Wanda  Patient's niece is requesting a call back regarding patient is not able to use her left side arm and leg. Would like to bring her to office asap. Please call Dea

## 2019-04-03 NOTE — CONSULTS
[facility]  Vascular Neurology  Comprehensive Stroke Center  Tele-Consultation Note      Inpatient consult to Telemedicine-Stroke  Consult performed by: Latoya Longoria MD  Consult ordered by: Lebron Kirkland MD  Reason for consult: stroke          Consulting Provider: Dave Physician:: lebron kirkland  Current Providers  No providers found    Patient Location: [facility]  Spoke hospital nurse at bedside with patient assisting consultant.     Patient information was obtained from patient and relative(s).       Assessment/Plan:     STROKE DOCUMENTATION     Acute Stroke Times:   Acute Stroke Times   Last Known Normal Date: 04/02/19  Last Known Normal Time: 1600  Symptom Onset Date: 04/02/19  Symptom Onset Time: 1600  Stroke Team Called Date: 04/03/19  Stroke Team Called Time: 1745  Stroke Team Arrival Date: 04/03/19  Stroke Team Arrival Time: 1752  CT Interpretation Time: 1758    NIH Scale:  Interval: baseline  1a. Level of Consciousness: 0-->Alert, keenly responsive  1b. LOC Questions: 0-->Answers both questions correctly  1c. LOC Commands: 0-->Performs both tasks correctly  2. Best Gaze: 0-->Normal  3. Visual: 0-->No visual loss  4. Facial Palsy: 2-->Partial paralysis (total or near-total paralysis of lower face)  5a. Motor Arm, Left: 0-->No drift, limb holds 90 (or 45) degrees for full 10 secs  5b. Motor Arm, Right: 0-->No drift, limb holds 90 (or 45) degrees for full 10 secs  6a. Motor Leg, Left: 2-->Some effort against gravity, leg falls to bed by 5 secs, but has some effort against gravity  6b. Motor Leg, Right: 1-->Drift, leg falls by the end of the 5-sec period but does not hit bed  7. Limb Ataxia: 0-->Absent  8. Sensory: 0-->Normal, no sensory loss  9. Best Language: 0-->No aphasia, normal  10. Dysarthria: 1-->Mild-to-moderate dysarthria, patient slurs at least some words and, at worst, can be understood with some difficulty  11.  Extinction and Inattention (formerly Neglect): 0-->No abnormality  Total (NIH Stroke Scale): 6     Modified Virgie Score: 1  [place] Coma Scale:    ABCD2 Score:    [id number] Score:   HAS -BLED Score:   ICH Score:   Hunt & Mendoza Classification:       Diagnoses:   Lacunar stroke, acute  Lacunar stroke, acute  Antithrombotics for secondary stroke prevention: Anticoagulants: Rivaroxaban 15 mg daily    Statins for secondary stroke prevention and hyperlipidemia, if present:   Statins: Atorvastatin- 80 mg daily    Aggressive risk factor modification: HTN, DM, HLD     Rehab efforts: PT/OT/SLP to evaluate and treat    Diagnostics ordered/pending: HgbA1C to assess blood glucose levels, Lipid Profile to assess cholesterol levels, MRA head to assess vasculature, MRA neck/arch to assess vasculature, MRI head without contrast to assess brain parenchyma, TTE to assess cardiac function/status     VTE prophylaxis: None: Reason for No Pharmacological VTE Prophylaxis: Mechanical prophylaxis: Place SCDs    BP parameters: Infarct: No intervention, SBP <220            Blood pressure 121/68, pulse 100, resp. rate 18, SpO2 100 %.  Alteplase Eligible?: No  Alteplase Recommendation: Alteplase not recommended due to Unknown/unclear onset  and Full dose anticoagulation   Possible Interventional Revascularization Candidate? No; No large vessel occlusion    Disposition Recommendation: transfer to system sub-[facility] by  ground  stat      Subjective:     History of Present Illness:  69 y/o female with CKD who had Neeraj catheter inserted on 4/1/19 in right chest wall. 4/2/19 noted to have left facial droop and slurred speech. Brought in by daughter today when she also noted left hemiparesis.      Woke up with symptoms?: no  Last known normal: Last Known Normal Date: 04/02/19 Last Known Normal Time: 1600    Recent bleeding noted: no  Does the patient take any Blood Thinners? yes  Medications: Anticoagulants:  rivaroxaban/Xarelto      Past  Medical History: hypertension, diabetes, hyperlipidemia and kidney problems/dialysis    Past Surgical History: no major surgeries within the last 2 weeks and any recent surgeries (within last month) Dexter lagunas placed 4/1/2019    Family History: no relevant history    Social History: no smoking, no drinking, no drugs    Allergies: No Known Allergies     Review of Systems   Neurological: Positive for facial asymmetry, speech difficulty and weakness.   All other systems reviewed and are negative.    Objective:   Vitals: Blood pressure 121/68, pulse 100, resp. rate 18, SpO2 100 %.     CT READ: Yes  No hemmorhage. No mass effect. No early infarct signs.     Physical Exam   Constitutional: She is oriented to person, place, and time. She appears well-developed and well-nourished.   HENT:   Head: Normocephalic and atraumatic.   Eyes: Pupils are equal, round, and reactive to light. EOM are normal.   Cardiovascular: Normal rate and regular rhythm.   Pulmonary/Chest: Effort normal.   Neurological: She is alert and oriented to person, place, and time. A cranial nerve deficit is present.   Vitals reviewed.            Recommended the emergency room physician to have a brief discussion with the patient and/or family if available regarding the risks and benefits of treatment, and to briefly document the occurrence of that discussion in his clinical encounter note.     The attending portion of this evaluation, treatment, and documentation was performed per Latoya Longoria MD via audiovisual.    Billing code:  (non-intervention mild to moderate stroke, TIA, some mimics)    · This patient has a critical neurological condition/illness, with some potential for high morbidity and mortality.  · There is a moderate probability for acute neurological change leading to clinical and possibly life-threatening deterioration requiring highest level of physician preparedness for urgent intervention.  · Care was coordinated with other  physicians involved in the patient's care.  · Radiologic studies and laboratory data were reviewed and interpreted, and plan of care was re-assessed based on the results.  · Diagnosis, treatment options and prognosis may have been discussed with the patient and/or family members or caregiver.      In your opinion, this was a: Tier 2    Consult End Time: 6:16 PM     Latoya Longoria MD  [facility]  Vascular Neurology   [facility]

## 2019-04-03 NOTE — ASSESSMENT & PLAN NOTE
Hold home amlodipine 10 mg HS, clonidine 0.2 mg HS, hydralazine 25 mg every 8 hours, metoprolol tartrate 100 mg BID, furosemide 80 mg BID for permissive hypertension.

## 2019-04-03 NOTE — ASSESSMENT & PLAN NOTE
Hemoglobin A1c was 5.1% on 3/12/19. Discontinue glipizide. Insulin aspart sliding scale. Monitor serum glucose.

## 2019-04-03 NOTE — SUBJECTIVE & OBJECTIVE
Woke up with symptoms?: no  Last known normal: Last Known Normal Date: 04/02/19 Last Known Normal Time: 1600    Recent bleeding noted: no  Does the patient take any Blood Thinners? yes  Medications: Anticoagulants:  rivaroxaban/Xarelto      Past Medical History: hypertension, diabetes, hyperlipidemia and kidney problems/dialysis    Past Surgical History: no major surgeries within the last 2 weeks and any recent surgeries (within last month) Quniton cathert placed 4/1/2019    Family History: no relevant history    Social History: no smoking, no drinking, no drugs    Allergies: No Known Allergies     Review of Systems   Neurological: Positive for facial asymmetry, speech difficulty and weakness.   All other systems reviewed and are negative.    Objective:   Vitals: Blood pressure 121/68, pulse 100, resp. rate 18, SpO2 100 %.     CT READ: Yes  No hemmorhage. No mass effect. No early infarct signs.     Physical Exam   Constitutional: She is oriented to person, place, and time. She appears well-developed and well-nourished.   HENT:   Head: Normocephalic and atraumatic.   Eyes: Pupils are equal, round, and reactive to light. EOM are normal.   Cardiovascular: Normal rate and regular rhythm.   Pulmonary/Chest: Effort normal.   Neurological: She is alert and oriented to person, place, and time. A cranial nerve deficit is present.   Vitals reviewed.

## 2019-04-03 NOTE — TELEPHONE ENCOUNTER
Spoke with pt's niece Wanda in regards to message. Informed her that pt should be evaluated by the ER. Wanda verbalized understanding of above information

## 2019-04-03 NOTE — ED NOTES
Family members at bedside and has been updated of pt's transfer to [facility] for further evaluation.

## 2019-04-03 NOTE — ASSESSMENT & PLAN NOTE
Lacunar stroke, acute  Antithrombotics for secondary stroke prevention: Anticoagulants: Rivaroxaban 15 mg daily    Statins for secondary stroke prevention and hyperlipidemia, if present:   Statins: Atorvastatin- 80 mg daily    Aggressive risk factor modification: HTN, DM, HLD     Rehab efforts: PT/OT/SLP to evaluate and treat    Diagnostics ordered/pending: HgbA1C to assess blood glucose levels, Lipid Profile to assess cholesterol levels, MRA head to assess vasculature, MRA neck/arch to assess vasculature, MRI head without contrast to assess brain parenchyma, TTE to assess cardiac function/status     VTE prophylaxis: None: Reason for No Pharmacological VTE Prophylaxis: Mechanical prophylaxis: Place SCDs    BP parameters: Infarct: No intervention, SBP <220

## 2019-04-03 NOTE — PLAN OF CARE
[facility]    Brittaney Joseph is a 68 y.o. black woman with hypertension, diabetes mellitus type 2, chronic diastolic heart failure, end stage renal disease on hemodialysis since 3/29/19 (Monday Wednesday Friday), hyperlipidemia, chronic low back pain, history of pulmonary embolism on 1/11/19 status post thrombectomy (anticoagulated on rivaroxaban). She wears upper and lower dentures. She lives with her niece. Her primary care physician is Dr. Charles Argueta. Her nephrologist is Dr. Saida Mcdaniel. She gets dialysis at [facility].              She was hospitalized at [facility] from 3/28/19 to 4/1/19 to start hemodialysis. She was prescribed furosemide 80 mg twice daily.   She developed facial droop and left sided weakness around 16:00 the day after discharge. Her niece forgot to mention it until the next day on 4/3/19. She was taken to [facility]. Blood pressure was normal at 121/68. Head CT showed chronic appearing ischemic white matter changes bilaterally. Vascular Neurology Telemedicine consult was done. She was given a NIH stroke scale of 6 for facial palsy, left leg weakness, right leg drift, dysarthria. She already takes aspirin, atorvastatin 40 mg, and rivaroxaban. Neurology recommended increasing atorvastatin to 80 mg and admission with MRI, MRA, and echocardiogram.    Incidentally, she had been seen by Dr. Brandon Ann on 3/28/19, who noted that her hemoglobin A1c was low at 5.1% and recommended discontinuation of her glipizide. Subsequent physicians continued it. She was not noted to have hypoglycemia however, with glucose of 104 on presentation.

## 2019-04-04 PROBLEM — I63.9 STROKE DUE TO EMBOLISM: Status: ACTIVE | Noted: 2019-04-04

## 2019-04-04 LAB
AORTIC ROOT ANNULUS: 2.13 CM
AORTIC VALVE CUSP SEPERATION: 1.55 CM
AV INDEX (PROSTH): 0.88
AV MEAN GRADIENT: 8.1 MMHG
AV PEAK GRADIENT: 17.98 MMHG
AV VALVE AREA: 2.53 CM2
AV VELOCITY RATIO: 0.8
BSA FOR ECHO PROCEDURE: 1.68 M2
CV ECHO LV RWT: 0.73 CM
DOP CALC AO PEAK VEL: 2.12 M/S
DOP CALC AO VTI: 32.12 CM
DOP CALC LVOT AREA: 2.86 CM2
DOP CALC LVOT DIAMETER: 1.91 CM
DOP CALC LVOT PEAK VEL: 1.69 M/S
DOP CALC LVOT STROKE VOLUME: 81.36 CM3
DOP CALCLVOT PEAK VEL VTI: 28.41 CM
E WAVE DECELERATION TIME: 191.68 MSEC
E/A RATIO: 0.71
ECHO LV POSTERIOR WALL: 1.18 CM (ref 0.6–1.1)
FRACTIONAL SHORTENING: 43 % (ref 28–44)
INTERVENTRICULAR SEPTUM: 1.56 CM (ref 0.6–1.1)
IVRT: 0.08 MSEC
LEFT ATRIUM SIZE: 2.95 CM
LEFT INTERNAL DIMENSION IN SYSTOLE: 1.84 CM (ref 2.1–4)
LEFT VENTRICLE DIASTOLIC VOLUME INDEX: 25.31 ML/M2
LEFT VENTRICLE DIASTOLIC VOLUME: 42.31 ML
LEFT VENTRICLE MASS INDEX: 89.9 G/M2
LEFT VENTRICLE SYSTOLIC VOLUME INDEX: 6.2 ML/M2
LEFT VENTRICLE SYSTOLIC VOLUME: 10.35 ML
LEFT VENTRICULAR INTERNAL DIMENSION IN DIASTOLE: 3.24 CM (ref 3.5–6)
LEFT VENTRICULAR MASS: 150.22 G
MV PEAK A VEL: 1.35 M/S
MV PEAK E VEL: 0.96 M/S
PISA TR MAX VEL: 2.6 M/S
POCT GLUCOSE: 104 MG/DL (ref 70–110)
POCT GLUCOSE: 125 MG/DL (ref 70–110)
POCT GLUCOSE: 50 MG/DL (ref 70–110)
POCT GLUCOSE: 67 MG/DL (ref 70–110)
POCT GLUCOSE: 92 MG/DL (ref 70–110)
PULM VEIN S/D RATIO: 0.91
PV PEAK D VEL: 0.32 M/S
PV PEAK S VEL: 0.29 M/S
PV PEAK VELOCITY: 1.64 CM/S
RA PRESSURE: 8 MMHG
RIGHT VENTRICULAR END-DIASTOLIC DIMENSION: 2.55 CM
TR MAX PG: 27.04 MMHG
TV REST PULMONARY ARTERY PRESSURE: 35 MMHG

## 2019-04-04 PROCEDURE — 97802 MEDICAL NUTRITION INDIV IN: CPT | Performed by: DIETITIAN, REGISTERED

## 2019-04-04 PROCEDURE — 25000003 PHARM REV CODE 250: Performed by: NURSE PRACTITIONER

## 2019-04-04 PROCEDURE — 11000001 HC ACUTE MED/SURG PRIVATE ROOM

## 2019-04-04 PROCEDURE — 94761 N-INVAS EAR/PLS OXIMETRY MLT: CPT

## 2019-04-04 PROCEDURE — G0408 PR TELHEALTH INPT CONSULT 35MIN: ICD-10-PCS | Mod: GT,G0,, | Performed by: PSYCHIATRY & NEUROLOGY

## 2019-04-04 PROCEDURE — 63600175 PHARM REV CODE 636 W HCPCS: Performed by: HOSPITALIST

## 2019-04-04 PROCEDURE — G0408 INPT/TELE FOLLOW UP 35: HCPCS | Mod: GT,G0,, | Performed by: PSYCHIATRY & NEUROLOGY

## 2019-04-04 PROCEDURE — 25000003 PHARM REV CODE 250: Performed by: HOSPITALIST

## 2019-04-04 RX ORDER — ACETAMINOPHEN 325 MG/1
650 TABLET ORAL EVERY 6 HOURS PRN
Status: DISCONTINUED | OUTPATIENT
Start: 2019-04-04 | End: 2019-04-08 | Stop reason: HOSPADM

## 2019-04-04 RX ORDER — ONDANSETRON 2 MG/ML
4 INJECTION INTRAMUSCULAR; INTRAVENOUS EVERY 8 HOURS PRN
Status: DISCONTINUED | OUTPATIENT
Start: 2019-04-04 | End: 2019-04-08 | Stop reason: HOSPADM

## 2019-04-04 RX ORDER — LORAZEPAM 0.5 MG/1
0.5 TABLET ORAL EVERY 6 HOURS PRN
Status: DISCONTINUED | OUTPATIENT
Start: 2019-04-04 | End: 2019-04-08 | Stop reason: HOSPADM

## 2019-04-04 RX ADMIN — LORAZEPAM 0.5 MG: 0.5 TABLET ORAL at 06:04

## 2019-04-04 RX ADMIN — RIVAROXABAN 10 MG: 10 TABLET, FILM COATED ORAL at 04:04

## 2019-04-04 RX ADMIN — SUCRALFATE 1 G: 1 SUSPENSION ORAL at 09:04

## 2019-04-04 RX ADMIN — Medication 16 G: at 10:04

## 2019-04-04 RX ADMIN — ATORVASTATIN CALCIUM 80 MG: 40 TABLET, FILM COATED ORAL at 08:04

## 2019-04-04 RX ADMIN — LORAZEPAM 2 MG: 2 INJECTION INTRAMUSCULAR; INTRAVENOUS at 12:04

## 2019-04-04 RX ADMIN — SUCRALFATE 1 G: 1 SUSPENSION ORAL at 01:04

## 2019-04-04 RX ADMIN — SUCRALFATE 1 G: 1 SUSPENSION ORAL at 04:04

## 2019-04-04 RX ADMIN — DEXTROSE MONOHYDRATE 12.5 G: 25 INJECTION, SOLUTION INTRAVENOUS at 06:04

## 2019-04-04 RX ADMIN — SUCRALFATE 1 G: 1 SUSPENSION ORAL at 08:04

## 2019-04-04 RX ADMIN — PANTOPRAZOLE SODIUM 40 MG: 40 TABLET, DELAYED RELEASE ORAL at 08:04

## 2019-04-04 RX ADMIN — ASPIRIN 81 MG 81 MG: 81 TABLET ORAL at 08:04

## 2019-04-04 RX ADMIN — ACETAMINOPHEN 650 MG: 325 TABLET ORAL at 01:04

## 2019-04-04 RX ADMIN — ACETAMINOPHEN 650 MG: 325 TABLET ORAL at 06:04

## 2019-04-04 NOTE — ASSESSMENT & PLAN NOTE
69 y/o woman with ESRD, recent PE, on xarelto, presenting for L sided weakness.  On MRI has multifocal, scattered embolic appearing strokes.  No significant intracranial or cervical vessel stenosis.  Etiology likely cardioembolic.  Consider p.afib or aortic atheroma.  Can consider MICHAEL, but would likely not  as patient will be continued on Xarelto for PE anyway.  If she develops fever or bacteremia, MICHAEL is mandatory.  She should be continued on telemetry and discharged with 30d event monitor to evaluate for afib long term.  Could also consider underlying malignancy or hypercoagulable state, as this is her second thrombotic event this year (first being PE -- unclear if provoked or not).        Antithrombotics for secondary stroke prevention: Anticoagulants: Rivaroxaban 10 mg daily    Statins for secondary stroke prevention and hyperlipidemia, if present:   Statins: Atorvastatin- 40 mg daily    Aggressive risk factor modification: HTN, DM     Rehab efforts: PT/OT/SLP to evaluate and treat    Diagnostics ordered/pending: None     VTE prophylaxis: None: Reason for No Pharmacological VTE Prophylaxis: Currently on anticoagulation    BP parameters: Infarct: No intervention, SBP <220

## 2019-04-04 NOTE — HOSPITAL COURSE
Brain MRI showed numerous stroke foci in the right supratentorial frontal, parietal, occipital, temporal lobe and left posterior frontal lobe, suggesting embolic etiology. MRA showed mild plaque in the left posterior cerebral artery, which is not contributory to the area of her stroke. Echocardiogram was mostly normal. Vascular Neurology recommended cardiac event monitor and optionally transesophageal echocardiogram. It is optional because her anticoagulation would treat a intracardiac thrombus anyway, although it would be mandatory if she had fever or other signs of endocarditis. Physical and Occupational Therapy recommended inpatient rehab. Her home hypertension medications were restarted as her blood pressures increased. Her clonidine 0.2 mg nightly and hydralazine 25 mg every 8 hours were discontinued because her blood pressures were controlled without them. She was discharged to [facility].    30-day event monitor should be done outpatient.

## 2019-04-04 NOTE — SUBJECTIVE & OBJECTIVE
Neurologic Chief Complaint: L sided weakness    Subjective:     Interval History: Patient is seen for follow-up neurological assessment and treatment recommendations:  MRI/MRA done in interval.    HPI, Past Medical, Family, and Social History remains the same as documented in the initial encounter.     Review of Systems  Scheduled Meds:   aspirin  81 mg Oral Daily    atorvastatin  80 mg Oral Daily    pantoprazole  40 mg Oral Daily    rivaroxaban  10 mg Oral Daily with dinner    sucralfate  1 g Oral QID     Continuous Infusions:  PRN Meds:acetaminophen, dextrose 50 % in water (D50W), dextrose 50 % in water (D50W), glucagon (human recombinant), glucose, glucose, hydrALAZINE, insulin aspart U-100, nitroGLYCERIN, ondansetron, sodium chloride 0.9%, sodium chloride 0.9%    Objective:     Vital Signs (Most Recent):  Temp: 98.3 °F (36.8 °C) (04/04/19 0721)  Pulse: 87 (04/04/19 0721)  Resp: 16 (04/04/19 0721)  BP: (!) 91/51 (04/04/19 0721)  SpO2: 98 % (04/04/19 0720)  BP Location: Left arm    Vital Signs Range (Last 24H):  Temp:  [98.3 °F (36.8 °C)-100 °F (37.8 °C)]   Pulse:  []   Resp:  [16-19]   BP: ()/(51-73)   SpO2:  [98 %-100 %]   BP Location: Left arm    Physical Exam   Thin elderly woman, lying in bed, no distress  No respiratory distress    Neurological Exam:   MS: Alert, oriented to self and year, states month of July.  Follows commands.  Repeats.  No aphasia.  CN: PERRL, EOMI, L UMN facial droop, mild dysarthria  Motor:   No movement of L arm.  Some drift in L leg.  R arm and leg antigravity without drift.  Sensory: appears to have decr sensation to L hemibody, but difficult to assess  Coord: no ataxia on finger to nose    Laboratory:  CMP:   Recent Labs   Lab 04/03/19  1815   CALCIUM 8.6*   ALBUMIN 3.3*   PROT 6.7   *   K 3.2*   CO2 29   CL 97   BUN 7   CREATININE 2.22*   ALKPHOS 102   ALT 17   AST 53*   BILITOT 0.7     BMP:   Recent Labs   Lab 04/03/19  1815   *   K 3.2*   CL 97   CO2  29   BUN 7   CREATININE 2.22*   CALCIUM 8.6*     CBC:   Recent Labs   Lab 04/03/19  1815   WBC 10.69   RBC 3.95*   HGB 11.3*   HCT 34.8*      MCV 88   MCH 28.6   MCHC 32.5     Lipid Panel:   Recent Labs   Lab 04/03/19  1815   CHOL 103*   LDLCALC 43.2*   HDL 45   TRIG 74     Hgb A1C: No results for input(s): HGBA1C in the last 168 hours.    Diagnostic Results     Brain Imaging   MRI Brain 4/4/19:  1. Numerous foci right supratentorial frontal, parietal, occipital, temporal lobe and left posterior frontal consistent with acute subacute foci under 2 weeks age of gliosis, microvascular ischemic change from suspected embolic phenomenon favored.    Vessel Imaging   MRA Head/Neck 4/4/19:  No significant abnormalities or stenoses.    Cardiac Imaging   TTE 4/4/19:  LVEF 65%  Mod coalification of mitral and aortic valves  Negative bubble

## 2019-04-04 NOTE — PT/OT/SLP PROGRESS
Occupational Therapy  Visit Attempt     Patient Name:  Brittaney Joseph   MRN:  [id number]    Patient not seen today secondary to remains receiving testing at this time- receiving bubble study. Will follow up as available.    Estephania Painter OT  4/4/2019

## 2019-04-04 NOTE — NURSING
Went down to MRI to give patient Ativan 2mg IV. IV infiltrated. 24 g started down in MRI. They now will be able to perform MRI.

## 2019-04-04 NOTE — PLAN OF CARE
Problem: Adult Inpatient Plan of Care  Goal: Plan of Care Review  Outcome: Ongoing (interventions implemented as appropriate)  Pt on RA with documented sats.    Will continue to monitor.

## 2019-04-04 NOTE — PLAN OF CARE
Problem: Oral Intake Inadequate  Goal: Improved Oral Intake    Intervention: Promote and Optimize Oral Intake  Recommendation:   1. Once medically able, restart diet 2000 calorie diabetic with renal restriction     2. Consider appetite stimulant if po intake < 25% of meals     3. If oral supplement warranted, rec Boost Glucose Control BID    4. If TF warranted, rec Novasource Renal @ 10 mL/hr to INC as BRO to goal rate of  40 mL/hr to provide 1920 calories, 86 gm Pro, 688 mL water     Goals: Pt to take in >75% of calorie and protein needs   Nutrition Goal Status: new  Communication of RD Recs: reviewed with RN     Nutrition Discharge Planning: Unable to determine nutrition needs a this time

## 2019-04-04 NOTE — CONSULTS
[facility]  Adult Nutrition  Consult Note    SUMMARY     Recommendations    Recommendation:   1. Once medically able, restart diet 2000 calorie diabetic with renal restriction     2. Consider appetite stimulant if po intake < 25% of meals     3. If oral supplement warranted, rec Boost Glucose Control BID    4. If TF warranted, rec Novasource Renal @ 10 mL/hr to INC as BRO to goal rate of  40 mL/hr to provide 1920 calories, 86 gm Pro, 688 mL water    Goals: Pt to take in >75% of calorie and protein needs   Nutrition Goal Status: new  Communication of RD Recs: reviewed with RN    Nutrition Discharge Planning: Unable to determine nutrition needs a this time    Reason for Assessment    Reason For Assessment: consult  Diagnosis: stroke/CVA    Relevant Medical History:   Past Medical History:   Diagnosis Date    Anticoagulant long-term use     Arthritis     Chronic kidney disease     Chronic low back pain     Diabetes mellitus, type 2     Hypertension     Pulmonary embolism with acute cor pulmonale 1/11/2019     Past Surgical History:   Procedure Laterality Date    CHOLECYSTECTOMY      EKOS, Pumoart/DVT  1/11/2019    Performed by Roger Day MD at [facility] CATH LAB/EP    HYSTERECTOMY      Insertion,catheter,tunneled Right 3/29/2019    Performed by Michelet Cano Jr., MD at [facility] OR    Thrombolysis, PA N/A 1/11/2019    Performed by Roger Day MD at [facility] CATH LAB/EP     Interdisciplinary Rounds: did not attend    General Information Comments: NFPE not performed due to patient OOR in procedure at time of visit. Family present and reports pt has poor po intake. Per chart wt on 2/28/19 68.9 kg which reflects significant wt loss but unclear if actual weight vs fluid shifts. Pt is new to HD, < 1month.       Nutrition Risk Screen    Nutrition Risk Screen: no indicators present    Nutrition/Diet History    Patient Reported Diet/Restrictions/Preferences: general  Typical Food/Fluid Intake:  "Family reports pt has had poor po intake for >2 months. She was not following any diet restrictions.   Food Preferences: n/a  Spiritual, Cultural Beliefs, Mosque Practices, Values that Affect Care: no  Food Allergies: NKFA  Factors Affecting Nutritional Intake: decreased appetite, early satiety, NPO    Anthropometrics    Temp: 98.3 °F (36.8 °C)  Height: 5' 4" (162.6 cm)  Height (inches): 64 in  Weight Method: Bed Scale  Weight: 62.7 kg (138 lb 3.7 oz)  Weight (lb): 138.23 lb  Ideal Body Weight (IBW), Female: 120 lb  % Ideal Body Weight, Female (lb): 115.19 lb  BMI (Calculated): 23.8  BMI Grade: 18.5-24.9 - normal  Weight Loss: unintentional  Usual Body Weight (UBW), k.9 kg(clinic wt 19)  Weight Change Amount: 13 lb 10.7 oz  % Usual Body Weight: 91.19  % Weight Change From Usual Weight: -9 %       Lab/Procedures/Meds    Pertinent Labs Reviewed: reviewed  Recent Labs   Lab 19  1815   *   K 3.2*   CL 97   CO2 29   BUN 7   CREATININE 2.22*     Recent Labs   Lab 19   WBC 10.69   RBC 3.95*   HGB 11.3*   HCT 34.8*      MCV 88   MCH 28.6   MCHC 32.5     Recent Labs   Lab 19   ALT 17   AST 53*   ALKPHOS 102   BILITOT 0.7   PROT 6.7   ALBUMIN 3.3*       Pertinent Medications Reviewed: reviewed  Scheduled Meds:   aspirin  81 mg Oral Daily    atorvastatin  80 mg Oral Daily    pantoprazole  40 mg Oral Daily    rivaroxaban  10 mg Oral Daily with dinner    sucralfate  1 g Oral QID     Continuous Infusions:  PRN Meds:.acetaminophen, dextrose 50 % in water (D50W), dextrose 50 % in water (D50W), glucagon (human recombinant), glucose, glucose, hydrALAZINE, insulin aspart U-100, nitroGLYCERIN, ondansetron, sodium chloride 0.9%, sodium chloride 0.9%      Estimated/Assessed Needs    Weight Used For Calorie Calculations: 62.7 kg (138 lb 3.7 oz)  Energy Calorie Requirements (kcal): 1880 to 2190  Energy Need Method: Kcal/kg(30-35 kcal/kg)  Protein Requirements: 88(1.4 gm " Pro/kg)  Weight Used For Protein Calculations: 62.7 kg (138 lb 3.7 oz)     Estimated Fluid Requirement Method: RDA Method  RDA Method (mL): 1880  CHO Requirement: 45%      Nutrition Prescription Ordered    Current Diet Order: NPO    Evaluation of Received Nutrient/Fluid Intake    I/O: reviewed  Comments: LBM 4/2/19  Tolerance: tolerating     % Intake of Estimated Energy Needs: 0 - 25 %  % Meal Intake: NPO    Nutrition Risk    Level of Risk/Frequency of Follow-up: (2xweek)     Assessment and Plan    Controlled type 2 diabetes mellitus with stage 4 chronic kidney disease, without long-term current use of insulin  Nutrition Problem  Increased Nutrient Needs-calories/protein       Related to (etiology):   ESRD with Hemodialysis    Signs and Symptoms (as evidenced by):   Decrease po intake for >1 month  Significant weight loss of 9% in <3 months  New Hemodialysis for <2 months     Interventions:  Collaboration with other providers    Nutrition Diagnosis Status:   New             Monitor and Evaluation    Food and Nutrient Intake: energy intake  Food and Nutrient Adminstration: diet order  Anthropometric Measurements: weight, weight change, body mass index  Biochemical Data, Medical Tests and Procedures: electrolyte and renal panel, glucose/endocrine profile  Nutrition-Focused Physical Findings: overall appearance     Malnutrition Assessment  Malnutrition Type: chronic illness(per past clinic visit records: wt 68.9kg 2/28/19)     Weight Loss (Malnutrition): greater than 5% in 1 month          Nutrition Follow-Up    RD Follow-up?: Yes

## 2019-04-04 NOTE — PT/OT/SLP PROGRESS
Speech Language Pathology  MISSED VISIT      Brittaney Joseph  MRN: [id number]      Orders for swallow and speech/lang eval received; Medical chart reviewed. Pt has been SINTIA this am and now in PM 12:45. RN went down to MRI to give pt ordered Ativan for completion of MRI exam.   Will re-attempt as time permits.       CORINNE Rodas, CCC-SLP   4/4/2019

## 2019-04-04 NOTE — H&P
[facility]  History & Physical    Patient Name: Brittaney Joseph  MRN: [id number]  Admission Date: 4/3/2019  Attending Physician: Brandon Ann MD   Primary Care Provider: Charles Argueta MD         Patient information was obtained from patient, past medical records and ER records.     Subjective:     Principal Problem:<principal problem not specified>    Chief Complaint:   Chief Complaint   Patient presents with    Cerebrovascular Accident     c/o left sided weakness and difficulty speaking that started this morning at home. Pt unable to move left arm with minimal movement to left leg. Left side facial droop noted with slurred speech and delayed response.        HPI: Brittaney Joseph is a 68 y.o.  female with hypertension, diabetes mellitus type 2, chronic diastolic heart failure, end stage renal disease on hemodialysis since 3/29/19 (Monday Wednesday Friday), hyperlipidemia, chronic low back pain, history of pulmonary embolism on 1/11/19 status post thrombectomy (anticoagulated on rivaroxaban). She lives with her niece. Her primary care physician is Dr. Charles Argueta. Her nephrologist is Dr. Saida Mcdaniel. She gets dialysis at [facility].              She was hospitalized at [facility] from 3/28/19 to 4/1/19 to start hemodialysis.               The patient developed left sided facial droop and left sided weakness around 16:00 on 4/2/19. Her family did not seek medical attention at the time, but noted symptoms progressively becoming worse with onset of slurred speech on 4/3/19 after dialysis which prompted ED evaluation. She was taken to [facility]. Blood pressure was normal at 121/68. Head CT showed chronic appearing ischemic white matter changes bilaterally. Vascular Neurology Telemedicine consult was done. She was given a NIH stroke scale of 6 for facial palsy, left leg  weakness, right leg drift, dysarthria. She already takes aspirin, atorvastatin 40 mg, and rivaroxaban. Neurology recommended increasing atorvastatin to 80 mg and admission with MRI, MRA, and echocardiogram.  Patient admitted to [facility] for further evaluation.                      Past Medical History:   Diagnosis Date    Anticoagulant long-term use     Arthritis     Chronic kidney disease     Chronic low back pain     Diabetes mellitus, type 2     Hypertension     Pulmonary embolism with acute cor pulmonale 1/11/2019       Past Surgical History:   Procedure Laterality Date    CHOLECYSTECTOMY      EKOS, Pumoart/DVT  1/11/2019    Performed by Roger Day MD at [facility] CATH LAB/EP    HYSTERECTOMY      Insertion,catheter,tunneled Right 3/29/2019    Performed by Michelet Cano Jr., MD at [facility] OR    Thrombolysis, PA N/A 1/11/2019    Performed by Roger Day MD at [facility] CATH LAB/EP       Review of patient's allergies indicates:  No Known Allergies    No current facility-administered medications on file prior to encounter.      Current Outpatient Medications on File Prior to Encounter   Medication Sig    amLODIPine (NORVASC) 10 MG tablet Take 10 mg by mouth nightly.    aspirin 81 MG Chew Take 1 tablet (81 mg total) by mouth once daily.    atorvastatin (LIPITOR) 40 MG tablet Take 40 mg by mouth once daily.    cloNIDine (CATAPRES) 0.2 MG tablet Take 0.2 mg by mouth nightly.    furosemide (LASIX) 80 MG tablet Take 1 tablet (80 mg total) by mouth 2 (two) times daily.    glipiZIDE (GLUCOTROL) 2.5 MG TR24 Take 1 tablet (2.5 mg total) by mouth daily with breakfast.    hydrALAZINE (APRESOLINE) 25 MG tablet Take 1 tablet (25 mg total) by mouth every 8 (eight) hours.    metoprolol tartrate (LOPRESSOR) 100 MG tablet Take 1 tablet (100 mg total) by mouth 2 (two) times daily.    nitroGLYCERIN (NITROSTAT) 0.4 MG SL tablet Place 1 tablet (0.4 mg total) under the tongue every 5 (five)  minutes as needed for Chest pain. if second dose needed call 911    ondansetron (ZOFRAN) 4 MG tablet Take 1 tablet (4 mg total) by mouth every 8 (eight) hours as needed for Nausea.    oxyCODONE-acetaminophen (PERCOCET)  mg per tablet     pantoprazole (PROTONIX) 40 MG tablet Take 40 mg by mouth once daily.    rivaroxaban (XARELTO) 10 mg Tab Take 1 tablet (10 mg total) by mouth daily with dinner or evening meal.    senna-docusate 8.6-50 mg (PERICOLACE) 8.6-50 mg per tablet Take 1 tablet by mouth 2 (two) times daily as needed for Constipation.    sucralfate (CARAFATE) 100 mg/mL suspension Take 10 mLs (1 g total) by mouth 4 (four) times daily.     Family History     None        Tobacco Use    Smoking status: Never Smoker    Smokeless tobacco: Never Used   Substance and Sexual Activity    Alcohol use: No     Frequency: Never    Drug use: Never    Sexual activity: Not Currently     Review of Systems   Constitutional: Negative for chills, diaphoresis and fever.   HENT: Negative for congestion.    Eyes: Negative for photophobia.   Respiratory: Negative for cough, chest tightness, shortness of breath and wheezing.    Cardiovascular: Negative for chest pain, palpitations and leg swelling.   Gastrointestinal: Negative for abdominal pain, diarrhea, nausea and vomiting.   Genitourinary: Negative for dysuria, flank pain, frequency and hematuria.   Musculoskeletal: Negative for back pain and myalgias.   Neurological: Positive for weakness (left sided weakness ). Negative for dizziness, syncope, light-headedness and headaches.        Slurred speech   Psychiatric/Behavioral: Negative for confusion.     Objective:     Vital Signs (Most Recent):  Temp: 99.9 °F (37.7 °C) (04/04/19 0335)  Pulse: 76 (04/04/19 0400)  Resp: 18 (04/04/19 0335)  BP: 116/62 (04/04/19 0335)  SpO2: 99 % (04/04/19 0328) Vital Signs (24h Range):  Temp:  [98.7 °F (37.1 °C)-100 °F (37.8 °C)] 99.9 °F (37.7 °C)  Pulse:  [] 76  Resp:  [18-19]  18  SpO2:  [98 %-100 %] 99 %  BP: (108-140)/(59-73) 116/62     Weight: 62.7 kg (138 lb 3.7 oz)  Body mass index is 23.73 kg/m².    Physical Exam   Constitutional: She is oriented to person, place, and time. She appears well-developed and well-nourished.   HENT:   Head: Normocephalic and atraumatic.   Eyes: Pupils are equal, round, and reactive to light. Conjunctivae are normal.   Neck: Normal range of motion. No JVD present.   Cardiovascular: Normal rate, regular rhythm, normal heart sounds and intact distal pulses.   Pulmonary/Chest: Effort normal. No respiratory distress. She has no wheezes.   Abdominal: Soft. Bowel sounds are normal. She exhibits no distension. There is no tenderness. There is no guarding.   Musculoskeletal: Normal range of motion. She exhibits no edema or tenderness.   Neurological: She is alert and oriented to person, place, and time.   Left hemiparesis, left facial droop, +left upper extremity drift, no effort against gravity to LUE    Skin: Skin is warm and dry. Capillary refill takes less than 2 seconds.   Psychiatric: She has a normal mood and affect. Her behavior is normal.         CRANIAL NERVES     CN III, IV, VI   Pupils are equal, round, and reactive to light.       Significant Labs:   BMP:   Recent Labs   Lab 04/03/19  1815   *   *   K 3.2*   CL 97   CO2 29   BUN 7   CREATININE 2.22*   CALCIUM 8.6*     CBC:   Recent Labs   Lab 04/03/19  1815   WBC 10.69   HGB 11.3*   HCT 34.8*          Significant Imaging: I have reviewed all pertinent imaging results/findings within the past 24 hours.    Assessment/Plan:     Lacunar stroke, acute  Patient with onset of left sided weakness on 4/2. Per family at bedside symptoms gradually progressing on 4/3 prompting ED evaluation. +left sided hemiparesis noted on exam, +left facial droop and slurred speech noted. CT brain shows no acute findings. Patient evaluated by vascular neurology.     MRI brain   MRA head and neck   Neuro  check q4h   Lipid panel, A1C   TTE   Continue ASA, statin   Consult PT/OT/SLP         End-stage renal disease on hemodialysis  Consult nephrology       History of pulmonary embolism  Taking Xarelto 10 mg daily       Chronic diastolic heart failure  No evidence of volume overload on exam       Chronic low back pain  Chronic, stable       Controlled type 2 diabetes mellitus with stage 4 chronic kidney disease, without long-term current use of insulin  Hemoglobin A1c was 5.1% on 3/12/19. Discontinue glipizide. Insulin aspart sliding scale. Monitor serum glucose.    Hyperlipidemia associated with type 2 diabetes mellitus  Continue ASA, statin       Essential hypertension  Hold home amlodipine 10 mg HS, clonidine 0.2 mg HS, hydralazine 25 mg every 8 hours, metoprolol tartrate 100 mg BID, furosemide 80 mg BID for permissive hypertension.      VTE Risk Mitigation (From admission, onward)        Ordered     rivaroxaban tablet 10 mg  With dinner      04/03/19 2122     Reason for No Pharmacological VTE Prophylaxis  Once      04/03/19 2122     IP VTE HIGH RISK PATIENT  Once      04/03/19 2122     Place sequential compression device  Until discontinued      04/03/19 2122     Reason for No Pharmacological VTE Prophylaxis  Once      04/03/19 2122             Arlette Pascual NP  Department of Hospital Medicine   [facility]

## 2019-04-04 NOTE — NURSING
Patient neuro status had been stable since admit, but when assessed at 0520 she was less talkative, significant increase in left sided weakness, no movement lue. Code stroke callled at 0530,  BS=50, D50% IVP as ordered, to CT scan at 0537, Dr Von MD responded via video at 0608 with MITCHEL Duran present. Repeat CY=509. Back to her baseline assessment upon return from CT & No further intervention at this time.

## 2019-04-04 NOTE — PROGRESS NOTES
[facility]  Vascular Neurology  Telemedicine Rounding Note        Consulting Provider: Spoke Physician:: Dr. Brandon Ann  Current Providers  No providers found    Patient Location: [facility]  Spoke hospital nurse at bedside with patient assisting consultant.     Patient information was obtained from relative(s).     Subjective:     History of Present Illness:  67 y/o woman with PMH ESRD on HD, T2DM, CHF, PE s/p thrombectomy (Jan 2019), on Xarelto, who developed L facial droop and weakness on 4/2.  She presented to the ED on 4/3, received telestroke consult (NIHSS 6), and was admitted to the hospitalist service.  She was continued on Xarelto, aspirin, statin.    Family reports poor appetite recently and likely missed some doses of Xarelto last week.  No history of stroke or afib.    Neurologic Chief Complaint: L sided weakness    Subjective:     Interval History: Patient is seen for follow-up neurological assessment and treatment recommendations:  MRI/MRA done in interval.    HPI, Past Medical, Family, and Social History remains the same as documented in the initial encounter.     Review of Systems  Scheduled Meds:   aspirin  81 mg Oral Daily    atorvastatin  80 mg Oral Daily    pantoprazole  40 mg Oral Daily    rivaroxaban  10 mg Oral Daily with dinner    sucralfate  1 g Oral QID     Continuous Infusions:  PRN Meds:acetaminophen, dextrose 50 % in water (D50W), dextrose 50 % in water (D50W), glucagon (human recombinant), glucose, glucose, hydrALAZINE, insulin aspart U-100, nitroGLYCERIN, ondansetron, sodium chloride 0.9%, sodium chloride 0.9%    Objective:     Vital Signs (Most Recent):  Temp: 98.3 °F (36.8 °C) (04/04/19 0721)  Pulse: 87 (04/04/19 0721)  Resp: 16 (04/04/19 0721)  BP: (!) 91/51 (04/04/19 0721)  SpO2: 98 % (04/04/19 0720)  BP Location: Left arm    Vital Signs Range (Last 24H):  Temp:  [98.3 °F (36.8 °C)-100 °F (37.8 °C)]   Pulse:  []    Resp:  [16-19]   BP: ()/(51-73)   SpO2:  [98 %-100 %]   BP Location: Left arm    Physical Exam   Thin elderly woman, lying in bed, no distress  No respiratory distress    Neurological Exam:   MS: Alert, oriented to self and year, states month of July.  Follows commands.  Repeats.  No aphasia.  CN: PERRL, EOMI, L UMN facial droop, mild dysarthria  Motor:   No movement of L arm.  Some drift in L leg.  R arm and leg antigravity without drift.  Sensory: appears to have decr sensation to L hemibody, but difficult to assess  Coord: no ataxia on finger to nose    Laboratory:  CMP:   Recent Labs   Lab 04/03/19  1815   CALCIUM 8.6*   ALBUMIN 3.3*   PROT 6.7   *   K 3.2*   CO2 29   CL 97   BUN 7   CREATININE 2.22*   ALKPHOS 102   ALT 17   AST 53*   BILITOT 0.7     BMP:   Recent Labs   Lab 04/03/19  1815   *   K 3.2*   CL 97   CO2 29   BUN 7   CREATININE 2.22*   CALCIUM 8.6*     CBC:   Recent Labs   Lab 04/03/19  1815   WBC 10.69   RBC 3.95*   HGB 11.3*   HCT 34.8*      MCV 88   MCH 28.6   MCHC 32.5     Lipid Panel:   Recent Labs   Lab 04/03/19  1815   CHOL 103*   LDLCALC 43.2*   HDL 45   TRIG 74     Hgb A1C: No results for input(s): HGBA1C in the last 168 hours.    Diagnostic Results     Brain Imaging   MRI Brain 4/4/19:  1. Numerous foci right supratentorial frontal, parietal, occipital, temporal lobe and left posterior frontal consistent with acute subacute foci under 2 weeks age of gliosis, microvascular ischemic change from suspected embolic phenomenon favored.    Vessel Imaging   MRA Head/Neck 4/4/19:  No significant abnormalities or stenoses.    Cardiac Imaging   TTE 4/4/19:  LVEF 65%  Mod coalification of mitral and aortic valves  Negative bubble       Assessment/Plan:     STROKE DOCUMENTATION     NIH Scale:  1a. Level of Consciousness: 0-->Alert, keenly responsive  1b. LOC Questions: 1-->Answers one question correctly  1c. LOC Commands: 0-->Performs both tasks correctly  2. Best Gaze:  0-->Normal  3. Visual: 0-->No visual loss  4. Facial Palsy: 2-->Partial paralysis (total or near-total paralysis of lower face)  5a. Motor Arm, Left: 4-->No movement  5b. Motor Arm, Right: 0-->No drift, limb holds 90 (or 45) degrees for full 10 secs  6a. Motor Leg, Left: 0-->No drift, leg holds 30 degree position for full 5 secs  6b. Motor Leg, Right: 1-->Drift, leg falls by the end of the 5-sec period but does not hit bed  7. Limb Ataxia: 0-->Absent  8. Sensory: 1-->Mild-to-moderate sensory loss, patient feels pinprick is less sharp or is dull on the affected side, or there is a loss of superficial pain with pinprick, but patient is aware of being touched  9. Best Language: 0-->No aphasia, normal  10. Dysarthria: 1-->Mild-to-moderate dysarthria, patient slurs at least some words and, at worst, can be understood with some difficulty  11. Extinction and Inattention (formerly Neglect): 0-->No abnormality  Total (NIH Stroke Scale): 10     Modified [place] Score: 1  Marianna Coma Scale:    ABCD2 Score:    [id number] Score:   HAS -BLED Score:   ICH Score:   Hunt & Mendoza Classification:       Diagnoses:   Stroke due to embolism  67 y/o woman with ESRD, recent PE, on xarelto, presenting for L sided weakness.  On MRI has multifocal, scattered embolic appearing strokes.  No significant intracranial or cervical vessel stenosis.  Etiology likely cardioembolic.  Consider p.afib or aortic atheroma.  Can consider MICHAEL, but would likely not  as patient will be continued on Xarelto for PE anyway.  If she develops fever or bacteremia, MICHAEL is mandatory.  She should be continued on telemetry and discharged with 30d event monitor to evaluate for afib long term.  Could also consider underlying malignancy or hypercoagulable state, as this is her second thrombotic event this year (first being PE -- unclear if provoked or not).        Antithrombotics for secondary stroke prevention: Anticoagulants: Rivaroxaban 10 mg  "daily    Statins for secondary stroke prevention and hyperlipidemia, if present:   Statins: Atorvastatin- 40 mg daily    Aggressive risk factor modification: HTN, DM     Rehab efforts: PT/OT/SLP to evaluate and treat    Diagnostics ordered/pending: None     VTE prophylaxis: None: Reason for No Pharmacological VTE Prophylaxis: Currently on anticoagulation    BP parameters: Infarct: No intervention, SBP <220            Blood pressure (!) 110/55, pulse 91, temperature 98.1 °F (36.7 °C), resp. rate 18, height 5' 4" (1.626 m), weight 62.7 kg (138 lb 3.7 oz), SpO2 98 %, not currently breastfeeding.      The attending portion of this evaluation, treatment, and documentation was performed per Yuliet Ellsworth MD via audiovisual.    Billing code:  (AR TELHEATH INPT CONSULT 25MIN)    Consult End Time: 4:31 PM     Yuliet Ellsworth MD  [facility]  Vascular Neurology   [facility]  "

## 2019-04-04 NOTE — ASSESSMENT & PLAN NOTE
Patient with onset of left sided weakness on 4/2. Per family at bedside symptoms gradually progressing on 4/3 prompting ED evaluation. +left sided hemiparesis noted on exam, +left facial droop and slurred speech noted. CT brain shows no acute findings. Patient evaluated by vascular neurology.     MRI brain   MRA head and neck   Neuro check q4h   Lipid panel, A1C   TTE   Continue ASA, statin   Consult PT/OT/SLP

## 2019-04-04 NOTE — PLAN OF CARE
Pt lives with her adult daughters who care for her.  She has standard walker and BSC at home.  Family is requesting wheelchair for appts and long distances.  I educated family on the difference between IPR and SNF.  I also let them know therapy will work with her and determine what pt will need going home.  This  put name on white board and explained blue discharge folder to patient. Discharge planning brochure and/or business card given to patient.  Patient verbalized understanding.     04/04/19 2631   Discharge Assessment   Assessment Type Discharge Planning Assessment   Confirmed/corrected address and phone number on facesheet? Yes   Assessment information obtained from? Caregiver   Communicated expected length of stay with patient/caregiver yes   Prior to hospitilization cognitive status: Alert/Oriented   Prior to hospitalization functional status: Assistive Equipment   Current cognitive status: Alert/Oriented   Current Functional Status: Assistive Equipment   Lives With child(laurel), adult   Able to Return to Prior Arrangements yes   Is patient able to care for self after discharge? No   Patient's perception of discharge disposition skilled nursing facility   Readmission Within the Last 30 Days current reason for admission unrelated to previous admission   Patient currently being followed by outpatient case management? No   Patient currently receives any other outside agency services? No   Equipment Currently Used at Home walker, standard;bedside commode   Do you have any problems affording any of your prescribed medications? No   Is the patient taking medications as prescribed? yes   Does the patient have transportation home? Yes   Transportation Anticipated family or friend will provide   Does the patient receive services at the [facility]? No   Discharge Plan A Rehab   Discharge Plan B Skilled Nursing Facility   DME Needed Upon Discharge  other (see comments)   Patient/Family in Agreement with  Plan yes   Readmission Questionnaire   At the time of your discharge, did someone talk to you about what your health problems were? Yes   At the time of discharge, did someone talk to you about what to watch out for regarding worsening of your health problem? Yes   At the time of discharge, did someone talk to you about what to do if you experienced worsening of your health problem? Yes   At the time of discharge, did someone talk to you about which medication to take when you left the hospital and which ones to stop taking? Yes   At the time of discharge, did someone talk to you about when and where to follow up with a doctor after you left the hospital? Yes   How often do you need to have someone help you when you read instructions, pamphlets, or other written material from your doctor or pharmacy? Always   Do you have problems taking your medications as prescribed? No   Do you have any problems affording any of  your prescribed medications? No   Do you have problems obtaining/receiving your medications? No   Does the patient have transportation to healthcare appointments? Yes   Living Arrangements house   Does the patient have family/friends to help with healtcare needs after discharge? yes   Does your caregiver provide all the help you need? Yes   Are you currently feeling confused? No   Are you currently having problems thinking? Yes   Are you currently having memory problems? No   In the last 7 days, my sleep quality was: very good

## 2019-04-04 NOTE — PT/OT/SLP PROGRESS
Physical Therapy      Patient Name:  Brittaney Joseph   MRN:  [id number]    Patient not seen today for eval secondary to off of floor in medical testing x 3 attempts today. Will follow-up as able.    Kaley Scruggs, PT

## 2019-04-04 NOTE — SUBJECTIVE & OBJECTIVE
Past Medical History:   Diagnosis Date    Anticoagulant long-term use     Arthritis     Chronic kidney disease     Chronic low back pain     Diabetes mellitus, type 2     Hypertension     Pulmonary embolism with acute cor pulmonale 1/11/2019       Past Surgical History:   Procedure Laterality Date    CHOLECYSTECTOMY      EKOS, Pumoart/DVT  1/11/2019    Performed by Roger Day MD at [facility] CATH LAB/EP    HYSTERECTOMY      Insertion,catheter,tunneled Right 3/29/2019    Performed by Michelet Cano Jr., MD at [facility] OR    Thrombolysis, PA N/A 1/11/2019    Performed by Roger Day MD at [facility] CATH LAB/EP       Review of patient's allergies indicates:  No Known Allergies    No current facility-administered medications on file prior to encounter.      Current Outpatient Medications on File Prior to Encounter   Medication Sig    amLODIPine (NORVASC) 10 MG tablet Take 10 mg by mouth nightly.    aspirin 81 MG Chew Take 1 tablet (81 mg total) by mouth once daily.    atorvastatin (LIPITOR) 40 MG tablet Take 40 mg by mouth once daily.    cloNIDine (CATAPRES) 0.2 MG tablet Take 0.2 mg by mouth nightly.    furosemide (LASIX) 80 MG tablet Take 1 tablet (80 mg total) by mouth 2 (two) times daily.    glipiZIDE (GLUCOTROL) 2.5 MG TR24 Take 1 tablet (2.5 mg total) by mouth daily with breakfast.    hydrALAZINE (APRESOLINE) 25 MG tablet Take 1 tablet (25 mg total) by mouth every 8 (eight) hours.    metoprolol tartrate (LOPRESSOR) 100 MG tablet Take 1 tablet (100 mg total) by mouth 2 (two) times daily.    nitroGLYCERIN (NITROSTAT) 0.4 MG SL tablet Place 1 tablet (0.4 mg total) under the tongue every 5 (five) minutes as needed for Chest pain. if second dose needed call 911    ondansetron (ZOFRAN) 4 MG tablet Take 1 tablet (4 mg total) by mouth every 8 (eight) hours as needed for Nausea.    oxyCODONE-acetaminophen (PERCOCET)  mg per tablet     pantoprazole (PROTONIX) 40 MG tablet Take 40 mg by mouth  once daily.    rivaroxaban (XARELTO) 10 mg Tab Take 1 tablet (10 mg total) by mouth daily with dinner or evening meal.    senna-docusate 8.6-50 mg (PERICOLACE) 8.6-50 mg per tablet Take 1 tablet by mouth 2 (two) times daily as needed for Constipation.    sucralfate (CARAFATE) 100 mg/mL suspension Take 10 mLs (1 g total) by mouth 4 (four) times daily.     Family History     None        Tobacco Use    Smoking status: Never Smoker    Smokeless tobacco: Never Used   Substance and Sexual Activity    Alcohol use: No     Frequency: Never    Drug use: Never    Sexual activity: Not Currently     Review of Systems   Constitutional: Negative for chills, diaphoresis and fever.   HENT: Negative for congestion.    Eyes: Negative for photophobia.   Respiratory: Negative for cough, chest tightness, shortness of breath and wheezing.    Cardiovascular: Negative for chest pain, palpitations and leg swelling.   Gastrointestinal: Negative for abdominal pain, diarrhea, nausea and vomiting.   Genitourinary: Negative for dysuria, flank pain, frequency and hematuria.   Musculoskeletal: Negative for back pain and myalgias.   Neurological: Positive for weakness (left sided weakness ). Negative for dizziness, syncope, light-headedness and headaches.        Slurred speech   Psychiatric/Behavioral: Negative for confusion.     Objective:     Vital Signs (Most Recent):  Temp: 99.9 °F (37.7 °C) (04/04/19 0335)  Pulse: 76 (04/04/19 0400)  Resp: 18 (04/04/19 0335)  BP: 116/62 (04/04/19 0335)  SpO2: 99 % (04/04/19 0328) Vital Signs (24h Range):  Temp:  [98.7 °F (37.1 °C)-100 °F (37.8 °C)] 99.9 °F (37.7 °C)  Pulse:  [] 76  Resp:  [18-19] 18  SpO2:  [98 %-100 %] 99 %  BP: (108-140)/(59-73) 116/62     Weight: 62.7 kg (138 lb 3.7 oz)  Body mass index is 23.73 kg/m².    Physical Exam   Constitutional: She is oriented to person, place, and time. She appears well-developed and well-nourished.   HENT:   Head: Normocephalic and atraumatic.    Eyes: Pupils are equal, round, and reactive to light. Conjunctivae are normal.   Neck: Normal range of motion. No JVD present.   Cardiovascular: Normal rate, regular rhythm, normal heart sounds and intact distal pulses.   Pulmonary/Chest: Effort normal. No respiratory distress. She has no wheezes.   Abdominal: Soft. Bowel sounds are normal. She exhibits no distension. There is no tenderness. There is no guarding.   Musculoskeletal: Normal range of motion. She exhibits no edema or tenderness.   Neurological: She is alert and oriented to person, place, and time.   Left hemiparesis, left facial droop, +left upper extremity drift, no effort against gravity to LUE    Skin: Skin is warm and dry. Capillary refill takes less than 2 seconds.   Psychiatric: She has a normal mood and affect. Her behavior is normal.         CRANIAL NERVES     CN III, IV, VI   Pupils are equal, round, and reactive to light.       Significant Labs:   BMP:   Recent Labs   Lab 04/03/19  1815   *   *   K 3.2*   CL 97   CO2 29   BUN 7   CREATININE 2.22*   CALCIUM 8.6*     CBC:   Recent Labs   Lab 04/03/19  1815   WBC 10.69   HGB 11.3*   HCT 34.8*          Significant Imaging: I have reviewed all pertinent imaging results/findings within the past 24 hours.

## 2019-04-04 NOTE — ASSESSMENT & PLAN NOTE
Embolic etiology suspected but unknown due to what process. Continue rivaroxaban 10 mg daily. Appreciate Vascular Neurology. Can get cardiac event monitor outpatient. MICHAEL only if endocarditis suspected.

## 2019-04-04 NOTE — PT/OT/SLP PROGRESS
Occupational Therapy  Visit Attempt     Patient Name:  Brittaney Joseph   MRN:  [id number]    Patient not seen this AM 2/2 SINTIA in testing x 2. Will follow up as available    Estephania Painter OT  4/4/2019

## 2019-04-04 NOTE — HPI
Brittaney Joseph is a 68 y.o.  female with hypertension, diabetes mellitus type 2, chronic diastolic heart failure, end stage renal disease on hemodialysis since 3/29/19 (Monday Wednesday Friday), hyperlipidemia, chronic low back pain, history of pulmonary embolism on 1/11/19 status post thrombectomy (anticoagulated on rivaroxaban). She lives with her niece. Her primary care physician is Dr. Charles Argueta. Her nephrologist is Dr. Saida Mcdaniel. She gets dialysis at [facility].              She was hospitalized at [facility] from 3/28/19 to 4/1/19 to start hemodialysis.              She developed left sided facial droop and left sided weakness around 16:00 on 4/2/19. Her family did not seek medical attention at the time, but noted symptoms progressively becoming worse with onset of slurred speech on 4/3/19 after dialysis. She was taken to [facility] Emergency Department. Blood pressure was normal at 121/68. Head CT showed chronic appearing ischemic white matter changes bilaterally. Vascular Neurology Telemedicine consult was done. She was given a NIH stroke scale of 6 for facial palsy, left leg weakness, right leg drift, dysarthria. She already takes aspirin, atorvastatin 40 mg, and rivaroxaban. Neurology recommended increasing atorvastatin to 80 mg and admission with MRI, MRA, and echocardiogram. She was admitted to [facility] for further evaluation.

## 2019-04-04 NOTE — ED NOTES
Pt is unable to sit upright with out favoring the left side to attempted the swallow assessment. Dr. Garcia notified.

## 2019-04-05 PROBLEM — I63.9 STROKE DUE TO EMBOLISM: Status: ACTIVE | Noted: 2019-04-03

## 2019-04-05 LAB
ALBUMIN SERPL BCP-MCNC: 2.5 G/DL (ref 3.5–5.2)
ANION GAP SERPL CALC-SCNC: 9 MMOL/L (ref 8–16)
BASOPHILS # BLD AUTO: 0.03 K/UL (ref 0–0.2)
BASOPHILS NFR BLD: 0.5 % (ref 0–1.9)
BUN SERPL-MCNC: 15 MG/DL (ref 8–23)
CALCIUM SERPL-MCNC: 9.2 MG/DL (ref 8.7–10.5)
CHLORIDE SERPL-SCNC: 101 MMOL/L (ref 95–110)
CO2 SERPL-SCNC: 25 MMOL/L (ref 23–29)
CREAT SERPL-MCNC: 4.2 MG/DL (ref 0.5–1.4)
DIFFERENTIAL METHOD: ABNORMAL
EOSINOPHIL # BLD AUTO: 0.2 K/UL (ref 0–0.5)
EOSINOPHIL NFR BLD: 3.4 % (ref 0–8)
ERYTHROCYTE [DISTWIDTH] IN BLOOD BY AUTOMATED COUNT: 15.9 % (ref 11.5–14.5)
EST. GFR  (AFRICAN AMERICAN): 12 ML/MIN/1.73 M^2
EST. GFR  (NON AFRICAN AMERICAN): 10 ML/MIN/1.73 M^2
GLUCOSE SERPL-MCNC: 93 MG/DL (ref 70–110)
HCT VFR BLD AUTO: 31.5 % (ref 37–48.5)
HGB BLD-MCNC: 10.3 G/DL (ref 12–16)
LYMPHOCYTES # BLD AUTO: 1.7 K/UL (ref 1–4.8)
LYMPHOCYTES NFR BLD: 31.5 % (ref 18–48)
MCH RBC QN AUTO: 28.9 PG (ref 27–31)
MCHC RBC AUTO-ENTMCNC: 32.7 G/DL (ref 32–36)
MCV RBC AUTO: 89 FL (ref 82–98)
MONOCYTES # BLD AUTO: 0.8 K/UL (ref 0.3–1)
MONOCYTES NFR BLD: 15 % (ref 4–15)
NEUTROPHILS # BLD AUTO: 2.7 K/UL (ref 1.8–7.7)
NEUTROPHILS NFR BLD: 49.6 % (ref 38–73)
PHOSPHATE SERPL-MCNC: 1.9 MG/DL (ref 2.7–4.5)
PLATELET # BLD AUTO: 238 K/UL (ref 150–350)
PMV BLD AUTO: 9.4 FL (ref 9.2–12.9)
POCT GLUCOSE: 104 MG/DL (ref 70–110)
POCT GLUCOSE: 107 MG/DL (ref 70–110)
POCT GLUCOSE: 120 MG/DL (ref 70–110)
POCT GLUCOSE: 144 MG/DL (ref 70–110)
POCT GLUCOSE: 61 MG/DL (ref 70–110)
POTASSIUM SERPL-SCNC: 2.7 MMOL/L (ref 3.5–5.1)
RBC # BLD AUTO: 3.56 M/UL (ref 4–5.4)
SODIUM SERPL-SCNC: 135 MMOL/L (ref 136–145)
WBC # BLD AUTO: 5.52 K/UL (ref 3.9–12.7)

## 2019-04-05 PROCEDURE — 80100016 HC MAINTENANCE HEMODIALYSIS

## 2019-04-05 PROCEDURE — 97165 OT EVAL LOW COMPLEX 30 MIN: CPT

## 2019-04-05 PROCEDURE — 25000003 PHARM REV CODE 250: Performed by: INTERNAL MEDICINE

## 2019-04-05 PROCEDURE — 25000003 PHARM REV CODE 250: Performed by: NURSE PRACTITIONER

## 2019-04-05 PROCEDURE — 97530 THERAPEUTIC ACTIVITIES: CPT

## 2019-04-05 PROCEDURE — 11000001 HC ACUTE MED/SURG PRIVATE ROOM

## 2019-04-05 PROCEDURE — 85025 COMPLETE CBC W/AUTO DIFF WBC: CPT

## 2019-04-05 PROCEDURE — 63600175 PHARM REV CODE 636 W HCPCS: Performed by: INTERNAL MEDICINE

## 2019-04-05 PROCEDURE — 36415 COLL VENOUS BLD VENIPUNCTURE: CPT

## 2019-04-05 PROCEDURE — 92610 EVALUATE SWALLOWING FUNCTION: CPT

## 2019-04-05 PROCEDURE — 97162 PT EVAL MOD COMPLEX 30 MIN: CPT

## 2019-04-05 PROCEDURE — 94761 N-INVAS EAR/PLS OXIMETRY MLT: CPT

## 2019-04-05 PROCEDURE — 80069 RENAL FUNCTION PANEL: CPT

## 2019-04-05 PROCEDURE — 92522 EVALUATE SPEECH PRODUCTION: CPT

## 2019-04-05 PROCEDURE — 25000003 PHARM REV CODE 250: Performed by: HOSPITALIST

## 2019-04-05 RX ORDER — LANOLIN ALCOHOL/MO/W.PET/CERES
400 CREAM (GRAM) TOPICAL EVERY 4 HOURS
Status: DISCONTINUED | OUTPATIENT
Start: 2019-04-05 | End: 2019-04-05

## 2019-04-05 RX ORDER — RAMELTEON 8 MG/1
8 TABLET ORAL NIGHTLY PRN
Status: DISCONTINUED | OUTPATIENT
Start: 2019-04-05 | End: 2019-04-08 | Stop reason: HOSPADM

## 2019-04-05 RX ORDER — ATORVASTATIN CALCIUM 40 MG/1
40 TABLET, FILM COATED ORAL DAILY
Status: DISCONTINUED | OUTPATIENT
Start: 2019-04-06 | End: 2019-04-08 | Stop reason: HOSPADM

## 2019-04-05 RX ORDER — POTASSIUM CHLORIDE 20 MEQ/1
40 TABLET, EXTENDED RELEASE ORAL ONCE
Status: DISCONTINUED | OUTPATIENT
Start: 2019-04-05 | End: 2019-04-05

## 2019-04-05 RX ORDER — HEPARIN SODIUM 1000 [USP'U]/ML
4300 INJECTION, SOLUTION INTRAVENOUS; SUBCUTANEOUS
Status: DISCONTINUED | OUTPATIENT
Start: 2019-04-05 | End: 2019-04-08 | Stop reason: HOSPADM

## 2019-04-05 RX ORDER — SODIUM CHLORIDE 9 MG/ML
INJECTION, SOLUTION INTRAVENOUS
Status: DISCONTINUED | OUTPATIENT
Start: 2019-04-05 | End: 2019-04-08 | Stop reason: HOSPADM

## 2019-04-05 RX ORDER — BUTALBITAL, ACETAMINOPHEN AND CAFFEINE 50; 325; 40 MG/1; MG/1; MG/1
1 TABLET ORAL EVERY 4 HOURS PRN
Status: DISCONTINUED | OUTPATIENT
Start: 2019-04-05 | End: 2019-04-08 | Stop reason: HOSPADM

## 2019-04-05 RX ORDER — SODIUM CHLORIDE 9 MG/ML
INJECTION, SOLUTION INTRAVENOUS ONCE
Status: COMPLETED | OUTPATIENT
Start: 2019-04-05 | End: 2019-04-05

## 2019-04-05 RX ADMIN — BUTALBITAL, ACETAMINOPHEN, AND CAFFEINE 1 TABLET: 50; 325; 40 TABLET ORAL at 09:04

## 2019-04-05 RX ADMIN — PANTOPRAZOLE SODIUM 40 MG: 40 TABLET, DELAYED RELEASE ORAL at 08:04

## 2019-04-05 RX ADMIN — Medication 16 G: at 06:04

## 2019-04-05 RX ADMIN — HEPARIN SODIUM 4300 UNITS: 1000 INJECTION, SOLUTION INTRAVENOUS; SUBCUTANEOUS at 04:04

## 2019-04-05 RX ADMIN — ATORVASTATIN CALCIUM 80 MG: 40 TABLET, FILM COATED ORAL at 08:04

## 2019-04-05 RX ADMIN — ACETAMINOPHEN 650 MG: 325 TABLET ORAL at 04:04

## 2019-04-05 RX ADMIN — RAMELTEON 8 MG: 8 TABLET, FILM COATED ORAL at 09:04

## 2019-04-05 RX ADMIN — SODIUM CHLORIDE 500 ML: 0.9 INJECTION, SOLUTION INTRAVENOUS at 04:04

## 2019-04-05 RX ADMIN — LORAZEPAM 0.5 MG: 0.5 TABLET ORAL at 06:04

## 2019-04-05 RX ADMIN — SUCRALFATE 1 G: 1 SUSPENSION ORAL at 08:04

## 2019-04-05 RX ADMIN — ASPIRIN 81 MG 81 MG: 81 TABLET ORAL at 08:04

## 2019-04-05 RX ADMIN — SUCRALFATE 1 G: 1 SUSPENSION ORAL at 04:04

## 2019-04-05 RX ADMIN — RIVAROXABAN 10 MG: 10 TABLET, FILM COATED ORAL at 04:04

## 2019-04-05 NOTE — PLAN OF CARE
Problem: SLP Goal  Goal: SLP Goal  Short Term Goals:  1. Pt will participate in ongoing swallow assessment to determine least restrictive diet. Met 4/5  2. Pt will tolerate regular tray/thin liquids with no audible s/s of dysphagia and good oral clearance.   3. Pt will consume % of regular diet with thin liquids without overt s/s of aspiration given min assist.   4. Pt will implement safe swallowing strategies 100% of the time given min assist.   5. Pt will completed OMEx with mod cues to increase oral motor coordination, strength and movement.   6. Pt will complete dysarthria tasks with 90% intelligibility given min cues to implement compensatory strategies for increased speech intelligibility.   7. Further assess reading/writing/visual - spatial skills.            Outcome: Ongoing (interventions implemented as appropriate)  Swallow and speech eval completed, see report for details.

## 2019-04-05 NOTE — PROGRESS NOTES
[facility] Medicine  Progress Note    Patient Name: Brittaney Joseph  MRN: [id number]  Patient Class: IP- Inpatient   Admission Date: 4/3/2019  Length of Stay: 2 days  Attending Physician: Brandon Ann MD  Primary Care Provider: Charles Argueta MD        Subjective:     Principal Problem:Stroke due to embolism    HPI:  Brittaney Joseph is a 68 y.o.  female with hypertension, diabetes mellitus type 2, chronic diastolic heart failure, end stage renal disease on hemodialysis since 3/29/19 (Monday Wednesday Friday), hyperlipidemia, chronic low back pain, history of pulmonary embolism on 1/11/19 status post thrombectomy (anticoagulated on rivaroxaban). She lives with her niece. Her primary care physician is Dr. Charles Argueta. Her nephrologist is Dr. Saida Mcdaniel. She gets dialysis at [facility] [place].              She was hospitalized at [facility] from 3/28/19 to 4/1/19 to start hemodialysis.               She developed left sided facial droop and left sided weakness around 16:00 on 4/2/19. Her family did not seek medical attention at the time, but noted symptoms progressively becoming worse with onset of slurred speech on 4/3/19 after dialysis. She was taken to [facility] Emergency Department. Blood pressure was normal at 121/68. Head CT showed chronic appearing ischemic white matter changes bilaterally. Vascular Neurology Telemedicine consult was done. She was given a NIH stroke scale of 6 for facial palsy, left leg weakness, right leg drift, dysarthria. She already takes aspirin, atorvastatin 40 mg, and rivaroxaban. Neurology recommended increasing atorvastatin to 80 mg and admission with MRI, MRA, and echocardiogram.  She was admitted to [facility] for further evaluation.     Hospital Course:  Brain MRI showed numerous stroke foci in the right supratentorial  frontal, parietal, occipital, temporal lobe and left posterior frontal lobe, suggesting embolic etiology. MRA showed mild plaque in the left posterior cerebral artery, which is not contributory to the area of her stroke. Echocardiogram was mostly normal. Vascular Neurology recommended cardiac event monitor and optionally transesophageal echocardiogram. It is optional because her anticoagulation would treat a intracardiac thrombus anyway, although it would be mandatory if she had fever or other signs of endocarditis. Even while holding her home amlodipine, clonidine, furosemide, hydralazine, and metoprolol for permissive hypertension, her blood pressures were mostly normal. Hypotension at home would not explain her stroke because it was not in a watershed pattern.     Interval History: Nothing new. Discussed holding her antihypertensives for now and discontinuing diabetes medication.    Review of Systems   Constitutional: Negative for chills and fever.   Respiratory: Negative for cough and shortness of breath.    Neurological: Positive for weakness. Negative for syncope.     Objective:     Vital Signs (Most Recent):  Temp: 99.1 °F (37.3 °C) (04/05/19 1056)  Pulse: 86 (04/05/19 1056)  Resp: 20 (04/05/19 1056)  BP: (!) 103/53 (04/05/19 1056)  SpO2: 100 % (04/05/19 0755) Vital Signs (24h Range):  Temp:  [98.1 °F (36.7 °C)-99.7 °F (37.6 °C)] 99.1 °F (37.3 °C)  Pulse:  [82-99] 86  Resp:  [16-20] 20  SpO2:  [98 %-100 %] 100 %  BP: (103-153)/(53-71) 103/53     Weight: 62.7 kg (138 lb 3.7 oz)  Body mass index is 23.73 kg/m².    Intake/Output Summary (Last 24 hours) at 4/5/2019 1101  Last data filed at 4/5/2019 0700  Gross per 24 hour   Intake 465 ml   Output 0 ml   Net 465 ml      Physical Exam   Constitutional: She appears well-developed. No distress.   Cardiovascular: Normal rate and regular rhythm.   Pulmonary/Chest: Effort normal. No respiratory distress.   Neurological: She is alert.   Can move left arm a little but not  against gravity   Nursing note and vitals reviewed.      Significant Labs: All pertinent labs within the past 24 hours have been reviewed.    Significant Imaging: I have reviewed all pertinent imaging results/findings within the past 24 hours.   MRI BRAIN WITHOUT CONTRAST 4/04/19:   FINDINGS:  Diffusion, several foci hyperintensity posterior frontal deep subcortical white matter centrum semiovale and additional tiny area subcortical and central gyral region, right sylvian fissure cortex subcortical medial and lateral, subinsular.  And contralateral posterior frontal high convexity subcortical deep white matter.  Distal vertebral, basilar, internal carotid artery and major branches patent flow void.  Foci increased signal subcortical deep white matter high convexity posterior frontal upper basal ganglia particularly on right centrum semiovale upper basal ganglia on right, additional areas increased signal right subinsular cortex, lower basal ganglia and mild moderate periventricular white matter edema.  Additional tiny areas cortex subcortical right lateral parietooccipital junction.  Central and cortical atrophy appropriate for age.  Nasal septal deviation left.  Gradient echo imaging negative.  Impression: Numerous foci right supratentorial frontal, parietal, occipital, temporal lobe and left posterior frontal consistent with acute subacute foci under 2 weeks age of gliosis, microvascular ischemic change from suspected embolic phenomenon favored.  MRA Brain and Neck without contrast 4/04/19: FINDINGS:  Tiny right posterior communicating artery, left posterior cerebral ridge in age directly from distal internal carotid with tiny left P-1 segment.  Mild plaquing left posterior cerebral artery without significant stenosis.  Left vertebral dominant.  Anterior communicating artery noted. Common origin left carotid from innominate.  Carotid and vertebral arteries are patent.  Impression: Suggestion of mild plaquing left  posterior cerebral artery.  No severe stenosis, aneurysm or AVM.  TRANSTHORACIC ECHO (TTE) COMPLETE W/ CONTRAST 4/04/19:  · Increased (hyperdynamic) left ventricular systolic function. The estimated ejection fraction is 65%  · Concentric left ventricular remodeling.  · Normal right ventricular systolic function.  · There is moderate leaflet thickening and calcification of the Mitral and Aortic Valves.  · The estimated PA systolic pressure is 35 mm Hg  · Intermediate central venous pressure (8 mm Hg).  · Negative Bubble Study. No Right to Left intra-atrial shunt    Assessment/Plan:      * Stroke due to embolism  Embolic etiology suspected but unknown due to what process. Continue rivaroxaban 10 mg daily. Appreciate Vascular Neurology. Can get cardiac event monitor outpatient. MICHAEL only if endocarditis suspected.    End-stage renal disease on hemodialysis  Consult nephrology       History of pulmonary embolism  Taking Xarelto 10 mg daily       Chronic diastolic heart failure  No evidence of volume overload on exam       Chronic low back pain  Chronic, stable       Controlled type 2 diabetes mellitus with stage 4 chronic kidney disease, without long-term current use of insulin  Hemoglobin A1c was 5.1% on 3/12/19. Discontinue glipizide. Insulin aspart sliding scale. Monitor serum glucose.    Hyperlipidemia associated with type 2 diabetes mellitus  Continue home aspirin and atorvastatin.    Essential hypertension  Hold home amlodipine 10 mg HS, clonidine 0.2 mg HS, hydralazine 25 mg every 8 hours, metoprolol tartrate 100 mg BID, furosemide 80 mg BID for permissive hypertension.      VTE Risk Mitigation (From admission, onward)        Ordered     rivaroxaban tablet 10 mg  With dinner      04/03/19 2122     Reason for No Pharmacological VTE Prophylaxis  Once      04/03/19 2122     IP VTE HIGH RISK PATIENT  Once      04/03/19 2122     Place sequential compression device  Until discontinued      04/03/19 2122     Reason for  No Pharmacological VTE Prophylaxis  Once      04/03/19 2122        Disposition: Pending PT/OT recommendations, probably inpatient rehab or SNF.      Brandon Ann MD  Department of Hospital Medicine   [facility]

## 2019-04-05 NOTE — SUBJECTIVE & OBJECTIVE
Interval History: Nothing new. Discussed holding her antihypertensives for now and discontinuing diabetes medication.    Review of Systems   Constitutional: Negative for chills and fever.   Respiratory: Negative for cough and shortness of breath.    Neurological: Positive for weakness. Negative for syncope.     Objective:     Vital Signs (Most Recent):  Temp: 99.1 °F (37.3 °C) (04/05/19 1056)  Pulse: 86 (04/05/19 1056)  Resp: 20 (04/05/19 1056)  BP: (!) 103/53 (04/05/19 1056)  SpO2: 100 % (04/05/19 0755) Vital Signs (24h Range):  Temp:  [98.1 °F (36.7 °C)-99.7 °F (37.6 °C)] 99.1 °F (37.3 °C)  Pulse:  [82-99] 86  Resp:  [16-20] 20  SpO2:  [98 %-100 %] 100 %  BP: (103-153)/(53-71) 103/53     Weight: 62.7 kg (138 lb 3.7 oz)  Body mass index is 23.73 kg/m².    Intake/Output Summary (Last 24 hours) at 4/5/2019 1101  Last data filed at 4/5/2019 0700  Gross per 24 hour   Intake 465 ml   Output 0 ml   Net 465 ml      Physical Exam   Constitutional: She appears well-developed. No distress.   Cardiovascular: Normal rate and regular rhythm.   Pulmonary/Chest: Effort normal. No respiratory distress.   Neurological: She is alert.   Can move left arm a little but not against gravity   Nursing note and vitals reviewed.      Significant Labs: All pertinent labs within the past 24 hours have been reviewed.    Significant Imaging: I have reviewed all pertinent imaging results/findings within the past 24 hours.   MRI BRAIN WITHOUT CONTRAST 4/04/19:   FINDINGS:  Diffusion, several foci hyperintensity posterior frontal deep subcortical white matter centrum semiovale and additional tiny area subcortical and central gyral region, right sylvian fissure cortex subcortical medial and lateral, subinsular.  And contralateral posterior frontal high convexity subcortical deep white matter.  Distal vertebral, basilar, internal carotid artery and major branches patent flow void.  Foci increased signal subcortical deep white matter high convexity  posterior frontal upper basal ganglia particularly on right centrum semiovale upper basal ganglia on right, additional areas increased signal right subinsular cortex, lower basal ganglia and mild moderate periventricular white matter edema.  Additional tiny areas cortex subcortical right lateral parietooccipital junction.  Central and cortical atrophy appropriate for age.  Nasal septal deviation left.  Gradient echo imaging negative.  Impression: Numerous foci right supratentorial frontal, parietal, occipital, temporal lobe and left posterior frontal consistent with acute subacute foci under 2 weeks age of gliosis, microvascular ischemic change from suspected embolic phenomenon favored.  MRA Brain and Neck without contrast 4/04/19: FINDINGS:  Tiny right posterior communicating artery, left posterior cerebral ridge in age directly from distal internal carotid with tiny left P-1 segment.  Mild plaquing left posterior cerebral artery without significant stenosis.  Left vertebral dominant.  Anterior communicating artery noted. Common origin left carotid from innominate.  Carotid and vertebral arteries are patent.  Impression: Suggestion of mild plaquing left posterior cerebral artery.  No severe stenosis, aneurysm or AVM.  TRANSTHORACIC ECHO (TTE) COMPLETE W/ CONTRAST 4/04/19:  · Increased (hyperdynamic) left ventricular systolic function. The estimated ejection fraction is 65%  · Concentric left ventricular remodeling.  · Normal right ventricular systolic function.  · There is moderate leaflet thickening and calcification of the Mitral and Aortic Valves.  · The estimated PA systolic pressure is 35 mm Hg  · Intermediate central venous pressure (8 mm Hg).  · Negative Bubble Study. No Right to Left intra-atrial shunt

## 2019-04-05 NOTE — PT/OT/SLP EVAL
"Occupational Therapy   Evaluation    Name: Brittaney Joseph  MRN: [id number]  Admitting Diagnosis:  Stroke due to embolism      Recommendations:     Discharge Recommendations: rehabilitation facility  Discharge Equipment Recommendations:  (TBD)  Barriers to discharge:  Decreased caregiver support, Inaccessible home environment    Assessment:     Brittaney Joseph is a 68 y.o. female with a medical diagnosis of Stroke due to embolism.  She presents with L sided deficits . Performance deficits affecting function: weakness, impaired self care skills, impaired balance, impaired functional mobilty, impaired endurance, abnormal tone, decreased lower extremity function, decreased upper extremity function, impaired cognition, gait instability, decreased ROM, decreased safety awareness, impaired joint extensibility, impaired skin.      Despite patient's co-morbidities, including ESRD, DM2, h/o PE, patient was living in community setting functioning at supervision level for ADLs, and mobility prior to this event.  There is an expectation of returning to prior level of function to maintain independence thus avoiding readmission.  Patient's clinical condition meets full Inpatient Rehab (IPR) criteria; including the ability to actively participate in 3 hours of therapy.  A lower level of care(SNF) cannot provide the interdisciplinary treatment approach needed.       Rehab Prognosis: Good; patient would benefit from acute skilled OT services to address these deficits and reach maximum level of function.       Plan:     Patient to be seen 5 x/week to address the above listed problems via self-care/home management, therapeutic activities, therapeutic exercises  · Plan of Care Expires: 05/05/19  · Plan of Care Reviewed with: patient    Subjective     Chief Complaint: Pt with complaints that she can't use her L arm. States, " I try to move it, but I can't"  Patient/Family Comments/goals: return to PLOF     Occupational Profile:  Pt " lives with her niece, sister, and 3 nephews in a trailer with 4 YESSICA with no rails and tub/shower combo.  Prior to admission, patients level of function was supervision level without AD for gait within the home and intermittent assist with ADLs. Called pt's niece who reported that up until ~last week pt was functioning at supervision level but has recently been requiring assist for all mobility and ADLs.  Equipment used at home: walker, rolling, bedside commode.  DME owned (not currently used): unknown.  Upon discharge, patient will have assistance from her family - recommending IPR upon d/c as pt requires significant assist for all functional mobility at this time.      Equipment Used at Home:  walker, rolling, bedside commode      Pain/Comfort:  · Pain Rating 1: 0/10  · Pain Rating Post-Intervention 1: 0/10    Patients cultural, spiritual, Tenriism conflicts given the current situation:      Objective:     Communicated with: heber prior to session.   General Precautions: Standard, fall   Orthopedic Precautions:N/A   Braces: N/A     Occupational Performance:    Bed Mobility:    · Patient completed Scooting/Bridging with moderate assistance  · Patient completed Supine to Sit with moderate assistance      Functional Mobility/Transfers:  · Patient completed Sit <> Stand Transfer with moderate assistance and of 2 persons  with  hand-held assist   · Patient completed Bed <> Chair Transfer using Step Transfer technique with moderate assistance and of 2 persons with hand-held assist  · Functional Mobility: Mod A of 2 with HHA of 2; Pt with posterior and L lateral lean in standing    Activities of Daily Living:  · Upper Body Dressing: maximal assistance    · Lower Body Dressing: total assistance      Cognitive/Visual Perceptual:  Cognitive/Psychosocial Skills:     -       Oriented to: Person, Place and Situation   -       Follows Commands/attention:Follows multistep  commands  -       Communication: appears effortful; L  facial droop   -       Memory: No Deficits noted  -       Safety awareness/insight to disability: impaired   -       Mood/Affect/Coping skills/emotional control: Appropriate to situation    Physical Exam:  Balance:    -       CGA- Mod A sitting balance; Mod A of 2 standing   Postural examination/scapula alignment:    -       Rounded shoulders  -       Forward head  -       L lateral lean   Skin integrity: mepilex on sacrum; reports small wound   Dominant hand:    -       right  Upper Extremity Range of Motion:  RUE WFL ROM grossly 3+/5 shoulder; 4-/5 shoulder; LUE with increased flex/ext tone; 1/5 at digits, 1/5 at shoulder; able to perform supination and pronation with increased time and effort    Strength:  Good R hand however, tremors present throughout session; unable to perform L hand   Fine Motor Coordination:  Impaired L/unable to perform   Gross motor coordination: Impaired L     AMPAC 6 Click ADL:  AMPAC Total Score: 13    Treatment & Education:  Pt requesting to spend time OOB   Bed mobility Mod A- unable to utilize LUE functionally to assist   L lateral lean while seated; CGA- Mod A sitting balance; unable to hold for extended time once placed in midline   Mod A of 2 stand from EOB with HHA of 2   Mod A of 2 with HHA of 2 functional steps and t/f to b/s chair   Encouraged pt to perform exercises while supine/seated      Education:    Patient left up in chair with all lines intact, call button in reach, chair alarm on and nsg notified    GOALS:   Multidisciplinary Problems     Occupational Therapy Goals        Problem: Occupational Therapy Goal    Goal Priority Disciplines Outcome Interventions   Occupational Therapy Goal     OT, PT/OT Ongoing (interventions implemented as appropriate)    Description:  Goals to be met by: 5/5/19     Patient will increase functional independence with ADLs by performing:    UE Dressing with Minimal Assistance.  Grooming while seated with Contact Guard  Assistance.  Toileting from bedside commode with Minimal Assistance for hygiene and clothing management.   Supine to sit with Minimal Assistance.  Step transfer with Minimal Assistance  Toilet transfer to bedside commode with Minimal Assistance.  Increased functional strength to WFL for self care skills and functional mobility .  Upper extremity exercise program x10 reps per handout, with assistance as needed.                      History:     Past Medical History:   Diagnosis Date    Anticoagulant long-term use     Arthritis     Chronic kidney disease     Chronic low back pain     Diabetes mellitus, type 2     Hypertension     Pulmonary embolism with acute cor pulmonale 1/11/2019    Stroke due to embolism 4/4/2019       Past Surgical History:   Procedure Laterality Date    CHOLECYSTECTOMY      EKOS, Pumoart/DVT  1/11/2019    [facility]    HYSTERECTOMY      Insertion,catheter,tunneled Right 3/29/2019    Per[facility] by Michelet Cano Jr., [facility] at [facility] OR    Thrombolysis, PA N/A 1/11/2019    Performed by Roger [facility] at [facility] CATH LAB/EP       Time Tracking:     OT Date of Treatment: 04/05/19  OT Start Time: 0927  OT Stop Time: 0957  OT Total Time (min): 30 min    Billable Minutes:Evaluation 10  Therapeutic Activity 20    Estephania Painter, OT  4/5/2019

## 2019-04-05 NOTE — PLAN OF CARE
Problem: Occupational Therapy Goal  Goal: Occupational Therapy Goal  Goals to be met by: 5/5/19     Patient will increase functional independence with ADLs by performing:    UE Dressing with Minimal Assistance.  Grooming while seated with Contact Guard Assistance.  Toileting from bedside commode with Minimal Assistance for hygiene and clothing management.   Supine to sit with Minimal Assistance.  Step transfer with Minimal Assistance  Toilet transfer to bedside commode with Minimal Assistance.  Increased functional strength to WFL for self care skills and functional mobility .  Upper extremity exercise program x10 reps per handout, with assistance as needed.    Outcome: Ongoing (interventions implemented as appropriate)  Despite patient's co-morbidities, including ESRD, DM2, h/o PE, patient was living in community setting functioning at supervision level for ADLs, and mobility prior to this event.  There is an expectation of returning to prior level of function to maintain independence thus avoiding readmission.  Patient's clinical condition meets full Inpatient Rehab (IPR) criteria; including the ability to actively participate in 3 hours of therapy.  A lower level of care(SNF) cannot provide the interdisciplinary treatment approach needed.

## 2019-04-05 NOTE — PT/OT/SLP EVAL
Physical Therapy Evaluation    Patient Name:  Brittaney Joseph   MRN:  [id number]    Recommendations:     Discharge Recommendations:  rehabilitation facility   Discharge Equipment Recommendations: (defer to next level of care)   Barriers to discharge: increased need for physical assist for all functional mobility and ADLs    Assessment:     Brittaney Joseph is a 68 y.o. female admitted with a medical diagnosis of Stroke due to embolism.  She presents with the following impairments/functional limitations:  weakness, impaired endurance, impaired self care skills, impaired functional mobilty, gait instability, impaired balance, decreased lower extremity function, decreased upper extremity function, decreased coordination, impaired coordination, abnormal tone, decreased ROM.   Pt presents with L facial droop, significant LUE weakness and increased tone, LLE weakness, and impaired coordination. Pt required mod A x 2 to ambulate 6 ft with B HHA.    Despite patient's co-morbidities, including ESRD, DM2, and hx of PE in January, patient was living in community setting functioning at supervision level for ADLs, and mobility prior to this event.  There is an expectation of returning to prior level of function to maintain independence thus avoiding readmission.  Patient's clinical condition meets full Inpatient Rehab (IPR) criteria; including the ability to actively participate in 3 hours of therapy.  A lower level of care(SNF) cannot provide the interdisciplinary treatment approach needed.     Rehab Prognosis: Good; patient would benefit from acute skilled PT services to address these deficits and reach maximum level of function.    Recent Surgery: * No surgery found *      Plan:     During this hospitalization, patient to be seen 6 x/week to address the identified rehab impairments via gait training, therapeutic activities, therapeutic exercises, neuromuscular re-education and progress toward the following goals:    · Plan of  Care Expires:  05/05/19    Subjective     Chief Complaint: none reproted  Patient/Family Comments/goals: pt is motivated to sit up and work with therapy  Pain/Comfort:  · Pain Rating 1: 0/10  · Pain Rating Post-Intervention 1: 0/10    Patients cultural, spiritual, Confucianism conflicts given the current situation: no    Living Environment:  Pt lives with her niece, sister, and 3 nephews in a trailer with 4 YESSICA with no rails and tub/shower combo.  Prior to admission, patients level of function was supervision level without AD for gait within the home and intermittent assist with ADLs. Called pt's niece who reported that up until ~last week pt was functioning at supervision level but has recently been requiring assist for all mobility and ADLs.  Equipment used at home: walker, rolling, bedside commode.  DME owned (not currently used): unknown.  Upon discharge, patient will have assistance from her family - recommending IPR upon d/c as pt requires significant assist for all functional mobility at this time.    Objective:     Communicated with FRANCIS Oneal prior to session.  Patient found HOB elevated with bed alarm, telemetry  upon PT entry to room.    General Precautions: Standard, fall   Orthopedic Precautions:N/A   Braces: N/A     Exams:  · Pt with impaired processing   · Tone: hypertonicity LUE  · L facial droop with slurred speech  · Fine Motor Coordination:    · -       Impaired  LUE impaired - weakness  · Gross Motor Coordination:  impaired LLE coordination with gait  · Postural Exam:  Patient presented with the following abnormalities:    · -       Rounded shoulders  · Sensation:    · -       Intact  · Skin Integrity/Edema:      · -       Skin integrity: Visible skin intact  · RLE ROM: WFL  · RLE Strength: WFL except hip flexion 4/5  · LLE ROM: Deficits: ankle DF lacking ~5 deg from neutral, hip flexion limited by weakness  · LLE Strength: Deficits: ankle DF 3-/5 (able to resist through available ROM), knee ext  , hip flexion 3-/5    Functional Mobility:  · Bed Mobility:     · Scooting: moderate assistance  · Supine to Sit: moderate assistance  · Transfers:     · Sit to Stand:  moderate assistance and of 2 persons with hand-held assist  · Bed to Chair: moderate assistance and of 2 persons with  hand-held assist  using  Step Transfer  · Gait: 6 ft with B HHA and mod A x 2 -- pt with impaired coordination and LLE ataxia throughout gait, max cues for sequencing and assist to prevent posterior adn left leaning  · Balance: sitting balance -- up to mod A to prevent posterior and left leaning - standing balance - mod A      Therapeutic Activities and Exercises:  Pt sat EOB with CGA up to mod A 2/2 pt with posterior and left leaning intermittently requiring cues for upright midline positioning.  Completed stand and brief bout of gait then turned to sit in chair. Pt with significant left lean in standing requiring mod A x 2 to maintain standing. RN entered room upon pt sitting in chair and reporting pt's HR was reading 161 bpm during session.  Pt left up in chair and LEs reclined.  Educated on BUE/BLE exercises to complete during the days.    AM-PAC 6 CLICK MOBILITY  Total Score:11     Patient left up in chair with all lines intact, call button in reach, chair alarm on and RN notified.    GOALS:   Multidisciplinary Problems     Physical Therapy Goals        Problem: Physical Therapy Goal    Goal Priority Disciplines Outcome Goal Variances Interventions   Physical Therapy Goal     PT, PT/OT Ongoing (interventions implemented as appropriate)     Description:  Goals to be met by: 19     Patient will increase functional independence with mobility by performin. Supine <> sit with MInimal Assistance  2. Sit to stand transfer with Minimal Assistance  3. Bed to chair transfer with Minimal Assistance using appropriate AD  4. Gait  x 20 feet with Minimal Assistance using appropriate AD  5. Sitting at edge of bed x 15 minutes  with Stand-by Assistance                      History:     Past Medical History:   Diagnosis Date    Anticoagulant long-term use     Arthritis     Chronic kidney disease     Chronic low back pain     Diabetes mellitus, type 2     Hypertension     Pulmonary embolism with acute cor pulmonale 1/11/2019    Stroke due to embolism 4/4/2019       Past Surgical History:   Procedure Laterality Date    CHOLECYSTECTOMY      EKOS, Pumoart/DVT  1/11/2019    Performed [facility] CATH LAB/EP    HYSTERECTOMY      Insertion,catheter,tunneled Right 3/29/2019    Performed [facility] Mic[facility]let [facility] Jr[facility] at [facility] OR    Thrombolysis, PA N/A 1/11/2019    Performed by Roger Day MD at [facility] CATH LAB/EP       Time Tracking:     PT Received On: 04/05/19  PT Start Time: 0927     PT Stop Time: 0956  PT Total Time (min): 29 min co-tx with OT    Billable Minutes: Evaluation 15      Sofia Pineda, PT  04/05/2019

## 2019-04-05 NOTE — PT/OT/SLP EVAL
Speech Language Pathology Evaluation  Speech/Bedside Swallow    Patient Name:  Brittaney Joseph   MRN:  [id number]  Admitting Diagnosis: Lacunar stroke, acute    Recommendations:                  General Recommendations:  Speech/language therapy and monitor with diet to ensure safety and swallow strategies as needed  Diet recommendations:  Regular, Thin   Aspiration Precautions:   · 1 bite/sip at a time   · Alternating bites/sips  ·  Assistance with meals  · Avoid talking while eating  ·  Check for pocketing/oral residue on L side of cheek  ·  Eliminate distractions  ·  Feed only when awake/alert,   · HOB to 90 degrees  · Meds whole 1 at a time   ·  SINGLE straw swallows are ok   · Remain upright 30 minutes post meal  · Small bites/sips   · Standard aspiration precautions   General Precautions: Standard, fall(standard precautions, wound on buttock)  Communication strategies:  ask pt to talk loudly and over articulate so she can be understood    History:   Brittaney Joseph is a 68 y.o.  female with hypertension, diabetes mellitus type 2, chronic diastolic heart failure, end stage renal disease on hemodialysis since 3/29/19 (Monday Wednesday Friday), hyperlipidemia, chronic low back pain, history of pulmonary embolism on 1/11/19 status post thrombectomy (anticoagulated on rivaroxaban). She lives with her niece. Her primary care physician is Dr. Charles Argueta. Her nephrologist is Dr. Saida Mcdaniel. She gets dialysis at [facility].              She was hospitalized at [facility] from 3/28/19 to 4/1/19 to start hemodialysis.              She developed left sided facial droop and left sided weakness around 16:00 on 4/2/19. Her family did not seek medical attention at the time, but noted symptoms progressively becoming worse with onset of slurred speech on 4/3/19 after dialysis. She was taken to [facility]. Blood pressure  was normal at 121/68. Head CT showed chronic appearing ischemic white matter changes bilaterally. Vascular Neurology Telemedicine consult was done. She was given a NIH stroke scale of 6 for facial palsy, left leg weakness, right leg drift, dysarthria. She already takes aspirin, atorvastatin 40 mg, and rivaroxaban. Neurology recommended increasing atorvastatin to 80 mg and admission with MRI, MRA, and echocardiogram. [facility] was [facility] for further evaluation.      Brain MRI showed numerous stroke foci in the right supratentorial frontal, parietal, occipital, temporal lobe and left posterior frontal lobe, suggesting embolic etiology. MRA showed mild plaque in the left posterior cerebral artery, which is not contributory to the area of her stroke. Echocardiogram was mostly normal. Vascular Neurology recommended cardiac event monitor and optionally transesophageal echocardiogram. It is optional because her anticoagulation would treat a intracardiac thrombus anyway, although it would be mandatory if she had fever or other signs of endocarditis.        Past Medical History:   Diagnosis Date    Anticoagulant long-term use     Arthritis     Chronic kidney disease     Chronic low back pain     Diabetes mellitus, type 2     Hypertension     Pulmonary embolism with acute cor pulmonale 1/11/2019    Stroke due to embolism 4/4/2019       Past Surgical History:   Procedure Laterality Date    CHOLECYSTECTOMY      EKOS, Pumoart/DVT  1/11/2019    Performed by Roger Day MD at [facility] CATH LAB/EP    HYSTERECTOMY      Insertion,catheter,tunneled Right 3/29/2019    Performed by Michelet Cano Jr., MD at [facility] OR    Thrombolysis, PA N/A 1/11/2019    Performed by Roger Day MD at [facility] CATH LAB/EP       Social History: Patient lives with at home with sister, Neeraj and her children.  Pt has no children and no spouse (not )    Prior Intubation HX:  " n/a    Modified Barium Swallow: none per EMR    Chest X-Rays: The lungs are clear.    MRI: Brain MRI showed numerous stroke foci in the right supratentorial frontal, parietal, occipital, temporal lobe and left posterior frontal lobe, suggesting embolic etiology.     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 home.     Subjective     Consult received for clinical swallow eval/speech/lang eval this date, SLP did communicate with RN prior to eval/treat.    Patient goals: "I can't sit on my butt too long."    Pain/Comfort:  · Pain Rating 1: 0/10    Objective:   Pt seen in room, she was found sleeping. Pt did arouse when lights turned on in room and loud voice of SLP.  Sister in room and cleared SLP to wake her up.     Cognitive/Communication Status:  Pt oriented to name, sister, address, cell #. Pt stated its "2018" not oriented to time, exact date, DONAVON or MARIS  Pt did respond to familiar ?s with good accuracy, 90%. Pt followed 1-2 steps commands with good accuracy. Pt did not exhibit any word finding deficits during simple conversational discourse.    SLP to further assess reasoning/problem solving skills.     Expressive Language:  Verbal:    Fluent verbal expression is noted impacted by flaccid dysarthria      Motor Speech:  Pt presents with flaccid dysarthria c/b L sided oral motor weakness, imprecise articulation, slurred speech and dcr'd speech clarity approx. 70% to an unfamiliar listener. Pt also edentulous during session. Pt does wear sometimes.     Voice:   Clear voice, adequate volume and intonation noted    Visual-Spatial:  NOT tested at this time, does not wear glasses     Reading:   TBA     Written Expression:   TBA, is R hand dominant    Oral Musculature Evaluation  · Oral Musculature: general weakness, left weakness  · Dentition: " present and adequate, uses dentures to eat  · Mucosal Quality: good, adequate  · Mandibular Strength and Mobility: WFL  · Oral Labial Strength and Mobility: impaired coordination, impaired pursing  · Lingual Strength and Mobility: impaired strength  · Velar Elevation: (fair elevation noted)  · Buccal Strength and Mobility: decreased tone, flaccid  · Volitional Cough: elicited  · Volitional Swallow: timely swallow noted  · Voice Prior to PO Intake: clear voice  · Oral Musculature Comments: L facial hypotonia noted    Bedside Swallow Eval: Pt assessed with meal in room.   Consistencies Assessed:  · Thin liquids juice via cup, water via straw x4 oz cup  · Puree applesauce by tsp x3  · Solids scrambled eggs, Green[place]ic toast x3 bites     Oral Phase:   · Slow mastication noted (no dentures in place)  · Trace Lingual residue cleared with alternating sip of water  · Slow oral transit time but adequate    Pharyngeal Phase:   · Timely swallow trigger noted  · No wet voice present or coughing with oral medications or solids.  · no overt clinical signs/symptoms of aspiration  · no overt clinical signs/symptoms of pharyngeal dysphagia  · multiple spontaneous swallows     Compensatory Strategies  · Alternate sips and bites  · Place food in R side of oral cavity    Treatment: educated sister and pt on ST goals, swallow guidelines and POC to improve speech production and ensure safety with meal.     Assessment:     Brittaney Joseph is a 68 y.o. female admitted with CVA who presents with L facial droop, oral motor weakness and flaccid dysarthria.     Goals:   Multidisciplinary Problems     SLP Goals        Problem: SLP Goal    Goal Priority Disciplines Outcome   SLP Goal     SLP Ongoing (interventions implemented as appropriate)   Description:  Short Term Goals:  1. Pt will participate in ongoing swallow assessment to determine least restrictive diet. Met 4/5  2. Pt will tolerate regular tray/thin liquids with no audible s/s of  dysphagia and good oral clearance.   3. Pt will consume % of regular diet with thin liquids without overt s/s of aspiration given min assist.   4. Pt will implement safe swallowing strategies 100% of the time given min assist.   5. Pt will completed OMEx with mod cues to increase oral motor coordination, strength and movement.   6. Pt will complete dysarthria tasks with 90% intelligibility given min cues to implement compensatory strategies for increased speech intelligibility.   7. Further assess reading/writing/visual - spatial skills.                              Plan:     · Patient to be seen:  4 x/week, 3 x/week   · Plan of Care expires:  05/04/19  · Plan of Care reviewed with:  patient(sister, niece)   · SLP Follow-Up:  Yes       Discharge recommendations:  Discharge Facility/Level of Care Needs: (ST at next level of care)     Time Tracking:     SLP Treatment Date:   04/05/19  Speech Start Time:  0823  Speech Stop Time:  0850     Speech Total Time (min):  27 min    Billable Minutes: Eval 15  and Eval Swallow and Oral Function 12        CORINNE Rodas, CCC-SLP  04/05/2019

## 2019-04-05 NOTE — PLAN OF CARE
I went in room to provide list for inpatient rehab and her sister at the bedside and she and pt are requesting that I call his nieces Stacia [phone] and she wants me to call Kallie [phone] and I left a  for her.  No call back yet.      I spoke with Paula and she wants to confirm with Kallie but she wants [facility].  I called Leonie and provided pt information.         04/05/19 1132   Post-Acute Status   Post-Acute Authorization Placement   Post-Acute Placement Status Patient List Provided

## 2019-04-05 NOTE — PLAN OF CARE
Problem: Adult Inpatient Plan of Care  Goal: Plan of Care Review  Patient had 67 bBS at 2030, tx'd with glucose tabs per order & came up to 107. Neuro status essentially unchanged. LUE with very minimal twitch when asked to move & LLE with moderate movement. At 0400 called out for caregiver & wanted to get OOB. Reoriented to date,situation & location. She couldn't remember why she couldn't move herself in bed. 0600 BS=61. Treated again per orders.

## 2019-04-05 NOTE — PLAN OF CARE
Problem: Physical Therapy Goal  Goal: Physical Therapy Goal  Goals to be met by: 19     Patient will increase functional independence with mobility by performin. Supine <> sit with MInimal Assistance  2. Sit to stand transfer with Minimal Assistance  3. Bed to chair transfer with Minimal Assistance using appropriate AD  4. Gait  x 20 feet with Minimal Assistance using appropriate AD  5. Sitting at edge of bed x 15 minutes with Stand-by Assistance    Outcome: Ongoing (interventions implemented as appropriate)  PT evaluation completed, note to follow. Pt presents with L facial droop, significant LUE weakness and increased tone, LLE weakness, and impaired coordination. Pt required mod A x 2 to ambulate 6 ft with B HHA.  Despite patient's co-morbidities, including ESRD, DM2, and hx of PE in January, patient was living in community setting functioning at supervision level for ADLs, and mobility prior to this event.  There is an expectation of returning to prior level of function to maintain independence thus avoiding readmission.  Patient's clinical condition meets full Inpatient Rehab (IPR) criteria; including the ability to actively participate in 3 hours of therapy.  A lower level of care(SNF) cannot provide the interdisciplinary treatment approach needed.

## 2019-04-05 NOTE — PROGRESS NOTES
U renal fellow ELYSE WEI        Reason for Consult:     ESRD    Subjective:      History of Present Illness:  Brittaney Joseph is a 68 y.o. AA female who  has a past medical history of Anticoagulant long-term use, Arthritis, Chronic kidney disease, Chronic low back pain, Diabetes mellitus, type 2, Hypertension, Pulmonary embolism with acute cor pulmonale (2019), and Stroke due to embolism (2019).. The patient presented to the [facility] on 4/3/2019 with a primary complaint of Cerebrovascular Accident (c/o left sided weakness and difficulty speaking that started this morning at home. Pt unable to move left arm with minimal movement to left leg. Left side facial droop noted with slurred speech and delayed response.)    This is a 69 yo AA female who is being admitted for initiation of dialysis; she was sent from outpt nephrology clinic (Dr. Edgar) after labwork saw a progression in her renal disease; in a addition, she has uremic Sx including n/v/asterixis/confusion/decreased urine output/decreased po intake for a couple of weeks.    This aM, the pt denies sob; she endorses decreased appetite; pt knows why she is here, but I believe she has poor insight into her medical condiitons      Interim History:  Pt reports that she is breathing ok today. Denies SOB, CP, N/V. No issues with dialysis yesterday. Currently on dialysis without acute complaints.    Review of Systems: All other systems are reviewed and are negative.       Objective:   Last 24 Hour Vital Signs:  BP  Min: 103/53  Max: 153/67  Temp  Av.9 °F (37.2 °C)  Min: 98.1 °F (36.7 °C)  Max: 99.7 °F (37.6 °C)  Pulse  Av.4  Min: 82  Max: 99  Resp  Av  Min: 16  Max: 20  SpO2  Av.8 %  Min: 98 %  Max: 100 %  I/O last 3 completed shifts:  In: 645 [P.O.:645]  Out: 0     Physical Examination:  GEN - AA; NAD  CHEST - CTA B  HEART - 2/6 systolic murmur over much of precordium; no rub  ABD - soft; nonttp  EXTR  -warm; trace edema to shins  B  NEURO - proximal and distal muscle weakness; + asterixis    Laboratory Results:    Trended Lab Data:  Recent Labs   Lab 04/01/19  0522 04/03/19  1815 04/05/19  1046   WBC  --  10.69 5.52   HGB  --  11.3* 10.3*   HCT  --  34.8* 31.5*   PLT  --  265 238   MCV  --  88 89   RDW  --  15.6* 15.9*   * 135* 135*   K 3.3* 3.2* 2.7*   CL 98 97 101   CO2 23 29 25   BUN 41* 7 15   GLU 80 118* 93   PROT  --  6.7  --    ALBUMIN 2.4* 3.3* 2.5*   BILITOT  --  0.7  --    AST  --  53*  --    ALKPHOS  --  102  --    ALT  --  17  --        Trended Cardiac Data:  No results for input(s): TROPONINI, CKTOTAL, CKMB, BNP in the last 168 hours.    .    Radiology:  X-ray Chest Ap Portable    Result Date: 3/28/2019  EXAMINATION: XR CHEST AP PORTABLE CLINICAL HISTORY: Weakness; TECHNIQUE: Single frontal view of the chest was performed. COMPARISON: January 10, 2019. FINDINGS: Mildly tortuous aorta, similar to prior.  Underinflated lungs with hypoventilatory change. Heart and lungs otherwise appear unchanged when allowing for differences in technique and positioning.     No acute findings. No significant change from prior study. Electronically signed by: Zi Beauchamp MD Date:    03/28/2019 Time:    19:45           Assessment/Plan     ESRD MWF  Hd today uf 1-1.5 L  - cont nephrocaps    Hypercalcemia better   - pth 42 wnl  - vit d 54   - not ergocalciferol  - not zemplar with hd    Anemia  - no  epo with iHD    CKD-MBD  - low phos no binders       Osvaldo Mckeon MD, 4/5/2019 1:06 PM  [employer] Nephrology PGY-V

## 2019-04-06 LAB
ALBUMIN SERPL BCP-MCNC: 2.5 G/DL (ref 3.5–5.2)
ANION GAP SERPL CALC-SCNC: 8 MMOL/L (ref 8–16)
BUN SERPL-MCNC: 10 MG/DL (ref 8–23)
CALCIUM SERPL-MCNC: 9.1 MG/DL (ref 8.7–10.5)
CHLORIDE SERPL-SCNC: 104 MMOL/L (ref 95–110)
CO2 SERPL-SCNC: 22 MMOL/L (ref 23–29)
CREAT SERPL-MCNC: 3.3 MG/DL (ref 0.5–1.4)
EST. GFR  (AFRICAN AMERICAN): 16 ML/MIN/1.73 M^2
EST. GFR  (NON AFRICAN AMERICAN): 14 ML/MIN/1.73 M^2
GLUCOSE SERPL-MCNC: 114 MG/DL (ref 70–110)
PHOSPHATE SERPL-MCNC: 1.6 MG/DL (ref 2.7–4.5)
POCT GLUCOSE: 113 MG/DL (ref 70–110)
POCT GLUCOSE: 129 MG/DL (ref 70–110)
POCT GLUCOSE: 80 MG/DL (ref 70–110)
POCT GLUCOSE: 92 MG/DL (ref 70–110)
POTASSIUM SERPL-SCNC: 3 MMOL/L (ref 3.5–5.1)
SODIUM SERPL-SCNC: 134 MMOL/L (ref 136–145)

## 2019-04-06 PROCEDURE — 25000003 PHARM REV CODE 250: Performed by: HOSPITALIST

## 2019-04-06 PROCEDURE — 11000001 HC ACUTE MED/SURG PRIVATE ROOM

## 2019-04-06 PROCEDURE — 97116 GAIT TRAINING THERAPY: CPT

## 2019-04-06 PROCEDURE — 97110 THERAPEUTIC EXERCISES: CPT

## 2019-04-06 PROCEDURE — 94761 N-INVAS EAR/PLS OXIMETRY MLT: CPT

## 2019-04-06 PROCEDURE — 25000003 PHARM REV CODE 250: Performed by: NURSE PRACTITIONER

## 2019-04-06 PROCEDURE — 80069 RENAL FUNCTION PANEL: CPT

## 2019-04-06 PROCEDURE — 36415 COLL VENOUS BLD VENIPUNCTURE: CPT

## 2019-04-06 RX ORDER — BISACODYL 5 MG
5 TABLET, DELAYED RELEASE (ENTERIC COATED) ORAL DAILY PRN
Status: DISCONTINUED | OUTPATIENT
Start: 2019-04-06 | End: 2019-04-08 | Stop reason: HOSPADM

## 2019-04-06 RX ORDER — LACTULOSE 10 G/15ML
15 SOLUTION ORAL EVERY 6 HOURS PRN
Status: DISCONTINUED | OUTPATIENT
Start: 2019-04-06 | End: 2019-04-08 | Stop reason: HOSPADM

## 2019-04-06 RX ORDER — METOPROLOL TARTRATE 50 MG/1
100 TABLET ORAL 2 TIMES DAILY
Status: DISCONTINUED | OUTPATIENT
Start: 2019-04-06 | End: 2019-04-08 | Stop reason: HOSPADM

## 2019-04-06 RX ADMIN — ASPIRIN 81 MG 81 MG: 81 TABLET ORAL at 08:04

## 2019-04-06 RX ADMIN — SUCRALFATE 1 G: 1 SUSPENSION ORAL at 12:04

## 2019-04-06 RX ADMIN — SUCRALFATE 1 G: 1 SUSPENSION ORAL at 09:04

## 2019-04-06 RX ADMIN — LORAZEPAM 0.5 MG: 0.5 TABLET ORAL at 05:04

## 2019-04-06 RX ADMIN — METOPROLOL TARTRATE 100 MG: 50 TABLET ORAL at 09:04

## 2019-04-06 RX ADMIN — METOPROLOL TARTRATE 100 MG: 50 TABLET ORAL at 12:04

## 2019-04-06 RX ADMIN — PANTOPRAZOLE SODIUM 40 MG: 40 TABLET, DELAYED RELEASE ORAL at 08:04

## 2019-04-06 RX ADMIN — RIVAROXABAN 10 MG: 10 TABLET, FILM COATED ORAL at 04:04

## 2019-04-06 RX ADMIN — SUCRALFATE 1 G: 1 SUSPENSION ORAL at 04:04

## 2019-04-06 RX ADMIN — SUCRALFATE 1 G: 1 SUSPENSION ORAL at 08:04

## 2019-04-06 RX ADMIN — BUTALBITAL, ACETAMINOPHEN, AND CAFFEINE 1 TABLET: 50; 325; 40 TABLET ORAL at 07:04

## 2019-04-06 RX ADMIN — BUTALBITAL, ACETAMINOPHEN, AND CAFFEINE 1 TABLET: 50; 325; 40 TABLET ORAL at 05:04

## 2019-04-06 RX ADMIN — LORAZEPAM 0.5 MG: 0.5 TABLET ORAL at 07:04

## 2019-04-06 RX ADMIN — RAMELTEON 8 MG: 8 TABLET, FILM COATED ORAL at 09:04

## 2019-04-06 RX ADMIN — ATORVASTATIN CALCIUM 40 MG: 40 TABLET, FILM COATED ORAL at 08:04

## 2019-04-06 NOTE — PROGRESS NOTES
[facility]  Nephrology  Progress Note    Patient Name: Brittaney Joseph  MRN: [id number]  Admission Date: 4/3/2019  Hospital Length of Stay: 3 days  Attending Provider: Brandon Ann MD   Primary Care Physician: Charles Argueta MD  Principal Problem:Stroke due to embolism    Subjective:     HPI: Pt. Sitting up in chair with sister @ bedside.  Doing well only c/o MARY weakness and decreased strength.  Able to stand and take steps.      Interval History: Pt. Stable this am, without c/o.    Review of patient's allergies indicates:  No Known Allergies  Current Facility-Administered Medications   Medication Frequency    0.9%  NaCl infusion PRN    acetaminophen tablet 650 mg Q6H PRN    aspirin chewable tablet 81 mg Daily    atorvastatin tablet 40 mg Daily    bisacodyl EC tablet 5 mg Daily PRN    butalbital-acetaminophen-caffeine -40 mg per tablet 1 tablet Q4H PRN    dextrose 50 % in water (D50W) injection 12.5 g PRN    dextrose 50 % in water (D50W) injection 25 g PRN    glucagon (human recombinant) injection 1 mg PRN    glucose chewable tablet 16 g PRN    glucose chewable tablet 24 g PRN    heparin (porcine) injection 4,300 Units PRN    hydrALAZINE tablet 25 mg Q8H PRN    insulin aspart U-100 pen 0-5 Units QID (AC + HS) PRN    lactulose 20 gram/30 mL solution Soln 15 g Q6H PRN    LORazepam tablet 0.5 mg Q6H PRN    metoprolol tartrate (LOPRESSOR) tablet 100 mg BID    nitroGLYCERIN SL tablet 0.4 mg Q5 Min PRN    ondansetron injection 4 mg Q8H PRN    pantoprazole EC tablet 40 mg Daily    ramelteon tablet 8 mg Nightly PRN    rivaroxaban tablet 10 mg Daily with dinner    sodium chloride 0.9% flush 10 mL PRN    sodium chloride 0.9% flush 10 mL PRN    sucralfate 100 mg/mL suspension 1 g QID       Objective:     Vital Signs (Most Recent):  Temp: 97.2 °F (36.2 °C) (04/06/19 1139)  Pulse: 99 (04/06/19 1139)  Resp: 18 (04/06/19 1139)  BP: 116/79 (04/06/19 1139)  SpO2: 97 % (04/06/19  0901)  O2 Device (Oxygen Therapy): room air (04/06/19 0901) Vital Signs (24h Range):  Temp:  [97.2 °F (36.2 °C)-98.8 °F (37.1 °C)] 97.2 °F (36.2 °C)  Pulse:  [] 99  Resp:  [16-20] 18  SpO2:  [95 %-98 %] 97 %  BP: (102-171)/(49-94) 116/79     Weight: 62.7 kg (138 lb 3.7 oz) (04/04/19 0900)  Body mass index is 23.73 kg/m².  Body surface area is 1.68 meters squared.    I/O last 3 completed shifts:  In: 990 [P.O.:590; Other:400]  Out: 1400 [Other:1400]    Physical Exam   Constitutional: She is oriented to person, place, and time. She appears well-developed and well-nourished.   HENT:   Head: Normocephalic and atraumatic.   Cardiovascular: Normal rate, regular rhythm and normal heart sounds.   Pulmonary/Chest: Effort normal and breath sounds normal.   Abdominal: Soft. Bowel sounds are normal.   Musculoskeletal: She exhibits no edema.   Neurological: She is alert and oriented to person, place, and time.   Skin: Skin is warm and dry.   Psychiatric: Her behavior is normal.   Vitals reviewed.      Significant Labs:  Cardiac Markers: No results for input(s): CKMB, TROPONINT, MYOGLOBIN in the last 168 hours.  CBC:   Recent Labs   Lab 04/05/19  1046   WBC 5.52   RBC 3.56*   HGB 10.3*   HCT 31.5*      MCV 89   MCH 28.9   MCHC 32.7     CMP:   Recent Labs   Lab 04/03/19  1815  04/06/19  1035   *   < > 114*   CALCIUM 8.6*   < > 9.1   ALBUMIN 3.3*   < > 2.5*   PROT 6.7  --   --    *   < > 134*   K 3.2*   < > 3.0*   CO2 29   < > 22*   CL 97   < > 104   BUN 7   < > 10   CREATININE 2.22*   < > 3.3*   ALKPHOS 102  --   --    ALT 17  --   --    AST 53*  --   --    BILITOT 0.7  --   --     < > = values in this interval not displayed.        Significant Imaging:  Labs: Reviewed  X-Ray: Reviewed    Assessment/Plan:     End-stage renal disease on hemodialysis  Plan for next HD on Monday am.  Monitor clinical volume status and labs over weekend.        Thank you for your consult. I will follow-up with patient. Please  contact us if you have any additional questions.    Saida Mcdaniel MD  Nephrology  [facility]

## 2019-04-06 NOTE — PT/OT/SLP PROGRESS
"Physical Therapy Treatment    Patient Name:  Brittaney Joseph   MRN:  [id number]    Recommendations:     Discharge Recommendations:  rehabilitation facility   Discharge Equipment Recommendations: (Defer to next level of care)   Barriers to discharge:  Increased need for physical assistance for all functional mobility and ADL's.    Assessment:     Brittaney Joseph is a 68 y.o. female admitted with a medical diagnosis of Stroke due to embolism.  She presents with the following impairments/functional limitations:  weakness, impaired endurance, impaired self care skills, impaired balance, gait instability, impaired functional mobilty, impaired cognition, decreased coordination, decreased upper extremity function, decreased lower extremity function, decreased ROM, impaired joint extensibility, decreased safety awareness, impaired coordination. Pt performed seated therapeutic exercises at EOB, received stretches to elbow flexors LUE and bilateral hamstrings. Able to completer 2 sit to stand trials and ambulate by 2 trials. 1st ~5-6 turning steps to sit in bedside chair and ~5-6 ft forward and backward with mod A of 2 people and bilateral HHA. Recommending rehabilitation facility upon discharge. Would benefit from continued PT services while in this facility to increase pt's functional mobility addressing all impairments listed above.    Rehab Prognosis: Good; patient would benefit from acute skilled PT services to address these deficits and reach maximum level of function.    Recent Surgery: * No surgery found *      Plan:     During this hospitalization, patient to be seen 6 x/week to address the identified rehab impairments via gait training, therapeutic activities, therapeutic exercises, neuromuscular re-education and progress toward the following goals:    · Plan of Care Expires:  05/05/19    Subjective     Chief Complaint: None expressed  Patient/Family Comments/goals: "Oh good therapy. I want to get out of this " "bed".  Pain/Comfort:  ·        Objective:     Communicated with  nurse prior to session.  Patient found supine with bed alarm, peripheral IV, telemetry upon PT entry to room.     General Precautions: Standard, fall   Orthopedic Precautions:N/A   Braces: N/A     Functional Mobility:  Bed Mobility:     Rolling Right: contact guard assistance  Scooting: minimum assistance, moderate assistance and verbal/tactile cueing for weight shifting right/left to advance hips forward one at a time  Supine to Sit: minimum assistance, moderate assistance and  extra time needed  Transfers:     Sit to Stand:  moderate assistance, of  2 persons and  one from EOB and 1 from B/S chair with hand-held assist and  bilateral HHA  Gait:  2 trials ~5-6 turning steps to sit in B./S chair and ~5-6 ft forward and backward. Both trilas bilateral HHA requiring mod A of 2 people. Pt demonstrates an ataxic gait pattern LLE. Requires tactile as well as verbal cueing for proper execution and sequencing.    AM-PAC 6 CLICK MOBILITY  Turning over in bed (including adjusting bedclothes, sheets and blankets)?: 3  Sitting down on and standing up from a chair with arms (e.g., wheelchair, bedside commode, etc.): 2  Moving from lying on back to sitting on the side of the bed?: 2  Moving to and from a bed to a chair (including a wheelchair)?: 2  Need to walk in hospital room?: 2  Climbing 3-5 steps with a railing?: 1  Basic Mobility Total Score: 12       Therapeutic Activities and Exercises:   All bed mobility with assistance as documented above. Sat at EOB  ~15-20 minutes performing weight shifting to increase weight bearing through left hip. Seated therapeutic exercise 1 x 10 reps LAQ's only 1 x 10 reps BLE, requiring AAROM right and left (more assistance on left) to ensure fullest ROM available.  Received gentle manual stretching to left elbow flexors 1 x 6 reps, also gentle stretching to LLE hamstrings in sitting position 1 x 6 reps. All stretches with an " approximate 10-15 second hold. Ambulation training by 2 trials both requiring mod A of 2 people bilateral HHA. ~5-6 turning steps from EOB to B/S chair and ~5-6 steps forward and ~5-6 steps backward. Demonstrates an ataxic gait pattern on LLE and requires verbal/tactile cueing to increase GREY, decrease trunk flexion, and increase step length bilaterally.    Patient left up in chair with all lines intact, call button in reach, chair alarm on,  nurse notified and  sister present..    GOALS:   Multidisciplinary Problems     Physical Therapy Goals        Problem: Physical Therapy Goal    Goal Priority Disciplines Outcome Goal Variances Interventions   Physical Therapy Goal     PT, PT/OT Ongoing (interventions implemented as appropriate)     Description:  Goals to be met by: 19     Patient will increase functional independence with mobility by performin. Supine <> sit with MInimal Assistance  2. Sit to stand transfer with Minimal Assistance  3. Bed to chair transfer with Minimal Assistance using appropriate AD  4. Gait  x 20 feet with Minimal Assistance using appropriate AD  5. Sitting at edge of bed x 15 minutes with Stand-by Assistance                      Time Tracking:     PT Received On: 19  PT Start Time: 859     PT Stop Time: 938  PT Total Time (min): 39 min     Billable Minutes: Gait Training  14 and Therapeutic Exercise  25    Treatment Type: Treatment  PT/PTA: PTA     PTA Visit Number: 1     Sofi Osborn, PTA  2019

## 2019-04-06 NOTE — HPI
Pt. Sitting up in chair with sister @ bedside.  Doing well only c/o MARY weakness and decreased strength.  Able to stand and take steps.      Pt. Stable, no c/o, plan for dialysis in am.

## 2019-04-06 NOTE — SUBJECTIVE & OBJECTIVE
Interval History: Pt. Stable this am, without c/o.    Review of patient's allergies indicates:  No Known Allergies  Current Facility-Administered Medications   Medication Frequency    0.9%  NaCl infusion PRN    acetaminophen tablet 650 mg Q6H PRN    aspirin chewable tablet 81 mg Daily    atorvastatin tablet 40 mg Daily    bisacodyl EC tablet 5 mg Daily PRN    butalbital-acetaminophen-caffeine -40 mg per tablet 1 tablet Q4H PRN    dextrose 50 % in water (D50W) injection 12.5 g PRN    dextrose 50 % in water (D50W) injection 25 g PRN    glucagon (human recombinant) injection 1 mg PRN    glucose chewable tablet 16 g PRN    glucose chewable tablet 24 g PRN    heparin (porcine) injection 4,300 Units PRN    hydrALAZINE tablet 25 mg Q8H PRN    insulin aspart U-100 pen 0-5 Units QID (AC + HS) PRN    lactulose 20 gram/30 mL solution Soln 15 g Q6H PRN    LORazepam tablet 0.5 mg Q6H PRN    metoprolol tartrate (LOPRESSOR) tablet 100 mg BID    nitroGLYCERIN SL tablet 0.4 mg Q5 Min PRN    ondansetron injection 4 mg Q8H PRN    pantoprazole EC tablet 40 mg Daily    ramelteon tablet 8 mg Nightly PRN    rivaroxaban tablet 10 mg Daily with dinner    sodium chloride 0.9% flush 10 mL PRN    sodium chloride 0.9% flush 10 mL PRN    sucralfate 100 mg/mL suspension 1 g QID       Objective:     Vital Signs (Most Recent):  Temp: 97.2 °F (36.2 °C) (04/06/19 1139)  Pulse: 99 (04/06/19 1139)  Resp: 18 (04/06/19 1139)  BP: 116/79 (04/06/19 1139)  SpO2: 97 % (04/06/19 0901)  O2 Device (Oxygen Therapy): room air (04/06/19 0901) Vital Signs (24h Range):  Temp:  [97.2 °F (36.2 °C)-98.8 °F (37.1 °C)] 97.2 °F (36.2 °C)  Pulse:  [] 99  Resp:  [16-20] 18  SpO2:  [95 %-98 %] 97 %  BP: (102-171)/(49-94) 116/79     Weight: 62.7 kg (138 lb 3.7 oz) (04/04/19 0900)  Body mass index is 23.73 kg/m².  Body surface area is 1.68 meters squared.    I/O last 3 completed shifts:  In: 990 [P.O.:590; Other:400]  Out: 1400  [Other:1400]    Physical Exam   Constitutional: She is oriented to person, place, and time. She appears well-developed and well-nourished.   HENT:   Head: Normocephalic and atraumatic.   Cardiovascular: Normal rate, regular rhythm and normal heart sounds.   Pulmonary/Chest: Effort normal and breath sounds normal.   Abdominal: Soft. Bowel sounds are normal.   Musculoskeletal: She exhibits no edema.   Neurological: She is alert and oriented to person, place, and time.   Skin: Skin is warm and dry.   Psychiatric: Her behavior is normal.   Vitals reviewed.      Significant Labs:  Cardiac Markers: No results for input(s): CKMB, TROPONINT, MYOGLOBIN in the last 168 hours.  CBC:   Recent Labs   Lab 04/05/19  1046   WBC 5.52   RBC 3.56*   HGB 10.3*   HCT 31.5*      MCV 89   MCH 28.9   MCHC 32.7     CMP:   Recent Labs   Lab 04/03/19  1815  04/06/19  1035   *   < > 114*   CALCIUM 8.6*   < > 9.1   ALBUMIN 3.3*   < > 2.5*   PROT 6.7  --   --    *   < > 134*   K 3.2*   < > 3.0*   CO2 29   < > 22*   CL 97   < > 104   BUN 7   < > 10   CREATININE 2.22*   < > 3.3*   ALKPHOS 102  --   --    ALT 17  --   --    AST 53*  --   --    BILITOT 0.7  --   --     < > = values in this interval not displayed.        Significant Imaging:  Labs: Reviewed  X-Ray: Reviewed

## 2019-04-06 NOTE — ASSESSMENT & PLAN NOTE
Takes amlodipine 10 mg HS, clonidine 0.2 mg HS, hydralazine 25 mg every 8 hours, metoprolol tartrate 100 mg BID, furosemide 80 mg BID. Resume home metoprolol.

## 2019-04-06 NOTE — SUBJECTIVE & OBJECTIVE
Interval History: Had anxiety and insomnia overnight.    Review of Systems   Constitutional: Negative for chills and fever.   Respiratory: Negative for cough and shortness of breath.    Neurological: Positive for weakness. Negative for syncope.     Objective:     Vital Signs (Most Recent):  Temp: 97.3 °F (36.3 °C) (04/06/19 0721)  Pulse: 79 (04/06/19 0758)  Resp: 18 (04/06/19 0721)  BP: (!) 154/68 (04/06/19 0721)  SpO2: 97 % (04/06/19 0901) Vital Signs (24h Range):  Temp:  [97.3 °F (36.3 °C)-98.8 °F (37.1 °C)] 97.3 °F (36.3 °C)  Pulse:  [] 79  Resp:  [16-20] 18  SpO2:  [95 %-98 %] 97 %  BP: (102-171)/(49-94) 154/68     Weight: 62.7 kg (138 lb 3.7 oz)  Body mass index is 23.73 kg/m².    Intake/Output Summary (Last 24 hours) at 4/6/2019 1108  Last data filed at 4/6/2019 0830  Gross per 24 hour   Intake 930 ml   Output 1400 ml   Net -470 ml      Physical Exam   Constitutional: She appears well-developed. No distress.   Cardiovascular: Normal rate and regular rhythm.   Pulmonary/Chest: Effort normal. No respiratory distress.   Neurological: She is alert.   Nursing note and vitals reviewed.      Significant Labs: All pertinent labs within the past 24 hours have been reviewed.    · Significant Imaging: I have reviewed all pertinent imaging results/findings within the past 24 hours.

## 2019-04-06 NOTE — PLAN OF CARE
Problem: Adult Inpatient Plan of Care  Goal: Plan of Care Review  Outcome: Ongoing (interventions implemented as appropriate)  Patient AAOx3 laying in bed sister at the bedside. Plan of care reviewed on room air sats 97%. Medications administered.  neurological assessment Q4hrs.  Cardiac monitoring NSR. Dialysis catheter Right subclavian dressing clean, dry, and intact. Safety maintained chair alarm on,skid socks on, call bell within reach. Will continue to monitor.

## 2019-04-06 NOTE — PROGRESS NOTES
[facility] Hospital Medicine  Progress Note    Patient Name: Brittaney Joseph  MRN: [id number]  Patient Class: IP- Inpatient   Admission Date: 4/3/2019  Length of Stay: 3 days  Attending Physician: Brandon Ann MD  Primary Care Provider: Charles Argueta MD        Subjective:     Principal Problem:Stroke due to embolism    HPI:  Brittaney Joseph is a 68 y.o.  female with hypertension, diabetes mellitus type 2, chronic diastolic heart failure, end stage renal disease on hemodialysis since 3/29/19 (Monday Wednesday Friday), hyperlipidemia, chronic low back pain, history of pulmonary embolism on 1/11/19 status post thrombectomy (anticoagulated on rivaroxaban). She lives with her niece. Her primary care physician is Dr. Charles Argueta. Her nephrologist is Dr. Saida Mcdaniel. She gets dialysis at [facility].              She was hospitalized at [facility] from 3/28/19 to 4/1/19 to start hemodialysis.               She developed left sided facial droop and left sided weakness around 16:00 on 4/2/19. Her family did not seek medical attention at the time, but noted symptoms progressively becoming worse with onset of slurred speech on 4/3/19 after dialysis. She was taken to [facility]. Blood pressure was normal at 121/68. Head CT showed chronic appearing ischemic white matter changes bilaterally. Vascular Neurology Telemedicine consult was done. She was given a NIH stroke scale of 6 for facial palsy, left leg weakness, right leg drift, dysarthria. She already takes aspirin, atorvastatin 40 mg, and rivaroxaban. Neurology recommended increasing atorvastatin to 80 mg and admission with MRI, MRA, and echocardiogram.  She was admitted to [facility] for further evaluation.     Hospital Course:  Brain MRI showed numerous stroke foci in the right supratentorial  frontal, parietal, occipital, temporal lobe and left posterior frontal lobe, suggesting embolic etiology. MRA showed mild plaque in the left posterior cerebral artery, which is not contributory to the area of her stroke. Echocardiogram was mostly normal. Vascular Neurology recommended cardiac event monitor and optionally transesophageal echocardiogram. It is optional because her anticoagulation would treat a intracardiac thrombus anyway, although it would be mandatory if she had fever or other signs of endocarditis. Even while holding her home amlodipine, clonidine, furosemide, hydralazine, and metoprolol for permissive hypertension, her blood pressures were mostly normal. Hypotension at home would not explain her stroke because it was not in a watershed pattern. Physical and Occupational Therapy recommended inpatient rehab.     Interval History: Had anxiety and insomnia overnight.    Review of Systems   Constitutional: Negative for chills and fever.   Respiratory: Negative for cough and shortness of breath.    Neurological: Positive for weakness. Negative for syncope.     Objective:     Vital Signs (Most Recent):  Temp: 97.3 °F (36.3 °C) (04/06/19 0721)  Pulse: 79 (04/06/19 0758)  Resp: 18 (04/06/19 0721)  BP: (!) 154/68 (04/06/19 0721)  SpO2: 97 % (04/06/19 0901) Vital Signs (24h Range):  Temp:  [97.3 °F (36.3 °C)-98.8 °F (37.1 °C)] 97.3 °F (36.3 °C)  Pulse:  [] 79  Resp:  [16-20] 18  SpO2:  [95 %-98 %] 97 %  BP: (102-171)/(49-94) 154/68     Weight: 62.7 kg (138 lb 3.7 oz)  Body mass index is 23.73 kg/m².    Intake/Output Summary (Last 24 hours) at 4/6/2019 1108  Last data filed at 4/6/2019 0830  Gross per 24 hour   Intake 930 ml   Output 1400 ml   Net -470 ml      Physical Exam   Constitutional: She appears well-developed. No distress.   Cardiovascular: Normal rate and regular rhythm.   Pulmonary/Chest: Effort normal. No respiratory distress.   Neurological: She is alert.   Nursing note and vitals  reviewed.      Significant Labs: All pertinent labs within the past 24 hours have been reviewed.    · Significant Imaging: I have reviewed all pertinent imaging results/findings within the past 24 hours.     Assessment/Plan:      * Stroke due to embolism  Embolic etiology suspected but unknown due to what process. Continue rivaroxaban 10 mg daily. Appreciate Vascular Neurology. Can get cardiac event monitor outpatient. MICHAEL only if endocarditis suspected. Inpatient rehab.    End-stage renal disease on hemodialysis  Dialysis per Nephrology.    History of pulmonary embolism  Continue home rivaroxaban.    Chronic diastolic heart failure  No evidence of volume overload on exam.    Chronic low back pain  Chronic, stable     Controlled type 2 diabetes mellitus with stage 4 chronic kidney disease, without long-term current use of insulin  Hemoglobin A1c was 5.1% on 3/12/19. Discontinue glipizide. Insulin aspart sliding scale. Monitor serum glucose.    Hyperlipidemia associated with type 2 diabetes mellitus  Continue home aspirin and atorvastatin.    Essential hypertension  Takes amlodipine 10 mg HS, clonidine 0.2 mg HS, hydralazine 25 mg every 8 hours, metoprolol tartrate 100 mg BID, furosemide 80 mg BID. Resume home metoprolol.      VTE Risk Mitigation (From admission, onward)        Ordered     heparin (porcine) injection 4,300 Units  As needed (PRN)      04/05/19 1544     rivaroxaban tablet 10 mg  With dinner      04/03/19 2122     Reason for No Pharmacological VTE Prophylaxis  Once      04/03/19 2122     IP VTE HIGH RISK PATIENT  Once      04/03/19 2122     Place sequential compression device  Until discontinued      04/03/19 2122     Reason for No Pharmacological VTE Prophylaxis  Once      04/03/19 2122        Disposition: Inpatient rehab.      Brandon Ann MD  Department of Hospital Medicine   [facility]

## 2019-04-06 NOTE — PLAN OF CARE
Problem: Adult Inpatient Plan of Care  Goal: Plan of Care Review     04/06/19 0217   Plan of Care Review   Plan of Care Reviewed With patient   Day shift reported that pt had a HA and gave tylenol @ 1814.  1900 shift change pt reported POTTER decreasing but still present. 2100 pt reported HA of 8/10.  Called for additional pain relief.  NP Ankur ordered Fioracet.  Family also asked for something to help pt sleep because the ativan didn't relieve pt anxiety to get out of hospital.  Remeltion ordered, pt asleep in 30 minutes.  2100 , no coverage needed.  Lots of family at bed side.     0451 pt  anxious, calling out for help,  wants out of bed like she's trapped. She seems to be claustrophobic.  She was anxious during 1900 shift report. Remained anxious up until 2230 when she received a remelten and fell asleep. Ativan seems to have little no affect for her but will give her another dose this morning.        Tele: NSR, reg HR mid 100 teens,  No alarms.     Bed in lowest position, wheels locked, non skid socks, ID band worn, personal items and call bell with in reach, bed alarm set.

## 2019-04-06 NOTE — PLAN OF CARE
Problem: Physical Therapy Goal  Goal: Physical Therapy Goal  Goals to be met by: 19     Patient will increase functional independence with mobility by performin. Supine <> sit with MInimal Assistance  2. Sit to stand transfer with Minimal Assistance  3. Bed to chair transfer with Minimal Assistance using appropriate AD  4. Gait  x 20 feet with Minimal Assistance using appropriate AD  5. Sitting at edge of bed x 15 minutes with Stand-by Assistance     Outcome: Ongoing (interventions implemented as appropriate)      Pt continues to work and progress toward goals.

## 2019-04-06 NOTE — ASSESSMENT & PLAN NOTE
Embolic etiology suspected but unknown due to what process. Continue rivaroxaban 10 mg daily. Appreciate Vascular Neurology. Can get cardiac event monitor outpatient. MICHAEL only if endocarditis suspected. Inpatient rehab.

## 2019-04-07 LAB
MAGNESIUM SERPL-MCNC: 1.6 MG/DL (ref 1.6–2.6)
POCT GLUCOSE: 104 MG/DL (ref 70–110)
POCT GLUCOSE: 76 MG/DL (ref 70–110)
POCT GLUCOSE: 81 MG/DL (ref 70–110)
POCT GLUCOSE: 92 MG/DL (ref 70–110)
POTASSIUM SERPL-SCNC: 3.4 MMOL/L (ref 3.5–5.1)

## 2019-04-07 PROCEDURE — 94761 N-INVAS EAR/PLS OXIMETRY MLT: CPT

## 2019-04-07 PROCEDURE — 84132 ASSAY OF SERUM POTASSIUM: CPT

## 2019-04-07 PROCEDURE — 83735 ASSAY OF MAGNESIUM: CPT

## 2019-04-07 PROCEDURE — 36415 COLL VENOUS BLD VENIPUNCTURE: CPT

## 2019-04-07 PROCEDURE — 11000001 HC ACUTE MED/SURG PRIVATE ROOM

## 2019-04-07 PROCEDURE — 25000003 PHARM REV CODE 250: Performed by: HOSPITALIST

## 2019-04-07 PROCEDURE — 25000003 PHARM REV CODE 250: Performed by: NURSE PRACTITIONER

## 2019-04-07 RX ORDER — CLONIDINE HYDROCHLORIDE 0.2 MG/1
0.2 TABLET ORAL NIGHTLY
Status: DISCONTINUED | OUTPATIENT
Start: 2019-04-07 | End: 2019-04-08

## 2019-04-07 RX ORDER — AMLODIPINE BESYLATE 5 MG/1
10 TABLET ORAL NIGHTLY
Status: DISCONTINUED | OUTPATIENT
Start: 2019-04-07 | End: 2019-04-08 | Stop reason: HOSPADM

## 2019-04-07 RX ORDER — FUROSEMIDE 40 MG/1
80 TABLET ORAL 2 TIMES DAILY
Status: DISCONTINUED | OUTPATIENT
Start: 2019-04-07 | End: 2019-04-07

## 2019-04-07 RX ORDER — AMOXICILLIN 250 MG
1 CAPSULE ORAL 2 TIMES DAILY PRN
Status: DISCONTINUED | OUTPATIENT
Start: 2019-04-07 | End: 2019-04-08 | Stop reason: HOSPADM

## 2019-04-07 RX ADMIN — BUTALBITAL, ACETAMINOPHEN, AND CAFFEINE 1 TABLET: 50; 325; 40 TABLET ORAL at 04:04

## 2019-04-07 RX ADMIN — SUCRALFATE 1 G: 1 SUSPENSION ORAL at 12:04

## 2019-04-07 RX ADMIN — ASPIRIN 81 MG 81 MG: 81 TABLET ORAL at 08:04

## 2019-04-07 RX ADMIN — BUTALBITAL, ACETAMINOPHEN, AND CAFFEINE 1 TABLET: 50; 325; 40 TABLET ORAL at 09:04

## 2019-04-07 RX ADMIN — SUCRALFATE 1 G: 1 SUSPENSION ORAL at 04:04

## 2019-04-07 RX ADMIN — RIVAROXABAN 10 MG: 10 TABLET, FILM COATED ORAL at 04:04

## 2019-04-07 RX ADMIN — METOPROLOL TARTRATE 100 MG: 50 TABLET ORAL at 08:04

## 2019-04-07 RX ADMIN — LORAZEPAM 0.5 MG: 0.5 TABLET ORAL at 09:04

## 2019-04-07 RX ADMIN — AMLODIPINE BESYLATE 10 MG: 5 TABLET ORAL at 09:04

## 2019-04-07 RX ADMIN — CLONIDINE HYDROCHLORIDE 0.2 MG: 0.2 TABLET ORAL at 09:04

## 2019-04-07 RX ADMIN — SUCRALFATE 1 G: 1 SUSPENSION ORAL at 08:04

## 2019-04-07 RX ADMIN — ATORVASTATIN CALCIUM 40 MG: 40 TABLET, FILM COATED ORAL at 08:04

## 2019-04-07 RX ADMIN — PANTOPRAZOLE SODIUM 40 MG: 40 TABLET, DELAYED RELEASE ORAL at 08:04

## 2019-04-07 RX ADMIN — SUCRALFATE 1 G: 1 SUSPENSION ORAL at 09:04

## 2019-04-07 RX ADMIN — METOPROLOL TARTRATE 100 MG: 50 TABLET ORAL at 09:04

## 2019-04-07 NOTE — ASSESSMENT & PLAN NOTE
Takes amlodipine 10 mg HS, clonidine 0.2 mg HS, hydralazine 25 mg every 8 hours, metoprolol tartrate 100 mg BID, furosemide 80 mg BID. Resume all.

## 2019-04-07 NOTE — SUBJECTIVE & OBJECTIVE
Interval History:   Pt. Stable      Review of patient's allergies indicates:  No Known Allergies  Current Facility-Administered Medications   Medication Frequency    0.9%  NaCl infusion PRN    acetaminophen tablet 650 mg Q6H PRN    amLODIPine tablet 10 mg Nightly    aspirin chewable tablet 81 mg Daily    atorvastatin tablet 40 mg Daily    bisacodyl EC tablet 5 mg Daily PRN    butalbital-acetaminophen-caffeine -40 mg per tablet 1 tablet Q4H PRN    cloNIDine tablet 0.2 mg Nightly    dextrose 50 % in water (D50W) injection 12.5 g PRN    dextrose 50 % in water (D50W) injection 25 g PRN    glucagon (human recombinant) injection 1 mg PRN    glucose chewable tablet 16 g PRN    glucose chewable tablet 24 g PRN    heparin (porcine) injection 4,300 Units PRN    hydrALAZINE tablet 25 mg Q8H PRN    insulin aspart U-100 pen 0-5 Units QID (AC + HS) PRN    lactulose 20 gram/30 mL solution Soln 15 g Q6H PRN    LORazepam tablet 0.5 mg Q6H PRN    metoprolol tartrate (LOPRESSOR) tablet 100 mg BID    nitroGLYCERIN SL tablet 0.4 mg Q5 Min PRN    ondansetron injection 4 mg Q8H PRN    pantoprazole EC tablet 40 mg Daily    ramelteon tablet 8 mg Nightly PRN    rivaroxaban tablet 10 mg Daily with dinner    senna-docusate 8.6-50 mg per tablet 1 tablet BID PRN    sodium chloride 0.9% flush 10 mL PRN    sodium chloride 0.9% flush 10 mL PRN    sucralfate 100 mg/mL suspension 1 g QID       Objective:     Vital Signs (Most Recent):  Temp: 97.7 °F (36.5 °C) (04/07/19 1121)  Pulse: 65 (04/07/19 1147)  Resp: 16 (04/07/19 1121)  BP: 112/63 (04/07/19 1121)  SpO2: 97 % (04/07/19 1228)  O2 Device (Oxygen Therapy): room air (04/07/19 1228) Vital Signs (24h Range):  Temp:  [96.1 °F (35.6 °C)-98.1 °F (36.7 °C)] 97.7 °F (36.5 °C)  Pulse:  [65-83] 65  Resp:  [15-19] 16  SpO2:  [97 %] 97 %  BP: (112-189)/(63-99) 112/63     Weight: 65.2 kg (143 lb 11.8 oz) (04/07/19 0534)  Body mass index is 24.67 kg/m².  Body surface area is  1.72 meters squared.    I/O last 3 completed shifts:  In: 1025 [P.O.:1025]  Out: 0     Physical Exam   Constitutional: She is oriented to person, place, and time. She appears well-developed and well-nourished.   HENT:   Head: Normocephalic and atraumatic.   Cardiovascular: Normal rate, regular rhythm and normal heart sounds.   Pulmonary/Chest: Effort normal and breath sounds normal.   Musculoskeletal: She exhibits no edema.   Neurological: She is alert and oriented to person, place, and time.   Vitals reviewed.      Significant Labs:  Cardiac Markers: No results for input(s): CKMB, TROPONINT, MYOGLOBIN in the last 168 hours.  CBC:   Recent Labs   Lab 04/05/19  1046   WBC 5.52   RBC 3.56*   HGB 10.3*   HCT 31.5*      MCV 89   MCH 28.9   MCHC 32.7     CMP:   Recent Labs   Lab 04/03/19  1815  04/06/19  1035 04/07/19  1135   *   < > 114*  --    CALCIUM 8.6*   < > 9.1  --    ALBUMIN 3.3*   < > 2.5*  --    PROT 6.7  --   --   --    *   < > 134*  --    K 3.2*   < > 3.0* 3.4*   CO2 29   < > 22*  --    CL 97   < > 104  --    BUN 7   < > 10  --    CREATININE 2.22*   < > 3.3*  --    ALKPHOS 102  --   --   --    ALT 17  --   --   --    AST 53*  --   --   --    BILITOT 0.7  --   --   --     < > = values in this interval not displayed.        Significant Imaging:  Labs: Reviewed  X-Ray: Reviewed

## 2019-04-07 NOTE — PLAN OF CARE
Problem: Adult Inpatient Plan of Care  Goal: Plan of Care Review  Outcome: Ongoing (interventions implemented as appropriate)  Patient in bed sleeping on room air, sister and the bedside. Plan of care reviewed patient verbalized understanding. Medications administered, blood glucose monitoring per sliding scale no coverage needed. Neurological assessment q 4hrs. Aspiration and delirium precautions maintained. LORIN monitoring. Bed in lowest position, bed alarm on, call bell in reach. Will continue to monitor.

## 2019-04-07 NOTE — PLAN OF CARE
"Problem: Adult Inpatient Plan of Care  Goal: Plan of Care Review     04/06/19 2132   Plan of Care Review   Plan of Care Reviewed With patient;sibling   Pt extreemly anxious, want's "out of the box".  Sister Karyna called for nurse, she looking very frazled. Gave 0.5 mg ativan and 1 fieurcet for HA.  Pt still very anxious, sitting up in bed, wants "out of box", constantly calling sister  To get her out of box, she needs, help.     Tele: SR,  HR,  No alarms.     Bed in lowest position, wheels locked, non skid socks, ID band worn, personal items and call bell with in reach, bed alarm set.       "

## 2019-04-07 NOTE — SUBJECTIVE & OBJECTIVE
Interval History: Had anxiety and insomnia again overnight.    Review of Systems   Constitutional: Negative for chills and fever.   Respiratory: Negative for cough and shortness of breath.    Neurological: Positive for weakness. Negative for syncope.     Objective:     Vital Signs (Most Recent):  Temp: 96.1 °F (35.6 °C) (04/07/19 0710)  Pulse: 65 (04/07/19 0754)  Resp: 18 (04/07/19 0710)  BP: (!) 152/70 (04/07/19 0710)  SpO2: 97 % (04/07/19 0836) Vital Signs (24h Range):  Temp:  [96.1 °F (35.6 °C)-98.1 °F (36.7 °C)] 96.1 °F (35.6 °C)  Pulse:  [] 65  Resp:  [15-19] 18  SpO2:  [97 %] 97 %  BP: (114-189)/(63-99) 152/70     Weight: 65.2 kg (143 lb 11.8 oz)  Body mass index is 24.67 kg/m².    Intake/Output Summary (Last 24 hours) at 4/7/2019 0932  Last data filed at 4/7/2019 0534  Gross per 24 hour   Intake 495 ml   Output 0 ml   Net 495 ml      Physical Exam   Constitutional: She appears well-developed. No distress.   Cardiovascular: Normal rate and regular rhythm.   Pulmonary/Chest: Effort normal. No respiratory distress.   Neurological: She is alert.   Nursing note and vitals reviewed.      Significant Labs: All pertinent labs within the past 24 hours have been reviewed.    Significant Imaging: I have reviewed all pertinent imaging results/findings within the past 24 hours.

## 2019-04-07 NOTE — PROGRESS NOTES
[facility] Hospital Medicine  Progress Note    Patient Name: Brittaney Joseph  MRN: [id number]  Patient Class: IP- Inpatient   Admission Date: 4/3/2019  Length of Stay: 4 days  Attending Physician: Brandon Ann MD  Primary Care Provider: Charles Arugeta MD        Subjective:     Principal Problem:Stroke due to embolism    HPI:  Brittaney Joseph is a 68 y.o.  female with hypertension, diabetes mellitus type 2, chronic diastolic heart failure, end stage renal disease on hemodialysis since 3/29/19 (Monday Wednesday Friday), hyperlipidemia, chronic low back pain, history of pulmonary embolism on 1/11/19 status post thrombectomy (anticoagulated on rivaroxaban). She lives with her niece. Her primary care physician is Dr. Charles Argueta. Her nephrologist is Dr. Saida Mcdaniel. She gets dialysis at [facility] [place].              She was hospitalized at [facility] from 3/28/19 to 4/1/19 to start hemodialysis.               She developed left sided facial droop and left sided weakness around 16:00 on 4/2/19. Her family did not seek medical attention at the time, but noted symptoms progressively becoming worse with onset of slurred speech on 4/3/19 after dialysis. She was taken to [facility]. Blood pressure was normal at 121/68. Head CT showed chronic appearing ischemic white matter changes bilaterally. Vascular Neurology Telemedicine consult was done. She was given a NIH stroke scale of 6 for facial palsy, left leg weakness, right leg drift, dysarthria. She already takes aspirin, atorvastatin 40 mg, and rivaroxaban. Neurology recommended increasing atorvastatin to 80 mg and admission with MRI, MRA, and echocardiogram.  [facility] was [facility] for further evaluation.     Hospital Course:  Brain MRI showed numerous stroke foci in the right supratentorial  frontal, parietal, occipital, temporal lobe and left posterior frontal lobe, suggesting embolic etiology. MRA showed mild plaque in the left posterior cerebral artery, which is not contributory to the area of her stroke. Echocardiogram was mostly normal. Vascular Neurology recommended cardiac event monitor and optionally transesophageal echocardiogram. It is optional because her anticoagulation would treat a intracardiac thrombus anyway, although it would be mandatory if she had fever or other signs of endocarditis. Physical and Occupational Therapy recommended inpatient rehab. Her home hypertension medications were restarted as her blood pressures increased.     Interval History: Had anxiety and insomnia again overnight.    Review of Systems   Constitutional: Negative for chills and fever.   Respiratory: Negative for cough and shortness of breath.    Neurological: Positive for weakness. Negative for syncope.     Objective:     Vital Signs (Most Recent):  Temp: 96.1 °F (35.6 °C) (04/07/19 0710)  Pulse: 65 (04/07/19 0754)  Resp: 18 (04/07/19 0710)  BP: (!) 152/70 (04/07/19 0710)  SpO2: 97 % (04/07/19 0836) Vital Signs (24h Range):  Temp:  [96.1 °F (35.6 °C)-98.1 °F (36.7 °C)] 96.1 °F (35.6 °C)  Pulse:  [] 65  Resp:  [15-19] 18  SpO2:  [97 %] 97 %  BP: (114-189)/(63-99) 152/70     Weight: 65.2 kg (143 lb 11.8 oz)  Body mass index is 24.67 kg/m².    Intake/Output Summary (Last 24 hours) at 4/7/2019 0932  Last data filed at 4/7/2019 0534  Gross per 24 hour   Intake 495 ml   Output 0 ml   Net 495 ml      Physical Exam   Constitutional: She appears well-developed. No distress.   Cardiovascular: Normal rate and regular rhythm.   Pulmonary/Chest: Effort normal. No respiratory distress.   Neurological: She is alert.   Nursing note and vitals reviewed.      Significant Labs: All pertinent labs within the past 24 hours have been reviewed.    Significant Imaging: I have reviewed all pertinent imaging results/findings  within the past 24 hours.     Assessment/Plan:      * Stroke due to embolism  Embolic etiology suspected but unknown due to what process. Continue rivaroxaban 10 mg daily. Appreciate Vascular Neurology. Can get cardiac event monitor outpatient. MICHAEL only if endocarditis suspected. Inpatient rehab.    End-stage renal disease on hemodialysis  Dialysis per Nephrology.    History of pulmonary embolism  Continue home rivaroxaban.    Chronic diastolic heart failure  No evidence of volume overload on exam.    Chronic low back pain  Chronic, stable     Controlled type 2 diabetes mellitus with stage 4 chronic kidney disease, without long-term current use of insulin  Hemoglobin A1c was 5.1% on 3/12/19. Discontinue glipizide. Insulin aspart sliding scale. Monitor serum glucose.    Hyperlipidemia associated with type 2 diabetes mellitus  Continue home aspirin and atorvastatin.    Essential hypertension  Takes amlodipine 10 mg HS, clonidine 0.2 mg HS, hydralazine 25 mg every 8 hours, metoprolol tartrate 100 mg BID, furosemide 80 mg BID. Resume all.      VTE Risk Mitigation (From admission, onward)        Ordered     heparin (porcine) injection 4,300 Units  As needed (PRN)      04/05/19 1544     rivaroxaban tablet 10 mg  With dinner      04/03/19 2122     Reason for No Pharmacological VTE Prophylaxis  Once      04/03/19 2122     IP VTE HIGH RISK PATIENT  Once      04/03/19 2122     Place sequential compression device  Until discontinued      04/03/19 2122     Reason for No Pharmacological VTE Prophylaxis  Once      04/03/19 2122        Disposition: Inpatient rehab.      Brandon Ann MD  Department of Hospital Medicine   [facility]

## 2019-04-07 NOTE — PLAN OF CARE
Problem: Adult Inpatient Plan of Care  Goal: Plan of Care Review  Outcome: Ongoing (interventions implemented as appropriate)     04/07/19 0516   Plan of Care Review   Plan of Care Reviewed With patient   Pt initially reported no pain but about 45 min later reported a HA.  Gave a fiercet and remelten. Pt finally settled down and went to sleep.  Sister Karyna at bed side. 2100 BS 92, no coverage needed.      Tele: NSR, reg HR 70  No alarms.     Bed in lowest position, wheels locked, non skid socks, ID band worn, personal items and call bell with in reach, bed alarm set.

## 2019-04-07 NOTE — ASSESSMENT & PLAN NOTE
Plan for next HD on Monday am.  Monitor clinical volume status and labs over weekend.        Will plan for dialysis in am.  Will check labs pre dialysis in a.m and adjust dialysis Rx. As needed,

## 2019-04-07 NOTE — PROGRESS NOTES
[facility]  Nephrology  Progress Note    Patient Name: Brittaney Joseph  MRN: [id number]  Admission Date: 4/3/2019  Hospital Length of Stay: 4 days  Attending Provider: Brandon Ann MD   Primary Care Physician: Charles Argueta MD  Principal Problem:Stroke due to embolism    Subjective:     HPI: Pt. Sitting up in chair with sister @ bedside.  Doing well only c/o MARY weakness and decreased strength.  Able to stand and take steps.      Pt. Stable, no c/o, plan for dialysis in am.    Interval History:   Pt. Stable      Review of patient's allergies indicates:  No Known Allergies  Current Facility-Administered Medications   Medication Frequency    0.9%  NaCl infusion PRN    acetaminophen tablet 650 mg Q6H PRN    amLODIPine tablet 10 mg Nightly    aspirin chewable tablet 81 mg Daily    atorvastatin tablet 40 mg Daily    bisacodyl EC tablet 5 mg Daily PRN    butalbital-acetaminophen-caffeine -40 mg per tablet 1 tablet Q4H PRN    cloNIDine tablet 0.2 mg Nightly    dextrose 50 % in water (D50W) injection 12.5 g PRN    dextrose 50 % in water (D50W) injection 25 g PRN    glucagon (human recombinant) injection 1 mg PRN    glucose chewable tablet 16 g PRN    glucose chewable tablet 24 g PRN    heparin (porcine) injection 4,300 Units PRN    hydrALAZINE tablet 25 mg Q8H PRN    insulin aspart U-100 pen 0-5 Units QID (AC + HS) PRN    lactulose 20 gram/30 mL solution Soln 15 g Q6H PRN    LORazepam tablet 0.5 mg Q6H PRN    metoprolol tartrate (LOPRESSOR) tablet 100 mg BID    nitroGLYCERIN SL tablet 0.4 mg Q5 Min PRN    ondansetron injection 4 mg Q8H PRN    pantoprazole EC tablet 40 mg Daily    ramelteon tablet 8 mg Nightly PRN    rivaroxaban tablet 10 mg Daily with dinner    senna-docusate 8.6-50 mg per tablet 1 tablet BID PRN    sodium chloride 0.9% flush 10 mL PRN    sodium chloride 0.9% flush 10 mL PRN    sucralfate 100 mg/mL suspension 1 g QID       Objective:     Vital Signs  (Most Recent):  Temp: 97.7 °F (36.5 °C) (04/07/19 1121)  Pulse: 65 (04/07/19 1147)  Resp: 16 (04/07/19 1121)  BP: 112/63 (04/07/19 1121)  SpO2: 97 % (04/07/19 1228)  O2 Device (Oxygen Therapy): room air (04/07/19 1228) Vital Signs (24h Range):  Temp:  [96.1 °F (35.6 °C)-98.1 °F (36.7 °C)] 97.7 °F (36.5 °C)  Pulse:  [65-83] 65  Resp:  [15-19] 16  SpO2:  [97 %] 97 %  BP: (112-189)/(63-99) 112/63     Weight: 65.2 kg (143 lb 11.8 oz) (04/07/19 0534)  Body mass index is 24.67 kg/m².  Body surface area is 1.72 meters squared.    I/O last 3 completed shifts:  In: 1025 [P.O.:1025]  Out: 0     Physical Exam   Constitutional: She is oriented to person, place, and time. She appears well-developed and well-nourished.   HENT:   Head: Normocephalic and atraumatic.   Cardiovascular: Normal rate, regular rhythm and normal heart sounds.   Pulmonary/Chest: Effort normal and breath sounds normal.   Musculoskeletal: She exhibits no edema.   Neurological: She is alert and oriented to person, place, and time.   Vitals reviewed.      Significant Labs:  Cardiac Markers: No results for input(s): CKMB, TROPONINT, MYOGLOBIN in the last 168 hours.  CBC:   Recent Labs   Lab 04/05/19  1046   WBC 5.52   RBC 3.56*   HGB 10.3*   HCT 31.5*      MCV 89   MCH 28.9   MCHC 32.7     CMP:   Recent Labs   Lab 04/03/19  1815  04/06/19  1035 04/07/19  1135   *   < > 114*  --    CALCIUM 8.6*   < > 9.1  --    ALBUMIN 3.3*   < > 2.5*  --    PROT 6.7  --   --   --    *   < > 134*  --    K 3.2*   < > 3.0* 3.4*   CO2 29   < > 22*  --    CL 97   < > 104  --    BUN 7   < > 10  --    CREATININE 2.22*   < > 3.3*  --    ALKPHOS 102  --   --   --    ALT 17  --   --   --    AST 53*  --   --   --    BILITOT 0.7  --   --   --     < > = values in this interval not displayed.        Significant Imaging:  Labs: Reviewed  X-Ray: Reviewed    Assessment/Plan:     End-stage renal disease on hemodialysis  Plan for next HD on Monday am.  Monitor clinical volume  status and labs over weekend.        Will plan for dialysis in am.  Will check labs pre dialysis in a.m and adjust dialysis Rx. As needed,        Thank you for your consult. I will follow-up with patient. Please contact us if you have any additional questions.    Saida Mcdaniel MD  Nephrology  [facility]

## 2019-04-08 VITALS
BODY MASS INDEX: 24.54 KG/M2 | WEIGHT: 143.75 LBS | HEART RATE: 84 BPM | TEMPERATURE: 97 F | DIASTOLIC BLOOD PRESSURE: 59 MMHG | SYSTOLIC BLOOD PRESSURE: 103 MMHG | OXYGEN SATURATION: 100 % | HEIGHT: 64 IN | RESPIRATION RATE: 16 BRPM

## 2019-04-08 LAB
ANION GAP SERPL CALC-SCNC: 9 MMOL/L (ref 8–16)
BASOPHILS # BLD AUTO: 0.04 K/UL (ref 0–0.2)
BASOPHILS NFR BLD: 0.7 % (ref 0–1.9)
BUN SERPL-MCNC: 20 MG/DL (ref 8–23)
CALCIUM SERPL-MCNC: 9.4 MG/DL (ref 8.7–10.5)
CHLORIDE SERPL-SCNC: 103 MMOL/L (ref 95–110)
CO2 SERPL-SCNC: 20 MMOL/L (ref 23–29)
CREAT SERPL-MCNC: 4.5 MG/DL (ref 0.5–1.4)
DIFFERENTIAL METHOD: ABNORMAL
EOSINOPHIL # BLD AUTO: 0.2 K/UL (ref 0–0.5)
EOSINOPHIL NFR BLD: 3.3 % (ref 0–8)
ERYTHROCYTE [DISTWIDTH] IN BLOOD BY AUTOMATED COUNT: 15.8 % (ref 11.5–14.5)
EST. GFR  (AFRICAN AMERICAN): 11 ML/MIN/1.73 M^2
EST. GFR  (NON AFRICAN AMERICAN): 9 ML/MIN/1.73 M^2
GLUCOSE SERPL-MCNC: 95 MG/DL (ref 70–110)
HCT VFR BLD AUTO: 34.2 % (ref 37–48.5)
HGB BLD-MCNC: 10.2 G/DL (ref 12–16)
LYMPHOCYTES # BLD AUTO: 3 K/UL (ref 1–4.8)
LYMPHOCYTES NFR BLD: 49.9 % (ref 18–48)
MCH RBC QN AUTO: 27.2 PG (ref 27–31)
MCHC RBC AUTO-ENTMCNC: 29.8 G/DL (ref 32–36)
MCV RBC AUTO: 91 FL (ref 82–98)
MONOCYTES # BLD AUTO: 0.8 K/UL (ref 0.3–1)
MONOCYTES NFR BLD: 13 % (ref 4–15)
NEUTROPHILS # BLD AUTO: 2 K/UL (ref 1.8–7.7)
NEUTROPHILS NFR BLD: 32.9 % (ref 38–73)
PLATELET # BLD AUTO: 208 K/UL (ref 150–350)
PMV BLD AUTO: 10.2 FL (ref 9.2–12.9)
POCT GLUCOSE: 80 MG/DL (ref 70–110)
POTASSIUM SERPL-SCNC: 3.8 MMOL/L (ref 3.5–5.1)
RBC # BLD AUTO: 3.75 M/UL (ref 4–5.4)
SODIUM SERPL-SCNC: 132 MMOL/L (ref 136–145)
WBC # BLD AUTO: 6.07 K/UL (ref 3.9–12.7)

## 2019-04-08 PROCEDURE — 25000003 PHARM REV CODE 250: Performed by: HOSPITALIST

## 2019-04-08 PROCEDURE — 36415 COLL VENOUS BLD VENIPUNCTURE: CPT

## 2019-04-08 PROCEDURE — 97112 NEUROMUSCULAR REEDUCATION: CPT

## 2019-04-08 PROCEDURE — 99223 PR INITIAL HOSPITAL CARE,LEVL III: ICD-10-PCS | Mod: ,,, | Performed by: PHYSICAL MEDICINE & REHABILITATION

## 2019-04-08 PROCEDURE — 80048 BASIC METABOLIC PNL TOTAL CA: CPT

## 2019-04-08 PROCEDURE — 85025 COMPLETE CBC W/AUTO DIFF WBC: CPT

## 2019-04-08 PROCEDURE — 97116 GAIT TRAINING THERAPY: CPT

## 2019-04-08 PROCEDURE — 94761 N-INVAS EAR/PLS OXIMETRY MLT: CPT

## 2019-04-08 PROCEDURE — 90935 HEMODIALYSIS ONE EVALUATION: CPT

## 2019-04-08 PROCEDURE — 97530 THERAPEUTIC ACTIVITIES: CPT

## 2019-04-08 PROCEDURE — 99223 1ST HOSP IP/OBS HIGH 75: CPT | Mod: ,,, | Performed by: PHYSICAL MEDICINE & REHABILITATION

## 2019-04-08 RX ORDER — LANOLIN ALCOHOL/MO/W.PET/CERES
400 CREAM (GRAM) TOPICAL ONCE
Status: COMPLETED | OUTPATIENT
Start: 2019-04-08 | End: 2019-04-08

## 2019-04-08 RX ORDER — POTASSIUM CHLORIDE 20 MEQ/1
20 TABLET, EXTENDED RELEASE ORAL ONCE
Status: COMPLETED | OUTPATIENT
Start: 2019-04-08 | End: 2019-04-08

## 2019-04-08 RX ADMIN — ASPIRIN 81 MG 81 MG: 81 TABLET ORAL at 02:04

## 2019-04-08 RX ADMIN — ATORVASTATIN CALCIUM 40 MG: 40 TABLET, FILM COATED ORAL at 02:04

## 2019-04-08 RX ADMIN — SUCRALFATE 1 G: 1 SUSPENSION ORAL at 01:04

## 2019-04-08 RX ADMIN — MAGNESIUM OXIDE TAB 400 MG (241.3 MG ELEMENTAL MG) 400 MG: 400 (241.3 MG) TAB at 07:04

## 2019-04-08 RX ADMIN — PANTOPRAZOLE SODIUM 40 MG: 40 TABLET, DELAYED RELEASE ORAL at 08:04

## 2019-04-08 RX ADMIN — POTASSIUM CHLORIDE 20 MEQ: 20 TABLET, EXTENDED RELEASE ORAL at 07:04

## 2019-04-08 RX ADMIN — SUCRALFATE 1 G: 1 SUSPENSION ORAL at 08:04

## 2019-04-08 NOTE — PLAN OF CARE
Problem: Physical Therapy Goal  Goal: Physical Therapy Goal  Goals to be met by: 19     Patient will increase functional independence with mobility by performin. Supine <> sit with MInimal Assistance  2. Sit to stand transfer with Minimal Assistance  3. Bed to chair transfer with Minimal Assistance using appropriate AD  4. Gait  x 20 feet with Minimal Assistance using appropriate AD  5. Sitting at edge of bed x 15 minutes with Stand-by Assistance     Outcome: Ongoing (interventions implemented as appropriate)  Cont with PT POC to achieve goals.  Rec discharge to Inpt Rehab setting as pt is appropriate candidate with multi disciplinary needs and excellent rehab potential to improve functional mobility and ADL independence levels and safety.

## 2019-04-08 NOTE — ASSESSMENT & PLAN NOTE
Hemoglobin A1c was 5.1% on 3/12/19. Discontinued glipizide. Insulin aspart sliding scale. Monitor serum glucose.

## 2019-04-08 NOTE — PT/OT/SLP PROGRESS
Occupational Therapy   Treatment    Name: Brittaney Joseph  MRN: [id number]  Admitting Diagnosis:  Stroke due to embolism       Recommendations:     Discharge Recommendations: rehabilitation facility  Discharge Equipment Recommendations:  (TBD)  Barriers to discharge:  Inaccessible home environment, Decreased caregiver support    Assessment:     Brittaney Joseph is a 68 y.o. female with a medical diagnosis of Stroke due to embolism.  She presents with L sided deficits from CVA. Performance deficits affecting function are weakness, impaired self care skills, impaired balance, impaired functional mobilty, impaired endurance, impaired cognition, gait instability, impaired sensation, decreased lower extremity function, decreased upper extremity function, abnormal tone, impaired fine motor, pain, impaired coordination, decreased ROM, impaired joint extensibility, decreased safety awareness, decreased coordination.     Pt progressing well towards goals. Remains motivated for participation and to return to OF. Cont OT POC    Rehab Prognosis:  Good; patient would benefit from acute skilled OT services to address these deficits and reach maximum level of function.       Plan:     Patient to be seen 5 x/week to address the above listed problems via self-care/home management, therapeutic activities, therapeutic exercises  · Plan of Care Expires: 05/05/19  · Plan of Care Reviewed with: patient, sibling    Subjective     Pain/Comfort:  · Pain Rating 1: (reprots in buttocks )  · Pain Addressed 1: Reposition, Distraction, Cessation of Activity, Nurse notified    Objective:     Communicated with: heber prior to session.    General Precautions: Standard, fall, aphasia   Orthopedic Precautions:N/A   Braces: N/A     Occupational Performance:     Bed Mobility:    · Patient completed Sit to Supine with minimum assistance     Functional Mobility/Transfers:  · Patient completed Sit <> Stand Transfer with minimum assistance and moderate  assistance  with  hand-held assist of 2   · Patient completed Bed <> Chair Transfer using Step Transfer technique with moderate assistance with hand-held assist and of 2   · Functional Mobility: Min A of 2 with HHA of 2; Mod A of 1     Activities of Daily Living:  · Lower Body Dressing: maximal assistance and total assistance    · Toileting: maximal assistance        AMPAC 6 Click ADL: 13    Treatment & Education:  Pt sat EOB for extended period of time while lab technicians to attempt to draw blood; pt demonstrating increased tone in LUE; L lateral and posterior leaning- initially requiring Max A for sitting balance; increased tone with pain and overflow of movements noted to RUE as well; focusing on upright/midline sitting posture to decrease assist required- looking to R, reaching/leaning to right, R lateral forearm press ups  LTM/STM cognitive processing performed as well while seated EOB- inquiring about pt's family members and home life   Stood x 4 trials from EOB/B/s chair with HHA of 2 and min/Mod A   Functional mobility trials x 3 with Mod A of 1 and/or Min A of 2; initially with increased L lateral and posterior leaning; neuro facilitation techniques provided to increase midline stance with decreased leaning noted; increased tone in LUE noted with increased axs; pt fatigues with increased distance requiring seated rest breaks between trials   Session cut short by transport to take pt to dialysis     Patient left supine with all lines intact, call button in reach, nsg notified and transport  presentEducation:      GOALS:   Multidisciplinary Problems     Occupational Therapy Goals        Problem: Occupational Therapy Goal    Goal Priority Disciplines Outcome Interventions   Occupational Therapy Goal     OT, PT/OT Ongoing (interventions implemented as appropriate)    Description:  Goals to be met by: 5/5/19     Patient will increase functional independence with ADLs by performing:    UE Dressing with Minimal  Assistance.  Grooming while seated with Contact Guard Assistance.  Toileting from bedside commode with Minimal Assistance for hygiene and clothing management.   Supine to sit with Minimal Assistance.  Step transfer with Minimal Assistance  Toilet transfer to bedside commode with Minimal Assistance.  Increased functional strength to WFL for self care skills and functional mobility .  Upper extremity exercise program x10 reps per handout, with assistance as needed.                      Time Tracking:     OT Date of Treatment: 04/08/19  OT Start Time: 0914  OT Stop Time: 0944  OT Total Time (min): 30 min    Billable Minutes:Therapeutic Activity 13 co treatment with PT     Estephania Painter, OT  4/8/2019

## 2019-04-08 NOTE — SUBJECTIVE & OBJECTIVE
Interval History: Dialysis today.    Review of Systems   Constitutional: Negative for chills and fever.   Respiratory: Negative for cough and shortness of breath.    Neurological: Positive for weakness. Negative for syncope.     Objective:     Vital Signs (Most Recent):  Temp: 97.2 °F (36.2 °C) (04/08/19 0747)  Pulse: 68 (04/08/19 1030)  Resp: 18 (04/08/19 0945)  BP: 104/75 (04/08/19 1030)  SpO2: 98 % (04/08/19 0705) Vital Signs (24h Range):  Temp:  [96.4 °F (35.8 °C)-97.7 °F (36.5 °C)] 97.2 °F (36.2 °C)  Pulse:  [60-78] 68  Resp:  [16-18] 18  SpO2:  [96 %-98 %] 98 %  BP: ()/(56-86) 104/75     Weight: 65.2 kg (143 lb 11.8 oz)  Body mass index is 24.67 kg/m².    Intake/Output Summary (Last 24 hours) at 4/8/2019 1040  Last data filed at 4/8/2019 0930  Gross per 24 hour   Intake 555 ml   Output 420 ml   Net 135 ml      Physical Exam   Constitutional: She appears well-developed. No distress.   Cardiovascular: Normal rate and regular rhythm.   Pulmonary/Chest: Effort normal. No respiratory distress.   Neurological: She is alert.   Nursing note and vitals reviewed.      Significant Labs: All pertinent labs within the past 24 hours have been reviewed.    Significant Imaging: I have reviewed all pertinent imaging results/findings within the past 24 hours.

## 2019-04-08 NOTE — PT/OT/SLP PROGRESS
"Physical Therapy Treatment    Patient Name:  Brittaney Joseph   MRN:  [id number]    Recommendations:     Discharge Recommendations:  rehabilitation facility   Discharge Equipment Recommendations: (TBD w progress)   Barriers to discharge: Inaccessible home and increased level of care from PLOF    Assessment:     Brittaney Joseph is a 68 y.o. female admitted with a medical diagnosis of Stroke due to embolism.  She presents with the following impairments/functional limitations:  weakness, impaired endurance, impaired self care skills, impaired sensation, impaired functional mobilty, gait instability, impaired balance, impaired cognition, decreased coordination, decreased lower extremity function, decreased upper extremity function, decreased safety awareness, pain, impaired fine motor, abnormal tone.  Rec discharge to Inpt Rehab setting as pt is appropriate candidate with multi disciplinary needs and excellent rehab potential to improve functional mobility and ADL independence levels and safety.      Rehab Prognosis: Good; patient would benefit from acute skilled PT services to address these deficits and reach maximum level of function.    Recent Surgery: * No surgery found *      Plan:     During this hospitalization, patient to be seen 6 x/week to address the identified rehab impairments via gait training, therapeutic activities, therapeutic exercises, neuromuscular re-education and progress toward the following goals:    · Plan of Care Expires:  05/05/19    Subjective     Chief Complaint: initially reporting "butt hurts" upon entering pt's room;  After perf mobility tasks sit and gait training and returning to sup with improved positioning pt reports improved to resolved pain  Patient/Family Comments/goals: "to do therapy in rehab and get back to like before" per family member  Pain/Comfort:  · Pain Rating 1: (unable to provide numeric value but initially reporting pain at her "butt" - improved after " sit/stance/repositioning )  · Location - Side 1: Bilateral  · Location - Orientation 1: generalized  · Location 1: coccyx  · Pain Addressed 1: Reposition, Distraction, Cessation of Activity, Nurse notified  · Pain Rating Post-Intervention 1: 0/10      Objective:     Communicated with nsg prior to session.  Patient found supine with telemetry, bed alarm upon PT entry to room.     General Precautions: Standard, fall, aphasia   Orthopedic Precautions:N/A   Braces: N/A     Functional Mobility:  · Bed mobility:  B rolling min assist and sup scoot mod assist x 2 w BLE blocked/assisted w man cues/mm facilitation bridging technique; seated side scoot L w man cues LE placement/mm facilitation x 1 w max assist x 1 to achieve buttock clearance  · Txfs:  Sup<>sit w mod/max assist;  Sit<>stand w min/mod assist x 2 w HHA and 100% VC technique w wt shift and hand and foot placement before and during transition;  Low surface chair<>stand w mod assist x 2 and 100% VC  · PreGait and Gait training:  Static stance w NDT facilitation to improve equal wt bearing and stim B LE w facilitation self balance correction and wt shifting side to side and improving fwd foot wt bearing as pt w tendency for post lean;  Pt amb with B HHA (LLE hypertonic but able to extend fingers) ~3', ~6' and ~9' with seated rest between each trial w min/mod assist x 2 straight path fwd stepping step to w decreased step length and foot clearance LLE and demo decreased motor control and flat foot placement rather than heel strike with advancement and L lean w wt shift for RLE advancement demo'd; side stepping and back stepping mod/max assist x 2 for safety;  Pt demo fatigue and decreased LE control and posture/balance as fatigued during each gait trial but cooperative and motivated throughout  · Balance Sit EOB w posture dary facilitation of upright cervical posture eyes to horizon and improve awareness and self correction attempt of post LOB and L lean; pt  following cues but maintain less than 1 minute tolerance currently and then f/u cues req'd as mm fatigue, static and dynamic sit and stand act      AM-PAC 6 CLICK MOBILITY  Turning over in bed (including adjusting bedclothes, sheets and blankets)?: 3  Sitting down on and standing up from a chair with arms (e.g., wheelchair, bedside commode, etc.): 3  Moving from lying on back to sitting on the side of the bed?: 2  Moving to and from a bed to a chair (including a wheelchair)?: 2  Need to walk in hospital room?: 2  Climbing 3-5 steps with a railing?: 1  Basic Mobility Total Score: 13       Therapeutic Activities and Exercises:   safety ed and PT POC provided to pt and family present throughout;  Pt cooperative throughout;  Bed mobility training, txf training, balance act sit EOB static and dyn along w stance static and dyn endurance/strength/trunk control/COG awareness/posture dary along with when seated in OOB chair; pregait and gait training, return supine to prepare to go to HD     Patient left supine with all lines intact, call button in reach, bed alarm on, nsg notified and sister present..    GOALS:   Multidisciplinary Problems     Physical Therapy Goals        Problem: Physical Therapy Goal    Goal Priority Disciplines Outcome Goal Variances Interventions   Physical Therapy Goal     PT, PT/OT Ongoing (interventions implemented as appropriate)     Description:  Goals to be met by: 19     Patient will increase functional independence with mobility by performin. Supine <> sit with MInimal Assistance  2. Sit to stand transfer with Minimal Assistance  3. Bed to chair transfer with Minimal Assistance using appropriate AD  4. Gait  x 20 feet with Minimal Assistance using appropriate AD  5. Sitting at edge of bed x 15 minutes with Stand-by Assistance                      Time Tracking:     PT Received On: 19  PT Start Time: 904   + 6 min prior for assist supine positioning and side lying w wedge ed  for pt/family/staff  PT Stop Time: 0940  PT Total Time (min): 36 min  (+ OT tx)  (42 min total)    Billable Minutes: Gait Training 13 and Neuromuscular Re-education 10    Treatment Type: Treatment  PT/PTA: PT     PTA Visit Number: 0     Kaley Scruggs, PT  04/08/2019

## 2019-04-08 NOTE — PHYSICIAN QUERY
PT Name: Brittaney Joseph  MR #: [id number]     PHYSICIAN QUERY -  ELECTROLYTE CLARIFICATION      CDS/: Kayode Arnold               Contact information: [phone]  This form is a permanent document in the medical record.     Query Date: April 8, 2019    By submitting this query, we are merely seeking further clarification of documentation to reflect the severity of illness of your patient. Please utilize your independent clinical judgment when addressing the question(s) below.    The Medical record reflects the following:     Indicators   Supporting Clinical Findings Location in Medical Record   x Lab Value(s) Potassium Level 3.2, 2.7, 3.8 Labs 4/3, 4/5, 4/8   x Treatment                                 Medication Potassium Chloride 20 meq PO Mar 4/8 x 1    Other       Provider, please specify the diagnosis or diagnoses that correspond(s) to the above indicators. Rupert all that apply:    [ x  ] Hypokalemia   [   ] Other electrolyte disturbance (please specify): _______   [   ]  Clinically Undetermined       Please document in your progress notes daily for the duration of treatment until resolved, and include in your discharge summary.

## 2019-04-08 NOTE — NURSING
EMS picking up for transport to in-patient rehab heart monitor removed and IV. Gave report to lacho.

## 2019-04-08 NOTE — PLAN OF CARE
Transportation set up for 3:30 through [facility].  Family at bedside and aware.  I gave number for bedside nurse Eliu and she will call report.  Packet given to Eliu.       04/08/19 1431   Final Note   Assessment Type Final Discharge Note   Anticipated Discharge Disposition Rehab   Hospital Follow Up  Appt(s) scheduled? No   Discharge plans and expectations educations in teach back method with documentation complete? Yes   Right Care Referral Info   Post Acute Recommendation IRF

## 2019-04-08 NOTE — PHYSICIAN QUERY
PT Name: Brittaney Joseph  MR #: [id number]     Physician Query Form - Documentation Clarification      CDS/: Kayode Arnold               Contact information: [phone]    This form is a permanent document in the medical record.     Query Date: April 8, 2019    By submitting this query, we are merely seeking further clarification of documentation. Please utilize your independent clinical judgment when addressing the question(s) below.    The Medical record reflects the following:    Supporting Clinical Findings Location in Medical Record   lower basal ganglia and mild moderate periventricular white matter edema.    FINDINGS:  Diffusion, several foci hyperintensity posterior frontal deep subcortical white matter centrum semiovale and additional tiny area subcortical and central gyral region, right sylvian fissure cortex subcortical medial and lateral, subinsular.  And contralateral posterior frontal high convexity subcortical deep white matter.    Distal vertebral, basilar, internal carotid artery and major branches patent flow void.    Foci increased signal subcortical deep white matter high convexity posterior frontal upper basal ganglia particularly on right centrum semiovale upper basal ganglia on right, additional areas increased signal right subinsular cortex,   MRI Brain 4/4   Stroke due to embolism  Embolic etiology suspected but unknown due to what process. Continue rivaroxaban 10 mg daily, which was previously for pulmonary embolism. Appreciate Vascular Neurology. Can get cardiac event monitor outpatient. Inpatient rehab.     End-stage renal disease on hemodialysis  Dialysis per Nephrology.      She developed left sided facial droop and left sided weakness around 16:00 on 4/2/19. Her family did not seek medical attention at the time, but noted symptoms progressively becoming worse with onset of slurred speech on 4/3/19 after dialysis. She was taken to [facility]  Emergency Department. Blood pressure was normal at 121/68. Head CT showed chronic appearing ischemic white matter changes bilaterally. Vascular Neurology Telemedicine consult was done. She was given a NIH stroke scale of 6 for facial palsy, left leg weakness, right leg drift, dysarthria. She already takes aspirin, atorvastatin 40 mg, and rivaroxaban. Neurology recommended increasing atorvastatin to 80 mg and admission with MRI, MRA, and echocardiogram.  She was admitted to [facility] for further evaluation         Progress note  (ABadco) 4/8                                                                            Doctor, Please specify diagnosis or diagnoses associated with above clinical findings.    Provider Use Only    (     )  Cerebral Edema    (   x  ) Other__________stroke___________                                                                                                               [  ] Clinically Undetermined

## 2019-04-08 NOTE — PLAN OF CARE
Problem: Occupational Therapy Goal  Goal: Occupational Therapy Goal  Goals to be met by: 5/5/19     Patient will increase functional independence with ADLs by performing:    UE Dressing with Minimal Assistance.  Grooming while seated with Contact Guard Assistance.  Toileting from bedside commode with Minimal Assistance for hygiene and clothing management.   Supine to sit with Minimal Assistance.  Step transfer with Minimal Assistance  Toilet transfer to bedside commode with Minimal Assistance.  Increased functional strength to WFL for self care skills and functional mobility .  Upper extremity exercise program x10 reps per handout, with assistance as needed.     Outcome: Ongoing (interventions implemented as appropriate)  Pt progressing well towards goals. Remains motivated for participation and to return to PLOF. Cont OT POC

## 2019-04-08 NOTE — DISCHARGE SUMMARY
[facility]  Discharge Summary      Patient Name: Brittaney Joseph  MRN: [id number]  Admission Date: 4/3/2019  Hospital Length of Stay: 5 days  Discharge Date and Time: 4/8/2019  4:11 PM  Attending Physician: Brandon Ann MD   Discharging Provider: Brandon Ann MD  Primary Care Provider: Charles Argueta MD      HPI:   Brittaney Joseph is a 68 y.o.  female with hypertension, diabetes mellitus type 2, chronic diastolic heart failure, end stage renal disease on hemodialysis since 3/29/19 (Monday Wednesday Friday), hyperlipidemia, chronic low back pain, history of pulmonary embolism on 1/11/19 status post thrombectomy (anticoagulated on rivaroxaban). She lives with her niece. Her primary care physician is Dr. Charles Argueta. Her nephrologist is Dr. Saida Mcdaniel. She gets dialysis at [facility].              She was hospitalized at [facility] from 3/28/19 to 4/1/19 to start hemodialysis.               She developed left sided facial droop and left sided weakness around 16:00 on 4/2/19. Her family did not seek medical attention at the time, but noted symptoms progressively becoming worse with onset of slurred speech on 4/3/19 after dialysis. She was taken to [facility] Emergency Department. Blood pressure was normal at 121/68. Head CT showed chronic appearing ischemic white matter changes bilaterally. Vascular Neurology Telemedicine consult was done. She was given a NIH stroke scale of 6 for facial palsy, left leg weakness, right leg drift, dysarthria. She already takes aspirin, atorvastatin 40 mg, and rivaroxaban. Neurology recommended increasing atorvastatin to 80 mg and admission with MRI, MRA, and echocardiogram.  She was admitted to [facility] for further evaluation.       Hospital Course:   Brain MRI showed numerous stroke foci in the right  supratentorial frontal, parietal, occipital, temporal lobe and left posterior frontal lobe, suggesting embolic etiology. MRA showed mild plaque in the left posterior cerebral artery, which is not contributory to the area of her stroke. Echocardiogram was mostly normal. Vascular Neurology recommended cardiac event monitor and optionally transesophageal echocardiogram. It is optional because her anticoagulation would treat a intracardiac thrombus anyway, although it would be mandatory if she had fever or other signs of endocarditis. Physical and Occupational Therapy recommended inpatient rehab. Her home hypertension medications were restarted as her blood pressures increased. Her clonidine 0.2 mg nightly and hydralazine 25 mg every 8 hours were discontinued because her blood pressures were controlled without them. She was discharged to [facility].    30-day event monitor should be done outpatient.     Consults:   Consults (From admission, onward)        Status Ordering Provider     Inpatient consult to Nephrology-[facility]  Once     Provider:  (Not yet assigned)    Acknowledged JOSEPH MONSALVE A     Inpatient consult to Registered Dietitian/Nutritionist  Once     Provider:  (Not yet assigned)    Completed FABIOLA MONSALVEIN A     Inpatient consult to Social Work  Once     Provider:  (Not yet assigned)    Acknowledged FABIOLA MONSALVEIN A     Inpatient consult to Telemedicine-Stroke  Once     Provider:  (Not yet assigned)    Completed YVES JORGE     Inpatient consult to Telemedicine-Stroke  Once     Provider:  Latoya Longoria MD    Acknowledged GURPREET GANNON     Inpatient Telestroke rounding  Once     Provider:  Yuliet Ellsworth MD    Acknowledged JOSEPH MONSALVE     IP consult to case management/social work  Once     Provider:  (Not yet assigned)    Acknowledged FABIOLA MONSALVEIN A            Final Active Diagnoses:    Diagnosis Date Noted POA    PRINCIPAL PROBLEM:  Stroke due to embolism [I63.9]  04/03/2019 Yes    End-stage renal disease on hemodialysis [N18.6, Z99.2] 03/28/2019 Not Applicable     Chronic    History of pulmonary embolism [Z86.711] 01/11/2019 Yes     Chronic    Chronic diastolic heart failure [I50.32] 01/10/2019 Yes     Chronic    Chronic low back pain [M54.5, G89.29]  Yes     Chronic    Controlled type 2 diabetes mellitus with stage 4 chronic kidney disease, without long-term current use of insulin [E11.22, N18.4] 12/20/2018 Yes     Chronic    Essential hypertension [I10] 12/20/2018 Yes     Chronic    Hyperlipidemia associated with type 2 diabetes mellitus [E11.69, E78.5] 12/20/2018 Yes     Chronic      Problems Resolved During this Admission:       Discharged Condition: good    Disposition: Rehab Facility    Follow Up:  Follow-up Information     [facility].    Specialties:  Rehab Facility, Rehabilitation  Contact information:  [facility] [address]  [phone]                 Patient Instructions:      Diet diabetic     Vital signs per facility protocol     Notify Physician     Order Specific Question Answer Comments   Temperature (F) greater than 101    Systolic Blood Pressure SBP greater than or equal to 160    Systolic Blood Pressure SBP less than or equal to 90    Diastolic Blood Pressure DBP greater than or equal to 110    Diastolic Blood Pressure DBP less than or equal to 60    Pulse greater than or equal to 120    Pulse less than or equal to 50    Respirations Rate RR greater than or equal to 30    Respirations Rate RR less than or equal to 6    Urine output less than 60 over 2 hours   SPO2% less than 90      Wound care routine (specify)   Order Comments: Sacral decubitus ulcer stage 2: Cleanse with normal saline, apply zinc oxide, and cover with Mepilex 3 times a week and as needed when soiled.     Full code     Turn patient every 2 hours to prevent pressure ulcer formation       Significant Diagnostic Studies:   MRI BRAIN  WITHOUT CONTRAST 4/04/19:   FINDINGS:  Diffusion, several foci hyperintensity posterior frontal deep subcortical white matter centrum semiovale and additional tiny area subcortical and central gyral region, right sylvian fissure cortex subcortical medial and lateral, subinsular.  And contralateral posterior frontal high convexity subcortical deep white matter.  Distal vertebral, basilar, internal carotid artery and major branches patent flow void.  Foci increased signal subcortical deep white matter high convexity posterior frontal upper basal ganglia particularly on right centrum semiovale upper basal ganglia on right, additional areas increased signal right subinsular cortex, lower basal ganglia and mild moderate periventricular white matter edema.  Additional tiny areas cortex subcortical right lateral parietooccipital junction.  Central and cortical atrophy appropriate for age.  Nasal septal deviation left.  Gradient echo imaging negative.  Impression: Numerous foci right supratentorial frontal, parietal, occipital, temporal lobe and left posterior frontal consistent with acute subacute foci under 2 weeks age of gliosis, microvascular ischemic change from suspected embolic phenomenon favored.  MRA Brain and Neck without contrast 4/04/19: FINDINGS:  Tiny right posterior communicating artery, left posterior cerebral ridge in age directly from distal internal carotid with tiny left P-1 segment.  Mild plaquing left posterior cerebral artery without significant stenosis.  Left vertebral dominant.  Anterior communicating artery noted. Common origin left carotid from innominate.  Carotid and vertebral arteries are patent.  Impression: Suggestion of mild plaquing left posterior cerebral artery.  No severe stenosis, aneurysm or AVM.  TRANSTHORACIC ECHO (TTE) COMPLETE W/ CONTRAST 4/04/19:  · Increased (hyperdynamic) left ventricular systolic function. The estimated ejection fraction is 65%  · Concentric left ventricular  remodeling.  · Normal right ventricular systolic function.  · There is moderate leaflet thickening and calcification of the Mitral and Aortic Valves.  · The estimated PA systolic pressure is 35 mm Hg  · Intermediate central venous pressure (8 mm Hg).  · Negative Bubble Study. No Right to Left intra-atrial shunt    Medications:  Reconciled Home Medications:      Medication List      CONTINUE taking these medications    amLODIPine 10 MG tablet  Commonly known as:  NORVASC  Take 10 mg by mouth nightly.     aspirin 81 MG Chew  Take 1 tablet (81 mg total) by mouth once daily.     atorvastatin 40 MG tablet  Commonly known as:  LIPITOR  Take 40 mg by mouth once daily.     furosemide 80 MG tablet  Commonly known as:  LASIX  Take 1 tablet (80 mg total) by mouth 2 (two) times daily.     metoprolol tartrate 100 MG tablet  Commonly known as:  LOPRESSOR  Take 1 tablet (100 mg total) by mouth 2 (two) times daily.     nitroGLYCERIN 0.4 MG SL tablet  Commonly known as:  NITROSTAT  Place 1 tablet (0.4 mg total) under the tongue every 5 (five) minutes as needed for Chest pain. if second dose needed call 911     ondansetron 4 MG tablet  Commonly known as:  ZOFRAN  Take 1 tablet (4 mg total) by mouth every 8 (eight) hours as needed for Nausea.     pantoprazole 40 MG tablet  Commonly known as:  PROTONIX  Take 40 mg by mouth once daily.     rivaroxaban 10 mg Tab  Commonly known as:  XARELTO  Take 1 tablet (10 mg total) by mouth daily with dinner or evening meal.     senna-docusate 8.6-50 mg 8.6-50 mg per tablet  Commonly known as:  PERICOLACE  Take 1 tablet by mouth 2 (two) times daily as needed for Constipation.     sucralfate 100 mg/mL suspension  Commonly known as:  CARAFATE  Take 10 mLs (1 g total) by mouth 4 (four) times daily.        STOP taking these medications    cloNIDine 0.2 MG tablet  Commonly known as:  CATAPRES     glipiZIDE 2.5 MG Tr24  Commonly known as:  GLUCOTROL     hydrALAZINE 25 MG tablet  Commonly known as:   APRESOLINE     oxyCODONE-acetaminophen  mg per tablet  Commonly known as:  PERCOCET            Indwelling Lines/Drains at time of discharge:   Lines/Drains/Airways     Central Venous Catheter Line                 Hemodialysis Catheter 03/29/19 2043 right internal jugular 9 days          Pressure Ulcer                 Pressure Injury 04/03/19 2145 midline Sacral spine #1 Stage 2 4 days                Time spent on the discharge of patient: 35 minutes  Patient was seen and examined on the date of discharge and determined to be suitable for discharge.         Brandon Ann MD  Department of Hospital Medicine  [facility]

## 2019-04-08 NOTE — PT/OT/SLP PROGRESS
Speech Language Pathology  MISSED VISIT        Brittaney Joseph  MRN: [id number]    Patient not seen today secondary to HD this am.  Will re-check as time permits.         CORINNE Rodas, CCC-SLP   4/8/2019

## 2019-04-08 NOTE — PLAN OF CARE
[facility] [place] [facility] [address] [place] [address]  Office: [phone]  Fax: [phone]                                        FACILITY TRANSFER ORDERS     Patient Name: Brittaney Joseph  YOB: 1950    04/08/2019    Admit to:  [facility]    Diagnoses:  Active Hospital Problems    Diagnosis  POA    *Stroke due to embolism [I63.9]  Yes    End-stage renal disease on hemodialysis [N18.6, Z99.2]  Not Applicable     Chronic     Monday Wednesday Friday at [facility]      History of pulmonary embolism [Z86.711]  Yes     Chronic    Chronic diastolic heart failure [I50.32]  Yes     Chronic    Chronic low back pain [M54.5, G89.29]  Yes     Chronic    Controlled type 2 diabetes mellitus with stage 4 chronic kidney disease, without long-term current use of insulin [E11.22, N18.4]  Yes     Chronic     Nutrition Problem  Increased Nutrient Needs-calories/protein       Related to (etiology):   ESRD with Hemodialysis    Signs and Symptoms (as evidenced by):   Decrease po intake for >1 month  Significant weight loss of 9% in <3 months  New Hemodialysis for <2 months     Interventions:  Collaboration with other providers    Nutrition Diagnosis Status:   New          Essential hypertension [I10]  Yes     Chronic    Hyperlipidemia associated with type 2 diabetes mellitus [E11.69, E78.5]  Yes     Chronic      Resolved Hospital Problems   No resolved problems to display.       Allergies:Review of patient's allergies indicates:  No Known Allergies    Discharge Procedure Orders   Diet diabetic     Vital signs per facility protocol     Notify Physician     Order Specific Question Answer Comments   Temperature (F) greater than 101    Systolic Blood Pressure SBP greater than or equal to 160    Systolic Blood Pressure SBP less than or equal to 90    Diastolic Blood Pressure DBP greater than or equal to 110    Diastolic Blood Pressure DBP  less than or equal to 60    Pulse greater than or equal to 120    Pulse less than or equal to 50    Respirations Rate RR greater than or equal to 30    Respirations Rate RR less than or equal to 6    Urine output less than 60 over 2 hours   SPO2% less than 90      Wound care routine (specify)   Order Comments: Sacral decubitus ulcer stage 2: Cleanse with normal saline, apply zinc oxide, and cover with Mepilex 3 times a week and as needed when soiled.     Full code     Turn patient every 2 hours to prevent pressure ulcer formation       CONSULTS:    Physical Therapy to evaluate and treat     Occupational Therapy to evaluate and treat     Speech Therapy to evaluate and treat    Dialysis Monday Wednesday Friday    DIABETES CARE:    SN to perform and educate Diabetic management with blood glucose monitoring:      Fingerstick blood sugar a.m. and p.m.    Report CBG < 60 or > 350 to physician.                                          Insulin Sliding Scale          Glucose  Novolog Insulin Subcutaneous        0 - 60   Orange juice or glucose tablet, hold insulin      No insulin   201-250  2 units   251-300  4 units   301-350  6 units   351-400  8 units   >400   10 units then call physician    Medications:    Jaek Brittaney   Home Medication Instructions FREDDY:[id number]    Printed on:04/08/19 1322   Medication Information                      amLODIPine (NORVASC) 10 MG tablet  Take 10 mg by mouth nightly.             aspirin 81 MG Chew  Take 1 tablet (81 mg total) by mouth once daily.             atorvastatin (LIPITOR) 40 MG tablet  Take 40 mg by mouth once daily.             furosemide (LASIX) 80 MG tablet  Take 1 tablet (80 mg total) by mouth 2 (two) times daily.             metoprolol tartrate (LOPRESSOR) 100 MG tablet  Take 1 tablet (100 mg total) by mouth 2 (two) times daily.             nitroGLYCERIN (NITROSTAT) 0.4 MG SL tablet  Place 1 tablet (0.4 mg total) under the tongue every 5 (five) minutes as needed  for Chest pain.              ondansetron (ZOFRAN) 4 MG tablet  Take 1 tablet (4 mg total) by mouth every 8 (eight) hours as needed for Nausea.             pantoprazole (PROTONIX) 40 MG tablet  Take 40 mg by mouth once daily.             rivaroxaban (XARELTO) 10 mg Tab  Take 1 tablet (10 mg total) by mouth daily with dinner or evening meal.             senna-docusate 8.6-50 mg (PERICOLACE) 8.6-50 mg per tablet  Take 1 tablet by mouth 2 (two) times daily as needed for Constipation.             sucralfate (CARAFATE) 100 mg/mL suspension  Take 10 mLs (1 g total) by mouth 4 (four) times daily.                       _________________________________  Brandon Ann MD  04/08/2019

## 2019-04-08 NOTE — ASSESSMENT & PLAN NOTE
Takes amlodipine 10 mg HS, clonidine 0.2 mg HS, hydralazine 25 mg every 8 hours, metoprolol tartrate 100 mg BID, furosemide 80 mg BID. Continue amlodipine, metoprolol. Hold clonidine, hydralazine, furosemide. May need some medications discontinued on discharge.

## 2019-04-08 NOTE — PROGRESS NOTES
[facility] [facility]  Progress Note    Patient Name: Brittaney Joseph  MRN: [id number]  Patient Class: IP- Inpatient   Admission Date: 4/3/2019  Length of Stay: 5 days  Attending Physician: Brandon Ann MD  Primary Care Provider: Charles Argueta MD        Subjective:     Principal Problem:Stroke due to embolism    HPI:  Brittaney Joseph is a 68 y.o.  female with hypertension, diabetes mellitus type 2, chronic diastolic heart failure, end stage renal disease on hemodialysis since 3/29/19 (Monday Wednesday Friday), hyperlipidemia, chronic low back pain, history of pulmonary embolism on 1/11/19 status post thrombectomy (anticoagulated on rivaroxaban). She lives with her niece. Her primary care physician is Dr. Charles Argueta. Her nephrologist is Dr. Saida Mcdaniel. She gets dialysis at [facility].              She was hospitalized at [facility] [place] from 3/28/19 to 4/1/19 to start hemodialysis.               She developed left sided facial droop and left sided weakness around 16:00 on 4/2/19. Her family did not seek medical attention at the time, but noted symptoms progressively becoming worse with onset of slurred speech on 4/3/19 after dialysis. She was taken to [facility]. Blood pressure was normal at 121/68. Head CT showed chronic appearing ischemic white matter changes bilaterally. Vascular Neurology Telemedicine consult was done. She was given a NIH stroke scale of 6 for facial palsy, left leg weakness, right leg drift, dysarthria. She already takes aspirin, atorvastatin 40 mg, and rivaroxaban. Neurology recommended increasing atorvastatin to 80 mg and admission with MRI, MRA, and echocardiogram.  She was [facility] to [facility] [place] for further evaluation.     Hospital Course:  Brain MRI showed numerous stroke foci in the right supratentorial  frontal, parietal, occipital, temporal lobe and left posterior frontal lobe, suggesting embolic etiology. MRA showed mild plaque in the left posterior cerebral artery, which is not contributory to the area of her stroke. Echocardiogram was mostly normal. Vascular Neurology recommended cardiac event monitor and optionally transesophageal echocardiogram. It is optional because her anticoagulation would treat a intracardiac thrombus anyway, although it would be mandatory if she had fever or other signs of endocarditis. Physical and Occupational Therapy recommended inpatient rehab. Her home hypertension medications were restarted as her blood pressures increased.     Interval History: Dialysis today.    Review of Systems   Constitutional: Negative for chills and fever.   Respiratory: Negative for cough and shortness of breath.    Neurological: Positive for weakness. Negative for syncope.     Objective:     Vital Signs (Most Recent):  Temp: 97.2 °F (36.2 °C) (04/08/19 0747)  Pulse: 68 (04/08/19 1030)  Resp: 18 (04/08/19 0945)  BP: 104/75 (04/08/19 1030)  SpO2: 98 % (04/08/19 0705) Vital Signs (24h Range):  Temp:  [96.4 °F (35.8 °C)-97.7 °F (36.5 °C)] 97.2 °F (36.2 °C)  Pulse:  [60-78] 68  Resp:  [16-18] 18  SpO2:  [96 %-98 %] 98 %  BP: ()/(56-86) 104/75     Weight: 65.2 kg (143 lb 11.8 oz)  Body mass index is 24.67 kg/m².    Intake/Output Summary (Last 24 hours) at 4/8/2019 1040  Last data filed at 4/8/2019 0930  Gross per 24 hour   Intake 555 ml   Output 420 ml   Net 135 ml      Physical Exam   Constitutional: She appears well-developed. No distress.   Cardiovascular: Normal rate and regular rhythm.   Pulmonary/Chest: Effort normal. No respiratory distress.   Neurological: She is alert.   Nursing note and vitals reviewed.      Significant Labs: All pertinent labs within the past 24 hours have been reviewed.    Significant Imaging: I have reviewed all pertinent imaging results/findings within the past 24 hours.      Assessment/Plan:      * Stroke due to embolism  Embolic etiology suspected but unknown due to what process. Continue rivaroxaban 10 mg daily, which was previously for pulmonary embolism. Appreciate Vascular Neurology. Can get cardiac event monitor outpatient. Inpatient rehab.    End-stage renal disease on hemodialysis  Dialysis per Nephrology.    History of pulmonary embolism  Continue home rivaroxaban.    Chronic diastolic heart failure  No evidence of volume overload on exam.    Chronic low back pain  Chronic, stable     Controlled type 2 diabetes mellitus with stage 4 chronic kidney disease, without long-term current use of insulin  Hemoglobin A1c was 5.1% on 3/12/19. Discontinued glipizide. Insulin aspart sliding scale. Monitor serum glucose.    Hyperlipidemia associated with type 2 diabetes mellitus  Continue home aspirin and atorvastatin.    Essential hypertension  Takes amlodipine 10 mg HS, clonidine 0.2 mg HS, hydralazine 25 mg every 8 hours, metoprolol tartrate 100 mg BID, furosemide 80 mg BID. Continue amlodipine, metoprolol. Hold clonidine, hydralazine, furosemide. May need some medications discontinued on discharge.      VTE Risk Mitigation (From admission, onward)        Ordered     heparin (porcine) injection 4,300 Units  As needed (PRN)      04/05/19 1544     rivaroxaban tablet 10 mg  With dinner      04/03/19 2122     Reason for No Pharmacological VTE Prophylaxis  Once      04/03/19 2122     IP VTE HIGH RISK PATIENT  Once      04/03/19 2122     Place sequential compression device  Until discontinued      04/03/19 2122     Reason for No Pharmacological VTE Prophylaxis  Once      04/03/19 2122        Disposition: Inpatient rehab.      Brandon Ann MD  Department of Hospital Medicine   [facility]

## 2019-04-08 NOTE — PLAN OF CARE
Problem: Adult Inpatient Plan of Care  Goal: Plan of Care Review  Outcome: Ongoing (interventions implemented as appropriate)  Plan of care reviewed patient verbalized understanding. Medications administered, blood glucose monitoring per sliding scale no coverage needed. Neurological assessment q 4hrs. Aspiration and delirium precautions maintained. Dialysis performed 1.1L removed.  LORIN monitoring. Bed in lowest position, bed alarm on, call bell in reach. Will continue to monitor.

## 2019-04-08 NOTE — PROGRESS NOTES
[employer] renal fellow ELYSE WEI        Reason for Consult:     ESRD    Subjective:      History of Present Illness:  Brittaney Joseph is a 68 y.o. AA female who  has a past medical history of Anticoagulant long-term use, Arthritis, Chronic kidney disease, Chronic low back pain, Diabetes mellitus, type 2, Hypertension, Pulmonary embolism with acute cor pulmonale (2019), and Stroke due to embolism (2019).. The patient presented to the [facility] [place] on 4/3/2019 with a primary complaint of Cerebrovascular Accident (c/o left sided weakness and difficulty speaking that started this morning at home. Pt unable to move left arm with minimal movement to left leg. Left side facial droop noted with slurred speech and delayed response.)    This is a 69 yo AA female who is being admitted for initiation of dialysis; she was sent from outpt nephrology clinic (Dr. Edgar) after labwork saw a progression in her renal disease; in a addition, she has uremic Sx including n/v/asterixis/confusion/decreased urine output/decreased po intake for a couple of weeks.    This aM, the pt denies sob; she endorses decreased appetite; pt knows why she is here, but I believe she has poor insight into her medical condiitons      Interim History:  Pt reports that she is breathing ok today. Denies SOB, CP, N/V. No issues with dialysis yesterday. Currently on dialysis without acute complaints.    Review of Systems: All other systems are reviewed and are negative.       Objective:   Last 24 Hour Vital Signs:  BP  Min: 91/56  Max: 148/86  Temp  Av.8 °F (36 °C)  Min: 96.4 °F (35.8 °C)  Max: 97.2 °F (36.2 °C)  Pulse  Av.3  Min: 60  Max: 87  Resp  Av  Min: 18  Max: 18  SpO2  Av %  Min: 96 %  Max: 98 %  I/O last 3 completed shifts:  In: 800 [P.O.:800]  Out: 0     Physical Examination:  GEN - AA; NAD  CHEST - CTA B  HEART - 2/6 systolic murmur over much of precordium; no rub  ABD - soft; nonttp  EXTR  -warm; trace edema to shins B  NEURO -  proximal and distal muscle weakness; + asterixis    Laboratory Results:    Trended Lab Data:  Recent Labs   Lab 04/03/19  1815 04/05/19  1046 04/06/19  1035 04/07/19  1135 04/08/19  0926 04/08/19  0927   WBC 10.69 5.52  --   --   --  6.07   HGB 11.3* 10.3*  --   --   --  10.2*   HCT 34.8* 31.5*  --   --   --  34.2*    238  --   --   --  208   MCV 88 89  --   --   --  91   RDW 15.6* 15.9*  --   --   --  15.8*   * 135* 134*  --  132*  --    K 3.2* 2.7* 3.0* 3.4* 3.8  --    CL 97 101 104  --  103  --    CO2 29 25 22*  --  20*  --    BUN 7 15 10  --  20  --    * 93 114*  --  95  --    PROT 6.7  --   --   --   --   --    ALBUMIN 3.3* 2.5* 2.5*  --   --   --    BILITOT 0.7  --   --   --   --   --    AST 53*  --   --   --   --   --    ALKPHOS 102  --   --   --   --   --    ALT 17  --   --   --   --   --        Trended Cardiac Data:  No results for input(s): TROPONINI, CKTOTAL, CKMB, BNP in the last 168 hours.    .    Radiology:  X-ray Chest Ap Portable    Result Date: 3/28/2019  EXAMINATION: XR CHEST AP PORTABLE CLINICAL HISTORY: Weakness; TECHNIQUE: Single frontal view of the chest was performed. COMPARISON: January 10, 2019. FINDINGS: Mildly tortuous aorta, similar to prior.  Underinflated lungs with hypoventilatory change. Heart and lungs otherwise appear unchanged when allowing for differences in technique and positioning.     No acute findings. No significant change from prior study. Electronically signed by: Zi Beauchamp MD Date:    03/28/2019 Time:    19:45           Assessment/Plan     ESRD MWF  Hd today uf 2 L  - cont nephrocaps    Hypercalcemia better   - pth 42 wnl  - vit d 54   - not ergocalciferol  - not zemplar with hd    Anemia  - no  epo with iHD    CKD-MBD  - low phos no binders     H/o Pulmonary embolus- will continue Xeralto @ current dose for anticoagulation. Will monitor clinically for signs of bleeding.      Osvaldo Mckeon MD, 4/8/2019 1:06 PM  [facility] PGY-V

## 2019-04-08 NOTE — PLAN OF CARE
Problem: Adult Inpatient Plan of Care  Goal: Plan of Care Review  Patient is in bed with eyes closed and appears to be asleep. No noted distress during shift. Patient was very anxious during the beginning of the shift, prn meds given to help with anxiety.VSS. Safety measures in place, call bell in reach, bed alarm on, hob elevated will continue to monitor patient

## 2019-04-09 NOTE — PT/OT/SLP DISCHARGE
Physical Therapy Discharge Summary    Name: Brittaney Joseph  MRN: [id number]   Principal Problem: Stroke due to embolism     Patient Discharged from acute Physical Therapy on 19.  Please refer to prior PT noted date on 19 for functional status.     Assessment:     Patient appropriate for care in another setting.    Objective:     GOALS:   Multidisciplinary Problems     Physical Therapy Goals        Problem: Physical Therapy Goal    Goal Priority Disciplines Outcome Goal Variances Interventions   Physical Therapy Goal     PT, PT/OT Ongoing (interventions implemented as appropriate)     Description:  Goals to be met by: 19     Patient will increase functional independence with mobility by performin. Supine <> sit with MInimal Assistance  2. Sit to stand transfer with Minimal Assistance  3. Bed to chair transfer with Minimal Assistance using appropriate AD  4. Gait  x 20 feet with Minimal Assistance using appropriate AD  5. Sitting at edge of bed x 15 minutes with Stand-by Assistance                      Reasons for Discontinuation of Therapy Services  Transfer to alternate level of care.      Plan:     Patient Discharged to: Inpatient Rehab.    Sofia Pineda, PT  2019

## 2019-04-10 ENCOUNTER — HOSPITAL ENCOUNTER (OUTPATIENT)
Dept: RADIOLOGY | Facility: HOSPITAL | Age: 69
Discharge: HOME | End: 2019-04-10
Attending: INTERNAL MEDICINE
Payer: MEDICARE

## 2019-04-10 PROCEDURE — 74018 XR KUB: ICD-10-PCS | Mod: 26,,, | Performed by: RADIOLOGY

## 2019-04-10 PROCEDURE — 74018 RADEX ABDOMEN 1 VIEW: CPT | Mod: 26,,, | Performed by: RADIOLOGY

## 2019-04-30 ENCOUNTER — TELEPHONE (OUTPATIENT)
Dept: FAMILY MEDICINE | Facility: CLINIC | Age: 69
End: 2019-04-30

## 2019-04-30 NOTE — TELEPHONE ENCOUNTER
Appt has been scheduled. Pt's nimakayla Malin has been notified and verbalized understanding of appt details.

## 2019-04-30 NOTE — TELEPHONE ENCOUNTER
----- Message from Marian Wilkinson sent at 4/30/2019  2:36 PM CDT -----  Contact: [phone]/Dea  Patient is requesting to be seen next week by any doctor. She needs a hospital follow up appointment. Please call Roldan/Wanda to schedule. thanks

## 2019-05-09 ENCOUNTER — OFFICE VISIT (OUTPATIENT)
Dept: FAMILY MEDICINE | Facility: CLINIC | Age: 69
End: 2019-05-09
Payer: MEDICARE

## 2019-05-09 VITALS
HEART RATE: 73 BPM | WEIGHT: 143.75 LBS | HEIGHT: 64 IN | OXYGEN SATURATION: 95 % | SYSTOLIC BLOOD PRESSURE: 114 MMHG | TEMPERATURE: 99 F | DIASTOLIC BLOOD PRESSURE: 60 MMHG | BODY MASS INDEX: 24.54 KG/M2

## 2019-05-09 DIAGNOSIS — N18.6 END-STAGE RENAL DISEASE ON HEMODIALYSIS: Chronic | ICD-10-CM

## 2019-05-09 DIAGNOSIS — E11.69 HYPERLIPIDEMIA ASSOCIATED WITH TYPE 2 DIABETES MELLITUS: Chronic | ICD-10-CM

## 2019-05-09 DIAGNOSIS — I63.10 CEREBROVASCULAR ACCIDENT (CVA) DUE TO EMBOLISM OF PRECEREBRAL ARTERY: Primary | ICD-10-CM

## 2019-05-09 DIAGNOSIS — N18.4 CONTROLLED TYPE 2 DIABETES MELLITUS WITH STAGE 4 CHRONIC KIDNEY DISEASE, WITHOUT LONG-TERM CURRENT USE OF INSULIN: Chronic | ICD-10-CM

## 2019-05-09 DIAGNOSIS — E11.22 CONTROLLED TYPE 2 DIABETES MELLITUS WITH STAGE 4 CHRONIC KIDNEY DISEASE, WITHOUT LONG-TERM CURRENT USE OF INSULIN: Chronic | ICD-10-CM

## 2019-05-09 DIAGNOSIS — I50.32 CHRONIC DIASTOLIC HEART FAILURE: Chronic | ICD-10-CM

## 2019-05-09 DIAGNOSIS — I10 ESSENTIAL HYPERTENSION: Chronic | ICD-10-CM

## 2019-05-09 DIAGNOSIS — Z99.2 END-STAGE RENAL DISEASE ON HEMODIALYSIS: Chronic | ICD-10-CM

## 2019-05-09 DIAGNOSIS — E78.5 HYPERLIPIDEMIA ASSOCIATED WITH TYPE 2 DIABETES MELLITUS: Chronic | ICD-10-CM

## 2019-05-09 DIAGNOSIS — R19.7 DIARRHEA, UNSPECIFIED TYPE: ICD-10-CM

## 2019-05-09 DIAGNOSIS — I21.4 NSTEMI (NON-ST ELEVATED MYOCARDIAL INFARCTION): ICD-10-CM

## 2019-05-09 DIAGNOSIS — R53.81 PHYSICAL DECONDITIONING: ICD-10-CM

## 2019-05-09 PROBLEM — E66.9 OBESITY (BMI 30-39.9): Status: RESOLVED | Noted: 2018-12-20 | Resolved: 2019-05-09

## 2019-05-09 PROCEDURE — 1101F PT FALLS ASSESS-DOCD LE1/YR: CPT | Mod: CPTII,S$GLB,, | Performed by: FAMILY MEDICINE

## 2019-05-09 PROCEDURE — 3044F PR MOST RECENT HEMOGLOBIN A1C LEVEL <7.0%: ICD-10-PCS | Mod: CPTII,S$GLB,, | Performed by: FAMILY MEDICINE

## 2019-05-09 PROCEDURE — 3044F HG A1C LEVEL LT 7.0%: CPT | Mod: CPTII,S$GLB,, | Performed by: FAMILY MEDICINE

## 2019-05-09 PROCEDURE — 99214 OFFICE O/P EST MOD 30 MIN: CPT | Mod: S$GLB,,, | Performed by: FAMILY MEDICINE

## 2019-05-09 PROCEDURE — 99214 PR OFFICE/OUTPT VISIT, EST, LEVL IV, 30-39 MIN: ICD-10-PCS | Mod: S$GLB,,, | Performed by: FAMILY MEDICINE

## 2019-05-09 PROCEDURE — 1101F PR PT FALLS ASSESS DOC 0-1 FALLS W/OUT INJ PAST YR: ICD-10-PCS | Mod: CPTII,S$GLB,, | Performed by: FAMILY MEDICINE

## 2019-05-09 RX ORDER — OXYCODONE AND ACETAMINOPHEN 10; 325 MG/1; MG/1
1 TABLET ORAL EVERY 12 HOURS PRN
Refills: 0 | COMMUNITY
Start: 2019-04-25 | End: 2019-05-09

## 2019-05-09 RX ORDER — DIPHENOXYLATE HYDROCHLORIDE AND ATROPINE SULFATE 2.5; .025 MG/1; MG/1
1 TABLET ORAL 4 TIMES DAILY PRN
Qty: 20 TABLET | Refills: 0 | Status: SHIPPED | OUTPATIENT
Start: 2019-05-09 | End: 2019-05-19

## 2019-05-09 NOTE — PROGRESS NOTES
Subjective:      Patient ID: Brittaney Joseph is a 68 y.o. female.    Chief Complaint: Check up after release from [facility]      HPI   alexei with her niece, out of rehab, has Family home with Nadege Vivas was coming and occupational therapy; she is a full code; was in rehab at [facility] discharged 2 weeks ago; in pt was at [place]; discharge read; goes to [facility] dialysis MWF, should take Rx after  Just started dialysis one month ago and has catheter for access right chest wall    Review of Systems   Constitutional: Negative.    HENT: Negative.    Respiratory: Negative.    Cardiovascular: Negative.    Gastrointestinal: Negative.    Endocrine: Negative.    Genitourinary: Negative.    Musculoskeletal: Positive for gait problem.   Neurological: Positive for facial asymmetry, speech difficulty and weakness.   Psychiatric/Behavioral: Negative.    All other systems reviewed and are negative.    Objective:     Physical Exam   Constitutional: She is oriented to person, place, and time. She appears well-developed and well-nourished.   HENT:   Head: Normocephalic.   Eyes: Pupils are equal, round, and reactive to light. Conjunctivae and EOM are normal.   Neck: Normal range of motion. Neck supple.   Cardiovascular: Normal rate, regular rhythm and normal heart sounds.   Pulmonary/Chest: Effort normal and breath sounds normal.   Musculoskeletal: Normal range of motion. She exhibits tenderness. She exhibits no edema or deformity.   Neurological: She is alert and oriented to person, place, and time. She displays abnormal reflex. A cranial nerve deficit and sensory deficit is present. She exhibits abnormal muscle tone. Coordination abnormal.   Left weakness   Skin: Skin is warm and dry.   Psychiatric: She has a normal mood and affect. Her behavior is normal. Judgment and thought content normal.   Nursing note and vitals reviewed.    Assessment:     1. Cerebrovascular accident (CVA) due to embolism of precerebral artery     2. End-stage renal disease on hemodialysis    3. NSTEMI (non-ST elevated myocardial infarction)    4. Hyperlipidemia associated with type 2 diabetes mellitus    5. Essential hypertension    6. Chronic diastolic heart failure    7. Controlled type 2 diabetes mellitus with stage 4 chronic kidney disease, without long-term current use of insulin    8. Physical deconditioning    9. Diarrhea, unspecified type      Plan:        Medication List           Accurate as of 5/9/19 11:59 PM. If you have any questions, ask your nurse or doctor.               START taking these medications    diphenoxylate-atropine 2.5-0.025 mg 2.5-0.025 mg per tablet  Commonly known as:  LOMOTIL  Take 1 tablet by mouth 4 (four) times daily as needed for Diarrhea.  Started by:  Charles Argueta MD        CONTINUE taking these medications    amLODIPine 10 MG tablet  Commonly known as:  NORVASC     aspirin 81 MG Chew  Take 1 tablet (81 mg total) by mouth once daily.     atorvastatin 40 MG tablet  Commonly known as:  LIPITOR     furosemide 80 MG tablet  Commonly known as:  LASIX  Take 1 tablet (80 mg total) by mouth 2 (two) times daily.     metoprolol tartrate 100 MG tablet  Commonly known as:  LOPRESSOR  Take 1 tablet (100 mg total) by mouth 2 (two) times daily.     nitroGLYCERIN 0.4 MG SL tablet  Commonly known as:  NITROSTAT  Place 1 tablet (0.4 mg total) under the tongue every 5 (five) minutes as needed for Chest pain. if second dose needed call 911     ondansetron 4 MG tablet  Commonly known as:  ZOFRAN  Take 1 tablet (4 mg total) by mouth every 8 (eight) hours as needed for Nausea.     pantoprazole 40 MG tablet  Commonly known as:  PROTONIX     POTASSIUM,SODIUM MONOBAS PHOS ORAL     rivaroxaban 10 mg Tab  Commonly known as:  XARELTO  Take 1 tablet (10 mg total) by mouth daily with dinner or evening meal.        STOP taking these medications    oxyCODONE-acetaminophen  mg per tablet  Commonly known as:  PERCOCET  Stopped by:  Charles STALEY  MD Ellie     senna-docusate 8.6-50 mg 8.6-50 mg per tablet  Commonly known as:  PERICOLACE  Stopped by:  Charles Argueta MD     sucralfate 100 mg/mL suspension  Commonly known as:  CARAFATE  Stopped by:  Charles Argueta MD           Where to Get Your Medications      These medications were sent to [facility] [place], [place] - [address]  [address], [place] [address]    Phone:  [phone]   · diphenoxylate-atropine 2.5-0.025 mg 2.5-0.025 mg per tablet       Cerebrovascular accident (CVA) due to embolism of precerebral artery    End-stage renal disease on hemodialysis    NSTEMI (non-ST elevated myocardial infarction)    Hyperlipidemia associated with type 2 diabetes mellitus    Essential hypertension    Chronic diastolic heart failure    Controlled type 2 diabetes mellitus with stage 4 chronic kidney disease, without long-term current use of insulin    Physical deconditioning    Diarrhea, unspecified type  -     Stool Exam-Ova,Cysts,Parasites; Future; Expected date: 05/09/2019  -     Stool culture; Future; Expected date: 05/09/2019  -     WBC, Stool; Future; Expected date: 05/09/2019  -     Clostridium difficile EIA; Future; Expected date: 05/09/2019    Other orders  -     diphenoxylate-atropine 2.5-0.025 mg (LOMOTIL) 2.5-0.025 mg per tablet; Take 1 tablet by mouth 4 (four) times daily as needed for Diarrhea.  Dispense: 20 tablet; Refill: 0    i'll call family home and speak nurse, o t, pt an st

## 2019-05-14 ENCOUNTER — TELEPHONE (OUTPATIENT)
Dept: ADMINISTRATIVE | Facility: CLINIC | Age: 69
End: 2019-05-14

## 2019-05-14 ENCOUNTER — TELEPHONE (OUTPATIENT)
Dept: FAMILY MEDICINE | Facility: CLINIC | Age: 69
End: 2019-05-14

## 2019-05-14 NOTE — TELEPHONE ENCOUNTER
Home Health SOC 04/27/2019 - 06/25/2019 with [facility] ([place] - Dr. Katty Hernandez. Patient received SN, PT, OT and ST services.

## 2019-05-14 NOTE — TELEPHONE ENCOUNTER
Pt's family states that they are confused on the recommendations that were made at her last visit.      1.  Hospice?    2.  Swallow Study?      Home Health Nurse discharged her today.   PT is due to discharge on Thursday.  Pt has refused       PT for today.  They have several different people living there according to the home health.  [facility] just wanted to clarify things before they discharge on Thursday.      Please call Kylee Freeman PT with the response.    [phone]

## 2019-05-15 ENCOUNTER — TELEPHONE (OUTPATIENT)
Dept: FAMILY MEDICINE | Facility: CLINIC | Age: 69
End: 2019-05-15

## 2019-05-15 NOTE — TELEPHONE ENCOUNTER
----- Message from Marian Wilkinson sent at 5/15/2019  4:13 PM CDT -----  Contact: [phone] /jean/Dea Malin/jean is requesting a call. Patient is not eating and should she still be taking dialysis?

## 2019-05-15 NOTE — TELEPHONE ENCOUNTER
I notified Kylee Freeman and Autumn with [facility].

## 2019-05-16 NOTE — TELEPHONE ENCOUNTER
I spoke to a niece.  Pt is not eating, going to dialysis  I recommended a PEG; she'll talk to closest relatives about this and let me know.

## 2019-05-27 RX ORDER — PANTOPRAZOLE SODIUM 40 MG/1
40 TABLET, DELAYED RELEASE ORAL DAILY
Qty: 30 TABLET | Refills: 3 | Status: SHIPPED | OUTPATIENT
Start: 2019-05-27 | End: 2019-08-06

## 2019-05-27 NOTE — TELEPHONE ENCOUNTER
----- Message from Marian Wilkinson sent at 5/27/2019  1:28 PM CDT -----  Contact: [phone]/Dea  Type:  RX Refill Request    Who Called: Dea  Refill or New Rx: New  RX Name and Strength:pantoprazole (PROTONIX) 40 MG tablet  How is the patient currently taking it? (ex. 1XDay):Take 40 mg by mouth once daily. - Oral  Is this a 30 day or 90 day RXPreferred Pharmacy with phone number:[facility], [address]  Local or Mail Order:local  Ordering Provider:  Would the patient rather a call back or a response via [facility]? call  Best Call Back Number:  Additional Information:

## 2019-06-17 ENCOUNTER — ANESTHESIA EVENT (OUTPATIENT)
Dept: SURGERY | Facility: HOSPITAL | Age: 69
End: 2019-06-17
Payer: MEDICARE

## 2019-06-17 ENCOUNTER — HOSPITAL ENCOUNTER (OUTPATIENT)
Dept: PREADMISSION TESTING | Facility: HOSPITAL | Age: 69
Discharge: HOME | End: 2019-06-17
Attending: SURGERY
Payer: MEDICARE

## 2019-06-17 RX ORDER — LIDOCAINE HYDROCHLORIDE 10 MG/ML
1 INJECTION, SOLUTION EPIDURAL; INFILTRATION; INTRACAUDAL; PERINEURAL ONCE
Status: CANCELLED | OUTPATIENT
Start: 2019-06-17 | End: 2019-06-17

## 2019-06-17 RX ORDER — SODIUM CHLORIDE 9 MG/ML
INJECTION, SOLUTION INTRAVENOUS CONTINUOUS
Status: CANCELLED | OUTPATIENT
Start: 2019-06-17

## 2019-06-17 NOTE — ANESTHESIA PREPROCEDURE EVALUATION
06/17/2019  Brittaney Joseph is a 68 y.o., female with history of ESRD, DMII, and HTN scheduled for AV graft insertion on 6/18/19.    In January, patient had submassive/massive PE requiring catheter directed thrombolysis. Takes xarelto as an outpatient    Past Medical History:   Diagnosis Date    Anticoagulant long-term use     Arthritis     Chronic kidney disease     Chronic low back pain     Diabetes mellitus, type 2     End-stage renal disease on hemodialysis 3/28/2019    Monday Wednesday Friday at [facility]    Hypertension     Pulmonary embolism with acute cor pulmonale 1/11/2019    Stroke due to embolism 4/4/2019     Past Surgical History:   Procedure Laterality Date    CHOLECYSTECTOMY      EKOS, Pumoart/DVT  1/11/2019    Performed by Roger Day [facility] at [facility] CATH LAB[facility]EP    HYSTERECTOMY      Insertion,catheter,tunneled Right 3/29/2019    Performed by Michelet Cano Jr., MD at [facility] OR    Thrombolysis, PA N/A 1/11/2019    Performed by Roger Day MD at [facility] CATH LAB/EP         Anesthesia Evaluation    I have reviewed the Patient Summary Reports.    I have reviewed the Nursing Notes.   I have reviewed the Medications.     Review of Systems  Anesthesia Hx:  No problems with previous Anesthesia Denies Hx of Anesthetic complications  Denies Family Hx of Anesthesia complications.   Denies Personal Hx of Anesthesia complications.   Social:  Non-Smoker, No Alcohol Use    Hematology/Oncology:  Hematology Normal   Oncology Normal     EENT/Dental:EENT/Dental Normal   Cardiovascular:   Exercise tolerance: good Hypertension  Denies Angina.     Chronic diastolic heart failure   Pulmonary:  Pulmonary Normal    Renal/:   Chronic Renal Disease, ESRD    Hepatic/GI:  Hepatic/GI Normal    Musculoskeletal:  Musculoskeletal Normal    Neurological:  Neurology Normal Denies Seizures.   CVA - Cerebrovasular Accident (residual left sided weakness) , Most recent CVA was on 4/2019    Endocrine:   Diabetes, well controlled, type 2    Dermatological:  Skin Normal    Psych:  Psychiatric Normal           Physical Exam  General:  Well nourished    Airway/Jaw/Neck:  Airway Findings: Mouth Opening: Small, but > 3cm Tongue: Normal  General Airway Assessment: Adult  Mallampati: III      Dental:  Dental Findings: Upper Dentures, Lower Dentures   Chest/Lungs:  Chest/Lungs Findings: Clear to auscultation, Normal Respiratory Rate     Heart/Vascular:  Heart Findings: Rate: Normal  Rhythm: Regular Rhythm  Sounds: Normal  Heart murmur: negative       Mental Status:  Mental Status Findings:  Cooperative, Alert and Oriented         TTE 1/11/19  · Normal left ventricular systolic function. The estimated ejection fraction is 65%  · Grade I (mild) left ventricular diastolic dysfunction consistent with impaired relaxation.  · Concentric left ventricular remodeling.  · Thickened aortic and mitral valve leaflets appear rheumatic  · Severe right ventricular enlargement with Positive Licona's sign - Right heart strain - Consider Pulmonary Embolism  · Normal right ventricular systolic function.      Anesthesia Plan  Type of Anesthesia, risks & benefits discussed:  Anesthesia Type:  regional, MAC  Patient's Preference:   Intra-op Monitoring Plan:   Intra-op Monitoring Plan Comments:   Post Op Pain Control Plan:   Post Op Pain Control Plan Comments:   Induction:   IV  Beta Blocker:         Informed Consent: Patient understands risks and agrees with Anesthesia plan.  Questions answered.   ASA Score: 3     Day of Surgery Review of History & Physical:        Anesthesia Plan Notes: Anesthesia consent to be signed prior to procedure on 6/18/19          Ready For Surgery From Anesthesia Perspective.

## 2019-06-17 NOTE — DISCHARGE INSTRUCTIONS
Your surgery is scheduled for 6/18/19.    Please report to Outpatient Surgery Intake Office on the 2nd FLOOR at 10:00 a.m.          INSTRUCTIONS IMPORTANT!!!  ¨ Do not eat or drink after 12 midnight-including water. OK to brush teeth, no   gum, candy or mints!    ¨ Take only these medicines with a small swallow of water-morning of surgery: metoprolol, amlodipine and protonix              ____  Do not wear makeup, including mascara.  ____  No powder, lotions or creams to surgical area.  ____  Please remove all jewelry, including piercings and leave at home.  ____  No money or valuables needed. Please leave at home.  ____  Please bring any documents given by your doctor.  ____  If going home the same day, arrange for a ride home. You will not be able to             drive if Anesthesia was used.  ____  Wear loose fitting clothing. Allow for dressings, bandages.  ____  Stop Aspirin, Ibuprofen, Motrin and Aleve at least 3-5 days before surgery, unless otherwise instructed by your doctor, or the nurse.   You MAY use Tylenol/acetaminophen until day of surgery.  ____  Wash the surgical area with Hibiclens the night before surgery, and again the             morning of surgery.  Be sure to rinse hibiclens off completely (if instructed by   nurse).  ____  If you take diabetic medication, do not take am of surgery unless instructed by Doctor.  ____  Call MD for temperature above 101 degrees or any other signs of infection such as Urinary (bladder) infection, Upper respiratory infection, skin boils, etc.  ____ Stop taking any Fish Oil supplement or any Vitamins that contain Vitamin E at least 5 days prior to surgery.  ____ Do Not wear your contact lenses the day of your procedure.  You may wear your glasses.      ____Do not shave surgical site for 3 days prior to surgery.  ____ Practice Good hand washing before, during, and after procedure.      I have read or had read and explained to me, and understand the above  information.  Additional comments or instructions:  For additional questions call [phone]      ANESTHESIA SIDE EFFECTS  -For the first 24 hours after surgery:  Do not drive, use heavy equipment, make important decisions, or drink alcohol  -It is normal to feel sleepy for several hours.  Rest until you are more awake.  -Have someone stay with you, if needed.  They can watch for problems and help keep you safe.  -Some possible post anesthesia side effects include: nausea and vomiting, sore throat and hoarseness, sleepiness, and dizziness.        Pre-Op Bathing Instructions    Before surgery, you can play an important role in your own health.    Because skin is not sterile, we need to be sure that your skin is as free of germs as possible. By following the instructions below, you can reduce the number of germs on your skin before surgery.    IMPORTANT: You will need to shower with a special soap called Hibiclens*, available at any pharmacy.  If you are allergic to Chlorhexidine (the antiseptic in Hibiclens), use an antibacterial soap such as Dial Soap for your preoperative shower.  You will shower with Hibiclens both the night before your surgery and the morning of your surgery.  Do not use Hibiclens on the head, face or genitals to avoid injury to those areas.    STEP #1: THE NIGHT BEFORE YOUR SURGERY     1. Do not shave the area of your body where your surgery will be performed.  2. Shower and wash your hair and body as usual with your normal soap and shampoo.  3. Rinse your hair and body thoroughly after you shower to remove all soap residue.  4. With your hand, apply one packet of Hibiclens soap to the surgical site.   5. Wash the site gently for five (5) minutes. Do not scrub your skin too hard.   6. Do not wash with your regular soap after Hibiclens is used.  7. Rinse your body thoroughly.  8. Pat yourself dry with a clean, soft towel.  9. Do not use lotion, cream, or powder.  10. Wear clean clothes.    STEP #2:  THE MORNING OF YOUR SURGERY     1. Repeat Step #1.    * Not to be used by people allergic to Chlorhexidine.          Arteriovenous (AV) Fistula for Dialysis  An AV fistula is a connection between an artery and a vein. For this procedure, an AV fistula is surgically created using an artery and a vein in your arm. (Your healthcare provider will let you know if another site is to be used.) When the artery and vein are joined, blood flow increases from the artery into the vein. As a result, the vein gets bigger over time. The enlarged vein provides easier access to the blood for a treatment for kidney failure (dialysis). This sheet explains the procedure and what to expect.     An AV fistula increases blood flow from the artery into the vein. Over time, the vein becomes stronger and enlarged.   Preparing for the procedure  Prepare as you have been told. In addition:  · Tell your healthcare provider about all the medicines you take. This includes all over-the-counter and prescription medicines, and street drugs. It also includes herbs, vitamins, and other supplements. You may need to stop taking some or all of them before the procedure.  · Follow any directions youre given for not eating or drinking before the procedure.  · Do not allow anyone to draw blood from or take blood pressure on the arm that will have the fistula before the procedure.  The day of the procedure  The procedure takes about 1 to 2 hours. Youll likely go home the same day.  Before the procedure begins:  · An IV (intravenous) line is put into a vein in the arm or hand not being used for the procedure. This line supplies fluids and medicines.  · To keep you free of pain during the procedure, youre given general anesthesia. This medicine puts you into a state like a deep sleep through the procedure. Or a nerve block may be used. This medicine numbs the arm. With it, you may also be given medicine that makes you relaxed and drowsy through the  procedure.  During the procedure:  · The skin over your arm may be injected with numbing medicine.  · One or more small cuts (incisions) are then made through the numbed skin. This depends on the size of your arm and the depth of the vein in your arm.  · The vein is attached to the selected artery.  · Any incisions made are then closed with stitches (sutures), staples, surgical glue, or strips of surgical tape.  After the procedure:  · Youll be asked to keep your arm raised (elevated) as often as possible for at least a week after the procedure.  · Youll be given medicines to manage pain as needed.  · Your arm and hand will be checked to make sure blood is flowing through the fistula properly. The feeling of blood rushing through the fistula is called a thrill. It is somewhat similar to the purring of a cat. Youll be taught how to check for this feeling each day to make sure there are no problems with your fistula. Youll also be taught how to care for your fistula at home.  · When its time for you to leave the hospital, have an adult family member or friend ready to drive you home.  Recovering at home  Once at home, follow all of the instructions youve been given. Be sure to:  · Take all medicines as directed.  · Care for your incision as instructed.  · Check for signs of infection at the incision site (see below).  · Avoid heavy lifting and strenuous activities as directed.  · Monitor and care for your fistula as instructed.  · Do your hand and arm exercises as instructed. This usually involves squeezing a ball in your hand for a few minutes each hour.  Call your healthcare provider if you have any of the following:  · Fever of 100.4°F (38°C) or higher  · Signs of infection at the incision site, such as increased redness or swelling, warmth, worsening pain, bleeding, or bad-smelling drainage  · You cant feel a thrill (the vibration of blood going through your arm)  · Pain or numbness in your fingers, hand,  or arm  · Bleeding, redness, or warmth around your fistula  · Sudden bulging of the fistula (more than usual; a slight bulge is normal)   Follow-Up  Your healthcare provider will check your fistula within 1 to 2 weeks after the procedure. It will likely take about 6 to 8 weeks for the fistula to enlarge enough to start dialysis. After that, make sure the fistula is checked each time you have dialysis. Your healthcare provider may also suggest checkups every 6 months.  Risks and possible complications include:  · The fistula not working properly  · Long wait before the fistula is ready (up to 6 months)  · Coldness or numbness in the hand (due to blood flowing away from the hand and into the fistula)  · An unsightly bump under the skin (due to enlargement of the fistula)  · Prolonged bleeding from the fistula after dialysis  · Narrowing or weakening of the blood vessels used for the fistula  · Formation of blood clots in the blood vessels used for the fistula  · Risks of anesthesia or any other medicines used during the procedure   Living with an AV Fistula  A problem, such as a narrowing (stricture) of the vein or an infection, can make the fistula unusable. If this happens, you may need other treatments to repair or make a new fistula. To protect your fistula, follow these and any other guidelines youre given:  · Check your fistula as often as your healthcare provider says. If you cant feel your thrill, let your provider know right away.  · Make sure your fistula is checked before each dialysis treatment.  · Dont let anyone draw blood from or take blood pressure on the arm that has the fistula.  · Wash your hands often and keep the area around your fistula clean.  · Dont sleep on the arm that has the fistula.  · Dont wear tight jewelry or a watch on the arm with your fistula.  · Protect your fistula from cuts, scrapes, or blows.  Date Last Reviewed: 1/1/2017  © 4709-0123 [employer]. [address] [place] [address]  [place] [address]. All rights reserved. This information is not intended as a substitute for professional medical care. Always follow your healthcare professional's instructions.

## 2019-06-18 ENCOUNTER — ANESTHESIA (OUTPATIENT)
Dept: SURGERY | Facility: HOSPITAL | Age: 69
End: 2019-06-18
Payer: MEDICARE

## 2019-06-18 ENCOUNTER — HOSPITAL ENCOUNTER (OUTPATIENT)
Facility: HOSPITAL | Age: 69
Discharge: HOME | End: 2019-06-18
Attending: SURGERY | Admitting: SURGERY
Payer: MEDICARE

## 2019-06-18 VITALS
SYSTOLIC BLOOD PRESSURE: 112 MMHG | HEART RATE: 71 BPM | WEIGHT: 143 LBS | DIASTOLIC BLOOD PRESSURE: 63 MMHG | BODY MASS INDEX: 24.41 KG/M2 | HEIGHT: 64 IN | TEMPERATURE: 97 F | OXYGEN SATURATION: 96 % | RESPIRATION RATE: 17 BRPM

## 2019-06-18 DIAGNOSIS — I63.10 CEREBROVASCULAR ACCIDENT (CVA) DUE TO EMBOLISM OF PRECEREBRAL ARTERY: ICD-10-CM

## 2019-06-18 DIAGNOSIS — E11.22 CONTROLLED TYPE 2 DIABETES MELLITUS WITH STAGE 4 CHRONIC KIDNEY DISEASE, WITHOUT LONG-TERM CURRENT USE OF INSULIN: Chronic | ICD-10-CM

## 2019-06-18 DIAGNOSIS — E11.69 HYPERLIPIDEMIA ASSOCIATED WITH TYPE 2 DIABETES MELLITUS: Chronic | ICD-10-CM

## 2019-06-18 DIAGNOSIS — N18.4 CONTROLLED TYPE 2 DIABETES MELLITUS WITH STAGE 4 CHRONIC KIDNEY DISEASE, WITHOUT LONG-TERM CURRENT USE OF INSULIN: Chronic | ICD-10-CM

## 2019-06-18 DIAGNOSIS — I10 ESSENTIAL HYPERTENSION: Chronic | ICD-10-CM

## 2019-06-18 DIAGNOSIS — I50.32 CHRONIC DIASTOLIC HEART FAILURE: Chronic | ICD-10-CM

## 2019-06-18 DIAGNOSIS — E78.5 HYPERLIPIDEMIA ASSOCIATED WITH TYPE 2 DIABETES MELLITUS: Chronic | ICD-10-CM

## 2019-06-18 LAB
ANION GAP SERPL CALC-SCNC: 12 MMOL/L (ref 8–16)
BUN SERPL-MCNC: 4 MG/DL (ref 8–23)
CALCIUM SERPL-MCNC: 8.7 MG/DL (ref 8.7–10.5)
CHLORIDE SERPL-SCNC: 104 MMOL/L (ref 95–110)
CO2 SERPL-SCNC: 26 MMOL/L (ref 23–29)
CREAT SERPL-MCNC: 3 MG/DL (ref 0.5–1.4)
EST. GFR  (AFRICAN AMERICAN): 18 ML/MIN/1.73 M^2
EST. GFR  (NON AFRICAN AMERICAN): 15 ML/MIN/1.73 M^2
GLUCOSE SERPL-MCNC: 71 MG/DL (ref 70–110)
POTASSIUM SERPL-SCNC: 3.8 MMOL/L (ref 3.5–5.1)
SODIUM SERPL-SCNC: 142 MMOL/L (ref 136–145)

## 2019-06-18 PROCEDURE — 36000706: Performed by: SURGERY

## 2019-06-18 PROCEDURE — 63600175 PHARM REV CODE 636 W HCPCS: Performed by: SURGERY

## 2019-06-18 PROCEDURE — 36000707: Performed by: SURGERY

## 2019-06-18 PROCEDURE — 37000008 HC ANESTHESIA 1ST 15 MINUTES: Performed by: SURGERY

## 2019-06-18 PROCEDURE — 80048 BASIC METABOLIC PNL TOTAL CA: CPT

## 2019-06-18 PROCEDURE — C1768 GRAFT, VASCULAR: HCPCS | Performed by: SURGERY

## 2019-06-18 PROCEDURE — 64415 NJX AA&/STRD BRCH PLXS IMG: CPT | Performed by: ANESTHESIOLOGY

## 2019-06-18 PROCEDURE — 27201423 OPTIME MED/SURG SUP & DEVICES STERILE SUPPLY: Performed by: SURGERY

## 2019-06-18 PROCEDURE — 25000003 PHARM REV CODE 250: Performed by: NURSE ANESTHETIST, CERTIFIED REGISTERED

## 2019-06-18 PROCEDURE — 71000016 HC POSTOP RECOV ADDL HR: Performed by: SURGERY

## 2019-06-18 PROCEDURE — 36415 COLL VENOUS BLD VENIPUNCTURE: CPT

## 2019-06-18 PROCEDURE — 63600175 PHARM REV CODE 636 W HCPCS: Performed by: STUDENT IN AN ORGANIZED HEALTH CARE EDUCATION/TRAINING PROGRAM

## 2019-06-18 PROCEDURE — 71000015 HC POSTOP RECOV 1ST HR: Performed by: SURGERY

## 2019-06-18 PROCEDURE — 76942 ECHO GUIDE FOR BIOPSY: CPT | Performed by: ANESTHESIOLOGY

## 2019-06-18 PROCEDURE — 25000003 PHARM REV CODE 250: Performed by: NURSE PRACTITIONER

## 2019-06-18 PROCEDURE — 37000009 HC ANESTHESIA EA ADD 15 MINS: Performed by: SURGERY

## 2019-06-18 PROCEDURE — 25000003 PHARM REV CODE 250: Performed by: SURGERY

## 2019-06-18 PROCEDURE — 63600175 PHARM REV CODE 636 W HCPCS: Performed by: NURSE ANESTHETIST, CERTIFIED REGISTERED

## 2019-06-18 DEVICE — GRAFT STRETCH RING 6MMX5CMX45: Type: IMPLANTABLE DEVICE | Site: ARM | Status: FUNCTIONAL

## 2019-06-18 RX ORDER — BACITRACIN 50000 [IU]/1
INJECTION, POWDER, FOR SOLUTION INTRAMUSCULAR
Status: DISCONTINUED | OUTPATIENT
Start: 2019-06-18 | End: 2019-06-18 | Stop reason: HOSPADM

## 2019-06-18 RX ORDER — HEPARIN SODIUM 5000 [USP'U]/ML
INJECTION, SOLUTION INTRAVENOUS; SUBCUTANEOUS
Status: DISCONTINUED | OUTPATIENT
Start: 2019-06-18 | End: 2019-06-18 | Stop reason: HOSPADM

## 2019-06-18 RX ORDER — SODIUM CHLORIDE 9 MG/ML
INJECTION, SOLUTION INTRAVENOUS CONTINUOUS
Status: DISCONTINUED | OUTPATIENT
Start: 2019-06-18 | End: 2019-06-18 | Stop reason: HOSPADM

## 2019-06-18 RX ORDER — PROPOFOL 10 MG/ML
VIAL (ML) INTRAVENOUS
Status: DISCONTINUED | OUTPATIENT
Start: 2019-06-18 | End: 2019-06-18

## 2019-06-18 RX ORDER — MIDAZOLAM HYDROCHLORIDE 1 MG/ML
INJECTION, SOLUTION INTRAMUSCULAR; INTRAVENOUS
Status: DISCONTINUED | OUTPATIENT
Start: 2019-06-18 | End: 2019-06-18

## 2019-06-18 RX ORDER — LIDOCAINE HYDROCHLORIDE 10 MG/ML
1 INJECTION, SOLUTION EPIDURAL; INFILTRATION; INTRACAUDAL; PERINEURAL ONCE
Status: DISCONTINUED | OUTPATIENT
Start: 2019-06-18 | End: 2019-06-18 | Stop reason: HOSPADM

## 2019-06-18 RX ORDER — ACETAMINOPHEN 325 MG/1
650 TABLET ORAL EVERY 4 HOURS PRN
Status: CANCELLED | OUTPATIENT
Start: 2019-06-18

## 2019-06-18 RX ORDER — SODIUM CHLORIDE 0.9 % (FLUSH) 0.9 %
3 SYRINGE (ML) INJECTION
Status: DISCONTINUED | OUTPATIENT
Start: 2019-06-18 | End: 2019-06-18 | Stop reason: HOSPADM

## 2019-06-18 RX ORDER — HYDROCODONE BITARTRATE AND ACETAMINOPHEN 5; 325 MG/1; MG/1
1 TABLET ORAL EVERY 6 HOURS PRN
Qty: 20 TABLET | Refills: 0 | Status: SHIPPED | OUTPATIENT
Start: 2019-06-18 | End: 2019-08-06

## 2019-06-18 RX ORDER — ROPIVACAINE HYDROCHLORIDE 5 MG/ML
INJECTION, SOLUTION EPIDURAL; INFILTRATION; PERINEURAL
Status: DISCONTINUED | OUTPATIENT
Start: 2019-06-18 | End: 2019-06-18

## 2019-06-18 RX ORDER — HYDROMORPHONE HYDROCHLORIDE 2 MG/ML
0.5 INJECTION, SOLUTION INTRAMUSCULAR; INTRAVENOUS; SUBCUTANEOUS EVERY 5 MIN PRN
Status: CANCELLED | OUTPATIENT
Start: 2019-06-18

## 2019-06-18 RX ORDER — FENTANYL CITRATE 50 UG/ML
INJECTION, SOLUTION INTRAMUSCULAR; INTRAVENOUS
Status: DISCONTINUED | OUTPATIENT
Start: 2019-06-18 | End: 2019-06-18

## 2019-06-18 RX ORDER — EPHEDRINE SULFATE 50 MG/ML
INJECTION, SOLUTION INTRAVENOUS
Status: DISCONTINUED | OUTPATIENT
Start: 2019-06-18 | End: 2019-06-18

## 2019-06-18 RX ORDER — MEPERIDINE HYDROCHLORIDE 50 MG/ML
12.5 INJECTION INTRAMUSCULAR; INTRAVENOUS; SUBCUTANEOUS ONCE AS NEEDED
Status: CANCELLED | OUTPATIENT
Start: 2019-06-18 | End: 2019-06-19

## 2019-06-18 RX ORDER — ONDANSETRON 2 MG/ML
4 INJECTION INTRAMUSCULAR; INTRAVENOUS DAILY PRN
Status: CANCELLED | OUTPATIENT
Start: 2019-06-18

## 2019-06-18 RX ORDER — LIDOCAINE HYDROCHLORIDE 10 MG/ML
INJECTION, SOLUTION EPIDURAL; INFILTRATION; INTRACAUDAL; PERINEURAL
Status: DISCONTINUED | OUTPATIENT
Start: 2019-06-18 | End: 2019-06-18 | Stop reason: HOSPADM

## 2019-06-18 RX ORDER — CEFAZOLIN SODIUM 2 G/50ML
2 SOLUTION INTRAVENOUS
Status: COMPLETED | OUTPATIENT
Start: 2019-06-18 | End: 2019-06-18

## 2019-06-18 RX ORDER — HYDROCODONE BITARTRATE AND ACETAMINOPHEN 5; 325 MG/1; MG/1
1 TABLET ORAL EVERY 4 HOURS PRN
Status: CANCELLED | OUTPATIENT
Start: 2019-06-18

## 2019-06-18 RX ADMIN — MIDAZOLAM 2 MG: 1 INJECTION INTRAMUSCULAR; INTRAVENOUS at 01:06

## 2019-06-18 RX ADMIN — ROPIVACAINE HYDROCHLORIDE 20 ML: 5 INJECTION, SOLUTION EPIDURAL; INFILTRATION; PERINEURAL at 12:06

## 2019-06-18 RX ADMIN — CEFAZOLIN SODIUM 2 G: 2 SOLUTION INTRAVENOUS at 01:06

## 2019-06-18 RX ADMIN — EPHEDRINE SULFATE 25 MG: 50 INJECTION, SOLUTION INTRAMUSCULAR; INTRAVENOUS; SUBCUTANEOUS at 01:06

## 2019-06-18 RX ADMIN — FENTANYL CITRATE 50 MCG: 50 INJECTION, SOLUTION INTRAMUSCULAR; INTRAVENOUS at 02:06

## 2019-06-18 RX ADMIN — MIDAZOLAM 5 MG: 1 INJECTION INTRAMUSCULAR; INTRAVENOUS at 12:06

## 2019-06-18 RX ADMIN — PROPOFOL 30 MG: 10 INJECTION, EMULSION INTRAVENOUS at 01:06

## 2019-06-18 RX ADMIN — SODIUM CHLORIDE: 0.9 INJECTION, SOLUTION INTRAVENOUS at 01:06

## 2019-06-18 RX ADMIN — PROPOFOL 50 MG: 10 INJECTION, EMULSION INTRAVENOUS at 01:06

## 2019-06-18 RX ADMIN — PROPOFOL 40 MG: 10 INJECTION, EMULSION INTRAVENOUS at 02:06

## 2019-06-18 RX ADMIN — FENTANYL CITRATE 50 MCG: 50 INJECTION, SOLUTION INTRAMUSCULAR; INTRAVENOUS at 01:06

## 2019-06-18 RX ADMIN — PROPOFOL 20 MG: 10 INJECTION, EMULSION INTRAVENOUS at 02:06

## 2019-06-18 NOTE — ANESTHESIA POSTPROCEDURE EVALUATION
Anesthesia Post Evaluation    Patient: Brittaney Joseph    Procedure(s) Performed: Procedure(s) (LRB):  INSERTION, GRAFT, ARTERIOVENOUS (Left)    Final Anesthesia Type: regional  Patient location during evaluation: OPS  Patient participation: Yes- Able to Participate  Level of consciousness: awake and alert  Post-procedure vital signs: reviewed and stable  Pain management: adequate  Airway patency: patent  PONV status at discharge: No PONV  Anesthetic complications: no      Cardiovascular status: hemodynamically stable and blood pressure returned to baseline  Respiratory status: unassisted, spontaneous ventilation and room air  Hydration status: euvolemic  Follow-up not needed.          Vitals Value Taken Time   /56 6/18/2019  3:05 PM   Temp 36.4 °C (97.5 °F) 6/18/2019  3:05 PM   Pulse 74 6/18/2019  3:05 PM   Resp 18 6/18/2019  3:05 PM   SpO2 96 % 6/18/2019  3:05 PM         No case tracking events are documented in the log.      Pain/Gregory Score: Gregory Score: 10 (6/18/2019  1:15 PM)

## 2019-06-18 NOTE — BRIEF OP NOTE
Operative Note       Surgery Date: 6/18/2019     Surgeon(s) and Role:     * Jean Pierre Villarreal MD - Primary  Assist: Dr Laboy  Pre-op Diagnosis:  Cerebrovascular accident (CVA) due to embolism of precerebral artery [I63.10]  Hyperlipidemia associated with type 2 diabetes mellitus [E11.69, E78.5]  Essential hypertension [I10]  Chronic diastolic heart failure [I50.32]  Controlled type 2 diabetes mellitus with stage 4 chronic kidney disease, without long-term current use of insulin [E11.22, N18.4]    Post-op Diagnosis: Post-Op Diagnosis Codes:     * Cerebrovascular accident (CVA) due to embolism of precerebral artery [I63.10]     * Hyperlipidemia associated with type 2 diabetes mellitus [E11.69, E78.5]     * Essential hypertension [I10]     * Chronic diastolic heart failure [I50.32]     * Controlled type 2 diabetes mellitus with stage 4 chronic kidney disease, without long-term current use of insulin [E11.22, N18.4]    Procedure(s) (LRB):  INSERTION, GRAFT, ARTERIOVENOUS (Left)    Anesthesia: Regional    Procedure in Detail/Findings:  Operative Note       Surgery Date: 6/18/2019     Surgeon(s) and Role:     * Jean Pierre Villarreal MD - Primary    Pre-op Diagnosis:  Cerebrovascular accident (CVA) due to embolism of precerebral artery [I63.10]  Hyperlipidemia associated with type 2 diabetes mellitus [E11.69, E78.5]  Essential hypertension [I10]  Chronic diastolic heart failure [I50.32]  Controlled type 2 diabetes mellitus with stage 4 chronic kidney disease, without long-term current use of insulin [E11.22, N18.4]    Post-op Diagnosis: Post-Op Diagnosis Codes:     * Cerebrovascular accident (CVA) due to embolism of precerebral artery [I63.10]     * Hyperlipidemia associated with type 2 diabetes mellitus [E11.69, E78.5]     * Essential hypertension [I10]     * Chronic diastolic heart failure [I50.32]     * Controlled type 2 diabetes mellitus with stage 4 chronic kidney disease, without long-term current use of insulin  [E11.22, N18.4]    Procedure(s) (LRB):  INSERTION, GRAFT, ARTERIOVENOUS (Left)    Anesthesia: Regional    Procedure in Detail/Findings:    After satisfactory regional block, right upper arm,   forearm prepped and draped in normal sterile manner using ChloraPrep.    Stockinette was applied.  An incision was made in the upper medial aspect of the   right upper arm.  Incision was carried down to the deep subcutaneous tissues.    Subcutaneous bleeders clamped and bovied, further taken down.  Brachial vein was   isolated.  Proximal and distal control was obtained.  Laterally, brachial   artery was isolated.  Proximal and distal control was obtained.  Another   incision was made in the distal right arm.  A tunnel was created between the two   incisions.  Alpha-Zhen 6 mm size was placed into the tunnel.  First graft-to-vein   anastomosis was created on the venous side using running 6-0 Prolene suture.    Then graft-to-artery anastomosis was created on the arterial side using running   6-0 Prolene suture.  The patient had good bruit after completion of procedure.    Hemostasis was satisfactorily maintained.  Wound was thoroughly irrigated with   antibiotic solution and then approximated using 3-0 Vicryl for subcutaneous   tissue.  Skin was closed using skin staple.  Sterile gauze dressing was applied.    Instrument count, sponge count, and needle count was correct.  The patient   tolerated it well.  Estimated blood loss was 30 mL.  No specimen was removed.    No intraoperative complications.    Estimated Blood Loss: 30 cc           Specimens (From admission, onward)    None        Implants:   Implant Name Type Inv. Item Serial No.  Lot No. LRB No. Used   GRAFT STRETCH RING [id number] - [id number]  GRAFT STRETCH RING [id number]  [employer]orn[employer] [id number] Left 1              Disposition: PACU - hemodynamically stable.           Condition: Good    Attestation:  I performed the procedure.           Discharge Note    Admit  Date: 6/18/2019    Attending Physician: Jean Pierre Villarreal MD     Discharge Physician: Jean Pierre Villarreal MD    Final Diagnosis: Post-Op Diagnosis Codes:     * Cerebrovascular accident (CVA) due to embolism of precerebral artery [I63.10]     * Hyperlipidemia associated with type 2 diabetes mellitus [E11.69, E78.5]     * Essential hypertension [I10]     * Chronic diastolic heart failure [I50.32]     * Controlled type 2 diabetes mellitus with stage 4 chronic kidney disease, without long-term current use of insulin [E11.22, N18.4]    Disposition: Home or Self Care    Patient Instructions:   Current Discharge Medication List      START taking these medications    Details   HYDROcodone-acetaminophen (NORCO) 5-325 mg per tablet Take 1 tablet by mouth every 6 (six) hours as needed for Pain.  Qty: 20 tablet, Refills: 0         CONTINUE these medications which have NOT CHANGED    Details   amLODIPine (NORVASC) 10 MG tablet Take 10 mg by mouth nightly.      metoprolol tartrate (LOPRESSOR) 100 MG tablet Take 1 tablet (100 mg total) by mouth 2 (two) times daily.  Qty: 180 tablet, Refills: 1      aspirin 81 MG Chew Take 1 tablet (81 mg total) by mouth once daily.  Refills: 0      atorvastatin (LIPITOR) 40 MG tablet Take 40 mg by mouth once daily.      nitroGLYCERIN (NITROSTAT) 0.4 MG SL tablet Place 1 tablet (0.4 mg total) under the tongue every 5 (five) minutes as needed for Chest pain. if second dose needed call 911  Qty: 25 tablet, Refills: 1      ondansetron (ZOFRAN) 4 MG tablet Take 1 tablet (4 mg total) by mouth every 8 (eight) hours as needed for Nausea.  Qty: 20 tablet, Refills: 0    Associated Diagnoses: Viral gastroenteritis      pantoprazole (PROTONIX) 40 MG tablet Take 1 tablet (40 mg total) by mouth once daily.  Qty: 30 tablet, Refills: 3      rivaroxaban (XARELTO) 10 mg Tab Take 1 tablet (10 mg total) by mouth daily with dinner or evening meal.  Qty: 30 tablet, Refills: 3    Comments: To replace eliquis which  may be causing nausea.  Associated Diagnoses: Other pulmonary embolism without acute cor pulmonale, unspecified chronicity             Discharge Procedure Orders (must include Diet, Follow-up, Activity)   Discharge Procedure Orders (must include Diet, Follow-up, Activity)   Diet general     Keep surgical extremity elevated     Lifting restrictions     Call MD for:  temperature >100.4     Call MD for:  persistent nausea and vomiting     Call MD for:  severe uncontrolled pain     Call MD for:  redness, tenderness, or signs of infection (pain, swelling, redness, odor or green/yellow discharge around incision site)     Leave dressing on - Keep it clean, dry, and intact until clinic visit        Discharge Date: No discharge date for patient encounter.      Estimated Blood Loss: * No values recorded between 6/18/2019  2:01 PM and 6/18/2019  3:00 PM *           Specimens (From admission, onward)    None        Implants:   Implant Name Type Inv. Item Serial No.  Lot No. LRB No. Used   GRAFT STRETCH RING [id number] - [id number]  GRAFT STRETCH RING [id number]  [employer]. [employer]ORE [id number] Left 1              Disposition: PACU - hemodynamically stable.           Condition: Good    Attestation:  I was present and scrubbed for the entire procedure.           Discharge Note    Admit Date: 6/18/2019    Attending Physician: Jean Pierre Villarreal MD     Discharge Physician: Jean Pierre Villarreal MD    Final Diagnosis: Post-Op Diagnosis Codes:     * Cerebrovascular accident (CVA) due to embolism of precerebral artery [I63.10]     * Hyperlipidemia associated with type 2 diabetes mellitus [E11.69, E78.5]     * Essential hypertension [I10]     * Chronic diastolic heart failure [I50.32]     * Controlled type 2 diabetes mellitus with stage 4 chronic kidney disease, without long-term current use of insulin [E11.22, N18.4]    Disposition: Home or Self Care    Patient Instructions:   Current Discharge Medication List      START taking these  medications    Details   HYDROcodone-acetaminophen (NORCO) 5-325 mg per tablet Take 1 tablet by mouth every 6 (six) hours as needed for Pain.  Qty: 20 tablet, Refills: 0         CONTINUE these medications which have NOT CHANGED    Details   amLODIPine (NORVASC) 10 MG tablet Take 10 mg by mouth nightly.      metoprolol tartrate (LOPRESSOR) 100 MG tablet Take 1 tablet (100 mg total) by mouth 2 (two) times daily.  Qty: 180 tablet, Refills: 1      aspirin 81 MG Chew Take 1 tablet (81 mg total) by mouth once daily.  Refills: 0      atorvastatin (LIPITOR) 40 MG tablet Take 40 mg by mouth once daily.      nitroGLYCERIN (NITROSTAT) 0.4 MG SL tablet Place 1 tablet (0.4 mg total) under the tongue every 5 (five) minutes as needed for Chest pain. if second dose needed call 911  Qty: 25 tablet, Refills: 1      ondansetron (ZOFRAN) 4 MG tablet Take 1 tablet (4 mg total) by mouth every 8 (eight) hours as needed for Nausea.  Qty: 20 tablet, Refills: 0    Associated Diagnoses: Viral gastroenteritis      pantoprazole (PROTONIX) 40 MG tablet Take 1 tablet (40 mg total) by mouth once daily.  Qty: 30 tablet, Refills: 3      rivaroxaban (XARELTO) 10 mg Tab Take 1 tablet (10 mg total) by mouth daily with dinner or evening meal.  Qty: 30 tablet, Refills: 3    Comments: To replace eliquis which may be causing nausea.  Associated Diagnoses: Other pulmonary embolism without acute cor pulmonale, unspecified chronicity             Renal diet, keep dressing dry ,rtc one week , no heavy lifting   Discharge Procedure Orders (must include Diet, Follow-up, Activity)   Diet general     Keep surgical extremity elevated     Lifting restrictions     Call MD for:  temperature >100.4     Call MD for:  persistent nausea and vomiting     Call MD for:  severe uncontrolled pain     Call MD for:  redness, tenderness, or signs of infection (pain, swelling, redness, odor or green/yellow discharge around incision site)     Leave dressing on - Keep it clean, dry,  and intact until clinic visit        Discharge Date: 6/18/2019

## 2019-06-18 NOTE — ANESTHESIA PROCEDURE NOTES
Peripheral Block    Patient location during procedure: pre-op   Block not for primary anesthetic.  Reason for block: at surgeon's request and post-op pain management   Post-op Pain Location: L fistula   Start time: 6/18/2019 12:27 PM  Timeout: 6/18/2019 12:26 PM   End time: 6/18/2019 12:32 PM  Staffing  Anesthesiologist: Thuan Akbar MD  Resident/CRNA: Bishnu Lai MD  Performed: resident/CRNA   Preanesthetic Checklist  Completed: patient identified, site marked, surgical consent, pre-op evaluation, timeout performed, IV checked, risks and benefits discussed and monitors and equipment checked  Peripheral Block  Patient position: supine  Prep: ChloraPrep  Patient monitoring: heart rate, cardiac monitor, continuous pulse ox, continuous capnometry and frequent blood pressure checks  Block type: axillary  Laterality: left  Injection technique: single shot  Needle  Needle type: Stimuplex   Needle gauge: 21 G  Needle length: 4 in  Needle localization: anatomical landmarks and ultrasound guidance   -ultrasound image captured on disc.  Assessment  Injection assessment: negative aspiration and negative parasthesia  Paresthesia pain: none  Heart rate change: no  Slow fractionated injection: yes  Additional Notes  VSS.  [facility] RN monitoring vitals throughout procedure.  Patient tolerated procedure well.

## 2019-06-18 NOTE — H&P
History of Present Illness:  Patient is a 68 y.o. female presents with            Chief Complaint   Patient presents with    Vascular Access Problem       ref by Dialysis need perm access -  chest cath         Review of patient's allergies indicates:  No Known Allergies     Current Medications          Current Outpatient Medications   Medication Sig Dispense Refill    amLODIPine (NORVASC) 10 MG tablet Take 10 mg by mouth nightly.        aspirin 81 MG Chew Take 1 tablet (81 mg total) by mouth once daily.   0    atorvastatin (LIPITOR) 40 MG tablet Take 40 mg by mouth once daily.        furosemide (LASIX) 80 MG tablet Take 1 tablet (80 mg total) by mouth 2 (two) times daily. 60 tablet 11    metoprolol tartrate (LOPRESSOR) 100 MG tablet Take 1 tablet (100 mg total) by mouth 2 (two) times daily. 180 tablet 1    nitroGLYCERIN (NITROSTAT) 0.4 MG SL tablet Place 1 tablet (0.4 mg total) under the tongue every 5 (five) minutes as needed for Chest pain. if second dose needed call 911 25 tablet 1    ondansetron (ZOFRAN) 4 MG tablet Take 1 tablet (4 mg total) by mouth every 8 (eight) hours as needed for Nausea. 20 tablet 0    pantoprazole (PROTONIX) 40 MG tablet Take 1 tablet (40 mg total) by mouth once daily. 30 tablet 3    rivaroxaban (XARELTO) 10 mg Tab Take 1 tablet (10 mg total) by mouth daily with dinner or evening meal. 30 tablet 3      No current facility-administered medications for this visit.                  Past Medical History:   Diagnosis Date    Anticoagulant long-term use      Arthritis      Chronic kidney disease      Chronic low back pain      Diabetes mellitus, type 2      End-stage renal disease on hemodialysis 3/28/2019     Monday Wednesday Friday at [facility]    Hypertension      Pulmonary embolism with acute cor pulmonale 1/11/2019    Stroke due to embolism 4/4/2019            Past Surgical History:   Procedure Laterality Date    CHOLECYSTECTOMY        EKOS,  "Pumoart/DVT   1/11/2019     Performed by Roger Day MD at [facility] CATH LAB/EP    HYSTERECTOMY        Insertion,catheter,tunneled Right 3/29/2019     Performed by Michelet Cano Jr., MD at [facility] OR    Thrombolysis, PA N/A 1/11/2019     Performed by Roger Day MD at [facility] CATH LAB/EP      History reviewed. No pertinent family history.  Social History            Tobacco Use    Smoking status: Never Smoker    Smokeless tobacco: Never Used   Substance Use Topics    Alcohol use: No       Frequency: Never    Drug use: Never         Review of Systems:     Review of Systems   Constitutional: Negative.    HENT: Negative.    Eyes: Negative.    Respiratory: Negative.    Cardiovascular: Negative.    Gastrointestinal: Negative.    Genitourinary: Negative.    Musculoskeletal: Negative.    Skin: Negative.    Neurological: Negative.    Psychiatric/Behavioral: Negative.          OBJECTIVE:      Vital Signs (Most Recent)  Pulse: 65 (06/11/19 1513)  Resp: 16 (06/11/19 1513)  BP: (!) 83/51 (06/11/19 1513)  5' 4" (1.626 m)  64.9 kg (143 lb)      Physical Exam:     Physical Exam   Constitutional: She is oriented to person, place, and time. She appears well-developed and well-nourished.   HENT:   Head: Normocephalic.   Eyes: Pupils are equal, round, and reactive to light. Conjunctivae and EOM are normal.   Neck: Normal range of motion. Neck supple.   Cardiovascular: Normal rate, regular rhythm, normal heart sounds and intact distal pulses.   Pulmonary/Chest: Effort normal and breath sounds normal.   Abdominal: Soft. Bowel sounds are normal.   Musculoskeletal: Normal range of motion.   Neurological: She is alert and oriented to person, place, and time. She has normal reflexes.   Skin: Skin is warm and dry.         Laboratory        Diagnostic Results:        ASSESSMENT/PLAN:      Crf. Dm htn  S/p stroke     PLAN:Plan   Av graft placement left arm today            "

## 2019-06-18 NOTE — DISCHARGE INSTRUCTIONS
Arteriovenous (AV) Fistula for Dialysis  An AV fistula is a connection between an artery and a vein. For this procedure, an AV fistula is surgically created using an artery and a vein in your arm. (Your healthcare provider will let you know if another site is to be used.) When the artery and vein are joined, blood flow increases from the artery into the vein. As a result, the vein gets bigger over time. The enlarged vein provides easier access to the blood for a treatment for kidney failure (dialysis). This sheet explains the procedure and what to expect.     An AV fistula increases blood flow from the artery into the vein. Over time, the vein becomes stronger and enlarged.   Preparing for the procedure  Prepare as you have been told. In addition:  · Tell your healthcare provider about all the medicines you take. This includes all over-the-counter and prescription medicines, and street drugs. It also includes herbs, vitamins, and other supplements. You may need to stop taking some or all of them before the procedure.  · Follow any directions youre given for not eating or drinking before the procedure.  · Do not allow anyone to draw blood from or take blood pressure on the arm that will have the fistula before the procedure.  The day of the procedure  The procedure takes about 1 to 2 hours. Youll likely go home the same day.  Before the procedure begins:  · An IV (intravenous) line is put into a vein in the arm or hand not being used for the procedure. This line supplies fluids and medicines.  · To keep you free of pain during the procedure, youre given general anesthesia. This medicine puts you into a state like a deep sleep through the procedure. Or a nerve block may be used. This medicine numbs the arm. With it, you may also be given medicine that makes you relaxed and drowsy through the procedure.  During the procedure:  · The skin over your arm may be injected with numbing medicine.  · One or more small  cuts (incisions) are then made through the numbed skin. This depends on the size of your arm and the depth of the vein in your arm.  · The vein is attached to the selected artery.  · Any incisions made are then closed with stitches (sutures), staples, surgical glue, or strips of surgical tape.  After the procedure:  · Youll be asked to keep your arm raised (elevated) as often as possible for at least a week after the procedure.  · Youll be given medicines to manage pain as needed.  · Your arm and hand will be checked to make sure blood is flowing through the fistula properly. The feeling of blood rushing through the fistula is called a thrill. It is somewhat similar to the purring of a cat. Youll be taught how to check for this feeling each day to make sure there are no problems with your fistula. Youll also be taught how to care for your fistula at home.  · When its time for you to leave the hospital, have an adult family member or friend ready to drive you home.  Recovering at home  Once at home, follow all of the instructions youve been given. Be sure to:  · Take all medicines as directed.  · Care for your incision as instructed.  · Check for signs of infection at the incision site (see below).  · Avoid heavy lifting and strenuous activities as directed.  · Monitor and care for your fistula as instructed.  · Do your hand and arm exercises as instructed. This usually involves squeezing a ball in your hand for a few minutes each hour.  Call your healthcare provider if you have any of the following:  · Fever of 100.4°F (38°C) or higher  · Signs of infection at the incision site, such as increased redness or swelling, warmth, worsening pain, bleeding, or bad-smelling drainage  · You cant feel a thrill (the vibration of blood going through your arm)  · Pain or numbness in your fingers, hand, or arm  · Bleeding, redness, or warmth around your fistula  · Sudden bulging of the fistula (more than usual; a slight  bulge is normal)   Follow-Up  Your healthcare provider will check your fistula within 1 to 2 weeks after the procedure. It will likely take about 6 to 8 weeks for the fistula to enlarge enough to start dialysis. After that, make sure the fistula is checked each time you have dialysis. Your healthcare provider may also suggest checkups every 6 months.  Risks and possible complications include:  · The fistula not working properly  · Long wait before the fistula is ready (up to 6 months)  · Coldness or numbness in the hand (due to blood flowing away from the hand and into the fistula)  · An unsightly bump under the skin (due to enlargement of the fistula)  · Prolonged bleeding from the fistula after dialysis  · Narrowing or weakening of the blood vessels used for the fistula  · Formation of blood clots in the blood vessels used for the fistula  · Risks of anesthesia or any other medicines used during the procedure   Living with an AV Fistula  A problem, such as a narrowing (stricture) of the vein or an infection, can make the fistula unusable. If this happens, you may need other treatments to repair or make a new fistula. To protect your fistula, follow these and any other guidelines youre given:  · Check your fistula as often as your healthcare provider says. If you cant feel your thrill, let your provider know right away.  · Make sure your fistula is checked before each dialysis treatment.  · Dont let anyone draw blood from or take blood pressure on the arm that has the fistula.  · Wash your hands often and keep the area around your fistula clean.  · Dont sleep on the arm that has the fistula.  · Dont wear tight jewelry or a watch on the arm with your fistula.  · Protect your fistula from cuts, scrapes, or blows.  Date Last Reviewed: 1/1/2017  © 7374-9247 [employer]. [address] [place] [address], [place] [address]. All rights reserved. This information is not intended as a substitute for professional  medical care. Always follow your healthcare professional's instructions.  ANESTHESIA  -For the first 24 hours after surgery:  Do not drive, use heavy equipment, make important decisions, or drink alcohol  -It is normal to feel sleepy for several hours.  Rest until you are more awake.  -Have someone stay with you, if needed.  They can watch for problems and help keep you safe.  -Some possible post anesthesia side effects include: nausea and vomiting, sore throat and hoarseness, sleepiness, and dizziness.    PAIN  -If you have pain after surgery, pain medicine will help you feel better.  Take it as directed, before pain becomes severe.  Most pain relievers taken by mouth need at least 20-30 minutes to start working.  -Do not drive or drink alcohol while taking pain medicine.  -Pain medication can upset your stomach.  Taking them with a little food may help.  -Other ways to help control pain: elevation, ice, and relaxation  -Call your surgeon if still having unmanageable pain an hour after taking pain medicine.  -Pain medicine can cause constipation.  Taking an over-the counter stool softener while on prescription pain medicine and drinking plenty of fluids can prevent this side effect.  -Call your surgeon if you have severe side effects like: breathing problems, trouble waking up, dizziness, confusion, or severe constipation.    NAUSEA  -Some people have nausea after surgery.  This is often because of anesthesia, pain, pain medicine, or the stress of surgery.  -Do not push yourself to eat.  Start off with clear liquids and soup.  Slowly move to solid foods.  Don't eat fatty, rich, spicy foods at first.  Eat smaller amounts.  -If you develop persistent nausea and vomiting please notify your surgeon immediately.    BLEEDING  -Different types of surgery require different types of care and dressing changes.  It is important to follow all instructions and advice from your surgeon.  Change dressing as directed.  Call your  surgeon for any concerns regarding postop bleeding.    SIGNS OF INFECTION  -Signs of infection include: fever, swelling, drainage, and redness  -Notify your surgeon if you have a fever of 100.4 F (38.0 C) or higher.  -Notify your surgeon if you notice redness, swelling, increased pain, pus, or a foul smell at the incision site.    Acetaminophen; Hydrocodone tablets or capsules  What is this medicine?  ACETAMINOPHEN; HYDROCODONE (a set a MONICA robbie fen; rasheeda droe KOE done) is a pain reliever. It is used to treat moderate to severe pain.  How should I use this medicine?  Take this medicine by mouth with a glass of water. Follow the directions on the prescription label. You can take it with or without food. If it upsets your stomach, take it with food. Do not take your medicine more often than directed.  A special MedGuide will be given to you by the pharmacist with each prescription and refill. Be sure to read this information carefully each time.  Talk to your pediatrician regarding the use of this medicine in children. Special care may be needed.  What side effects may I notice from receiving this medicine?  Side effects that you should report to your doctor or health care professional as soon as possible:  · allergic reactions like skin rash, itching or hives, swelling of the face, lips, or tongue  · breathing problems  · confusion  · redness, blistering, peeling or loosening of the skin, including inside the mouth  · signs and symptoms of low blood pressure like dizziness; feeling faint or lightheaded, falls; unusually weak or tired  · trouble passing urine or change in the amount of urine  · yellowing of the eyes or skin  Side effects that usually do not require medical attention (report to your doctor or health care professional if they continue or are bothersome):  · constipation  · dry mouth  · nausea, vomiting  · tiredness  What may interact with this medicine?  This medicine may interact with the following  medications:  · alcohol  · antiviral medicines for HIV or AIDS  · atropine  · antihistamines for allergy, cough and cold  · certain antibiotics like erythromycin, clarithromycin  · certain medicines for anxiety or sleep  · certain medicines for bladder problems like oxybutynin, tolterodine  · certain medicines for depression like amitriptyline, fluoxetine, sertraline  · certain medicines for fungal infections like ketoconazole and itraconazole  · certain medicines for Parkinson's disease like benztropine, trihexyphenidyl  · certain medicines for seizures like carbamazepine, phenobarbital, phenytoin, primidone  · certain medicines for stomach problems like dicyclomine, hyoscyamine  · certain medicines for travel sickness like scopolamine  · general anesthetics like halothane, isoflurane, methoxyflurane, propofol  · ipratropium  · local anesthetics like lidocaine, pramoxine, tetracaine  · MAOIs like Carbex, Eldepryl, Marplan, Nardil, and Parnate  · medicines that relax muscles for surgery  · other medicines with acetaminophen  · other narcotic medicines for pain or cough  · phenothiazines like chlorpromazine, mesoridazine, prochlorperazine, thioridazine  · rifampin  What if I miss a dose?  If you miss a dose, take it as soon as you can. If it is almost time for your next dose, take only that dose. Do not take double or extra doses.  Where should I keep my medicine?  Keep out of the reach of children. This medicine can be abused. Keep your medicine in a safe place to protect it from theft. Do not share this medicine with anyone. Selling or giving away this medicine is dangerous and against the law.  This medicine may cause accidental overdose and death if it taken by other adults, children, or pets. Mix any unused medicine with a substance like cat litter or coffee grounds. Then throw the medicine away in a sealed container like a sealed bag or a coffee can with a lid. Do not use the medicine after the expiration  date.  Store at room temperature between 15 and 30 degrees C (59 and 86 degrees F).  What should I tell my health care provider before I take this medicine?  They need to know if you have any of these conditions:  · brain tumor  · Crohn's disease, inflammatory bowel disease, or ulcerative colitis  · drug abuse or addiction  · head injury  · heart or circulation problems  · if you often drink alcohol  · kidney disease or problems going to the bathroom  · liver disease  · lung disease, asthma, or breathing problems  · an unusual or allergic reaction to acetaminophen, hydrocodone, other opioid analgesics, other medicines, foods, dyes, or preservatives  · pregnant or trying to get pregnant  · breast-feeding  What should I watch for while using this medicine?  Tell your doctor or health care professional if your pain does not go away, if it gets worse, or if you have new or a different type of pain. You may develop tolerance to the medicine. Tolerance means that you will need a higher dose of the medicine for pain relief. Tolerance is normal and is expected if you take the medicine for a long time.  Do not suddenly stop taking your medicine because you may develop a severe reaction. Your body becomes used to the medicine. This does NOT mean you are addicted. Addiction is a behavior related to getting and using a drug for a non-medical reason. If you have pain, you have a medical reason to take pain medicine. Your doctor will tell you how much medicine to take. If your doctor wants you to stop the medicine, the dose will be slowly lowered over time to avoid any side effects.  There are different types of narcotic medicines (opiates). If you take more than one type at the same time or if you are taking another medicine that also causes drowsiness, you may have more side effects. Give your health care provider a list of all medicines you use. Your doctor will tell you how much medicine to take. Do not take more medicine  than directed. Call emergency for help if you have problems breathing or unusual sleepiness.  Do not take other medicines that contain acetaminophen with this medicine. Always read labels carefully. If you have questions, ask your doctor or pharmacist.  If you take too much acetaminophen get medical help right away. Too much acetaminophen can be very dangerous and cause liver damage. Even if you do not have symptoms, it is important to get help right away.  You may get drowsy or dizzy. Do not drive, use machinery, or do anything that needs mental alertness until you know how this medicine affects you. Do not stand or sit up quickly, especially if you are an older patient. This reduces the risk of dizzy or fainting spells. Alcohol may interfere with the effect of this medicine. Avoid alcoholic drinks.  The medicine will cause constipation. Try to have a bowel movement at least every 2 to 3 days. If you do not have a bowel movement for 3 days, call your doctor or health care professional.  Your mouth may get dry. Chewing sugarless gum or sucking hard candy, and drinking plenty of water may help. Contact your doctor if the problem does not go away or is severe.  NOTE:This sheet is a summary. It may not cover all possible information. If you have questions about this medicine, talk to your doctor, pharmacist, or health care provider. Copyright© 2017 [employer]

## 2019-06-25 ENCOUNTER — HOSPITAL ENCOUNTER (INPATIENT)
Facility: HOSPITAL | Age: 69
LOS: 4 days | Discharge: HOME HEALTH | DRG: 314 | End: 2019-06-29
Attending: EMERGENCY MEDICINE | Admitting: HOSPITALIST
Payer: MEDICARE

## 2019-06-25 DIAGNOSIS — N18.6 END STAGE RENAL DISEASE ON DIALYSIS: Primary | ICD-10-CM

## 2019-06-25 DIAGNOSIS — Z99.2 TYPE 2 DIABETES MELLITUS WITH CHRONIC KIDNEY DISEASE ON CHRONIC DIALYSIS, WITHOUT LONG-TERM CURRENT USE OF INSULIN: Chronic | ICD-10-CM

## 2019-06-25 DIAGNOSIS — E11.22 TYPE 2 DIABETES MELLITUS WITH CHRONIC KIDNEY DISEASE ON CHRONIC DIALYSIS, WITHOUT LONG-TERM CURRENT USE OF INSULIN: Chronic | ICD-10-CM

## 2019-06-25 DIAGNOSIS — I95.9 HYPOTENSION: ICD-10-CM

## 2019-06-25 DIAGNOSIS — Z86.79 HISTORY OF HYPERTENSION: Chronic | ICD-10-CM

## 2019-06-25 DIAGNOSIS — Z99.2 END STAGE RENAL DISEASE ON DIALYSIS: Primary | ICD-10-CM

## 2019-06-25 DIAGNOSIS — N18.6 TYPE 2 DIABETES MELLITUS WITH CHRONIC KIDNEY DISEASE ON CHRONIC DIALYSIS, WITHOUT LONG-TERM CURRENT USE OF INSULIN: Chronic | ICD-10-CM

## 2019-06-25 DIAGNOSIS — Z86.711 HISTORY OF PULMONARY EMBOLISM: Chronic | ICD-10-CM

## 2019-06-25 DIAGNOSIS — I95.0 IDIOPATHIC HYPOTENSION: ICD-10-CM

## 2019-06-25 DIAGNOSIS — R53.1 GENERALIZED WEAKNESS: ICD-10-CM

## 2019-06-25 LAB
ALBUMIN SERPL BCP-MCNC: 2.1 G/DL (ref 3.5–5.2)
ALP SERPL-CCNC: 74 U/L (ref 55–135)
ALT SERPL W/O P-5'-P-CCNC: 43 U/L (ref 10–44)
ANION GAP SERPL CALC-SCNC: 15 MMOL/L (ref 8–16)
AST SERPL-CCNC: 81 U/L (ref 10–40)
BASOPHILS # BLD AUTO: 0.01 K/UL (ref 0–0.2)
BASOPHILS NFR BLD: 0.2 % (ref 0–1.9)
BILIRUB SERPL-MCNC: 1.1 MG/DL (ref 0.1–1)
BUN SERPL-MCNC: 8 MG/DL (ref 8–23)
CALCIUM SERPL-MCNC: 8.5 MG/DL (ref 8.7–10.5)
CHLORIDE SERPL-SCNC: 102 MMOL/L (ref 95–110)
CO2 SERPL-SCNC: 21 MMOL/L (ref 23–29)
CREAT SERPL-MCNC: 3.8 MG/DL (ref 0.5–1.4)
DIFFERENTIAL METHOD: ABNORMAL
EOSINOPHIL # BLD AUTO: 0 K/UL (ref 0–0.5)
EOSINOPHIL NFR BLD: 0.3 % (ref 0–8)
ERYTHROCYTE [DISTWIDTH] IN BLOOD BY AUTOMATED COUNT: 16.1 % (ref 11.5–14.5)
EST. GFR  (AFRICAN AMERICAN): 13 ML/MIN/1.73 M^2
EST. GFR  (NON AFRICAN AMERICAN): 12 ML/MIN/1.73 M^2
GLUCOSE SERPL-MCNC: 89 MG/DL (ref 70–110)
HCT VFR BLD AUTO: 37.5 % (ref 37–48.5)
HGB BLD-MCNC: 11.9 G/DL (ref 12–16)
LACTATE SERPL-SCNC: 1.7 MMOL/L (ref 0.5–2.2)
LYMPHOCYTES # BLD AUTO: 1.7 K/UL (ref 1–4.8)
LYMPHOCYTES NFR BLD: 27.7 % (ref 18–48)
MAGNESIUM SERPL-MCNC: 1.5 MG/DL (ref 1.6–2.6)
MCH RBC QN AUTO: 28.3 PG (ref 27–31)
MCHC RBC AUTO-ENTMCNC: 31.7 G/DL (ref 32–36)
MCV RBC AUTO: 89 FL (ref 82–98)
MONOCYTES # BLD AUTO: 0.9 K/UL (ref 0.3–1)
MONOCYTES NFR BLD: 14.3 % (ref 4–15)
NEUTROPHILS # BLD AUTO: 3.6 K/UL (ref 1.8–7.7)
NEUTROPHILS NFR BLD: 57.5 % (ref 38–73)
PLATELET # BLD AUTO: 216 K/UL (ref 150–350)
PMV BLD AUTO: 10.1 FL (ref 9.2–12.9)
POTASSIUM SERPL-SCNC: 3.4 MMOL/L (ref 3.5–5.1)
PROT SERPL-MCNC: 5.1 G/DL (ref 6–8.4)
RBC # BLD AUTO: 4.21 M/UL (ref 4–5.4)
SODIUM SERPL-SCNC: 138 MMOL/L (ref 136–145)
TROPONIN I SERPL DL<=0.01 NG/ML-MCNC: 0.01 NG/ML (ref 0–0.03)
TSH SERPL DL<=0.005 MIU/L-ACNC: 2.38 UIU/ML (ref 0.4–4)
WBC # BLD AUTO: 6.24 K/UL (ref 3.9–12.7)

## 2019-06-25 PROCEDURE — 93005 ELECTROCARDIOGRAM TRACING: CPT

## 2019-06-25 PROCEDURE — 12000002 HC ACUTE/MED SURGE SEMI-PRIVATE ROOM

## 2019-06-25 PROCEDURE — 25000003 PHARM REV CODE 250: Performed by: EMERGENCY MEDICINE

## 2019-06-25 PROCEDURE — 96361 HYDRATE IV INFUSION ADD-ON: CPT | Performed by: EMERGENCY MEDICINE

## 2019-06-25 PROCEDURE — 83605 ASSAY OF LACTIC ACID: CPT

## 2019-06-25 PROCEDURE — G0378 HOSPITAL OBSERVATION PER HR: HCPCS

## 2019-06-25 PROCEDURE — 83735 ASSAY OF MAGNESIUM: CPT

## 2019-06-25 PROCEDURE — 85025 COMPLETE CBC W/AUTO DIFF WBC: CPT

## 2019-06-25 PROCEDURE — 84443 ASSAY THYROID STIM HORMONE: CPT

## 2019-06-25 PROCEDURE — 80053 COMPREHEN METABOLIC PANEL: CPT

## 2019-06-25 PROCEDURE — 84484 ASSAY OF TROPONIN QUANT: CPT

## 2019-06-25 PROCEDURE — 96361 HYDRATE IV INFUSION ADD-ON: CPT

## 2019-06-25 PROCEDURE — 99285 EMERGENCY DEPT VISIT HI MDM: CPT | Mod: 25

## 2019-06-25 RX ORDER — HYDROCODONE BITARTRATE AND ACETAMINOPHEN 10; 325 MG/1; MG/1
1 TABLET ORAL
Status: COMPLETED | OUTPATIENT
Start: 2019-06-25 | End: 2019-06-25

## 2019-06-25 RX ADMIN — SODIUM CHLORIDE 250 ML: 0.9 INJECTION, SOLUTION INTRAVENOUS at 09:06

## 2019-06-25 RX ADMIN — SODIUM CHLORIDE 250 ML: 0.9 INJECTION, SOLUTION INTRAVENOUS at 07:06

## 2019-06-25 RX ADMIN — HYDROCODONE BITARTRATE AND ACETAMINOPHEN 1 TABLET: 10; 325 TABLET ORAL at 08:06

## 2019-06-25 NOTE — ED PROVIDER NOTES
"Encounter Date: 6/25/2019    SCRIBE #1 NOTE: I, Malik Antoine, am scribing for, and in the presence of,  Dr. Ariza. I have scribed the entire note.       History     Chief Complaint   Patient presents with    Hypotension     low blood pressure at doctor's office then at home. reports last dialysis Monday, due tomorrow. reports generalized weakness. Jonathanian reports last pressure 92/58     Time seen by provider: 6:44 PM    This is a 68 y.o. Female with past medical history of Anticoagulant long-term use, Arthritis, CKD, Chronic low back pain, Diabetes mellitus, type 2, End-stage renal disease on hemodialysis, Hypertension, Pulmonary embolism with acute cor pulmonale, and Stroke due to embolism who presents via EMS with complaint of generalized weakness and fatigue with hypotension since dialysis yesterday. She states she was hypotensive following her full dialysis treatment yesterday, but was sent home and "told to eat." She reports recent decreased appetite and PO intake over the past 2 days, and states her left arm has been swollen and warm for a few days. The patient states her BP usually runs around 110's systolic; however, she arrives with a BP of 86/50. Patient denies any fever, cough, nausea, vomiting, CP, SOB, abdominal pain, syncope, lightheadedness, or any other symptoms. She notes that she only makes a small amount of urine daily    The history is provided by the patient.     Review of patient's allergies indicates:  No Known Allergies  Past Medical History:   Diagnosis Date    Anticoagulant long-term use     Arthritis     Chronic kidney disease     Chronic low back pain     Diabetes mellitus, type 2     End-stage renal disease on hemodialysis 3/28/2019    Monday Wednesday Friday at [facility]    Hypertension     Pulmonary embolism with acute cor pulmonale 1/11/2019    Stroke due to embolism 4/4/2019     Past Surgical History:   Procedure Laterality Date    CHOLECYSTECTOMY      " EKOS, Pumoart/DVT  1/11/2019    Performed by Roger Day MD at [facility] CATH LAB/EP    HYSTERECTOMY      INSERTION, GRAFT, ARTERIOVENOUS Left 6/18/2019    Performed by Jean Pierre Villarreal MD at [facility] OR    Insertion,catheter,tunneled Right 3/29/2019    Performed by Michelet Cano Jr., MD at [facility] OR    Thrombolysis, PA N/A 1/11/2019    Performed by Roger Day MD at [facility] CATH LAB/EP     History reviewed. No pertinent family history.  Social History     Tobacco Use    Smoking status: Never Smoker    Smokeless tobacco: Never Used   Substance Use Topics    Alcohol use: No     Frequency: Never    Drug use: Never     Review of Systems   Constitutional: Positive for fatigue. Negative for chills and fever.   HENT: Negative for facial swelling and trouble swallowing.    Eyes: Negative for redness.   Respiratory: Negative for cough and shortness of breath.    Cardiovascular: Negative for chest pain.   Gastrointestinal: Negative for abdominal pain, diarrhea and vomiting.   Genitourinary: Negative for dysuria and hematuria.   Musculoskeletal: Negative for gait problem.   Skin: Negative for rash.   Neurological: Positive for weakness (generalized). Negative for dizziness, syncope, facial asymmetry, speech difficulty and light-headedness.     Physical Exam     Initial Vitals [06/25/19 1838]   BP Pulse Resp Temp SpO2   (!) 83/49 92 12 98 °F (36.7 °C) 98 %      MAP       --         Physical Exam    Nursing note and vitals reviewed.  Constitutional: She appears well-developed and well-nourished. She is not diaphoretic. No distress.   HENT:   Head: Normocephalic and atraumatic.   Right Ear: External ear normal.   Left Ear: External ear normal.   Nose: Nose normal.   Eyes: Conjunctivae and EOM are normal.   Neck: Normal range of motion. Neck supple.   Cardiovascular: Normal rate, regular rhythm and intact distal pulses.   Murmur heard.  Systolic murmur  Shunt to left proximal upper extremity with good thrill  2+  radial pulse   Pulmonary/Chest: Breath sounds normal. No respiratory distress. She has no wheezes. She has no rhonchi. She has no rales.   Lungs CTAB   Abdominal: Soft. There is no tenderness.   Musculoskeletal: Normal range of motion. She exhibits edema. She exhibits no tenderness.   Left upper extremity is edematous and erythematous   1+ pitting edema to lower extremities bilaterally   Neurological: She is alert and oriented to person, place, and time. GCS score is 15. GCS eye subscore is 4. GCS verbal subscore is 5. GCS motor subscore is 6.   Skin: Skin is warm and dry. Capillary refill takes less than 2 seconds. There is erythema (to the left upper extremity).   7 cm area of wound dehiscence to left axilla, no active bleeding no sign of infection           ED Course   Procedures  Labs Reviewed   CBC W/ AUTO DIFFERENTIAL - Abnormal; Notable for the following components:       Result Value    Hemoglobin 11.9 (*)     Mean Corpuscular Hemoglobin Conc 31.7 (*)     RDW 16.1 (*)     All other components within normal limits   COMPREHENSIVE METABOLIC PANEL - Abnormal; Notable for the following components:    Potassium 3.4 (*)     CO2 21 (*)     Creatinine 3.8 (*)     Calcium 8.5 (*)     Total Protein 5.1 (*)     Albumin 2.1 (*)     Total Bilirubin 1.1 (*)     AST 81 (*)     eGFR if  13 (*)     eGFR if non  12 (*)     All other components within normal limits   MAGNESIUM - Abnormal; Notable for the following components:    Magnesium 1.5 (*)     All other components within normal limits   TROPONIN I   TSH   LACTIC ACID, PLASMA        ECG Results          EKG 12-lead (In process)  Result time 06/26/19 08:42:22    In process by Interface, Lab In [facility] (06/26/19 08:42:22)                 Narrative:    Test Reason : R53.1,    Vent. Rate : 098 BPM     Atrial Rate : 098 BPM     P-R Int : 128 ms          QRS Dur : 074 ms      QT Int : 356 ms       P-R-T Axes : 067 016 042 degrees     QTc Int :  454 ms    Normal sinus rhythm  Cannot rule out Anterior infarct ,age undetermined  Abnormal ECG  When compared with ECG of 03-APR-2019 17:42,  QRS voltage has decreased  Nonspecific T wave abnormality now evident in Anterior-lateral leads    Referred By: [email or web address]   SELF           Confirmed By:                             X-Rays:   Independently Interpreted Readings:   Other Readings:  Reviewed by myself, read by radiology.    Imaging Results          US Upper Extremity Veins Left (Final result)  Result time 06/25/19 21:31:26    Final result by Selin Ware MD (06/25/19 21:31:26)                 Impression:      No thrombus in central veins of the left upper extremity.    Complex area around the AV fistula near the mid basilic vein.    Nonvisualization of the left cephalic vein.      Electronically signed by: Selin Ware  Date:    06/25/2019  Time:    21:31             Narrative:    EXAMINATION:  US UPPER EXTREMITY VEINS LEFT    CLINICAL HISTORY:  left arm pain/edema;    TECHNIQUE:  Duplex and color flow Doppler evaluation and dynamic compression was performed of the left upper extremity veins.    COMPARISON:  None    FINDINGS:  Central veins: The internal jugular, subclavian, and axillary veins are patent and free of thrombus.    Arm veins: The brachial is patent and compressible.    Other findings: The basilic vein is part of the arteriovenous fistula and is patent.  There is a complex area around the AV fistula near the mid basilic vein.  Question if there is any history of evolved hematoma about the is basilic vein.  The left cephalic vein is not visualized.                               X-Ray Chest 1 View (Final result)  Result time 06/25/19 19:35:31    Final result by Selin Ware MD (06/25/19 19:35:31)                 Impression:      No acute intrathoracic abnormality.      Electronically signed by: Selin Ware  Date:    06/25/2019  Time:    19:35             Narrative:     EXAMINATION:  CHEST ONE VIEW    CLINICAL HISTORY:  Weakness    TECHNIQUE:  One view of the chest.    COMPARISON:  04/03/2019    FINDINGS:  There is a right central venous catheter with its tip in the distal superior vena cava.  The cardiac silhouette is within normal limits.   There is no focal consolidation, pneumothorax, or pleural effusion. Spondylitic changes are present.  Degenerative changes seen at the shoulders.  Bones are osteopenic.  Gallbladder is surgically absent.  Calcific densities are seen over both breasts.                              Medical Decision Making:   Initial Assessment:   This is a 68 y.o. female who presents via EMS with complaint of generalized weakness and fatigue with hypotension since dialysis yesterday.  Differential Diagnosis:   Electrolyte abnormality, hypoglycemia, CVA, spinal cord abnormality, infectious causes, Guillain [place], neuromuscular junction disease, muscle disease, endocrine abnormalities, sepsis.    Independently Interpreted Test(s):   I have ordered and independently interpreted EKG Reading(s) - see prior notes  Clinical Tests:   Lab Tests: Ordered and Reviewed  Radiological Study: Ordered and Reviewed  Medical Tests: Ordered and Reviewed  ED Management:  Patient had 250 cc bolus x 2 with minimal improvement in BP however she continues to feel generally weak.  Will admit to medicine.  Wound to axilla approximated with steri strips per Dr Villarreal request.  Informed him that I was not able to get wound completely closed secondary to swelling.  He states that this is expected.                     ED Course as of Jun 26 1415   Tue Jun 25, 2019 1911 BP(!): 83/49 [LD]   1911 Temp: 98 °F (36.7 °C) [LD]   1911 Pulse: 92 [LD]   1911 Resp: 12 [LD]   1911 SpO2: 98 % [LD]   1916 EKG with NSR, rate of 98 bpm.  Normal intervals. No STEMI    [LD]   2205 Discuss wound dehiscence with Dr. Villarreal who recommends approximating wound and placing Steri-Strips.  Would like wound arm  wrapped in kerlex    [LD]      ED Course User Index  [LD] Yuliet Ariza MD     Clinical Impression:       ICD-10-CM ICD-9-CM   1. End stage renal disease on dialysis N18.6 585.6    Z99.2 V45.11   2. Generalized weakness R53.1 780.79   3. Hypotension I95.9 458.9            I, Yuliet Ariza,  personally performed the services described in this documentation. All medical record entries made by the scribe were at my direction and in my presence.  I have reviewed the chart and agree that the record reflects my personal performance and is accurate and complete. Yuliet Ariza M.D. 2:15 PM06/26/2019       Yuliet Ariza MD  06/26/19 1416

## 2019-06-25 NOTE — ED NOTES
Patient stated that she received dialysis yesterday and has been feeling weak for past few days. Patient stated during diaylsis yesterday her BP was low and she felt weak and tired. Patient stated the Dialysis unit told her she was most likely dehydrated and they couldn't take off much fluid to go home to drink and eat. Pt stated all she has wanted to do was sleep. Patient is AAOx4, left upper arm swollen and in a sling. Patient placed into a gown and connected to BP monitor and pulse ox

## 2019-06-26 PROBLEM — Z99.2 TYPE 2 DIABETES MELLITUS WITH CHRONIC KIDNEY DISEASE ON CHRONIC DIALYSIS, WITHOUT LONG-TERM CURRENT USE OF INSULIN: Chronic | Status: ACTIVE | Noted: 2018-12-20

## 2019-06-26 PROBLEM — N18.6 TYPE 2 DIABETES MELLITUS WITH CHRONIC KIDNEY DISEASE ON CHRONIC DIALYSIS, WITHOUT LONG-TERM CURRENT USE OF INSULIN: Chronic | Status: ACTIVE | Noted: 2018-12-20

## 2019-06-26 PROBLEM — R53.1 GENERALIZED WEAKNESS: Status: ACTIVE | Noted: 2019-06-26

## 2019-06-26 PROBLEM — Z86.73 HISTORY OF EMBOLIC STROKE: Chronic | Status: ACTIVE | Noted: 2019-04-03

## 2019-06-26 PROBLEM — N18.6 ESRD (END STAGE RENAL DISEASE) ON DIALYSIS: Status: ACTIVE | Noted: 2019-06-18

## 2019-06-26 PROBLEM — Z99.2 ESRD (END STAGE RENAL DISEASE) ON DIALYSIS: Status: ACTIVE | Noted: 2019-06-18

## 2019-06-26 PROBLEM — I63.9 STROKE DUE TO EMBOLISM: Status: RESOLVED | Noted: 2019-04-03 | Resolved: 2019-06-26

## 2019-06-26 LAB
ANION GAP SERPL CALC-SCNC: 9 MMOL/L (ref 8–16)
AORTIC ROOT ANNULUS: 2.7 CM
AV INDEX (PROSTH): 0.8
AV MEAN GRADIENT: 11 MMHG
AV PEAK GRADIENT: 18 MMHG
AV VALVE AREA: 2.08 CM2
AV VELOCITY RATIO: 0.85
BASOPHILS # BLD AUTO: 0.01 K/UL (ref 0–0.2)
BASOPHILS NFR BLD: 0.2 % (ref 0–1.9)
BSA FOR ECHO PROCEDURE: 1.64 M2
BUN SERPL-MCNC: 9 MG/DL (ref 8–23)
CALCIUM SERPL-MCNC: 8.2 MG/DL (ref 8.7–10.5)
CHLORIDE SERPL-SCNC: 103 MMOL/L (ref 95–110)
CO2 SERPL-SCNC: 28 MMOL/L (ref 23–29)
CREAT SERPL-MCNC: 4 MG/DL (ref 0.5–1.4)
CV ECHO LV RWT: 0.32 CM
DIFFERENTIAL METHOD: ABNORMAL
DOP CALC AO PEAK VEL: 2.1 M/S
DOP CALC AO VTI: 37.8 CM
DOP CALC LVOT AREA: 2.6 CM2
DOP CALC LVOT DIAMETER: 1.82 CM
DOP CALC LVOT PEAK VEL: 1.78 M/S
DOP CALC LVOT STROKE VOLUME: 78.79 CM3
DOP CALCLVOT PEAK VEL VTI: 30.3 CM
E WAVE DECELERATION TIME: 162.79 MSEC
E/A RATIO: 1.07
ECHO LV POSTERIOR WALL: 0.69 CM (ref 0.6–1.1)
EOSINOPHIL # BLD AUTO: 0.1 K/UL (ref 0–0.5)
EOSINOPHIL NFR BLD: 1.3 % (ref 0–8)
ERYTHROCYTE [DISTWIDTH] IN BLOOD BY AUTOMATED COUNT: 16.2 % (ref 11.5–14.5)
EST. GFR  (AFRICAN AMERICAN): 13 ML/MIN/1.73 M^2
EST. GFR  (NON AFRICAN AMERICAN): 11 ML/MIN/1.73 M^2
FRACTIONAL SHORTENING: 42 % (ref 28–44)
GLUCOSE SERPL-MCNC: 65 MG/DL (ref 70–110)
HCT VFR BLD AUTO: 33.3 % (ref 37–48.5)
HGB BLD-MCNC: 11 G/DL (ref 12–16)
INTERVENTRICULAR SEPTUM: 0.53 CM (ref 0.6–1.1)
IVRT: 0.13 MSEC
LA MAJOR: 3.85 CM
LA MINOR: 4.5 CM
LA WIDTH: 2.79 CM
LEFT ATRIUM SIZE: 3.76 CM
LEFT ATRIUM VOLUME INDEX: 22.6 ML/M2
LEFT ATRIUM VOLUME: 37 CM3
LEFT INTERNAL DIMENSION IN SYSTOLE: 2.47 CM (ref 2.1–4)
LEFT VENTRICLE DIASTOLIC VOLUME INDEX: 50.48 ML/M2
LEFT VENTRICLE DIASTOLIC VOLUME: 82.47 ML
LEFT VENTRICLE MASS INDEX: 45 G/M2
LEFT VENTRICLE SYSTOLIC VOLUME INDEX: 13.3 ML/M2
LEFT VENTRICLE SYSTOLIC VOLUME: 21.77 ML
LEFT VENTRICULAR INTERNAL DIMENSION IN DIASTOLE: 4.29 CM (ref 3.5–6)
LEFT VENTRICULAR MASS: 74.09 G
LYMPHOCYTES # BLD AUTO: 2.6 K/UL (ref 1–4.8)
LYMPHOCYTES NFR BLD: 40.6 % (ref 18–48)
MCH RBC QN AUTO: 28.9 PG (ref 27–31)
MCHC RBC AUTO-ENTMCNC: 33 G/DL (ref 32–36)
MCV RBC AUTO: 87 FL (ref 82–98)
MONOCYTES # BLD AUTO: 0.9 K/UL (ref 0.3–1)
MONOCYTES NFR BLD: 14.3 % (ref 4–15)
MV PEAK A VEL: 0.98 M/S
MV PEAK E VEL: 1.05 M/S
NEUTROPHILS # BLD AUTO: 2.8 K/UL (ref 1.8–7.7)
NEUTROPHILS NFR BLD: 43.6 % (ref 38–73)
PISA TR MAX VEL: 2.39 M/S
PLATELET # BLD AUTO: 222 K/UL (ref 150–350)
PMV BLD AUTO: 10.2 FL (ref 9.2–12.9)
POCT GLUCOSE: 108 MG/DL (ref 70–110)
POCT GLUCOSE: 173 MG/DL (ref 70–110)
POCT GLUCOSE: 93 MG/DL (ref 70–110)
POTASSIUM SERPL-SCNC: 3.3 MMOL/L (ref 3.5–5.1)
PROCALCITONIN SERPL IA-MCNC: 19.54 NG/ML
PULM VEIN S/D RATIO: 1.59
PV PEAK D VEL: 0.27 M/S
PV PEAK S VEL: 0.43 M/S
RA MAJOR: 3.94 CM
RA PRESSURE: 3 MMHG
RBC # BLD AUTO: 3.81 M/UL (ref 4–5.4)
RIGHT VENTRICULAR END-DIASTOLIC DIMENSION: 2.78 CM
SODIUM SERPL-SCNC: 140 MMOL/L (ref 136–145)
TR MAX PG: 23 MMHG
TV REST PULMONARY ARTERY PRESSURE: 26 MMHG
WBC # BLD AUTO: 6.31 K/UL (ref 3.9–12.7)

## 2019-06-26 PROCEDURE — 80048 BASIC METABOLIC PNL TOTAL CA: CPT

## 2019-06-26 PROCEDURE — 96365 THER/PROPH/DIAG IV INF INIT: CPT | Performed by: EMERGENCY MEDICINE

## 2019-06-26 PROCEDURE — 96366 THER/PROPH/DIAG IV INF ADDON: CPT | Performed by: EMERGENCY MEDICINE

## 2019-06-26 PROCEDURE — 36415 COLL VENOUS BLD VENIPUNCTURE: CPT

## 2019-06-26 PROCEDURE — 96361 HYDRATE IV INFUSION ADD-ON: CPT | Performed by: EMERGENCY MEDICINE

## 2019-06-26 PROCEDURE — 20000000 HC ICU ROOM

## 2019-06-26 PROCEDURE — 84145 PROCALCITONIN (PCT): CPT

## 2019-06-26 PROCEDURE — 85025 COMPLETE CBC W/AUTO DIFF WBC: CPT

## 2019-06-26 PROCEDURE — 94761 N-INVAS EAR/PLS OXIMETRY MLT: CPT

## 2019-06-26 PROCEDURE — 25000003 PHARM REV CODE 250: Performed by: INTERNAL MEDICINE

## 2019-06-26 PROCEDURE — 25000003 PHARM REV CODE 250: Performed by: NURSE PRACTITIONER

## 2019-06-26 PROCEDURE — 87040 BLOOD CULTURE FOR BACTERIA: CPT | Mod: 59

## 2019-06-26 PROCEDURE — 25000003 PHARM REV CODE 250: Performed by: HOSPITALIST

## 2019-06-26 RX ORDER — ERGOCALCIFEROL 1.25 MG/1
50000 CAPSULE ORAL
Status: DISCONTINUED | OUTPATIENT
Start: 2019-06-26 | End: 2019-06-29 | Stop reason: HOSPADM

## 2019-06-26 RX ORDER — SODIUM CHLORIDE 9 MG/ML
INJECTION, SOLUTION INTRAVENOUS CONTINUOUS
Status: DISCONTINUED | OUTPATIENT
Start: 2019-06-26 | End: 2019-06-26

## 2019-06-26 RX ORDER — NOREPINEPHRINE BITARTRATE/D5W 16MG/250ML
0.02 PLASTIC BAG, INJECTION (ML) INTRAVENOUS CONTINUOUS
Status: DISCONTINUED | OUTPATIENT
Start: 2019-06-26 | End: 2019-06-28

## 2019-06-26 RX ORDER — SODIUM CHLORIDE 0.9 % (FLUSH) 0.9 %
10 SYRINGE (ML) INJECTION
Status: DISCONTINUED | OUTPATIENT
Start: 2019-06-26 | End: 2019-06-29 | Stop reason: HOSPADM

## 2019-06-26 RX ORDER — SODIUM CHLORIDE 9 MG/ML
INJECTION, SOLUTION INTRAVENOUS
Status: DISCONTINUED | OUTPATIENT
Start: 2019-06-26 | End: 2019-06-29 | Stop reason: HOSPADM

## 2019-06-26 RX ORDER — PANTOPRAZOLE SODIUM 40 MG/1
40 TABLET, DELAYED RELEASE ORAL DAILY
Status: DISCONTINUED | OUTPATIENT
Start: 2019-06-26 | End: 2019-06-29 | Stop reason: HOSPADM

## 2019-06-26 RX ORDER — ONDANSETRON 2 MG/ML
4 INJECTION INTRAMUSCULAR; INTRAVENOUS EVERY 8 HOURS PRN
Status: DISCONTINUED | OUTPATIENT
Start: 2019-06-26 | End: 2019-06-29 | Stop reason: HOSPADM

## 2019-06-26 RX ORDER — SODIUM CHLORIDE 9 MG/ML
INJECTION, SOLUTION INTRAVENOUS ONCE
Status: DISCONTINUED | OUTPATIENT
Start: 2019-06-26 | End: 2019-06-29 | Stop reason: HOSPADM

## 2019-06-26 RX ORDER — NAPROXEN SODIUM 220 MG/1
81 TABLET, FILM COATED ORAL DAILY
Status: DISCONTINUED | OUTPATIENT
Start: 2019-06-26 | End: 2019-06-29 | Stop reason: HOSPADM

## 2019-06-26 RX ORDER — MUPIROCIN 20 MG/G
OINTMENT TOPICAL 2 TIMES DAILY
Status: DISCONTINUED | OUTPATIENT
Start: 2019-06-26 | End: 2019-06-29 | Stop reason: HOSPADM

## 2019-06-26 RX ORDER — ACETAMINOPHEN 325 MG/1
650 TABLET ORAL EVERY 4 HOURS PRN
Status: DISCONTINUED | OUTPATIENT
Start: 2019-06-26 | End: 2019-06-29 | Stop reason: HOSPADM

## 2019-06-26 RX ORDER — ATORVASTATIN CALCIUM 40 MG/1
40 TABLET, FILM COATED ORAL DAILY
Status: DISCONTINUED | OUTPATIENT
Start: 2019-06-26 | End: 2019-06-29 | Stop reason: HOSPADM

## 2019-06-26 RX ADMIN — ACETAMINOPHEN 650 MG: 325 TABLET ORAL at 01:06

## 2019-06-26 RX ADMIN — SODIUM CHLORIDE 500 ML: 0.9 INJECTION, SOLUTION INTRAVENOUS at 03:06

## 2019-06-26 RX ADMIN — ERGOCALCIFEROL 50000 UNITS: 1.25 CAPSULE ORAL at 11:06

## 2019-06-26 RX ADMIN — ASPIRIN 81 MG 81 MG: 81 TABLET ORAL at 08:06

## 2019-06-26 RX ADMIN — ACETAMINOPHEN 650 MG: 325 TABLET ORAL at 09:06

## 2019-06-26 RX ADMIN — ATORVASTATIN CALCIUM 40 MG: 40 TABLET, FILM COATED ORAL at 08:06

## 2019-06-26 RX ADMIN — MUPIROCIN: 20 OINTMENT TOPICAL at 09:06

## 2019-06-26 RX ADMIN — NEPHROCAP 1 CAPSULE: 1 CAP ORAL at 11:06

## 2019-06-26 RX ADMIN — Medication 0.02 MCG/KG/MIN: at 06:06

## 2019-06-26 RX ADMIN — PANTOPRAZOLE SODIUM 40 MG: 40 TABLET, DELAYED RELEASE ORAL at 08:06

## 2019-06-26 RX ADMIN — APIXABAN 2.5 MG: 2.5 TABLET, FILM COATED ORAL at 09:06

## 2019-06-26 RX ADMIN — ACETAMINOPHEN 650 MG: 325 TABLET ORAL at 02:06

## 2019-06-26 RX ADMIN — SODIUM CHLORIDE: 0.9 INJECTION, SOLUTION INTRAVENOUS at 08:06

## 2019-06-26 NOTE — PROGRESS NOTES
[facility] Hospital Medicine  Progress Note    Patient Name: Brittaney Joseph  MRN: [id number]  Patient Class: IP- Inpatient   Admission Date: 6/25/2019  Length of Stay: 0 days  Attending Physician: Brandon Ann MD  Primary Care Provider: Charles Argueta MD        Subjective:     Principal Problem:Hypotension      HPI:  Brittaney Joseph is a 68 year old black woman with hypertension, diabetes mellitus type 2, chronic diastolic heart failure, end stage renal disease on hemodialysis since 3/29/19 (Monday Wednesday Friday), hyperlipidemia, chronic low back pain, history of pulmonary embolism on 1/11/19 status post thrombectomy (anticoagulated on rivaroxaban), history of embolic stroke in the right supratentorial frontal, parietal, occipital, and temporal lobe and left posterior frontal lobe on 4/2/19. She lives with her niece. Her primary care physician is Dr. Charles Argueta. Her nephrologist is Dr. Saida Mcdaniel. She gets dialysis at [facility].   Her blood pressure in clinic on 6/11/19 was 83/51. She takes amlodipine 10 mg nightly and metoprolol 100 mg twice daily.              She underwent right brachial arteriovenous graft placement by Dr. Jean Pierre Villarreal on 6/18/19. Her blood pressures were around /50-60.   She presented to [facility] Emergency Department on 6/25/19 with generalized weakness and fatigue for 2 days, associated with low blood pressures. She reported decreased oral intake and swelling and pain to her left upper extremity. Her blood pressure was 83/49, but she had not taken her amlodipine and metoprolol for the past 2-3 days. Left upper extremity ultrasound showed no venous thrombosis. She was given IV fluids without improvement. She was started on norepinephrine. She was admitted to [facility].    Overview/Hospital Course:  The pharmacist noticed that she was on chronic rivaroxaban, which is not recommended for dialysis  patients. This was changed to apixaban.     Interval History: No new localized symptoms.    Review of Systems   Constitutional: Negative for chills and fever.   Gastrointestinal: Negative for nausea and vomiting.     Objective:     Vital Signs (Most Recent):  Temp: 98.1 °F (36.7 °C) (06/26/19 0730)  Pulse: 79 (06/26/19 1530)  Resp: 14 (06/26/19 1530)  BP: (!) 84/52 (06/26/19 1530)  SpO2: 99 % (06/26/19 1530) Vital Signs (24h Range):  Temp:  [97.7 °F (36.5 °C)-98.4 °F (36.9 °C)] 98.1 °F (36.7 °C)  Pulse:  [77-96] 79  Resp:  [7-22] 14  SpO2:  [76 %-100 %] 99 %  BP: ()/(46-79) 84/52     Weight: 59.4 kg (130 lb 15.3 oz)  Body mass index is 22.48 kg/m².    Intake/Output Summary (Last 24 hours) at 6/26/2019 1606  Last data filed at 6/26/2019 1500  Gross per 24 hour   Intake 1269.99 ml   Output --   Net 1269.99 ml      Physical Exam   Constitutional: She appears well-developed. No distress.   Pulmonary/Chest: Effort normal. No respiratory distress.   Neurological: She is alert.   Dysphasia (speaks slowly)   Psychiatric: She has a normal mood and affect.   Nursing note and vitals reviewed.      Significant Labs: All pertinent labs within the past 24 hours have been reviewed.    Significant Imaging: I have reviewed all pertinent imaging results/findings within the past 24 hours.   X-Ray Chest 1 View 6/25/19: FINDINGS:  There is a right central venous catheter with its tip in the distal superior vena cava.  The cardiac silhouette is within normal limits.   There is no focal consolidation, pneumothorax, or pleural effusion. Spondylitic changes are present.  Degenerative changes seen at the shoulders.  Bones are osteopenic.  Gallbladder is surgically absent.  Calcific densities are seen over both breasts.  US Upper Extremity Veins Left 6/25/19: FINDINGS:  Central veins: The internal jugular, subclavian, and axillary veins are patent and free of thrombus.  Arm veins: The brachial is patent and compressible.  Other  findings: The basilic vein is part of the arteriovenous fistula and is patent.  There is a complex area around the AV fistula near the mid basilic vein.  Question if there is any history of evolved hematoma about the is basilic vein.  The left cephalic vein is not visualized.  Impression:  No thrombus in central veins of the left upper extremity.  Complex area around the AV fistula near the mid basilic vein.  Nonvisualization of the left cephalic vein.      Assessment/Plan:      * Hypotension  Etiology unclear. TSH normal. Monitor blood culture results to see if she has bacteremia. Holding home antihypertensive medications. Wean norepinephrine as tolerated.    History of embolic stroke  Continue home aspirin and atorvastatin.    End-stage renal disease on hemodialysis  Appreciate Nephrology.    History of pulmonary embolism  Was taking rivaroxaban. Changed to apixaban.    Chronic low back pain  Chronic, stable.    Type 2 diabetes mellitus with chronic kidney disease on chronic dialysis, without long-term current use of insulin  Diet controlled. Insulin aspart sliding scale. Monitor serum glucose.    Hyperlipidemia associated with type 2 diabetes mellitus  Continue home atorvastatin.    Essential hypertension  Holding home amlodipine and metoprolol.      VTE Risk Mitigation (From admission, onward)        Ordered     apixaban tablet 2.5 mg  2 times daily      06/26/19 1440     IP VTE HIGH RISK PATIENT  Once      06/26/19 0154     Place sequential compression device  Until discontinued      06/26/19 0154          Critical care time spent on the evaluation and treatment of severe organ dysfunction, review of pertinent labs and imaging studies, discussions with consulting providers and discussions with patient/family: 30 minutes.      Brandon Ann MD  Department of Hospital Medicine   [facility]

## 2019-06-26 NOTE — PLAN OF CARE
Problem: Adult Inpatient Plan of Care  Goal: Plan of Care Review  Outcome: Ongoing (interventions implemented as appropriate)  Pt AAOx4. RA. NS. MAP > 65 sustained w/ IV vasopressors. Renal diet. Anuric ESRD. Pain managed w/ po meds prn. Will cont to monitor.

## 2019-06-26 NOTE — ED NOTES
Coupay notified RN of not being able to complete US due to pain. Notified Dr. Ariza, new orders received.

## 2019-06-26 NOTE — PLAN OF CARE
met with patient to complete discharge assessment. Patient was oriented and alert at time of assessment. Prior to admission patient stated she was dependent and has assistive equipment (bath bench, wheelchair, rolling walker) at home. Patient provided her physical address: [address] [place] [id number]. Patient lives at home with her niece ( Kallie Holman [phone]) patient also relies on her niece Wanda Cayden [phone]. Patient is retired , has no issues affording medications. Patient stated she is interested in home health when going home. Patient has no other needs.  wrote contact information on board and encouraged patient to contact if any questions or needs.       06/26/19 9244   Discharge Assessment   Assessment Type Discharge Planning Assessment   Confirmed/corrected address and phone number on facesheet? Yes   Assessment information obtained from? Patient   Prior to hospitilization cognitive status: Alert/Oriented   Prior to hospitalization functional status: Assistive Equipment;Partially Dependent   Current cognitive status: Alert/Oriented   Current Functional Status: Assistive Equipment;Partially Dependent   Lives With other relative(s)  (Kallie River, [phone], niece)   Able to Return to Prior Arrangements yes   Is patient able to care for self after discharge? Yes   Who are your caregiver(s) and their phone number(s)? Kallie Holman, [phone] , Dea Holman [phone]   Patient's perception of discharge disposition admitted as an inpatient   Readmission Within the Last 30 Days no previous admission in last 30 days   Patient currently being followed by outpatient case management? No   Patient currently receives any other outside agency services? No   Equipment Currently Used at Home bath bench;wheelchair;walker, rolling   Do you have any problems affording any of your prescribed medications? No   Is the patient taking medications as prescribed? yes   Does  the patient have transportation home? Yes   Transportation Anticipated family or friend will provide   Does the patient receive services at the [facility]? No   Discharge Plan A Home   Discharge Plan B Home with family   DME Needed Upon Discharge  other (see comments)  (TBD)   Patient/Family in Agreement with Plan yes

## 2019-06-26 NOTE — EICU
Pt is a 67 y/o f with pmhx sig for ESRD on Hd (?etiology ESRD), pulmonary embolism, cva with residual deficits reports being hypotensive after full hd day pta and reports poor appetite and po intake past two days.  Says left arm has been swollen and warm for few days, her bp 86/50 in ER and she says her sbps typically 110s.  Denies f/c/cough/n/vdiarrhea/abd pain/chest pain/sob she makes little urine w/o dysuria, has been compliant with her meds. Pt reports left arm is swollen over av graft/fistula and pt was seen by lilia galo who took out fistulas; lightheaded with upright     vss afeb flat 80/50s upright in bed 100/50s little hr change  Pt nontoxitc, slightly delayed answers, but oriented to person/place, yr 2020, Elvie is president  Left arm swollen/erythematous per nursing, painful to touch near incision; inner part upper left arm superficail incision that is granulated w/o purulence  Lue/lle 3/5, left facial droop  rij site c/d/i no pain    Wbc 6 P 58 L 28 M 14  hgb 11.9 mcv  plt 216  K 3.4 Na 138 hco3 21 ag 15 bun 8 creat 3.8 gluc 89 Ca 8.5 Mg 1.5  Ast 81 Alt 43 alb 2.1    cxr nl card silhouette no inf/eff, rij hd catheter    Us  RLE  No thrombus in central veins of the left upper extremity.  Complex area around the AV fistula near the mid basilic vein.  Nonvisualization of the left cephalic vein.    ekg sr nl ax/int volt slightly lower than prior, no sttw changes c/w ishcmia      A/P  Hypotension  Pt with hypotension of unclear etiology, recent poor po intake without any other infectious symptoms, though does have pain over new fistula site- seen by surgeon day of admit, staples removed, no concern regarding infecton per pt.    - sbp now in high 90s, baseline 110s; orthostatics neg, pt not very ambulatory due to cva/paresis  - ekg sinus, vol slightly lwoer than usual; card silhouette appears ok, no pleurisy c/w pericarditis/tamponade seem unlikely in pt getting hd  - consider albumin, would like an  assessment of vol status- lung u/s, IVC, if more vol needed  - sepsis real consideration, us fistula by no means definitive, but converned with swelling/warmth; no fever/leucocytosis; blood cx still not done due to diff stick, will order dose vanco, ideally will be given after cx drawn; hd catheter another possibility, could be pulled if necessary, site looks ok  - ms intact, slow to answer, ?related to dysarthria/cva vs setpic encephalopathy  - needs K, Mg repleted, may depend on timing of next hd  - asked nursing to call pt surgeon, may need a diff imaging study to assess swelling/warmth at site of graft/fistula  - scds, chemophroph if no procedure needed today

## 2019-06-26 NOTE — ED NOTES
Attempted to obtain blood samples, unsuccessfully x1. Will call lab to come collect blood samples.

## 2019-06-26 NOTE — HOSPITAL COURSE
Her amlodipine and metoprolol were discontinued. The pharmacist noticed that she was on chronic rivaroxaban, which is not recommended for dialysis patients. This was changed to apixaban. She was started on midodrine with improvement of hypotension to her baseline. Norepinephrine was weaned off on 6/27/19. She had asymptomatic morning hypoglycemia. Early morning cortisol level was normal. She was discharged home with home health on 6/29/19.

## 2019-06-26 NOTE — HPI
Brittaney Joseph is a 68 year old black woman with hypertension, diabetes mellitus type 2, chronic diastolic heart failure, end stage renal disease on hemodialysis since 3/29/19 (Monday Wednesday Friday), hyperlipidemia, chronic low back pain, history of pulmonary embolism on 1/11/19 status post thrombectomy (anticoagulated on rivaroxaban), history of embolic stroke in the right supratentorial frontal, parietal, occipital, and temporal lobe and left posterior frontal lobe on 4/2/19. She lives with her niece. Her primary care physician is Dr. Charles Argueta. Her nephrologist is Dr. Saida Mcdaniel. She gets dialysis at [facility].   Her blood pressure in clinic on 6/11/19 was 83/51. She takes amlodipine 10 mg nightly and metoprolol 100 mg twice daily.              She underwent right brachial arteriovenous graft placement by Dr. Jean Pierre Villarreal on 6/18/19. Her blood pressures were around /50-60.   She presented to [facility] on 6/25/19 with generalized weakness and fatigue for 2 days, associated with low blood pressures. She reported decreased oral intake and swelling and pain to her left upper extremity. Her blood pressure was 83/49, but she had not taken her amlodipine and metoprolol for the past 2-3 days. Left upper extremity ultrasound showed no venous thrombosis. She was given IV fluids without improvement. She was started on norepinephrine. She was admitted to [facility].

## 2019-06-26 NOTE — PLAN OF CARE
NP would like eICU MD input for pt's hypotension. Notified eICU  pt came to ED complaining of weakness and hypotension. Pt received  ml in ED. NP ordered another  ml to be infused over 2 hours.     Pt's last BP was 75/47, MAP 57. Pt has CKD stag 4 and is on HD. Last HD treatment was on 6/24. Very little to no urine output. Pt is AAO. On room air, Sats %. HR 70s-80s.    Will take orthostatic blood pressure lying flat then sitting up. Unable to take standing blood pressure due to Left sided weakness form previous CVA. Pt is either bed or wheel chair bound.      is okay with blood pressure as long as pt is AAO.

## 2019-06-26 NOTE — SUBJECTIVE & OBJECTIVE
Interval History: No new localized symptoms.    Review of Systems   Constitutional: Negative for chills and fever.   Gastrointestinal: Negative for nausea and vomiting.     Objective:     Vital Signs (Most Recent):  Temp: 98.1 °F (36.7 °C) (06/26/19 0730)  Pulse: 79 (06/26/19 1530)  Resp: 14 (06/26/19 1530)  BP: (!) 84/52 (06/26/19 1530)  SpO2: 99 % (06/26/19 1530) Vital Signs (24h Range):  Temp:  [97.7 °F (36.5 °C)-98.4 °F (36.9 °C)] 98.1 °F (36.7 °C)  Pulse:  [77-96] 79  Resp:  [7-22] 14  SpO2:  [76 %-100 %] 99 %  BP: ()/(46-79) 84/52     Weight: 59.4 kg (130 lb 15.3 oz)  Body mass index is 22.48 kg/m².    Intake/Output Summary (Last 24 hours) at 6/26/2019 1606  Last data filed at 6/26/2019 1500  Gross per 24 hour   Intake 1269.99 ml   Output --   Net 1269.99 ml      Physical Exam   Constitutional: She appears well-developed. No distress.   Pulmonary/Chest: Effort normal. No respiratory distress.   Neurological: She is alert.   Dysphasia (speaks slowly)   Psychiatric: She has a normal mood and affect.   Nursing note and vitals reviewed.      Significant Labs: All pertinent labs within the past 24 hours have been reviewed.    Significant Imaging: I have reviewed all pertinent imaging results/findings within the past 24 hours.   X-Ray Chest 1 View 6/25/19: FINDINGS:  There is a right central venous catheter with its tip in the distal superior vena cava.  The cardiac silhouette is within normal limits.   There is no focal consolidation, pneumothorax, or pleural effusion. Spondylitic changes are present.  Degenerative changes seen at the shoulders.  Bones are osteopenic.  Gallbladder is surgically absent.  Calcific densities are seen over both breasts.  US Upper Extremity Veins Left 6/25/19: FINDINGS:  Central veins: The internal jugular, subclavian, and axillary veins are patent and free of thrombus.  Arm veins: The brachial is patent and compressible.  Other findings: The basilic vein is part of the  arteriovenous fistula and is patent.  There is a complex area around the AV fistula near the mid basilic vein.  Question if there is any history of evolved hematoma about the is basilic vein.  The left cephalic vein is not visualized.  Impression:  No thrombus in central veins of the left upper extremity.  Complex area around the AV fistula near the mid basilic vein.  Nonvisualization of the left cephalic vein.

## 2019-06-26 NOTE — ED NOTES
Pt asking to be repositioned, pt turned and repositioned. Updated pt and family on plan of care. Both verbalized understanding. Call light within reach. Will continue to monitor.

## 2019-06-26 NOTE — ED NOTES
Pt asking to be turned. Pt pulled up in bed and repositioned. Pt daughter at bedside. Call light within reach. Will continue to monitor.

## 2019-06-26 NOTE — PLAN OF CARE
Notified Ankur, NP, pt's last BP 75/49 and MAP 58. Pt asleep and asymptomatic     NP will order  ml  over  2 hours.

## 2019-06-26 NOTE — ASSESSMENT & PLAN NOTE
Etiology unclear. TSH normal. Monitor blood culture results to see if she has bacteremia. Holding home antihypertensive medications. Wean norepinephrine as tolerated.

## 2019-06-26 NOTE — CONSULTS
"U renal fellow ELYSE WEI    Consult note    Reason for Consult:     ESRD    Subjective:      History of Present Illness:  Brittaney Joseph is a 68 y.o. AA female who  has a past medical history of Anticoagulant long-term use, Arthritis, Chronic kidney disease, Chronic low back pain, Diabetes mellitus, type 2, End-stage renal disease on hemodialysis (3/28/2019), Hypertension, Pulmonary embolism with acute cor pulmonale (1/11/2019), and Stroke due to embolism (4/4/2019).. The patient presented to the [facility] on 6/25/2019 with a primary complaint of Hypotension (low blood pressure at doctor's office then at home. reports last dialysis Monday, due tomorrow. reports generalized weakness. [facility] reports last pressure 92/58)      Pt admitted for hypotension; she states she thinks her BP is low because she is "dehydrated"; she is a poor historian, so her history is not very contributory.  She makes no urine; she states she has not been taking in po intake for "some time" because she is not hungry and she states she has trouble swallowing from a recent CVA; she denies f/c/n/v/d.     Renal Hx  -ESRD initiated 3/2019; MWF with Dr. Carpenter; R sided permacath; recent gortex AVG placed LUE not mature for use yet - saw Phil in clinic yesterday and all sutures removed    This AM, pt states she has some pain associated with LUE AVG    Past Medical History:  Past Medical History:   Diagnosis Date    Anticoagulant long-term use     Arthritis     Chronic kidney disease     Chronic low back pain     Diabetes mellitus, type 2     End-stage renal disease on hemodialysis 3/28/2019    [facility] Wednesday Friday [facility]    Hypertension     Pulmonary embolism with acute cor pulmonale 1/11/2019    Stroke due to embolism 4/4/2019       Past Surgical History:  Past Surgical History:   Procedure Laterality Date    CHOLECYSTECTOMY      EKOS, Pumoart/DVT  1/11/2019    Performed by Roger [facility] at [facility] " CATH LAB/EP    HYSTERECTOMY      INSERTION, GRAFT, ARTERIOVENOUS Left 6/18/2019    Performed by [facility] at [facility] OR    Insertion,catheter,tunneled Right 3/29/2019    Performed by Michelet Cano Jr., MD at [facility] OR    Thrombolysis, PA N/A 1/11/2019    Performed by Roger Day MD at [facility] CATH LAB/EP       Allergies:  Review of patient's allergies indicates:  No Known Allergies    Medications:   In-Hospital Scheduled Medications:   aspirin  81 mg Oral Daily    atorvastatin  40 mg Oral Daily    pantoprazole  40 mg Oral Daily    rivaroxaban  10 mg Oral Daily with dinner      In-Hospital PRN Medications:  acetaminophen, ondansetron, sodium chloride 0.9%   In-Hospital IV Infusion Medications:   norepinephrine bitartrate-D5W 0.04 mcg/kg/min (06/26/19 0700)      Home Medications:  Prior to Admission medications    Medication Sig Start Date End Date Taking? Authorizing Provider   amLODIPine (NORVASC) 10 MG tablet Take 10 mg by mouth nightly.   Yes Historical Provider, MD   aspirin 81 MG Chew Take 1 tablet (81 mg total) by mouth once daily. 1/16/19 1/16/20 Yes Aparna Serrano MD   atorvastatin (LIPITOR) 40 MG tablet Take 40 mg by mouth once daily.   Yes Historical Provider, MD   cloNIDine (CATAPRES) 0.2 MG tablet Take 0.2 mg by mouth nightly.   Yes Historical Provider, MD   hydrALAZINE (APRESOLINE) 25 MG tablet Take 1 tablet (25 mg total) by mouth every 8 (eight) hours. 1/16/19 1/16/20 Yes Aparna Serrano MD   metoprolol tartrate (LOPRESSOR) 100 MG tablet Take 1 tablet (100 mg total) by mouth 2 (two) times daily. 1/23/19  Yes Charles Argueta MD   glipiZIDE (GLUCOTROL) 2.5 MG TR24 Take 1 tablet (2.5 mg total) by mouth daily with breakfast. 12/26/18 12/26/19  Melani Corrales MD   loperamide (IMODIUM) 2 mg capsule Take 1 tablet after each loose stool.  Do not exceed 8 tablets in a day. 2/28/19   Laurie Pérez,    nitroGLYCERIN (NITROSTAT) 0.4 MG SL tablet Place 1  "tablet (0.4 mg total) under the tongue every 5 (five) minutes as needed for Chest pain. if second dose needed call 911 19  Aparna Serrano MD   ondansetron (ZOFRAN) 4 MG tablet Take 1 tablet (4 mg total) by mouth every 8 (eight) hours as needed for Nausea. 19   Laurie Pérez DO   oxyCODONE-acetaminophen (PERCOCET)  mg per tablet  18   Historical Provider, MD   rivaroxaban (XARELTO) 10 mg Tab Take 1 tablet (10 mg total) by mouth daily with dinner or evening meal. 3/9/19   Laurie Pérez DO   senna-docusate 8.6-50 mg (PERICOLACE) 8.6-50 mg per tablet Take 1 tablet by mouth 2 (two) times daily as needed for Constipation. 19   Aparna Serrano MD   sucralfate (CARAFATE) 100 mg/mL suspension Take 10 mLs (1 g total) by mouth 4 (four) times daily. 3/9/19   Laurie Pérez DO       Family History:  History reviewed. No pertinent family history.    Social History:  Social History     Tobacco Use    Smoking status: Never Smoker    Smokeless tobacco: Never Used   Substance Use Topics    Alcohol use: No     Frequency: Never    Drug use: Never       Review of Systems: All other systems are reviewed and are negative.    Health Maintenance:     Immunizations:   Currently on File:   Most Recent Immunizations   Administered Date(s) Administered    Influenza - High Dose 2018    PPD Test 2019          Objective:   Last 24 Hour Vital Signs:  BP  Min: 75/47  Max: 111/57  Temp  Av.1 °F (36.7 °C)  Min: 97.7 °F (36.5 °C)  Max: 98.4 °F (36.9 °C)  Pulse  Av.9  Min: 77  Max: 96  Resp  Av.8  Min: 10  Max: 22  SpO2  Av.8 %  Min: 76 %  Max: 100 %  Height  Av' 4" (162.6 cm)  Min: 5' 4" (162.6 cm)  Max: 5' 4" (162.6 cm)  Weight  Av kg (138 lb 15.8 oz)  Min: 59.4 kg (130 lb 15.3 oz)  Max: 64.9 kg (143 lb)  I/O last 3 completed shifts:  In: 1050 [P.O.:50; IV Piggyback:1000]  Out: -     Physical Examination:  GEN - AA; NAD  CHEST - " CTA B  HEART - rrr; 2/6 systolic murmur over much of precordium; no rub  ABD - soft; nonttp  EXTR  -warm; trace edema to shins B; LUE AVG with swelling - no evidence of cellulitis - good radial pulse  NEURO - proximal and distal muscle weakness    Laboratory Results:    Trended Lab Data:  Recent Labs   Lab 06/25/19 2009 06/26/19  0707   WBC 6.24 6.31   HGB 11.9* 11.0*   HCT 37.5 33.3*    222   MCV 89 87   RDW 16.1* 16.2*    140   K 3.4* 3.3*    103   CO2 21* 28   BUN 8 9   GLU 89 65*   PROT 5.1*  --    ALBUMIN 2.1*  --    BILITOT 1.1*  --    AST 81*  --    ALKPHOS 74  --    ALT 43  --        Trended Cardiac Data:  Recent Labs   Lab 06/25/19 2009   TROPONINI 0.012       .    Radiology:  X-ray Chest Ap Portable    Result Date: 3/28/2019  EXAMINATION: XR CHEST AP PORTABLE CLINICAL HISTORY: Weakness; TECHNIQUE: Single frontal view of the chest was performed. COMPARISON: January 10, 2019. FINDINGS: Mildly tortuous aorta, similar to prior.  Underinflated lungs with hypoventilatory change. Heart and lungs otherwise appear unchanged when allowing for differences in technique and positioning.     No acute findings. No significant change from prior study. Electronically signed by: Zi Beauchamp MD Date:    03/28/2019 Time:    19:45           Assessment/Plan     ESRD  -MWF with Dr. Carpenter; access R chestwall permcath - maturing LUE AVG; pt makes less than cup of urine  -plan for next iHD session friday  -nephrocap    Hypotension  -etiol include decreased po intake/cardiac/infection  -BCx pending - normal WBC/afebrile  -if pt having trouble swallowing, would rec speech to eval  -on minimal dose NE; echo pending    Anemia  -will eval for epo with iHD prn    CKD-MBD  -PTH/Vit D ordered  -zemplar with iHD prn; ergo 50k units weekly  -will cont to evaluate need for phosphorus binders     Eder Mcghee II  [employer] [facility]  [phone]

## 2019-06-26 NOTE — ED NOTES
Dr. Ariza at bedside placing steri strips on pt left upper arm. Pt tolerated well. Xeroform gauze placed on wound. Dr. Ariza updated pt and family on plan of care. Both verbalized understanding.

## 2019-06-26 NOTE — H&P
[facility]  History & Physical    Patient Name: Brittaney Joseph  MRN: [id number]  Admission Date: 6/25/2019  Attending Physician: Brandon Ann MD   Primary Care Provider: Charles Argueta MD         Patient information was obtained from patient, relative(s), past medical records and ER records.     Subjective:     Principal Problem:Hypotension    Chief Complaint:   Chief Complaint   Patient presents with    Hypotension     low blood pressure at doctor's office then at home. reports last dialysis Monday, due tomorrow. reports generalized weakness. [place] reports last pressure 92/58        HPI: Brittaney Joseph is a 68 y.o.  female with hypertension, diabetes mellitus type 2, chronic diastolic heart failure, end stage renal disease on hemodialysis since 3/29/19 (Monday Wednesday Friday), hyperlipidemia, chronic low back pain, history of pulmonary embolism on 1/11/19 status post thrombectomy (anticoagulated on rivaroxaban). She lives with her niece. Her primary care physician is Dr. Charles Argueta. Her nephrologist is Dr. Saida Mcdaniel.   She was hospitalized at [facility] from 3/28/19 to 4/1/19 to start hemodialysis. Patient hospitalized 4/3-4/8/19 for management of embolic CVA. AV graft placed to left upper extremity per Dr. Villarreal on 6/18/19.   She presented to [facility] 6/25/19 with complaint of generalized weakness and fatigue. Patient reports associated hypotension over the past 2 days. She denies chest pain, SOB, abdominal pain and nausea/vomiting. No syncopal events. She reports decreased po intake. She also reports swelling and pain to left upper extremity. No fever/chills.   Labs in ED remarkable for BUN/Cr 8/3.8. CXR negative. Ultrasound left upper extremity negative for thrombosis. SBP 80s in ED. Patient received 250 cc normal saline x 2 with no significant improvement. Patient admitted to ICU for further care.         Past  Medical History:   Diagnosis Date    Anticoagulant long-term use     Arthritis     Chronic kidney disease     Chronic low back pain     Diabetes mellitus, type 2     End-stage renal disease on hemodialysis 3/28/2019    [facility] Wednesday Friday [facility]    Hypertension     Pulmonary embolism with acute cor pulmonale 1/11/2019    Stroke due to embolism 4/4/2019       Past Surgical History:   Procedure Laterality Date    CHOLECYSTECTOMY      EKOS, Pumoart/DVT  1/11/2019    Performed by Roger Day [facility] at [facility] CATH LAB/EP    HYSTERECTOMY      INSERTION, GRAFT, ARTERIOVENOUS Left 6/18/2019    Performed by Jean Pierre [facility]real MD at [facility] OR    Insertion,catheter,tunneled Right 3/29/2019    Performed by Michelet Cano Jr., MD at [facility] OR    Thrombolysis, PA N/A 1/11/2019    Performed by Roger Day MD at [facility] CATH LAB/EP       Review of patient's allergies indicates:  No Known Allergies    No current facility-administered medications on file prior to encounter.      Current Outpatient Medications on File Prior to Encounter   Medication Sig    amLODIPine (NORVASC) 10 MG tablet Take 10 mg by mouth nightly.    amLODIPine (NORVASC) 5 MG tablet     aspirin 81 MG Chew Take 1 tablet (81 mg total) by mouth once daily.    atorvastatin (LIPITOR) 40 MG tablet Take 40 mg by mouth once daily.    cephALEXin (KEFLEX) 500 MG capsule Take 500 mg by mouth 2 (two) times daily.    HYDROcodone-acetaminophen (NORCO) 5-325 mg per tablet Take 1 tablet by mouth every 6 (six) hours as needed for Pain.    metoprolol tartrate (LOPRESSOR) 100 MG tablet Take 1 tablet (100 mg total) by mouth 2 (two) times daily.    nitroGLYCERIN (NITROSTAT) 0.4 MG SL tablet Place 1 tablet (0.4 mg total) under the tongue every 5 (five) minutes as needed for Chest pain. if second dose needed call 911    ondansetron (ZOFRAN) 4 MG tablet Take 1 tablet (4 mg total) by mouth every 8 (eight) hours as needed for Nausea.     pantoprazole (PROTONIX) 40 MG tablet Take 1 tablet (40 mg total) by mouth once daily.    rivaroxaban (XARELTO) 10 mg Tab Take 1 tablet (10 mg total) by mouth daily with dinner or evening meal.     Family History     None        Tobacco Use    Smoking status: Never Smoker    Smokeless tobacco: Never Used   Substance and Sexual Activity    Alcohol use: No     Frequency: Never    Drug use: Never    Sexual activity: Not Currently     Review of Systems   Constitutional: Negative for chills, diaphoresis and fever.   HENT: Negative for congestion.    Eyes: Negative for photophobia.   Respiratory: Negative for cough, chest tightness, shortness of breath and wheezing.    Cardiovascular: Negative for chest pain, palpitations and leg swelling.   Gastrointestinal: Negative for abdominal pain, diarrhea, nausea and vomiting.   Genitourinary: Negative for dysuria, flank pain, frequency and hematuria.   Musculoskeletal: Negative for back pain and myalgias.   Neurological: Positive for weakness and light-headedness. Negative for dizziness, syncope and headaches.   Psychiatric/Behavioral: Negative for confusion.     Objective:     Vital Signs (Most Recent):  Temp: 97.7 °F (36.5 °C) (06/26/19 0330)  Pulse: 80 (06/26/19 0400)  Resp: 10 (06/26/19 0400)  BP: (!) 80/50 (06/26/19 0400)  SpO2: 97 % (06/26/19 0400) Vital Signs (24h Range):  Temp:  [97.7 °F (36.5 °C)-98.4 °F (36.9 °C)] 97.7 °F (36.5 °C)  Pulse:  [80-96] 80  Resp:  [10-22] 10  SpO2:  [95 %-100 %] 97 %  BP: (75-97)/(46-54) 80/50     Weight: 59.4 kg (130 lb 15.3 oz)  Body mass index is 22.48 kg/m².    Physical Exam   Constitutional: She is oriented to person, place, and time. She appears well-developed and well-nourished.   HENT:   Head: Normocephalic and atraumatic.   Eyes: Pupils are equal, round, and reactive to light. Conjunctivae are normal.   Neck: Normal range of motion. No JVD present.   Cardiovascular: Normal rate, regular rhythm, normal heart sounds and intact  distal pulses.   Pulmonary/Chest: Effort normal. No respiratory distress. She has no wheezes.   Abdominal: Soft. Bowel sounds are normal. She exhibits no distension. There is no tenderness. There is no guarding.   Musculoskeletal: Normal range of motion. She exhibits no edema or tenderness.   Neurological: She is alert and oriented to person, place, and time.   Skin: Skin is warm and dry. Capillary refill takes less than 2 seconds.   Psychiatric: She has a normal mood and affect. Her behavior is normal.         CRANIAL NERVES     CN III, IV, VI   Pupils are equal, round, and reactive to light.       Significant Labs:   BMP:   Recent Labs   Lab 06/25/19 2009   GLU 89      K 3.4*      CO2 21*   BUN 8   CREATININE 3.8*   CALCIUM 8.5*   MG 1.5*     CBC:   Recent Labs   Lab 06/25/19 2009   WBC 6.24   HGB 11.9*   HCT 37.5        Lactic Acid:   Recent Labs   Lab 06/25/19  2233   LACTATE 1.7       Significant Imaging: I have reviewed all pertinent imaging results/findings within the past 24 hours.    Assessment/Plan:     * Hypotension  -SBP 70-80, suspect volume depletion, no evidence of sepsis. Pt AAOx3, does not appear toxic. No leukocytosis or lactic acidosis. Continue gentle hydration as tolerated per patient.   -Titrate pressors to maintain MAP > 65   -Hold all antihypertensive medications             ESRD (end stage renal disease) on dialysis  HD MWF, last dialyzed 6/24. Followed by Dr. Saida Mcdaniel. Consult nephrology       Cerebrovascular accident (CVA) due to embolism of precerebral artery  Stroke due to embolism    Continue aspirin and atorvastatin       History of pulmonary embolism  Taking Xarelto 10 mg nightly, continue      Chronic low back pain  Chronic, stable       Controlled type 2 diabetes mellitus with stage 4 chronic kidney disease, without long-term current use of insulin  A1C 5.1- 3/12/19   SSI, accucheck AC&HS, Diabetic diet     Hyperlipidemia associated with type 2 diabetes  mellitus  Continue atorvastatin       Essential hypertension  Patient hypotensive on admission. Hold amlodipine and metoprolol         VTE Risk Mitigation (From admission, onward)        Ordered     rivaroxaban tablet 10 mg  With dinner      06/26/19 0416     IP VTE HIGH RISK PATIENT  Once      06/26/19 0154     Place sequential compression device  Until discontinued      06/26/19 0154        Critical care time spent on the evaluation and treatment of severe organ dysfunction, review of pertinent labs and imaging studies, discussions with consulting providers and discussions with patient/family: 60 minutes.     Arlette Pascual NP  Department of Hospital Medicine   [facility]

## 2019-06-26 NOTE — ED NOTES
Report received from FRANCIS Mckenna. Assumed care of pt at this time. Pt aaox3. rr even and unlabored on ra. Nadn. Pt denies cp, sob, headache, abdominal pain, n/v, or any other complaints at this time. Pt asking to be repositioned in bed. Call light within reach. Will continue to monitor.

## 2019-06-26 NOTE — SUBJECTIVE & OBJECTIVE
Past Medical History:   Diagnosis Date    Anticoagulant long-term use     Arthritis     Chronic kidney disease     Chronic low back pain     Diabetes mellitus, type 2     End-stage renal disease on hemodialysis 3/28/2019    Monday Wednesday Friday at [facility]    Hypertension     Pulmonary embolism with acute cor pulmonale 1/11/2019    Stroke due to embolism 4/4/2019       Past Surgical History:   Procedure Laterality Date    CHOLECYSTECTOMY      EKOS, Pumoart/DVT  1/11/2019    Performed by Roger Day MD at [facility] CATH LAB/EP    HYSTERECTOMY      INSERTION, GRAFT, ARTERIOVENOUS Left 6/18/2019    Performed by Jean Pierre Villarreal MD at [facility] OR    Insertion,catheter,tunneled Right 3/29/2019    Performed by Michelet Cano Jr., MD at [facility] OR    Thrombolysis, PA N/A 1/11/2019    Performed by Roger Day MD at [facility] CATH LAB/EP       Review of patient's allergies indicates:  No Known Allergies    No current facility-administered medications on file prior to encounter.      Current Outpatient Medications on File Prior to Encounter   Medication Sig    amLODIPine (NORVASC) 10 MG tablet Take 10 mg by mouth nightly.    amLODIPine (NORVASC) 5 MG tablet     aspirin 81 MG Chew Take 1 tablet (81 mg total) by mouth once daily.    atorvastatin (LIPITOR) 40 MG tablet Take 40 mg by mouth once daily.    cephALEXin (KEFLEX) 500 MG capsule Take 500 mg by mouth 2 (two) times daily.    HYDROcodone-acetaminophen (NORCO) 5-325 mg per tablet Take 1 tablet by mouth every 6 (six) hours as needed for Pain.    metoprolol tartrate (LOPRESSOR) 100 MG tablet Take 1 tablet (100 mg total) by mouth 2 (two) times daily.    nitroGLYCERIN (NITROSTAT) 0.4 MG SL tablet Place 1 tablet (0.4 mg total) under the tongue every 5 (five) minutes as needed for Chest pain. if second dose needed call 911    ondansetron (ZOFRAN) 4 MG tablet Take 1 tablet (4 mg total) by mouth every 8 (eight) hours as needed for  Nausea.    pantoprazole (PROTONIX) 40 MG tablet Take 1 tablet (40 mg total) by mouth once daily.    rivaroxaban (XARELTO) 10 mg Tab Take 1 tablet (10 mg total) by mouth daily with dinner or evening meal.     Family History     None        Tobacco Use    Smoking status: Never Smoker    Smokeless tobacco: Never Used   Substance and Sexual Activity    Alcohol use: No     Frequency: Never    Drug use: Never    Sexual activity: Not Currently     Review of Systems   Constitutional: Negative for chills, diaphoresis and fever.   HENT: Negative for congestion.    Eyes: Negative for photophobia.   Respiratory: Negative for cough, chest tightness, shortness of breath and wheezing.    Cardiovascular: Negative for chest pain, palpitations and leg swelling.   Gastrointestinal: Negative for abdominal pain, diarrhea, nausea and vomiting.   Genitourinary: Negative for dysuria, flank pain, frequency and hematuria.   Musculoskeletal: Negative for back pain and myalgias.   Neurological: Positive for weakness and light-headedness. Negative for dizziness, syncope and headaches.   Psychiatric/Behavioral: Negative for confusion.     Objective:     Vital Signs (Most Recent):  Temp: 97.7 °F (36.5 °C) (06/26/19 0330)  Pulse: 80 (06/26/19 0400)  Resp: 10 (06/26/19 0400)  BP: (!) 80/50 (06/26/19 0400)  SpO2: 97 % (06/26/19 0400) Vital Signs (24h Range):  Temp:  [97.7 °F (36.5 °C)-98.4 °F (36.9 °C)] 97.7 °F (36.5 °C)  Pulse:  [80-96] 80  Resp:  [10-22] 10  SpO2:  [95 %-100 %] 97 %  BP: (75-97)/(46-54) 80/50     Weight: 59.4 kg (130 lb 15.3 oz)  Body mass index is 22.48 kg/m².    Physical Exam   Constitutional: She is oriented to person, place, and time. She appears well-developed and well-nourished.   HENT:   Head: Normocephalic and atraumatic.   Eyes: Pupils are equal, round, and reactive to light. Conjunctivae are normal.   Neck: Normal range of motion. No JVD present.   Cardiovascular: Normal rate, regular rhythm, normal heart sounds  and intact distal pulses.   Pulmonary/Chest: Effort normal. No respiratory distress. She has no wheezes.   Abdominal: Soft. Bowel sounds are normal. She exhibits no distension. There is no tenderness. There is no guarding.   Musculoskeletal: Normal range of motion. She exhibits no edema or tenderness.   Neurological: She is alert and oriented to person, place, and time.   Skin: Skin is warm and dry. Capillary refill takes less than 2 seconds.   Psychiatric: She has a normal mood and affect. Her behavior is normal.         CRANIAL NERVES     CN III, IV, VI   Pupils are equal, round, and reactive to light.       Significant Labs:   BMP:   Recent Labs   Lab 06/25/19 2009   GLU 89      K 3.4*      CO2 21*   BUN 8   CREATININE 3.8*   CALCIUM 8.5*   MG 1.5*     CBC:   Recent Labs   Lab 06/25/19 2009   WBC 6.24   HGB 11.9*   HCT 37.5        Lactic Acid:   Recent Labs   Lab 06/25/19  2233   LACTATE 1.7       Significant Imaging: I have reviewed all pertinent imaging results/findings within the past 24 hours.

## 2019-06-26 NOTE — ASSESSMENT & PLAN NOTE
-SBP 70-80, suspect volume depletion, no evidence of sepsis. Pt AAOx3, does not appear toxic. No leukocytosis or lactic acidosis. Continue gentle hydration as tolerated per patient.   -Titrate pressors to maintain MAP > 65   -Hold all antihypertensive medications

## 2019-06-27 PROBLEM — Z86.79 HISTORY OF HYPERTENSION: Chronic | Status: ACTIVE | Noted: 2018-12-20

## 2019-06-27 LAB
PHOSPHATE SERPL-MCNC: 2.9 MG/DL (ref 2.7–4.5)
POCT GLUCOSE: 163 MG/DL (ref 70–110)
POCT GLUCOSE: 89 MG/DL (ref 70–110)
POCT GLUCOSE: 94 MG/DL (ref 70–110)
POCT GLUCOSE: 99 MG/DL (ref 70–110)

## 2019-06-27 PROCEDURE — 20000000 HC ICU ROOM

## 2019-06-27 PROCEDURE — 92610 EVALUATE SWALLOWING FUNCTION: CPT

## 2019-06-27 PROCEDURE — 94761 N-INVAS EAR/PLS OXIMETRY MLT: CPT

## 2019-06-27 PROCEDURE — 87340 HEPATITIS B SURFACE AG IA: CPT

## 2019-06-27 PROCEDURE — 86706 HEP B SURFACE ANTIBODY: CPT

## 2019-06-27 PROCEDURE — 25000003 PHARM REV CODE 250: Performed by: INTERNAL MEDICINE

## 2019-06-27 PROCEDURE — 25000003 PHARM REV CODE 250: Performed by: HOSPITALIST

## 2019-06-27 PROCEDURE — 25000003 PHARM REV CODE 250: Performed by: NURSE PRACTITIONER

## 2019-06-27 PROCEDURE — 84100 ASSAY OF PHOSPHORUS: CPT

## 2019-06-27 RX ORDER — TRAMADOL HYDROCHLORIDE 50 MG/1
50 TABLET ORAL ONCE
Status: DISCONTINUED | OUTPATIENT
Start: 2019-06-27 | End: 2019-06-27

## 2019-06-27 RX ORDER — MIDODRINE HYDROCHLORIDE 5 MG/1
5 TABLET ORAL 2 TIMES DAILY WITH MEALS
Status: DISCONTINUED | OUTPATIENT
Start: 2019-06-27 | End: 2019-06-28

## 2019-06-27 RX ORDER — TRAMADOL HYDROCHLORIDE 50 MG/1
50 TABLET ORAL EVERY 8 HOURS PRN
Status: DISCONTINUED | OUTPATIENT
Start: 2019-06-27 | End: 2019-06-29 | Stop reason: HOSPADM

## 2019-06-27 RX ORDER — TRAMADOL HYDROCHLORIDE 50 MG/1
50 TABLET ORAL ONCE
Status: COMPLETED | OUTPATIENT
Start: 2019-06-27 | End: 2019-06-27

## 2019-06-27 RX ADMIN — TRAMADOL HYDROCHLORIDE 50 MG: 50 TABLET, FILM COATED ORAL at 03:06

## 2019-06-27 RX ADMIN — ATORVASTATIN CALCIUM 40 MG: 40 TABLET, FILM COATED ORAL at 10:06

## 2019-06-27 RX ADMIN — MUPIROCIN: 20 OINTMENT TOPICAL at 08:06

## 2019-06-27 RX ADMIN — APIXABAN 2.5 MG: 2.5 TABLET, FILM COATED ORAL at 08:06

## 2019-06-27 RX ADMIN — ASPIRIN 81 MG 81 MG: 81 TABLET ORAL at 10:06

## 2019-06-27 RX ADMIN — APIXABAN 2.5 MG: 2.5 TABLET, FILM COATED ORAL at 10:06

## 2019-06-27 RX ADMIN — NEPHROCAP 1 CAPSULE: 1 CAP ORAL at 10:06

## 2019-06-27 RX ADMIN — MIDODRINE HYDROCHLORIDE 5 MG: 5 TABLET ORAL at 06:06

## 2019-06-27 RX ADMIN — TRAMADOL HYDROCHLORIDE 50 MG: 50 TABLET, FILM COATED ORAL at 08:06

## 2019-06-27 RX ADMIN — PANTOPRAZOLE SODIUM 40 MG: 40 TABLET, DELAYED RELEASE ORAL at 10:06

## 2019-06-27 RX ADMIN — MIDODRINE HYDROCHLORIDE 5 MG: 5 TABLET ORAL at 08:06

## 2019-06-27 NOTE — PROGRESS NOTES
[employer] renal fellow HO V      Subjective:      Interval Hx:    -hallie pureed diet    -denies sob; states arm is sore from recent surgery       Objective:   Last 24 Hour Vital Signs:  BP  Min: 77/48  Max: 125/67  Temp  Av.8 °F (36.6 °C)  Min: 96.7 °F (35.9 °C)  Max: 98.5 °F (36.9 °C)  Pulse  Av.7  Min: 78  Max: 95  Resp  Avg: 15.5  Min: 7  Max: 35  SpO2  Av.1 %  Min: 85 %  Max: 100 %  Weight  Av.1 kg (134 lb 11.2 oz)  Min: 61.1 kg (134 lb 11.2 oz)  Max: 61.1 kg (134 lb 11.2 oz)  I/O last 3 completed shifts:  In: 1369.4 [P.O.:110; I.V.:259.4; IV Piggyback:1000]  Out: 0     Physical Examination:  GEN - NAD, AAOx4  CHEST - lungs CTA bilaterally; equal expansion   HEART - RRR, no m/r/s3/s4   ABD - non distended, non ttp; no guarding or rigidity   EXTR  -warm; L UE edema associated with recent AVG placement - medial incision dehisced without cellulitis or purulence  Skin - no tenting; no bruising; no ecchymosis   Neuro - no asterixis or localizing deficits      Laboratory:  Laboratory   Pertinent Findings:  Recent Labs   Lab 19  0707   WBC 6.24 6.31   HGB 11.9* 11.0*   HCT 37.5 33.3*    222   MCV 89 87   RDW 16.1* 16.2*    140   K 3.4* 3.3*    103   CO2 21* 28   BUN 8 9   GLU 89 65*   PROT 5.1*  --    ALBUMIN 2.1*  --    BILITOT 1.1*  --    AST 81*  --    ALKPHOS 74  --    ALT 43  --                  Current Medications:     Infusions:   norepinephrine bitartrate-D5W 0.02 mcg/kg/min (19 0611)        Scheduled:   sodium chloride 0.9%   Intravenous Once    apixaban  2.5 mg Oral BID    aspirin  81 mg Oral Daily    atorvastatin  40 mg Oral Daily    ergocalciferol  50,000 Units Oral Q7 Days    midodrine  5 mg Oral BID WM    mupirocin   Nasal BID    pantoprazole  40 mg Oral Daily    vitamin renal formula (B-complex-vitamin c-folic acid)  1 capsule Oral Daily        PRN:  sodium chloride 0.9%, acetaminophen, ondansetron, sodium chloride  0.9%        Assessment/Plan     ESRD  -MWF with Dr. Carpenter; access R chestwall permcath - maturing LUE AVG; pt makes less than cup of urine  -plan for next iHD session friday  -nephrocap    AVG  -medial incision has dehisced - Dr. Daigle aware     Hypotension  -etiol include decreased po intake/cardiac/infection  -BCx pending - normal WBC/afebrile  -if pt having trouble swallowing, would rec speech to eval  -on minimal dose NE; echo with normal BiVentricular systolic function     Anemia  -will eval for epo with iHD prn     CKD-MBD  -PTH/Vit D ordered  -zemplar with iHD prn; ergo 50k units weekly  -will cont to evaluate need for phosphorus binders      Eder Mcghee II  [employer] renal HO [facility]  [phone]

## 2019-06-27 NOTE — SUBJECTIVE & OBJECTIVE
Interval History: Placed on pureed diet by her nurse yesterday and wants to advance diet.    Review of Systems   Constitutional: Negative for chills and fever.   Gastrointestinal: Negative for nausea and vomiting.     Objective:     Vital Signs (Most Recent):  Temp: 97.4 °F (36.3 °C) (06/27/19 1115)  Pulse: 79 (06/27/19 1145)  Resp: 11 (06/27/19 1145)  BP: (!) 106/58 (06/27/19 1145)  SpO2: 100 % (06/27/19 1145) Vital Signs (24h Range):  Temp:  [96.7 °F (35.9 °C)-98.5 °F (36.9 °C)] 97.4 °F (36.3 °C)  Pulse:  [69-95] 79  Resp:  [7-35] 11  SpO2:  [85 %-100 %] 100 %  BP: ()/(48-79) 106/58     Weight: 61.1 kg (134 lb 11.2 oz)  Body mass index is 23.12 kg/m².    Intake/Output Summary (Last 24 hours) at 6/27/2019 1210  Last data filed at 6/27/2019 1100  Gross per 24 hour   Intake 231.6 ml   Output 0 ml   Net 231.6 ml      Physical Exam   Constitutional: She appears well-developed. No distress.   Pulmonary/Chest: Effort normal. No respiratory distress.   Neurological: She is alert.   Dysphasia (speaks slowly)   Psychiatric: She has a normal mood and affect.   Nursing note and vitals reviewed.      Significant Labs: All pertinent labs within the past 24 hours have been reviewed.    Significant Imaging: I have reviewed all pertinent imaging results/findings within the past 24 hours.

## 2019-06-27 NOTE — PROGRESS NOTES
Lab was called to follow up with labs not being drawn this morning. Per phlebotomist will come soon.

## 2019-06-27 NOTE — PROGRESS NOTES
MITCHEL Pascual was called to address the phlebotomist having difficulty drawing labs. Three different phlebotomist came to draw it without any success. Also, requested for more labs and that only phos is ordered no bmp or cbc and mag was noted. The patient is a dialysis patient,  requested to use the tunneled HD catheter. Per Ankur not to use the HD catheter and let the day shift nurse know to let Dr. Ann know of the situation.

## 2019-06-27 NOTE — PLAN OF CARE
Problem: Fall Injury Risk  Goal: Absence of Fall and Fall-Related Injury  Outcome: Ongoing (interventions implemented as appropriate)  Intervention: Identify and Manage Contributors to Fall Injury Risk       06/27/19 0216   Manage Acute Allergic Reaction   Medication Review/Management medications reviewed   Identify and Manage Contributors to Fall Injury Risk   Self-Care Promotion independence encouraged;BADL personal objects within reach;safe use of adaptive equipment encouraged      Intervention: Promote Injury-Free Environment       06/26/19 1905 06/27/19 0115   Optimize [place] and Functional Mobility   Environmental Safety Modification clutter free environment maintained;assistive device/personal items within reach;room organization consistent;lighting adjusted  --    Optimize Balance and Safe Activity   Safety Promotion/Fall Prevention  --  bed alarm set;family to remain at bedside;side rails raised x 3;medications reviewed;nonskid shoes/socks when out of bed;muscle strengthening facilitated;diversional activities provided            Problem: Adult Inpatient Plan of Care  Goal: Plan of Care Review  Outcome: Ongoing (interventions implemented as appropriate)       06/27/19 0216   Plan of Care Review   Plan of Care Reviewed With Patient;family. Safety: call light within reach, pt. Oriented to room & instructed how to notify nurse if assistance is needed, current questions/concerns addressed, bed in lowest position with wheels locked & side rails up x3. Pt. Educated regarding fall prevention and taking appropriate action to prevent falls. Activity: repositioned q. 2hrs, encouraged activity, encouraged ROM, encouraged independency. Neurological: oriented x4 Respiratory: room air, O2 sat WNL, no acute changes Cardiac: HR stable, BP hypotensive, took patient off Levo but MAP dropped below 65 so restarted, afebrile this shift. Intake/Output: anuric, on dialysis, no BM overnight. Diet: inadequate intake,  encouraged to eat Pain: controlled with PRN medication Skin: MOMO fistula, steri stripped in place, no acute changes, no other skin breakdown noted Plan: all questions/concerns were addressed, verbal education provided, meds reviewed, labs and vitals are being monitored.           Problem: Diabetes Comorbidity  Goal: Blood Glucose Level Within Desired Range  Outcome: Ongoing (interventions implemented as appropriate)  Intervention: Maintain Glycemic Control       06/27/19 0216   Monitor and Manage Ketoacidosis   Glycemic Management blood glucose monitoring. No coverage needed.            Problem: Infection  Goal: Infection Symptom Resolution  Outcome: Ongoing (interventions implemented as appropriate)  Intervention: Prevent or Manage Infection       06/27/19 0216   Manage Diarrhea   Isolation Precautions protective environment maintained   Prevent or Manage Infection   Infection Management aseptic technique maintained            Problem: Skin Injury Risk Increased  Goal: Skin Health and Integrity  Outcome: Ongoing (interventions implemented as appropriate)  Intervention: Optimize Skin Protection       06/26/19 1905 06/27/19 0115   Prevent Additional Skin Injury   Head of Bed (HOB)  --  HOB at 30-45 degrees   Pressure Reduction Devices positioning supports utilized;specialty bed utilized  --    Pressure Reduction Techniques heels elevated off bed;pressure points protected  --    Monitor and Manage Hypervolemia   Skin Protection adhesive use limited;incontinence pads utilized;tubing/devices free from skin contact  --             Problem: Infection (Hemodialysis)  Goal: Absence of Infection Signs/Symptoms  Outcome: Ongoing (interventions implemented as appropriate)  Intervention: Prevent or Manage Infection       06/26/19 2305 06/27/19 0216   Prevent or Manage Infection   Infection Management  --  aseptic technique maintained   Prevent Infection and Maximize Resistance   Infection Prevention environmental surveillance  performed;equipment surfaces disinfected;rest/sleep promoted  --

## 2019-06-27 NOTE — ASSESSMENT & PLAN NOTE
Baseline hypotensive. Etiology of worsening unclear. TSH normal. Monitor blood culture results to see if she has bacteremia. Holding home antihypertensive medications. Wean norepinephrine as tolerated. Start midodrine.

## 2019-06-27 NOTE — PROGRESS NOTES
Ankur NP was called to address pain. Patient is complaining of left shoulder due to new fistula. Requested for Norco as that's what she was receiving for it prior to admission. Per NP will place tramadol. Will implement the order and let the patient know.

## 2019-06-27 NOTE — PLAN OF CARE
Problem: SLP Goal  Goal: SLP Goal  Outcome: Ongoing (interventions implemented as appropriate)  6/27: Pt presents w/ oral dysphagia and dysarthric speech at this time, recommend mech soft and thin liquids for now. Pt needs assist for feeding. Full note to follow.   CORINNE Rodas, [facility]-SLP  Speech Pathologist 6/27/2019

## 2019-06-27 NOTE — PT/OT/SLP EVAL
Speech Language Pathology Evaluation  Bedside Swallow    Patient Name:  Brittaney Joseph   MRN:  [id number]  Admitting Diagnosis: Hypotension    Recommendations:                 General Recommendations:  Dysphagia therapy  Diet recommendations:  Mechanical soft, Thin   Aspiration Precautions: 1 bite/sip at a time, Alternating bites/sips, Assistance with meals, Avoid talking while eating, Check for pocketing/oral residue, Eliminate distractions, HOB to 90 degrees and Meds crushed in puree   General Precautions: Standard, fall  Communication strategies:  none    History:      Brittaney Joseph is a 68 y.o.  female with hypertension, diabetes mellitus type 2, chronic diastolic heart failure, end stage renal disease on hemodialysis since 3/29/19 (Monday Wednesday Friday), hyperlipidemia, chronic low back pain, history of pulmonary embolism on 1/11/19 status post thrombectomy (anticoagulated on rivaroxaban). She lives with her niece. Her primary care physician is Dr. Charles Argueta. Her nephrologist is Dr. Saida Mcdaniel.    She was hospitalized at [facility] from 3/28/19 to 4/1/19 to start hemodialysis. Patient hospitalized 4/3-4/8/19 for management of embolic CVA. AV graft placed to left upper extremity per Dr. Villarreal on 6/18/19.    She presented to [facility] 6/25/19 with complaint of generalized weakness and fatigue. Patient reports associated hypotension over the past 2 days. She denies chest pain, SOB, abdominal pain and nausea/vomiting. No syncopal events. She reports decreased po intake. She also reports swelling and pain to left upper extremity. No fever/chills.    Labs in ED remarkable for BUN/Cr 8/3.8. CXR negative. Ultrasound left upper extremity negative for thrombosis. SBP 80s in ED. Patient received 250 cc normal saline x 2 with no significant improvement. Patient admitted to ICU for further care.     Past Medical History:   Diagnosis Date    Anticoagulant long-term use   "   Arthritis     Chronic kidney disease     Chronic low back pain     Diabetes mellitus, type 2     End-stage renal disease on hemodialysis 3/28/2019    Monday Wednesday Friday [facility]    Hypertension     Pulmonary embolism with acute cor pulmonale 1/11/2019    Stroke due to embolism 4/4/2019       Past Surgical History:   Procedure Laterality Date    BRACHIAL ARTERY GRAFT Right 06/18/2019    CHOLECYSTECTOMY      EKOS, Pumoart/DVT  1/11/2019    Performed by Roger Day MD at [facility] CATH LAB/EP    HYSTERECTOMY      INSERTION, GRAFT, ARTERIOVENOUS Left 6/18/2019    Performed by Jean Pierre Vill[facility]real MD at [facility] OR    Insertion,catheter,tunneled Right 3/29/2019    Performed by Michelet Cano Jr., MD at [facility] OR    Thrombolysis, PA N/A 1/11/2019    Performed by Roger Day MD at [facility] CATH LAB/EP       Prior Intubation HX:  n/a    Modified Barium Swallow: none per EMR    Chest X-Rays:   There is a right central venous catheter with its tip in the distal superior vena cava.  The cardiac silhouette is within normal limits.   There is no focal consolidation, pneumothorax, or pleural effusion. Spondylitic changes are present.  Degenerative changes seen at the shoulders.  Bones are osteopenic.  Gallbladder is surgically absent.  Calcific densities are seen over both breasts.   Impression:       No acute intrathoracic abnormality.     Prior diet: puree/thin liquid.    Subjective     Pt seen at bedside for clinical swallow evaluation. Upon entry, pt was asleep. Pt did arouse with vocal cues and localized to name.   Patient goals: "My sister fed me my lunch."    Pain/Comfort:  Pain Rating 1: 0/10    Objective:       Oral Musculature Evaluation  Oral Musculature: general weakness  Structural Abnormalities: none observed  Dentition: upper dentures, lower dentures  Secretion Management: adequate  Mucosal Quality: adequate  Mandibular Strength and Mobility: impaired  Oral Labial Strength " and Mobility: impaired retraction, impaired pursing, impaired coordination, other (see comments)(smile asymmetrical L side droop)  Lingual Strength and Mobility: impaired strength, functional protrusion, functional anterior elevation, functional lateral movement  Velar Elevation: WNL  Buccal Strength and Mobility: flaccid, decreased tone  Volitional Cough: elicited; adequate  Volitional Swallow: elicited; mild delay  Voice Prior to PO Intake: low volume  Oral Musculature Comments: dysarthric speech (flaccid dysarthria) c/b imprecise articulation, slow speaking rate    Bedside Swallow Eval:   Consistencies Assessed:  · Thin liquids water by cup x3 by straw x3  · Puree pudding x3  · Solids cracker x1     Oral Phase:   · Prolonged mastication  · Pocketing   Left buccal cavity from previous meal  · Oral residue  · Lingual residue    Pharyngeal Phase:   · decreased hyolaryngeal excursion to palpation  · delayed swallow initation  · no overt clinical signs/symptoms of aspiration  · no overt clinical signs/symptoms of pharyngeal dysphagia    Compensatory Strategies  · Lingual sweep    Treatment: SLP provided liquid rinse to eliminate oral and lingual residue after the swallow of dry solid (cracker).     Assessment:     Brittaney Joseph is a 68 y.o. female with an admitting diagnosis of  Hypotension and an SLP diagnosis of mild oral dysphagia and flaccid dysarthria.  She presents with pocketing in the L buccal cavity, prolonged mastication, slowed oral transit time, mild delay in initiation of pharyngeal swallow, reduced hyolaryngeal elevation/excursion to subjective SLP palpitation. Speech is dysarthric c/b imprecise articulation and slowed rate of speech.    Goals:   Multidisciplinary Problems     SLP Goals        Problem: SLP Goal    Goal Priority Disciplines Outcome   SLP Goal     SLP Ongoing (interventions implemented as appropriate)   Description:  Short Term Goals:  1. Pt will participate in ongoing clinical swallow  eval to ensure safest po diet and  to ensure adequate nutrition/hydration/medication needs are met.  2.  Pt will tolerate mechanical soft/ thinliquid diet with no overt s/s of aspiration.  3.Pt will participate in po trials of dry/hard solids with SLP.  4. Pt will perform compensatory swallow techniques including lingual sweep and liquid wash to eliminate oral residue after the swallow.  5. Pt will demonstrate understanding of swallow precautions to ensure safest po intake.                     Plan:     · Patient to be seen:  3 x/week   · Plan of Care expires:  07/26/19  · Plan of Care reviewed with:  patient   · SLP Follow-Up:  Yes       Discharge recommendations:  other (see comments)(TBD pending PT/OT recs in progress)   Barriers to Discharge:  None    Time Tracking:     SLP Treatment Date:   06/27/19  Speech Start Time:  0220  Speech Stop Time:  0235     Speech Total Time (min):  15 min    Billable Minutes: Eval 15 minutes     KRISHNA Gomes  06/27/2019

## 2019-06-27 NOTE — PROGRESS NOTES
[facility] Medicine  Progress Note    Patient Name: Brittaney Joseph  MRN: [id number]  Patient Class: IP- Inpatient   Admission Date: 6/25/2019  Length of Stay: 1 days  Attending Physician: Brandon Ann MD  Primary Care Provider: Charles Argueta MD        Subjective:     Principal Problem:Hypotension      HPI:  Brittaney Joseph is a 68 year old black woman with hypertension, diabetes mellitus type 2, chronic diastolic heart failure, end stage renal disease on hemodialysis since 3/29/19 (Monday Wednesday Friday), hyperlipidemia, chronic low back pain, history of pulmonary embolism on 1/11/19 status post thrombectomy (anticoagulated on rivaroxaban), history of embolic stroke in the right supratentorial frontal, parietal, occipital, and temporal lobe and left posterior frontal lobe on 4/2/19. She lives with her niece. Her primary care physician is Dr. Charles Argueta. Her nephrologist is Dr. Saida Mcdaniel. She gets dialysis at [facility].   Her blood pressure in clinic on 6/11/19 was 83/51. She takes amlodipine 10 mg nightly and metoprolol 100 mg twice daily.              She underwent right brachial arteriovenous graft placement by Dr. Jean Pierre Villarreal on 6/18/19. Her blood pressures were around /50-60.   She presented to [facility] on 6/25/19 with generalized weakness and fatigue for 2 days, associated with low blood pressures. She reported decreased oral intake and swelling and pain to her left upper extremity. Her blood pressure was 83/49, but she had not taken her amlodipine and metoprolol for the past 2-3 days. Left upper extremity ultrasound showed no venous thrombosis. She was given IV fluids without improvement. She was started on norepinephrine. She was admitted to [facility].    Overview/Hospital Course:  The pharmacist noticed that she was on chronic rivaroxaban, which is not recommended for dialysis  patients. This was changed to apixaban.     Interval History: Placed on pureed diet by her nurse yesterday and wants to advance diet.    Review of Systems   Constitutional: Negative for chills and fever.   Gastrointestinal: Negative for nausea and vomiting.     Objective:     Vital Signs (Most Recent):  Temp: 97.4 °F (36.3 °C) (06/27/19 1115)  Pulse: 79 (06/27/19 1145)  Resp: 11 (06/27/19 1145)  BP: (!) 106/58 (06/27/19 1145)  SpO2: 100 % (06/27/19 1145) Vital Signs (24h Range):  Temp:  [96.7 °F (35.9 °C)-98.5 °F (36.9 °C)] 97.4 °F (36.3 °C)  Pulse:  [69-95] 79  Resp:  [7-35] 11  SpO2:  [85 %-100 %] 100 %  BP: ()/(48-79) 106/58     Weight: 61.1 kg (134 lb 11.2 oz)  Body mass index is 23.12 kg/m².    Intake/Output Summary (Last 24 hours) at 6/27/2019 1210  Last data filed at 6/27/2019 1100  Gross per 24 hour   Intake 231.6 ml   Output 0 ml   Net 231.6 ml      Physical Exam   Constitutional: She appears well-developed. No distress.   Pulmonary/Chest: Effort normal. No respiratory distress.   Neurological: She is alert.   Dysphasia (speaks slowly)   Psychiatric: She has a normal mood and affect.   Nursing note and vitals reviewed.      Significant Labs: All pertinent labs within the past 24 hours have been reviewed.    Significant Imaging: I have reviewed all pertinent imaging results/findings within the past 24 hours.       Assessment/Plan:      * Hypotension  Baseline hypotensive. Etiology of worsening unclear. TSH normal. Monitor blood culture results to see if she has bacteremia. Holding home antihypertensive medications. Wean norepinephrine as tolerated. Start midodrine.    History of embolic stroke  Continue home aspirin and atorvastatin.    End-stage renal disease on hemodialysis  Appreciate Nephrology.    History of pulmonary embolism  Was taking rivaroxaban. Changed to apixaban.    Chronic low back pain  Chronic, stable.    Type 2 diabetes mellitus with chronic kidney disease on chronic dialysis, without  long-term current use of insulin  Diet controlled. Insulin aspart sliding scale. Monitor serum glucose.    Hyperlipidemia associated with type 2 diabetes mellitus  Continue home atorvastatin.    History of hypertension  Baseline hypotensive. Stopped home amlodipine and metoprolol.      VTE Risk Mitigation (From admission, onward)        Ordered     apixaban tablet 2.5 mg  2 times daily      06/26/19 1440     IP VTE HIGH RISK PATIENT  Once      06/26/19 0154     Place sequential compression device  Until discontinued      06/26/19 0154            Brandon Ann MD  Department of Hospital Medicine   [facility]

## 2019-06-28 PROBLEM — R53.1 GENERALIZED WEAKNESS: Status: RESOLVED | Noted: 2019-06-26 | Resolved: 2019-06-28

## 2019-06-28 LAB
ANION GAP SERPL CALC-SCNC: 9 MMOL/L (ref 8–16)
BUN SERPL-MCNC: 18 MG/DL (ref 8–23)
CALCIUM SERPL-MCNC: 8.2 MG/DL (ref 8.7–10.5)
CHLORIDE SERPL-SCNC: 103 MMOL/L (ref 95–110)
CO2 SERPL-SCNC: 27 MMOL/L (ref 23–29)
CORTIS SERPL-MCNC: 10.3 UG/DL
CREAT SERPL-MCNC: 4.3 MG/DL (ref 0.5–1.4)
EST. GFR  (AFRICAN AMERICAN): 12 ML/MIN/1.73 M^2
EST. GFR  (NON AFRICAN AMERICAN): 10 ML/MIN/1.73 M^2
GLUCOSE SERPL-MCNC: 63 MG/DL (ref 70–110)
HBV SURFACE AG SERPL QL IA: NEGATIVE
MAGNESIUM SERPL-MCNC: 1.5 MG/DL (ref 1.6–2.6)
PHOSPHATE SERPL-MCNC: 3.2 MG/DL (ref 2.7–4.5)
POCT GLUCOSE: 101 MG/DL (ref 70–110)
POCT GLUCOSE: 35 MG/DL (ref 70–110)
POCT GLUCOSE: 41 MG/DL (ref 70–110)
POCT GLUCOSE: 75 MG/DL (ref 70–110)
POCT GLUCOSE: 92 MG/DL (ref 70–110)
POTASSIUM SERPL-SCNC: 3.3 MMOL/L (ref 3.5–5.1)
SODIUM SERPL-SCNC: 139 MMOL/L (ref 136–145)

## 2019-06-28 PROCEDURE — 97535 SELF CARE MNGMENT TRAINING: CPT

## 2019-06-28 PROCEDURE — 82533 TOTAL CORTISOL: CPT

## 2019-06-28 PROCEDURE — 25000003 PHARM REV CODE 250: Performed by: HOSPITALIST

## 2019-06-28 PROCEDURE — 80100016 HC MAINTENANCE HEMODIALYSIS

## 2019-06-28 PROCEDURE — 94761 N-INVAS EAR/PLS OXIMETRY MLT: CPT

## 2019-06-28 PROCEDURE — 11000001 HC ACUTE MED/SURG PRIVATE ROOM

## 2019-06-28 PROCEDURE — 83735 ASSAY OF MAGNESIUM: CPT

## 2019-06-28 PROCEDURE — 80048 BASIC METABOLIC PNL TOTAL CA: CPT

## 2019-06-28 PROCEDURE — 36415 COLL VENOUS BLD VENIPUNCTURE: CPT

## 2019-06-28 PROCEDURE — 25000003 PHARM REV CODE 250: Performed by: NURSE PRACTITIONER

## 2019-06-28 PROCEDURE — 63600175 PHARM REV CODE 636 W HCPCS: Performed by: INTERNAL MEDICINE

## 2019-06-28 PROCEDURE — 92526 ORAL FUNCTION THERAPY: CPT

## 2019-06-28 PROCEDURE — 84100 ASSAY OF PHOSPHORUS: CPT

## 2019-06-28 RX ORDER — HEPARIN SODIUM 1000 [USP'U]/ML
4300 INJECTION, SOLUTION INTRAVENOUS; SUBCUTANEOUS ONCE
Status: COMPLETED | OUTPATIENT
Start: 2019-06-28 | End: 2019-06-28

## 2019-06-28 RX ORDER — MIDODRINE HYDROCHLORIDE 5 MG/1
5 TABLET ORAL
Qty: 90 TABLET | Refills: 11 | Status: SHIPPED | OUTPATIENT
Start: 2019-06-28 | End: 2021-08-26

## 2019-06-28 RX ORDER — MIDODRINE HYDROCHLORIDE 5 MG/1
10 TABLET ORAL 2 TIMES DAILY WITH MEALS
Status: DISCONTINUED | OUTPATIENT
Start: 2019-06-28 | End: 2019-06-29 | Stop reason: HOSPADM

## 2019-06-28 RX ADMIN — MUPIROCIN: 20 OINTMENT TOPICAL at 10:06

## 2019-06-28 RX ADMIN — MIDODRINE HYDROCHLORIDE 10 MG: 5 TABLET ORAL at 05:06

## 2019-06-28 RX ADMIN — APIXABAN 2.5 MG: 2.5 TABLET, FILM COATED ORAL at 10:06

## 2019-06-28 RX ADMIN — ATORVASTATIN CALCIUM 40 MG: 40 TABLET, FILM COATED ORAL at 10:06

## 2019-06-28 RX ADMIN — MIDODRINE HYDROCHLORIDE 10 MG: 5 TABLET ORAL at 12:06

## 2019-06-28 RX ADMIN — TRAMADOL HYDROCHLORIDE 50 MG: 50 TABLET, FILM COATED ORAL at 10:06

## 2019-06-28 RX ADMIN — HEPARIN SODIUM 4300 UNITS: 1000 INJECTION, SOLUTION INTRAVENOUS; SUBCUTANEOUS at 04:06

## 2019-06-28 RX ADMIN — ASPIRIN 81 MG 81 MG: 81 TABLET ORAL at 10:06

## 2019-06-28 NOTE — NURSING TRANSFER
Nursing Transfer Note      6/28/2019     Transfer To: Dialysis    Transfer via bed    Transfer with cardiac monitoring    Transported by RN, transport    Medicines sent: na    Chart send with patient: Yes    Notified: sister        Pt to go to dialysis and then RM [id number] from there. Report called to 5thfldivya RN.

## 2019-06-28 NOTE — PROGRESS NOTES
[facility]  Progress Note    Patient Name: Brittaney Joseph  MRN: [id number]  Patient Class: IP- Inpatient   Admission Date: 6/25/2019  Length of Stay: 2 days  Attending Physician: Brandon Ann MD  Primary Care Provider: Charles Argueta MD        Subjective:     Principal Problem:Hypotension      HPI:  Brittaney Joseph is a 68 year old black woman with hypertension, diabetes mellitus type 2, chronic diastolic heart failure, end stage renal disease on hemodialysis since 3/29/19 (Monday Wednesday Friday), hyperlipidemia, chronic low back pain, history of pulmonary embolism on 1/11/19 status post thrombectomy (anticoagulated on rivaroxaban), history of embolic stroke in the right supratentorial frontal, parietal, occipital, and temporal lobe and left posterior frontal lobe on 4/2/19. She lives with her niece. Her primary care physician is Dr. Charles Argueta. Her nephrologist is Dr. Saida Mcdaniel. She gets dialysis at [facility].   Her blood pressure in clinic on 6/11/19 was 83/51. She takes amlodipine 10 mg nightly and metoprolol 100 mg twice daily.              She underwent right brachial arteriovenous graft placement by Dr. Jean Pierre Villarreal on 6/18/19. Her blood pressures were around /50-60.   She presented to [facility] on 6/25/19 with generalized weakness and fatigue for 2 days, associated with low blood pressures. She reported decreased oral intake and swelling and pain to her left upper extremity. Her blood pressure was 83/49, but she had not taken her amlodipine and metoprolol for the past 2-3 days. Left upper extremity ultrasound showed no venous thrombosis. She was given IV fluids without improvement. She was started on norepinephrine. She was admitted to [facility].    Overview/Hospital Course:  Her amlodipine and metoprolol were discontinued. The pharmacist noticed that she was on chronic  rivaroxaban, which is not recommended for dialysis patients. This was changed to apixaban. She was started on midodrine with improvement of hypotension to her baseline. Norepinephrine was weaned off on 6/27/19. She had asymptomatic morning hypoglycemia.    Interval History: See above.    Review of Systems   Constitutional: Negative for chills and fever.   Gastrointestinal: Negative for nausea and vomiting.     Objective:     Vital Signs (Most Recent):  Temp: 97.8 °F (36.6 °C) (06/28/19 1103)  Pulse: 80 (06/28/19 1115)  Resp: 11 (06/28/19 1115)  BP: 99/61 (06/28/19 1115)  SpO2: 100 % (06/28/19 1115) Vital Signs (24h Range):  Temp:  [97.5 °F (36.4 °C)-98 °F (36.7 °C)] 97.8 °F (36.6 °C)  Pulse:  [69-91] 80  Resp:  [7-28] 11  SpO2:  [97 %-100 %] 100 %  BP: ()/(46-75) 99/61     Weight: 62.9 kg (138 lb 10.7 oz)  Body mass index is 23.8 kg/m².    Intake/Output Summary (Last 24 hours) at 6/28/2019 1226  Last data filed at 6/27/2019 1325  Gross per 24 hour   Intake 1.56 ml   Output --   Net 1.56 ml      Physical Exam   Constitutional: She appears well-developed. No distress.   Pulmonary/Chest: Effort normal. No respiratory distress.   Neurological: She is alert.   Dysphasia (speaks slowly)   Psychiatric: She has a normal mood and affect.   Nursing note and vitals reviewed.      Significant Labs: All pertinent labs within the past 24 hours have been reviewed.   Blood Sugars (AccuCheck):  Recent Labs     06/27/19  1057 06/27/19  1118 06/27/19  1650 06/27/19  2101 06/28/19  0752 06/28/19  0758 06/28/19  0849 06/28/19  1128   POCTGLUCOSE 99 163* 89 94 41* 35* 101 92       Significant Imaging: I have reviewed all pertinent imaging results/findings within the past 24 hours.       Assessment/Plan:      * Hypotension  Baseline hypotensive. Etiology of worsening unclear. TSH normal. Stopped home antihypertensive medications. Started midodrine. Check cortisol.    History of embolic stroke  Continue home aspirin and  atorvastatin.    End-stage renal disease on hemodialysis  Appreciate Nephrology.    History of pulmonary embolism  Was taking rivaroxaban. Changed to apixaban.    Chronic low back pain  Chronic, stable.    Type 2 diabetes mellitus with chronic kidney disease on chronic dialysis, without long-term current use of insulin  Diet controlled. Insulin aspart sliding scale. Monitor serum glucose. Eat breakfast when hypoglycemic in the morning.    Hyperlipidemia associated with type 2 diabetes mellitus  Continue home atorvastatin.    History of hypertension  Baseline hypotensive. Stopped home amlodipine and metoprolol.      VTE Risk Mitigation (From admission, onward)        Ordered     apixaban tablet 2.5 mg  2 times daily      06/26/19 1440     IP VTE HIGH RISK PATIENT  Once      06/26/19 0154     Place sequential compression device  Until discontinued      06/26/19 0154        Transfer out of ICU    Brandon Ann MD  Department of Hospital Medicine   [facility]

## 2019-06-28 NOTE — DISCHARGE SUMMARY
[facility]  Discharge Summary      Patient Name: Brittaney Joseph  MRN: [id number]  Admission Date: 6/25/2019  Hospital Length of Stay: 3 days  Discharge Date and Time: 6/29/2019  4:50 PM  Attending Physician: Brandon Ann MD   Discharging Provider: Brandon Ann MD  Primary Care Provider: Charles Argueta MD      HPI:   Brittaney Joseph is a 68 year old black woman with hypertension, diabetes mellitus type 2, chronic diastolic heart failure, end stage renal disease on hemodialysis since 3/29/19 (Monday Wednesday Friday), hyperlipidemia, chronic low back pain, history of pulmonary embolism on 1/11/19 status post thrombectomy (anticoagulated on rivaroxaban), history of embolic stroke in the right supratentorial frontal, parietal, occipital, and temporal lobe and left posterior frontal lobe on 4/2/19. She lives with her niece. Her primary care physician is Dr. Charles Argueta. Her nephrologist is Dr. Saida Mcdaniel. She gets dialysis at [facility].   Her blood pressure in clinic on 6/11/19 was 83/51. She takes amlodipine 10 mg nightly and metoprolol 100 mg twice daily.              She underwent right brachial arteriovenous graft placement by Dr. Jean Pierre Villarreal on 6/18/19. Her blood pressures were around /50-60.   She presented to [facility] on 6/25/19 with generalized weakness and fatigue for 2 days, associated with low blood pressures. She reported decreased oral intake and swelling and pain to her left upper extremity. Her blood pressure was 83/49, but she had not taken her amlodipine and metoprolol for the past 2-3 days. Left upper extremity ultrasound showed no venous thrombosis. She was given IV fluids without improvement. She was started on norepinephrine. She was admitted to [facility].      Hospital Course:   Her amlodipine and metoprolol were discontinued. The pharmacist noticed that she  was on chronic rivaroxaban, which is not recommended for dialysis patients. This was changed to apixaban. She was started on midodrine with improvement of hypotension to her baseline. Norepinephrine was weaned off on 6/27/19. She had asymptomatic morning hypoglycemia. Early morning cortisol level was normal. She was discharged home with home health on 6/29/19.     Consults:   Consults (From admission, onward)        Status Ordering Provider     Inpatient consult to [facility]  Once     Provider:  (Not yet assigned)    Completed GURPREET GANNON consult to case management  Once     Provider:  (Not yet assigned)    Acknowledged JOSEPH MONSALVE        Final Active Diagnoses:    Diagnosis Date Noted POA    PRINCIPAL PROBLEM:  Hypotension [I95.9] 06/25/2019 Yes    History of embolic stroke [Z86.73] 04/03/2019 Not Applicable     Chronic    End-stage renal disease on hemodialysis [N18.6, Z99.2] 03/28/2019 Not Applicable     Chronic    History of pulmonary embolism [Z86.711] 01/11/2019 Yes     Chronic    Chronic diastolic heart failure [I50.32] 01/10/2019 Yes     Chronic    Chronic low back pain [M54.5, G89.29]  Yes     Chronic    History of hypertension [Z86.79] 12/20/2018 Not Applicable     Chronic    Hyperlipidemia associated with type 2 diabetes mellitus [E11.69, E78.5] 12/20/2018 Yes     Chronic    Type 2 diabetes mellitus with chronic kidney disease on chronic dialysis, without long-term current use of insulin [E11.22, N18.6, Z99.2] 12/20/2018 Not Applicable     Chronic      Problems Resolved During this Admission:    Diagnosis Date Noted Date Resolved POA    Generalized weakness [R53.1] 06/26/2019 06/28/2019 Yes    Stroke due to embolism [I63.9] 04/03/2019 06/26/2019 Yes       Discharged Condition: good    Disposition: Home-Health Care [facility]    Follow Up:  Follow-up Information     Charles Argueta MD.    Specialty:  Family Medicine  Contact information:  [address]  [facility] [place] [address]  [phone]                 Patient Instructions:      Diet diabetic     Notify your health care provider if you experience any of the following:  persistent dizziness, light-headedness, or visual disturbances     SUBSEQUENT HOME HEALTH ORDERS   Order Comments: Subsequent Home Health Orders    Current Medications:  Current Facility-Administered Medications:  0.9%  NaCl infusion, , Intravenous, PRN, Eder Mcghee II, MD  0.9%  NaCl infusion, , Intravenous, Once, Eder Mcghee II, MD  acetaminophen tablet 650 mg, 650 mg, Oral, Q4H PRN, Arlette Pascual NP, 650 mg at 06/26/19 2126  apixaban tablet 2.5 mg, 2.5 mg, Oral, BID, Brandon Ann MD, 2.5 mg at 06/27/19 2056  aspirin chewable tablet 81 mg, 81 mg, Oral, Daily, Arlette Pascual NP, 81 mg at 06/27/19 1000  atorvastatin tablet 40 mg, 40 mg, Oral, Daily, Arlette Pascual NP, 40 mg at 06/27/19 1000  ergocalciferol capsule 50,000 Units, 50,000 Units, Oral, Q7 Days, Eder Mcghee II, MD, 50,000 Units at 06/26/19 1159  midodrine tablet 10 mg, 10 mg, Oral, BID WM, Brandon Ann MD  mupirocin 2 % ointment, , Nasal, BID, Eder Mcghee II, MD  norepinephrine bitartrate-D5W 16 mg/250 mL (64 mcg/mL) infusion, 0.02 mcg/kg/min (Dosing Weight), Intravenous, Continuous, Arlette Pascual NP, Stopped at 06/27/19 1325  ondansetron injection 4 mg, 4 mg, Intravenous, Q8H PRN, Arlette Pascual NP  pantoprazole EC tablet 40 mg, 40 mg, Oral, Daily, Arlette Pascual NP, 40 mg at 06/27/19 1000  sodium chloride 0.9% flush 10 mL, 10 mL, Intravenous, PRN, Arlette Pascual NP  traMADol tablet 50 mg, 50 mg, Oral, Q8H PRN, Brandon Ann MD, 50 mg at 06/27/19 2056  vitamin renal formula (B-complex-vitamin c-folic acid) 1 mg per capsule 1 capsule, 1 capsule, Oral, Daily, Eder Mcghee II, MD, 1 capsule at 06/27/19 1000        Nursing:   N/A    Diet:   diabetic diet 2000 calorie    Activities:   activity as  tolerated    Labs:  none    Referrals/ Consults  Physical Therapy to evaluate and treat. Evaluate for home safety and equipment needs; Establish/upgrade home exercise program. Perform / instruct on therapeutic exercises, gait training, transfer training, and Range of Motion.  Occupational Therapy to evaluate and treat. Evaluate home environment for safety and equipment needs. Perform/Instruct on transfers, ADL training, ROM, and therapeutic exercises.  Speech Therapy  to evaluate and treat for  Language and Swallowing.  Aide to provide assistance with personal care, ADLs, and vital signs.    Home Health Aide:  Nursing Three times weekly, Physical Therapy Three times weekly, Occupational Therapy Three times weekly and Speech Language Pathology Three times weekly     Order Specific Question Answer Comments   What Home Health Agency is the patient currently using? [facility]      Activity as tolerated       Significant Diagnostic Studies:   X-Ray Chest 1 View 6/25/19: FINDINGS:  There is a right central venous catheter with its tip in the distal superior vena cava.  The cardiac silhouette is within normal limits.   There is no focal consolidation, pneumothorax, or pleural effusion. Spondylitic changes are present.  Degenerative changes seen at the shoulders.  Bones are osteopenic.  Gallbladder is surgically absent.  Calcific densities are seen over both breasts.  US Upper Extremity Veins Left 6/25/19: FINDINGS:  Central veins: The internal jugular, subclavian, and axillary veins are patent and free of thrombus.  Arm veins: The brachial is patent and compressible.  Other findings: The basilic vein is part of the arteriovenous fistula and is patent.  There is a complex area around the AV fistula near the mid basilic vein.  Question if there is any history of evolved hematoma about the is basilic vein.  The left cephalic vein is not visualized.  Impression:  No thrombus in central veins of the left upper  extremity.  Complex area around the AV fistula near the mid basilic vein.  Nonvisualization of the left cephalic vein.    Pending Diagnostic Studies:     Procedure Component Value Units Date/Time    Hepatitis B Surface Antibody, Qual/Quant [[id number]] Collected:  06/27/19 1933    Order Status:  Sent Lab Status:  In process Updated:  06/28/19 9103    Specimen:  Blood          Medications:  Reconciled Home Medications:      Medication List      START taking these medications    apixaban 2.5 mg Tab  Commonly known as:  ELIQUIS  Take 1 tablet (2.5 mg total) by mouth 2 (two) times daily.     midodrine 5 MG Tab  Commonly known as:  PROAMATINE  Take 1 tablet (5 mg total) by mouth 3 (three) times daily with meals.        CONTINUE taking these medications    aspirin 81 MG Chew  Take 1 tablet (81 mg total) by mouth once daily.     atorvastatin 40 MG tablet  Commonly known as:  LIPITOR  Take 40 mg by mouth once daily.     HYDROcodone-acetaminophen 5-325 mg per tablet  Commonly known as:  NORCO  Take 1 tablet by mouth every 6 (six) hours as needed for Pain.     nitroGLYCERIN 0.4 MG SL tablet  Commonly known as:  NITROSTAT  Place 1 tablet (0.4 mg total) under the tongue every 5 (five) minutes as needed for Chest pain. if second dose needed call 911     ondansetron 4 MG tablet  Commonly known as:  ZOFRAN  Take 1 tablet (4 mg total) by mouth every 8 (eight) hours as needed for Nausea.     pantoprazole 40 MG tablet  Commonly known as:  PROTONIX  Take 1 tablet (40 mg total) by mouth once daily.        STOP taking these medications    amLODIPine 10 MG tablet  Commonly known as:  NORVASC     amLODIPine 5 MG tablet  Commonly known as:  NORVASC     cephALEXin 500 MG capsule  Commonly known as:  KEFLEX     metoprolol tartrate 100 MG tablet  Commonly known as:  LOPRESSOR     rivaroxaban 10 mg Tab  Commonly known as:  XARELTO            Indwelling Lines/Drains at time of discharge:   Lines/Drains/Airways     Central Venous Catheter Line                  Hemodialysis Catheter 03/29/19 2043 right subclavian 90 days          Drain                 Hemodialysis AV Fistula 06/25/19 Right upper arm 3 days         Hemodialysis AV Graft 06/26/19 Left upper arm 2 days          Pressure Ulcer                 Pressure Injury 04/03/19 2145 midline Sacral spine #1 Stage 2 85 days                Time spent on the discharge of patient: 35 minutes  Patient was seen and examined on the date of discharge and determined to be suitable for discharge.    Brandon Ann MD  Department of Hospital Medicine  [facility]

## 2019-06-28 NOTE — ASSESSMENT & PLAN NOTE
Baseline hypotensive. Etiology of worsening unclear. TSH normal. Stopped home antihypertensive medications. Started midodrine. Check cortisol.

## 2019-06-28 NOTE — PLAN OF CARE
visited with patient. Patient's sister Karyna at bedside feeding her lunch.  Bedside nurse Dottie informed  the patient is not being discharged today. She will step down to the floor instead.    Patient goes to HD on MWF at 9:30am- [facility]. Spoke with nurse Sera LBANCO and informed the patient will discharge on tomorrow.   faxed HD flowsheets to dialysis center [phone].    Patient states her brother South will provide transportation home at discharge.     requested home health preference. Patient stated she had [facility] in the past and wishes to resume services with them.    Referral for home health sent to [facility] via [employer].  Physical address is [address] [place] [address]     06/28/19 1970   Post-Acute Status   Post-Acute Authorization Home Health/Hospice   Home Health/Hospice Status Referrals Sent

## 2019-06-28 NOTE — PLAN OF CARE
Problem: SLP Goal  Goal: SLP Goal  Short Term Goals:  1. Pt will participate in ongoing clinical swallow eval to ensure safest po diet and  to ensure adequate nutrition/hydration/medication needs are met.  2.  Pt will tolerate mechanical soft/ thinliquid diet with no overt s/s of aspiration.  3.Pt will participate in po trials of dry/hard solids with SLP.  4. Pt will perform compensatory swallow techniques including lingual sweep and liquid wash to eliminate oral residue after the swallow.  5. Pt will demonstrate understanding of swallow precautions to ensure safest po intake.    Outcome: Ongoing (interventions implemented as appropriate)  Patient presents with increased tolerance and adequate oral clearance of solids. She is independent with lingual sweeps. SLP will f/u x1 to ensure tolerance of dental soft diet upgrade. Patient was on regular diet prior to current admission, but requesting dental soft diet at this time.   HENRY Silva, CCC-SLP  Spectra: [phone]  06/28/2019

## 2019-06-28 NOTE — PROGRESS NOTES
Dr. Ann informed pt CBG 41. Re-checked using a different finger and was 35. Pt asymptomatic and breakfast just arrived. States to let pt eat breakfast and re-check.

## 2019-06-28 NOTE — ASSESSMENT & PLAN NOTE
Diet controlled. Insulin aspart sliding scale. Monitor serum glucose. Eat breakfast when hypoglycemic in the morning.

## 2019-06-28 NOTE — SUBJECTIVE & OBJECTIVE
Interval History: See above.    Review of Systems   Constitutional: Negative for chills and fever.   Gastrointestinal: Negative for nausea and vomiting.     Objective:     Vital Signs (Most Recent):  Temp: 97.8 °F (36.6 °C) (06/28/19 1103)  Pulse: 80 (06/28/19 1115)  Resp: 11 (06/28/19 1115)  BP: 99/61 (06/28/19 1115)  SpO2: 100 % (06/28/19 1115) Vital Signs (24h Range):  Temp:  [97.5 °F (36.4 °C)-98 °F (36.7 °C)] 97.8 °F (36.6 °C)  Pulse:  [69-91] 80  Resp:  [7-28] 11  SpO2:  [97 %-100 %] 100 %  BP: ()/(46-75) 99/61     Weight: 62.9 kg (138 lb 10.7 oz)  Body mass index is 23.8 kg/m².    Intake/Output Summary (Last 24 hours) at 6/28/2019 1226  Last data filed at 6/27/2019 1325  Gross per 24 hour   Intake 1.56 ml   Output --   Net 1.56 ml      Physical Exam   Constitutional: She appears well-developed. No distress.   Pulmonary/Chest: Effort normal. No respiratory distress.   Neurological: She is alert.   Dysphasia (speaks slowly)   Psychiatric: She has a normal mood and affect.   Nursing note and vitals reviewed.      Significant Labs: All pertinent labs within the past 24 hours have been reviewed.   Blood Sugars (AccuCheck):  Recent Labs     06/27/19  1057 06/27/19  1118 06/27/19  1650 06/27/19  2101 06/28/19  0752 06/28/19  0758 06/28/19  0849 06/28/19  1128   POCTGLUCOSE 99 163* 89 94 41* 35* 101 92       Significant Imaging: I have reviewed all pertinent imaging results/findings within the past 24 hours.

## 2019-06-28 NOTE — PROGRESS NOTES
[facility]      Subjective:      Interval Hx:    -hallie pureed diet    -denies sob       Objective:   Last 24 Hour Vital Signs:  BP  Min: 83/53  Max: 141/75  Temp  Av.5 °F (36.4 °C)  Min: 96.7 °F (35.9 °C)  Max: 98 °F (36.7 °C)  Pulse  Av.9  Min: 69  Max: 87  Resp  Av.8  Min: 7  Max: 28  SpO2  Av.5 %  Min: 88 %  Max: 100 %  Weight  Av.9 kg (138 lb 10.7 oz)  Min: 62.9 kg (138 lb 10.7 oz)  Max: 62.9 kg (138 lb 10.7 oz)  I/O last 3 completed shifts:  In: 220.8 [P.O.:180; I.V.:40.8]  Out: 0     Physical Examination:  GEN - NAD, AAOx4  CHEST - lungs CTA bilaterally; equal expansion   HEART - RRR, no m/r/s3/s4   ABD - non distended, non ttp; no guarding or rigidity   EXTR  -warm; L UE edema associated with recent AVG placement - medial incision with bandage in place  Skin - no tenting; no bruising; no ecchymosis   Neuro - no asterixis or localizing deficits      Laboratory:  Laboratory   Pertinent Findings:  Recent Labs   Lab 19  0707 19  0411   WBC 6.24 6.31  --    HGB 11.9* 11.0*  --    HCT 37.5 33.3*  --     222  --    MCV 89 87  --    RDW 16.1* 16.2*  --     140 139   K 3.4* 3.3* 3.3*    103 103   CO2 21* 28 27   BUN 8 9 18   GLU 89 65* 63*   PROT 5.1*  --   --    ALBUMIN 2.1*  --   --    BILITOT 1.1*  --   --    AST 81*  --   --    ALKPHOS 74  --   --    ALT 43  --   --                  Current Medications:     Infusions:   norepinephrine bitartrate-D5W Stopped (19 1325)        Scheduled:   sodium chloride 0.9%   Intravenous Once    apixaban  2.5 mg Oral BID    aspirin  81 mg Oral Daily    atorvastatin  40 mg Oral Daily    ergocalciferol  50,000 Units Oral Q7 Days    midodrine  10 mg Oral BID WM    mupirocin   Nasal BID    pantoprazole  40 mg Oral Daily    vitamin renal formula (B-complex-vitamin c-folic acid)  1 capsule Oral Daily        PRN:  sodium chloride 0.9%, acetaminophen, ondansetron, sodium chloride 0.9%,  traMADol        Assessment/Plan     ESRD  -MWF with Dr. Carpenter; access R chestwall permcath - maturing LUE AVG; pt makes less than cup of urine  -nephrocap    AVG  -medial incision has dehisced - Dr. Daigle aware     Hypotension  -etiol include decreased po intake/cardiac/infection  -BCx NGTD - off NE  -started on midodrine yesterday with good results - rec increasing to TID and on iHD days, please give 15 minutes prior to dialysis     Anemia  -will eval for epo with iHD prn     CKD-MBD  -zemplar with iHD prn; ergo 50k units weekly  -will cont to evaluate need for phosphorus binders      Eder Mcghee II  [facility]  [phone]

## 2019-06-28 NOTE — PLAN OF CARE
06/27/19 0216   Plan of Care Review   Plan of Care Reviewed With Patient;family. Safety: call light within reach, pt. Oriented to room & instructed how to notify nurse if assistance is needed, current questions/concerns addressed, bed in lowest position with wheels locked & side rails up x3. Pt. Educated regarding fall prevention and taking appropriate action to prevent falls. Activity: repositioned q. 2hrs, encouraged activity, encouraged ROM, encouraged independency. Neurological: oriented x4 Respiratory: room air, O2 sat WNL, no acute changes Cardiac: HR stable, BP hypotensive, but map maintained >65.  Afebrile this shift. Intake/Output: anuric, on dialysis, no BM overnight. Diet: inadequate intake, encouraged to eat Pain: controlled with PRN medication Skin: MOMO fistula, dressing  in place, no acute changes, no other skin breakdown noted Plan: all questions/concerns were addressed, verbal education provided, meds reviewed, labs and vitals are being monitored.

## 2019-06-28 NOTE — PLAN OF CARE
Problem: Electrolyte Imbalance (Chronic Kidney Disease)  Goal: Electrolyte Balance    Intervention: Monitor and Manage Electrolyte Imbalance  POC discussed with patient  HD with UF

## 2019-06-28 NOTE — PT/OT/SLP PROGRESS
Speech Language Pathology Treatment    Patient Name:  Brittaney Joseph   MRN:  [id number]   [id number]/[id number]    Admitting Diagnosis: Hypotension    Recommendations:                 General Recommendations:  Speech language evaluation, Cognitive-linguistic evaluation and diet f/u x1  Diet recommendations:  Dental Soft, Liquid Diet Level: Thin  Aspiration Precautions:   · 1 bite/sip at a time,   · Alternating bites/sips,   · Assistance with meals,  ·  Avoid talking while eating,   · Check for pocketing/oral residue--removed and clean dentures after meal 2/2 dentures loose in oral cavity,   · Eliminate distractions,   · HOB to 90 degrees   General Precautions: Standard, fall, dental soft  Communication strategies:  none    Subjective     Patient awake with lunch tray. She requested to wait to eat until sister was present in room to feed her. Sister entered room.   · Patient goals: to get out of the hospital    Objective:     Has the patient been evaluated by SLP for swallowing?   Yes  Keep patient NPO? No   Current Respiratory Status: room air      Patient seen to assess tolerance of diet and feasibility of diet upgrade. Patient reports being on a regular diet at baseline, however, does not feel she can tolerate a regular diet at this time and is requesting a soft diet. He reports feeling too restricted on mechanical soft diet. Patient placed dentures in oral cavity for meal, and was repositioned by SLP and SLP student. Dentures noted to be loose in oral cavity. She reports typically utilizing denture glue which is not present in hospital at this time. Patient assessed with lunch tray (baked fish, mashed potatoes, beans and sips of lemonade) and bites of omid cracker. She independently stated need for lingual sweeps to clear oral cavity. She presented with no overt s/s of aspiration and adequate oral clearance. SLP discussed diet upgrade and POC. Patient and patient's sister in agreement with diet upgrade. SLP communicated  diet upgrade recommendations with RN. Patient and family education provided on safe swallowing precautions and recommendation to remove and clean dentures after every meal. Patient v/u. Rn reports possible d/c this date.     Assessment:     Brittaney Joseph is a 68 y.o. female with an SLP diagnosis of Dysphagia and Dysarthria. ST will follow up to assess tolerance of diet upgrade, ensure compliance with safe swallowing precautions, and complete a Mnceaj-Qeispkkx-Ytdmibduq Evaluation to continue to follow patient for cognitive-linguistic impairments s/p stroke in April.     Goals:   Multidisciplinary Problems     SLP Goals        Problem: SLP Goal    Goal Priority Disciplines Outcome   SLP Goal     SLP Ongoing (interventions implemented as appropriate)   Description:  Short Term Goals:  1. Pt will participate in ongoing clinical swallow eval to ensure safest po diet and  to ensure adequate nutrition/hydration/medication needs are met.  2.  Pt will tolerate mechanical soft/ thinliquid diet with no overt s/s of aspiration.  3.Pt will participate in po trials of dry/hard solids with SLP.  4. Pt will perform compensatory swallow techniques including lingual sweep and liquid wash to eliminate oral residue after the swallow.  5. Pt will demonstrate understanding of swallow precautions to ensure safest po intake.                     Plan:     · Patient to be seen:  2 x/week   · Plan of Care expires:  07/26/19  · Plan of Care reviewed with:  patient, sibling   · SLP Follow-Up:  Yes       Discharge recommendations:  other (see comments)(TBD pending PT/OT recs in progress)   Barriers to Discharge:  None    Time Tracking:     SLP Treatment Date:   06/28/19  Speech Start Time:  1200  Speech Stop Time:  1220     Speech Total Time (min):  20 min    Billable Minutes: Treatment Swallowing Dysfunction 10 and Seld Care/Home Management Training 10    CORINNE Jean-Baptiste, CCC-SLP   Spectra: [phone]  06/28/2019

## 2019-06-29 VITALS
DIASTOLIC BLOOD PRESSURE: 59 MMHG | SYSTOLIC BLOOD PRESSURE: 97 MMHG | BODY MASS INDEX: 21.64 KG/M2 | WEIGHT: 126.75 LBS | OXYGEN SATURATION: 98 % | HEART RATE: 85 BPM | RESPIRATION RATE: 20 BRPM | TEMPERATURE: 97 F | HEIGHT: 64 IN

## 2019-06-29 LAB
ANION GAP SERPL CALC-SCNC: 9 MMOL/L (ref 8–16)
BUN SERPL-MCNC: 10 MG/DL (ref 8–23)
CALCIUM SERPL-MCNC: 7.9 MG/DL (ref 8.7–10.5)
CHLORIDE SERPL-SCNC: 107 MMOL/L (ref 95–110)
CO2 SERPL-SCNC: 19 MMOL/L (ref 23–29)
CREAT SERPL-MCNC: 3 MG/DL (ref 0.5–1.4)
ERYTHROCYTE [DISTWIDTH] IN BLOOD BY AUTOMATED COUNT: 15.9 % (ref 11.5–14.5)
EST. GFR  (AFRICAN AMERICAN): 18 ML/MIN/1.73 M^2
EST. GFR  (NON AFRICAN AMERICAN): 15 ML/MIN/1.73 M^2
GLUCOSE SERPL-MCNC: 54 MG/DL (ref 70–110)
HCT VFR BLD AUTO: 32.1 % (ref 37–48.5)
HGB BLD-MCNC: 10.3 G/DL (ref 12–16)
MAGNESIUM SERPL-MCNC: 1.3 MG/DL (ref 1.6–2.6)
MCH RBC QN AUTO: 28.1 PG (ref 27–31)
MCHC RBC AUTO-ENTMCNC: 32.1 G/DL (ref 32–36)
MCV RBC AUTO: 88 FL (ref 82–98)
PHOSPHATE SERPL-MCNC: 2.1 MG/DL (ref 2.7–4.5)
PLATELET # BLD AUTO: 242 K/UL (ref 150–350)
PMV BLD AUTO: 9.9 FL (ref 9.2–12.9)
POCT GLUCOSE: 66 MG/DL (ref 70–110)
POCT GLUCOSE: 90 MG/DL (ref 70–110)
POTASSIUM SERPL-SCNC: 4.1 MMOL/L (ref 3.5–5.1)
RBC # BLD AUTO: 3.66 M/UL (ref 4–5.4)
SODIUM SERPL-SCNC: 135 MMOL/L (ref 136–145)
WBC # BLD AUTO: 7.21 K/UL (ref 3.9–12.7)

## 2019-06-29 PROCEDURE — 84100 ASSAY OF PHOSPHORUS: CPT

## 2019-06-29 PROCEDURE — 25000003 PHARM REV CODE 250: Performed by: NURSE PRACTITIONER

## 2019-06-29 PROCEDURE — 36415 COLL VENOUS BLD VENIPUNCTURE: CPT

## 2019-06-29 PROCEDURE — 80048 BASIC METABOLIC PNL TOTAL CA: CPT

## 2019-06-29 PROCEDURE — 25000003 PHARM REV CODE 250: Performed by: HOSPITALIST

## 2019-06-29 PROCEDURE — 83735 ASSAY OF MAGNESIUM: CPT

## 2019-06-29 PROCEDURE — 85027 COMPLETE CBC AUTOMATED: CPT

## 2019-06-29 PROCEDURE — 94761 N-INVAS EAR/PLS OXIMETRY MLT: CPT

## 2019-06-29 RX ADMIN — NEPHROCAP 1 CAPSULE: 1 CAP ORAL at 10:06

## 2019-06-29 RX ADMIN — MIDODRINE HYDROCHLORIDE 10 MG: 5 TABLET ORAL at 10:06

## 2019-06-29 RX ADMIN — ATORVASTATIN CALCIUM 40 MG: 40 TABLET, FILM COATED ORAL at 10:06

## 2019-06-29 RX ADMIN — MUPIROCIN: 20 OINTMENT TOPICAL at 10:06

## 2019-06-29 RX ADMIN — ASPIRIN 81 MG 81 MG: 81 TABLET ORAL at 10:06

## 2019-06-29 RX ADMIN — PANTOPRAZOLE SODIUM 40 MG: 40 TABLET, DELAYED RELEASE ORAL at 10:06

## 2019-06-29 RX ADMIN — APIXABAN 2.5 MG: 2.5 TABLET, FILM COATED ORAL at 10:06

## 2019-06-29 NOTE — DISCHARGE INSTRUCTIONS
Start taking midodrine 3 times a day with your meals. Stop taking amlodipine and metoprolol.    Rivaroxaban (Xarelto) is not proven safe for people on dialysis, so it was replaced with apixaban (Eliquis).

## 2019-06-29 NOTE — PROGRESS NOTES
[facility]    No overnight events. Transferred to floor from ICU. Had HD yesterday.  Uneventful.    Scheduled Meds:   sodium chloride 0.9%   Intravenous Once    apixaban  2.5 mg Oral BID    aspirin  81 mg Oral Daily    atorvastatin  40 mg Oral Daily    ergocalciferol  50,000 Units Oral Q7 Days    midodrine  10 mg Oral BID WM    mupirocin   Nasal BID    pantoprazole  40 mg Oral Daily    vitamin renal formula (B-complex-vitamin c-folic acid)  1 capsule Oral Daily     Continuous Infusions:  PRN Meds:.sodium chloride 0.9%, acetaminophen, ondansetron, sodium chloride 0.9%, traMADol    Vitals:    06/29/19 0813 06/29/19 0831 06/29/19 1140 06/29/19 1156   BP:  (!) 89/54 (!) 97/59    BP Location:       Patient Position:  Lying Lying    Pulse:  83 73 87   Resp:  20 20    Temp:  97.8 °F (36.6 °C) 97.2 °F (36.2 °C)    TempSrc:  Oral Oral    SpO2: 98%      Weight:       Height:             Intake/Output Summary (Last 24 hours) at 6/29/2019 1509  Last data filed at 6/29/2019 1300  Gross per 24 hour   Intake 875 ml   Output 1400 ml   Net -525 ml       Gen: thin, NAD  CV: RRR  Lungs: overall CTA  Abd: soft, NT  Ext: no edema  Access: right tunneled in.  LUE AVG dressing in place.    LABS:    Results for JEROME VALENZUELA (MRN [id number]) as of 6/29/2019 15:09   Ref. Range 6/29/2019 04:41   WBC Latest Ref Range: 3.90 - 12.70 K/uL 7.21   RBC Latest Ref Range: 4.00 - 5.40 M/uL 3.66 (L)   Hemoglobin Latest Ref Range: 12.0 - 16.0 g/dL 10.3 (L)   Hematocrit Latest Ref Range: 37.0 - 48.5 % 32.1 (L)   MCV Latest Ref Range: 82 - 98 fL 88   MCH Latest Ref Range: 27.0 - 31.0 pg 28.1   MCHC Latest Ref Range: 32.0 - 36.0 g/dL 32.1   RDW Latest Ref Range: 11.5 - 14.5 % 15.9 (H)   Platelets Latest Ref Range: 150 - 350 K/uL 242   MPV Latest Ref Range: 9.2 - 12.9 fL 9.9   Sodium Latest Ref Range: 136 - 145 mmol/L 135 (L)   Potassium Latest Ref Range: 3.5 - 5.1 mmol/L 4.1   Chloride Latest Ref Range: 95 - 110 mmol/L 107   CO2 Latest Ref  Range: 23 - 29 mmol/L 19 (L)   Anion Gap Latest Ref Range: 8 - 16 mmol/L 9   BUN, Bld Latest Ref Range: 8 - 23 mg/dL 10   Creatinine Latest Ref Range: 0.5 - 1.4 mg/dL 3.0 (H)   eGFR if non African American Latest Ref Range: >60 mL/min/1.73 m^2 15 (A)   eGFR if African American Latest Ref Range: >60 mL/min/1.73 m^2 18 (A)   Glucose Latest Ref Range: 70 - 110 mg/dL 54 (L)   Calcium Latest Ref Range: 8.7 - 10.5 mg/dL 7.9 (L)   Phosphorus Latest Ref Range: 2.7 - 4.5 mg/dL 2.1 (L)   Magnesium Latest Ref Range: 1.6 - 2.6 mg/dL 1.3 (L)       ASSESSMENT AND PLAN:    1. ESRD: HD MWF at [facility] with Dr. Mcdaniel.  Had HD yesterday  - HD MWF per schedule  - AVG maturing  - midodrine for HD    2. Access: f/u with Dr. Villarreal    3. Hypotension: no clear source for infection as cause of hypotension.  ECHO with normal EF  - started on midodrine    4. Anemia ESRD: EPO with HD    Will cont to follow    Ale Aquino MD  [facility]

## 2019-06-29 NOTE — PLAN OF CARE
VN reviewed discharge instructions with pt and 2 friends.   AVS printed and handed to pt by bedside nurse.  Reviewed followup appts, medication, diet, and importance of medication compliance.  Reviewed home care instructions, treatment plan, self management and when to seek medical attention.  Allowed time for questions, all questions answered.  Pt and friends verbalized complete understanding of discharge instructions and voices no concerns.      Discharge instructions complete.   Transport wheelchair requested.  Bedside nurse notified.

## 2019-06-29 NOTE — PLAN OF CARE
Problem: Adult Inpatient Plan of Care  Goal: Plan of Care Review  Outcome: Ongoing (interventions implemented as appropriate)  Patient is AAO X 4. Vital signs and blood sugar stable. Denies pain at present. Slept well during night. Safety measures maintained. On tele monitor shows NSR. Will continue to monitor patient.   Anticipated discharge today.

## 2019-06-29 NOTE — PLAN OF CARE
Problem: Adult Inpatient Plan of Care  Goal: Plan of Care Review  Outcome: Ongoing (interventions implemented as appropriate)  Pt on RA with documented sats. .

## 2019-06-29 NOTE — PROGRESS NOTES
Pt being discharged per  orders. IV and telemetry box removed. Discharge paperwork given to pt to be reviewed with pt and sister. Pt had no questions or concerns. Will continue to monitor.

## 2019-06-29 NOTE — PLAN OF CARE
HH orders sent via [facility] to [facility], their nurse will contact patient to arrange a visit for Sunday, 6/30/19    DME in place, none ordered    Future Appointments   Date Time Provider Department Center   7/6/2019 10:00 AM Laurie Pérez DO [facility] [place] [facility]   7/16/2019  2:10 PM Jean Pierre Villarreal MD [facility]       Pt's nurse will go over medications/signs and symptoms prior to discharge    Per Christi BREWSTER on Friday 6/28/19, pt's brotherSouth to bring pt home on Saturday 06/29/19 0740   Final Note   Assessment Type Final Discharge Note   Anticipated Discharge Disposition Home-Health  ([facility])   What phone number can be called within the next 1-3 days to see how you are doing after discharge? [phone]   Hospital Follow Up  Appt(s) scheduled? No  (Offices closed for Weekend. Patient to schedule own follow up appointment.)   Right Care Referral Info   Post Acute Recommendation Home-care   Facility Name [facility]     Mitra Raymundo, RN Transitional Navigator  [phone]

## 2019-06-30 PROCEDURE — G0180 PR HOME HEALTH MD CERTIFICATION: ICD-10-PCS | Mod: ,,, | Performed by: HOSPITALIST

## 2019-06-30 PROCEDURE — G0180 MD CERTIFICATION HHA PATIENT: HCPCS | Mod: ,,, | Performed by: HOSPITALIST

## 2019-07-01 ENCOUNTER — PATIENT OUTREACH (OUTPATIENT)
Dept: ADMINISTRATIVE | Facility: CLINIC | Age: 69
End: 2019-07-01

## 2019-07-01 LAB
BACTERIA BLD CULT: NORMAL
BACTERIA BLD CULT: NORMAL
HBV SURFACE AB SER QL IA: NEGATIVE
HBV SURFACE AB SERPL IA-ACNC: <3 MIU/ML

## 2019-07-01 NOTE — PATIENT INSTRUCTIONS
Low Blood Pressure (All Causes)  A blood pressure reading is made up of 2 numbers There is a top number over a bottom number. The top number is the systolic pressure. The bottom number is the diastolic pressure. A normal blood pressure is a systolic pressure less than 120 over a diastolic pressure less than 80. Low blood pressure (hypotension) is a blood pressure that is less than what is normal for you. Low blood pressure can cause dizziness, lightheadedness, or fainting.  Some medicines can cause low blood pressure. They include:  · High blood pressure pills  · Water pills (diuretics)  · Some heart medicines  · Some antidepressants  · Pain, anxiety, sedative, and sleeping medicines  Other causes include:  · Dehydration, severe infection, or fever  · Blood loss, such as bleeding from the stomach or intestines.  · Heart failure  · Change in heart rate or rhythm (arrhythmia)  · A drop in blood pressure from a sudden change in body position, from lying down to standing (orthostatic hypotension)  · Alcohol or drug intoxication  · Neurological diseases that impair the autonomic nervous system  Treatment will depend on what is causing your low blood pressure.  Home care  Follow these guidelines when caring for yourself at home:  · Rest until your symptoms get better.  · Keep a record of your symptoms and what you were doing when they occurred. Bring the record with you to your next appointment.  · Be aware of how quickly your blood pressure drops when you become dehydrated, spend a lot of time in the sun, or have low blood sugar. Take measures to prevent blood pressure drops at these times.  · Follow the treatment plan described by your healthcare provider.  Follow-up care  Follow up with your healthcare provider, or as advised.  When to seek medical advice  Call your healthcare provider right away if any of these occur:  · Dizziness, lightheadedness, or fainting  · Black or red color in your stools or  vomit  · Shortness of breath or difficulty breathing  · Chest, shoulder, arm, neck, or upper back pain  · Abdominal pain  · Diarrhea or vomiting that doesnt go away  · You arent able to eat or drink  · Fever of 100.4°F (38°C) or higher, or as directed by your healthcare provider  · Burning when you urinate  · Foul-smelling urine  Date Last Reviewed: 12/1/2016  © 0627-9367 [employer]. [address] [place] [address], [place] [address]. All rights reserved. This information is not intended as a substitute for professional medical care. Always follow your healthcare professional's instructions.

## 2019-07-12 ENCOUNTER — EXTERNAL HOME HEALTH (OUTPATIENT)
Dept: HOME HEALTH SERVICES | Facility: HOSPITAL | Age: 69
End: 2019-07-12
Payer: MEDICARE

## 2019-07-15 ENCOUNTER — TELEPHONE (OUTPATIENT)
Dept: FAMILY MEDICINE | Facility: CLINIC | Age: 69
End: 2019-07-15

## 2019-07-15 NOTE — TELEPHONE ENCOUNTER
Called moncho ruggiero and she states that pts heart rate has been 109 and 111 and that her care giver states that she is experiencing bilateral foot pain and sob but she has an appt on tomorrow with another Dr But they wanted to make us aware

## 2019-07-15 NOTE — TELEPHONE ENCOUNTER
----- Message from Desire Soto sent at 7/15/2019 10:11 AM CDT -----  Contact: Kalpana sheffield - [phone]  Needs a call back about patient high heart rate. Please advise

## 2019-07-23 ENCOUNTER — PATIENT OUTREACH (OUTPATIENT)
Dept: ADMINISTRATIVE | Facility: HOSPITAL | Age: 69
End: 2019-07-23

## 2019-07-26 ENCOUNTER — TELEPHONE (OUTPATIENT)
Dept: FAMILY MEDICINE | Facility: CLINIC | Age: 69
End: 2019-07-26

## 2019-07-26 NOTE — TELEPHONE ENCOUNTER
----- Message from Marian Wilkinson sent at 7/26/2019  2:56 PM CDT -----  Contact: [phone] /jean/Liu Oliver/jean is calling about her having an infected boil under her private area. Please call her to discuss what she should do. Thanks

## 2019-07-26 NOTE — TELEPHONE ENCOUNTER
Spoke to Liu and she was notified to take the pt to the Er or urgent care. She understood and will take the pt.

## 2019-08-06 ENCOUNTER — OFFICE VISIT (OUTPATIENT)
Dept: FAMILY MEDICINE | Facility: CLINIC | Age: 69
End: 2019-08-06
Payer: MEDICARE

## 2019-08-06 VITALS
SYSTOLIC BLOOD PRESSURE: 96 MMHG | WEIGHT: 126.13 LBS | BODY MASS INDEX: 21.53 KG/M2 | HEART RATE: 97 BPM | OXYGEN SATURATION: 96 % | TEMPERATURE: 98 F | DIASTOLIC BLOOD PRESSURE: 60 MMHG | HEIGHT: 64 IN

## 2019-08-06 DIAGNOSIS — Z23 NEED FOR VACCINATION AGAINST STREPTOCOCCUS PNEUMONIAE: ICD-10-CM

## 2019-08-06 DIAGNOSIS — I95.9 HYPOTENSION, UNSPECIFIED HYPOTENSION TYPE: ICD-10-CM

## 2019-08-06 DIAGNOSIS — Z86.73 HISTORY OF EMBOLIC STROKE: Primary | Chronic | ICD-10-CM

## 2019-08-06 DIAGNOSIS — N18.6 TYPE 2 DIABETES MELLITUS WITH CHRONIC KIDNEY DISEASE ON CHRONIC DIALYSIS, WITHOUT LONG-TERM CURRENT USE OF INSULIN: Chronic | ICD-10-CM

## 2019-08-06 DIAGNOSIS — M54.42 CHRONIC BILATERAL LOW BACK PAIN WITH BILATERAL SCIATICA: ICD-10-CM

## 2019-08-06 DIAGNOSIS — Z86.79 HISTORY OF HYPERTENSION: Chronic | ICD-10-CM

## 2019-08-06 DIAGNOSIS — R53.81 PHYSICAL DECONDITIONING: ICD-10-CM

## 2019-08-06 DIAGNOSIS — Z78.0 POST-MENOPAUSAL: ICD-10-CM

## 2019-08-06 DIAGNOSIS — M48.00 SPINAL STENOSIS, UNSPECIFIED SPINAL REGION: ICD-10-CM

## 2019-08-06 DIAGNOSIS — E11.22 TYPE 2 DIABETES MELLITUS WITH CHRONIC KIDNEY DISEASE ON CHRONIC DIALYSIS, WITHOUT LONG-TERM CURRENT USE OF INSULIN: Chronic | ICD-10-CM

## 2019-08-06 DIAGNOSIS — Z99.2 TYPE 2 DIABETES MELLITUS WITH CHRONIC KIDNEY DISEASE ON CHRONIC DIALYSIS, WITHOUT LONG-TERM CURRENT USE OF INSULIN: Chronic | ICD-10-CM

## 2019-08-06 DIAGNOSIS — M54.41 CHRONIC BILATERAL LOW BACK PAIN WITH BILATERAL SCIATICA: ICD-10-CM

## 2019-08-06 DIAGNOSIS — G89.29 CHRONIC BILATERAL LOW BACK PAIN WITH BILATERAL SCIATICA: ICD-10-CM

## 2019-08-06 PROCEDURE — 90670 PCV13 VACCINE IM: CPT | Mod: S$GLB,,, | Performed by: FAMILY MEDICINE

## 2019-08-06 PROCEDURE — 1101F PR PT FALLS ASSESS DOC 0-1 FALLS W/OUT INJ PAST YR: ICD-10-PCS | Mod: CPTII,S$GLB,, | Performed by: FAMILY MEDICINE

## 2019-08-06 PROCEDURE — 3044F PR MOST RECENT HEMOGLOBIN A1C LEVEL <7.0%: ICD-10-PCS | Mod: CPTII,S$GLB,, | Performed by: FAMILY MEDICINE

## 2019-08-06 PROCEDURE — 1101F PT FALLS ASSESS-DOCD LE1/YR: CPT | Mod: CPTII,S$GLB,, | Performed by: FAMILY MEDICINE

## 2019-08-06 PROCEDURE — 90670 PNEUMOCOCCAL CONJUGATE VACCINE 13-VALENT LESS THAN 5YO & GREATER THAN: ICD-10-PCS | Mod: S$GLB,,, | Performed by: FAMILY MEDICINE

## 2019-08-06 PROCEDURE — 99213 PR OFFICE/OUTPT VISIT, EST, LEVL III, 20-29 MIN: ICD-10-PCS | Mod: 25,S$GLB,, | Performed by: FAMILY MEDICINE

## 2019-08-06 PROCEDURE — G0009 PNEUMOCOCCAL CONJUGATE VACCINE 13-VALENT LESS THAN 5YO & GREATER THAN: ICD-10-PCS | Mod: S$GLB,,, | Performed by: FAMILY MEDICINE

## 2019-08-06 PROCEDURE — 3044F HG A1C LEVEL LT 7.0%: CPT | Mod: CPTII,S$GLB,, | Performed by: FAMILY MEDICINE

## 2019-08-06 PROCEDURE — 99213 OFFICE O/P EST LOW 20 MIN: CPT | Mod: 25,S$GLB,, | Performed by: FAMILY MEDICINE

## 2019-08-06 PROCEDURE — G0009 ADMIN PNEUMOCOCCAL VACCINE: HCPCS | Mod: S$GLB,,, | Performed by: FAMILY MEDICINE

## 2019-08-06 RX ORDER — DOCUSATE SODIUM 100 MG/1
100 CAPSULE, LIQUID FILLED ORAL 2 TIMES DAILY
Qty: 180 CAPSULE | Refills: 3 | Status: SHIPPED | OUTPATIENT
Start: 2019-08-06 | End: 2020-08-03

## 2019-08-06 RX ORDER — GABAPENTIN 300 MG/1
300 CAPSULE ORAL 2 TIMES DAILY
Qty: 60 CAPSULE | Refills: 11 | Status: SHIPPED | OUTPATIENT
Start: 2019-08-06 | End: 2020-08-03

## 2019-08-06 RX ORDER — TRAMADOL HYDROCHLORIDE 50 MG/1
50 TABLET ORAL EVERY 8 HOURS
Refills: 0 | COMMUNITY
Start: 2019-07-26 | End: 2019-08-27

## 2019-08-06 RX ORDER — CLOTRIMAZOLE AND BETAMETHASONE DIPROPIONATE 10; .64 MG/G; MG/G
CREAM TOPICAL
Refills: 0 | COMMUNITY
Start: 2019-07-26

## 2019-08-06 RX ORDER — OXYCODONE AND ACETAMINOPHEN 10; 325 MG/1; MG/1
1 TABLET ORAL EVERY 12 HOURS PRN
Refills: 0 | COMMUNITY
Start: 2019-08-01 | End: 2021-08-26

## 2019-08-06 NOTE — PROGRESS NOTES
"Subjective:      Patient ID: Brittaney Joseph is a 69 y.o. female.    Chief Complaint: Foot Pain      Vitals:    08/06/19 1418   BP: 96/60   Pulse: 97   Temp: 97.7 °F (36.5 °C)   SpO2: 96%   Weight: 57.2 kg (126 lb 1.7 oz)   Height: 5' 4" (1.626 m)        HPI   Both feet hurt constantly; for unknown long time; back hurts occ; no hand pain; doesn't walk  Had dialyhsis access inserted to left arm, not using; has access through chest wall catheter; A1C normal in March  Review of Systems   Constitutional: Negative.    HENT: Negative.    Respiratory: Negative.    Cardiovascular: Negative.    Gastrointestinal: Negative.    Endocrine: Negative.    Genitourinary: Negative.    Musculoskeletal: Positive for back pain and gait problem. Negative for neck pain and neck stiffness.   Neurological: Positive for weakness. Negative for numbness.   Psychiatric/Behavioral: Negative.    All other systems reviewed and are negative.    Objective:     Physical Exam   Constitutional: She is oriented to person, place, and time.   ch ill appearing, mod discomfort, NAD   HENT:   Head: Normocephalic.   Eyes: Pupils are equal, round, and reactive to light. Conjunctivae and EOM are normal.   Neck: Normal range of motion. Neck supple.   Cardiovascular: Normal rate, regular rhythm, normal heart sounds and intact distal pulses.   Pulses:       Dorsalis pedis pulses are 3+ on the right side, and 3+ on the left side.        Posterior tibial pulses are 3+ on the right side, and 3+ on the left side.   Pulmonary/Chest: Effort normal and breath sounds normal.   Abdominal: Soft.   Musculoskeletal: She exhibits tenderness. She exhibits no edema or deformity.        Right foot: There is normal range of motion and no deformity.        Left foot: There is normal range of motion and no deformity.   Feet:   Right Foot:   Protective Sensation: 5 sites tested. 5 sites sensed.   Skin Integrity: Positive for callus and dry skin. Negative for ulcer, blister, skin " breakdown, erythema or warmth.   Left Foot:   Protective Sensation: 5 sites tested. 5 sites sensed.   Skin Integrity: Positive for callus and dry skin. Negative for ulcer, blister, skin breakdown, erythema or warmth.   Neurological: She is alert and oriented to person, place, and time. She has normal reflexes.   Skin: Skin is warm and dry.   onychomycosis   Psychiatric: She has a normal mood and affect. Her behavior is normal. Judgment and thought content normal.   Nursing note and vitals reviewed.    Assessment:     1. History of embolic stroke    2. Hypotension, unspecified hypotension type    3. History of hypertension    4. Type 2 diabetes mellitus with chronic kidney disease on chronic dialysis, without long-term current use of insulin    5. Physical deconditioning    6. Spinal stenosis, unspecified spinal region    7. Chronic bilateral low back pain with bilateral sciatica    8. Post-menopausal    9. Need for vaccination against Streptococcus pneumoniae      Plan:        Medication List           Accurate as of 8/6/19 11:59 PM. If you have any questions, ask your nurse or doctor.               START taking these medications    docusate sodium 100 MG capsule  Commonly known as:  COLACE  Take 1 capsule (100 mg total) by mouth 2 (two) times daily.  Started by:  Charles Argueta MD     gabapentin 300 MG capsule  Commonly known as:  NEURONTIN  Take 1 capsule (300 mg total) by mouth 2 (two) times daily. For feet pain  Started by:  Charlse Argueta MD        CONTINUE taking these medications    apixaban 2.5 mg Tab  Commonly known as:  ELIQUIS  Take 1 tablet (2.5 mg total) by mouth 2 (two) times daily.     aspirin 81 MG Chew  Take 1 tablet (81 mg total) by mouth once daily.     atorvastatin 40 MG tablet  Commonly known as:  LIPITOR     clotrimazole-betamethasone 1-0.05% cream  Commonly known as:  LOTRISONE     midodrine 5 MG Tab  Commonly known as:  PROAMATINE  Take 1 tablet (5 mg total) by mouth 3 (three) times daily  with meals.     nitroGLYCERIN 0.4 MG SL tablet  Commonly known as:  NITROSTAT  Place 1 tablet (0.4 mg total) under the tongue every 5 (five) minutes as needed for Chest pain. if second dose needed call 911     oxyCODONE-acetaminophen  mg per tablet  Commonly known as:  PERCOCET     traMADol 50 mg tablet  Commonly known as:  ULTRAM        STOP taking these medications    HYDROcodone-acetaminophen 5-325 mg per tablet  Commonly known as:  NORCO  Stopped by:  Charles Argueta MD     ondansetron 4 MG tablet  Commonly known as:  ZOFRAN  Stopped by:  Charles Argueta MD     pantoprazole 40 MG tablet  Commonly known as:  PROTONIX  Stopped by:  Charles Argueta MD           Where to Get Your Medications      These medications were sent to [facility] - [place], [place] - [address]  [address], [place] [address]    Phone:  [phone]   · docusate sodium 100 MG capsule  · gabapentin 300 MG capsule       History of embolic stroke    Hypotension, unspecified hypotension type    History of hypertension    Type 2 diabetes mellitus with chronic kidney disease on chronic dialysis, without long-term current use of insulin    Physical deconditioning    Spinal stenosis, unspecified spinal region    Chronic bilateral low back pain with bilateral sciatica  -     MRI Lumbar Spine Without Contrast; Future; Expected date: 08/06/2019    Post-menopausal  -     DXA Bone Density Spine And Hip; Future; Expected date: 08/06/2019    Need for vaccination against Streptococcus pneumoniae  -     (In Office Administered) Pneumococcal Conjugate Vaccine (13 Valent) (IM)    Other orders  -     gabapentin (NEURONTIN) 300 MG capsule; Take 1 capsule (300 mg total) by mouth 2 (two) times daily. For feet pain  Dispense: 60 capsule; Refill: 11  -     docusate sodium (COLACE) 100 MG capsule; Take 1 capsule (100 mg total) by mouth 2 (two) times daily.  Dispense: 180 capsule; Refill: 3

## 2019-08-15 ENCOUNTER — HOSPITAL ENCOUNTER (OUTPATIENT)
Dept: RADIOLOGY | Facility: HOSPITAL | Age: 69
Discharge: HOME | End: 2019-08-15
Attending: FAMILY MEDICINE
Payer: MEDICARE

## 2019-08-15 DIAGNOSIS — M54.42 CHRONIC BILATERAL LOW BACK PAIN WITH BILATERAL SCIATICA: ICD-10-CM

## 2019-08-15 DIAGNOSIS — Z78.0 POST-MENOPAUSAL: ICD-10-CM

## 2019-08-15 DIAGNOSIS — G89.29 CHRONIC BILATERAL LOW BACK PAIN WITH BILATERAL SCIATICA: ICD-10-CM

## 2019-08-15 DIAGNOSIS — M54.41 CHRONIC BILATERAL LOW BACK PAIN WITH BILATERAL SCIATICA: ICD-10-CM

## 2019-08-15 PROCEDURE — 77080 DXA BONE DENSITY AXIAL: CPT | Mod: TC,PO

## 2019-08-15 PROCEDURE — 72148 MRI LUMBAR SPINE W/O DYE: CPT | Mod: TC,PO

## 2019-08-19 ENCOUNTER — TELEPHONE (OUTPATIENT)
Dept: FAMILY MEDICINE | Facility: CLINIC | Age: 69
End: 2019-08-19

## 2019-08-19 NOTE — TELEPHONE ENCOUNTER
----- Message from Charles Argueta MD sent at 8/16/2019  4:49 PM CDT -----  CALL PT TESTS ARE NORMAL

## 2019-08-19 NOTE — TELEPHONE ENCOUNTER
Spoke to Chely and she was notified of pt results and she understood. Pt was given a follow up appointment and the appointment slip was mailed to pt home.

## 2019-08-27 PROBLEM — S40.022A TRAUMATIC HEMATOMA OF LEFT UPPER ARM: Status: ACTIVE | Noted: 2019-08-27

## 2019-08-27 PROBLEM — M25.422: Status: ACTIVE | Noted: 2019-08-27

## 2019-09-12 ENCOUNTER — HOSPITAL ENCOUNTER (OUTPATIENT)
Dept: RADIOLOGY | Facility: HOSPITAL | Age: 69
Discharge: HOME | End: 2019-09-12
Attending: SURGERY
Payer: MEDICARE

## 2019-09-12 DIAGNOSIS — E11.22 TYPE 2 DIABETES MELLITUS WITH CHRONIC KIDNEY DISEASE ON CHRONIC DIALYSIS, WITHOUT LONG-TERM CURRENT USE OF INSULIN: ICD-10-CM

## 2019-09-12 DIAGNOSIS — M25.422 SWELLING OF JOINT OF UPPER ARM, LEFT: ICD-10-CM

## 2019-09-12 DIAGNOSIS — I95.9 HYPOTENSION, UNSPECIFIED HYPOTENSION TYPE: ICD-10-CM

## 2019-09-12 DIAGNOSIS — R53.81 PHYSICAL DECONDITIONING: ICD-10-CM

## 2019-09-12 DIAGNOSIS — S40.022A TRAUMATIC HEMATOMA OF LEFT UPPER ARM, INITIAL ENCOUNTER: ICD-10-CM

## 2019-09-12 DIAGNOSIS — N18.6 TYPE 2 DIABETES MELLITUS WITH CHRONIC KIDNEY DISEASE ON CHRONIC DIALYSIS, WITHOUT LONG-TERM CURRENT USE OF INSULIN: ICD-10-CM

## 2019-09-12 DIAGNOSIS — N18.6 END-STAGE RENAL DISEASE ON HEMODIALYSIS: ICD-10-CM

## 2019-09-12 DIAGNOSIS — Z99.2 TYPE 2 DIABETES MELLITUS WITH CHRONIC KIDNEY DISEASE ON CHRONIC DIALYSIS, WITHOUT LONG-TERM CURRENT USE OF INSULIN: ICD-10-CM

## 2019-09-12 DIAGNOSIS — Z99.2 END-STAGE RENAL DISEASE ON HEMODIALYSIS: ICD-10-CM

## 2019-09-12 PROCEDURE — 93990 US HEMODIALYSIS ACCESS: ICD-10-PCS | Mod: 26,,, | Performed by: RADIOLOGY

## 2019-09-12 PROCEDURE — 93990 DOPPLER FLOW TESTING: CPT | Mod: TC

## 2019-09-12 PROCEDURE — 93990 DOPPLER FLOW TESTING: CPT | Mod: 26,,, | Performed by: RADIOLOGY

## 2019-10-14 ENCOUNTER — TELEPHONE (OUTPATIENT)
Dept: FAMILY MEDICINE | Facility: CLINIC | Age: 69
End: 2019-10-14

## 2019-10-14 NOTE — TELEPHONE ENCOUNTER
----- Message from Isabel Simeon sent at 10/14/2019  2:46 PM CDT -----  Contact: self, [phone]  Patient has an appointment scheduled tomorrow at 2:20 pm but requests to be seen at around 1 pm if possible.

## 2019-10-28 ENCOUNTER — TELEPHONE (OUTPATIENT)
Dept: FAMILY MEDICINE | Facility: CLINIC | Age: 69
End: 2019-10-28

## 2019-10-28 DIAGNOSIS — Z86.711 HISTORY OF PULMONARY EMBOLISM: Chronic | ICD-10-CM

## 2019-10-28 NOTE — TELEPHONE ENCOUNTER
----- Message from [employer] sent at 10/28/2019 10:55 AM CDT -----  Contact: /Karyna  [phone]   Type:  Sooner Apoointment Request    Caller is requesting a sooner appointment.  Caller declined first available appointment listed below.  Caller will not accept being placed on the waitlist and is requesting a message be sent to doctor.  Name of Caller:patient sister  When is the first available appointment? 12-3-2019  Symptoms:f/u  Would the patient rather a call back or a response via [facility]? callback  Best Call Back Number:[phone]  Additional Information: none

## 2019-11-14 ENCOUNTER — OFFICE VISIT (OUTPATIENT)
Dept: FAMILY MEDICINE | Facility: CLINIC | Age: 69
End: 2019-11-14
Payer: MEDICARE

## 2019-11-14 VITALS
OXYGEN SATURATION: 98 % | BODY MASS INDEX: 22.49 KG/M2 | HEIGHT: 64 IN | DIASTOLIC BLOOD PRESSURE: 62 MMHG | WEIGHT: 131.75 LBS | HEART RATE: 104 BPM | SYSTOLIC BLOOD PRESSURE: 134 MMHG

## 2019-11-14 DIAGNOSIS — E78.5 HYPERLIPIDEMIA ASSOCIATED WITH TYPE 2 DIABETES MELLITUS: Chronic | ICD-10-CM

## 2019-11-14 DIAGNOSIS — Z86.711 HISTORY OF PULMONARY EMBOLISM: Chronic | ICD-10-CM

## 2019-11-14 DIAGNOSIS — G89.29 CHRONIC LOW BACK PAIN, UNSPECIFIED BACK PAIN LATERALITY, UNSPECIFIED WHETHER SCIATICA PRESENT: Chronic | ICD-10-CM

## 2019-11-14 DIAGNOSIS — Z99.2 END-STAGE RENAL DISEASE ON HEMODIALYSIS: Chronic | ICD-10-CM

## 2019-11-14 DIAGNOSIS — E04.9 GOITER: ICD-10-CM

## 2019-11-14 DIAGNOSIS — N18.6 END-STAGE RENAL DISEASE ON HEMODIALYSIS: Chronic | ICD-10-CM

## 2019-11-14 DIAGNOSIS — Z86.79 HISTORY OF HYPERTENSION: Chronic | ICD-10-CM

## 2019-11-14 DIAGNOSIS — M54.50 CHRONIC LOW BACK PAIN, UNSPECIFIED BACK PAIN LATERALITY, UNSPECIFIED WHETHER SCIATICA PRESENT: Chronic | ICD-10-CM

## 2019-11-14 DIAGNOSIS — E11.22 TYPE 2 DIABETES MELLITUS WITH CHRONIC KIDNEY DISEASE ON CHRONIC DIALYSIS, WITHOUT LONG-TERM CURRENT USE OF INSULIN: Chronic | ICD-10-CM

## 2019-11-14 DIAGNOSIS — N18.6 TYPE 2 DIABETES MELLITUS WITH CHRONIC KIDNEY DISEASE ON CHRONIC DIALYSIS, WITHOUT LONG-TERM CURRENT USE OF INSULIN: Chronic | ICD-10-CM

## 2019-11-14 DIAGNOSIS — Z86.73 HISTORY OF EMBOLIC STROKE: Primary | Chronic | ICD-10-CM

## 2019-11-14 DIAGNOSIS — Z99.2 TYPE 2 DIABETES MELLITUS WITH CHRONIC KIDNEY DISEASE ON CHRONIC DIALYSIS, WITHOUT LONG-TERM CURRENT USE OF INSULIN: Chronic | ICD-10-CM

## 2019-11-14 DIAGNOSIS — Z79.01 ANTICOAGULANT LONG-TERM USE: ICD-10-CM

## 2019-11-14 DIAGNOSIS — E11.69 HYPERLIPIDEMIA ASSOCIATED WITH TYPE 2 DIABETES MELLITUS: Chronic | ICD-10-CM

## 2019-11-14 DIAGNOSIS — I50.32 CHRONIC DIASTOLIC HEART FAILURE: Chronic | ICD-10-CM

## 2019-11-14 PROBLEM — I95.9 HYPOTENSION: Status: RESOLVED | Noted: 2019-06-25 | Resolved: 2019-11-14

## 2019-11-14 PROCEDURE — 99214 PR OFFICE/OUTPT VISIT, EST, LEVL IV, 30-39 MIN: ICD-10-PCS | Mod: 25,S$GLB,, | Performed by: FAMILY MEDICINE

## 2019-11-14 PROCEDURE — 3044F PR MOST RECENT HEMOGLOBIN A1C LEVEL <7.0%: ICD-10-PCS | Mod: CPTII,S$GLB,, | Performed by: FAMILY MEDICINE

## 2019-11-14 PROCEDURE — 3044F HG A1C LEVEL LT 7.0%: CPT | Mod: CPTII,S$GLB,, | Performed by: FAMILY MEDICINE

## 2019-11-14 PROCEDURE — 1101F PR PT FALLS ASSESS DOC 0-1 FALLS W/OUT INJ PAST YR: ICD-10-PCS | Mod: CPTII,S$GLB,, | Performed by: FAMILY MEDICINE

## 2019-11-14 PROCEDURE — G0008 FLU VACCINE - HIGH DOSE (65+) PRESERVATIVE FREE IM: ICD-10-PCS | Mod: S$GLB,,, | Performed by: FAMILY MEDICINE

## 2019-11-14 PROCEDURE — 90662 IIV NO PRSV INCREASED AG IM: CPT | Mod: S$GLB,,, | Performed by: FAMILY MEDICINE

## 2019-11-14 PROCEDURE — G0008 ADMIN INFLUENZA VIRUS VAC: HCPCS | Mod: S$GLB,,, | Performed by: FAMILY MEDICINE

## 2019-11-14 PROCEDURE — 1101F PT FALLS ASSESS-DOCD LE1/YR: CPT | Mod: CPTII,S$GLB,, | Performed by: FAMILY MEDICINE

## 2019-11-14 PROCEDURE — 99214 OFFICE O/P EST MOD 30 MIN: CPT | Mod: 25,S$GLB,, | Performed by: FAMILY MEDICINE

## 2019-11-14 PROCEDURE — 90662 FLU VACCINE - HIGH DOSE (65+) PRESERVATIVE FREE IM: ICD-10-PCS | Mod: S$GLB,,, | Performed by: FAMILY MEDICINE

## 2019-11-14 NOTE — PROGRESS NOTES
"Subjective:      Patient ID: Brittaney Joseph is a 69 y.o. female.    Chief Complaint: Follow-up      Vitals:    11/14/19 1540   BP: 134/62   Pulse: 104   SpO2: 98%   Weight: 59.8 kg (131 lb 11.6 oz)   Height: 5' 4" (1.626 m)        HPI   Follow up from 3 months ago for CVA and foot pain; not using wheelchair anymore; going to dialysis 3 times a weeks with Dr Edgar, using left arm; feet better, back still hurts. Walking unaided, no falls.  Neck hurts and throat hurts; hx of going in neck crushing the blood clog?         Review of Systems   Constitutional: Negative.    HENT: Negative.    Respiratory: Negative.    Cardiovascular: Negative.    Gastrointestinal: Negative.    Endocrine: Negative.    Genitourinary: Negative.    Musculoskeletal: Positive for neck pain.        Bilat neck pain, no trauma, for 2 days   Psychiatric/Behavioral: Negative.    All other systems reviewed and are negative.    Objective:     Physical Exam   Constitutional: She is oriented to person, place, and time. She appears well-developed and well-nourished.   HENT:   Head: Normocephalic.   Eyes: Pupils are equal, round, and reactive to light. Conjunctivae and EOM are normal.   Neck: Normal range of motion. Neck supple. Thyromegaly present.   Cardiovascular: Normal rate and regular rhythm.   Murmur heard.  Pulmonary/Chest: Effort normal and breath sounds normal.   Musculoskeletal: Normal range of motion.   Neurological: She is alert and oriented to person, place, and time. She has normal reflexes.   Skin: Skin is warm and dry.   Psychiatric: She has a normal mood and affect. Her behavior is normal. Judgment and thought content normal.   Nursing note and vitals reviewed.    Assessment:     1. History of embolic stroke    2. Hyperlipidemia associated with type 2 diabetes mellitus    3. History of hypertension    4. Chronic diastolic heart failure    5. End-stage renal disease on hemodialysis    6. Anticoagulant long-term use    7. History of " pulmonary embolism    8. Type 2 diabetes mellitus with chronic kidney disease on chronic dialysis, without long-term current use of insulin    9. Chronic low back pain, unspecified back pain laterality, unspecified whether sciatica present    10. Goiter      Plan:        Medication List           Accurate as of November 14, 2019 11:59 PM. If you have any questions, ask your nurse or doctor.               CONTINUE taking these medications    apixaban 2.5 mg Tab  Commonly known as:  ELIQUIS  Take 1 tablet (2.5 mg total) by mouth 2 (two) times daily.     aspirin 81 MG Chew  Take 1 tablet (81 mg total) by mouth once daily.     atorvastatin 40 MG tablet  Commonly known as:  LIPITOR     clotrimazole-betamethasone 1-0.05% cream  Commonly known as:  LOTRISONE     docusate sodium 100 MG capsule  Commonly known as:  Colace  Take 1 capsule (100 mg total) by mouth 2 (two) times daily.     gabapentin 300 MG capsule  Commonly known as:  NEURONTIN  Take 1 capsule (300 mg total) by mouth 2 (two) times daily. For feet pain     midodrine 5 MG Tab  Commonly known as:  PROAMATINE  Take 1 tablet (5 mg total) by mouth 3 (three) times daily with meals.     nitroGLYCERIN 0.4 MG SL tablet  Commonly known as:  NITROSTAT  Place 1 tablet (0.4 mg total) under the tongue every 5 (five) minutes as needed for Chest pain. if second dose needed call 911     oxyCODONE-acetaminophen  mg per tablet  Commonly known as:  PERCOCET     pantoprazole 40 MG tablet  Commonly known as:  PROTONIX          History of embolic stroke    Hyperlipidemia associated with type 2 diabetes mellitus    History of hypertension    Chronic diastolic heart failure    End-stage renal disease on hemodialysis    Anticoagulant long-term use    History of pulmonary embolism    Type 2 diabetes mellitus with chronic kidney disease on chronic dialysis, without long-term current use of insulin  -     Hemoglobin A1c; Future    Chronic low back pain, unspecified back pain  laterality, unspecified whether sciatica present    Goiter  -     TSH; Future  -     US Soft Tissue Head Neck Thyroid; Future; Expected date: 11/14/2019    Other orders  -     Influenza - High Dose (65+) (PF) (IM)  -     Cancel: Pneumococcal Polysaccharide Vaccine (23 Valent) (SQ/IM)    get a1c, tsh and u/s thyroid

## 2019-11-19 ENCOUNTER — HOSPITAL ENCOUNTER (OUTPATIENT)
Dept: RADIOLOGY | Facility: HOSPITAL | Age: 69
Discharge: HOME | End: 2019-11-19
Attending: FAMILY MEDICINE
Payer: MEDICARE

## 2019-11-19 DIAGNOSIS — E04.9 GOITER: ICD-10-CM

## 2019-11-19 PROBLEM — Z45.2 ENCOUNTER FOR REMOVAL OF VASCULAR CATHETER: Status: ACTIVE | Noted: 2019-11-19

## 2019-11-19 PROBLEM — Z98.890 S/P ARTERIOVENOUS (AV) FISTULA CREATION: Status: ACTIVE | Noted: 2019-11-19

## 2019-11-19 PROCEDURE — 76536 US EXAM OF HEAD AND NECK: CPT | Mod: TC

## 2019-11-19 PROCEDURE — 76536 US EXAM OF HEAD AND NECK: CPT | Mod: 26,,, | Performed by: RADIOLOGY

## 2019-11-19 PROCEDURE — 76536 US SOFT TISSUE HEAD NECK THYROID: ICD-10-PCS | Mod: 26,,, | Performed by: RADIOLOGY

## 2019-11-21 ENCOUNTER — TELEPHONE (OUTPATIENT)
Dept: FAMILY MEDICINE | Facility: CLINIC | Age: 69
End: 2019-11-21

## 2019-11-21 DIAGNOSIS — E04.9 GOITER: Primary | ICD-10-CM

## 2019-11-25 ENCOUNTER — TELEPHONE (OUTPATIENT)
Dept: FAMILY MEDICINE | Facility: CLINIC | Age: 69
End: 2019-11-25

## 2019-11-25 NOTE — TELEPHONE ENCOUNTER
----- Message from Saravanan Heath sent at 11/25/2019 12:57 PM CST -----  Contact: jean/Wanda [phone]  Patient jean requesting to speak with you concerning the patient upcoming surgery  Please call back to assist at [phone]

## 2019-12-03 ENCOUNTER — ANESTHESIA (OUTPATIENT)
Dept: SURGERY | Facility: HOSPITAL | Age: 69
End: 2019-12-03
Payer: MEDICARE

## 2019-12-03 ENCOUNTER — ANESTHESIA EVENT (OUTPATIENT)
Dept: SURGERY | Facility: HOSPITAL | Age: 69
End: 2019-12-03
Payer: MEDICARE

## 2019-12-03 ENCOUNTER — HOSPITAL ENCOUNTER (OUTPATIENT)
Facility: HOSPITAL | Age: 69
Discharge: HOME | End: 2019-12-03
Attending: SURGERY | Admitting: SURGERY
Payer: MEDICARE

## 2019-12-03 VITALS
RESPIRATION RATE: 17 BRPM | HEIGHT: 64 IN | BODY MASS INDEX: 23.05 KG/M2 | HEART RATE: 74 BPM | SYSTOLIC BLOOD PRESSURE: 138 MMHG | OXYGEN SATURATION: 100 % | TEMPERATURE: 98 F | WEIGHT: 135 LBS | DIASTOLIC BLOOD PRESSURE: 67 MMHG

## 2019-12-03 DIAGNOSIS — Z98.890 S/P ARTERIOVENOUS (AV) FISTULA CREATION: ICD-10-CM

## 2019-12-03 DIAGNOSIS — I50.32 CHRONIC DIASTOLIC HEART FAILURE: Chronic | ICD-10-CM

## 2019-12-03 DIAGNOSIS — Z99.2 END-STAGE RENAL DISEASE ON HEMODIALYSIS: Chronic | ICD-10-CM

## 2019-12-03 DIAGNOSIS — N18.6 END-STAGE RENAL DISEASE ON HEMODIALYSIS: Chronic | ICD-10-CM

## 2019-12-03 DIAGNOSIS — Z45.2 ENCOUNTER FOR REMOVAL OF VASCULAR CATHETER: ICD-10-CM

## 2019-12-03 DIAGNOSIS — Z99.2 TYPE 2 DIABETES MELLITUS WITH CHRONIC KIDNEY DISEASE ON CHRONIC DIALYSIS, WITHOUT LONG-TERM CURRENT USE OF INSULIN: Primary | Chronic | ICD-10-CM

## 2019-12-03 DIAGNOSIS — E11.22 TYPE 2 DIABETES MELLITUS WITH CHRONIC KIDNEY DISEASE ON CHRONIC DIALYSIS, WITHOUT LONG-TERM CURRENT USE OF INSULIN: Primary | Chronic | ICD-10-CM

## 2019-12-03 DIAGNOSIS — N18.6 TYPE 2 DIABETES MELLITUS WITH CHRONIC KIDNEY DISEASE ON CHRONIC DIALYSIS, WITHOUT LONG-TERM CURRENT USE OF INSULIN: Primary | Chronic | ICD-10-CM

## 2019-12-03 DIAGNOSIS — M25.422 SWELLING OF JOINT OF UPPER ARM, LEFT: ICD-10-CM

## 2019-12-03 DIAGNOSIS — Z79.01 ANTICOAGULANT LONG-TERM USE: ICD-10-CM

## 2019-12-03 DIAGNOSIS — Z86.73 HISTORY OF EMBOLIC STROKE: Chronic | ICD-10-CM

## 2019-12-03 PROBLEM — T85.698A MALFUNCTION OF DEVICE: Status: ACTIVE | Noted: 2019-12-03

## 2019-12-03 LAB
ANION GAP SERPL CALC-SCNC: 15 MMOL/L (ref 8–16)
BUN SERPL-MCNC: 38 MG/DL (ref 8–23)
CALCIUM SERPL-MCNC: 10.2 MG/DL (ref 8.7–10.5)
CHLORIDE SERPL-SCNC: 100 MMOL/L (ref 95–110)
CO2 SERPL-SCNC: 28 MMOL/L (ref 23–29)
CREAT SERPL-MCNC: 6.7 MG/DL (ref 0.5–1.4)
EST. GFR  (AFRICAN AMERICAN): 7 ML/MIN/1.73 M^2
EST. GFR  (NON AFRICAN AMERICAN): 6 ML/MIN/1.73 M^2
GLUCOSE SERPL-MCNC: 124 MG/DL (ref 70–110)
POTASSIUM SERPL-SCNC: 5.1 MMOL/L (ref 3.5–5.1)
SODIUM SERPL-SCNC: 143 MMOL/L (ref 136–145)

## 2019-12-03 PROCEDURE — 25000003 PHARM REV CODE 250: Performed by: SURGERY

## 2019-12-03 PROCEDURE — 36000707: Performed by: SURGERY

## 2019-12-03 PROCEDURE — 37000009 HC ANESTHESIA EA ADD 15 MINS: Performed by: SURGERY

## 2019-12-03 PROCEDURE — 63600175 PHARM REV CODE 636 W HCPCS: Performed by: STUDENT IN AN ORGANIZED HEALTH CARE EDUCATION/TRAINING PROGRAM

## 2019-12-03 PROCEDURE — 36000706: Performed by: SURGERY

## 2019-12-03 PROCEDURE — 71000015 HC POSTOP RECOV 1ST HR: Performed by: SURGERY

## 2019-12-03 PROCEDURE — 80048 BASIC METABOLIC PNL TOTAL CA: CPT

## 2019-12-03 PROCEDURE — 37000008 HC ANESTHESIA 1ST 15 MINUTES: Performed by: SURGERY

## 2019-12-03 PROCEDURE — 36415 COLL VENOUS BLD VENIPUNCTURE: CPT

## 2019-12-03 PROCEDURE — 00400 ANES INTEGUMENTARY SYS NOS: CPT | Performed by: SURGERY

## 2019-12-03 RX ORDER — FENTANYL CITRATE 50 UG/ML
INJECTION, SOLUTION INTRAMUSCULAR; INTRAVENOUS
Status: DISCONTINUED | OUTPATIENT
Start: 2019-12-03 | End: 2019-12-03

## 2019-12-03 RX ORDER — BACITRACIN 50000 [IU]/1
INJECTION, POWDER, FOR SOLUTION INTRAMUSCULAR
Status: DISCONTINUED | OUTPATIENT
Start: 2019-12-03 | End: 2019-12-03 | Stop reason: HOSPADM

## 2019-12-03 RX ORDER — MIDAZOLAM HYDROCHLORIDE 1 MG/ML
INJECTION, SOLUTION INTRAMUSCULAR; INTRAVENOUS
Status: DISCONTINUED | OUTPATIENT
Start: 2019-12-03 | End: 2019-12-03

## 2019-12-03 RX ORDER — CEFAZOLIN SODIUM 2 G/50ML
2 SOLUTION INTRAVENOUS
Status: DISCONTINUED | OUTPATIENT
Start: 2019-12-03 | End: 2019-12-03 | Stop reason: HOSPADM

## 2019-12-03 RX ORDER — SODIUM CHLORIDE, SODIUM LACTATE, POTASSIUM CHLORIDE, CALCIUM CHLORIDE 600; 310; 30; 20 MG/100ML; MG/100ML; MG/100ML; MG/100ML
INJECTION, SOLUTION INTRAVENOUS CONTINUOUS PRN
Status: DISCONTINUED | OUTPATIENT
Start: 2019-12-03 | End: 2019-12-03

## 2019-12-03 RX ORDER — ACETAMINOPHEN 325 MG/1
325 TABLET ORAL EVERY 4 HOURS PRN
Qty: 10 TABLET | Refills: 0 | Status: SHIPPED | OUTPATIENT
Start: 2019-12-03

## 2019-12-03 RX ORDER — LIDOCAINE HYDROCHLORIDE 10 MG/ML
INJECTION, SOLUTION EPIDURAL; INFILTRATION; INTRACAUDAL; PERINEURAL
Status: DISCONTINUED | OUTPATIENT
Start: 2019-12-03 | End: 2019-12-03 | Stop reason: HOSPADM

## 2019-12-03 RX ORDER — HYDROCODONE BITARTRATE AND ACETAMINOPHEN 5; 325 MG/1; MG/1
1 TABLET ORAL EVERY 4 HOURS PRN
Status: CANCELLED | OUTPATIENT
Start: 2019-12-03

## 2019-12-03 RX ORDER — SODIUM CHLORIDE 9 MG/ML
INJECTION, SOLUTION INTRAVENOUS CONTINUOUS
Status: CANCELLED | OUTPATIENT
Start: 2019-12-03

## 2019-12-03 RX ORDER — ACETAMINOPHEN 325 MG/1
650 TABLET ORAL EVERY 4 HOURS PRN
Status: CANCELLED | OUTPATIENT
Start: 2019-12-03

## 2019-12-03 RX ORDER — PROPOFOL 10 MG/ML
VIAL (ML) INTRAVENOUS
Status: DISCONTINUED | OUTPATIENT
Start: 2019-12-03 | End: 2019-12-03

## 2019-12-03 RX ORDER — MUPIROCIN 20 MG/G
OINTMENT TOPICAL
Status: DISCONTINUED | OUTPATIENT
Start: 2019-12-03 | End: 2019-12-03 | Stop reason: HOSPADM

## 2019-12-03 RX ADMIN — SODIUM CHLORIDE, SODIUM LACTATE, POTASSIUM CHLORIDE, AND CALCIUM CHLORIDE: .6; .31; .03; .02 INJECTION, SOLUTION INTRAVENOUS at 10:12

## 2019-12-03 RX ADMIN — MIDAZOLAM 2 MG: 1 INJECTION INTRAMUSCULAR; INTRAVENOUS at 10:12

## 2019-12-03 RX ADMIN — PROPOFOL 30 MG: 10 INJECTION, EMULSION INTRAVENOUS at 10:12

## 2019-12-03 RX ADMIN — MUPIROCIN: 20 OINTMENT TOPICAL at 09:12

## 2019-12-03 RX ADMIN — FENTANYL CITRATE 100 MCG: 50 INJECTION, SOLUTION INTRAMUSCULAR; INTRAVENOUS at 10:12

## 2019-12-03 NOTE — TRANSFER OF CARE
"Anesthesia Transfer of Care Note    Patient: Brittaney Joseph    Procedure(s) Performed: Procedure(s) (LRB):  REMOVAL-CATHETER (Right)    Patient location: Other: PREop    Anesthesia Type: MAC    Transport from OR: Transported from OR on room air with adequate spontaneous ventilation    Post pain: adequate analgesia    Post assessment: no apparent anesthetic complications    Post vital signs: stable    Level of consciousness: awake, alert and oriented    Nausea/Vomiting: no nausea/vomiting    Complications: none    Transfer of care protocol was followed      Last vitals:   Visit Vitals  /63 (BP Location: Right arm, Patient Position: Lying)   Pulse 92   Temp 36.5 °C (97.7 °F) (Skin)   Resp 16   Ht 5' 4" (1.626 m)   Wt 61.2 kg (135 lb)   LMP  (LMP Unknown)   SpO2 100%   Breastfeeding? No   BMI 23.17 kg/m²     "

## 2019-12-03 NOTE — OP NOTE
Operative Note       Surgery Date: 12/3/2019     Surgeon(s) and Role:     * Jean Pierre Villarreal MD - Primary    Pre-op Diagnosis:  S/P arteriovenous (AV) fistula creation [Z98.890]  End-stage renal disease on hemodialysis [N18.6, Z99.2]  Encounter for removal of vascular catheter [Z45.2]  History of embolic stroke [Z86.73]    Post-op Diagnosis: Post-Op Diagnosis Codes:     * S/P arteriovenous (AV) fistula creation [Z98.890]     * End-stage renal disease on hemodialysis [N18.6, Z99.2]     * Encounter for removal of vascular catheter [Z45.2]     * History of embolic stroke [Z86.73]    Procedure(s) (LRB):  REMOVAL-CATHETER (Right)    Anesthesia: Local MAC    Procedure in Detail/Findings:  Operative Note       Surgery Date: 12/3/2019     Surgeon(s) and Role:     * Jean Pierre Villarreal MD - Primary    Pre-op Diagnosis:  S/P arteriovenous (AV) fistula creation [Z98.890]  End-stage renal disease on hemodialysis [N18.6, Z99.2]  Encounter for removal of vascular catheter [Z45.2]  History of embolic stroke [Z86.73]    Post-op Diagnosis: Post-Op Diagnosis Codes:     * S/P arteriovenous (AV) fistula creation [Z98.890]     * End-stage renal disease on hemodialysis [N18.6, Z99.2]     * Encounter for removal of vascular catheter [Z45.2]     * History of embolic stroke [Z86.73]    Procedure(s) (LRB):  REMOVAL-CATHETER (Right)    Anesthesia: Local MAC    Procedure in Detail/Findings:    The patient was positioned, the right side of neck and   chest prepped and draped in normal sterile manner using ChloraPrep.  Timeout was   called.  The patient was properly recognized.  The area was then infiltrated   using 1% Xylocaine solution.  Incision was made in the same area, taken down to   deep subcutaneous tissue.  Subcutaneous bleeders clamped and bovied and further   taken down.  Cuff portion was then  from deep subcutaneous tissue and   was retrieved both proximally and distally.  Vein was ligated proximally using    interrupted 3-0 Vicryl, subcutaneous tissue with 3-0 Vicryl, skin was closed   using running 4-0 nylon suture.  Sterile gauze dressing was applied.  Instrument   count, sponge count and needle count correct.  The patient tolerated it well.    Estimated blood loss was 5 mL.  Specimen removed was catheter, submitted for   gross ID only.  Postoperative diagnosis is malfunctioning Perm-A-Cath, status   post PD catheter.    Estimated Blood Loss:10 cc        Specimens (From admission, onward)    None        Implants: * No implants in log *           Disposition: PACU - hemodynamically stable.           Condition: Good    Attestation:  I performed the procedure.           Discharge Note    Admit Date: 12/3/2019    Attending Physician: Jean Pierre Villarreal MD     Discharge Physician: Jean Pierre Villarreal MD    Final Diagnosis: Post-Op Diagnosis Codes:     * S/P arteriovenous (AV) fistula creation [Z98.890]     * End-stage renal disease on hemodialysis [N18.6, Z99.2]     * Encounter for removal of vascular catheter [Z45.2]     * History of embolic stroke [Z86.73]    Disposition: Home or Self Care    Patient Instructions:   Current Discharge Medication List      START taking these medications    Details   acetaminophen (TYLENOL) 325 MG tablet Take 1 tablet (325 mg total) by mouth every 4 (four) hours as needed for Pain.  Qty: 10 tablet, Refills: 0         CONTINUE these medications which have NOT CHANGED    Details   apixaban (ELIQUIS) 2.5 mg Tab Take 1 tablet (2.5 mg total) by mouth 2 (two) times daily.  Qty: 60 tablet, Refills: 11    Associated Diagnoses: History of pulmonary embolism      atorvastatin (LIPITOR) 40 MG tablet Take 40 mg by mouth once daily.      clotrimazole-betamethasone 1-0.05% (LOTRISONE) cream APPLY TO CLEAN DRY SKIN TWO TIMES DAILY  Refills: 0      docusate sodium (COLACE) 100 MG capsule Take 1 capsule (100 mg total) by mouth 2 (two) times daily.  Qty: 180 capsule, Refills: 3       oxyCODONE-acetaminophen (PERCOCET)  mg per tablet Take 1 tablet by mouth every 12 (twelve) hours as needed.  Refills: 0      pantoprazole (PROTONIX) 40 MG tablet       aspirin 81 MG Chew Take 1 tablet (81 mg total) by mouth once daily.  Refills: 0      gabapentin (NEURONTIN) 300 MG capsule Take 1 capsule (300 mg total) by mouth 2 (two) times daily. For feet pain  Qty: 60 capsule, Refills: 11      midodrine (PROAMATINE) 5 MG Tab Take 1 tablet (5 mg total) by mouth 3 (three) times daily with meals.  Qty: 90 tablet, Refills: 11    Associated Diagnoses: Hypotension      nitroGLYCERIN (NITROSTAT) 0.4 MG SL tablet Place 1 tablet (0.4 mg total) under the tongue every 5 (five) minutes as needed for Chest pain. if second dose needed call 911  Qty: 25 tablet, Refills: 1             Discharge Procedure Orders (renal diet , keep dressing dry  office one week,    Discharge Procedure Orders (must include Diet, Follow-up, Activity)   Diet general     Keep surgical extremity elevated     Lifting restrictions     Call MD for:  temperature >100.4     Call MD for:  persistent nausea and vomiting     Call MD for:  severe uncontrolled pain     Call MD for:  redness, tenderness, or signs of infection (pain, swelling, redness, odor or green/yellow discharge around incision site)     Leave dressing on - Keep it clean, dry, and intact until clinic visit        Discharge Date: 12/3/2019    Estimated Blood Loss: 10 cc         Specimens (From admission, onward)    None        Implants: * No implants in log *           Disposition: PACU - hemodynamically stable.           Condition: Good    Attestation:  I performed the procedure.           Discharge Note    Admit Date: 12/3/2019    Attending Physician: Jean Pierre Villarreal MD     Discharge Physician: Jean Pierre Villarreal MD    Final Diagnosis: Post-Op Diagnosis Codes:     * S/P arteriovenous (AV) fistula creation [Z98.890]     * End-stage renal disease on hemodialysis [N18.6, Z99.2]      * Encounter for removal of vascular catheter [Z45.2]     * History of embolic stroke [Z86.73]    Disposition: Home or Self Care    Patient Instructions:   Current Discharge Medication List      START taking these medications    Details   acetaminophen (TYLENOL) 325 MG tablet Take 1 tablet (325 mg total) by mouth every 4 (four) hours as needed for Pain.  Qty: 10 tablet, Refills: 0         CONTINUE these medications which have NOT CHANGED    Details   apixaban (ELIQUIS) 2.5 mg Tab Take 1 tablet (2.5 mg total) by mouth 2 (two) times daily.  Qty: 60 tablet, Refills: 11    Associated Diagnoses: History of pulmonary embolism      atorvastatin (LIPITOR) 40 MG tablet Take 40 mg by mouth once daily.      clotrimazole-betamethasone 1-0.05% (LOTRISONE) cream APPLY TO CLEAN DRY SKIN TWO TIMES DAILY  Refills: 0      docusate sodium (COLACE) 100 MG capsule Take 1 capsule (100 mg total) by mouth 2 (two) times daily.  Qty: 180 capsule, Refills: 3      oxyCODONE-acetaminophen (PERCOCET)  mg per tablet Take 1 tablet by mouth every 12 (twelve) hours as needed.  Refills: 0      pantoprazole (PROTONIX) 40 MG tablet       aspirin 81 MG Chew Take 1 tablet (81 mg total) by mouth once daily.  Refills: 0      gabapentin (NEURONTIN) 300 MG capsule Take 1 capsule (300 mg total) by mouth 2 (two) times daily. For feet pain  Qty: 60 capsule, Refills: 11      midodrine (PROAMATINE) 5 MG Tab Take 1 tablet (5 mg total) by mouth 3 (three) times daily with meals.  Qty: 90 tablet, Refills: 11    Associated Diagnoses: Hypotension      nitroGLYCERIN (NITROSTAT) 0.4 MG SL tablet Place 1 tablet (0.4 mg total) under the tongue every 5 (five) minutes as needed for Chest pain. if second dose needed call 911  Qty: 25 tablet, Refills: 1             Discharge Procedure Orders (renal diet , no heavy lifting , keep dressing dry rtc office one week.   Discharge Procedure Orders (must include Diet, Follow-up, Activity)   Diet general     Keep surgical  extremity elevated     Lifting restrictions     Call MD for:  temperature >100.4     Call MD for:  persistent nausea and vomiting     Call MD for:  severe uncontrolled pain     Call MD for:  redness, tenderness, or signs of infection (pain, swelling, redness, odor or green/yellow discharge around incision site)     Leave dressing on - Keep it clean, dry, and intact until clinic visit        Discharge Date: 12/3/2019.

## 2019-12-03 NOTE — H&P
"Chief Complaint:        Chief Complaint   Patient presents with    Hemodialysis Access       2 wk F/u left arm graft left upper arm, MWF; c/o swelling; pt here to [facility] sx for chest port removal         Subjective:     Brittaney Joseph is a 69 y.o.female who is following up for left upper arm graft.  No issues in dialysis. No tenderness.      Denies F/chills/      Review of Systems     Review of Systems   Constitutional: Negative for chills, fever and weight loss.   Eyes: Negative for blurred vision.   Respiratory: Negative for cough, hemoptysis, sputum production and shortness of breath.    Cardiovascular: Negative for chest pain, palpitations, orthopnea, claudication and leg swelling.   Gastrointestinal: Negative for abdominal pain, blood in stool, constipation, diarrhea, heartburn, melena, nausea and vomiting.   Genitourinary: Negative for dysuria, hematuria and urgency.   Skin: Negative for itching and rash.   Neurological: Negative for dizziness, tingling, weakness and headaches.   Psychiatric/Behavioral: Negative for depression, substance abuse and suicidal ideas. The patient is not nervous/anxious and does not have insomnia.          Past medical, past surgical, social, and family history reviewed.     Objective:     /79   Pulse 80   Resp 17   Ht 5' 4" (1.626 m)   Wt 59.4 kg (131 lb)   LMP  (LMP Unknown)   BMI 22.49 kg/m²      Physical Exam:  Physical Exam   Constitutional: She is oriented to person, place, and time and well-developed, well-nourished, and in no distress. No distress.   HENT:   Head: Normocephalic and atraumatic.   Cardiovascular: Normal rate, regular rhythm and intact distal pulses. Exam reveals no gallop and no friction rub.   No murmur heard.  Left A-V fistula intact. Bruit auscultated. No tenderness to palpation. Scars healing well. No erythema or discharge. Some edema. Radial pulse 2+.    Pulmonary/Chest: Effort normal and breath sounds normal. No respiratory distress. She has " no wheezes. She has no rales. She exhibits no tenderness.   Right anterior chest port site clean and dry. No erythema, edema or discharge.    Abdominal: Soft. Bowel sounds are normal. She exhibits no distension. There is no tenderness.   Neurological: She is alert and oriented to person, place, and time.   Skin: Skin is warm and dry. She is not diaphoretic. No erythema.   Psychiatric: Mood, memory, affect and judgment normal.   Nursing note and vitals reviewed.        Laboratory:     Reviewed labs and imaging.        Assessment Plan     Brittaney BINGHAM was seen today for hemodialysis access.     Diagnoses and all orders for this visit:     S/P arteriovenous (AV) fistula creation     End-stage renal disease on hemodialysis     Encounter for removal of vascular catheter      Continue to use A-V graft for dialysis.   for removal of perm cath today

## 2019-12-03 NOTE — ANESTHESIA POSTPROCEDURE EVALUATION
Anesthesia Post Evaluation    Patient: Brittaney Joseph    Procedure(s) Performed: Procedure(s) (LRB):  REMOVAL-CATHETER (Right)    Final Anesthesia Type: MAC    Patient location during evaluation: [facility]  Patient participation: Yes- Able to Participate  Level of consciousness: awake and alert  Post-procedure vital signs: reviewed and stable  Pain management: adequate  Airway patency: patent    PONV status at discharge: No PONV  Anesthetic complications: no      Cardiovascular status: blood pressure returned to baseline and hemodynamically stable  Respiratory status: unassisted  Hydration status: euvolemic  Follow-up not needed.          Vitals Value Taken Time   /67 12/3/2019 11:40 AM   Temp 36.5 °C (97.7 °F) 12/3/2019 11:40 AM   Pulse 74 12/3/2019 11:40 AM   Resp 17 12/3/2019 11:40 AM   SpO2 100 % 12/3/2019 11:40 AM         No case tracking events are documented in the log.      Pain/Gregory Score: Gregory Score: 10 (12/3/2019 11:40 AM)

## 2019-12-03 NOTE — ANESTHESIA PREPROCEDURE EVALUATION
12/03/2019  Brittaney Joseph is a 69 y.o., female    In January, patient had submassive/massive PE requiring catheter directed thrombolysis. Takes xarelto as an outpatient    Past Medical History:   Diagnosis Date    Anticoagulant long-term use     Arthritis     Chronic kidney disease     Chronic low back pain     Diabetes mellitus, type 2     End-stage renal disease on hemodialysis 3/28/2019    Monday Wednesday Friday at [facility]    Hypertension     Pulmonary embolism with acute cor pulmonale 1/11/2019    Stroke due to embolism 4/4/2019     Past Surgical History:   Procedure Laterality Date    BRACHIAL ARTERY GRAFT Right 06/18/2019    CHOLECYSTECTOMY      HYSTERECTOMY      PLACEMENT OF ARTERIOVENOUS GRAFT Left 6/18/2019    Procedure: INSERTION, GRAFT, ARTERIOVENOUS;  Surgeon: Jean Pierre Villarreal MD;  Location: [facility];  Service: General;  Laterality: Left;         Pre-op Assessment    I have reviewed the Patient Summary Reports.     I have reviewed the Nursing Notes.   I have reviewed the Medications.     Review of Systems  Anesthesia Hx:  No problems with previous Anesthesia Denies Hx of Anesthetic complications  Denies Family Hx of Anesthesia complications.   Denies Personal Hx of Anesthesia complications.   Social:  Non-Smoker, No Alcohol Use    Hematology/Oncology:  Hematology Normal   Oncology Normal     EENT/Dental:EENT/Dental Normal   Cardiovascular:   Exercise tolerance: good Hypertension  Denies Angina.     Chronic diastolic heart failure   Pulmonary:  Pulmonary Normal    Renal/:   Chronic Renal Disease, ESRD    Hepatic/GI:  Hepatic/GI Normal    Musculoskeletal:  Musculoskeletal Normal    Neurological:  Neurology Normal Denies Seizures.  CVA - Cerebrovasular Accident (residual left sided weakness) , Most recent CVA was on 4/2019    Endocrine:   Diabetes, well  controlled, type 2    Dermatological:  Skin Normal    Psych:  Psychiatric Normal           Physical Exam  General:  Well nourished    Airway/Jaw/Neck:  Airway Findings: Mouth Opening: Small, but > 3cm Tongue: Normal  General Airway Assessment: Adult  Mallampati: III      Dental:  Dental Findings: Upper Dentures, Lower Dentures   Chest/Lungs:  Chest/Lungs Findings: Clear to auscultation, Normal Respiratory Rate     Heart/Vascular:  Heart Findings: Rate: Normal  Rhythm: Regular Rhythm  Sounds: Normal  Heart murmur: negative       Mental Status:  Mental Status Findings:  Cooperative, Alert and Oriented         TTE 1/11/19  · Normal left ventricular systolic function. The estimated ejection fraction is 65%  · Grade I (mild) left ventricular diastolic dysfunction consistent with impaired relaxation.  · Concentric left ventricular remodeling.  · Thickened aortic and mitral valve leaflets appear rheumatic  · Severe right ventricular enlargement with Positive Licona's sign - Right heart strain - Consider Pulmonary Embolism  · Normal right ventricular systolic function.      Anesthesia Plan  Type of Anesthesia, risks & benefits discussed:  Anesthesia Type:  regional, MAC  Patient's Preference:   Intra-op Monitoring Plan:   Intra-op Monitoring Plan Comments:   Post Op Pain Control Plan:   Post Op Pain Control Plan Comments:   Induction:   IV  Beta Blocker:         Informed Consent: Patient understands risks and agrees with Anesthesia plan.  Questions answered.   ASA Score: 3     Day of Surgery Review of History & Physical:        Anesthesia Plan Notes: Anesthesia consent to be signed prior to procedure on 6/18/19          Ready For Surgery From Anesthesia Perspective.

## 2019-12-03 NOTE — DISCHARGE INSTRUCTIONS
ANESTHESIA  -For the first 24 hours after surgery:  Do not drive, use heavy equipment, make important decisions, or drink alcohol  -It is normal to feel sleepy for several hours.  Rest until you are more awake.  -Have someone stay with you, if needed.  They can watch for problems and help keep you safe.  -Some possible post anesthesia side effects include: nausea and vomiting, sore throat and hoarseness, sleepiness, and dizziness.    PAIN  -If you have pain after surgery, pain medicine will help you feel better.  Take it as directed, before pain becomes severe.  Most pain relievers taken by mouth need at least 20-30 minutes to start working.  -Do not drive or drink alcohol while taking pain medicine.  -Pain medication can upset your stomach.  Taking them with a little food may help.  -Other ways to help control pain: elevation, ice, and relaxation  -Call your surgeon if still having unmanageable pain an hour after taking pain medicine.  -Pain medicine can cause constipation.  Taking an over-the counter stool softener while on prescription pain medicine and drinking plenty of fluids can prevent this side effect.  -Call your surgeon if you have severe side effects like: breathing problems, trouble waking up, dizziness, confusion, or severe constipation.    NAUSEA  -Some people have nausea after surgery.  This is often because of anesthesia, pain, pain medicine, or the stress of surgery.  -Do not push yourself to eat.  Start off with clear liquids and soup.  Slowly move to solid foods.  Don't eat fatty, rich, spicy foods at first.  Eat smaller amounts.  -If you develop persistent nausea and vomiting please notify your surgeon immediately.    BLEEDING  -Different types of surgery require different types of care and dressing changes.  It is important to follow all instructions and advice from your surgeon.  Change dressing as directed.  Call your surgeon for any concerns regarding postop bleeding.    SIGNS OF  INFECTION  -Signs of infection include: fever, swelling, drainage, and redness  -Notify your surgeon if you have a fever of 100.4 F (38.0 C) or higher.  -Notify your surgeon if you notice redness, swelling, increased pain, pus, or a foul smell at the incision site.

## 2019-12-11 ENCOUNTER — TELEPHONE (OUTPATIENT)
Dept: FAMILY MEDICINE | Facility: CLINIC | Age: 69
End: 2019-12-11

## 2019-12-11 NOTE — TELEPHONE ENCOUNTER
Spoke to Sera and she was notified the pt received her Flu shot here. She understood and was pleased.

## 2019-12-11 NOTE — TELEPHONE ENCOUNTER
----- Message from Kike Crews sent at 12/11/2019 10:33 AM CST -----  Contact: Sera Rene \[facility]\"" [phone]   Sera would like to speak with a nurse about the patients vaccine history.

## 2019-12-15 RX ORDER — PANTOPRAZOLE SODIUM 40 MG/1
TABLET, DELAYED RELEASE ORAL
Qty: 30 TABLET | Refills: 3 | Status: SHIPPED | OUTPATIENT
Start: 2019-12-15 | End: 2020-12-22 | Stop reason: SDUPTHER

## 2019-12-16 ENCOUNTER — PATIENT OUTREACH (OUTPATIENT)
Dept: ADMINISTRATIVE | Facility: OTHER | Age: 69
End: 2019-12-16

## 2019-12-16 ENCOUNTER — TELEPHONE (OUTPATIENT)
Dept: SURGERY | Facility: CLINIC | Age: 69
End: 2019-12-16

## 2019-12-19 NOTE — TELEPHONE ENCOUNTER
----- Message from Valentine Canales sent at 12/19/2019  2:08 PM CST -----  Contact: [phone]/self   Patient is calling to give office the name of cream she needs , lidocaine prilocaine cream.  [facility] [place].

## 2019-12-19 NOTE — TELEPHONE ENCOUNTER
Pt states she would like a cream called into the pharmacy. She states she doesn't know the name of the medication but it is something that she can use at the injection site for dialysis. Please advise.

## 2019-12-19 NOTE — TELEPHONE ENCOUNTER
----- Message from Anabelle Pineda sent at 12/19/2019  4:30 PM CST -----  Contact: [phone]/patient  Patient is following up on a request sent earlier today for a cream for her arm. She is a dialysis patient. She needs it today for dialysis tomorrow. [facility], [place].

## 2019-12-19 NOTE — TELEPHONE ENCOUNTER
----- Message from Arlette Grewal sent at 12/19/2019  1:14 PM CST -----  Contact: self  Patient is requesting a callback concerning a medication cream she is expecting at pharm.

## 2019-12-20 ENCOUNTER — TELEPHONE (OUTPATIENT)
Dept: FAMILY MEDICINE | Facility: CLINIC | Age: 69
End: 2019-12-20

## 2019-12-20 RX ORDER — LIDOCAINE AND PRILOCAINE 25; 25 MG/G; MG/G
CREAM TOPICAL
Qty: 30 G | Refills: 11 | Status: SHIPPED | OUTPATIENT
Start: 2019-12-20 | End: 2020-08-03

## 2019-12-20 NOTE — TELEPHONE ENCOUNTER
Spoke to pt and she was notified the medication was sent to her pharmacy and we are working on an approval from her insurance. Pt understood.

## 2019-12-20 NOTE — TELEPHONE ENCOUNTER
PA for Emla cream has been initiated via Cover My Meds.    JEROME VALENZUELA (Key: [id number])    Your information has been sent to [facility].

## 2019-12-20 NOTE — TELEPHONE ENCOUNTER
----- Message from Desire Soto sent at 12/20/2019  3:33 PM CST -----  Contact: self / [phone]  Needs to speak with you about a cream for her arm she do not know the name.   Please advise       cream for dialysis patients

## 2019-12-23 ENCOUNTER — TELEPHONE (OUTPATIENT)
Dept: FAMILY MEDICINE | Facility: CLINIC | Age: 69
End: 2019-12-23

## 2019-12-23 NOTE — TELEPHONE ENCOUNTER
----- Message from Martha Anderson sent at 12/23/2019  9:07 AM CST -----  Contact: self  Pt called to speak with nurse about prior authorization for cream.                Pt callback number [phone]

## 2019-12-23 NOTE — TELEPHONE ENCOUNTER
Pt's niece Wanda has been notified and verbalized understanding that PA for Emla cream has been denied. Wanda stated that they will pay for cream out of pocket.

## 2020-01-15 ENCOUNTER — TELEPHONE (OUTPATIENT)
Dept: FAMILY MEDICINE | Facility: CLINIC | Age: 70
End: 2020-01-15

## 2020-01-15 NOTE — TELEPHONE ENCOUNTER
----- Message from Saravanan eHath sent at 1/15/2020 12:25 PM CST -----  Contact: jean/Wanda [phone]  Patient nimakayla requesting a prescription for a head cold be called in for the patient   PHARMACY: [address] [phone] (Phone)

## 2020-01-15 NOTE — TELEPHONE ENCOUNTER
Pt's niece, Wanda, stated that pt has been having cold symptoms x over 1 week. Denies fever. Has not tried any OTC meds.     Pharmacy: [facility]: [place]

## 2020-02-18 ENCOUNTER — OFFICE VISIT (OUTPATIENT)
Dept: FAMILY MEDICINE | Facility: CLINIC | Age: 70
End: 2020-02-18
Payer: MEDICARE

## 2020-02-18 VITALS
BODY MASS INDEX: 22.88 KG/M2 | OXYGEN SATURATION: 95 % | SYSTOLIC BLOOD PRESSURE: 100 MMHG | DIASTOLIC BLOOD PRESSURE: 58 MMHG | HEIGHT: 64 IN | WEIGHT: 134 LBS | HEART RATE: 93 BPM

## 2020-02-18 DIAGNOSIS — Z99.2 END-STAGE RENAL DISEASE ON HEMODIALYSIS: Chronic | ICD-10-CM

## 2020-02-18 DIAGNOSIS — Z79.01 ANTICOAGULANT LONG-TERM USE: ICD-10-CM

## 2020-02-18 DIAGNOSIS — E11.69 HYPERLIPIDEMIA ASSOCIATED WITH TYPE 2 DIABETES MELLITUS: Chronic | ICD-10-CM

## 2020-02-18 DIAGNOSIS — Z86.79 HISTORY OF HYPERTENSION: Chronic | ICD-10-CM

## 2020-02-18 DIAGNOSIS — I26.09 OTHER CHRONIC PULMONARY EMBOLISM WITH ACUTE COR PULMONALE: ICD-10-CM

## 2020-02-18 DIAGNOSIS — E78.5 HYPERLIPIDEMIA ASSOCIATED WITH TYPE 2 DIABETES MELLITUS: Chronic | ICD-10-CM

## 2020-02-18 DIAGNOSIS — N18.6 END-STAGE RENAL DISEASE ON HEMODIALYSIS: Chronic | ICD-10-CM

## 2020-02-18 DIAGNOSIS — I50.32 CHRONIC DIASTOLIC HEART FAILURE: Chronic | ICD-10-CM

## 2020-02-18 DIAGNOSIS — Z00.00 WELLNESS EXAMINATION: Primary | ICD-10-CM

## 2020-02-18 DIAGNOSIS — Z12.31 ENCOUNTER FOR SCREENING MAMMOGRAM FOR MALIGNANT NEOPLASM OF BREAST: ICD-10-CM

## 2020-02-18 DIAGNOSIS — Z86.711 HISTORY OF PULMONARY EMBOLISM: Chronic | ICD-10-CM

## 2020-02-18 DIAGNOSIS — Z86.73 HISTORY OF EMBOLIC STROKE: Chronic | ICD-10-CM

## 2020-02-18 DIAGNOSIS — I27.82 OTHER CHRONIC PULMONARY EMBOLISM WITH ACUTE COR PULMONALE: ICD-10-CM

## 2020-02-18 PROBLEM — S40.022A TRAUMATIC HEMATOMA OF LEFT UPPER ARM: Status: RESOLVED | Noted: 2019-08-27 | Resolved: 2020-02-18

## 2020-02-18 PROBLEM — M25.422: Status: RESOLVED | Noted: 2019-08-27 | Resolved: 2020-02-18

## 2020-02-18 PROBLEM — E11.22 TYPE 2 DIABETES MELLITUS WITH CHRONIC KIDNEY DISEASE ON CHRONIC DIALYSIS, WITHOUT LONG-TERM CURRENT USE OF INSULIN: Chronic | Status: RESOLVED | Noted: 2018-12-20 | Resolved: 2020-02-18

## 2020-02-18 PROCEDURE — G0009 PNEUMOCOCCAL POLYSACCHARIDE VACCINE 23-VALENT =>2YO SQ IM: ICD-10-PCS | Mod: S$GLB,,, | Performed by: FAMILY MEDICINE

## 2020-02-18 PROCEDURE — 90732 PNEUMOCOCCAL POLYSACCHARIDE VACCINE 23-VALENT =>2YO SQ IM: ICD-10-PCS | Mod: S$GLB,,, | Performed by: FAMILY MEDICINE

## 2020-02-18 PROCEDURE — 1126F PR PAIN SEVERITY QUANTIFIED, NO PAIN PRESENT: ICD-10-PCS | Mod: S$GLB,,, | Performed by: FAMILY MEDICINE

## 2020-02-18 PROCEDURE — 1101F PT FALLS ASSESS-DOCD LE1/YR: CPT | Mod: CPTII,S$GLB,, | Performed by: FAMILY MEDICINE

## 2020-02-18 PROCEDURE — 1126F AMNT PAIN NOTED NONE PRSNT: CPT | Mod: S$GLB,,, | Performed by: FAMILY MEDICINE

## 2020-02-18 PROCEDURE — 1159F PR MEDICATION LIST DOCUMENTED IN MEDICAL RECORD: ICD-10-PCS | Mod: S$GLB,,, | Performed by: FAMILY MEDICINE

## 2020-02-18 PROCEDURE — 3044F PR MOST RECENT HEMOGLOBIN A1C LEVEL <7.0%: ICD-10-PCS | Mod: CPTII,S$GLB,, | Performed by: FAMILY MEDICINE

## 2020-02-18 PROCEDURE — 1159F MED LIST DOCD IN RCRD: CPT | Mod: S$GLB,,, | Performed by: FAMILY MEDICINE

## 2020-02-18 PROCEDURE — 3044F HG A1C LEVEL LT 7.0%: CPT | Mod: CPTII,S$GLB,, | Performed by: FAMILY MEDICINE

## 2020-02-18 PROCEDURE — 99213 OFFICE O/P EST LOW 20 MIN: CPT | Mod: 25,S$GLB,, | Performed by: FAMILY MEDICINE

## 2020-02-18 PROCEDURE — 90732 PPSV23 VACC 2 YRS+ SUBQ/IM: CPT | Mod: S$GLB,,, | Performed by: FAMILY MEDICINE

## 2020-02-18 PROCEDURE — G0009 ADMIN PNEUMOCOCCAL VACCINE: HCPCS | Mod: S$GLB,,, | Performed by: FAMILY MEDICINE

## 2020-02-18 PROCEDURE — 99213 PR OFFICE/OUTPT VISIT, EST, LEVL III, 20-29 MIN: ICD-10-PCS | Mod: 25,S$GLB,, | Performed by: FAMILY MEDICINE

## 2020-02-18 PROCEDURE — 1101F PR PT FALLS ASSESS DOC 0-1 FALLS W/OUT INJ PAST YR: ICD-10-PCS | Mod: CPTII,S$GLB,, | Performed by: FAMILY MEDICINE

## 2020-02-18 RX ORDER — SEVELAMER CARBONATE 800 MG/1
TABLET, FILM COATED ORAL
COMMUNITY
Start: 2020-02-14

## 2020-02-18 NOTE — PROGRESS NOTES
"Subjective:      Patient ID: Brittaney Joseph is a 69 y.o. female.    Chief Complaint: Follow-up (Pt to clinic for follow up visit )      Vitals:    02/18/20 1347   BP: (!) 100/58   Pulse: 93   SpO2: 95%   Weight: 60.8 kg (134 lb)   Height: 5' 4" (1.626 m)        HPI   Check up; here with sister; last visit 3 months; no problems sionce last visit; doing a lot on her own; CKD 5 dialysis [facility]; no weightg loss  Goes to pain management for lower back and hip pain        Review of Systems   Constitutional: Negative.    HENT: Negative.    Respiratory: Negative.    Cardiovascular: Negative.    Gastrointestinal: Negative.    Endocrine: Negative.    Genitourinary: Negative.    Musculoskeletal: Negative.    Neurological: Positive for weakness.   Psychiatric/Behavioral: Negative.    All other systems reviewed and are negative.    Objective:     Physical Exam   Constitutional: She is oriented to person, place, and time. She appears well-developed and well-nourished.   HENT:   Head: Normocephalic.   Eyes: Pupils are equal, round, and reactive to light. Conjunctivae and EOM are normal.   Neck: Normal range of motion. Neck supple.   Cardiovascular: Normal rate, regular rhythm and normal heart sounds.   Pulmonary/Chest: Effort normal and breath sounds normal.   Musculoskeletal: Normal range of motion.   Neurological: She is alert and oriented to person, place, and time. She has normal reflexes.   Skin: Skin is warm and dry.   Psychiatric: She has a normal mood and affect. Her behavior is normal. Judgment and thought content normal.   Nursing note and vitals reviewed.    Assessment:     1. Wellness examination    2. Encounter for screening mammogram for malignant neoplasm of breast     3. History of embolic stroke    4. Chronic diastolic heart failure    5. History of hypertension    6. Hyperlipidemia associated with type 2 diabetes mellitus    7. End-stage renal disease on hemodialysis    8. Anticoagulant " long-term use    9. History of pulmonary embolism    10. Other chronic pulmonary embolism with acute cor pulmonale      Plan:        Medication List           Accurate as of February 18, 2020 11:59 PM. If you have any questions, ask your nurse or doctor.               CONTINUE taking these medications    acetaminophen 325 MG tablet  Commonly known as:  TYLENOL  Take 1 tablet (325 mg total) by mouth every 4 (four) hours as needed for Pain.     apixaban 2.5 mg Tab  Commonly known as:  ELIQUIS  Take 1 tablet (2.5 mg total) by mouth 2 (two) times daily.     aspirin 81 MG Chew  Take 1 tablet (81 mg total) by mouth once daily.     atorvastatin 40 MG tablet  Commonly known as:  LIPITOR     clotrimazole-betamethasone 1-0.05% cream  Commonly known as:  LOTRISONE     docusate sodium 100 MG capsule  Commonly known as:  Colace  Take 1 capsule (100 mg total) by mouth 2 (two) times daily.     gabapentin 300 MG capsule  Commonly known as:  NEURONTIN  Take 1 capsule (300 mg total) by mouth 2 (two) times daily. For feet pain     lidocaine-prilocaine cream  Commonly known as:  EMLA  Apply topically as needed.     midodrine 5 MG Tab  Commonly known as:  PROAMATINE  Take 1 tablet (5 mg total) by mouth 3 (three) times daily with meals.     nitroGLYCERIN 0.4 MG SL tablet  Commonly known as:  NITROSTAT  Place 1 tablet (0.4 mg total) under the tongue every 5 (five) minutes as needed for Chest pain. if second dose needed call 911     oxyCODONE-acetaminophen  mg per tablet  Commonly known as:  PERCOCET     * pantoprazole 40 MG tablet  Commonly known as:  PROTONIX     * pantoprazole 40 MG tablet  Commonly known as:  PROTONIX  TAKE 1 TABLET BY MOUTH EVERY DAY     sevelamer carbonate 800 mg Tab  Commonly known as:  RENVELA         * This list has 2 medication(s) that are the same as other medications prescribed for you. Read the directions carefully, and ask your doctor or other care provider to review them with you.              Wellness  examination  -     Mammo Digital Screening Bilat w/ Pawel; Future; Expected date: 02/18/2020    Encounter for screening mammogram for malignant neoplasm of breast   -     Mammo Digital Screening Bilat w/ Pawel; Future; Expected date: 02/18/2020    History of embolic stroke    Chronic diastolic heart failure    History of hypertension    Hyperlipidemia associated with type 2 diabetes mellitus    End-stage renal disease on hemodialysis    Anticoagulant long-term use    History of pulmonary embolism    Other chronic pulmonary embolism with acute cor pulmonale    Other orders  -     Pneumococcal Polysaccharide Vaccine (23 Valent) (SQ/IM)      Mammog, pneujmis vaccine/ RTC 6 rosa poole

## 2020-03-05 ENCOUNTER — HOSPITAL ENCOUNTER (OUTPATIENT)
Dept: RADIOLOGY | Facility: HOSPITAL | Age: 70
Discharge: HOME | End: 2020-03-05
Attending: FAMILY MEDICINE
Payer: MEDICARE

## 2020-03-05 DIAGNOSIS — Z00.00 WELLNESS EXAMINATION: ICD-10-CM

## 2020-03-05 DIAGNOSIS — Z12.31 ENCOUNTER FOR SCREENING MAMMOGRAM FOR MALIGNANT NEOPLASM OF BREAST: ICD-10-CM

## 2020-03-05 PROCEDURE — 77067 SCR MAMMO BI INCL CAD: CPT | Mod: TC,HCNC,PO

## 2020-03-06 ENCOUNTER — TELEPHONE (OUTPATIENT)
Dept: FAMILY MEDICINE | Facility: CLINIC | Age: 70
End: 2020-03-06

## 2020-03-06 NOTE — TELEPHONE ENCOUNTER
----- Message from Marian Wilkinson sent at 3/6/2020 10:56 AM CST -----  Contact: [phone]/sister Karyna  Type:  Needs Medical Advice    Who Called:  Sister Karyna  Symptoms (please be specific):  Coughing, with headache and head congestion and runny nose  How long has patient had these symptoms:   6 days  Pharmacy name and phone #:   [facility] - [place], [place] - [address]  Would the patient rather a call back or a response via [facility]?  call  Best Call Back Number:  [phone] sister Karyna  Additional Information:  Please call to discuss

## 2020-03-06 NOTE — TELEPHONE ENCOUNTER
----- Message from Michelle Newton sent at 3/6/2020  3:16 PM CST -----  Contact: Patient  Type:  Needs Medical Advice    Who Called:  Patient  Symptoms (please be specific):  Sneezing, runny nose, headache   How long has patient had these symptoms:   4 days  Pharmacy name and phone #:   [facility] - [place], [place] - [address]  Would the patient rather a call back or a response via [facility]?  Call back  Best Call Back Number:  [phone]  Additional Information:  no

## 2020-03-07 ENCOUNTER — TELEPHONE (OUTPATIENT)
Dept: FAMILY MEDICINE | Facility: CLINIC | Age: 70
End: 2020-03-07

## 2020-03-07 RX ORDER — METHYLPREDNISOLONE 4 MG/1
TABLET ORAL
Qty: 1 PACKAGE | Refills: 0 | Status: SHIPPED | OUTPATIENT
Start: 2020-03-07 | End: 2021-08-03

## 2020-03-07 RX ORDER — LORATADINE 10 MG/1
10 TABLET ORAL DAILY
Qty: 90 TABLET | Refills: 3 | COMMUNITY
Start: 2020-03-07 | End: 2021-08-26

## 2020-03-07 RX ORDER — FLUTICASONE PROPIONATE 50 MCG
2 SPRAY, SUSPENSION (ML) NASAL DAILY
Qty: 10 ML | Refills: 11 | Status: SHIPPED | OUTPATIENT
Start: 2020-03-07

## 2020-03-07 NOTE — TELEPHONE ENCOUNTER
I spoke to the patient and she has allergies and I am sending the Medrol Dosepak Flonase and Claritin-D Gemzar reserve

## 2020-06-30 ENCOUNTER — TELEPHONE (OUTPATIENT)
Dept: FAMILY MEDICINE | Facility: CLINIC | Age: 70
End: 2020-06-30

## 2020-06-30 RX ORDER — PROMETHAZINE HYDROCHLORIDE 25 MG/1
25 TABLET ORAL EVERY 6 HOURS PRN
Qty: 20 TABLET | Refills: 0 | Status: SHIPPED | OUTPATIENT
Start: 2020-06-30 | End: 2020-07-09 | Stop reason: SDUPTHER

## 2020-06-30 NOTE — TELEPHONE ENCOUNTER
Pt's sister, Amber, called to inform you that the pt has been complaining of being nauseated x several weeks. Denies vomiting/diarrhea. I offered an appt for evaluation on tomorrow by Dr. Hernandez, and Amber declined due to pt having dialysis tomorrow. Would you be willing to send in a prescription to help with her nausea?

## 2020-06-30 NOTE — TELEPHONE ENCOUNTER
----- Message from Kike Crews sent at 6/30/2020 12:30 PM CDT -----  Contact: sister Clark / [phone]  Type:  Sooner Apoointment Request    Caller is requesting a sooner appointment.  Caller declined first available appointment listed below.  Caller will not accept being placed on the waitlist and is requesting a message be sent to doctor.  Name of Caller: pt   When is the first available appointment? 08/21  Symptoms: nausea   Would the patient rather a call back or a response via [facility]?  Call back

## 2020-07-09 RX ORDER — PROMETHAZINE HYDROCHLORIDE 25 MG/1
25 TABLET ORAL EVERY 6 HOURS PRN
Qty: 20 TABLET | Refills: 0 | Status: SHIPPED | OUTPATIENT
Start: 2020-07-09 | End: 2021-09-28 | Stop reason: SDUPTHER

## 2020-07-09 NOTE — TELEPHONE ENCOUNTER
----- Message from Fabiana Jeffers sent at 7/9/2020  3:47 PM CDT -----  Pt is calling to speak with Staff regarding new prescriptions that were to be called in for her.  Pt says she checked, but nothing was called in.  She is requesting a returned call on the status.    She can be reached at [phone].    Thank you.

## 2020-07-09 NOTE — TELEPHONE ENCOUNTER
----- Message from Barbara Rosa sent at 7/9/2020 10:21 AM CDT -----  Regarding: Nausea medication  Contact: self [phone]  Patient is calling to get medication for nausea called in for her.Please call      I spoke with the pt - she occasionally gets nauseated after dialysis ( usually the evening after she gets home )  She would [facility]   Please advise

## 2020-08-03 DIAGNOSIS — Z86.711 HISTORY OF PULMONARY EMBOLISM: Chronic | ICD-10-CM

## 2020-08-03 NOTE — TELEPHONE ENCOUNTER
----- Message from Marian Wilkinson sent at 8/3/2020 12:39 PM CDT -----  Regarding: RX Refill Request  Contact: [phone]/self  Type:  RX Refill Request    Who Called:  self  Refill or New Rx: refill  RX Name and Strength: apixaban (ELIQUIS) 2.5 mg Tab  How is the patient currently taking it? (ex. 1XDay): Take 1 tablet (2.5 mg total) by mouth 2 (two) times daily. - Oral  Is this a 30 day or 90 day RX: 30 day/11 refills  [facility] [address]  Local or Mail Order: local  Ordering Provider:   Would the patient rather a call back or a response via [facility]?  call  Best Call Back Number: [phone]/self  Additional Information:  [facility]

## 2020-08-03 NOTE — TELEPHONE ENCOUNTER
----- Message from Marian Wilkinson sent at 8/3/2020 12:39 PM CDT -----  Regarding: RX Refill Request  Contact: [phone]/self  Type:  RX Refill Request    Who Called:  self  Refill or New Rx: refill  RX Name and Strength: apixaban (ELIQUIS) 2.5 mg Tab  How is the patient currently taking it? (ex. 1XDay): Take 1 tablet (2.5 mg total) by mouth 2 (two) times daily. - Oral  Is this a 30 day or 90 day RX: 30 day/11 refills  [facility] with phone number[facility] [place] [address]  Local or Mail Order: local  Ordering Provider:   Would the patient rather a call back or a response via [facility]?  call  Best Call Back Number: [phone]/self  Additional Information:  [facility]

## 2020-10-26 ENCOUNTER — TELEPHONE (OUTPATIENT)
Dept: FAMILY MEDICINE | Facility: CLINIC | Age: 70
End: 2020-10-26

## 2020-10-26 DIAGNOSIS — N39.0 URINARY TRACT INFECTION WITHOUT HEMATURIA, SITE UNSPECIFIED: Primary | ICD-10-CM

## 2020-10-26 NOTE — TELEPHONE ENCOUNTER
Patient's caregiver states patient has been urinating a lot. Complaining of burning, abdominal pain and frequent urination. Requesting antibiotics sent to [facility] in [place]. Also requesting a refill on Pantoprazole 40 MG       ----- Message from Marian Wilkinson sent at 10/26/2020 11:42 AM CDT -----  Regarding: advice  Contact: [phone]/Alison  Type:  Needs Medical Advice    Who Called:  jean Muñoz  Symptoms (please be specific):  UTI   How long has patient had these symptoms:   over the weekend  Pharmacy name and phone #:   [facility] [place] [address];  Would the patient rather a call back or a response via [facility]?  call  Best Call Back Number:  [phone]/Alison  Additional Information:

## 2020-10-27 RX ORDER — PANTOPRAZOLE SODIUM 40 MG/1
40 TABLET, DELAYED RELEASE ORAL DAILY
Qty: 30 TABLET | Refills: 0 | Status: SHIPPED | OUTPATIENT
Start: 2020-10-27 | End: 2020-12-02

## 2020-10-27 RX ORDER — NITROFURANTOIN (MACROCRYSTALS) 100 MG/1
100 CAPSULE ORAL EVERY 12 HOURS
Qty: 20 CAPSULE | Refills: 0 | Status: SHIPPED | OUTPATIENT
Start: 2020-10-27 | End: 2020-11-06

## 2020-11-16 ENCOUNTER — TELEPHONE (OUTPATIENT)
Dept: FAMILY MEDICINE | Facility: CLINIC | Age: 70
End: 2020-11-16

## 2020-11-16 NOTE — TELEPHONE ENCOUNTER
----- Message from Michelle Newton sent at 11/16/2020  1:18 PM CST -----  Contact: BRITTANEY VALENZUELA [[id number]]  Type:  Sooner Appointment Request    Caller is requesting a sooner appointment.   Name of Caller: Brittaney   When is the first available appointment? 1/15/2021  Symptoms: annual   Would the patient rather a call back or a response via [facility]?  Call back   Best Call Back Number: [phone]  Additional Information:  please schedule on Tuesday or Thursday, pt has dialysis on Monday, Wednesday and Friday

## 2020-12-22 ENCOUNTER — OFFICE VISIT (OUTPATIENT)
Dept: FAMILY MEDICINE | Facility: CLINIC | Age: 70
End: 2020-12-22
Payer: MEDICARE

## 2020-12-22 VITALS
TEMPERATURE: 98 F | HEIGHT: 64 IN | OXYGEN SATURATION: 96 % | WEIGHT: 143.06 LBS | SYSTOLIC BLOOD PRESSURE: 126 MMHG | BODY MASS INDEX: 24.42 KG/M2 | HEART RATE: 107 BPM | DIASTOLIC BLOOD PRESSURE: 78 MMHG

## 2020-12-22 DIAGNOSIS — Z86.79 HISTORY OF HYPERTENSION: Chronic | ICD-10-CM

## 2020-12-22 DIAGNOSIS — Z86.711 HISTORY OF PULMONARY EMBOLISM: Chronic | ICD-10-CM

## 2020-12-22 DIAGNOSIS — Z86.73 HISTORY OF EMBOLIC STROKE: Primary | Chronic | ICD-10-CM

## 2020-12-22 DIAGNOSIS — I50.32 CHRONIC DIASTOLIC HEART FAILURE: Chronic | ICD-10-CM

## 2020-12-22 DIAGNOSIS — Z99.2 END-STAGE RENAL DISEASE ON HEMODIALYSIS: Chronic | ICD-10-CM

## 2020-12-22 DIAGNOSIS — M54.50 CHRONIC LOW BACK PAIN, UNSPECIFIED BACK PAIN LATERALITY, UNSPECIFIED WHETHER SCIATICA PRESENT: Chronic | ICD-10-CM

## 2020-12-22 DIAGNOSIS — N18.6 END-STAGE RENAL DISEASE ON HEMODIALYSIS: Chronic | ICD-10-CM

## 2020-12-22 DIAGNOSIS — E11.69 HYPERLIPIDEMIA ASSOCIATED WITH TYPE 2 DIABETES MELLITUS: Chronic | ICD-10-CM

## 2020-12-22 DIAGNOSIS — E78.5 HYPERLIPIDEMIA ASSOCIATED WITH TYPE 2 DIABETES MELLITUS: Chronic | ICD-10-CM

## 2020-12-22 DIAGNOSIS — Z79.01 ANTICOAGULANT LONG-TERM USE: ICD-10-CM

## 2020-12-22 DIAGNOSIS — G89.29 CHRONIC LOW BACK PAIN, UNSPECIFIED BACK PAIN LATERALITY, UNSPECIFIED WHETHER SCIATICA PRESENT: Chronic | ICD-10-CM

## 2020-12-22 PROCEDURE — 1101F PT FALLS ASSESS-DOCD LE1/YR: CPT | Mod: CPTII,S$GLB,, | Performed by: FAMILY MEDICINE

## 2020-12-22 PROCEDURE — 1126F AMNT PAIN NOTED NONE PRSNT: CPT | Mod: S$GLB,,, | Performed by: FAMILY MEDICINE

## 2020-12-22 PROCEDURE — 1159F MED LIST DOCD IN RCRD: CPT | Mod: S$GLB,,, | Performed by: FAMILY MEDICINE

## 2020-12-22 PROCEDURE — 1101F PR PT FALLS ASSESS DOC 0-1 FALLS W/OUT INJ PAST YR: ICD-10-PCS | Mod: CPTII,S$GLB,, | Performed by: FAMILY MEDICINE

## 2020-12-22 PROCEDURE — 3008F PR BODY MASS INDEX (BMI) DOCUMENTED: ICD-10-PCS | Mod: CPTII,S$GLB,, | Performed by: FAMILY MEDICINE

## 2020-12-22 PROCEDURE — 1126F PR PAIN SEVERITY QUANTIFIED, NO PAIN PRESENT: ICD-10-PCS | Mod: S$GLB,,, | Performed by: FAMILY MEDICINE

## 2020-12-22 PROCEDURE — 99499 RISK ADDL DX/OHS AUDIT: ICD-10-PCS | Mod: S$GLB,,, | Performed by: FAMILY MEDICINE

## 2020-12-22 PROCEDURE — 99214 OFFICE O/P EST MOD 30 MIN: CPT | Mod: S$GLB,,, | Performed by: FAMILY MEDICINE

## 2020-12-22 PROCEDURE — 3288F PR FALLS RISK ASSESSMENT DOCUMENTED: ICD-10-PCS | Mod: CPTII,S$GLB,, | Performed by: FAMILY MEDICINE

## 2020-12-22 PROCEDURE — 3008F BODY MASS INDEX DOCD: CPT | Mod: CPTII,S$GLB,, | Performed by: FAMILY MEDICINE

## 2020-12-22 PROCEDURE — 99214 PR OFFICE/OUTPT VISIT, EST, LEVL IV, 30-39 MIN: ICD-10-PCS | Mod: S$GLB,,, | Performed by: FAMILY MEDICINE

## 2020-12-22 PROCEDURE — 1159F PR MEDICATION LIST DOCUMENTED IN MEDICAL RECORD: ICD-10-PCS | Mod: S$GLB,,, | Performed by: FAMILY MEDICINE

## 2020-12-22 PROCEDURE — 3288F FALL RISK ASSESSMENT DOCD: CPT | Mod: CPTII,S$GLB,, | Performed by: FAMILY MEDICINE

## 2020-12-22 PROCEDURE — 99499 UNLISTED E&M SERVICE: CPT | Mod: S$GLB,,, | Performed by: FAMILY MEDICINE

## 2020-12-22 RX ORDER — CINACALCET 30 MG/1
TABLET, FILM COATED ORAL
Status: ON HOLD | COMMUNITY
Start: 2020-12-09 | End: 2021-09-02 | Stop reason: DRUGHIGH

## 2020-12-22 NOTE — PROGRESS NOTES
"Subjective:      Patient ID: Brittaney Joseph is a 70 y.o. female.    Chief Complaint: Follow-up      Vitals:    12/22/20 1506   BP: 126/78   Pulse: 107   Temp: 98.1 °F (36.7 °C)   TempSrc: Oral   SpO2: 96%   Weight: 64.9 kg (143 lb 1.3 oz)   Height: 5' 4" (1.626 m)        HPI   Check up; with below diagnoses; here with her sister   goes to dialysis, had DM, off all meds and normal A1C, so i'm removing that Dx from your chart.  Hx CVA, PE, chronic pain, goes to Dr Jarvis  Had catarac surgery  Dialysis accerss left arm, has had to have procedures on it  Was left sided weakness with CVA, uses a rollator  Doing better thqan last visit in Feb here of this year      Problem List  Patient Active Problem List   Diagnosis    History of hypertension    Hyperlipidemia associated with type 2 diabetes mellitus    Physical deconditioning    Chronic low back pain    Chronic diastolic heart failure    History of pulmonary embolism    End-stage renal disease on hemodialysis    History of embolic stroke    Anticoagulant long-term use    S/P arteriovenous (AV) fistula creation    Encounter for removal of vascular catheter    Malfunction of device        ALLERGIES: Review of patient's allergies indicates:  No Known Allergies    MEDS:   Current Outpatient Medications:     acetaminophen (TYLENOL) 325 MG tablet, Take 1 tablet (325 mg total) by mouth every 4 (four) hours as needed for Pain., Disp: 10 tablet, Rfl: 0    apixaban (ELIQUIS) 2.5 mg Tab, Take 1 tablet (2.5 mg total) by mouth 2 (two) times daily., Disp: 60 tablet, Rfl: 11    atorvastatin (LIPITOR) 40 MG tablet, Take 40 mg by mouth once daily., Disp: , Rfl:     cinacalcet (SENSIPAR) 30 MG Tab, , Disp: , Rfl:     clotrimazole-betamethasone 1-0.05% (LOTRISONE) cream, APPLY TO CLEAN DRY SKIN TWO TIMES DAILY, Disp: , Rfl: 0    fluticasone propionate (FLONASE) 50 mcg/actuation nasal spray, 2 sprays (100 mcg total) by Each Nostril route once daily. For allergies " sneezing and runny nose, Disp: 10 mL, Rfl: 11    loratadine (CLARITIN) 10 mg tablet, Take 1 tablet (10 mg total) by mouth once daily., Disp: 90 tablet, Rfl: 3    midodrine (PROAMATINE) 5 MG Tab, Take 1 tablet (5 mg total) by mouth 3 (three) times daily with meals., Disp: 90 tablet, Rfl: 11    nitroGLYCERIN (NITROSTAT) 0.4 MG SL tablet, Place 1 tablet (0.4 mg total) under the tongue every 5 (five) minutes as needed for Chest pain. if second dose needed call 911, Disp: 25 tablet, Rfl: 1    oxyCODONE-acetaminophen (PERCOCET)  mg per tablet, Take 1 tablet by mouth every 12 (twelve) hours as needed., Disp: , Rfl: 0    pantoprazole (PROTONIX) 40 MG tablet, , Disp: , Rfl:     pantoprazole (PROTONIX) 40 MG tablet, TAKE 1 TABLET BY MOUTH ONCE DAILY, Disp: 30 tablet, Rfl: 0    promethazine (PHENERGAN) 25 MG tablet, Take 1 tablet (25 mg total) by mouth every 6 (six) hours as needed for Nausea., Disp: 20 tablet, Rfl: 0    sevelamer carbonate (RENVELA) 800 mg Tab, , Disp: , Rfl:     methylPREDNISolone (MEDROL DOSEPACK) 4 mg tablet, use as directed (Patient not taking: Reported on 12/22/2020), Disp: 1 Package, Rfl: 0      History:  Current Providers as of 12/22/2020  PCP: Charles Argueta MD  Care Team Provider: Charley Jarvis MD  Care Team Provider: Janette Edgar MD  Care Team Provider: Christopher Choi LPN  Encounter Provider: Charles Argueta MD, starting on Tue Dec 22, 2020 12:00 AM  Referring Provider: not found, starting on Tue Dec 22, 2020 12:00 AM  Consulting Physician: Charles Argueta MD, starting on Tue Dec 22, 2020  3:03 PM (Active)   Past Medical History:   Diagnosis Date    Anticoagulant long-term use     Arthritis     Chronic kidney disease     Chronic low back pain     Diabetes mellitus, type 2     End-stage renal disease on hemodialysis 3/28/2019    Monday Wednesday Friday at [facility]    Hypertension     Pulmonary embolism with acute cor pulmonale 1/11/2019    Stroke due  to embolism 4/4/2019     Past Surgical History:   Procedure Laterality Date    BRACHIAL ARTERY GRAFT Right 06/18/2019    CHOLECYSTECTOMY      HYSTERECTOMY      OOPHORECTOMY      PLACEMENT OF ARTERIOVENOUS GRAFT Left 6/18/2019    Procedure: INSERTION, GRAFT, ARTERIOVENOUS;  Surgeon: Jean Pierre Villarreal MD;  Location: [facility] OR;  Service: General;  Laterality: Left;    REMOVAL OF CATHETER Right 12/3/2019    Procedure: REMOVAL-CATHETER;  Surgeon: Jean Pierre Villarreal MD;  Location: [facility] OR;  Service: General;  Laterality: Right;     Social History     Tobacco Use    Smoking status: Never Smoker    Smokeless tobacco: Never Used   Substance Use Topics    Alcohol use: No     Frequency: Never    Drug use: Never         Review of Systems  Objective:     Physical Exam        Assessment:     No diagnosis found.  Plan:        Medication List          Accurate as of December 22, 2020  3:40 PM. If you have any questions, ask your nurse or doctor.            CONTINUE taking these medications    acetaminophen 325 MG tablet  Commonly known as: TYLENOL  Take 1 tablet (325 mg total) by mouth every 4 (four) hours as needed for Pain.     apixaban 2.5 mg Tab  Commonly known as: ELIQUIS  Take 1 tablet (2.5 mg total) by mouth 2 (two) times daily.     atorvastatin 40 MG tablet  Commonly known as: LIPITOR     cinacalcet 30 MG Tab  Commonly known as: SENSIPAR     clotrimazole-betamethasone 1-0.05% cream  Commonly known as: LOTRISONE     fluticasone propionate 50 mcg/actuation nasal spray  Commonly known as: FLONASE  2 sprays (100 mcg total) by Each Nostril route once daily. For allergies sneezing and runny nose     loratadine 10 mg tablet  Commonly known as: CLARITIN  Take 1 tablet (10 mg total) by mouth once daily.     methylPREDNISolone 4 mg tablet  Commonly known as: MEDROL DOSEPACK  use as directed     midodrine 5 MG Tab  Commonly known as: PROAMATINE  Take 1 tablet (5 mg total) by mouth 3 (three) times daily with meals.      nitroGLYCERIN 0.4 MG SL tablet  Commonly known as: NITROSTAT  Place 1 tablet (0.4 mg total) under the tongue every 5 (five) minutes as needed for Chest pain. if second dose needed call 911     oxyCODONE-acetaminophen  mg per tablet  Commonly known as: PERCOCET     * pantoprazole 40 MG tablet  Commonly known as: PROTONIX     * pantoprazole 40 MG tablet  Commonly known as: PROTONIX  TAKE 1 TABLET BY MOUTH ONCE DAILY     promethazine 25 MG tablet  Commonly known as: PHENERGAN  Take 1 tablet (25 mg total) by mouth every 6 (six) hours as needed for Nausea.     sevelamer carbonate 800 mg Tab  Commonly known as: RENVELA         * This list has 2 medication(s) that are the same as other medications prescribed for you. Read the directions carefully, and ask your doctor or other care provider to review them with you.              There are no diagnoses linked to this encounter.

## 2021-01-12 RX ORDER — PANTOPRAZOLE SODIUM 40 MG/1
40 TABLET, DELAYED RELEASE ORAL DAILY
Qty: 90 TABLET | Refills: 3 | Status: SHIPPED | OUTPATIENT
Start: 2021-01-12 | End: 2021-08-03 | Stop reason: SDUPTHER

## 2021-01-14 RX ORDER — NITROFURANTOIN (MACROCRYSTALS) 100 MG/1
100 CAPSULE ORAL EVERY 12 HOURS
Qty: 20 CAPSULE | Refills: 0 | Status: SHIPPED | OUTPATIENT
Start: 2021-01-14 | End: 2021-01-24

## 2021-02-09 ENCOUNTER — TELEPHONE (OUTPATIENT)
Dept: FAMILY MEDICINE | Facility: CLINIC | Age: 71
End: 2021-02-09

## 2021-06-22 ENCOUNTER — OFFICE VISIT (OUTPATIENT)
Dept: FAMILY MEDICINE | Facility: CLINIC | Age: 71
End: 2021-06-22
Payer: MEDICARE

## 2021-06-22 VITALS
SYSTOLIC BLOOD PRESSURE: 120 MMHG | OXYGEN SATURATION: 98 % | HEIGHT: 64 IN | HEART RATE: 98 BPM | TEMPERATURE: 99 F | DIASTOLIC BLOOD PRESSURE: 70 MMHG | WEIGHT: 144.38 LBS | BODY MASS INDEX: 24.65 KG/M2

## 2021-06-22 DIAGNOSIS — Z86.711 HISTORY OF PULMONARY EMBOLISM: Chronic | ICD-10-CM

## 2021-06-22 DIAGNOSIS — Z99.2 END-STAGE RENAL DISEASE ON HEMODIALYSIS: Chronic | ICD-10-CM

## 2021-06-22 DIAGNOSIS — E11.69 HYPERLIPIDEMIA ASSOCIATED WITH TYPE 2 DIABETES MELLITUS: Chronic | ICD-10-CM

## 2021-06-22 DIAGNOSIS — N18.6 END-STAGE RENAL DISEASE ON HEMODIALYSIS: Chronic | ICD-10-CM

## 2021-06-22 DIAGNOSIS — M19.90 ARTHRITIS: ICD-10-CM

## 2021-06-22 DIAGNOSIS — E78.5 HYPERLIPIDEMIA ASSOCIATED WITH TYPE 2 DIABETES MELLITUS: Chronic | ICD-10-CM

## 2021-06-22 DIAGNOSIS — G89.29 CHRONIC LOW BACK PAIN, UNSPECIFIED BACK PAIN LATERALITY, UNSPECIFIED WHETHER SCIATICA PRESENT: Chronic | ICD-10-CM

## 2021-06-22 DIAGNOSIS — M54.50 CHRONIC LOW BACK PAIN, UNSPECIFIED BACK PAIN LATERALITY, UNSPECIFIED WHETHER SCIATICA PRESENT: Chronic | ICD-10-CM

## 2021-06-22 DIAGNOSIS — Z12.31 ENCOUNTER FOR SCREENING MAMMOGRAM FOR MALIGNANT NEOPLASM OF BREAST: ICD-10-CM

## 2021-06-22 DIAGNOSIS — N39.0 URINARY TRACT INFECTION WITHOUT HEMATURIA, SITE UNSPECIFIED: ICD-10-CM

## 2021-06-22 DIAGNOSIS — Z79.01 ANTICOAGULANT LONG-TERM USE: ICD-10-CM

## 2021-06-22 DIAGNOSIS — Z86.73 HISTORY OF EMBOLIC STROKE: Primary | Chronic | ICD-10-CM

## 2021-06-22 DIAGNOSIS — I50.32 CHRONIC DIASTOLIC HEART FAILURE: Chronic | ICD-10-CM

## 2021-06-22 DIAGNOSIS — Z86.79 HISTORY OF HYPERTENSION: Chronic | ICD-10-CM

## 2021-06-22 PROBLEM — Z98.890 S/P ARTERIOVENOUS (AV) FISTULA CREATION: Status: RESOLVED | Noted: 2019-11-19 | Resolved: 2021-06-22

## 2021-06-22 PROBLEM — R53.81 PHYSICAL DECONDITIONING: Status: RESOLVED | Noted: 2018-12-25 | Resolved: 2021-06-22

## 2021-06-22 PROCEDURE — 1126F PR PAIN SEVERITY QUANTIFIED, NO PAIN PRESENT: ICD-10-PCS | Mod: S$GLB,,, | Performed by: FAMILY MEDICINE

## 2021-06-22 PROCEDURE — 99214 OFFICE O/P EST MOD 30 MIN: CPT | Mod: S$GLB,,, | Performed by: FAMILY MEDICINE

## 2021-06-22 PROCEDURE — 99214 PR OFFICE/OUTPT VISIT, EST, LEVL IV, 30-39 MIN: ICD-10-PCS | Mod: S$GLB,,, | Performed by: FAMILY MEDICINE

## 2021-06-22 PROCEDURE — 1159F PR MEDICATION LIST DOCUMENTED IN MEDICAL RECORD: ICD-10-PCS | Mod: S$GLB,,, | Performed by: FAMILY MEDICINE

## 2021-06-22 PROCEDURE — 1101F PR PT FALLS ASSESS DOC 0-1 FALLS W/OUT INJ PAST YR: ICD-10-PCS | Mod: CPTII,S$GLB,, | Performed by: FAMILY MEDICINE

## 2021-06-22 PROCEDURE — 3008F PR BODY MASS INDEX (BMI) DOCUMENTED: ICD-10-PCS | Mod: CPTII,S$GLB,, | Performed by: FAMILY MEDICINE

## 2021-06-22 PROCEDURE — 1126F AMNT PAIN NOTED NONE PRSNT: CPT | Mod: S$GLB,,, | Performed by: FAMILY MEDICINE

## 2021-06-22 PROCEDURE — 99499 UNLISTED E&M SERVICE: CPT | Mod: S$GLB,,, | Performed by: FAMILY MEDICINE

## 2021-06-22 PROCEDURE — 3288F PR FALLS RISK ASSESSMENT DOCUMENTED: ICD-10-PCS | Mod: CPTII,S$GLB,, | Performed by: FAMILY MEDICINE

## 2021-06-22 PROCEDURE — 99499 RISK ADDL DX/OHS AUDIT: ICD-10-PCS | Mod: S$GLB,,, | Performed by: FAMILY MEDICINE

## 2021-06-22 PROCEDURE — 3288F FALL RISK ASSESSMENT DOCD: CPT | Mod: CPTII,S$GLB,, | Performed by: FAMILY MEDICINE

## 2021-06-22 PROCEDURE — 3008F BODY MASS INDEX DOCD: CPT | Mod: CPTII,S$GLB,, | Performed by: FAMILY MEDICINE

## 2021-06-22 PROCEDURE — 1101F PT FALLS ASSESS-DOCD LE1/YR: CPT | Mod: CPTII,S$GLB,, | Performed by: FAMILY MEDICINE

## 2021-06-22 PROCEDURE — 1159F MED LIST DOCD IN RCRD: CPT | Mod: S$GLB,,, | Performed by: FAMILY MEDICINE

## 2021-07-06 ENCOUNTER — HOSPITAL ENCOUNTER (OUTPATIENT)
Dept: RADIOLOGY | Facility: HOSPITAL | Age: 71
Discharge: HOME | End: 2021-07-06
Attending: FAMILY MEDICINE
Payer: MEDICARE

## 2021-07-06 DIAGNOSIS — Z12.31 ENCOUNTER FOR SCREENING MAMMOGRAM FOR MALIGNANT NEOPLASM OF BREAST: ICD-10-CM

## 2021-07-06 DIAGNOSIS — I50.32 CHRONIC DIASTOLIC HEART FAILURE: ICD-10-CM

## 2021-07-06 DIAGNOSIS — M19.90 ARTHRITIS: ICD-10-CM

## 2021-07-06 DIAGNOSIS — E11.69 HYPERLIPIDEMIA ASSOCIATED WITH TYPE 2 DIABETES MELLITUS: ICD-10-CM

## 2021-07-06 DIAGNOSIS — E78.5 HYPERLIPIDEMIA ASSOCIATED WITH TYPE 2 DIABETES MELLITUS: ICD-10-CM

## 2021-07-06 DIAGNOSIS — Z86.79 HISTORY OF HYPERTENSION: ICD-10-CM

## 2021-07-06 DIAGNOSIS — N18.6 END-STAGE RENAL DISEASE ON HEMODIALYSIS: ICD-10-CM

## 2021-07-06 DIAGNOSIS — Z99.2 END-STAGE RENAL DISEASE ON HEMODIALYSIS: ICD-10-CM

## 2021-07-06 DIAGNOSIS — Z86.711 HISTORY OF PULMONARY EMBOLISM: ICD-10-CM

## 2021-07-06 DIAGNOSIS — Z86.73 HISTORY OF EMBOLIC STROKE: ICD-10-CM

## 2021-07-06 DIAGNOSIS — N39.0 URINARY TRACT INFECTION WITHOUT HEMATURIA, SITE UNSPECIFIED: ICD-10-CM

## 2021-07-06 DIAGNOSIS — G89.29 CHRONIC LOW BACK PAIN, UNSPECIFIED BACK PAIN LATERALITY, UNSPECIFIED WHETHER SCIATICA PRESENT: ICD-10-CM

## 2021-07-06 DIAGNOSIS — Z79.01 ANTICOAGULANT LONG-TERM USE: ICD-10-CM

## 2021-07-06 DIAGNOSIS — M54.50 CHRONIC LOW BACK PAIN, UNSPECIFIED BACK PAIN LATERALITY, UNSPECIFIED WHETHER SCIATICA PRESENT: ICD-10-CM

## 2021-07-06 PROCEDURE — 77067 SCR MAMMO BI INCL CAD: CPT | Mod: TC,PO

## 2021-07-23 ENCOUNTER — OUTSIDE PLACE OF SERVICE (OUTPATIENT)
Dept: ADMINISTRATIVE | Facility: OTHER | Age: 71
End: 2021-07-23
Payer: MEDICARE

## 2021-07-23 PROCEDURE — 93010 ELECTROCARDIOGRAM REPORT: CPT | Mod: ,,, | Performed by: INTERNAL MEDICINE

## 2021-07-23 PROCEDURE — 93010 PR ELECTROCARDIOGRAM REPORT: ICD-10-PCS | Mod: ,,, | Performed by: INTERNAL MEDICINE

## 2021-07-29 ENCOUNTER — TELEPHONE (OUTPATIENT)
Dept: FAMILY MEDICINE | Facility: CLINIC | Age: 71
End: 2021-07-29

## 2021-07-30 ENCOUNTER — HOSPITAL ENCOUNTER (EMERGENCY)
Facility: HOSPITAL | Age: 71
Discharge: HOME | End: 2021-07-31
Attending: EMERGENCY MEDICINE
Payer: MEDICARE

## 2021-07-30 DIAGNOSIS — E87.5 HYPERKALEMIA: ICD-10-CM

## 2021-07-30 DIAGNOSIS — A41.9 SEPSIS: ICD-10-CM

## 2021-07-30 DIAGNOSIS — N39.0 ACUTE UTI (URINARY TRACT INFECTION): Primary | ICD-10-CM

## 2021-07-30 LAB
ALBUMIN SERPL BCP-MCNC: 4.2 G/DL (ref 3.5–5.2)
ALLENS TEST: ABNORMAL
ALP SERPL-CCNC: 227 U/L (ref 38–126)
ALT SERPL W/O P-5'-P-CCNC: 13 U/L (ref 10–44)
ANION GAP SERPL CALC-SCNC: 11 MMOL/L (ref 8–16)
AST SERPL-CCNC: 24 U/L (ref 15–46)
BACTERIA #/AREA URNS AUTO: ABNORMAL /HPF
BASOPHILS # BLD AUTO: 0.03 K/UL (ref 0–0.2)
BASOPHILS NFR BLD: 0.2 % (ref 0–1.9)
BILIRUB SERPL-MCNC: 0.8 MG/DL (ref 0.1–1)
BILIRUB UR QL STRIP: ABNORMAL
CALCIUM SERPL-MCNC: 8.6 MG/DL (ref 8.7–10.5)
CHLORIDE SERPL-SCNC: 94 MMOL/L (ref 95–110)
CLARITY UR REFRACT.AUTO: ABNORMAL
CO2 SERPL-SCNC: 32 MMOL/L (ref 23–29)
COLOR UR AUTO: YELLOW
CREAT SERPL-MCNC: 7.8 MG/DL (ref 0.5–1.4)
DIFFERENTIAL METHOD: ABNORMAL
EOSINOPHIL # BLD AUTO: 0 K/UL (ref 0–0.5)
EOSINOPHIL NFR BLD: 0.2 % (ref 0–8)
ERYTHROCYTE [DISTWIDTH] IN BLOOD BY AUTOMATED COUNT: 13.5 % (ref 11.5–14.5)
EST. GFR  (AFRICAN AMERICAN): 5.5 ML/MIN/1.73 M^2
EST. GFR  (NON AFRICAN AMERICAN): 4.8 ML/MIN/1.73 M^2
GLUCOSE SERPL-MCNC: 209 MG/DL (ref 70–110)
GLUCOSE UR QL STRIP: NEGATIVE
HCO3 UR-SCNC: 35.8 MMOL/L (ref 24–28)
HCT VFR BLD AUTO: 39.1 % (ref 37–48.5)
HCT VFR BLD CALC: 38 %PCV (ref 36–54)
HGB BLD-MCNC: 12.8 G/DL (ref 12–16)
HGB BLD-MCNC: 13 G/DL
HGB UR QL STRIP: ABNORMAL
HYALINE CASTS UR QL AUTO: 0 /LPF
IMM GRANULOCYTES # BLD AUTO: 0.08 K/UL (ref 0–0.04)
IMM GRANULOCYTES NFR BLD AUTO: 0.6 % (ref 0–0.5)
INFLUENZA A, MOLECULAR: NEGATIVE
INFLUENZA B, MOLECULAR: NEGATIVE
KETONES UR QL STRIP: NEGATIVE
LACTATE SERPL-SCNC: 1.4 MMOL/L (ref 0.5–2.2)
LEUKOCYTE ESTERASE UR QL STRIP: ABNORMAL
LYMPHOCYTES # BLD AUTO: 0.7 K/UL (ref 1–4.8)
LYMPHOCYTES NFR BLD: 5.8 % (ref 18–48)
MAGNESIUM SERPL-MCNC: 1.8 MG/DL (ref 1.6–2.6)
MCH RBC QN AUTO: 32.7 PG (ref 27–31)
MCHC RBC AUTO-ENTMCNC: 32.7 G/DL (ref 32–36)
MCV RBC AUTO: 100 FL (ref 82–98)
MICROSCOPIC COMMENT: ABNORMAL
MONOCYTES # BLD AUTO: 1.5 K/UL (ref 0.3–1)
MONOCYTES NFR BLD: 12 % (ref 4–15)
NEUTROPHILS # BLD AUTO: 10.4 K/UL (ref 1.8–7.7)
NEUTROPHILS NFR BLD: 81.2 % (ref 38–73)
NITRITE UR QL STRIP: NEGATIVE
NRBC BLD-RTO: 0 /100 WBC
PCO2 BLDA: 52.3 MMHG (ref 35–45)
PH SMN: 7.44 [PH] (ref 7.35–7.45)
PH UR STRIP: 5 [PH] (ref 5–8)
PHOSPHATE SERPL-MCNC: 3.3 MG/DL (ref 2.7–4.5)
PLATELET # BLD AUTO: 193 K/UL (ref 150–450)
PMV BLD AUTO: 10.2 FL (ref 9.2–12.9)
PO2 BLDA: 30 MMHG (ref 40–60)
POC BE: 12 MMOL/L
POC SATURATED O2: 58 % (ref 95–100)
POC TCO2: 37 MMOL/L (ref 24–29)
POTASSIUM BLD-SCNC: 6.8 MMOL/L (ref 3.5–5.1)
POTASSIUM SERPL-SCNC: 5.5 MMOL/L (ref 3.5–5.1)
PROT SERPL-MCNC: 8.1 G/DL (ref 6–8.4)
PROT UR QL STRIP: ABNORMAL
RBC # BLD AUTO: 3.91 M/UL (ref 4–5.4)
RBC #/AREA URNS AUTO: 2 /HPF (ref 0–4)
SAMPLE: ABNORMAL
SARS-COV-2 RDRP RESP QL NAA+PROBE: NEGATIVE
SITE: ABNORMAL
SODIUM BLD-SCNC: 135 MMOL/L (ref 136–145)
SODIUM SERPL-SCNC: 137 MMOL/L (ref 136–145)
SP GR UR STRIP: 1.01 (ref 1–1.03)
SPECIMEN SOURCE: NORMAL
URN SPEC COLLECT METH UR: ABNORMAL
UROBILINOGEN UR STRIP-ACNC: 1 EU/DL
UUN UR-MCNC: 56 MG/DL (ref 7–17)
WBC # BLD AUTO: 12.76 K/UL (ref 3.9–12.7)
WBC #/AREA URNS AUTO: 5 /HPF (ref 0–5)

## 2021-07-30 PROCEDURE — 87502 INFLUENZA DNA AMP PROBE: CPT | Mod: ER | Performed by: EMERGENCY MEDICINE

## 2021-07-30 PROCEDURE — 99900035 HC TECH TIME PER 15 MIN (STAT): Mod: ER

## 2021-07-30 PROCEDURE — 82803 BLOOD GASES ANY COMBINATION: CPT | Mod: ER

## 2021-07-30 PROCEDURE — 93005 ELECTROCARDIOGRAM TRACING: CPT | Mod: ER

## 2021-07-30 PROCEDURE — 84100 ASSAY OF PHOSPHORUS: CPT | Mod: ER | Performed by: EMERGENCY MEDICINE

## 2021-07-30 PROCEDURE — 63600175 PHARM REV CODE 636 W HCPCS: Mod: ER | Performed by: EMERGENCY MEDICINE

## 2021-07-30 PROCEDURE — 81000 URINALYSIS NONAUTO W/SCOPE: CPT | Mod: ER | Performed by: EMERGENCY MEDICINE

## 2021-07-30 PROCEDURE — 85025 COMPLETE CBC W/AUTO DIFF WBC: CPT | Mod: ER | Performed by: EMERGENCY MEDICINE

## 2021-07-30 PROCEDURE — 99285 EMERGENCY DEPT VISIT HI MDM: CPT | Mod: 25,ER

## 2021-07-30 PROCEDURE — 96365 THER/PROPH/DIAG IV INF INIT: CPT | Mod: ER

## 2021-07-30 PROCEDURE — 96367 TX/PROPH/DG ADDL SEQ IV INF: CPT | Mod: ER

## 2021-07-30 PROCEDURE — 83605 ASSAY OF LACTIC ACID: CPT | Mod: ER | Performed by: EMERGENCY MEDICINE

## 2021-07-30 PROCEDURE — 80053 COMPREHEN METABOLIC PANEL: CPT | Mod: ER | Performed by: EMERGENCY MEDICINE

## 2021-07-30 PROCEDURE — 25000003 PHARM REV CODE 250: Mod: ER | Performed by: EMERGENCY MEDICINE

## 2021-07-30 PROCEDURE — U0002 COVID-19 LAB TEST NON-CDC: HCPCS | Mod: ER | Performed by: EMERGENCY MEDICINE

## 2021-07-30 PROCEDURE — 93010 EKG 12-LEAD: ICD-10-PCS | Mod: ,,, | Performed by: INTERNAL MEDICINE

## 2021-07-30 PROCEDURE — 94760 N-INVAS EAR/PLS OXIMETRY 1: CPT | Mod: ER

## 2021-07-30 PROCEDURE — 93010 ELECTROCARDIOGRAM REPORT: CPT | Mod: ,,, | Performed by: INTERNAL MEDICINE

## 2021-07-30 PROCEDURE — 96366 THER/PROPH/DIAG IV INF ADDON: CPT | Mod: ER

## 2021-07-30 PROCEDURE — 87040 BLOOD CULTURE FOR BACTERIA: CPT | Mod: ER | Performed by: EMERGENCY MEDICINE

## 2021-07-30 PROCEDURE — 83735 ASSAY OF MAGNESIUM: CPT | Mod: ER | Performed by: EMERGENCY MEDICINE

## 2021-07-30 RX ORDER — ACETAMINOPHEN 500 MG
1000 TABLET ORAL
Status: COMPLETED | OUTPATIENT
Start: 2021-07-30 | End: 2021-07-30

## 2021-07-30 RX ORDER — CEPHALEXIN 250 MG/1
500 CAPSULE ORAL EVERY 12 HOURS
Qty: 12 CAPSULE | Refills: 0 | Status: SHIPPED | OUTPATIENT
Start: 2021-07-30 | End: 2021-08-03

## 2021-07-30 RX ADMIN — SODIUM ZIRCONIUM CYCLOSILICATE 10 G: 10 POWDER, FOR SUSPENSION ORAL at 10:07

## 2021-07-30 RX ADMIN — PIPERACILLIN AND TAZOBACTAM 4.5 G: 4; .5 INJECTION, POWDER, LYOPHILIZED, FOR SOLUTION INTRAVENOUS; PARENTERAL at 11:07

## 2021-07-30 RX ADMIN — SODIUM CHLORIDE 1959 ML: 0.9 INJECTION, SOLUTION INTRAVENOUS at 08:07

## 2021-07-30 RX ADMIN — ACETAMINOPHEN 1000 MG: 500 TABLET ORAL at 10:07

## 2021-07-30 RX ADMIN — VANCOMYCIN HYDROCHLORIDE 1750 MG: 1 INJECTION, POWDER, LYOPHILIZED, FOR SOLUTION INTRAVENOUS at 08:07

## 2021-07-31 VITALS
SYSTOLIC BLOOD PRESSURE: 114 MMHG | RESPIRATION RATE: 18 BRPM | HEART RATE: 109 BPM | TEMPERATURE: 100 F | BODY MASS INDEX: 24.72 KG/M2 | WEIGHT: 144 LBS | OXYGEN SATURATION: 95 % | DIASTOLIC BLOOD PRESSURE: 69 MMHG

## 2021-08-03 ENCOUNTER — OFFICE VISIT (OUTPATIENT)
Dept: FAMILY MEDICINE | Facility: CLINIC | Age: 71
End: 2021-08-03
Payer: MEDICARE

## 2021-08-03 VITALS
WEIGHT: 144.81 LBS | HEART RATE: 96 BPM | DIASTOLIC BLOOD PRESSURE: 62 MMHG | OXYGEN SATURATION: 97 % | HEIGHT: 64 IN | TEMPERATURE: 98 F | SYSTOLIC BLOOD PRESSURE: 100 MMHG | BODY MASS INDEX: 24.72 KG/M2

## 2021-08-03 DIAGNOSIS — N39.0 URINARY TRACT INFECTION WITHOUT HEMATURIA, SITE UNSPECIFIED: Primary | ICD-10-CM

## 2021-08-03 DIAGNOSIS — R93.5 ABNORMAL CT OF THE ABDOMEN: ICD-10-CM

## 2021-08-03 PROCEDURE — 3008F PR BODY MASS INDEX (BMI) DOCUMENTED: ICD-10-PCS | Mod: CPTII,S$GLB,, | Performed by: FAMILY MEDICINE

## 2021-08-03 PROCEDURE — 99213 PR OFFICE/OUTPT VISIT, EST, LEVL III, 20-29 MIN: ICD-10-PCS | Mod: S$GLB,,, | Performed by: FAMILY MEDICINE

## 2021-08-03 PROCEDURE — 3078F PR MOST RECENT DIASTOLIC BLOOD PRESSURE < 80 MM HG: ICD-10-PCS | Mod: CPTII,S$GLB,, | Performed by: FAMILY MEDICINE

## 2021-08-03 PROCEDURE — 3074F SYST BP LT 130 MM HG: CPT | Mod: CPTII,S$GLB,, | Performed by: FAMILY MEDICINE

## 2021-08-03 PROCEDURE — 1125F AMNT PAIN NOTED PAIN PRSNT: CPT | Mod: CPTII,S$GLB,, | Performed by: FAMILY MEDICINE

## 2021-08-03 PROCEDURE — 1159F MED LIST DOCD IN RCRD: CPT | Mod: CPTII,S$GLB,, | Performed by: FAMILY MEDICINE

## 2021-08-03 PROCEDURE — 99213 OFFICE O/P EST LOW 20 MIN: CPT | Mod: S$GLB,,, | Performed by: FAMILY MEDICINE

## 2021-08-03 PROCEDURE — 1101F PR PT FALLS ASSESS DOC 0-1 FALLS W/OUT INJ PAST YR: ICD-10-PCS | Mod: CPTII,S$GLB,, | Performed by: FAMILY MEDICINE

## 2021-08-03 PROCEDURE — 3078F DIAST BP <80 MM HG: CPT | Mod: CPTII,S$GLB,, | Performed by: FAMILY MEDICINE

## 2021-08-03 PROCEDURE — 1160F RVW MEDS BY RX/DR IN RCRD: CPT | Mod: CPTII,S$GLB,, | Performed by: FAMILY MEDICINE

## 2021-08-03 PROCEDURE — 3074F PR MOST RECENT SYSTOLIC BLOOD PRESSURE < 130 MM HG: ICD-10-PCS | Mod: CPTII,S$GLB,, | Performed by: FAMILY MEDICINE

## 2021-08-03 PROCEDURE — 3008F BODY MASS INDEX DOCD: CPT | Mod: CPTII,S$GLB,, | Performed by: FAMILY MEDICINE

## 2021-08-03 PROCEDURE — 3288F PR FALLS RISK ASSESSMENT DOCUMENTED: ICD-10-PCS | Mod: CPTII,S$GLB,, | Performed by: FAMILY MEDICINE

## 2021-08-03 PROCEDURE — 1159F PR MEDICATION LIST DOCUMENTED IN MEDICAL RECORD: ICD-10-PCS | Mod: CPTII,S$GLB,, | Performed by: FAMILY MEDICINE

## 2021-08-03 PROCEDURE — 1101F PT FALLS ASSESS-DOCD LE1/YR: CPT | Mod: CPTII,S$GLB,, | Performed by: FAMILY MEDICINE

## 2021-08-03 PROCEDURE — 1125F PR PAIN SEVERITY QUANTIFIED, PAIN PRESENT: ICD-10-PCS | Mod: CPTII,S$GLB,, | Performed by: FAMILY MEDICINE

## 2021-08-03 PROCEDURE — 1160F PR REVIEW ALL MEDS BY PRESCRIBER/CLIN PHARMACIST DOCUMENTED: ICD-10-PCS | Mod: CPTII,S$GLB,, | Performed by: FAMILY MEDICINE

## 2021-08-03 PROCEDURE — 3288F FALL RISK ASSESSMENT DOCD: CPT | Mod: CPTII,S$GLB,, | Performed by: FAMILY MEDICINE

## 2021-08-04 ENCOUNTER — PATIENT OUTREACH (OUTPATIENT)
Dept: ADMINISTRATIVE | Facility: HOSPITAL | Age: 71
End: 2021-08-04

## 2021-08-05 ENCOUNTER — OUTPATIENT CASE MANAGEMENT (OUTPATIENT)
Dept: ADMINISTRATIVE | Facility: OTHER | Age: 71
End: 2021-08-05

## 2021-08-05 ENCOUNTER — TELEPHONE (OUTPATIENT)
Dept: FAMILY MEDICINE | Facility: CLINIC | Age: 71
End: 2021-08-05

## 2021-08-06 LAB
BACTERIA BLD CULT: NORMAL
BACTERIA BLD CULT: NORMAL

## 2021-08-09 ENCOUNTER — LAB VISIT (OUTPATIENT)
Dept: LAB | Facility: HOSPITAL | Age: 71
End: 2021-08-09
Attending: FAMILY MEDICINE
Payer: MEDICARE

## 2021-08-09 DIAGNOSIS — N39.0 URINARY TRACT INFECTION WITHOUT HEMATURIA, SITE UNSPECIFIED: ICD-10-CM

## 2021-08-09 LAB
BACTERIA #/AREA URNS AUTO: ABNORMAL /HPF
BILIRUB UR QL STRIP: NEGATIVE
CLARITY UR REFRACT.AUTO: ABNORMAL
COLOR UR AUTO: YELLOW
GLUCOSE UR QL STRIP: NEGATIVE
HGB UR QL STRIP: ABNORMAL
HYALINE CASTS UR QL AUTO: 0 /LPF
KETONES UR QL STRIP: NEGATIVE
LEUKOCYTE ESTERASE UR QL STRIP: ABNORMAL
MICROSCOPIC COMMENT: ABNORMAL
NITRITE UR QL STRIP: NEGATIVE
PH UR STRIP: 5 [PH] (ref 5–8)
PROT UR QL STRIP: ABNORMAL
RBC #/AREA URNS AUTO: 1 /HPF (ref 0–4)
SP GR UR STRIP: 1.01 (ref 1–1.03)
SQUAMOUS #/AREA URNS AUTO: 15 /HPF
URN SPEC COLLECT METH UR: ABNORMAL
UROBILINOGEN UR STRIP-ACNC: NEGATIVE EU/DL
WBC #/AREA URNS AUTO: 2 /HPF (ref 0–5)
YEAST UR QL AUTO: ABNORMAL

## 2021-08-09 PROCEDURE — 81000 URINALYSIS NONAUTO W/SCOPE: CPT | Mod: PO | Performed by: FAMILY MEDICINE

## 2021-08-09 PROCEDURE — 87086 URINE CULTURE/COLONY COUNT: CPT | Mod: PO | Performed by: FAMILY MEDICINE

## 2021-08-10 LAB
BACTERIA UR CULT: NORMAL
BACTERIA UR CULT: NORMAL

## 2021-08-16 ENCOUNTER — OUTPATIENT CASE MANAGEMENT (OUTPATIENT)
Dept: ADMINISTRATIVE | Facility: OTHER | Age: 71
End: 2021-08-16

## 2021-08-24 ENCOUNTER — PATIENT OUTREACH (OUTPATIENT)
Dept: ADMINISTRATIVE | Facility: OTHER | Age: 71
End: 2021-08-24

## 2021-08-26 ENCOUNTER — TELEPHONE (OUTPATIENT)
Dept: GASTROENTEROLOGY | Facility: CLINIC | Age: 71
End: 2021-08-26

## 2021-08-26 ENCOUNTER — OFFICE VISIT (OUTPATIENT)
Dept: GASTROENTEROLOGY | Facility: CLINIC | Age: 71
End: 2021-08-26
Payer: MEDICARE

## 2021-08-26 VITALS — WEIGHT: 138 LBS | BODY MASS INDEX: 23.69 KG/M2

## 2021-08-26 DIAGNOSIS — Z01.812 PRE-PROCEDURE LAB EXAM: ICD-10-CM

## 2021-08-26 DIAGNOSIS — R93.5 ABNORMAL CT OF THE ABDOMEN: ICD-10-CM

## 2021-08-26 DIAGNOSIS — Z12.11 SCREENING FOR COLON CANCER: Primary | ICD-10-CM

## 2021-08-26 PROCEDURE — 3008F BODY MASS INDEX DOCD: CPT | Mod: CPTII,S$GLB,, | Performed by: NURSE PRACTITIONER

## 2021-08-26 PROCEDURE — 99999 PR PBB SHADOW E&M-EST. PATIENT-LVL III: ICD-10-PCS | Mod: PBBFAC,,, | Performed by: NURSE PRACTITIONER

## 2021-08-26 PROCEDURE — 99204 OFFICE O/P NEW MOD 45 MIN: CPT | Mod: S$GLB,,, | Performed by: NURSE PRACTITIONER

## 2021-08-26 PROCEDURE — 99999 PR PBB SHADOW E&M-EST. PATIENT-LVL III: CPT | Mod: PBBFAC,,, | Performed by: NURSE PRACTITIONER

## 2021-08-26 PROCEDURE — 1159F MED LIST DOCD IN RCRD: CPT | Mod: CPTII,S$GLB,, | Performed by: NURSE PRACTITIONER

## 2021-08-26 PROCEDURE — 3008F PR BODY MASS INDEX (BMI) DOCUMENTED: ICD-10-PCS | Mod: CPTII,S$GLB,, | Performed by: NURSE PRACTITIONER

## 2021-08-26 PROCEDURE — 99204 PR OFFICE/OUTPT VISIT, NEW, LEVL IV, 45-59 MIN: ICD-10-PCS | Mod: S$GLB,,, | Performed by: NURSE PRACTITIONER

## 2021-08-26 PROCEDURE — 1159F PR MEDICATION LIST DOCUMENTED IN MEDICAL RECORD: ICD-10-PCS | Mod: CPTII,S$GLB,, | Performed by: NURSE PRACTITIONER

## 2021-09-01 ENCOUNTER — HOSPITAL ENCOUNTER (INPATIENT)
Facility: HOSPITAL | Age: 71
LOS: 3 days | Discharge: HOME | DRG: 640 | End: 2021-09-04
Attending: EMERGENCY MEDICINE | Admitting: HOSPITALIST
Payer: MEDICARE

## 2021-09-01 DIAGNOSIS — N18.6 ACUTE RENAL FAILURE SUPERIMPOSED ON CHRONIC KIDNEY DISEASE, ON CHRONIC DIALYSIS, UNSPECIFIED ACUTE RENAL FAILURE TYPE: ICD-10-CM

## 2021-09-01 DIAGNOSIS — R07.9 CHEST PAIN: ICD-10-CM

## 2021-09-01 DIAGNOSIS — N18.6 END-STAGE RENAL DISEASE ON HEMODIALYSIS: Chronic | ICD-10-CM

## 2021-09-01 DIAGNOSIS — N18.6 ESRD (END STAGE RENAL DISEASE): ICD-10-CM

## 2021-09-01 DIAGNOSIS — R53.83 FATIGUE: ICD-10-CM

## 2021-09-01 DIAGNOSIS — Z99.2 END-STAGE RENAL DISEASE ON HEMODIALYSIS: Chronic | ICD-10-CM

## 2021-09-01 DIAGNOSIS — Z99.2 ACUTE RENAL FAILURE SUPERIMPOSED ON CHRONIC KIDNEY DISEASE, ON CHRONIC DIALYSIS, UNSPECIFIED ACUTE RENAL FAILURE TYPE: ICD-10-CM

## 2021-09-01 DIAGNOSIS — N17.9 ACUTE RENAL FAILURE SUPERIMPOSED ON CHRONIC KIDNEY DISEASE, ON CHRONIC DIALYSIS, UNSPECIFIED ACUTE RENAL FAILURE TYPE: ICD-10-CM

## 2021-09-01 DIAGNOSIS — E87.5 HYPERKALEMIA: Primary | ICD-10-CM

## 2021-09-01 PROBLEM — E11.9 DM2 (DIABETES MELLITUS, TYPE 2): Status: ACTIVE | Noted: 2021-09-01

## 2021-09-01 LAB
ALBUMIN SERPL BCP-MCNC: 4.4 G/DL (ref 3.5–5.2)
ALP SERPL-CCNC: 227 U/L (ref 38–126)
ALT SERPL W/O P-5'-P-CCNC: 31 U/L (ref 10–44)
ANION GAP SERPL CALC-SCNC: 19 MMOL/L (ref 8–16)
AST SERPL-CCNC: 41 U/L (ref 15–46)
BASOPHILS # BLD AUTO: 0.04 K/UL (ref 0–0.2)
BASOPHILS NFR BLD: 0.6 % (ref 0–1.9)
BILIRUB SERPL-MCNC: 0.6 MG/DL (ref 0.1–1)
CALCIUM SERPL-MCNC: 8.7 MG/DL (ref 8.7–10.5)
CHLORIDE SERPL-SCNC: 104 MMOL/L (ref 95–110)
CO2 SERPL-SCNC: 20 MMOL/L (ref 23–29)
CREAT SERPL-MCNC: 20.71 MG/DL (ref 0.5–1.4)
DIFFERENTIAL METHOD: ABNORMAL
EOSINOPHIL # BLD AUTO: 0.1 K/UL (ref 0–0.5)
EOSINOPHIL NFR BLD: 1.7 % (ref 0–8)
ERYTHROCYTE [DISTWIDTH] IN BLOOD BY AUTOMATED COUNT: 14.7 % (ref 11.5–14.5)
EST. GFR  (AFRICAN AMERICAN): 1.7 ML/MIN/1.73 M^2
EST. GFR  (NON AFRICAN AMERICAN): 1.5 ML/MIN/1.73 M^2
GLUCOSE SERPL-MCNC: 89 MG/DL (ref 70–110)
HCT VFR BLD AUTO: 36.4 % (ref 37–48.5)
HGB BLD-MCNC: 11.8 G/DL (ref 12–16)
IMM GRANULOCYTES # BLD AUTO: 0.02 K/UL (ref 0–0.04)
IMM GRANULOCYTES NFR BLD AUTO: 0.3 % (ref 0–0.5)
LYMPHOCYTES # BLD AUTO: 1.6 K/UL (ref 1–4.8)
LYMPHOCYTES NFR BLD: 23.8 % (ref 18–48)
MAGNESIUM SERPL-MCNC: 2.2 MG/DL (ref 1.6–2.6)
MCH RBC QN AUTO: 32.4 PG (ref 27–31)
MCHC RBC AUTO-ENTMCNC: 32.4 G/DL (ref 32–36)
MCV RBC AUTO: 100 FL (ref 82–98)
MONOCYTES # BLD AUTO: 0.7 K/UL (ref 0.3–1)
MONOCYTES NFR BLD: 9.9 % (ref 4–15)
NEUTROPHILS # BLD AUTO: 4.2 K/UL (ref 1.8–7.7)
NEUTROPHILS NFR BLD: 63.7 % (ref 38–73)
NRBC BLD-RTO: 0 /100 WBC
PLATELET # BLD AUTO: 199 K/UL (ref 150–450)
PMV BLD AUTO: 9.9 FL (ref 9.2–12.9)
POCT GLUCOSE: 100 MG/DL (ref 70–110)
POCT GLUCOSE: 108 MG/DL (ref 70–110)
POCT GLUCOSE: 57 MG/DL (ref 70–110)
POCT GLUCOSE: 95 MG/DL (ref 70–110)
POTASSIUM SERPL-SCNC: 8.1 MMOL/L (ref 3.5–5.1)
PROT SERPL-MCNC: 8.3 G/DL (ref 6–8.4)
RBC # BLD AUTO: 3.64 M/UL (ref 4–5.4)
SARS-COV-2 RDRP RESP QL NAA+PROBE: NEGATIVE
SODIUM SERPL-SCNC: 143 MMOL/L (ref 136–145)
TROPONIN I SERPL-MCNC: <0.012 NG/ML (ref 0.01–0.03)
UUN UR-MCNC: 138 MG/DL (ref 7–17)
WBC # BLD AUTO: 6.65 K/UL (ref 3.9–12.7)

## 2021-09-01 PROCEDURE — 25000003 PHARM REV CODE 250: Mod: ER | Performed by: EMERGENCY MEDICINE

## 2021-09-01 PROCEDURE — 96374 THER/PROPH/DIAG INJ IV PUSH: CPT | Mod: ER

## 2021-09-01 PROCEDURE — 96375 TX/PRO/DX INJ NEW DRUG ADDON: CPT | Mod: ER

## 2021-09-01 PROCEDURE — 83735 ASSAY OF MAGNESIUM: CPT | Mod: ER | Performed by: EMERGENCY MEDICINE

## 2021-09-01 PROCEDURE — 93010 EKG 12-LEAD: ICD-10-PCS | Mod: ,,, | Performed by: INTERNAL MEDICINE

## 2021-09-01 PROCEDURE — 99291 CRITICAL CARE FIRST HOUR: CPT | Mod: 25,ER

## 2021-09-01 PROCEDURE — 85025 COMPLETE CBC W/AUTO DIFF WBC: CPT | Mod: ER | Performed by: EMERGENCY MEDICINE

## 2021-09-01 PROCEDURE — 93005 ELECTROCARDIOGRAM TRACING: CPT | Mod: ER

## 2021-09-01 PROCEDURE — 93010 ELECTROCARDIOGRAM REPORT: CPT | Mod: ,,, | Performed by: INTERNAL MEDICINE

## 2021-09-01 PROCEDURE — 84484 ASSAY OF TROPONIN QUANT: CPT | Mod: ER | Performed by: EMERGENCY MEDICINE

## 2021-09-01 PROCEDURE — 80053 COMPREHEN METABOLIC PANEL: CPT | Mod: ER | Performed by: EMERGENCY MEDICINE

## 2021-09-01 PROCEDURE — 63600175 PHARM REV CODE 636 W HCPCS: Mod: ER | Performed by: INTERNAL MEDICINE

## 2021-09-01 PROCEDURE — 63600175 PHARM REV CODE 636 W HCPCS: Mod: ER | Performed by: EMERGENCY MEDICINE

## 2021-09-01 PROCEDURE — U0002 COVID-19 LAB TEST NON-CDC: HCPCS | Mod: ER | Performed by: EMERGENCY MEDICINE

## 2021-09-01 PROCEDURE — 25000003 PHARM REV CODE 250: Mod: ER | Performed by: INTERNAL MEDICINE

## 2021-09-01 PROCEDURE — 11000001 HC ACUTE MED/SURG PRIVATE ROOM

## 2021-09-01 RX ORDER — SODIUM CHLORIDE 0.9 % (FLUSH) 0.9 %
10 SYRINGE (ML) INJECTION
Status: DISCONTINUED | OUTPATIENT
Start: 2021-09-01 | End: 2021-09-04 | Stop reason: HOSPADM

## 2021-09-01 RX ORDER — CINACALCET 60 MG/1
60 TABLET, FILM COATED ORAL DAILY
COMMUNITY
Start: 2021-07-17

## 2021-09-01 RX ORDER — CALCIUM GLUCONATE 98 MG/ML
1 INJECTION, SOLUTION INTRAVENOUS
Status: COMPLETED | OUTPATIENT
Start: 2021-09-01 | End: 2021-09-01

## 2021-09-01 RX ORDER — ONDANSETRON 2 MG/ML
4 INJECTION INTRAMUSCULAR; INTRAVENOUS EVERY 8 HOURS PRN
Status: DISCONTINUED | OUTPATIENT
Start: 2021-09-01 | End: 2021-09-04 | Stop reason: HOSPADM

## 2021-09-01 RX ORDER — SODIUM BICARBONATE 1 MEQ/ML
50 SYRINGE (ML) INTRAVENOUS
Status: COMPLETED | OUTPATIENT
Start: 2021-09-01 | End: 2021-09-01

## 2021-09-01 RX ORDER — TALC
6 POWDER (GRAM) TOPICAL NIGHTLY PRN
Status: DISCONTINUED | OUTPATIENT
Start: 2021-09-01 | End: 2021-09-04 | Stop reason: HOSPADM

## 2021-09-01 RX ORDER — DEXTROSE 50 % IN WATER (D50W) INTRAVENOUS SYRINGE
25
Status: COMPLETED | OUTPATIENT
Start: 2021-09-01 | End: 2021-09-01

## 2021-09-01 RX ORDER — IBUPROFEN 200 MG
24 TABLET ORAL
Status: DISCONTINUED | OUTPATIENT
Start: 2021-09-01 | End: 2021-09-04 | Stop reason: HOSPADM

## 2021-09-01 RX ORDER — GLUCAGON 1 MG
1 KIT INJECTION
Status: DISCONTINUED | OUTPATIENT
Start: 2021-09-01 | End: 2021-09-04 | Stop reason: HOSPADM

## 2021-09-01 RX ORDER — INSULIN ASPART 100 [IU]/ML
0-5 INJECTION, SOLUTION INTRAVENOUS; SUBCUTANEOUS
Status: DISCONTINUED | OUTPATIENT
Start: 2021-09-01 | End: 2021-09-04 | Stop reason: HOSPADM

## 2021-09-01 RX ORDER — ONDANSETRON 2 MG/ML
4 INJECTION INTRAMUSCULAR; INTRAVENOUS
Status: COMPLETED | OUTPATIENT
Start: 2021-09-01 | End: 2021-09-01

## 2021-09-01 RX ORDER — IBUPROFEN 200 MG
16 TABLET ORAL
Status: DISCONTINUED | OUTPATIENT
Start: 2021-09-01 | End: 2021-09-04 | Stop reason: HOSPADM

## 2021-09-01 RX ORDER — ACETAMINOPHEN 325 MG/1
650 TABLET ORAL EVERY 4 HOURS PRN
Status: DISCONTINUED | OUTPATIENT
Start: 2021-09-01 | End: 2021-09-04 | Stop reason: HOSPADM

## 2021-09-01 RX ADMIN — INSULIN HUMAN 10 UNITS: 100 INJECTION, SOLUTION PARENTERAL at 09:09

## 2021-09-01 RX ADMIN — DEXTROSE MONOHYDRATE 25 G: 25 INJECTION, SOLUTION INTRAVENOUS at 03:09

## 2021-09-01 RX ADMIN — CALCIUM GLUCONATE 1 G: 98 INJECTION, SOLUTION INTRAVENOUS at 09:09

## 2021-09-01 RX ADMIN — ONDANSETRON 4 MG: 2 INJECTION INTRAMUSCULAR; INTRAVENOUS at 05:09

## 2021-09-01 RX ADMIN — INSULIN HUMAN 10 UNITS: 100 INJECTION, SOLUTION PARENTERAL at 03:09

## 2021-09-01 RX ADMIN — SODIUM BICARBONATE 50 MEQ: 84 INJECTION INTRAVENOUS at 09:09

## 2021-09-01 RX ADMIN — SODIUM BICARBONATE 50 MEQ: 84 INJECTION INTRAVENOUS at 03:09

## 2021-09-01 RX ADMIN — CALCIUM GLUCONATE 1 G: 98 INJECTION, SOLUTION INTRAVENOUS at 03:09

## 2021-09-01 RX ADMIN — DEXTROSE MONOHYDRATE 25 G: 25 INJECTION, SOLUTION INTRAVENOUS at 09:09

## 2021-09-02 ENCOUNTER — PATIENT OUTREACH (OUTPATIENT)
Dept: ADMINISTRATIVE | Facility: OTHER | Age: 71
End: 2021-09-02

## 2021-09-02 LAB
ANION GAP SERPL CALC-SCNC: 22 MMOL/L (ref 8–16)
BASOPHILS # BLD AUTO: 0.06 K/UL (ref 0–0.2)
BASOPHILS NFR BLD: 0.8 % (ref 0–1.9)
BUN SERPL-MCNC: 127 MG/DL (ref 8–23)
CALCIUM SERPL-MCNC: 8.9 MG/DL (ref 8.7–10.5)
CHLORIDE SERPL-SCNC: 105 MMOL/L (ref 95–110)
CO2 SERPL-SCNC: 16 MMOL/L (ref 23–29)
CREAT SERPL-MCNC: 21.9 MG/DL (ref 0.5–1.4)
DIFFERENTIAL METHOD: ABNORMAL
EOSINOPHIL # BLD AUTO: 0.1 K/UL (ref 0–0.5)
EOSINOPHIL NFR BLD: 1.6 % (ref 0–8)
ERYTHROCYTE [DISTWIDTH] IN BLOOD BY AUTOMATED COUNT: 15.2 % (ref 11.5–14.5)
EST. GFR  (AFRICAN AMERICAN): 2 ML/MIN/1.73 M^2
EST. GFR  (NON AFRICAN AMERICAN): 1 ML/MIN/1.73 M^2
ESTIMATED AVG GLUCOSE: 117 MG/DL (ref 68–131)
GLUCOSE SERPL-MCNC: 75 MG/DL (ref 70–110)
HBA1C MFR BLD: 5.7 % (ref 4–5.6)
HCT VFR BLD AUTO: 34.6 % (ref 37–48.5)
HGB BLD-MCNC: 11.1 G/DL (ref 12–16)
IMM GRANULOCYTES # BLD AUTO: 0.02 K/UL (ref 0–0.04)
IMM GRANULOCYTES NFR BLD AUTO: 0.3 % (ref 0–0.5)
LYMPHOCYTES # BLD AUTO: 1.3 K/UL (ref 1–4.8)
LYMPHOCYTES NFR BLD: 17.1 % (ref 18–48)
MAGNESIUM SERPL-MCNC: 2.2 MG/DL (ref 1.6–2.6)
MCH RBC QN AUTO: 31.9 PG (ref 27–31)
MCHC RBC AUTO-ENTMCNC: 32.1 G/DL (ref 32–36)
MCV RBC AUTO: 99 FL (ref 82–98)
MONOCYTES # BLD AUTO: 1.1 K/UL (ref 0.3–1)
MONOCYTES NFR BLD: 14.2 % (ref 4–15)
NEUTROPHILS # BLD AUTO: 4.9 K/UL (ref 1.8–7.7)
NEUTROPHILS NFR BLD: 66 % (ref 38–73)
NRBC BLD-RTO: 0 /100 WBC
PHOSPHATE SERPL-MCNC: 5 MG/DL (ref 2.7–4.5)
PLATELET # BLD AUTO: 203 K/UL (ref 150–450)
PMV BLD AUTO: 10.4 FL (ref 9.2–12.9)
POCT GLUCOSE: 100 MG/DL (ref 70–110)
POCT GLUCOSE: 105 MG/DL (ref 70–110)
POCT GLUCOSE: 112 MG/DL (ref 70–110)
POCT GLUCOSE: 129 MG/DL (ref 70–110)
POCT GLUCOSE: 150 MG/DL (ref 70–110)
POTASSIUM SERPL-SCNC: 6.1 MMOL/L (ref 3.5–5.1)
RBC # BLD AUTO: 3.48 M/UL (ref 4–5.4)
SODIUM SERPL-SCNC: 143 MMOL/L (ref 136–145)
WBC # BLD AUTO: 7.47 K/UL (ref 3.9–12.7)

## 2021-09-02 PROCEDURE — 11000001 HC ACUTE MED/SURG PRIVATE ROOM

## 2021-09-02 PROCEDURE — 36415 COLL VENOUS BLD VENIPUNCTURE: CPT | Performed by: INTERNAL MEDICINE

## 2021-09-02 PROCEDURE — 80048 BASIC METABOLIC PNL TOTAL CA: CPT | Performed by: HOSPITALIST

## 2021-09-02 PROCEDURE — 86704 HEP B CORE ANTIBODY TOTAL: CPT | Performed by: INTERNAL MEDICINE

## 2021-09-02 PROCEDURE — 25000003 PHARM REV CODE 250: Performed by: NURSE PRACTITIONER

## 2021-09-02 PROCEDURE — 86706 HEP B SURFACE ANTIBODY: CPT | Performed by: INTERNAL MEDICINE

## 2021-09-02 PROCEDURE — 63600175 PHARM REV CODE 636 W HCPCS: Performed by: NURSE PRACTITIONER

## 2021-09-02 PROCEDURE — 84100 ASSAY OF PHOSPHORUS: CPT | Performed by: HOSPITALIST

## 2021-09-02 PROCEDURE — 87340 HEPATITIS B SURFACE AG IA: CPT | Performed by: INTERNAL MEDICINE

## 2021-09-02 PROCEDURE — 90935 HEMODIALYSIS ONE EVALUATION: CPT

## 2021-09-02 PROCEDURE — 85025 COMPLETE CBC W/AUTO DIFF WBC: CPT | Performed by: HOSPITALIST

## 2021-09-02 PROCEDURE — 36415 COLL VENOUS BLD VENIPUNCTURE: CPT | Performed by: HOSPITALIST

## 2021-09-02 PROCEDURE — 83036 HEMOGLOBIN GLYCOSYLATED A1C: CPT | Performed by: HOSPITALIST

## 2021-09-02 PROCEDURE — 83735 ASSAY OF MAGNESIUM: CPT | Performed by: HOSPITALIST

## 2021-09-02 RX ORDER — SEVELAMER CARBONATE 800 MG/1
800 TABLET, FILM COATED ORAL
Status: DISCONTINUED | OUTPATIENT
Start: 2021-09-02 | End: 2021-09-04 | Stop reason: HOSPADM

## 2021-09-02 RX ORDER — SODIUM CHLORIDE 9 MG/ML
INJECTION, SOLUTION INTRAVENOUS
Status: DISCONTINUED | OUTPATIENT
Start: 2021-09-02 | End: 2021-09-04 | Stop reason: HOSPADM

## 2021-09-02 RX ORDER — CINACALCET 30 MG/1
60 TABLET, FILM COATED ORAL
Status: DISCONTINUED | OUTPATIENT
Start: 2021-09-02 | End: 2021-09-04 | Stop reason: HOSPADM

## 2021-09-02 RX ORDER — MUPIROCIN 20 MG/G
OINTMENT TOPICAL 2 TIMES DAILY
Status: DISCONTINUED | OUTPATIENT
Start: 2021-09-02 | End: 2021-09-04 | Stop reason: HOSPADM

## 2021-09-02 RX ORDER — HYDROCODONE BITARTRATE AND ACETAMINOPHEN 5; 325 MG/1; MG/1
1 TABLET ORAL EVERY 8 HOURS PRN
Status: DISCONTINUED | OUTPATIENT
Start: 2021-09-02 | End: 2021-09-04 | Stop reason: HOSPADM

## 2021-09-02 RX ORDER — PANTOPRAZOLE SODIUM 40 MG/1
40 TABLET, DELAYED RELEASE ORAL DAILY
Status: DISCONTINUED | OUTPATIENT
Start: 2021-09-02 | End: 2021-09-04 | Stop reason: HOSPADM

## 2021-09-02 RX ORDER — SODIUM CHLORIDE 9 MG/ML
INJECTION, SOLUTION INTRAVENOUS ONCE
Status: DISCONTINUED | OUTPATIENT
Start: 2021-09-02 | End: 2021-09-04 | Stop reason: HOSPADM

## 2021-09-02 RX ADMIN — PANTOPRAZOLE SODIUM 40 MG: 40 TABLET, DELAYED RELEASE ORAL at 08:09

## 2021-09-02 RX ADMIN — SEVELAMER CARBONATE 800 MG: 800 TABLET, FILM COATED ORAL at 08:09

## 2021-09-02 RX ADMIN — HYDROCODONE BITARTRATE AND ACETAMINOPHEN 1 TABLET: 5; 325 TABLET ORAL at 09:09

## 2021-09-02 RX ADMIN — APIXABAN 2.5 MG: 2.5 TABLET, FILM COATED ORAL at 09:09

## 2021-09-02 RX ADMIN — DEXTROSE MONOHYDRATE 25 G: 25 INJECTION, SOLUTION INTRAVENOUS at 05:09

## 2021-09-02 RX ADMIN — INSULIN HUMAN 5 UNITS: 100 INJECTION, SOLUTION PARENTERAL at 05:09

## 2021-09-02 RX ADMIN — SEVELAMER CARBONATE 800 MG: 800 TABLET, FILM COATED ORAL at 12:09

## 2021-09-02 RX ADMIN — CINACALCET HYDROCHLORIDE 60 MG: 30 TABLET, FILM COATED ORAL at 08:09

## 2021-09-02 RX ADMIN — SEVELAMER CARBONATE 800 MG: 800 TABLET, FILM COATED ORAL at 06:09

## 2021-09-02 RX ADMIN — APIXABAN 2.5 MG: 2.5 TABLET, FILM COATED ORAL at 08:09

## 2021-09-03 PROBLEM — E11.9 DM2 (DIABETES MELLITUS, TYPE 2): Status: RESOLVED | Noted: 2021-09-01 | Resolved: 2021-09-03

## 2021-09-03 PROBLEM — E78.5 HYPERLIPIDEMIA ASSOCIATED WITH TYPE 2 DIABETES MELLITUS: Chronic | Status: RESOLVED | Noted: 2018-12-20 | Resolved: 2021-09-03

## 2021-09-03 PROBLEM — Z86.73 HISTORY OF EMBOLIC STROKE: Chronic | Status: RESOLVED | Noted: 2019-04-03 | Resolved: 2021-09-03

## 2021-09-03 PROBLEM — Z86.711 HISTORY OF PULMONARY EMBOLISM: Chronic | Status: RESOLVED | Noted: 2019-01-11 | Resolved: 2021-09-03

## 2021-09-03 PROBLEM — E11.69 HYPERLIPIDEMIA ASSOCIATED WITH TYPE 2 DIABETES MELLITUS: Chronic | Status: RESOLVED | Noted: 2018-12-20 | Resolved: 2021-09-03

## 2021-09-03 PROBLEM — I50.32 CHRONIC DIASTOLIC HEART FAILURE: Chronic | Status: RESOLVED | Noted: 2019-01-10 | Resolved: 2021-09-03

## 2021-09-03 LAB
ANION GAP SERPL CALC-SCNC: 15 MMOL/L (ref 8–16)
BASOPHILS # BLD AUTO: 0.04 K/UL (ref 0–0.2)
BASOPHILS NFR BLD: 0.6 % (ref 0–1.9)
BUN SERPL-MCNC: 91 MG/DL (ref 8–23)
CALCIUM SERPL-MCNC: 8 MG/DL (ref 8.7–10.5)
CHLORIDE SERPL-SCNC: 101 MMOL/L (ref 95–110)
CO2 SERPL-SCNC: 23 MMOL/L (ref 23–29)
CREAT SERPL-MCNC: 15.8 MG/DL (ref 0.5–1.4)
DIFFERENTIAL METHOD: ABNORMAL
EOSINOPHIL # BLD AUTO: 0.2 K/UL (ref 0–0.5)
EOSINOPHIL NFR BLD: 2.9 % (ref 0–8)
ERYTHROCYTE [DISTWIDTH] IN BLOOD BY AUTOMATED COUNT: 15.4 % (ref 11.5–14.5)
EST. GFR  (AFRICAN AMERICAN): 2 ML/MIN/1.73 M^2
EST. GFR  (NON AFRICAN AMERICAN): 2 ML/MIN/1.73 M^2
GLUCOSE SERPL-MCNC: 98 MG/DL (ref 70–110)
HBV CORE AB SERPL QL IA: NEGATIVE
HBV SURFACE AG SERPL QL IA: NEGATIVE
HCT VFR BLD AUTO: 30.7 % (ref 37–48.5)
HGB BLD-MCNC: 9.7 G/DL (ref 12–16)
IMM GRANULOCYTES # BLD AUTO: 0.02 K/UL (ref 0–0.04)
IMM GRANULOCYTES NFR BLD AUTO: 0.3 % (ref 0–0.5)
LYMPHOCYTES # BLD AUTO: 2 K/UL (ref 1–4.8)
LYMPHOCYTES NFR BLD: 30.8 % (ref 18–48)
MAGNESIUM SERPL-MCNC: 2.1 MG/DL (ref 1.6–2.6)
MCH RBC QN AUTO: 31.4 PG (ref 27–31)
MCHC RBC AUTO-ENTMCNC: 31.6 G/DL (ref 32–36)
MCV RBC AUTO: 99 FL (ref 82–98)
MONOCYTES # BLD AUTO: 1 K/UL (ref 0.3–1)
MONOCYTES NFR BLD: 14.7 % (ref 4–15)
NEUTROPHILS # BLD AUTO: 3.3 K/UL (ref 1.8–7.7)
NEUTROPHILS NFR BLD: 50.7 % (ref 38–73)
NRBC BLD-RTO: 0 /100 WBC
PHOSPHATE SERPL-MCNC: 5.2 MG/DL (ref 2.7–4.5)
PLATELET # BLD AUTO: 165 K/UL (ref 150–450)
PMV BLD AUTO: 10.1 FL (ref 9.2–12.9)
POCT GLUCOSE: 118 MG/DL (ref 70–110)
POCT GLUCOSE: 99 MG/DL (ref 70–110)
POTASSIUM SERPL-SCNC: 5.5 MMOL/L (ref 3.5–5.1)
RBC # BLD AUTO: 3.09 M/UL (ref 4–5.4)
SODIUM SERPL-SCNC: 139 MMOL/L (ref 136–145)
WBC # BLD AUTO: 6.47 K/UL (ref 3.9–12.7)

## 2021-09-03 PROCEDURE — 25000003 PHARM REV CODE 250: Performed by: NURSE PRACTITIONER

## 2021-09-03 PROCEDURE — 63600175 PHARM REV CODE 636 W HCPCS: Performed by: NURSE PRACTITIONER

## 2021-09-03 PROCEDURE — 25000003 PHARM REV CODE 250: Performed by: INTERNAL MEDICINE

## 2021-09-03 PROCEDURE — 84100 ASSAY OF PHOSPHORUS: CPT | Performed by: HOSPITALIST

## 2021-09-03 PROCEDURE — 80048 BASIC METABOLIC PNL TOTAL CA: CPT | Performed by: HOSPITALIST

## 2021-09-03 PROCEDURE — 36415 COLL VENOUS BLD VENIPUNCTURE: CPT | Performed by: HOSPITALIST

## 2021-09-03 PROCEDURE — 85025 COMPLETE CBC W/AUTO DIFF WBC: CPT | Performed by: HOSPITALIST

## 2021-09-03 PROCEDURE — 11000001 HC ACUTE MED/SURG PRIVATE ROOM

## 2021-09-03 PROCEDURE — 83735 ASSAY OF MAGNESIUM: CPT | Performed by: HOSPITALIST

## 2021-09-03 PROCEDURE — 80100016 HC MAINTENANCE HEMODIALYSIS

## 2021-09-03 RX ADMIN — APIXABAN 2.5 MG: 2.5 TABLET, FILM COATED ORAL at 09:09

## 2021-09-03 RX ADMIN — CINACALCET HYDROCHLORIDE 60 MG: 30 TABLET, FILM COATED ORAL at 08:09

## 2021-09-03 RX ADMIN — MUPIROCIN: 20 OINTMENT TOPICAL at 08:09

## 2021-09-03 RX ADMIN — SEVELAMER CARBONATE 800 MG: 800 TABLET, FILM COATED ORAL at 08:09

## 2021-09-03 RX ADMIN — APIXABAN 2.5 MG: 2.5 TABLET, FILM COATED ORAL at 08:09

## 2021-09-03 RX ADMIN — SEVELAMER CARBONATE 800 MG: 800 TABLET, FILM COATED ORAL at 06:09

## 2021-09-03 RX ADMIN — SEVELAMER CARBONATE 800 MG: 800 TABLET, FILM COATED ORAL at 12:09

## 2021-09-03 RX ADMIN — PANTOPRAZOLE SODIUM 40 MG: 40 TABLET, DELAYED RELEASE ORAL at 08:09

## 2021-09-04 ENCOUNTER — PATIENT OUTREACH (OUTPATIENT)
Dept: ADMINISTRATIVE | Facility: OTHER | Age: 71
End: 2021-09-04

## 2021-09-04 VITALS
OXYGEN SATURATION: 94 % | BODY MASS INDEX: 25.52 KG/M2 | DIASTOLIC BLOOD PRESSURE: 57 MMHG | SYSTOLIC BLOOD PRESSURE: 108 MMHG | HEIGHT: 64 IN | HEART RATE: 81 BPM | TEMPERATURE: 99 F | RESPIRATION RATE: 17 BRPM | WEIGHT: 149.5 LBS

## 2021-09-04 LAB
ANION GAP SERPL CALC-SCNC: 17 MMOL/L (ref 8–16)
BASOPHILS # BLD AUTO: 0.05 K/UL (ref 0–0.2)
BASOPHILS NFR BLD: 0.8 % (ref 0–1.9)
BUN SERPL-MCNC: 79 MG/DL (ref 8–23)
CALCIUM SERPL-MCNC: 8.1 MG/DL (ref 8.7–10.5)
CHLORIDE SERPL-SCNC: 100 MMOL/L (ref 95–110)
CO2 SERPL-SCNC: 21 MMOL/L (ref 23–29)
CREAT SERPL-MCNC: 15.1 MG/DL (ref 0.5–1.4)
DIFFERENTIAL METHOD: ABNORMAL
EOSINOPHIL # BLD AUTO: 0.2 K/UL (ref 0–0.5)
EOSINOPHIL NFR BLD: 3.6 % (ref 0–8)
ERYTHROCYTE [DISTWIDTH] IN BLOOD BY AUTOMATED COUNT: 14.8 % (ref 11.5–14.5)
EST. GFR  (AFRICAN AMERICAN): 2 ML/MIN/1.73 M^2
EST. GFR  (NON AFRICAN AMERICAN): 2 ML/MIN/1.73 M^2
GLUCOSE SERPL-MCNC: 76 MG/DL (ref 70–110)
HCT VFR BLD AUTO: 32.6 % (ref 37–48.5)
HGB BLD-MCNC: 10.2 G/DL (ref 12–16)
IMM GRANULOCYTES # BLD AUTO: 0.01 K/UL (ref 0–0.04)
IMM GRANULOCYTES NFR BLD AUTO: 0.2 % (ref 0–0.5)
LYMPHOCYTES # BLD AUTO: 2.3 K/UL (ref 1–4.8)
LYMPHOCYTES NFR BLD: 36.4 % (ref 18–48)
MAGNESIUM SERPL-MCNC: 1.9 MG/DL (ref 1.6–2.6)
MCH RBC QN AUTO: 31 PG (ref 27–31)
MCHC RBC AUTO-ENTMCNC: 31.3 G/DL (ref 32–36)
MCV RBC AUTO: 99 FL (ref 82–98)
MONOCYTES # BLD AUTO: 0.9 K/UL (ref 0.3–1)
MONOCYTES NFR BLD: 13.4 % (ref 4–15)
NEUTROPHILS # BLD AUTO: 2.9 K/UL (ref 1.8–7.7)
NEUTROPHILS NFR BLD: 45.6 % (ref 38–73)
NRBC BLD-RTO: 0 /100 WBC
PHOSPHATE SERPL-MCNC: 5.4 MG/DL (ref 2.7–4.5)
PLATELET # BLD AUTO: 182 K/UL (ref 150–450)
PMV BLD AUTO: 10.6 FL (ref 9.2–12.9)
POCT GLUCOSE: 119 MG/DL (ref 70–110)
POTASSIUM SERPL-SCNC: 5.6 MMOL/L (ref 3.5–5.1)
RBC # BLD AUTO: 3.29 M/UL (ref 4–5.4)
SODIUM SERPL-SCNC: 138 MMOL/L (ref 136–145)
WBC # BLD AUTO: 6.43 K/UL (ref 3.9–12.7)

## 2021-09-04 PROCEDURE — 85025 COMPLETE CBC W/AUTO DIFF WBC: CPT | Performed by: HOSPITALIST

## 2021-09-04 PROCEDURE — 63600175 PHARM REV CODE 636 W HCPCS: Performed by: NURSE PRACTITIONER

## 2021-09-04 PROCEDURE — 25000003 PHARM REV CODE 250: Performed by: INTERNAL MEDICINE

## 2021-09-04 PROCEDURE — 84100 ASSAY OF PHOSPHORUS: CPT | Performed by: HOSPITALIST

## 2021-09-04 PROCEDURE — 36415 COLL VENOUS BLD VENIPUNCTURE: CPT | Performed by: HOSPITALIST

## 2021-09-04 PROCEDURE — 83735 ASSAY OF MAGNESIUM: CPT | Performed by: HOSPITALIST

## 2021-09-04 PROCEDURE — 80048 BASIC METABOLIC PNL TOTAL CA: CPT | Performed by: HOSPITALIST

## 2021-09-04 PROCEDURE — 25000003 PHARM REV CODE 250: Performed by: NURSE PRACTITIONER

## 2021-09-04 RX ADMIN — CINACALCET HYDROCHLORIDE 60 MG: 30 TABLET, FILM COATED ORAL at 08:09

## 2021-09-04 RX ADMIN — SEVELAMER CARBONATE 800 MG: 800 TABLET, FILM COATED ORAL at 08:09

## 2021-09-04 RX ADMIN — MUPIROCIN: 20 OINTMENT TOPICAL at 08:09

## 2021-09-04 RX ADMIN — APIXABAN 2.5 MG: 2.5 TABLET, FILM COATED ORAL at 08:09

## 2021-09-04 RX ADMIN — PANTOPRAZOLE SODIUM 40 MG: 40 TABLET, DELAYED RELEASE ORAL at 08:09

## 2021-09-06 ENCOUNTER — TELEPHONE (OUTPATIENT)
Dept: FAMILY MEDICINE | Facility: CLINIC | Age: 71
End: 2021-09-06

## 2021-09-09 LAB
HBV SURFACE AB SER QL IA: NEGATIVE
HBV SURFACE AB SERPL IA-ACNC: 6 MIU/ML

## 2021-09-17 ENCOUNTER — OUTPATIENT CASE MANAGEMENT (OUTPATIENT)
Dept: ADMINISTRATIVE | Facility: OTHER | Age: 71
End: 2021-09-17

## 2021-09-27 ENCOUNTER — TELEPHONE (OUTPATIENT)
Dept: FAMILY MEDICINE | Facility: CLINIC | Age: 71
End: 2021-09-27

## 2021-09-28 RX ORDER — PROMETHAZINE HYDROCHLORIDE 25 MG/1
25 TABLET ORAL EVERY 6 HOURS PRN
Qty: 20 TABLET | Refills: 0 | Status: SHIPPED | OUTPATIENT
Start: 2021-09-28

## 2021-11-14 NOTE — ASSESSMENT & PLAN NOTE
Embolic etiology suspected but unknown due to what process. Continue rivaroxaban 10 mg daily, which was previously for pulmonary embolism. Appreciate Vascular Neurology. Can get cardiac event monitor outpatient. Inpatient rehab.   I will SWITCH the dose or number of times a day I take the medications listed below when I get home from the hospital:  None

## 2021-11-30 ENCOUNTER — TELEPHONE (OUTPATIENT)
Dept: TRANSPLANT | Facility: CLINIC | Age: 71
End: 2021-11-30
Payer: MEDICARE

## 2021-12-03 ENCOUNTER — TELEPHONE (OUTPATIENT)
Dept: TRANSPLANT | Facility: CLINIC | Age: 71
End: 2021-12-03
Payer: MEDICARE

## 2021-12-20 DIAGNOSIS — Z86.711 HISTORY OF PULMONARY EMBOLISM: Chronic | ICD-10-CM

## 2021-12-22 RX ORDER — APIXABAN 2.5 MG/1
TABLET, FILM COATED ORAL
Qty: 60 TABLET | Refills: 0 | Status: SHIPPED | OUTPATIENT
Start: 2021-12-22 | End: 2022-02-02

## 2021-12-29 ENCOUNTER — TELEPHONE (OUTPATIENT)
Dept: FAMILY MEDICINE | Facility: CLINIC | Age: 71
End: 2021-12-29
Payer: MEDICARE

## 2021-12-29 DIAGNOSIS — R05.9 COUGH: ICD-10-CM

## 2022-01-01 ENCOUNTER — HOSPITAL ENCOUNTER (INPATIENT)
Facility: HOSPITAL | Age: 72
LOS: 3 days | Discharge: HOSPICE | DRG: 640 | End: 2022-05-27
Attending: EMERGENCY MEDICINE | Admitting: HOSPITALIST
Payer: MEDICARE

## 2022-01-01 ENCOUNTER — HOSPITAL ENCOUNTER (INPATIENT)
Facility: HOSPITAL | Age: 72
LOS: 1 days | DRG: 951 | End: 2022-05-27
Attending: STUDENT IN AN ORGANIZED HEALTH CARE EDUCATION/TRAINING PROGRAM | Admitting: STUDENT IN AN ORGANIZED HEALTH CARE EDUCATION/TRAINING PROGRAM
Payer: OTHER MISCELLANEOUS

## 2022-01-01 VITALS — DIASTOLIC BLOOD PRESSURE: 61 MMHG | SYSTOLIC BLOOD PRESSURE: 99 MMHG | TEMPERATURE: 95 F | OXYGEN SATURATION: 73 %

## 2022-01-01 VITALS
TEMPERATURE: 97 F | HEART RATE: 118 BPM | SYSTOLIC BLOOD PRESSURE: 106 MMHG | HEIGHT: 65 IN | BODY MASS INDEX: 27.18 KG/M2 | WEIGHT: 163.13 LBS | DIASTOLIC BLOOD PRESSURE: 62 MMHG | OXYGEN SATURATION: 100 % | RESPIRATION RATE: 20 BRPM

## 2022-01-01 DIAGNOSIS — R73.9 HYPERGLYCEMIA: ICD-10-CM

## 2022-01-01 DIAGNOSIS — I10 ESSENTIAL HYPERTENSION: ICD-10-CM

## 2022-01-01 DIAGNOSIS — N18.6 ESRD (END STAGE RENAL DISEASE): ICD-10-CM

## 2022-01-01 DIAGNOSIS — J96.01 ACUTE HYPOXEMIC RESPIRATORY FAILURE: ICD-10-CM

## 2022-01-01 DIAGNOSIS — E87.5 HYPERKALEMIA: ICD-10-CM

## 2022-01-01 DIAGNOSIS — E87.20 METABOLIC ACIDOSIS: ICD-10-CM

## 2022-01-01 DIAGNOSIS — I46.9 CARDIAC ARREST: Primary | ICD-10-CM

## 2022-01-01 DIAGNOSIS — I46.8 CARDIAC ARREST DUE TO OTHER UNDERLYING CONDITION: ICD-10-CM

## 2022-01-01 DIAGNOSIS — E87.20 LACTIC ACIDOSIS: ICD-10-CM

## 2022-01-01 DIAGNOSIS — R57.9 SHOCK: ICD-10-CM

## 2022-01-01 LAB
ALBUMIN SERPL BCP-MCNC: 2.1 G/DL (ref 3.5–5.2)
ALBUMIN SERPL BCP-MCNC: 2.5 G/DL (ref 3.5–5.2)
ALBUMIN SERPL BCP-MCNC: 2.8 G/DL (ref 3.5–5.2)
ALBUMIN SERPL BCP-MCNC: 3.5 G/DL (ref 3.5–5.2)
ALLENS TEST: ABNORMAL
ALP SERPL-CCNC: 60 U/L (ref 55–135)
ALP SERPL-CCNC: 66 U/L (ref 55–135)
ALP SERPL-CCNC: 69 U/L (ref 38–126)
ALT SERPL W/O P-5'-P-CCNC: 129 U/L (ref 10–44)
ALT SERPL W/O P-5'-P-CCNC: 141 U/L (ref 10–44)
ALT SERPL W/O P-5'-P-CCNC: 59 U/L (ref 10–44)
ALT SERPL W/O P-5'-P-CCNC: 88 U/L (ref 10–44)
ANION GAP SERPL CALC-SCNC: 17 MMOL/L (ref 8–16)
ANION GAP SERPL CALC-SCNC: 17 MMOL/L (ref 8–16)
ANION GAP SERPL CALC-SCNC: 18 MMOL/L (ref 8–16)
ANION GAP SERPL CALC-SCNC: 19 MMOL/L (ref 8–16)
ANION GAP SERPL CALC-SCNC: 19 MMOL/L (ref 8–16)
ANION GAP SERPL CALC-SCNC: 20 MMOL/L (ref 8–16)
ANION GAP SERPL CALC-SCNC: 22 MMOL/L (ref 8–16)
ANION GAP SERPL CALC-SCNC: 23 MMOL/L (ref 8–16)
ANION GAP SERPL CALC-SCNC: 23 MMOL/L (ref 8–16)
ANION GAP SERPL CALC-SCNC: 24 MMOL/L (ref 8–16)
ANION GAP SERPL CALC-SCNC: 25 MMOL/L (ref 8–16)
ANION GAP SERPL CALC-SCNC: 32 MMOL/L (ref 8–16)
AORTIC ROOT ANNULUS: 2.27 CM
AORTIC VALVE CUSP SEPERATION: 0.97 CM
APTT BLDCRRT: 105.2 SEC (ref 21–32)
APTT BLDCRRT: >150 SEC (ref 21–32)
AST SERPL-CCNC: 148 U/L (ref 15–46)
AST SERPL-CCNC: 41 U/L (ref 10–40)
AST SERPL-CCNC: 96 U/L (ref 10–40)
AV INDEX (PROSTH): 0.41
AV MEAN GRADIENT: 7 MMHG
AV PEAK GRADIENT: 13 MMHG
AV VALVE AREA: 1.12 CM2
AV VELOCITY RATIO: 0.44
BASOPHILS # BLD AUTO: 0.01 K/UL (ref 0–0.2)
BASOPHILS # BLD AUTO: 0.01 K/UL (ref 0–0.2)
BASOPHILS # BLD AUTO: 0.02 K/UL (ref 0–0.2)
BASOPHILS # BLD AUTO: 0.02 K/UL (ref 0–0.2)
BASOPHILS NFR BLD: 0.1 % (ref 0–1.9)
BILIRUB SERPL-MCNC: 0.3 MG/DL (ref 0.1–1)
BILIRUB SERPL-MCNC: 0.3 MG/DL (ref 0.1–1)
BILIRUB SERPL-MCNC: 0.4 MG/DL (ref 0.1–1)
BILIRUB SERPL-MCNC: 0.5 MG/DL (ref 0.1–1)
BNP SERPL-MCNC: 965 PG/ML (ref 0–99)
BSA FOR ECHO PROCEDURE: 1.78 M2
BUN SERPL-MCNC: 171 MG/DL (ref 8–23)
BUN SERPL-MCNC: 65 MG/DL (ref 8–23)
BUN SERPL-MCNC: 77 MG/DL (ref 8–23)
BUN SERPL-MCNC: 78 MG/DL (ref 8–23)
BUN SERPL-MCNC: 80 MG/DL (ref 8–23)
BUN SERPL-MCNC: 80 MG/DL (ref 8–23)
BUN SERPL-MCNC: 82 MG/DL (ref 8–23)
BUN SERPL-MCNC: 83 MG/DL (ref 8–23)
BUN SERPL-MCNC: 85 MG/DL (ref 8–23)
BUN SERPL-MCNC: 85 MG/DL (ref 8–23)
CALCIUM SERPL-MCNC: 10.7 MG/DL (ref 8.7–10.5)
CALCIUM SERPL-MCNC: 16.1 MG/DL (ref 8.7–10.5)
CALCIUM SERPL-MCNC: 8.4 MG/DL (ref 8.7–10.5)
CALCIUM SERPL-MCNC: 8.5 MG/DL (ref 8.7–10.5)
CALCIUM SERPL-MCNC: 8.5 MG/DL (ref 8.7–10.5)
CALCIUM SERPL-MCNC: 8.6 MG/DL (ref 8.7–10.5)
CALCIUM SERPL-MCNC: 8.7 MG/DL (ref 8.7–10.5)
CALCIUM SERPL-MCNC: 8.7 MG/DL (ref 8.7–10.5)
CALCIUM SERPL-MCNC: 8.9 MG/DL (ref 8.7–10.5)
CALCIUM SERPL-MCNC: 8.9 MG/DL (ref 8.7–10.5)
CALCIUM SERPL-MCNC: 9.2 MG/DL (ref 8.7–10.5)
CALCIUM SERPL-MCNC: 9.4 MG/DL (ref 8.7–10.5)
CHLORIDE SERPL-SCNC: 100 MMOL/L (ref 95–110)
CHLORIDE SERPL-SCNC: 101 MMOL/L (ref 95–110)
CHLORIDE SERPL-SCNC: 101 MMOL/L (ref 95–110)
CHLORIDE SERPL-SCNC: 102 MMOL/L (ref 95–110)
CHLORIDE SERPL-SCNC: 97 MMOL/L (ref 95–110)
CHLORIDE SERPL-SCNC: 98 MMOL/L (ref 95–110)
CHLORIDE SERPL-SCNC: 99 MMOL/L (ref 95–110)
CK SERPL-CCNC: 346 U/L (ref 20–180)
CO2 SERPL-SCNC: 10 MMOL/L (ref 23–29)
CO2 SERPL-SCNC: 11 MMOL/L (ref 23–29)
CO2 SERPL-SCNC: 12 MMOL/L (ref 23–29)
CO2 SERPL-SCNC: 13 MMOL/L (ref 23–29)
CO2 SERPL-SCNC: 15 MMOL/L (ref 23–29)
CO2 SERPL-SCNC: 16 MMOL/L (ref 23–29)
CO2 SERPL-SCNC: 18 MMOL/L (ref 23–29)
CO2 SERPL-SCNC: 8 MMOL/L (ref 23–29)
CREAT SERPL-MCNC: 10.2 MG/DL (ref 0.5–1.4)
CREAT SERPL-MCNC: 10.5 MG/DL (ref 0.5–1.4)
CREAT SERPL-MCNC: 10.6 MG/DL (ref 0.5–1.4)
CREAT SERPL-MCNC: 11.3 MG/DL (ref 0.5–1.4)
CREAT SERPL-MCNC: 11.8 MG/DL (ref 0.5–1.4)
CREAT SERPL-MCNC: 11.8 MG/DL (ref 0.5–1.4)
CREAT SERPL-MCNC: 18.4 MG/DL (ref 0.5–1.4)
CREAT SERPL-MCNC: 18.8 MG/DL (ref 0.5–1.4)
CREAT SERPL-MCNC: 19.3 MG/DL (ref 0.5–1.4)
CREAT SERPL-MCNC: 7.5 MG/DL (ref 0.5–1.4)
CREAT SERPL-MCNC: 9.7 MG/DL (ref 0.5–1.4)
CREAT SERPL-MCNC: 9.9 MG/DL (ref 0.5–1.4)
CV ECHO LV RWT: 0.93 CM
DELSYS: ABNORMAL
DIFFERENTIAL METHOD: ABNORMAL
DOP CALC AO PEAK VEL: 1.81 M/S
DOP CALC AO VTI: 35.59 CM
DOP CALC LVOT AREA: 2.7 CM2
DOP CALC LVOT DIAMETER: 1.87 CM
DOP CALC LVOT PEAK VEL: 0.79 M/S
DOP CALC LVOT STROKE VOLUME: 39.83 CM3
DOP CALC MV VTI: 22.2 CM
DOP CALCLVOT PEAK VEL VTI: 14.51 CM
E WAVE DECELERATION TIME: 210.07 MSEC
E/A RATIO: 0.81
ECHO LV POSTERIOR WALL: 1.38 CM (ref 0.6–1.1)
EJECTION FRACTION: 55 %
EOSINOPHIL # BLD AUTO: 0 K/UL (ref 0–0.5)
EOSINOPHIL # BLD AUTO: 0.1 K/UL (ref 0–0.5)
EOSINOPHIL NFR BLD: 0 % (ref 0–8)
EOSINOPHIL NFR BLD: 0.1 % (ref 0–8)
EOSINOPHIL NFR BLD: 0.1 % (ref 0–8)
EOSINOPHIL NFR BLD: 0.6 % (ref 0–8)
ERYTHROCYTE [DISTWIDTH] IN BLOOD BY AUTOMATED COUNT: 13.3 % (ref 11.5–14.5)
ERYTHROCYTE [DISTWIDTH] IN BLOOD BY AUTOMATED COUNT: 13.6 % (ref 11.5–14.5)
ERYTHROCYTE [DISTWIDTH] IN BLOOD BY AUTOMATED COUNT: 13.7 % (ref 11.5–14.5)
ERYTHROCYTE [DISTWIDTH] IN BLOOD BY AUTOMATED COUNT: 13.9 % (ref 11.5–14.5)
ERYTHROCYTE [SEDIMENTATION RATE] IN BLOOD BY WESTERGREN METHOD: 14 MM/H
ERYTHROCYTE [SEDIMENTATION RATE] IN BLOOD BY WESTERGREN METHOD: 22 MM/H
ERYTHROCYTE [SEDIMENTATION RATE] IN BLOOD BY WESTERGREN METHOD: 30 MM/H
EST. GFR  (AFRICAN AMERICAN): 1.9 ML/MIN/1.73 M^2
EST. GFR  (AFRICAN AMERICAN): 2 ML/MIN/1.73 M^2
EST. GFR  (AFRICAN AMERICAN): 2 ML/MIN/1.73 M^2
EST. GFR  (AFRICAN AMERICAN): 3 ML/MIN/1.73 M^2
EST. GFR  (AFRICAN AMERICAN): 3 ML/MIN/1.73 M^2
EST. GFR  (AFRICAN AMERICAN): 4 ML/MIN/1.73 M^2
EST. GFR  (AFRICAN AMERICAN): 6 ML/MIN/1.73 M^2
EST. GFR  (NON AFRICAN AMERICAN): 1.6 ML/MIN/1.73 M^2
EST. GFR  (NON AFRICAN AMERICAN): 2 ML/MIN/1.73 M^2
EST. GFR  (NON AFRICAN AMERICAN): 2 ML/MIN/1.73 M^2
EST. GFR  (NON AFRICAN AMERICAN): 3 ML/MIN/1.73 M^2
EST. GFR  (NON AFRICAN AMERICAN): 4 ML/MIN/1.73 M^2
EST. GFR  (NON AFRICAN AMERICAN): 4 ML/MIN/1.73 M^2
EST. GFR  (NON AFRICAN AMERICAN): 5 ML/MIN/1.73 M^2
ESTIMATED AVG GLUCOSE: 157 MG/DL (ref 68–131)
FIO2: 100
FIO2: 21
FIO2: 50
FRACTIONAL SHORTENING: 33 % (ref 28–44)
GLUCOSE SERPL-MCNC: 142 MG/DL (ref 70–110)
GLUCOSE SERPL-MCNC: 142 MG/DL (ref 70–110)
GLUCOSE SERPL-MCNC: 160 MG/DL (ref 70–110)
GLUCOSE SERPL-MCNC: 172 MG/DL (ref 70–110)
GLUCOSE SERPL-MCNC: 172 MG/DL (ref 70–110)
GLUCOSE SERPL-MCNC: 175 MG/DL (ref 70–110)
GLUCOSE SERPL-MCNC: 178 MG/DL (ref 70–110)
GLUCOSE SERPL-MCNC: 178 MG/DL (ref 70–110)
GLUCOSE SERPL-MCNC: 184 MG/DL (ref 70–110)
GLUCOSE SERPL-MCNC: 184 MG/DL (ref 70–110)
GLUCOSE SERPL-MCNC: 258 MG/DL (ref 70–110)
GLUCOSE SERPL-MCNC: 272 MG/DL (ref 70–110)
GLUCOSE SERPL-MCNC: 274 MG/DL (ref 70–110)
GLUCOSE SERPL-MCNC: 305 MG/DL (ref 70–110)
GLUCOSE SERPL-MCNC: 305 MG/DL (ref 70–110)
GLUCOSE SERPL-MCNC: 308 MG/DL (ref 70–110)
GLUCOSE SERPL-MCNC: 467 MG/DL (ref 70–110)
HBA1C MFR BLD: 7.1 % (ref 4–5.6)
HCO3 UR-SCNC: 15.1 MMOL/L (ref 24–28)
HCO3 UR-SCNC: 15.2 MMOL/L (ref 24–28)
HCO3 UR-SCNC: 15.5 MMOL/L (ref 24–28)
HCT VFR BLD AUTO: 26.1 % (ref 37–48.5)
HCT VFR BLD AUTO: 28 % (ref 37–48.5)
HCT VFR BLD AUTO: 28.3 % (ref 37–48.5)
HCT VFR BLD AUTO: 32.9 % (ref 37–48.5)
HCT VFR BLD CALC: 28 %PCV (ref 36–54)
HGB BLD-MCNC: 10 G/DL
HGB BLD-MCNC: 10.7 G/DL (ref 12–16)
HGB BLD-MCNC: 8.8 G/DL (ref 12–16)
HGB BLD-MCNC: 9.6 G/DL (ref 12–16)
HGB BLD-MCNC: 9.9 G/DL (ref 12–16)
IMM GRANULOCYTES # BLD AUTO: 0.1 K/UL (ref 0–0.04)
IMM GRANULOCYTES # BLD AUTO: 0.11 K/UL (ref 0–0.04)
IMM GRANULOCYTES # BLD AUTO: 0.16 K/UL (ref 0–0.04)
IMM GRANULOCYTES # BLD AUTO: 0.34 K/UL (ref 0–0.04)
IMM GRANULOCYTES NFR BLD AUTO: 0.6 % (ref 0–0.5)
IMM GRANULOCYTES NFR BLD AUTO: 0.7 % (ref 0–0.5)
IMM GRANULOCYTES NFR BLD AUTO: 1.2 % (ref 0–0.5)
IMM GRANULOCYTES NFR BLD AUTO: 3.5 % (ref 0–0.5)
INR PPP: 1.1 (ref 0.8–1.2)
INTERVENTRICULAR SEPTUM: 1.34 CM (ref 0.6–1.1)
IVRT: 117.03 MSEC
LA MAJOR: 4.29 CM
LA WIDTH: 3.38 CM
LACTATE SERPL-SCNC: 1.7 MMOL/L (ref 0.5–2.2)
LACTATE SERPL-SCNC: 1.9 MMOL/L (ref 0.5–2.2)
LACTATE SERPL-SCNC: 10.5 MMOL/L (ref 0.5–2.2)
LACTATE SERPL-SCNC: 2.3 MMOL/L (ref 0.5–2.2)
LACTATE SERPL-SCNC: 3.2 MMOL/L (ref 0.5–2.2)
LACTATE SERPL-SCNC: 9.5 MMOL/L (ref 0.5–2.2)
LEFT ATRIUM SIZE: 3.32 CM
LEFT INTERNAL DIMENSION IN SYSTOLE: 1.99 CM (ref 2.1–4)
LEFT VENTRICLE DIASTOLIC VOLUME INDEX: 19.12 ML/M2
LEFT VENTRICLE DIASTOLIC VOLUME: 33.85 ML
LEFT VENTRICLE MASS INDEX: 74 G/M2
LEFT VENTRICLE SYSTOLIC VOLUME INDEX: 7.1 ML/M2
LEFT VENTRICLE SYSTOLIC VOLUME: 12.61 ML
LEFT VENTRICULAR INTERNAL DIMENSION IN DIASTOLE: 2.96 CM (ref 3.5–6)
LEFT VENTRICULAR MASS: 131.49 G
LV LATERAL E/E' RATIO: 21.25 M/S
LYMPHOCYTES # BLD AUTO: 0.6 K/UL (ref 1–4.8)
LYMPHOCYTES # BLD AUTO: 1.4 K/UL (ref 1–4.8)
LYMPHOCYTES # BLD AUTO: 1.9 K/UL (ref 1–4.8)
LYMPHOCYTES # BLD AUTO: 2.3 K/UL (ref 1–4.8)
LYMPHOCYTES NFR BLD: 14.1 % (ref 18–48)
LYMPHOCYTES NFR BLD: 23.4 % (ref 18–48)
LYMPHOCYTES NFR BLD: 3 % (ref 18–48)
LYMPHOCYTES NFR BLD: 9.1 % (ref 18–48)
MAGNESIUM SERPL-MCNC: 1.6 MG/DL (ref 1.6–2.6)
MAGNESIUM SERPL-MCNC: 1.6 MG/DL (ref 1.6–2.6)
MAGNESIUM SERPL-MCNC: 1.7 MG/DL (ref 1.6–2.6)
MAGNESIUM SERPL-MCNC: 1.8 MG/DL (ref 1.6–2.6)
MAGNESIUM SERPL-MCNC: 1.9 MG/DL (ref 1.6–2.6)
MAGNESIUM SERPL-MCNC: 2.2 MG/DL (ref 1.6–2.6)
MAGNESIUM SERPL-MCNC: 2.3 MG/DL (ref 1.6–2.6)
MCH RBC QN AUTO: 32.8 PG (ref 27–31)
MCH RBC QN AUTO: 33.4 PG (ref 27–31)
MCH RBC QN AUTO: 33.5 PG (ref 27–31)
MCH RBC QN AUTO: 33.6 PG (ref 27–31)
MCHC RBC AUTO-ENTMCNC: 32.5 G/DL (ref 32–36)
MCHC RBC AUTO-ENTMCNC: 33.7 G/DL (ref 32–36)
MCHC RBC AUTO-ENTMCNC: 34.3 G/DL (ref 32–36)
MCHC RBC AUTO-ENTMCNC: 35 G/DL (ref 32–36)
MCV RBC AUTO: 103 FL (ref 82–98)
MCV RBC AUTO: 96 FL (ref 82–98)
MCV RBC AUTO: 97 FL (ref 82–98)
MCV RBC AUTO: 98 FL (ref 82–98)
MIN VOL: 10.2
MIN VOL: 13.5
MODE: ABNORMAL
MONOCYTES # BLD AUTO: 0.2 K/UL (ref 0.3–1)
MONOCYTES # BLD AUTO: 0.6 K/UL (ref 0.3–1)
MONOCYTES # BLD AUTO: 0.9 K/UL (ref 0.3–1)
MONOCYTES # BLD AUTO: 0.9 K/UL (ref 0.3–1)
MONOCYTES NFR BLD: 2.2 % (ref 4–15)
MONOCYTES NFR BLD: 3.9 % (ref 4–15)
MONOCYTES NFR BLD: 4.4 % (ref 4–15)
MONOCYTES NFR BLD: 6.8 % (ref 4–15)
MV MEAN GRADIENT: 1 MMHG
MV PEAK A VEL: 1.05 M/S
MV PEAK E VEL: 0.85 M/S
MV PEAK GRADIENT: 4 MMHG
MV STENOSIS PRESSURE HALF TIME: 60.92 MS
MV VALVE AREA BY CONTINUITY EQUATION: 1.79 CM2
MV VALVE AREA P 1/2 METHOD: 3.61 CM2
NEUTROPHILS # BLD AUTO: 10.5 K/UL (ref 1.8–7.7)
NEUTROPHILS # BLD AUTO: 13.1 K/UL (ref 1.8–7.7)
NEUTROPHILS # BLD AUTO: 17.6 K/UL (ref 1.8–7.7)
NEUTROPHILS # BLD AUTO: 6.9 K/UL (ref 1.8–7.7)
NEUTROPHILS NFR BLD: 70.7 % (ref 38–73)
NEUTROPHILS NFR BLD: 77.2 % (ref 38–73)
NEUTROPHILS NFR BLD: 86.1 % (ref 38–73)
NEUTROPHILS NFR BLD: 91.9 % (ref 38–73)
NRBC BLD-RTO: 0 /100 WBC
PCO2 BLDA: 16.9 MMHG (ref 35–45)
PCO2 BLDA: 32.7 MMHG (ref 35–45)
PCO2 BLDA: 40.5 MMHG (ref 35–45)
PEEP: 5
PH SMN: 7.19 [PH] (ref 7.35–7.45)
PH SMN: 7.27 [PH] (ref 7.35–7.45)
PH SMN: 7.56 [PH] (ref 7.35–7.45)
PHOSPHATE SERPL-MCNC: 3.3 MG/DL (ref 2.7–4.5)
PHOSPHATE SERPL-MCNC: 4.4 MG/DL (ref 2.7–4.5)
PHOSPHATE SERPL-MCNC: 4.7 MG/DL (ref 2.7–4.5)
PHOSPHATE SERPL-MCNC: 5.1 MG/DL (ref 2.7–4.5)
PHOSPHATE SERPL-MCNC: 5.2 MG/DL (ref 2.7–4.5)
PHOSPHATE SERPL-MCNC: 5.3 MG/DL (ref 2.7–4.5)
PHOSPHATE SERPL-MCNC: 5.4 MG/DL (ref 2.7–4.5)
PHOSPHATE SERPL-MCNC: 6.5 MG/DL (ref 2.7–4.5)
PHOSPHATE SERPL-MCNC: 9.8 MG/DL (ref 2.7–4.5)
PIP: 27
PISA TR MAX VEL: 2.61 M/S
PLATELET # BLD AUTO: 134 K/UL (ref 150–450)
PLATELET # BLD AUTO: 165 K/UL (ref 150–450)
PLATELET # BLD AUTO: 175 K/UL (ref 150–450)
PLATELET # BLD AUTO: 212 K/UL (ref 150–450)
PMV BLD AUTO: 10.7 FL (ref 9.2–12.9)
PMV BLD AUTO: 11.1 FL (ref 9.2–12.9)
PMV BLD AUTO: 11.2 FL (ref 9.2–12.9)
PMV BLD AUTO: 11.5 FL (ref 9.2–12.9)
PO2 BLDA: 121 MMHG (ref 80–100)
PO2 BLDA: 213 MMHG (ref 80–100)
PO2 BLDA: 561 MMHG (ref 80–100)
POC BE: -12 MMOL/L
POC BE: -13 MMOL/L
POC BE: -7 MMOL/L
POC SATURATED O2: 100 % (ref 95–100)
POC SATURATED O2: 100 % (ref 95–100)
POC SATURATED O2: 99 % (ref 95–100)
POC TCO2: 16 MMOL/L (ref 23–27)
POC TCO2: 16 MMOL/L (ref 23–27)
POC TCO2: 17 MMOL/L (ref 23–27)
POCT GLUCOSE: 229 MG/DL (ref 70–110)
POCT GLUCOSE: 275 MG/DL (ref 70–110)
POCT GLUCOSE: 315 MG/DL (ref 70–110)
POCT GLUCOSE: 360 MG/DL (ref 70–110)
POCT GLUCOSE: 367 MG/DL (ref 70–110)
POCT GLUCOSE: 369 MG/DL (ref 70–110)
POTASSIUM BLD-SCNC: 8.1 MMOL/L (ref 3.5–5.1)
POTASSIUM SERPL-SCNC: 4.8 MMOL/L (ref 3.5–5.1)
POTASSIUM SERPL-SCNC: 6.3 MMOL/L (ref 3.5–5.1)
POTASSIUM SERPL-SCNC: 6.6 MMOL/L (ref 3.5–5.1)
POTASSIUM SERPL-SCNC: 6.6 MMOL/L (ref 3.5–5.1)
POTASSIUM SERPL-SCNC: 6.7 MMOL/L (ref 3.5–5.1)
POTASSIUM SERPL-SCNC: 6.8 MMOL/L (ref 3.5–5.1)
POTASSIUM SERPL-SCNC: 6.8 MMOL/L (ref 3.5–5.1)
POTASSIUM SERPL-SCNC: 8.1 MMOL/L (ref 3.5–5.1)
POTASSIUM SERPL-SCNC: >9 MMOL/L (ref 3.5–5.1)
PROT SERPL-MCNC: 5.5 G/DL (ref 6–8.4)
PROT SERPL-MCNC: 5.8 G/DL (ref 6–8.4)
PROT SERPL-MCNC: 6.4 G/DL (ref 6–8.4)
PROT SERPL-MCNC: 7.1 G/DL (ref 6–8.4)
PROTHROMBIN TIME: 10.9 SEC (ref 9–12.5)
PROTHROMBIN TIME: 11.1 SEC (ref 9–12.5)
PROTHROMBIN TIME: 11.2 SEC (ref 9–12.5)
RA MAJOR: 3.66 CM
RA PRESSURE: 15 MMHG
RA WIDTH: 3.19 CM
RBC # BLD AUTO: 2.68 M/UL (ref 4–5.4)
RBC # BLD AUTO: 2.87 M/UL (ref 4–5.4)
RBC # BLD AUTO: 2.95 M/UL (ref 4–5.4)
RBC # BLD AUTO: 3.19 M/UL (ref 4–5.4)
SAMPLE: ABNORMAL
SARS-COV-2 RDRP RESP QL NAA+PROBE: NEGATIVE
SITE: ABNORMAL
SODIUM BLD-SCNC: 131 MMOL/L (ref 136–145)
SODIUM SERPL-SCNC: 131 MMOL/L (ref 136–145)
SODIUM SERPL-SCNC: 131 MMOL/L (ref 136–145)
SODIUM SERPL-SCNC: 132 MMOL/L (ref 136–145)
SODIUM SERPL-SCNC: 133 MMOL/L (ref 136–145)
SODIUM SERPL-SCNC: 134 MMOL/L (ref 136–145)
SODIUM SERPL-SCNC: 135 MMOL/L (ref 136–145)
SODIUM SERPL-SCNC: 137 MMOL/L (ref 136–145)
SODIUM SERPL-SCNC: 137 MMOL/L (ref 136–145)
SODIUM SERPL-SCNC: 141 MMOL/L (ref 136–145)
SODIUM SERPL-SCNC: 142 MMOL/L (ref 136–145)
SP02: 100
TDI LATERAL: 0.04 M/S
TR MAX PG: 27 MMHG
TRICUSPID ANNULAR PLANE SYSTOLIC EXCURSION: 1.34 CM
TROPONIN I SERPL DL<=0.01 NG/ML-MCNC: 0.64 NG/ML (ref 0–0.03)
TROPONIN I SERPL-MCNC: 0.11 NG/ML (ref 0.01–0.03)
TV REST PULMONARY ARTERY PRESSURE: 42 MMHG
UUN UR-MCNC: 183 MG/DL (ref 7–17)
VT: 450
WBC # BLD AUTO: 13.62 K/UL (ref 3.9–12.7)
WBC # BLD AUTO: 15.22 K/UL (ref 3.9–12.7)
WBC # BLD AUTO: 19.14 K/UL (ref 3.9–12.7)
WBC # BLD AUTO: 9.7 K/UL (ref 3.9–12.7)

## 2022-01-01 PROCEDURE — 83520 IMMUNOASSAY QUANT NOS NONAB: CPT | Performed by: NURSE PRACTITIONER

## 2022-01-01 PROCEDURE — 84100 ASSAY OF PHOSPHORUS: CPT | Mod: 91 | Performed by: HOSPITALIST

## 2022-01-01 PROCEDURE — 82803 BLOOD GASES ANY COMBINATION: CPT

## 2022-01-01 PROCEDURE — 63600175 PHARM REV CODE 636 W HCPCS: Performed by: NURSE PRACTITIONER

## 2022-01-01 PROCEDURE — 94761 N-INVAS EAR/PLS OXIMETRY MLT: CPT

## 2022-01-01 PROCEDURE — 85610 PROTHROMBIN TIME: CPT | Performed by: NURSE PRACTITIONER

## 2022-01-01 PROCEDURE — 84100 ASSAY OF PHOSPHORUS: CPT | Mod: 91 | Performed by: NURSE PRACTITIONER

## 2022-01-01 PROCEDURE — 87205 SMEAR GRAM STAIN: CPT | Performed by: NURSE PRACTITIONER

## 2022-01-01 PROCEDURE — 85025 COMPLETE CBC W/AUTO DIFF WBC: CPT | Mod: ER | Performed by: EMERGENCY MEDICINE

## 2022-01-01 PROCEDURE — 99497 ADVNCD CARE PLAN 30 MIN: CPT | Mod: 25,,, | Performed by: STUDENT IN AN ORGANIZED HEALTH CARE EDUCATION/TRAINING PROGRAM

## 2022-01-01 PROCEDURE — 27200966 HC CLOSED SUCTION SYSTEM

## 2022-01-01 PROCEDURE — 25000003 PHARM REV CODE 250: Performed by: STUDENT IN AN ORGANIZED HEALTH CARE EDUCATION/TRAINING PROGRAM

## 2022-01-01 PROCEDURE — 99900035 HC TECH TIME PER 15 MIN (STAT)

## 2022-01-01 PROCEDURE — 63600175 PHARM REV CODE 636 W HCPCS: Performed by: STUDENT IN AN ORGANIZED HEALTH CARE EDUCATION/TRAINING PROGRAM

## 2022-01-01 PROCEDURE — 25000003 PHARM REV CODE 250: Performed by: HOSPITALIST

## 2022-01-01 PROCEDURE — 83036 HEMOGLOBIN GLYCOSYLATED A1C: CPT | Performed by: NURSE PRACTITIONER

## 2022-01-01 PROCEDURE — 93010 EKG 12-LEAD: ICD-10-PCS | Mod: 76,,, | Performed by: INTERNAL MEDICINE

## 2022-01-01 PROCEDURE — 20000000 HC ICU ROOM

## 2022-01-01 PROCEDURE — 84100 ASSAY OF PHOSPHORUS: CPT | Performed by: NURSE PRACTITIONER

## 2022-01-01 PROCEDURE — 99291 CRITICAL CARE FIRST HOUR: CPT | Mod: 25,ER

## 2022-01-01 PROCEDURE — 63600175 PHARM REV CODE 636 W HCPCS: Performed by: HOSPITALIST

## 2022-01-01 PROCEDURE — 80053 COMPREHEN METABOLIC PANEL: CPT | Performed by: NURSE PRACTITIONER

## 2022-01-01 PROCEDURE — 99291 PR CRITICAL CARE, E/M 30-74 MINUTES: ICD-10-PCS | Mod: ,,, | Performed by: NURSE PRACTITIONER

## 2022-01-01 PROCEDURE — 27201640 HC PAD, ARTICGEL

## 2022-01-01 PROCEDURE — 95816 EEG AWAKE AND DROWSY: CPT | Mod: 26,,, | Performed by: PSYCHIATRY & NEUROLOGY

## 2022-01-01 PROCEDURE — 36415 COLL VENOUS BLD VENIPUNCTURE: CPT | Performed by: HOSPITALIST

## 2022-01-01 PROCEDURE — 85025 COMPLETE CBC W/AUTO DIFF WBC: CPT | Performed by: NURSE PRACTITIONER

## 2022-01-01 PROCEDURE — 25000003 PHARM REV CODE 250: Performed by: NURSE PRACTITIONER

## 2022-01-01 PROCEDURE — 99497 PR ADVNCD CARE PLAN 30 MIN: ICD-10-PCS | Mod: 25,,, | Performed by: STUDENT IN AN ORGANIZED HEALTH CARE EDUCATION/TRAINING PROGRAM

## 2022-01-01 PROCEDURE — 25000003 PHARM REV CODE 250: Mod: ER | Performed by: EMERGENCY MEDICINE

## 2022-01-01 PROCEDURE — 95816 PR EEG,W/AWAKE & DROWSY RECORD: ICD-10-PCS | Mod: 26,,, | Performed by: PSYCHIATRY & NEUROLOGY

## 2022-01-01 PROCEDURE — 82947 ASSAY GLUCOSE BLOOD QUANT: CPT | Performed by: NURSE PRACTITIONER

## 2022-01-01 PROCEDURE — 83605 ASSAY OF LACTIC ACID: CPT | Performed by: STUDENT IN AN ORGANIZED HEALTH CARE EDUCATION/TRAINING PROGRAM

## 2022-01-01 PROCEDURE — 83735 ASSAY OF MAGNESIUM: CPT | Performed by: NURSE PRACTITIONER

## 2022-01-01 PROCEDURE — 80048 BASIC METABOLIC PNL TOTAL CA: CPT | Mod: XB | Performed by: HOSPITALIST

## 2022-01-01 PROCEDURE — 63600175 PHARM REV CODE 636 W HCPCS: Mod: ER | Performed by: EMERGENCY MEDICINE

## 2022-01-01 PROCEDURE — 80048 BASIC METABOLIC PNL TOTAL CA: CPT | Mod: 91,XB | Performed by: NURSE PRACTITIONER

## 2022-01-01 PROCEDURE — 99291 CRITICAL CARE FIRST HOUR: CPT | Mod: ,,, | Performed by: NURSE PRACTITIONER

## 2022-01-01 PROCEDURE — 83735 ASSAY OF MAGNESIUM: CPT | Mod: 91 | Performed by: NURSE PRACTITIONER

## 2022-01-01 PROCEDURE — 25000003 PHARM REV CODE 250: Performed by: INTERNAL MEDICINE

## 2022-01-01 PROCEDURE — 96375 TX/PRO/DX INJ NEW DRUG ADDON: CPT | Mod: 59,ER

## 2022-01-01 PROCEDURE — 63600175 PHARM REV CODE 636 W HCPCS: Performed by: INTERNAL MEDICINE

## 2022-01-01 PROCEDURE — 83605 ASSAY OF LACTIC ACID: CPT | Mod: ER | Performed by: EMERGENCY MEDICINE

## 2022-01-01 PROCEDURE — 99223 1ST HOSP IP/OBS HIGH 75: CPT | Mod: ,,, | Performed by: STUDENT IN AN ORGANIZED HEALTH CARE EDUCATION/TRAINING PROGRAM

## 2022-01-01 PROCEDURE — 80048 BASIC METABOLIC PNL TOTAL CA: CPT | Mod: 91,XB | Performed by: STUDENT IN AN ORGANIZED HEALTH CARE EDUCATION/TRAINING PROGRAM

## 2022-01-01 PROCEDURE — 80048 BASIC METABOLIC PNL TOTAL CA: CPT | Mod: XB | Performed by: NURSE PRACTITIONER

## 2022-01-01 PROCEDURE — 95822 EEG COMA OR SLEEP ONLY: CPT

## 2022-01-01 PROCEDURE — 93005 ELECTROCARDIOGRAM TRACING: CPT | Mod: ER

## 2022-01-01 PROCEDURE — 93010 ELECTROCARDIOGRAM REPORT: CPT | Mod: ,,, | Performed by: INTERNAL MEDICINE

## 2022-01-01 PROCEDURE — 36680 INSERT NEEDLE BONE CAVITY: CPT | Mod: ER

## 2022-01-01 PROCEDURE — 83735 ASSAY OF MAGNESIUM: CPT | Mod: 91 | Performed by: HOSPITALIST

## 2022-01-01 PROCEDURE — 83520 IMMUNOASSAY QUANT NOS NONAB: CPT | Mod: ER | Performed by: NURSE PRACTITIONER

## 2022-01-01 PROCEDURE — 83605 ASSAY OF LACTIC ACID: CPT | Mod: 91 | Performed by: STUDENT IN AN ORGANIZED HEALTH CARE EDUCATION/TRAINING PROGRAM

## 2022-01-01 PROCEDURE — 84484 ASSAY OF TROPONIN QUANT: CPT | Performed by: HOSPITALIST

## 2022-01-01 PROCEDURE — 83605 ASSAY OF LACTIC ACID: CPT | Performed by: NURSE PRACTITIONER

## 2022-01-01 PROCEDURE — 82803 BLOOD GASES ANY COMBINATION: CPT | Mod: ER

## 2022-01-01 PROCEDURE — C9399 UNCLASSIFIED DRUGS OR BIOLOG: HCPCS | Performed by: INTERNAL MEDICINE

## 2022-01-01 PROCEDURE — 80053 COMPREHEN METABOLIC PANEL: CPT | Mod: ER | Performed by: EMERGENCY MEDICINE

## 2022-01-01 PROCEDURE — 36600 WITHDRAWAL OF ARTERIAL BLOOD: CPT | Mod: ER

## 2022-01-01 PROCEDURE — 94003 VENT MGMT INPAT SUBQ DAY: CPT

## 2022-01-01 PROCEDURE — 83880 ASSAY OF NATRIURETIC PEPTIDE: CPT | Mod: ER | Performed by: NURSE PRACTITIONER

## 2022-01-01 PROCEDURE — 82550 ASSAY OF CK (CPK): CPT | Performed by: NURSE PRACTITIONER

## 2022-01-01 PROCEDURE — 85730 THROMBOPLASTIN TIME PARTIAL: CPT | Performed by: NURSE PRACTITIONER

## 2022-01-01 PROCEDURE — 99223 PR INITIAL HOSPITAL CARE,LEVL III: ICD-10-PCS | Mod: ,,, | Performed by: STUDENT IN AN ORGANIZED HEALTH CARE EDUCATION/TRAINING PROGRAM

## 2022-01-01 PROCEDURE — 80100014 HC HEMODIALYSIS 1:1

## 2022-01-01 PROCEDURE — 27000221 HC OXYGEN, UP TO 24 HOURS

## 2022-01-01 PROCEDURE — 99900035 HC TECH TIME PER 15 MIN (STAT): Mod: ER

## 2022-01-01 PROCEDURE — 85025 COMPLETE CBC W/AUTO DIFF WBC: CPT | Performed by: HOSPITALIST

## 2022-01-01 PROCEDURE — 96374 THER/PROPH/DIAG INJ IV PUSH: CPT | Mod: 59,ER

## 2022-01-01 PROCEDURE — 94002 VENT MGMT INPAT INIT DAY: CPT

## 2022-01-01 PROCEDURE — 84484 ASSAY OF TROPONIN QUANT: CPT | Mod: ER | Performed by: EMERGENCY MEDICINE

## 2022-01-01 PROCEDURE — 93010 ELECTROCARDIOGRAM REPORT: CPT | Mod: 76,,, | Performed by: INTERNAL MEDICINE

## 2022-01-01 PROCEDURE — 31500 INSERT EMERGENCY AIRWAY: CPT | Mod: ER

## 2022-01-01 PROCEDURE — U0002 COVID-19 LAB TEST NON-CDC: HCPCS | Mod: ER | Performed by: EMERGENCY MEDICINE

## 2022-01-01 PROCEDURE — 84100 ASSAY OF PHOSPHORUS: CPT | Performed by: HOSPITALIST

## 2022-01-01 PROCEDURE — 92950 HEART/LUNG RESUSCITATION CPR: CPT | Mod: ER

## 2022-01-01 PROCEDURE — 93005 ELECTROCARDIOGRAM TRACING: CPT

## 2022-01-01 PROCEDURE — 87040 BLOOD CULTURE FOR BACTERIA: CPT | Mod: 59,ER | Performed by: EMERGENCY MEDICINE

## 2022-01-01 PROCEDURE — 83880 ASSAY OF NATRIURETIC PEPTIDE: CPT | Mod: 91 | Performed by: HOSPITALIST

## 2022-01-01 PROCEDURE — 86706 HEP B SURFACE ANTIBODY: CPT | Performed by: INTERNAL MEDICINE

## 2022-01-01 PROCEDURE — 94002 VENT MGMT INPAT INIT DAY: CPT | Mod: ER

## 2022-01-01 PROCEDURE — 87070 CULTURE OTHR SPECIMN AEROBIC: CPT | Performed by: NURSE PRACTITIONER

## 2022-01-01 PROCEDURE — 36600 WITHDRAWAL OF ARTERIAL BLOOD: CPT

## 2022-01-01 RX ORDER — LORAZEPAM 2 MG/ML
2 INJECTION INTRAMUSCULAR ONCE
Status: COMPLETED | OUTPATIENT
Start: 2022-01-01 | End: 2022-01-01

## 2022-01-01 RX ORDER — OXYCODONE AND ACETAMINOPHEN 10; 325 MG/1; MG/1
1 TABLET ORAL 2 TIMES DAILY PRN
COMMUNITY
Start: 2022-01-01

## 2022-01-01 RX ORDER — PANTOPRAZOLE SODIUM 40 MG/1
40 TABLET, DELAYED RELEASE ORAL DAILY
COMMUNITY
Start: 2022-01-01

## 2022-01-01 RX ORDER — NOREPINEPHRINE BITARTRATE/D5W 4MG/250ML
0-3 PLASTIC BAG, INJECTION (ML) INTRAVENOUS CONTINUOUS
Status: DISCONTINUED | OUTPATIENT
Start: 2022-01-01 | End: 2022-01-01

## 2022-01-01 RX ORDER — FAMOTIDINE 10 MG/ML
20 INJECTION INTRAVENOUS DAILY
Status: DISCONTINUED | OUTPATIENT
Start: 2022-01-01 | End: 2022-01-01 | Stop reason: HOSPADM

## 2022-01-01 RX ORDER — GLUCAGON 1 MG
1 KIT INJECTION
Status: DISCONTINUED | OUTPATIENT
Start: 2022-01-01 | End: 2022-01-01 | Stop reason: HOSPADM

## 2022-01-01 RX ORDER — PROPOFOL 10 MG/ML
0-50 INJECTION, EMULSION INTRAVENOUS CONTINUOUS
Status: DISCONTINUED | OUTPATIENT
Start: 2022-01-01 | End: 2022-01-01 | Stop reason: HOSPADM

## 2022-01-01 RX ORDER — CINACALCET 60 MG/1
60 TABLET, FILM COATED ORAL DAILY
COMMUNITY
Start: 2022-01-01

## 2022-01-01 RX ORDER — LEVETIRACETAM 500 MG/5ML
1000 INJECTION, SOLUTION, CONCENTRATE INTRAVENOUS EVERY 12 HOURS
Status: DISCONTINUED | OUTPATIENT
Start: 2022-01-01 | End: 2022-01-01

## 2022-01-01 RX ORDER — MORPHINE SULFATE 4 MG/ML
4 INJECTION, SOLUTION INTRAMUSCULAR; INTRAVENOUS ONCE
Status: COMPLETED | OUTPATIENT
Start: 2022-01-01 | End: 2022-01-01

## 2022-01-01 RX ORDER — FAMOTIDINE 10 MG/ML
20 INJECTION INTRAVENOUS EVERY 12 HOURS
Status: DISCONTINUED | OUTPATIENT
Start: 2022-01-01 | End: 2022-01-01

## 2022-01-01 RX ORDER — ACETAMINOPHEN 650 MG/1
650 SUPPOSITORY RECTAL EVERY 4 HOURS PRN
Status: DISCONTINUED | OUTPATIENT
Start: 2022-01-01 | End: 2022-01-01 | Stop reason: HOSPADM

## 2022-01-01 RX ORDER — ACETAMINOPHEN 650 MG/20.3ML
650 LIQUID ORAL EVERY 6 HOURS PRN
Status: DISCONTINUED | OUTPATIENT
Start: 2022-01-01 | End: 2022-01-01 | Stop reason: HOSPADM

## 2022-01-01 RX ORDER — MAGNESIUM SULFATE HEPTAHYDRATE 40 MG/ML
2 INJECTION, SOLUTION INTRAVENOUS
Status: COMPLETED | OUTPATIENT
Start: 2022-01-01 | End: 2022-01-01

## 2022-01-01 RX ORDER — SODIUM BICARBONATE 1 MEQ/ML
SYRINGE (ML) INTRAVENOUS CODE/TRAUMA/SEDATION MEDICATION
Status: COMPLETED | OUTPATIENT
Start: 2022-01-01 | End: 2022-01-01

## 2022-01-01 RX ORDER — ENOXAPARIN SODIUM 100 MG/ML
40 INJECTION SUBCUTANEOUS EVERY 24 HOURS
Status: DISCONTINUED | OUTPATIENT
Start: 2022-01-01 | End: 2022-01-01

## 2022-01-01 RX ORDER — LORAZEPAM 2 MG/ML
4 INJECTION INTRAMUSCULAR ONCE
Status: COMPLETED | OUTPATIENT
Start: 2022-01-01 | End: 2022-01-01

## 2022-01-01 RX ORDER — SODIUM CHLORIDE 0.9 % (FLUSH) 0.9 %
10 SYRINGE (ML) INJECTION
Status: DISCONTINUED | OUTPATIENT
Start: 2022-01-01 | End: 2022-01-01 | Stop reason: HOSPADM

## 2022-01-01 RX ORDER — ONDANSETRON 2 MG/ML
4 INJECTION INTRAMUSCULAR; INTRAVENOUS EVERY 8 HOURS PRN
Status: DISCONTINUED | OUTPATIENT
Start: 2022-01-01 | End: 2022-01-01 | Stop reason: HOSPADM

## 2022-01-01 RX ORDER — APIXABAN 2.5 MG/1
2.5 TABLET, FILM COATED ORAL 2 TIMES DAILY
COMMUNITY
Start: 2022-01-01

## 2022-01-01 RX ORDER — MORPHINE SULFATE 4 MG/ML
8 INJECTION, SOLUTION INTRAMUSCULAR; INTRAVENOUS ONCE
Status: COMPLETED | OUTPATIENT
Start: 2022-01-01 | End: 2022-01-01

## 2022-01-01 RX ORDER — LABETALOL HYDROCHLORIDE 5 MG/ML
10 INJECTION, SOLUTION INTRAVENOUS EVERY 6 HOURS PRN
Status: DISCONTINUED | OUTPATIENT
Start: 2022-01-01 | End: 2022-01-01 | Stop reason: HOSPADM

## 2022-01-01 RX ORDER — ACETAMINOPHEN 650 MG/1
650 SUPPOSITORY RECTAL EVERY 4 HOURS PRN
Status: DISCONTINUED | OUTPATIENT
Start: 2022-01-01 | End: 2022-01-01

## 2022-01-01 RX ORDER — FENTANYL CITRATE 50 UG/ML
50 INJECTION, SOLUTION INTRAMUSCULAR; INTRAVENOUS
Status: DISCONTINUED | OUTPATIENT
Start: 2022-01-01 | End: 2022-01-01 | Stop reason: HOSPADM

## 2022-01-01 RX ORDER — SEVELAMER CARBONATE 800 MG/1
TABLET, FILM COATED ORAL
COMMUNITY
Start: 2022-01-01

## 2022-01-01 RX ORDER — EPINEPHRINE 0.1 MG/ML
INJECTION INTRAVENOUS CODE/TRAUMA/SEDATION MEDICATION
Status: COMPLETED | OUTPATIENT
Start: 2022-01-01 | End: 2022-01-01

## 2022-01-01 RX ORDER — BUSPIRONE HYDROCHLORIDE 5 MG/1
30 TABLET ORAL ONCE
Status: DISCONTINUED | OUTPATIENT
Start: 2022-01-01 | End: 2022-01-01

## 2022-01-01 RX ORDER — HEPARIN SODIUM 1000 [USP'U]/ML
2400 INJECTION, SOLUTION INTRAVENOUS; SUBCUTANEOUS
Status: DISCONTINUED | OUTPATIENT
Start: 2022-01-01 | End: 2022-01-01 | Stop reason: HOSPADM

## 2022-01-01 RX ORDER — INSULIN ASPART 100 [IU]/ML
0-5 INJECTION, SOLUTION INTRAVENOUS; SUBCUTANEOUS EVERY 6 HOURS PRN
Status: DISCONTINUED | OUTPATIENT
Start: 2022-01-01 | End: 2022-01-01 | Stop reason: HOSPADM

## 2022-01-01 RX ORDER — ACETAMINOPHEN 650 MG/20.3ML
650 LIQUID ORAL EVERY 6 HOURS
Status: DISCONTINUED | OUTPATIENT
Start: 2022-01-01 | End: 2022-01-01

## 2022-01-01 RX ORDER — HEPARIN SODIUM 5000 [USP'U]/ML
5000 INJECTION, SOLUTION INTRAVENOUS; SUBCUTANEOUS EVERY 8 HOURS
Status: DISCONTINUED | OUTPATIENT
Start: 2022-01-01 | End: 2022-01-01 | Stop reason: HOSPADM

## 2022-01-01 RX ORDER — FENTANYL CITRATE 50 UG/ML
50 INJECTION, SOLUTION INTRAMUSCULAR; INTRAVENOUS
Status: DISPENSED | OUTPATIENT
Start: 2022-01-01 | End: 2022-01-01

## 2022-01-01 RX ORDER — MORPHINE SULFATE 4 MG/ML
4 INJECTION, SOLUTION INTRAMUSCULAR; INTRAVENOUS
Status: DISCONTINUED | OUTPATIENT
Start: 2022-01-01 | End: 2022-01-01 | Stop reason: HOSPADM

## 2022-01-01 RX ORDER — NOREPINEPHRINE BITARTRATE/D5W 4MG/250ML
0-3 PLASTIC BAG, INJECTION (ML) INTRAVENOUS CONTINUOUS
Status: DISCONTINUED | OUTPATIENT
Start: 2022-01-01 | End: 2022-01-01 | Stop reason: HOSPADM

## 2022-01-01 RX ORDER — CALCIUM CHLORIDE INJECTION 100 MG/ML
INJECTION, SOLUTION INTRAVENOUS CODE/TRAUMA/SEDATION MEDICATION
Status: COMPLETED | OUTPATIENT
Start: 2022-01-01 | End: 2022-01-01

## 2022-01-01 RX ORDER — SCOLOPAMINE TRANSDERMAL SYSTEM 1 MG/1
1 PATCH, EXTENDED RELEASE TRANSDERMAL
Status: DISCONTINUED | OUTPATIENT
Start: 2022-01-01 | End: 2022-01-01 | Stop reason: HOSPADM

## 2022-01-01 RX ORDER — MUPIROCIN 20 MG/G
OINTMENT TOPICAL 2 TIMES DAILY
Status: DISCONTINUED | OUTPATIENT
Start: 2022-01-01 | End: 2022-01-01 | Stop reason: HOSPADM

## 2022-01-01 RX ADMIN — AZITHROMYCIN MONOHYDRATE 500 MG: 500 INJECTION, POWDER, LYOPHILIZED, FOR SOLUTION INTRAVENOUS at 12:05

## 2022-01-01 RX ADMIN — INSULIN ASPART 3 UNITS: 100 INJECTION, SOLUTION INTRAVENOUS; SUBCUTANEOUS at 07:05

## 2022-01-01 RX ADMIN — LEVETIRACETAM 1000 MG: 100 INJECTION, SOLUTION INTRAVENOUS at 09:05

## 2022-01-01 RX ADMIN — Medication 0.12 MCG/KG/MIN: at 04:05

## 2022-01-01 RX ADMIN — INSULIN HUMAN 5 UNITS: 100 INJECTION, SOLUTION PARENTERAL at 12:05

## 2022-01-01 RX ADMIN — DEXTROSE 250 ML: 10 SOLUTION INTRAVENOUS at 04:05

## 2022-01-01 RX ADMIN — INSULIN HUMAN 5 UNITS: 100 INJECTION, SOLUTION PARENTERAL at 04:05

## 2022-01-01 RX ADMIN — HEPARIN SODIUM 5000 UNITS: 5000 INJECTION INTRAVENOUS; SUBCUTANEOUS at 05:05

## 2022-01-01 RX ADMIN — SODIUM BICARBONATE 50 MEQ: 84 INJECTION, SOLUTION INTRAVENOUS at 10:05

## 2022-01-01 RX ADMIN — AZITHROMYCIN MONOHYDRATE 500 MG: 500 INJECTION, POWDER, LYOPHILIZED, FOR SOLUTION INTRAVENOUS at 11:05

## 2022-01-01 RX ADMIN — SODIUM BICARBONATE: 84 INJECTION, SOLUTION INTRAVENOUS at 05:05

## 2022-01-01 RX ADMIN — MORPHINE SULFATE 8 MG: 4 INJECTION INTRAVENOUS at 10:05

## 2022-01-01 RX ADMIN — LEVETIRACETAM 1000 MG: 100 INJECTION, SOLUTION INTRAVENOUS at 02:05

## 2022-01-01 RX ADMIN — MUPIROCIN: 20 OINTMENT TOPICAL at 10:05

## 2022-01-01 RX ADMIN — MUPIROCIN: 20 OINTMENT TOPICAL at 08:05

## 2022-01-01 RX ADMIN — MAGNESIUM SULFATE 2 G: 2 INJECTION INTRAVENOUS at 03:05

## 2022-01-01 RX ADMIN — FENTANYL CITRATE 50 MCG: 50 INJECTION INTRAMUSCULAR; INTRAVENOUS at 12:05

## 2022-01-01 RX ADMIN — MORPHINE SULFATE 4 MG: 4 INJECTION INTRAVENOUS at 09:05

## 2022-01-01 RX ADMIN — INSULIN DETEMIR 5 UNITS: 100 INJECTION, SOLUTION SUBCUTANEOUS at 09:05

## 2022-01-01 RX ADMIN — LORAZEPAM 2 MG: 2 INJECTION INTRAMUSCULAR; INTRAVENOUS at 09:05

## 2022-01-01 RX ADMIN — HEPARIN SODIUM 5000 UNITS: 5000 INJECTION INTRAVENOUS; SUBCUTANEOUS at 01:05

## 2022-01-01 RX ADMIN — LORAZEPAM 4 MG: 2 INJECTION INTRAMUSCULAR; INTRAVENOUS at 10:05

## 2022-01-01 RX ADMIN — EPINEPHRINE 1 MG: 0.1 INJECTION, SOLUTION ENDOTRACHEAL; INTRACARDIAC; INTRAVENOUS at 10:05

## 2022-01-01 RX ADMIN — MUPIROCIN: 20 OINTMENT TOPICAL at 09:05

## 2022-01-01 RX ADMIN — CEFTRIAXONE 2 G: 2 INJECTION, SOLUTION INTRAVENOUS at 10:05

## 2022-01-01 RX ADMIN — HEPARIN SODIUM 5000 UNITS: 5000 INJECTION INTRAVENOUS; SUBCUTANEOUS at 02:05

## 2022-01-01 RX ADMIN — FAMOTIDINE 20 MG: 10 INJECTION, SOLUTION INTRAVENOUS at 09:05

## 2022-01-01 RX ADMIN — HEPARIN SODIUM 2400 UNITS: 1000 INJECTION, SOLUTION INTRAVENOUS; SUBCUTANEOUS at 11:05

## 2022-01-01 RX ADMIN — Medication 0.1 MCG/KG/MIN: at 12:05

## 2022-01-01 RX ADMIN — PROPOFOL 10 MCG/KG/MIN: 10 INJECTION, EMULSION INTRAVENOUS at 01:05

## 2022-01-01 RX ADMIN — CEFTRIAXONE 2 G: 2 INJECTION, SOLUTION INTRAVENOUS at 12:05

## 2022-01-01 RX ADMIN — FENTANYL CITRATE 50 MCG: 50 INJECTION INTRAMUSCULAR; INTRAVENOUS at 11:05

## 2022-01-01 RX ADMIN — SODIUM BICARBONATE: 84 INJECTION, SOLUTION INTRAVENOUS at 01:05

## 2022-01-01 RX ADMIN — HEPARIN SODIUM 2400 UNITS: 1000 INJECTION, SOLUTION INTRAVENOUS; SUBCUTANEOUS at 02:05

## 2022-01-01 RX ADMIN — Medication 0.06 MCG/KG/MIN: at 03:05

## 2022-01-01 RX ADMIN — FAMOTIDINE 20 MG: 10 INJECTION, SOLUTION INTRAVENOUS at 08:05

## 2022-01-01 RX ADMIN — LEVETIRACETAM 1000 MG: 100 INJECTION, SOLUTION INTRAVENOUS at 10:05

## 2022-01-01 RX ADMIN — HEPARIN SODIUM 5000 UNITS: 5000 INJECTION INTRAVENOUS; SUBCUTANEOUS at 10:05

## 2022-01-01 RX ADMIN — Medication 0.02 MCG/KG/MIN: at 03:05

## 2022-01-01 RX ADMIN — PROPOFOL 30 MCG/KG/MIN: 10 INJECTION, EMULSION INTRAVENOUS at 10:05

## 2022-01-01 RX ADMIN — INSULIN DETEMIR 5 UNITS: 100 INJECTION, SOLUTION SUBCUTANEOUS at 08:05

## 2022-01-01 RX ADMIN — DEXTROSE 250 ML: 10 SOLUTION INTRAVENOUS at 12:05

## 2022-01-01 RX ADMIN — PIPERACILLIN AND TAZOBACTAM 4.5 G: 4; .5 INJECTION, POWDER, LYOPHILIZED, FOR SOLUTION INTRAVENOUS; PARENTERAL at 04:05

## 2022-01-01 RX ADMIN — INSULIN ASPART 5 UNITS: 100 INJECTION, SOLUTION INTRAVENOUS; SUBCUTANEOUS at 06:05

## 2022-01-01 RX ADMIN — INSULIN HUMAN 10 UNITS: 100 INJECTION, SOLUTION PARENTERAL at 11:05

## 2022-01-01 RX ADMIN — INSULIN ASPART 2 UNITS: 100 INJECTION, SOLUTION INTRAVENOUS; SUBCUTANEOUS at 12:05

## 2022-01-01 RX ADMIN — CALCIUM CHLORIDE 1 G: 100 INJECTION, SOLUTION INTRAVENOUS; INTRAVENTRICULAR at 10:05

## 2022-01-13 ENCOUNTER — TELEPHONE (OUTPATIENT)
Dept: FAMILY MEDICINE | Facility: CLINIC | Age: 72
End: 2022-01-13

## 2022-01-13 ENCOUNTER — TELEPHONE (OUTPATIENT)
Dept: FAMILY MEDICINE | Facility: CLINIC | Age: 72
End: 2022-01-13
Payer: MEDICARE

## 2022-01-13 NOTE — TELEPHONE ENCOUNTER
----- Message from Jazmin Garcia sent at 1/13/2022  8:45 AM CST -----  Contact: Pt  .Type:  Needs Medical Advice    Who Called: Pt  Symptoms (please be specific): headaches  How long has patient had these symptoms:  on and off  Pharmacy name and phone #:    Would the patient rather a call back or a response via [facility]? call  Best Call Back Number: [phone]  Additional Information:     Pt would like a call back w/ medical advice

## 2022-01-13 NOTE — TELEPHONE ENCOUNTER
Pt called to inform you that she had a headache during the early morning hours. She took 2 Tylenol and headache has subsided. She said that she'll call back if the headache returns.

## 2022-01-13 NOTE — TELEPHONE ENCOUNTER
----- Message from Michelle Newton sent at 1/13/2022  9:25 AM CST -----  Contact: Brittaney [phone]  Type:  Patient Returning Call    Who Called: Brittaney   Who Left Message for Patient: Oren   Does the patient know what this is regarding?: headaches   Would the patient rather a call back or a response via [facility]?  Call back   Best Call Back Number: [phone]  Additional Information:  none

## 2022-01-31 DIAGNOSIS — Z86.711 HISTORY OF PULMONARY EMBOLISM: Chronic | ICD-10-CM

## 2022-02-02 RX ORDER — APIXABAN 2.5 MG/1
TABLET, FILM COATED ORAL
Qty: 60 TABLET | Refills: 0 | Status: SHIPPED | OUTPATIENT
Start: 2022-02-02 | End: 2022-03-15

## 2022-04-27 DIAGNOSIS — Z86.711 HISTORY OF PULMONARY EMBOLISM: Chronic | ICD-10-CM

## 2022-04-28 DIAGNOSIS — Z86.711 HISTORY OF PULMONARY EMBOLISM: Chronic | ICD-10-CM

## 2022-04-28 NOTE — TELEPHONE ENCOUNTER
----- Message from Agatha Lee sent at 4/28/2022 10:10 AM CDT -----  Contact: Ezliki-[phone]  Type:  Needs Medical Advice    Who Called: Pt   Reason for call: regarding a Refill on ELIQUIS 2.5 mg Tab, pt is currently out of the medication and was not able to take any on Yesterday  Pharmacy name and phone #:  [facility] - [place], [place] - [address]  Would the patient rather a call back or a response via [facility]?  Call back  Best Call Back Number: [phone]

## 2022-05-01 RX ORDER — APIXABAN 2.5 MG/1
TABLET, FILM COATED ORAL
Qty: 60 TABLET | Refills: 0 | Status: SHIPPED | OUTPATIENT
Start: 2022-05-01

## 2022-05-24 PROBLEM — I46.9 CARDIAC ARREST: Status: ACTIVE | Noted: 2022-01-01

## 2022-05-24 NOTE — Clinical Note
Diagnosis: Cardiac arrest [427.5.ICD-9-CM]   Admitting Provider:: ERIC DAVID [[id number]]   Future Attending Provider: ERIC DAVID [[id number]]   Reason for IP Medical Treatment  (Clinical interventions that can only be accomplished in the IP setting? ) :: cardiac arrest   Estimated Length of Stay:: 2 midnights   I certify that Inpatient services for greater than or equal to 2 midnights are medically necessary:: Yes   Plans for Post-Acute care--if anticipated (pick the single best option):: A. No post acute care anticipated at this time

## 2022-05-24 NOTE — LETTER
May 26, 2022         [place] [id number]  Phone: [phone]  Fax: [phone]       Date of Visit: 05/26/2022    To Whom It May Concern:    Please be advised that under state and federal laws as it relates to patient privacy and Health Insurance Accountability Act (HIPAA), we can not release our patient(s) name without authorization. Although, we can confirm that the individual listed below did accompany a person to our facility for healthcare services to be provided.    This document confirms that Francesca Bowers accompanied a patient to our facility on 05/26/2022.    Sincerely,     Anthony Ya RN

## 2022-05-25 PROBLEM — J96.01 ACUTE HYPOXEMIC RESPIRATORY FAILURE: Status: ACTIVE | Noted: 2022-01-01

## 2022-05-25 PROBLEM — E83.39 HYPERPHOSPHATEMIA: Status: ACTIVE | Noted: 2022-01-01

## 2022-05-25 PROBLEM — N18.6 ESRD ON DIALYSIS: Status: ACTIVE | Noted: 2022-01-01

## 2022-05-25 PROBLEM — E87.29 METABOLIC ACIDOSIS, INCREASED ANION GAP: Status: ACTIVE | Noted: 2022-01-01

## 2022-05-25 PROBLEM — E11.9 DM2 (DIABETES MELLITUS, TYPE 2): Status: ACTIVE | Noted: 2022-01-01

## 2022-05-25 PROBLEM — Z99.2 ESRD ON DIALYSIS: Status: ACTIVE | Noted: 2022-01-01

## 2022-05-25 PROBLEM — T68.XXXA HYPOTHERMIA: Status: ACTIVE | Noted: 2022-01-01

## 2022-05-25 PROBLEM — E87.5 HYPERKALEMIA: Status: ACTIVE | Noted: 2022-01-01

## 2022-05-25 PROBLEM — R79.89 ELEVATED TROPONIN: Status: ACTIVE | Noted: 2022-01-01

## 2022-05-25 NOTE — SUBJECTIVE & OBJECTIVE
No past medical history on file.    No past surgical history on file.    Review of patient's allergies indicates:   Allergen Reactions    Iodinated contrast media Other (See Comments)       No current facility-administered medications on file prior to encounter.     Current Outpatient Medications on File Prior to Encounter   Medication Sig    cinacalcet (SENSIPAR) 60 MG Tab Take 60 mg by mouth once daily.    ELIQUIS 2.5 mg Tab Take 2.5 mg by mouth 2 (two) times daily.    oxyCODONE-acetaminophen (PERCOCET)  mg per tablet Take 1 tablet by mouth 2 (two) times daily as needed.    pantoprazole (PROTONIX) 40 MG tablet Take 40 mg by mouth once daily.    sevelamer carbonate (RENVELA) 800 mg Tab Take by mouth.     Family History    None       Tobacco Use    Smoking status: Not on file    Smokeless tobacco: Not on file   Substance and Sexual Activity    Alcohol use: Not on file    Drug use: Not on file    Sexual activity: Not on file     Review of Systems   Unable to perform ROS: Patient unresponsive   Objective:     Vital Signs (Most Recent):  Temp: (!) 92.48 °F (33.6 °C) (05/25/22 0345)  Pulse: 85 (05/25/22 0345)  Resp: 20 (05/25/22 0345)  BP: (!) 101/58 (05/25/22 0345)  SpO2: 100 % (05/25/22 0345)   Vital Signs (24h Range):  Temp:  [92.48 °F (33.6 °C)-96.8 °F (36 °C)] 92.48 °F (33.6 °C)  Pulse:  [] 85  Resp:  [15-26] 20  SpO2:  [96 %-100 %] 100 %  BP: ()/() 101/58     Weight: 69.5 kg (153 lb 3.5 oz)  Body mass index is 25.5 kg/m².    Physical Exam  Vitals and nursing note reviewed.   Constitutional:       Appearance: She is ill-appearing.   HENT:      Head: Normocephalic and atraumatic.      Nose: Nose normal.      Mouth/Throat:      Mouth: Mucous membranes are moist.   Eyes:      Pupils: Pupils are equal, round, and reactive to light.      Comments: Pinpoint pupils   Cardiovascular:      Rate and Rhythm: Normal rate.      Pulses: Normal pulses.      Heart sounds: Murmur heard.   Pulmonary:       Breath sounds: Rhonchi present.      Comments: ventilated  Abdominal:      General: Bowel sounds are normal.      Palpations: Abdomen is soft.   Musculoskeletal:         General: No swelling. Normal range of motion.      Cervical back: Neck supple.   Skin:     Comments: cool   Neurological:      Comments: Unresponsive to voice or touch, has slight gag on deep suction, pupils reactive   Psychiatric:      Comments: Unable to assess         CRANIAL NERVES     CN III, IV, VI   Pupils are equal, round, and reactive to light.     Significant Labs: All pertinent labs within the past 24 hours have been reviewed.    Significant Imaging: I have reviewed all pertinent imaging results/findings within the past 24 hours.

## 2022-05-25 NOTE — EICU
Rounding (Video Assessment):  No    Intervention Initiated From:  Bedside    Luís Communicated with Bedside Nurse regarding:  Time-Out    Nurse Notified:      Doctor Notified:  Yes    Comments: Called remotely into patients room for a central line time out.  Dr. KARINA Yen successfully placed a L IJ trialysis line.

## 2022-05-25 NOTE — PROCEDURES
"Brittaney Joseph is a 71 y.o. female patient.    Temp: (!) 93.92 °F (34.4 °C) (05/25/22 1200)  Pulse: 80 (05/25/22 1200)  Resp: (!) 32 (05/25/22 1200)  BP: 104/66 (05/25/22 1200)  SpO2: 100 % (05/25/22 1200)  Weight: 69.4 kg (153 lb) (05/25/22 1213)  Height: 5' 5" (165.1 cm) (05/25/22 1213)       Central Line    Date/Time: 5/25/2022 1:02 PM  Performed by: Riri Yen MD  Authorized by: Riri Yen MD     Location procedure was performed:  [facility] PULMONARY MEDICINE  Pre-operative diagnosis:  Hyperkalemia  Post-operative diagnosis:  Hyperkalemia  Consent Done ?:  Yes  Time out complete?: Verified correct patient, procedure, equipment, staff, and site/side    Indications:  Hemodialysis, vascular access and med administration  Anesthesia:  Local infiltration  Local anesthetic:  Lidocaine 1% without epinephrine  Anesthetic total (ml):  5  Preparation:  Skin prepped with ChloraPrep  Skin prep agent dried: Skin prep agent completely dried prior to procedure    Sterile barriers: All five maximal sterile barriers used - gloves, gown, cap, mask and large sterile sheet    Hand hygiene: Hand hygiene performed immediately prior to central venous catheter insertion    Location:  Left internal jugular  Catheter type:  Trialysis  Catheter size:  13 Fr  Inserted Catheter Length (cm):  20  Ultrasound guidance: Yes    Vessel Caliber:  Large   patent  Comprressibility:  Normal  Needle advanced into vessel with real time ultrasound guidance.    Guidewire confirmed in vessel.    Steril sheath on probe.    Sterile gel used.  Manometry: No    Number of attempts:  1  Securement:  Line sutured, chlorhexidine patch, sterile dressing applied and blood return through all ports  Complications: No    Specimens: No    Implants: No    XRay:  Placement verified by x-ray  Adverse Events:  NoneTermination Site: inferior vena cava        5/25/2022  "

## 2022-05-25 NOTE — ED TRIAGE NOTES
"EMS was called out to the patient's home for a witnessed LOC. EMS says that the patient was found to be in vtach, was then, shocked and later lost a pulse en route at 2238. The patient arrived to the ER at 2247 with CPR in progress. Pt was registered as a yong vasqueze due to not having an ID on file and the family not being present at the  ER, so PMH is limited. EMS does report that the patient's family says the patient was c/o generalized weakness and "possibly" had a syncopal episode earlier in the day.  "
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
## 2022-05-25 NOTE — ASSESSMENT & PLAN NOTE
Initial K+ >9, 6.8 after shifting- awaiting HD access  Initial pH 7.1  Rhythm VT- shocked, arrived pulseless and apneic   ~20 min for ROSC  Epi x 1 documented  Lactate 10.5     CAHP 185     EKG post ROSC ST with peaked TW    Feel cardiac arrest likely due to hyperkalemia   Will obtain TTE  Trend troponin- initial 0.1  Not a cath lab candidate at this time, await neuroprognostication   Patient auto cooled at 33 degrees C

## 2022-05-25 NOTE — CONSULTS
[facility] Consult Note    Date of Admit: 5/24/2022    Chief Complaint/Reason for Consult     Cardiac Arrest (Ems was called out after the patient was found down by her family. Pt EMS found pt in vtach, shocked her, lost a pulse at 2235 and started CPR. Pt arrived to the ER without a pulse. EMS says the family states that the patient was c/o generalized weakness this morning. MD and respiratory are at the bedside)    Reason for consult: ESRD on HD    Subjective:      History of Present Illness:  Brittaney Joseph is a 71 y.o. female with past medical history of ESRD on HD (MWF), HFpEF, CVA, PE on Eliquis presented to [facility] ER after cardiac arrest at home. Last dialyzed on Monday. Patient intubated/sedated at time of exam thus history comes from chart review. Per report patient was found unresponsive at home. On arrival by EMS found to be in pulseless Vtach and cardioverted with ROSC but again lost pulse en route to ER. Emergently intubated and ROSC achieved.     Past Medical History:  No past medical history on file.  ESRD on HD    Past Surgical History:  No past surgical history on file.    Allergies:  Review of patient's allergies indicates:   Allergen Reactions    Iodinated contrast media Other (See Comments)       Home Medications:  Prior to Admission medications    Medication Sig Start Date End Date Taking? Authorizing Provider   cinacalcet (SENSIPAR) 60 MG Tab Take 60 mg by mouth once daily. 4/22/22   Historical Provider   ELIQUIS 2.5 mg Tab Take 2.5 mg by mouth 2 (two) times daily. 5/2/22   Historical Provider   oxyCODONE-acetaminophen (PERCOCET)  mg per tablet Take 1 tablet by mouth 2 (two) times daily as needed. 5/23/22   Historical Provider   pantoprazole (PROTONIX) 40 MG tablet Take 40 mg by mouth once daily. 2/21/22   Historical Provider   sevelamer carbonate (RENVELA) 800 mg Tab Take by mouth. 1/31/22   Historical Provider       Family History:  No family history on file.    Social  "History:   No alcohol, drug, tobacco use    Review of Systems:  Unable to assess 2/2 clinical condition     Objective:   Last 24 Hour Vital Signs:  BP  Min: 71/48  Max: 211/77  Temp  Av.1 °F (33.9 °C)  Min: 92.3 °F (33.5 °C)  Max: 96.8 °F (36 °C)  Pulse  Av.9  Min: 81  Max: 143  Resp  Av.4  Min: 15  Max: 42  SpO2  Av.6 %  Min: 96 %  Max: 100 %  Height  Av' 5" (165.1 cm)  Min: 5' 5" (165.1 cm)  Max: 5' 5" (165.1 cm)  Weight  Av.9 kg (154 lb 1.8 oz)  Min: 69.5 kg (153 lb 3.5 oz)  Max: 70.3 kg (155 lb)  Body mass index is 25.5 kg/m².  I/O last 3 completed shifts:  In: 427.9 [I.V.:130; IV Piggyback:297.9]  Out: -     Physical Examination:  Gen: intubated, sedated  HEENT: NCAT, +gag and cough reflex  Neck: no thyroidmegaly, no LAD  Cardio: RRR, normal S1/S2, no MRG, JVP wnl  Lungs: mechanical breath sounds  Abd: soft, non distended  Ext: 2+ pulses B/L, no clubbing, cyanosis, edema  Skin: warm, dry, no rashes noted  Neuro: unable to assess  Access: LUE AVF w/ no thrill/bruit    Laboratory:  Most Recent Data:  CBC:   Lab Results   Component Value Date    WBC 9.70 2022    HGB 10.7 (L) 2022    HCT 28 (L) 2022     2022     (H) 2022    RDW 13.3 2022     BMP:   Lab Results   Component Value Date     2022    K 6.8 (HH) 2022     2022    CO2 8 (LL) 2022     (H) 2022    CREATININE 19.3 (H) 2022     (H) 2022     (H) 2022    CALCIUM 10.7 (H) 2022    MG 2.3 2022    PHOS 9.8 (HH) 2022     Urinalysis: No results found for: LABURIN, COLORU, PHUA, CLARITYU, SPECGRAV, LABSPEC, NITRITE, PROTEINUR, GLUCOSEU, KETONESU, UROBILINOGEN, BILIRUBINUR, BLOODU, RBCU, WBCUA    Radiology:  CT Head Without Contrast   Final Result      No convincing evidence of anoxic/ischemic changes, acute large vessel occlusion, hemorrhage or mass effect in this patient post arrest.    "   Nonspecific asymmetric patchy focus of white matter low density in the right posterior frontal region as described can be related to chronic white matter microvascular ischemic changes with milder low density elsewhere in the periventricular deep white matter.      Senescent atrophic changes.         Electronically signed by: Cecile Roque   Date:    05/25/2022   Time:    02:05      X-Ray Chest AP Portable   Final Result      As above         Electronically signed by: Chato Blas   Date:    05/24/2022   Time:    23:28      CT Head Without Contrast    (Results Pending)   US Lower Extremity Veins Bilateral    (Results Pending)   X-Ray Chest 1 View    (Results Pending)        Assessment and Plan:      Brittaney Joseph is a 71 y.o.female    ESRD on HD (MWF)  Anion gap acidosis  Hyperkalemia  - per report last normal session on Monday  - plan for dialysis today after Trialysis placement; AVF w/ no bruit/thrill  - if unable to tolerate iHD may need CRRT  - dose meds for GFR < 10  - strict I/O, daily weights  - please avoid gadolinium, fleets, phos-based laxatives, NSAIDs    Cardiac arrest  - intensivist following    Plan discussed with attending Dr. Mujica.    Thank you for allowing us to participate in taking care of your patient. Please call us if you have any questions regarding this consult.     Suma Turcios MD  [facility] Fellow    If after 5pm please forward any questions to the [facility] Fellow/Attending on call.

## 2022-05-25 NOTE — PROGRESS NOTES
Pharmacist Renal Dose Adjustment Note    Brittaney Joseph is a 71 y.o. female being treated with the medication zosyn    Patient Data:    Vital Signs (Most Recent):  Temp: (!) 93.74 °F (34.3 °C) (05/25/22 0145)  Pulse: 97 (05/25/22 0145)  Resp: (!) 23 (05/25/22 0145)  BP: 135/62 (05/25/22 0145)  SpO2: 97 % (05/25/22 0145)   Vital Signs (72h Range):  Temp:  [93.74 °F (34.3 °C)-96.8 °F (36 °C)]   Pulse:  []   Resp:  [15-25]   BP: (106-184)/()   SpO2:  [96 %-100 %]      Recent Labs   Lab 05/24/22 2304   CREATININE 18.80*     Serum creatinine: 18.8 mg/dL (H) 05/24/22 2304  Estimated creatinine clearance: 2.7 mL/min (A)    Medication:zosyn dose: 4.5G frequency q8h will be changed to medication:zosyn dose:4.5G frequency:q12h    Pharmacist's Name: Radha Kuhn  Pharmacist's Extension: [phone]

## 2022-05-25 NOTE — PLAN OF CARE
Problem: Adult Inpatient Plan of Care  Goal: Plan of Care Review  Outcome: Ongoing, Progressing  Goal: Patient-Specific Goal (Individualized)  Outcome: Ongoing, Progressing  Goal: Absence of Hospital-Acquired Illness or Injury  Outcome: Ongoing, Progressing  Goal: Optimal Comfort and Wellbeing  Outcome: Ongoing, Progressing     Problem: Communication Impairment (Mechanical Ventilation, Invasive)  Goal: Effective Communication  Outcome: Ongoing, Progressing     Problem: Device-Related Complication Risk (Mechanical Ventilation, Invasive)  Goal: Optimal Device Function  Outcome: Ongoing, Progressing     Plan of care reviewed with patient and family, all questions answered. HD done today, repeat labs pending. EEG done, MRI pending (will need to be off drips). Currently on Levo, post HD. Patient hypothermic during shift temp 91-93. Pulm CC made aware. Per Dr OTILIA Phillips, will continue to let patient self cool, okay with current temp range as long as patient tolerate (no shivers or tremors, no arrhthymias) Plan to start to rewarm at 2A (24hr jose). Will communicate to oncoming RN.

## 2022-05-25 NOTE — PLAN OF CARE
Patient arrived from [facility].  Head CT was performed on admit to [facility].  Primary team notified that patient has arrived.  Patient 93 degrees on admit and we will not be actively rewarming patient.  Cooling pads placed on patient incase temp increases >36 degrees.  Patient with spasticity, tremors, and seizure like activity but does not respond to noxious stimuli.  Patient loaded with keppra and bICARb drip started.  Five units of regular insulin given to shift potassium.  Niece updated by MITCHEL Jefferson.  Will continue with supportive care.

## 2022-05-25 NOTE — ED PROVIDER NOTES
"Encounter Date: 5/24/2022       History     Chief Complaint   Patient presents with    Cardiac Arrest     Ems was called out after the patient was found down by her family. Pt EMS found pt in vtach, shocked her, lost a pulse at 2235 and started CPR. Pt arrived to the ER without a pulse. EMS says the family states that the patient was c/o generalized weakness this morning. MD and respiratory are at the bedside     HPI   71 y.o.    Pt found down by family, EMS responded and found pulseless and were able to defib to ROSC    No meds given    Unable to intubate    Had ROSC and some breathing spontaneously when arrested again on way to [place], diverted here    On arrival was pulseless, apneic with CPR in progress    Known dialysis patient, unknown compliance with dialysis  Reportedly felt "weak," this AM      Review of patient's allergies indicates:   Allergen Reactions    Iodinated contrast media Other (See Comments)     No past medical history on file.  No past surgical history on file.  No family history on file.     Review of Systems  ROS unobtainable due to critical illness    Physical Exam     Initial Vitals   BP Pulse Resp Temp SpO2   05/24/22 2256 05/24/22 2303 05/24/22 2303 05/24/22 2319 05/24/22 2303   (!) 184/114 (!) 119 15 96.8 °F (36 °C) 100 %      MAP       --                Physical Exam    General: the patient is pulseless and apneic with chest compressions and BVM  Head: normocephalic and atraumatic, sclera are clear  Pupils =  Neck: supple without meningismus  Chest: BVM, apneic  Heart: pulseless  ABD soft, nontender, nondistended  Extremities: pulseless, cool  Skin: cool dry  Neuro: GCS 3      ED Course   Procedures  Labs Reviewed   CBC W/ AUTO DIFFERENTIAL - Abnormal; Notable for the following components:       Result Value    RBC 3.19 (*)     Hemoglobin 10.7 (*)     Hematocrit 32.9 (*)      (*)     MCH 33.5 (*)     Immature Granulocytes 3.5 (*)     Immature Grans (Abs) 0.34 (*)     Mono # 0.2 " (*)     Mono % 2.2 (*)     All other components within normal limits   COMPREHENSIVE METABOLIC PANEL - Abnormal; Notable for the following components:    Potassium >9.0 (*)     CO2 18 (*)     Glucose 467 (*)      (*)     Creatinine 18.80 (*)     Calcium 16.1 (*)      (*)      (*)     Anion Gap 25 (*)     eGFR if  1.3 (*)     eGFR if non  1.1 (*)     All other components within normal limits    Narrative:     K,GLU,CA   critical result(s) called and verbal readback obtained   from Melinda Clark RN by [facility] 05/24/2022 23:42   LACTIC ACID, PLASMA - Abnormal; Notable for the following components:    Lactate (Lactic Acid) 10.5 (*)     All other components within normal limits    Narrative:     LA    critical result(s) called and verbal readback obtained from   Melinda Clark RN by [facility] 05/24/2022 23:43   TROPONIN I - Abnormal; Notable for the following components:    Troponin I 0.109 (*)     All other components within normal limits   ISTAT PROCEDURE - Abnormal; Notable for the following components:    POC PH 7.190 (*)     POC PO2 561 (*)     POC HCO3 15.5 (*)     POC Sodium 131 (*)     POC Potassium 8.1 (*)     POC TCO2 17 (*)     POC Hematocrit 28 (*)     All other components within normal limits   CULTURE, BLOOD   CULTURE, BLOOD   SARS-COV-2 RNA AMPLIFICATION, QUAL    Narrative:     Is the patient symptomatic?->No   URINALYSIS, REFLEX TO URINE CULTURE   POCT GLUCOSE MONITORING CONTINUOUS          Imaging Results          X-Ray Chest AP Portable (Final result)  Result time 05/24/22 23:28:54    Final result by Roopa Reis MD (05/24/22 23:28:54)                 Impression:      As above      Electronically signed by: Chato Blas  Date:    05/24/2022  Time:    23:28             Narrative:    EXAMINATION:  XR CHEST AP PORTABLE    CLINICAL HISTORY:  cardiac arrest;    TECHNIQUE:  Single frontal view of the chest was performed.    COMPARISON:  None    FINDINGS:  Endotracheal  tube enteric tube satisfactory in position.  Overlying metallic device noted.  Perihilar interstitial opacities with prominent cardiac silhouette.  Correlate clinically for stable CHF.    Bones are intact.                                 Medications   fentaNYL 50 mcg/mL injection 50 mcg (50 mcg Intravenous Given 5/25/22 0009)     Followed by   fentaNYL 50 mcg/mL injection 50 mcg (has no administration in time range)   propofol (DIPRIVAN) 10 mg/mL infusion (has no administration in time range)   insulin regular injection 10 Units 0.1 mL (10 Units Intravenous Given 5/24/22 1561)       Medical Decision Making:    This is an emergent evaluation of a patient presenting to the ED.  Nursing notes were reviewed.  I personally reviewed, read, and interpreted the ECG and any monitoring strips.  ST w/ bifasc block  I reviewed radiology images personally along with interpretations.  ETT good mild edema  I personally reviewed and interpreted the laboratory results.  Severe hyperkalemia  Hyperglycemia  Metabolic acidosis  Communicated with another physician regarding patient's care: hospital medicine  I decided to obtain and review old medical records, which showed: unable to obtain due to chart merger (was originally reg as unk and family not avail)    Endotracheal Intubation    Consent: emergent    Medications: none    The patient was intubated emergently using direct laryngoscopy with a Esperanza 4 blade to pass a 7.5 tube on the 1st attempt.  Tube placement was confirmed by direct visualization, CO2 detection, pulse oximetry, chest rise, and breath sounds.  A post intubation chest xray will be ordered.    Eder Greenfield MD    Procedure Note:  Physician Inserted Peripheral IO    I inserted an appropriate tibial IO due to lack of other vascular access/failed attempts.  Dominick and flushed  Dressed in usual fashion.    Dr. Guillermo Greenfield    Critical Care Time    Critical care time was provided for 30 minutes exclusive of other billable  procedures and teaching time for the support of cardiac/resp/renal organ system where the potential for death, shock, or further decline was possible.  Critical care time can include documentation, discussion with consultants, developing a care plan, as well as direct patient care.    Eder Greenfield MD          ROSC  Intubated  IV/IO inserted  Had epi/calcium/bicarb  Severe hyperkalemia w/ hyperglycemia  Insulin IV  If further cardiac irritability, repeat calcium  Will need emergent dialysis  Has LUE fistula w/ good thrill    There is no family here to update.  I suspect because she was en route to [place] and they diverted here.  RN will try to update family.    Guillermo Greenfield MD, MAGDY                      Clinical Impression:   Final diagnoses:  [I46.9] Cardiac arrest  [E87.5] Hyperkalemia (Primary)  [R73.9] Hyperglycemia  [E87.2] Metabolic acidosis  [N18.6] ESRD (end stage renal disease)          ED Disposition Condition    Admit       critical, prognosis poor      Guillermo Greenfield MD, MAG[facility], [facility]  Department of Emergency Medicine          Eder Greenfield MD  05/25/22 9351

## 2022-05-25 NOTE — EICU
Brief New eICU admit Note:  CC: posibly LOC-syncope at home, found in V tach arest, shocked and lost pulse en route with CPR continued in ED. Seizure like activity post ROSC.    Camera:  On Vent: 450/5/20/50%. PIP is < 25.HR 93, 98% sats. 135/62  On propofol at 10. No CVL yet. Nephrology to do HD. Need trialysis line.    Data:  Hg 10.7,  K 8.1 dwon from > 9  7.19/40/561/-13. Hco3 at 15.  , co2 at 18, AG 25.  Cr 18, Glucose 467. Calcium elevated 16. AST/ALT: 148/129. ALP normal. Trop 0.109  Covid neg    CxR: ET and OG in place. Maribell hilar and RLL zone air space desnisities- could be aspiration.       A/P:    Out of Hospital V tach arrest, CPR en route to ED and ROSC in ED. On Vent. ESRD- hyperkalemia and hypercalcemia. AGMA and resp acidosis.     - follow CT head if no bleeding to start TTM to goal 36 degree  - Nephrology for emergent HD/hypercalcemia. Prior hx not available. Need to look for neoplasia.   - post cardiac arrest sz like activity. Getting neuron specific enolase. Follow troponin, LA.  - avoid hypoglycemia. BG goals < 180  - on VAP bundle.wean VT as tolerated to 6 ml/ibw goal.   - on Lovenox as VTE prophylaxis.  - watch for new fever, worsening hypoxemia or infiltrate on CxR for aspiration pneumonia?.  - on zosyn empirically for PNA. Cx drawn  - ABG to follow    Discussed with bed side RN. Bed side team-MD/NP on case.  No intervention needed from our side. On TTM.     5:19  NP notes, labs and CT scan reviewed  Continue care.

## 2022-05-25 NOTE — ASSESSMENT & PLAN NOTE
K >9 on presentation, shifted in ED  Repeat K 6.8  -will shift again   -serial labs  -consulted nephrology for dialysis, will dialyze in the morning

## 2022-05-25 NOTE — ED TRIAGE NOTES
Ems was called out after the patient was found down by her family. Pt EMS found pt in [facility], shocked her, lost a pulse at 2235 and started CPR. Pt arrived to the ER without a pulse. EMS says the family states that the patient was c/o generalized weakness this morning. MD and respiratory are at the bedside

## 2022-05-25 NOTE — PROGRESS NOTES
"Dr CASSIUS Yen at bedside rounding on patient, patient withdraws, + for gag/cough, slight possible posturing on right side and seizure like activity noted. Patient currently on propofol at 25, loaded with Keppra on admit for seizure like activity. Per Dr Yen, "stop propofol to assess true neuro status, if seizure is noted, will place order for ativan and will order EEG". Orders carried out.    Dr Macias, Nephrology called for an update on patient, orders placed for HD. MD made aware that upon assessment, no thrill or bruit noted to left upper arm fistula. Per patient's niece, patient was scheduled for an evaluation of fistula some time this week. Orders to consult surgery for trialysis placement    General Surgery consulted for trialysis, per Dr Shelton, he is currently not a hospital, have ICU team place trialysis.    ICU team notified of surgery requesting for them to place line.   "

## 2022-05-25 NOTE — CONSULTS
[facility] - Intensive Care  Cardiology  Consult Note    Patient Name: Brittaney Joseph  MRN: [id number]  Admission Date: 5/24/2022  Hospital Length of Stay: 1 days  Code Status: Full Code   Attending Provider: Sadaf Grier MD   Consulting Provider: Ciro Baird NP  Primary Care Physician: Charles Argueta MD  Principal Problem:Cardiac arrest    Patient information was obtained from ER records.     Inpatient consult to [facility]  Consult performed by: Ciro Baird NP  Consult ordered by: Li Tabares NP        Subjective:     Chief Complaint:  Cardiac arrest      HPI:   HPI retrieved from chart. Patient intubated and sedated. Brittaney Joseph is a 70 yo female with a pmh of ESRD on dialysis MWF, diastolic heart failure, CVA, PE on eliquis. She presented with cardiac arrest.  The patient last went to dialysis on Monday and family reports she had her full session. She was to see vascular today because her access was starting to clot off, which she has had trouble with in the past. She does not smoke, use drugs, or drink alcohol. She walks with a walker and lives with family. She has a brother and a sister but no children per family report. She had not been sick. No complaints of chest pain or SOB. She was in her normal state of health until yesterday evening (Tuesday) around 5pm. The family thought she might be having a stroke when she began sweating and swinging her arm and they noticed she wasn't talking and her mouth looked different. EMS was called and on arrival they checked her vitals and she was back to normal at that time. Then sometime after 9pm she was laying down when she complained of back pain and then stopped responding so the family called EMS. On their arrival the patient was found to be in pulseless Vtach and was shocked with ROSC, no meds given.   The patient then lost a pulse en route at 2238 and CPR was initiated, ROSC obtained at 2253.  On arrival to ED, the patient was  pulseless and apneic and CPR was continued with epinephrine, calcium, and bicarb given. The patient was intubated. ROSC was achieved at 2253. The patient remained unresponsive off sedation and began to have seizure activity while in the ED.   Initial labs revealed: lactic acid 10.5, pH 7.1, K >9,  troponin 0.1, glucose 467. EKG showed ST with PVCs.  She is self cooled at 92 degrees F  Concerns for seizure activity reported. EEG is pending.         No past medical history on file.    No past surgical history on file.    Review of patient's allergies indicates:   Allergen Reactions    Iodinated contrast media Other (See Comments)       No current facility-administered medications on file prior to encounter.     Current Outpatient Medications on File Prior to Encounter   Medication Sig    cinacalcet (SENSIPAR) 60 MG Tab Take 60 mg by mouth once daily.    ELIQUIS 2.5 mg Tab Take 2.5 mg by mouth 2 (two) times daily.    oxyCODONE-acetaminophen (PERCOCET)  mg per tablet Take 1 tablet by mouth 2 (two) times daily as needed.    pantoprazole (PROTONIX) 40 MG tablet Take 40 mg by mouth once daily.    sevelamer carbonate (RENVELA) 800 mg Tab Take by mouth.     Family History    None       Tobacco Use    Smoking status: Not on file    Smokeless tobacco: Not on file   Substance and Sexual Activity    Alcohol use: Not on file    Drug use: Not on file    Sexual activity: Not on file     Review of Systems   Unable to perform ROS: Intubated   Objective:     Vital Signs (Most Recent):  Temp: (!) 94.1 °F (34.5 °C) (05/25/22 1000)  Pulse: 84 (05/25/22 1030)  Resp: (!) 32 (05/25/22 1030)  BP: (!) 86/52 (05/25/22 1030)  SpO2: 100 % (05/25/22 1030)   Vital Signs (24h Range):  Temp:  [92.3 °F (33.5 °C)-96.8 °F (36 °C)] 94.1 °F (34.5 °C)  Pulse:  [] 84  Resp:  [15-42] 32  SpO2:  [96 %-100 %] 100 %  BP: ()/() 86/52     Weight: 69.5 kg (153 lb 3.5 oz)  Body mass index is 25.5 kg/m².    SpO2: 100 %  O2 Device  (Oxygen Therapy): ventilator      Intake/Output Summary (Last 24 hours) at 5/25/2022 1040  Last data filed at 5/25/2022 0900  Gross per 24 hour   Intake 886.43 ml   Output 0 ml   Net 886.43 ml       Lines/Drains/Airways       Central Venous Catheter Line  Duration             Trialysis (Dialysis) Catheter 05/25/22 0945 left internal jugular <1 day              Drain  Duration                  Hemodialysis AV Fistula Left upper arm -- days         NG/OG Tube 05/24/22 2330 orogastric 16 Fr. Center mouth <1 day         Urethral Catheter 05/24/22 2356 Double-lumen 16 Fr. <1 day              Airway  Duration                  Airway - Non-Surgical 05/24/22 Endotracheal Tube 1 day              Peripheral Intravenous Line  Duration                  Peripheral IV - Single Lumen 05/25/22 0000 22 G Right Wrist <1 day                    Physical Exam  Constitutional:       Appearance: She is ill-appearing.      Interventions: She is sedated and intubated.   Eyes:      General:         Right eye: No discharge.         Left eye: No discharge.   Cardiovascular:      Rate and Rhythm: Regular rhythm. Tachycardia present.   Pulmonary:      Effort: She is intubated.      Breath sounds: No rales.   Abdominal:      General: Bowel sounds are normal.       Significant Labs: BMP:   Recent Labs   Lab 05/24/22 2304 05/25/22 0226   * 272*  274*    141   K >9.0* 6.8*   CL 99 101   CO2 18* 8*   *  --    CREATININE 18.80* 19.3*   CALCIUM 16.1* 10.7*   MG  --  2.3   , CMP   Recent Labs   Lab 05/24/22 2304 05/25/22 0226    141   K >9.0* 6.8*   CL 99 101   CO2 18* 8*   * 272*  274*   *  --    CREATININE 18.80* 19.3*   CALCIUM 16.1* 10.7*   PROT 6.4 7.1   ALBUMIN 3.5 2.8*   BILITOT 0.5 0.4   ALKPHOS 69  --    *  --    * 141*   ANIONGAP 25* 32*   ESTGFRAFRICA 1.9* 2*   EGFRNONAA 1.6* 2*   , CBC   Recent Labs   Lab 05/24/22 2304 05/24/22  2348   WBC 9.70  --    HGB 10.7*  --    HCT 32.9* 28*      --    , INR   Recent Labs   Lab 05/25/22  0226   INR 1.1   , Lipid Panel No results for input(s): CHOL, HDL, LDLCALC, TRIG, CHOLHDL in the last 48 hours., Troponin   Recent Labs   Lab 05/24/22  2304   TROPONINI 0.109*   , and All pertinent lab results from the last 24 hours have been reviewed.    Significant Imaging: Echocardiogram: Transthoracic echo (TTE) complete ([facility] Only): No results found for this or any previous visit.    Assessment and Plan:     * Cardiac arrest  Initial K+ >9, 6.8 after shifting- awaiting HD access  Initial pH 7.1  Rhythm VT- shocked, arrived pulseless and apneic   ~20 min for ROSC  Epi x 1 documented  Lactate 10.5     CAHP 185     EKG post ROSC ST with peaked TW    Feel cardiac arrest likely due to hyperkalemia   Will obtain TTE  Trend troponin- initial 0.1  Not a cath lab candidate at this time, await neuroprognostication   Patient auto cooled at 33 degrees C      Elevated troponin  Per Cardiac arrest     Hyperkalemia  K+ >9 on admission, 6.8 after shifting- likely etiology of VT arrest   Awaiting line placement for emergent HD, continue shifting until HD feasible         VTE Risk Mitigation (From admission, onward)         Ordered     heparin (porcine) injection 5,000 Units  Every 8 hours         05/25/22 0215     Place sequential compression device  Until discontinued         05/25/22 0143     IP VTE HIGH RISK PATIENT  Once         05/25/22 0143     Place sequential compression device  Until discontinued         05/25/22 0143                Thank you for your consult. I will follow-up with patient. Please contact us if you have any additional questions.    Ciro Baird NP  Cardiology   [facility] - Intensive Care

## 2022-05-25 NOTE — HPI
Brittaney Joseph is a 70 yo female with a pmh of ESRD on dialysis MWF, diastolic heart failure, CVA, PE on eliquis. She presented with cardiac arrest. The history was obtained by the family and chart review. The patient last went to dialysis on Monday and family reports she had her full session. She was to see vascular today because her access was starting to clot off, which she has had trouble with in the past. She does not smoke, use drugs, or drink alcohol. She walks with a walker and lives with family. She has a brother and a sister but no children per family report. She had not been sick. No complaints of chest pain or SOB. She was in her normal state of health until yesterday evening (Tuesday) around 5pm. The family thought she might be having a stroke when she began sweating and swinging her arm and they noticed she wasn't talking and her mouth looked different. EMS was called and on arrival they checked her vitals and she was back to normal at that time. Then sometime after 9pm she was laying down when she complained of back pain and then stopped responding so the family called EMS. On their arrival the patient was found to be in pulseless Vtach and was shocked with ROSC, no meds given. The patient then lost a pulse en route at 2238 and CPR was initiated. On arrival to ED, the patient was pulseless and apneic and CPR was continued with epinephrine, calcium, and bicarb given. The patient was intubated. ROSC was achieved at 2253. The patient remained unresponsive off sedation and began to have seizure activity while in the ED. Initial labs revealed: lactic acid 10.5, pH 7.1, K >9, creatinine 18, troponin 0.1, glucose 467. EKG showed ST with PVCs.    On arrival to [facility], the patient remained unresponsive with frequent seizure like activity. She was hypothermic, self cooling. She did have a slight gag with deep suction and pupil reaction.

## 2022-05-25 NOTE — SUBJECTIVE & OBJECTIVE
No past medical history on file.    No past surgical history on file.    Review of patient's allergies indicates:   Allergen Reactions    Iodinated contrast media Other (See Comments)       No current facility-administered medications on file prior to encounter.     Current Outpatient Medications on File Prior to Encounter   Medication Sig    cinacalcet (SENSIPAR) 60 MG Tab Take 60 mg by mouth once daily.    ELIQUIS 2.5 mg Tab Take 2.5 mg by mouth 2 (two) times daily.    oxyCODONE-acetaminophen (PERCOCET)  mg per tablet Take 1 tablet by mouth 2 (two) times daily as needed.    pantoprazole (PROTONIX) 40 MG tablet Take 40 mg by mouth once daily.    sevelamer carbonate (RENVELA) 800 mg Tab Take by mouth.     Family History    None       Tobacco Use    Smoking status: Not on file    Smokeless tobacco: Not on file   Substance and Sexual Activity    Alcohol use: Not on file    Drug use: Not on file    Sexual activity: Not on file     Review of Systems   Unable to perform ROS: Intubated   Objective:     Vital Signs (Most Recent):  Temp: (!) 94.1 °F (34.5 °C) (05/25/22 1000)  Pulse: 84 (05/25/22 1030)  Resp: (!) 32 (05/25/22 1030)  BP: (!) 86/52 (05/25/22 1030)  SpO2: 100 % (05/25/22 1030)   Vital Signs (24h Range):  Temp:  [92.3 °F (33.5 °C)-96.8 °F (36 °C)] 94.1 °F (34.5 °C)  Pulse:  [] 84  Resp:  [15-42] 32  SpO2:  [96 %-100 %] 100 %  BP: ()/() 86/52     Weight: 69.5 kg (153 lb 3.5 oz)  Body mass index is 25.5 kg/m².    SpO2: 100 %  O2 Device (Oxygen Therapy): ventilator      Intake/Output Summary (Last 24 hours) at 5/25/2022 1040  Last data filed at 5/25/2022 0900  Gross per 24 hour   Intake 886.43 ml   Output 0 ml   Net 886.43 ml       Lines/Drains/Airways       Central Venous Catheter Line  Duration             Trialysis (Dialysis) Catheter 05/25/22 0945 left internal jugular <1 day              Drain  Duration                  Hemodialysis AV Fistula Left upper arm -- days         NG/OG Tube  05/24/22 2330 orogastric 16 Fr. Center mouth <1 day         Urethral Catheter 05/24/22 2356 Double-lumen 16 Fr. <1 day              Airway  Duration                  Airway - Non-Surgical 05/24/22 Endotracheal Tube 1 day              Peripheral Intravenous Line  Duration                  Peripheral IV - Single Lumen 05/25/22 0000 22 G Right Wrist <1 day                    Physical Exam  Constitutional:       Appearance: She is ill-appearing.      Interventions: She is sedated and intubated.   Eyes:      General:         Right eye: No discharge.         Left eye: No discharge.   Cardiovascular:      Rate and Rhythm: Regular rhythm. Tachycardia present.   Pulmonary:      Effort: She is intubated.      Breath sounds: No rales.   Abdominal:      General: Bowel sounds are normal.       Significant Labs: BMP:   Recent Labs   Lab 05/24/22 2304 05/25/22 0226   * 272*  274*    141   K >9.0* 6.8*   CL 99 101   CO2 18* 8*   *  --    CREATININE 18.80* 19.3*   CALCIUM 16.1* 10.7*   MG  --  2.3   , CMP   Recent Labs   Lab 05/24/22 2304 05/25/22 0226    141   K >9.0* 6.8*   CL 99 101   CO2 18* 8*   * 272*  274*   *  --    CREATININE 18.80* 19.3*   CALCIUM 16.1* 10.7*   PROT 6.4 7.1   ALBUMIN 3.5 2.8*   BILITOT 0.5 0.4   ALKPHOS 69  --    *  --    * 141*   ANIONGAP 25* 32*   ESTGFRAFRICA 1.9* 2*   EGFRNONAA 1.6* 2*   , CBC   Recent Labs   Lab 05/24/22 2304 05/24/22 2348   WBC 9.70  --    HGB 10.7*  --    HCT 32.9* 28*     --    , INR   Recent Labs   Lab 05/25/22 0226   INR 1.1   , Lipid Panel No results for input(s): CHOL, HDL, LDLCALC, TRIG, CHOLHDL in the last 48 hours., Troponin   Recent Labs   Lab 05/24/22 2304   TROPONINI 0.109*   , and All pertinent lab results from the last 24 hours have been reviewed.    Significant Imaging: Echocardiogram: Transthoracic echo (TTE) complete ([facility] Only): No results found for this or any previous visit.

## 2022-05-25 NOTE — PROGRESS NOTES
Patient BP 200s/100s since propofol has been stopped. Dr CASSIUS Yen notified. Orders given to restart propofol.

## 2022-05-25 NOTE — PROGRESS NOTES
Verbal orders given to increase propofol @30mcg to 50mcg by Dr CASSIUS Yen for trialysis placement. Patient became hypotensive, BP 70s/40s with MAP 50s. Orders placed for Levophed. Will carry out orders and monitor. Propofol decreased back to 30mcg per RN.     1030: With decreasing Propofol back to 30mcg, hypotension corrected. MAP 65 and above. Levo never started and on hold.

## 2022-05-25 NOTE — CONSULTS
"Consult Note  [facility]    Attending: Sadaf Grier MD  Fellow: Farshad  Admit Date: 5/24/2022  Today's Date: 05/25/2022  Reason for Consult:  Acute hypoxic respiratory failure s/p cardiac arrest    SUBJECTIVE:     HPI: patient is a 71 year old female with PMHx of ESRD on HD MWF, HFpEF, previous CVA with possible left sided weakness, hx of PE on eliquis [place] [facility]. History obtained from chart review. Per family patient toelrated HD two days ago with no issues. Family reports patient was assessed by vascular surgery yesterday prior to her arrest due to clotted off access. After her appointment patient was in her usual state of health. Family called EMS early evening due to patient sweating and swinging her arm around. By the time EMS arrived patient had returned to her baseline. Later that same evening patient was laying down and informed family she had back pain. Patient then became unresponsive. EMS was called and patient found to be pulseless VTach and shocked once, obtaining ROSC. En route to the ED, patient became pulseless again and CPR was initiated at that time. On arrival to the ED, patient remained pulseless and apneic. ROSC obtained after patient was intubated and given epinephrine, calcium and bicarb. Unsure of exact amount of time of CPR before ROSC was obtained. Labs in the ED showed pH 7.1/40/15 with LA of 10.5. K >9. Cr of 18  Once transferred to [place], patient remained unresponsive with frequent "seizure like activity." TTM was not initiated and patients CT head w.o contrast was negative. Patient transferred to ICU for acute hypoxic respiratory failure s/p cardiac arrest in setting of acidosis and hyperkalemia    At baseline patient walks with a walker and lives with family.     Review of patient's allergies indicates:   Allergen Reactions    Iodinated contrast media Other (See Comments)       No past medical history on file.  No past surgical " "history on file.  No family history on file.       All medications reviewed.    Review of Systems   Unable to perform ROS: Intubated       OBJECTIVE:     Vital Signs Trends/Hx Reviewed  Vitals:    05/25/22 1145 05/25/22 1200 05/25/22 1213 05/25/22 1324   BP: 99/61 104/66     BP Location:       Patient Position:       Pulse: 82 80  85   Resp: (!) 32 (!) 32  (!) 32   Temp:  (!) 93.92 °F (34.4 °C)  (!) 93.74 °F (34.3 °C)   TempSrc:  Core Esophageal     SpO2: 100% 100%  100%   Weight:   69.4 kg (153 lb)    Height:   5' 5" (1.651 m)        Ventilator settings: Vent Mode: A/CMV-VC  Oxygen Concentration (%):  [21-50] 21  Resp Rate Total:  [21 br/min-32 br/min] 32 br/min  Vt Set:  [450 mL] 450 mL  PEEP/CPAP:  [5 cmH20] 5 cmH20  Mean Airway Pressure:  [9.4 sjV39-71.6 cmH20] 12.4 cmH20     Physical Exam:  General: NAD  HEENT: AT/NC, minimally reactive pupils. Equal bilaterally   Cardiac: normal rate, regular rhythm, with no MRG with brisk cap refill and symmetric pulses in distal extremities. LUE fistula with no bruit  Respiratory: Normal inspection. Symmetric chest rise. Normal palpation and percussion. Coarse breath sounds  Abdomen: Soft, NT/ND. +BS. No hepatosplenomegaly.   Extremities: LUE contracture and stiff  Neuro: does not respond to painful stimuli; does not respond to verbal or physical stimuli.  Myoclonic jerking of right upper extremity and head, non rhythmic      Laboratory:  Recent Labs   Lab 05/25/22  0458   PH 7.273*   PCO2 32.7*   PO2 213*   HCO3 15.1*   POCSATURATED 100   BE -12     Recent Labs   Lab 05/25/22  1016   WBC 19.14*   RBC 2.87*   HGB 9.6*   HCT 28.0*      MCV 98   MCH 33.4*   MCHC 34.3     Recent Labs   Lab 05/25/22  1016      K 8.1*   CL 97   CO2 16*   *   CREATININE 18.4*   MG 1.9       Microbiology Data:   Microbiology Results (last 7 days)     Procedure Component Value Units Date/Time    Blood culture x two cultures. Draw prior to antibiotics. [[id number]] Collected: " 05/24/22 2318    Order Status: Sent Specimen: Blood from Peripheral, Forearm, Right Updated: 05/25/22 1318    Blood culture x two cultures. Draw prior to antibiotics. [[id number]] Collected: 05/24/22 2312    Order Status: Sent Specimen: Blood from Peripheral, Forearm, Right Updated: 05/25/22 1318    Culture, Respiratory with Gram Stain [[id number]]     Order Status: No result Specimen: Respiratory from Sputum     Blood culture [[id number]]     Order Status: Canceled Specimen: Blood     Blood culture [[id number]]     Order Status: Canceled Specimen: Blood            Chest Imaging:   No new imaging.     Infusions:     NORepinephrine bitartrate-D5W Stopped (05/25/22 1000)    propofoL Stopped (05/25/22 1357)       Scheduled Medications:    acetaminophen  650 mg Per NG tube Q6H    azithromycin  500 mg Intravenous Q24H    cefTRIAXone (ROCEPHIN) IVPB  2 g Intravenous Q24H    famotidine (PF)  20 mg Intravenous Daily    heparin (porcine)  5,000 Units Subcutaneous Q8H    levetiracetam IV  1,000 mg Intravenous Q12H    mupirocin   Nasal BID       PRN Medications:   acetaminophen, dextrose 10%, dextrose 10%, fentaNYL **FOLLOWED BY** [START ON 5/26/2022] fentaNYL, glucagon (human recombinant), insulin aspart U-100, labetalol, magnesium sulfate IVPB, ondansetron, sodium chloride 0.9%, sodium chloride 0.9%    Assessment & Plan:   Patient Active Problem List   Diagnosis    Cardiac arrest    ESRD on dialysis    Hyperkalemia    Metabolic acidosis, increased anion gap    Acute hypoxemic respiratory failure    Hypothermia    Elevated troponin    Hyperphosphatemia    DM2 (diabetes mellitus, type 2)       ASSESSMENT & RECOMMENDATIONS     Patient is a 71 year old female with PMHx ESRD on HD MWF, HFpEF, hx of CVA, hx of PE on eliquis presenting to [facility] s/p cardiac arrest in setting of hyperkalemia. Patient currently intubated and sedated, workup in process.     Neuro/Psych  #cardiac arrest  Intubated on  5/24  Sedated on propofol  keppra loaded  CT head without contrast negative  EEG pending, possible MRI brain  Hypothermic, self cooling at this time  initiate rewarming 24 hrs after arrival (5/26 at 2 am)    #hx of CVA  Per chart review, patient has hx of CVA with residual left sided weakness    CV  #cardiac arrest 2/2 to hyperkalemia  - s/p achieving ROSC x2 with pulseless Vtach  - K >9; troponin 0.1>0.639  - Sodium bicarb drip started  - repeat K 6.8 after shifting  - trialysis line placed for HD  - cardiology consulted: trend troponin, currently not a cath lab candidate    #CHF  - [id number] EF 60% with GIDD  -   - not fluid overloaded on examination  - repeat echo pending. Bedside echo showing good LV squeeze    #Hx of PE  - on eliquis per chart review  - US LE pending  - eliquis continued      Pulm  #acute hypoxic Respiratory Failure  - intubated on 5/24  - Vent settings as above, able to decrease Fio2 with increase in RR  - Most recent ABG  PH 7.273/32.7/15.1  - CXR with possible aspiration pneumonia      FEN/GI   Diet: NPO  Fluid restriction   Keep Mg 2, K 4  Zofran prn for n/v     RENAL  #ESRD on HD MWF  #clotted Fistula  #hyperkalemia  #AGMA  - reported tolerated dialysis two days ago but was seen by vascular surgery for fistula clotting  - trialysis line placed for access and HD 5/25  - nephrology consulted, emergent dialysis in progress  - K >9> 6.8  - AG 25>33  - LA 10>9.5> continue trending; expect resolution with dialysis  - Strict I&Os  - Avoid renal toxic meds  - Continue to monitor renal status and urine output       Heme  #Anemia of chronic disease on HD  H/H 9.6/28; stable  WBC 19.14 (likely stress related)  DVT prophylaxis: Heparin (prophylactic)    Endo  #T2DM  A1c 7.1  Currently on: aspart SSIu TID  Maintain glucose 140-180      ID  #aspiration pneumonitis  - zosyn switched to azithro and rocephin  - respiratory, blood cultures in process      DVT PPx: heparin  Diet: NPO  GI PPX: IV  Protonix  Deconditioning: n/a  Code Status: Full      Dispo  - dialysis, neuroprognostic testing      5/25/2022 Marya Taveras M.D., \[facility]\"" PULMONOLOGY   [facility]

## 2022-05-25 NOTE — ASSESSMENT & PLAN NOTE
Acute hypoxemic respiratory failure  Metabolic acidosis, increased anion gap  Hypothermia  Elevated troponin    -head CT obtained-no acute findings  -consult pulmonology and cardiology  -echo pending  -TTM ordered but patient self cooling since arrival  -start keppra  -start zosyn to cover for aspiration  -cont propofol as needed  -start bicarb drip  -trend labs  -correct electrolyte abnormalities, nephrology consulted  -blood cultures pending  -consult palliative care for goals of care discussion with family

## 2022-05-25 NOTE — ASSESSMENT & PLAN NOTE
K+ >9 on admission, 6.8 after shifting- likely etiology of VT arrest   Awaiting line placement for emergent HD, continue shifting until HD feasible

## 2022-05-25 NOTE — H&P
[facility] Medicine  History & Physical    Patient Name: Brittaney Joseph  MRN: [id number]  Patient Class: IP- Inpatient  Admission Date: 5/24/2022  Attending Physician: Isaías Menchaca, *   Primary Care Provider: No primary care provider on file.         Patient information was obtained from relative(s), EMS personnel, past medical records and ER records.     Subjective:     Principal Problem:Cardiac arrest    Chief Complaint:   Chief Complaint   Patient presents with    Cardiac Arrest     Ems was called out after the patient was found down by her family. Pt EMS found pt in vtach, shocked her, lost a pulse at 2235 and started CPR. Pt arrived to the ER without a pulse. EMS says the family states that the patient was c/o generalized weakness this morning. MD and respiratory are at the bedside        HPI: Brittaney Joseph is a 70 yo female with a pmh of ESRD on dialysis MWF, diastolic heart failure, CVA, PE on eliquis. She presented with cardiac arrest. The history was obtained by the family and chart review. The patient last went to dialysis on Monday and family reports she had her full session. She was to see vascular today because her access was starting to clot off, which she has had trouble with in the past. She does not smoke, use drugs, or drink alcohol. She walks with a walker and lives with family. She has a brother and a sister but no children per family report. She had not been sick. No complaints of chest pain or SOB. She was in her normal state of health until yesterday evening (Tuesday) around 5pm. The family thought she might be having a stroke when she began sweating and swinging her arm and they noticed she wasn't talking and her mouth looked different. EMS was called and on arrival they checked her vitals and she was back to normal at that time. Then sometime after 9pm she was laying down when she complained of back pain and then stopped responding so the family called EMS.  On their arrival the patient was found to be in pulseless Vtach and was shocked with ROSC, no meds given. The patient then lost a pulse en route at 2238 and CPR was initiated. On arrival to ED, the patient was pulseless and apneic and CPR was continued with epinephrine, calcium, and bicarb given. The patient was intubated. ROSC was achieved at 2253. The patient remained unresponsive off sedation and began to have seizure activity while in the ED. Initial labs revealed: lactic acid 10.5, pH 7.1, K >9, creatinine 18, troponin 0.1, glucose 467. EKG showed ST with PVCs.    On arrival to [facility] ICU, the patient remained unresponsive with frequent seizure like activity. She was hypothermic, self cooling. She did have a slight gag with deep suction and pupil reaction.       No past medical history on file.    No past surgical history on file.    Review of patient's allergies indicates:   Allergen Reactions    Iodinated contrast media Other (See Comments)       No current facility-administered medications on file prior to encounter.     Current Outpatient Medications on File Prior to Encounter   Medication Sig    cinacalcet (SENSIPAR) 60 MG Tab Take 60 mg by mouth once daily.    ELIQUIS 2.5 mg Tab Take 2.5 mg by mouth 2 (two) times daily.    oxyCODONE-acetaminophen (PERCOCET)  mg per tablet Take 1 tablet by mouth 2 (two) times daily as needed.    pantoprazole (PROTONIX) 40 MG tablet Take 40 mg by mouth once daily.    sevelamer carbonate (RENVELA) 800 mg Tab Take by mouth.     Family History    None       Tobacco Use    Smoking status: Not on file    Smokeless tobacco: Not on file   Substance and Sexual Activity    Alcohol use: Not on file    Drug use: Not on file    Sexual activity: Not on file     Review of Systems   Unable to perform ROS: Patient unresponsive   Objective:     Vital Signs (Most Recent):  Temp: (!) 92.48 °F (33.6 °C) (05/25/22 0345)  Pulse: 85 (05/25/22 0345)  Resp: 20 (05/25/22 0345)  BP:  (!) 101/58 (05/25/22 0345)  SpO2: 100 % (05/25/22 0345)   Vital Signs (24h Range):  Temp:  [92.48 °F (33.6 °C)-96.8 °F (36 °C)] 92.48 °F (33.6 °C)  Pulse:  [] 85  Resp:  [15-26] 20  SpO2:  [96 %-100 %] 100 %  BP: ()/() 101/58     Weight: 69.5 kg (153 lb 3.5 oz)  Body mass index is 25.5 kg/m².    Physical Exam  Vitals and nursing note reviewed.   Constitutional:       Appearance: She is ill-appearing.   HENT:      Head: Normocephalic and atraumatic.      Nose: Nose normal.      Mouth/Throat:      Mouth: Mucous membranes are moist.   Eyes:      Pupils: Pupils are equal, round, and reactive to light.      Comments: Pinpoint pupils   Cardiovascular:      Rate and Rhythm: Normal rate.      Pulses: Normal pulses.      Heart sounds: Murmur heard.   Pulmonary:      Breath sounds: Rhonchi present.      Comments: ventilated  Abdominal:      General: Bowel sounds are normal.      Palpations: Abdomen is soft.   Musculoskeletal:         General: No swelling. Normal range of motion.      Cervical back: Neck supple.   Skin:     Comments: cool   Neurological:      Comments: Unresponsive to voice or touch, has slight gag on deep suction, pupils reactive   Psychiatric:      Comments: Unable to assess         CRANIAL NERVES     CN III, IV, VI   Pupils are equal, round, and reactive to light.     Significant Labs: All pertinent labs within the past 24 hours have been reviewed.    Significant Imaging: I have reviewed all pertinent imaging results/findings within the past 24 hours.    Assessment/Plan:     * Cardiac arrest  Acute hypoxemic respiratory failure  Metabolic acidosis, increased anion gap  Hypothermia  Elevated troponin    -head CT obtained-no acute findings  -consult pulmonology and cardiology  -echo pending  -TTM ordered but patient self cooling since arrival  -start keppra  -start zosyn to cover for aspiration  -cont propofol as needed  -start bicarb drip  -trend labs  -correct electrolyte abnormalities,  nephrology consulted  -blood cultures pending  -consult palliative care for goals of care discussion with family          DM2 (diabetes mellitus, type 2)  Diet controlled  Hyperglycemic on admission  -check A1C  -accuchecks and SSI  -NPO      Hyperphosphatemia  -consult nephrology      Elevated troponin  See above      Hypothermia  -see above      Acute hypoxemic respiratory failure  See above      Metabolic acidosis, increased anion gap  See above      Hyperkalemia  K >9 on presentation, shifted in ED  Repeat K 6.8  -will shift again   -serial labs  -consulted nephrology for dialysis, will dialyze in the morning      ESRD on dialysis  -consult nephrology  -consult general surgery for trialysis line placement        VTE Risk Mitigation (From admission, onward)         Ordered     heparin (porcine) injection 5,000 Units  Every 8 hours         05/25/22 0215     Place sequential compression device  Until discontinued         05/25/22 0143     IP VTE HIGH RISK PATIENT  Once         05/25/22 0143     Place sequential compression device  Until discontinued         05/25/22 0143              Critical care time spent on the evaluation and treatment of severe organ dysfunction, review of pertinent labs and imaging studies, discussions with consulting providers and discussions with patient/family: 90 minutes.     Li Tabares NP  Department of Hospital Medicine   [place] - Intensive Care

## 2022-05-25 NOTE — HPI
HPI retrieved from chart. Patient intubated and sedated. Brittaney Joseph is a 70 yo female with a pmh of ESRD on dialysis MWF, diastolic heart failure, CVA, PE on eliquis. She presented with cardiac arrest.  The patient last went to dialysis on Monday and family reports she had her full session. She was to see vascular today because her access was starting to clot off, which she has had trouble with in the past. She does not smoke, use drugs, or drink alcohol. She walks with a walker and lives with family. She has a brother and a sister but no children per family report. She had not been sick. No complaints of chest pain or SOB. She was in her normal state of health until yesterday evening (Tuesday) around 5pm. The family thought she might be having a stroke when she began sweating and swinging her arm and they noticed she wasn't talking and her mouth looked different. EMS was called and on arrival they checked her vitals and she was back to normal at that time. Then sometime after 9pm she was laying down when she complained of back pain and then stopped responding so the family called EMS. On their arrival the patient was found to be in pulseless Vtach and was shocked with ROSC, no meds given.   The patient then lost a pulse en route at 2238 and CPR was initiated, ROSC obtained at 2253.  On arrival to ED, the patient was pulseless and apneic and CPR was continued with epinephrine, calcium, and bicarb given. The patient was intubated. ROSC was achieved at 2253. The patient remained unresponsive off sedation and began to have seizure activity while in the ED.   Initial labs revealed: lactic acid 10.5, pH 7.1, K >9,  troponin 0.1, glucose 467. EKG showed ST with PVCs.  She is self cooled at 92 degrees F  Concerns for seizure activity reported. EEG is pending.

## 2022-05-25 NOTE — PROGRESS NOTES
Pharmacist Renal Dose Adjustment Note    Brittaney Joseph is a 71 y.o. female being treated with the medication famotidine    Patient Data:    Vital Signs (Most Recent):  Temp: (!) 93.74 °F (34.3 °C) (05/25/22 0145)  Pulse: 97 (05/25/22 0145)  Resp: (!) 23 (05/25/22 0145)  BP: 135/62 (05/25/22 0145)  SpO2: 97 % (05/25/22 0145)   Vital Signs (72h Range):  Temp:  [93.74 °F (34.3 °C)-96.8 °F (36 °C)]   Pulse:  []   Resp:  [15-25]   BP: (106-184)/()   SpO2:  [96 %-100 %]      Recent Labs   Lab 05/24/22  2304   CREATININE 18.80*     Creatinine clearance cannot be calculated (Unknown ideal weight.)    Medication:famotidine dose: 20 mg frequency twice daily will be changed to medication:famotidine dose:20 mg frequency:daily    Pharmacist's Name: Radha Kuhn  Pharmacist's Extension: [phone]

## 2022-05-26 NOTE — HOSPITAL COURSE
Ms. Joseph presented after cardiac arrest in the field with unknown duration of time down.  Intubated in the field.  Had loss of pulse again on route to the hospital.  Hypothermic protocol initiated for VT arrest.  Patient was started on empiric Zosyn for possible aspiration pneumonia, administered medication to shift hyperkalemia prior to initiation of emergent dialysis.  Cardiology, Nephrology and Pulmonary consulted.  Initial head CT with no acute abnormality seen.  Loaded Keppra.  EEG markedly abnormal consistent with anoxic brain injury.  Palliative Care consulted.  patient with no improvement in neurological status consistent with severe anoxic brain injury and this was communicated with the patient's family and they are agreeable for DNR status with no escalation of care.  Patient was later transition to comfort care and discharged to inpatient hospice.     * Cardiac arrest  Anoxic brain injury  Encephalopathy  Acute hypoxemic respiratory failure  Metabolic acidosis, increased anion gap  Hypothermia  Elevated troponin  Shock  - Initial head CT obtained with no acute findings  - Loaded with Keppra and started empirically with Zosyn for aspiration pneumonia or  - Cardiac arrest likely secondary to severe hyperkalemia  - Treated with medications to correct hyperkalemia followed by emergent dialysis  - Nephrology, Cardiology and Pulmonary/Critical Care consulted  - Echo with EF 55% and grade 1 diastolic dysfunction  - Hypothermia protocol initiated but patient self cooling since arrival  - Started bicarb drip with correction of electrolytes   - Closely monitoring with repeat labs  - As of this morning, worsening neurological exam with fixed pupils that are dilated, and absence of gag and corneal reflexes  - Patient remains completely unresponsive despite being off all sedation  - EEG consistent with severe anoxic brain injury  - Prognosis poor for any meaningful recovery and family conference by medical team in  conjunction with palliative care  - Patient with was changed to DNR status and then discharged to inpatient hospice      Essential hypertension  - In shock, all medications on hold     DM2 (diabetes mellitus, type 2)  - Diet controlled and not on any medication at home  - Hyperglycemic on admission  - Hemoglobin A1c is 7.1  - Adjusted insulin regimen as needed to achieve glucose between 140-180     Hyperphosphatemia  -  As per nephrology     Elevated troponin  - See above     Hypothermia  - See above     Acute hypoxemic respiratory failure  - See above     Metabolic acidosis, increased anion gap  - See above     Hyperkalemia  - K >9 on presentation, shifted in ED  - Shifted and monitored, and treated with dialysis  - As per Nephrology     ESRD on dialysis  - Trialysis line placement done  - As per Nephrology

## 2022-05-26 NOTE — CARE UPDATE
Palliative Medicine Quick Note    Pt's eldest son South has signed consents with [facility]. Pt will be formally discharged and readmitted to the inpatient hospice service tomorrow AM and per discussion with family we plan to proceed with palliative extubation @ 0900 tomorrow 5/27/22.    Noted recently started on peripheral pressors; recommend no further escalation of care overnight.    Ger Mccormack MD  Hospice and Palliative Medicine  Palliative Care Pager: [phone]

## 2022-05-26 NOTE — PROGRESS NOTES
"Progress Note  [facility] Medicine    Attending: Logan  Fellow: Farshad  Admit Date: 5/24/2022  Today's Date: 05/26/2022      SUBJECTIVE:     Yesterday patient underwent EEG with no sedation showing "catastraphically severe cerebral dysfunction as seen in anoxic brain injury". Patient off propfol all night and started rewarming this morning.     OBJECTIVE:     Vital Signs Trends/Hx Reviewed  Vitals:    05/26/22 0115 05/26/22 0119 05/26/22 0316 05/26/22 0450   BP:  (!) 165/73  (!) 90/51   Pulse: 78 77 90 91   Resp: (!) 32 (!) 32 (!) 32 (!) 32   Temp: (!) 89.6 °F (32 °C) (!) 89.6 °F (32 °C) (!) 91.58 °F (33.1 °C) (!) 91.76 °F (33.2 °C)   TempSrc:       SpO2: 100% 100% 100% 100%   Weight:       Height:           Ventilator settings:   Vent Type: NKV-550 (5/26/2022  4:50 AM)    Vent Mode: A/CMV-VC  Oxygen Concentration (%):  [21-40] 21  Resp Rate Total:  [22 br/min-33 br/min] 32 br/min  Vt Set:  [450 mL] 450 mL  PEEP/CPAP:  [5 cmH20] 5 cmH20  Mean Airway Pressure:  [10.2 nvS98-98.1 cmH20] 12.6 cmH20    Physical Exam:  Physical Exam  Vitals reviewed.   HENT:      Head: Normocephalic and atraumatic.      Mouth/Throat:      Comments: ET tube in place  Eyes:      Comments: Right pupil 4 mm, nonreactive  Left pupil 2-3 mm, nonreactive  No corneal reflex   Cardiovascular:      Rate and Rhythm: Normal rate and regular rhythm.      Heart sounds: No murmur heard.  Pulmonary:      Effort: No respiratory distress.      Comments: Coarse breath sounds  Abdominal:      General: There is no distension.   Musculoskeletal:      Comments: Left hand contractured   Neurological:      Comments: No cough reflex  No gag reflex  No withdrawal to pain in all extremities  Does not respond to verbal or physical stimuli         Laboratory:  No results for input(s): PH, PCO2, PO2, HCO3, POCSATURATED, BE in the last 24 hours.  Recent Labs   Lab 05/25/22  1016   WBC 19.14*   RBC 2.87*   HGB 9.6*   HCT 28.0*      MCV 98 "   MCH 33.4*   MCHC 34.3     Recent Labs   Lab 05/26/22  0234   *   K 6.8*   CL 99   CO2 10*   BUN 77*   CREATININE 9.9*   MG 1.8       Microbiology Data:   Microbiology Results (last 7 days)     Procedure Component Value Units Date/Time    Culture, Respiratory with Gram Stain [[id number]] Collected: 05/25/22 1909    Order Status: Completed Specimen: Respiratory from Sputum Updated: 05/26/22 0525     Gram Stain (Respiratory) <10 epithelial cells per low power field.     Gram Stain (Respiratory) Rare WBC's     Gram Stain (Respiratory) Rare Gram positive cocci     Gram Stain (Respiratory) Rare yeast    Blood culture x two cultures. Draw prior to antibiotics. [[id number]] Collected: 05/24/22 2318    Order Status: Completed Specimen: Blood from Peripheral, Forearm, Right Updated: 05/25/22 2115     Blood Culture, Routine No Growth to date    Narrative:      Aerobic and anaerobic    Blood culture x two cultures. Draw prior to antibiotics. [[id number]] Collected: 05/24/22 2312    Order Status: Completed Specimen: Blood from Peripheral, Forearm, Right Updated: 05/25/22 2115     Blood Culture, Routine No Growth to date    Narrative:      Aerobic and anaerobic    Blood culture [[id number]]     Order Status: Canceled Specimen: Blood     Blood culture [[id number]]     Order Status: Canceled Specimen: Blood            Chest Imaging:   X-Ray Chest 1 View    Result Date: 5/25/2022  As above. Electronically signed by: Husam Beltre MD Date:    05/25/2022 Time:    10:09        Infusions:     NORepinephrine bitartrate-D5W 0.08 mcg/kg/min (05/26/22 0617)    propofoL Stopped (05/25/22 1357)       Scheduled Medications:    azithromycin  500 mg Intravenous Q24H    cefTRIAXone (ROCEPHIN) IVPB  2 g Intravenous Q24H    famotidine (PF)  20 mg Intravenous Daily    heparin (porcine)  5,000 Units Subcutaneous Q8H    levetiracetam IV  1,000 mg Intravenous Q12H    mupirocin   Nasal BID       PRN Medications:   acetaminophen, acetaminophen,  dextrose 10%, dextrose 10%, [] fentaNYL **FOLLOWED BY** fentaNYL, glucagon (human recombinant), heparin (porcine), insulin aspart U-100, labetalol, magnesium sulfate IVPB, ondansetron, sodium chloride 0.9%, sodium chloride 0.9%    Problem List:   Patient Active Problem List   Diagnosis    Cardiac arrest    ESRD on dialysis    Hyperkalemia    Metabolic acidosis, increased anion gap    Acute hypoxemic respiratory failure    Hypothermia    Elevated troponin    Hyperphosphatemia    DM2 (diabetes mellitus, type 2)    Shock    ESRD (end stage renal disease)    Lactic acidosis    Essential hypertension       ASSESSMENT & RECOMMENDATIONS       Patient is a 71 year old female with PMHx ESRD on HD MWF, HFpEF, hx of CVA, hx of PE on eliquis presenting to [facility] from [facility] s/p cardiac arrest in setting of hyperkalemia. EEG showing signs of anoxic brain injury. Patient showing signs of worsening neurologic signs. Patient currently undergoing rewarming process and GOC discussion performed today with family at bedside. Patient code status changed to DNR. Palliative consulted, prognosis guarded.      Neuro/Psych  #cardiac arrest  #anoxic brain injury  - Intubated on   - off sedation  - keppra loaded  - CT head without contrast negative  - EEG off sedation: catastrophically severe cerebral dysfunction seen in anoxic brain injury  - worsened physical exam findings from yesterday  - absent myoclonus today  - Rewarming in process  - will discontinue MRI brain at this time     #hx of CVA  Per chart review, patient has hx of CVA with residual left sided weakness     CV  #cardiac arrest 2/2 to hyperkalemia  #distributive shock   #NSTEMI  - hyperkalemic on admission, completed HD yesterday  - EKG showed ST with PVCs  - cardiology consulted: trend troponin, currently not a cath lab candidate  - Troponin 0.1>0.639>  - on levophed   - MAP goal >65    #CHF  - Echo  EF 60% with GIDD  -   - not fluid  overloaded on examination  - official Echo Ef 55% with GIDD, mild pulm HTN, mild RV enlargement     #Hx of PE  - on eliquis per chart review  - US LE neg for DVT  - on heparine        Pulm  #acute hypoxic Respiratory Failure  #aspiration pneumonitis  - intubated on 5/24  - Vent settings as above  - Most recent ABG  pH 7.56/16.9/15  - CXR with possible aspiration pneumonia  - on rocephin and azithro Day 2        FEN/GI  Diet: NPO  Fluid restriction   Keep Mg 2, K 4  Zofran prn for n/v      RENAL  #ESRD on HD MWF  #hyperkalemia  #AGMA  - trialysis line placed for access and HD 5/25; s/p emergent dialysis yesterday  - K >9> 6.8  - AG 25>33>20  - LA 10>9.5> 1.9  - nephrology consulted, will continue shifting and hold on dialysis till [facility] discussion completed  - Strict I&Os  - no UOP, gar in place  - Avoid renal toxic meds  - Continue to monitor renal status and urine output        Heme  #Anemia of chronic disease on HD  H/H 9.6/28; stable  WBC 15 (likely stress related)  DVT prophylaxis: Heparin (prophylactic)     Endo  #T2DM  A1c 7.1  Currently on: aspart SSI q6hrs; detemir 5u qhs  Maintain glucose 140-180        ID  #aspiration pneumonitis  - zosyn switched to azithro and rocephin (day 2)  - respiratory, blood cultures in process        DVT PPx: heparin  Diet: NPO  GI PPX: IV Protonix  Deconditioning: n/a  Code Status: DNR    Pulmonology team had long discussion with family in the room. Performed neurologic examination with family at bedside. Patient in agreement to change code status to DNR. Family waiting for all family to come to bedside before making further decisions.   Palliative care consulted.         Dispo  - K resolution, [facility] discussion        5/26/2022 Marya Taveras M.D., [facility]  [facility] Medicine  [facility]

## 2022-05-26 NOTE — NURSING
Notified Nephrology Dr. LORETO Gilbert regarding Potassium level 6.8 after trying to shift pt with Insulin and D10 earlier. No new orders at this time.

## 2022-05-26 NOTE — ASSESSMENT & PLAN NOTE
- Diet controlled and not on any medication at home  - Hyperglycemic on admission  - Hemoglobin A1c is 7.1  - Continue accuchecks and SSI  - Will adjust insulin regimen as needed to achieve glucose between 140-180

## 2022-05-26 NOTE — SUBJECTIVE & OBJECTIVE
Interval History:  Patient remained off sedation overnight with no improvement in neurological status; she remains unresponsive and with corneal and gag reflex absence.  She remains on pressor support with Levophed.  Rewarming in process.  EEG markedly abnormal consistent with severe anoxic brain injury.    Review of Systems   Unable to perform ROS: Intubated   Objective:     Vital Signs (Most Recent):  Temp: 96.98 °F (36.1 °C) (05/26/22 1611)  Pulse: (!) 114 (05/26/22 1611)  Resp: 20 (05/26/22 1611)  BP: (!) 88/56 (05/26/22 1530)  SpO2: 100 % (05/26/22 1611)   Vital Signs (24h Range):  Temp:  [89.24 °F (31.8 °C)-96.98 °F (36.1 °C)] 96.98 °F (36.1 °C)  Pulse:  [] 114  Resp:  [20-32] 20  SpO2:  [100 %] 100 %  BP: ()/(50-98) 88/56     Weight: 74 kg (163 lb 2.3 oz)  Body mass index is 27.15 kg/m².    Intake/Output Summary (Last 24 hours) at 5/26/2022 1617  Last data filed at 5/26/2022 1000  Gross per 24 hour   Intake 1131.34 ml   Output 1110 ml   Net 21.34 ml      Physical Exam  Vitals and nursing note reviewed.   Constitutional:       Appearance: She is ill-appearing.   HENT:      Head: Normocephalic and atraumatic.      Nose: Nose normal.      Mouth/Throat:      Mouth: Mucous membranes are moist.   Eyes:      Comments: Pupils fixed and dilated   Cardiovascular:      Rate and Rhythm: Normal rate.      Pulses: Normal pulses.      Heart sounds: Murmur heard.   Pulmonary:      Breath sounds: Rhonchi present.      Comments: ventilated  Abdominal:      Palpations: Abdomen is soft.      Tenderness: There is no abdominal tenderness.   Musculoskeletal:         General: No swelling. Normal range of motion.      Cervical back: Neck supple.      Comments: No spontaneous movements   Skin:     General: Skin is dry.   Neurological:      Comments: Unresponsive to verbal and tactile stimulus, no gag or corneal reflex, pupils are unreactive, GCS of 3   Psychiatric:      Comments: Unable to assess       Significant Labs:  Spoke with Brian. He states his fingers are very swollen and he does have some pain. He is asking if it's normal to be swollen. Writer advised it is normal to have swelling after the procedure he had. Advised him to ice the area but just be sure not to get the splint wet as well as elevate the hand above heart level when he is at rest. He states he will start doing this. Also advised patient that he can alternate tylenol and ibuprofen, along with the pain medication, but just to be sure not to exceed around 3,000mg of tylenol daily. Pt verbalized understanding, no further questions at this time.   All pertinent labs within the past 24 hours have been reviewed.    Significant Imaging: I have reviewed all pertinent imaging results/findings within the past 24 hours.

## 2022-05-26 NOTE — PROGRESS NOTES
[facility] Medicine  Progress Note    Patient Name: Brittaney Joseph  MRN: [id number]  Patient Class: IP- Inpatient   Admission Date: 5/24/2022  Length of Stay: 2 days  Attending Physician: Sadaf Grier MD  Primary Care Provider: Charles Argueta MD        Subjective:     Principal Problem:Cardiac arrest        HPI:  Brittaney Joseph is a 72 yo female with a pmh of ESRD on dialysis MWF, diastolic heart failure, CVA, PE on eliquis. She presented with cardiac arrest. The history was obtained by the family and chart review. The patient last went to dialysis on Monday and family reports she had her full session. She was to see vascular today because her access was starting to clot off, which she has had trouble with in the past. She does not smoke, use drugs, or drink alcohol. She walks with a walker and lives with family. She has a brother and a sister but no children per family report. She had not been sick. No complaints of chest pain or SOB. She was in her normal state of health until yesterday evening (Tuesday) around 5pm. The family thought she might be having a stroke when she began sweating and swinging her arm and they noticed she wasn't talking and her mouth looked different. EMS was called and on arrival they checked her vitals and she was back to normal at that time. Then sometime after 9pm she was laying down when she complained of back pain and then stopped responding so the family called EMS. On their arrival the patient was found to be in pulseless Vtach and was shocked with ROSC, no meds given. The patient then lost a pulse en route at 2238 and CPR was initiated. On arrival to ED, the patient was pulseless and apneic and CPR was continued with epinephrine, calcium, and bicarb given. The patient was intubated. ROSC was achieved at 2253. The patient remained unresponsive off sedation and began to have seizure activity while in the ED. Initial labs revealed: lactic acid 10.5, pH 7.1, K  >9, creatinine 18, troponin 0.1, glucose 467. EKG showed ST with PVCs.    On arrival to [place] ICU, the patient remained unresponsive with frequent seizure like activity. She was hypothermic, self cooling. She did have a slight gag with deep suction and pupil reaction.       Overview/Hospital Course:  Ms. Joseph present with cardiac arrest in the field with unknown duration of time down.  Intubated in the field.  Had loss of pulse again on route to the hospital.  Hypothermic protocol initiated for VT arrest.  Patient was started on empiric Zosyn for possible aspiration pneumonia, administered medication to shift hyperkalemia prior to initiation of emergent dialysis.  Cardiology, Nephrology and Pulmonary consulted.  Initial head CT with no acute abnormality seen.  Loaded Keppra.  EEG markedly abnormal consistent with anoxic brain injury.      Interval History:  Patient remained off sedation overnight with no improvement in neurological status; she remains unresponsive and with corneal and gag reflex absence.  She remains on pressor support with Levophed.  Rewarming in process.  EEG markedly abnormal consistent with severe anoxic brain injury.    Review of Systems   Unable to perform ROS: Intubated   Objective:     Vital Signs (Most Recent):  Temp: 96.98 °F (36.1 °C) (05/26/22 1611)  Pulse: (!) 114 (05/26/22 1611)  Resp: 20 (05/26/22 1611)  BP: (!) 88/56 (05/26/22 1530)  SpO2: 100 % (05/26/22 1611)   Vital Signs (24h Range):  Temp:  [89.24 °F (31.8 °C)-96.98 °F (36.1 °C)] 96.98 °F (36.1 °C)  Pulse:  [] 114  Resp:  [20-32] 20  SpO2:  [100 %] 100 %  BP: ()/(50-98) 88/56     Weight: 74 kg (163 lb 2.3 oz)  Body mass index is 27.15 kg/m².    Intake/Output Summary (Last 24 hours) at 5/26/2022 1617  Last data filed at 5/26/2022 1000  Gross per 24 hour   Intake 1131.34 ml   Output 1110 ml   Net 21.34 ml      Physical Exam  Vitals and nursing note reviewed.   Constitutional:       Appearance: She is  ill-appearing.   HENT:      Head: Normocephalic and atraumatic.      Nose: Nose normal.      Mouth/Throat:      Mouth: Mucous membranes are moist.   Eyes:      Comments: Pupils fixed and dilated   Cardiovascular:      Rate and Rhythm: Normal rate.      Pulses: Normal pulses.      Heart sounds: Murmur heard.   Pulmonary:      Breath sounds: Rhonchi present.      Comments: ventilated  Abdominal:      Palpations: Abdomen is soft.      Tenderness: There is no abdominal tenderness.   Musculoskeletal:         General: No swelling. Normal range of motion.      Cervical back: Neck supple.      Comments: No spontaneous movements   Skin:     General: Skin is dry.   Neurological:      Comments: Unresponsive to verbal and tactile stimulus, no gag or corneal reflex, pupils are unreactive, GCS of 3   Psychiatric:      Comments: Unable to assess       Significant Labs: All pertinent labs within the past 24 hours have been reviewed.    Significant Imaging: I have reviewed all pertinent imaging results/findings within the past 24 hours.      Assessment/Plan:      * Cardiac arrest  Anoxic brain injury  Encephalopathy  Acute hypoxemic respiratory failure  Metabolic acidosis, increased anion gap  Hypothermia  Elevated troponin  Shock  - Initial head CT obtained with no acute findings  - Loaded with Keppra and started empirically with Zosyn for aspiration pneumonia or  - Cardiac arrest likely secondary to severe hyperkalemia  - Treated with medications to correct hyperkalemia followed by emergent dialysis  - Nephrology, Cardiology and Pulmonary/Critical Care consulted  - Echo with EF 55% and grade 1 diastolic dysfunction  - Hypothermia protocol initiated but patient self cooling since arrival  - Started bicarb drip with correction of electrolytes   - Closely monitoring with repeat labs  - As of this morning, worsening neurological exam with fixed pupils that are dilated, and absence of gag and corneal reflexes  - Patient remains  completely unresponsive despite being off all sedation  - EEG consistent with severe anoxic brain injury  - Prognosis poor for any meaningful recovery and family conference planned for later today with the medical care team in conjunction with palliative care        Essential hypertension  - In shock, all medications on hold    DM2 (diabetes mellitus, type 2)  - Diet controlled and not on any medication at home  - Hyperglycemic on admission  - Hemoglobin A1c is 7.1  - Continue accuchecks and SSI  - Will adjust insulin regimen as needed to achieve glucose between 140-180    Hyperphosphatemia  -  As per nephrology    Elevated troponin  - See above    Hypothermia  - See above    Acute hypoxemic respiratory failure  - See above    Metabolic acidosis, increased anion gap  - See above    Hyperkalemia  - K >9 on presentation, shifted in ED  - Shifted and monitored, and treated with dialysis  - As per Nephrology    ESRD on dialysis  - Trialysis line placement done  - As per Nephrology      VTE Risk Mitigation (From admission, onward)         Ordered     heparin (porcine) injection 2,400 Units  As needed (PRN)         05/25/22 1705     heparin (porcine) injection 5,000 Units  Every 8 hours         05/25/22 0215     Place sequential compression device  Until discontinued         05/25/22 0143     IP VTE HIGH RISK PATIENT  Once         05/25/22 0143     Place sequential compression device  Until discontinued         05/25/22 0143                  Critical care time spent on the evaluation and treatment of severe organ dysfunction, review of pertinent labs and imaging studies, discussions with consulting providers and discussions with patient/family: 60 minutes.      Sadaf Grier MD  Department of Hospital Medicine   [facility] - Intensive Care

## 2022-05-26 NOTE — PLAN OF CARE
Problem: Adult Inpatient Plan of Care  Goal: Plan of Care Review  Outcome: Ongoing, Progressing  Goal: Patient-Specific Goal (Individualized)  Outcome: Ongoing, Progressing  Goal: Absence of Hospital-Acquired Illness or Injury  Outcome: Ongoing, Progressing  Goal: Optimal Comfort and Wellbeing  Outcome: Ongoing, Progressing     Problem: Communication Impairment (Mechanical Ventilation, Invasive)  Goal: Effective Communication  Outcome: Ongoing, Progressing     Problem: Device-Related Complication Risk (Mechanical Ventilation, Invasive)  Goal: Optimal Device Function  Outcome: Ongoing, Progressing     Plan of care reviewed with patient and family, all questions answered. Remains on Levo. Palliative care consult done and complete. Family agrees with hospice. Will flip to hospice in AM as there is still family coming in town tonight.

## 2022-05-26 NOTE — ASSESSMENT & PLAN NOTE
- K >9 on presentation, shifted in ED  - Shifted and monitored, and treated with dialysis  - As per Nephrology

## 2022-05-26 NOTE — PROGRESS NOTES
Upon initial assessment, patient neuro status has changed since 5/25/2022 7A-7P.  See assessment worksheet for details. Dr CASSIUS Yen, Pulm [facility] notified of findings. Will monitor.

## 2022-05-26 NOTE — ASSESSMENT & PLAN NOTE
Anoxic brain injury  Encephalopathy  Acute hypoxemic respiratory failure  Metabolic acidosis, increased anion gap  Hypothermia  Elevated troponin  Shock  - Initial head CT obtained with no acute findings  - Loaded with Keppra and started empirically with Zosyn for aspiration pneumonia or  - Cardiac arrest likely secondary to severe hyperkalemia  - Treated with medications to correct hyperkalemia followed by emergent dialysis  - Nephrology, Cardiology and Pulmonary/Critical Care consulted  - Echo with EF 55% and grade 1 diastolic dysfunction  - Hypothermia protocol initiated but patient self cooling since arrival  - Started bicarb drip with correction of electrolytes   - Closely monitoring with repeat labs  - As of this morning, worsening neurological exam with fixed pupils that are dilated, and absence of gag and corneal reflexes  - Patient remains completely unresponsive despite being off all sedation  - EEG consistent with severe anoxic brain injury  - Prognosis poor for any meaningful recovery and family conference planned for later today with the medical care team in conjunction with palliative care

## 2022-05-26 NOTE — PROCEDURES
Procedures     ELECTROENCEPHALOGRAM REPORT    DATE OF SERVICE: 5/25/22  EEG NUMBER: OK   REQUESTED BY: [place]  [place] [facility] [place]OLOGY   Electroencephalographic (EEG) recording is with electrodes placed according to the International 10-20 placement system.  Thirty two (32) channels of digital signal (sampling rate of 512/sec) including T1 and T2 was simultaneously recorded from the scalp and may include  EKG, EMG, and/or eye monitors.  Recording band pass was 0.1 to 512 hz.  Digital video recording of the patient is simultaneously recorded with the EEG.  The patient is instructed report clinical symptoms which may occur during the recording session.  EEG and video recording is stored and archived in digital format. Activation procedures which include photic stimulation, hyperventilation and instructing patients to perform simple task are done in selected patients.    The EEG is displayed on a monitor screen and can be reviewed using different montages.  Computer assisted analysis is employed to detect spike and electrographic seizure activity.   The entire record is submitted for computer analysis.  The entire recording is visually reviewed and the times identified by computer analysis as being spikes or seizures are reviewed again.  Compresses spectral analysis (CSA) is also performed on the activity recorded from each individual channel.  This is displayed as a power display of frequencies from 0 to 30 Hz over time.   The CSA is reviewed looking for asymmetries in power between homologous areas of the scalp and then compared with the original EEG recording.     [employer] software was also utilized in the review of this study.  This software suite analyzes the EEG recording in multiple domains.  Coherence and rhythmicity is computed to identify EEG sections which may contain organized seizures.  Each channel undergoes analysis to detect presence of spike and sharp waves which have special and  morphological characteristic of epileptic activity.  The routine EEG recording is converted from spacial into frequency domain.  This is then displayed comparing homologous areas to identify areas of significant asymmetry.  Algorithm to identify non-cortically generated artifact is used to separate eye movement, EMG and other artifact from the EEG    EEG FINDINGS  Background is markedly attenuated with only subtle diffuse, markedly attenuated delta and theta range slowing seen briefly.     State changes are not seen.     EKG shows regular rhythm.     IMPRESSION:  Markedly abnormal study due to the presence of near-total suppression and minimal cortical activity, which may be to anesthesia, or in the absence of sedatives, may be suggestive of catastrophically severe cerebral dysfunction such as may be seen in anoxic brain injury.     Ambrosio Holman MD

## 2022-05-26 NOTE — PLAN OF CARE
Pt remains intubated on Levo but w/o sedation. Non responsive. Rewarming started at 0208 to bring pt to 37 deg celcius. US of lower exts completed this shift. Family member at bedside. Safety maintained.

## 2022-05-26 NOTE — PROGRESS NOTES
Sin score of 11- nurse reports sacrum/heels intact- palliative care consult in place.  Recommend nurse to continue with current pressure injury prevention interventions.

## 2022-05-26 NOTE — ACP (ADVANCE CARE PLANNING)
Advance Care Planning     Date: 05/26/2022    Code Status  In light of the patients advanced and life limiting illness,I engaged the the family in a conversation about the patient's preferences for care  at the very end of life. The patient wishes to have a natural, peaceful death.  Along those lines, the patient does not wish to have CPR or other invasive treatments performed when her heart and/or breathing stops. I communicated to the family that a DNR order would be placed in her medical record to reflect this preference.  I spent a total of 32 minutes engaging the patient in this advance care planning discussion.       [facility]  I engaged the family in a conversation about advance care planning and we specifically addressed what the goals of care would be moving forward, in light of the patient's change in clinical status, specifically severe anoxic brain injury.  We did specifically address the patient's likely prognosis, which is terminal.  We explored the patient's values and preferences for future care.  The family endorses that what is most important right now is to focus on symptom/pain control and quality of life, even if it means sacrificing a little time    Accordingly, we have decided that the best plan to meet the patient's goals includes enrolling in hospice care    I did explain the role for hospice care at this stage of the patient's illness, including its ability to help the patient live with the best quality of life possible.  We will be making a hospice referral.    I spent a total of 32 minutes engaging the patient in this advance care planning discussion.    Ger Mccormack MD  Hospice and Palliative Medicine  Palliative Care Pager: [phone]

## 2022-05-27 PROBLEM — Z51.5 COMFORT MEASURES ONLY STATUS: Status: ACTIVE | Noted: 2022-01-01

## 2022-05-27 NOTE — PLAN OF CARE
Pt remains intubated/no sedation with no sedation. No response to stimuli. Pupils fixed. TTM maintaining pt at 37 degrees. Pt is DNR and plan is for Hospice today and extubation. Family visited early in the shift. Her sister Karyna stayed overnight.

## 2022-05-27 NOTE — PROGRESS NOTES
The sw met with the pt's various family members who were in the room crying and offered emotional support. The sw acknowledged their feelings and comforted them as needed. The sw will follow up with them again and encouraged them to gibson if they requires any further assistance.

## 2022-05-27 NOTE — H&P
[facility] - Intensive Care  Palliative Medicine  History & Physical    Patient Name: Brittaney Joseph  MRN: [id number]  Admission Date: 5/27/2022  Attending Physician: Ger Mccormack Jr., MD   Primary Care Provider: Charles Argueta MD    Patient information was obtained from caregiver / friend, past medical records and primary team.     Subjective:     Chief Complaint/Reason for Admission: Cardiac arrest    History of Present Illness:     71F with PMH of ESRD on HD MWF, HFpEF, CVA and PE on AC was BIBEMS after a witnessed cardiac arrest at home. Pt ambulated with a walker at baseline, has no children, next of kin are her adult brother and sister. Pt has been living with her special needs sister in a [employer] mobile home since [place] and is driven to and from HD by her brother and has generally been well supported.     Siblings report pt completed HD on Monday 5/23 but her line clotted and the next evening she developed TIA-like symptoms with abnormal arm movements, diaphoresis, facial droop and speech difficulty. Called EMS and by their arrival she was VSS with symptoms apparently resolved. Later that night sometimes after 2100 on 5/24 she was lying down, complained of back pain and became unresponsive. EMS recalled and pt was found in pulseless VT and she was successfully cardioverted back into ROSC. Lost pulse again en route to ED and CPR was continued after arrival to ED with epi, calcium and bicarb pushed. Intubated and ROSC was achieved at 2253.     Pt remained unresponsive off sedation and developed seizure like activity. Stat labs showed profound metabolic disturbances with LA 10.5, pH 7.1, K > 9. Became hypothermic. Reportedly had reactive pupils and a faint gag reflex on arrival to ICU for post arrest care.  NCHCT obtaiend and was non-acute. Started on keppra for seizures, zosyn for aspiration. Nephro consulted for emergent HD.     Interval Hospital Course     5/25: Propofol held in AM to determine if still seizing,  immediately provoked severe HTN and was restarted. Fistula had no thrill and a central line was requested for HD access while continuing aggressive potassium shifting. Cards consulted and reported not a cath candidate given suspected grim neuro prognosis. Pt was dialyzed and started on levo afterwards. EEG performed showing minimal cortical activity c/w catastrophic KIERAN.     5/26: Active warming started early this morning. ICU team reported interval worsening neuro exam with loss of pupillary, gag and cough reflexes. Pt breathing over ventilator but with grave prognosis. Goals of care discussions held with palliative and pulm teams and family has consented to enrollment in the inpatient hospice service prior to palliative extubation. Pt was anointed by her emelia henriquez.    5/27: Family gathering at bedside this AM for last goodbyes. Large extended family gathered and grief stricken relatives prompted a Code White call, extensive emotional support provided by staff and pastoral care was recalled. Pt was extubated around 1020 following premeds as ordered.    Assessment/Plan:        Physical Exam  Constitutional:       Appearance: She is normal weight. She is toxic-appearing.      Interventions: She is intubated.   HENT:      Head: Normocephalic and atraumatic.   Eyes:      Comments: Eccentric pupils and disconjugate gaze   Cardiovascular:      Rate and Rhythm: Normal rate and regular rhythm.      Heart sounds: Murmur heard.   Pulmonary:      Effort: She is intubated.      Breath sounds: Rhonchi present.   Abdominal:      General: Abdomen is flat. There is no distension.      Palpations: Abdomen is soft.   Musculoskeletal:         General: No swelling. Normal range of motion.   Neurological:      Cranial Nerves: Cranial nerve deficit present.      Motor: Abnormal muscle tone (spastic) present. No seizure activity.     No notes have been filed under this hospital service.  Service: Palliative Medicine    VTE Risk  Mitigation (From admission, onward)    None        71F with ESRD on HD with recent thrombosis of LUE AVF s/p outpt thrombolysis (unclear details) presented after a witnessed cardiac arrest at home suspected 2/2 catastrophic VTE. Admitted to ICU for post-arrest care after prolonged CPR with profound metabolic acidosis, hyperkalemia, hypothermia. Intubated and has since lost all brainstem reflexes though breathing over vent. Family has agreed to transition to inpatient hospice and pt has been palliatively extubated.    # End of life care  - Anointed by , pastoral call en route for further family support  - S/p premed with 8mg IV morphine 4mg IV ativan  - Continue PRN morphine 4mg IVP q15m for RR > 25 or dyspnea  - Morphine gtt ordered to start one hour after extubation if needed  - Prognosis minutes to hours off MV and pressors    Ger Mccormack Jr, MD  Palliative Medicine  [facility] - Intensive Care    Total time spent: 110 minutes  > 50% of 80 minute visit spent in chart review, face to face discussion of symptoms assessment, coordination of care with other specialties, and d/c planning.    30 min ACP time spent: goals of care, emotional support, formulating and communicating prognosis and exploring burden/benefit of various approaches of treatment.

## 2022-05-27 NOTE — PLAN OF CARE
The pt transitioned to IP hospice with [facility].        05/27/22 0900   Final Note   Assessment Type Final Discharge Note   Anticipated Discharge Disposition HospiceMedic

## 2022-05-27 NOTE — DISCHARGE SUMMARY
[facility] - Intensive Care  Palliative Medicine  Discharge Summary      Patient Name: Brittaney Joseph  MRN: [id number]  Admission Date: 5/27/2022  Hospital Length of Stay: 0 days  Discharge Date and Time:  05/27/2022 11:58 AM  Attending Physician: Ger Mccormack Jr., MD   Discharging Provider: Ger Mccormack Jr, MD  Primary Care Provider: Charles Argueta MD    HPI:   71F with PMH of ESRD on HD MWF, HFpEF, CVA and PE on AC was BIBEMS after a witnessed cardiac arrest at home. Pt ambulated with a walker at baseline, has no children, next of kin are her adult brother and sister. Pt has been living with her special needs sister in a [employer] mobile home since G[place] and is driven to and from HD by her brother and has generally been well supported.     Siblings report pt completed HD on Monday 5/23 but her line clotted and the next evening she developed TIA-like symptoms with abnormal arm movements, diaphoresis, facial droop and speech difficulty. Called EMS and by their arrival she was VSS with symptoms apparently resolved. Later that night sometimes after 2100 on 5/24 she was lying down, complained of back pain and became unresponsive. EMS recalled and pt was found in pulseless VT and she was successfully cardioverted back into ROSC. Lost pulse again en route to ED and CPR was continued after arrival to ED with epi, calcium and bicarb pushed. Intubated and ROSC was achieved at 2253.     Pt remained unresponsive off sedation and developed seizure like activity. Stat labs showed profound metabolic disturbances with LA 10.5, pH 7.1, K > 9. Became hypothermic. Reportedly had reactive pupils and a faint gag reflex on arrival to ICU for post arrest care.  NCHCT obtaiend and was non-acute. Started on keppra for seizures, zosyn for aspiration. Nephro consulted for emergent HD.    5/25: Propofol held in AM to determine if still seizing, immediately provoked severe HTN and was restarted. Fistula had no thrill and a central line was  requested for HD access while continuing aggressive potassium shifting. Cards consulted and reported not a cath candidate given suspected grim neuro prognosis. Pt was dialyzed and started on levo afterwards. EEG performed showing minimal cortical activity c/w catastrophic KIERAN.     : Active warming started early this morning. ICU team reported interval worsening neuro exam with loss of pupillary, gag and cough reflexes. Pt breathing over ventilator but with grave prognosis. Goals of care discussions held with palliative and pulm teams and family has consented to enrollment in the inpatient hospice service prior to palliative extubation. Pt was anointed by her emelia henriquez.     : Family gathering at bedside this AM for last goodbyes. Large extended family gathered and grief stricken relatives prompted a Code White call, extensive emotional support provided by staff and pastoral care was recalled. Pt was extubated around 1020 following premeds as ordered.    Hospital Course:   Pt passed away peacefully at 1106 with family at bedside. Emotional support provided.    Goals of Care Treatment Preferences:  Code Status: DNR    Health care agent: [place]  [facility] care agent number: [phone]          What is most important right now is to focus on symptom/pain control, quality of life, even if it means sacrificing a little time.  Accordingly, we have decided that the best plan to meet the patient's goals includes enrolling in hospice care.      Consults:     No new Assessment & Plan notes have been filed under this hospital service since the last note was generated.  Service: Palliative Medicine    There are no hospital problems to display for this patient.      Discharged Condition:     Disposition:     Follow Up:    Patient Instructions:   No discharge procedures on file.    Significant Diagnostic Studies: none    Pending Diagnostic Studies:     None         Medications:  None    Indwelling  Lines/Drains at time of discharge:   Lines/Drains/Airways     Central Venous Catheter Line  Duration           Trialysis (Dialysis) Catheter 05/25/22 0945 left internal jugular 2 days          Drain  Duration                Hemodialysis AV Fistula Left upper arm -- days         Urethral Catheter 05/24/22 7087 Double-lumen 16 Fr. 2 days                Time spent on the discharge of patient: 35 minutes  Patient was seen and examined on the date of discharge and determined to be suitable for discharge.    Ger Mccormack Jr, MD  Department of Palliative Medicine  [facility] - Intensive Care

## 2022-05-27 NOTE — PLAN OF CARE
The pt  on IP hospice with [facility].        22 1156   Final Note   Assessment Type Final Discharge Note   Anticipated Discharge Disposition Hospice

## 2022-05-27 NOTE — PROGRESS NOTES
Pt passed peacefully surrounded by her family at 1106. Hospice nurse Bessie pronounced patient at bedside. Dr. Mccormack,  of [place], [facility] and [facility] hospital all notified of patients TOD.  home to pick patient up from bedside.

## 2022-05-28 LAB
BACTERIA SPEC AEROBE CULT: NORMAL
BACTERIA SPEC AEROBE CULT: NORMAL
GRAM STN SPEC: NORMAL

## 2022-05-28 NOTE — DISCHARGE SUMMARY
[facility] Medicine  Discharge Summary      Patient Name: Brittaney Joseph  MRN: [id number]  Patient Class: IP- Inpatient  Admission Date: 5/24/2022  Hospital Length of Stay: 3 days  Discharge Date and Time: 5/27/2022  8:42 AM  Attending Physician: Sadaf Grier MD  Discharging Provider: Sadaf Grier MD  Primary Care Provider: Charles Argueta MD      HPI:   Brittaney Joseph is a 72 yo female with a pmh of ESRD on dialysis MWF, diastolic heart failure, CVA, PE on eliquis. She presented with cardiac arrest. The history was obtained by the family and chart review. The patient last went to dialysis on Monday and family reports she had her full session. She was to see vascular today because her access was starting to clot off, which she has had trouble with in the past. She does not smoke, use drugs, or drink alcohol. She walks with a walker and lives with family. She has a brother and a sister but no children per family report. She had not been sick. No complaints of chest pain or SOB. She was in her normal state of health until yesterday evening (Tuesday) around 5pm. The family thought she might be having a stroke when she began sweating and swinging her arm and they noticed she wasn't talking and her mouth looked different. EMS was called and on arrival they checked her vitals and she was back to normal at that time. Then sometime after 9pm she was laying down when she complained of back pain and then stopped responding so the family called EMS. On their arrival the patient was found to be in pulseless Vtach and was shocked with ROSC, no meds given. The patient then lost a pulse en route at 2238 and CPR was initiated. On arrival to ED, the patient was pulseless and apneic and CPR was continued with epinephrine, calcium, and bicarb given. The patient was intubated. ROSC was achieved at 2253. The patient remained unresponsive off sedation and began to have seizure activity while in the ED. Initial  labs revealed: lactic acid 10.5, pH 7.1, K >9, creatinine 18, troponin 0.1, glucose 467. EKG showed ST with PVCs.    On arrival to [facility] ICU, the patient remained unresponsive with frequent seizure like activity. She was hypothermic, self cooling. She did have a slight gag with deep suction and pupil reaction.       * No surgery found *      Hospital Course:   Ms. Joseph presented after cardiac arrest in the field with unknown duration of time down.  Intubated in the field.  Had loss of pulse again on route to the hospital.  Hypothermic protocol initiated for VT arrest.  Patient was started on empiric Zosyn for possible aspiration pneumonia, administered medication to shift hyperkalemia prior to initiation of emergent dialysis.  Cardiology, Nephrology and Pulmonary consulted.  Initial head CT with no acute abnormality seen.  Loaded Keppra.  EEG markedly abnormal consistent with anoxic brain injury.  Palliative Care consulted.  patient with no improvement in neurological status consistent with severe anoxic brain injury and this was communicated with the patient's family and they are agreeable for DNR status with no escalation of care.  Patient was later transition to comfort care and discharged to inpatient hospice.     * Cardiac arrest  Anoxic brain injury  Encephalopathy  Acute hypoxemic respiratory failure  Metabolic acidosis, increased anion gap  Hypothermia  Elevated troponin  Shock  - Initial head CT obtained with no acute findings  - Loaded with Keppra and started empirically with Zosyn for aspiration pneumonia or  - Cardiac arrest likely secondary to severe hyperkalemia  - Treated with medications to correct hyperkalemia followed by emergent dialysis  - Nephrology, Cardiology and Pulmonary/Critical Care consulted  - Echo with EF 55% and grade 1 diastolic dysfunction  - Hypothermia protocol initiated but patient self cooling since arrival  - Started bicarb drip with correction of electrolytes   - Closely  monitoring with repeat labs  - As of this morning, worsening neurological exam with fixed pupils that are dilated, and absence of gag and corneal reflexes  - Patient remains completely unresponsive despite being off all sedation  - EEG consistent with severe anoxic brain injury  - Prognosis poor for any meaningful recovery and family conference by medical team in conjunction with palliative care  - Patient with was changed to DNR status and then discharged to inpatient hospice      Essential hypertension  - In shock, all medications on hold     DM2 (diabetes mellitus, type 2)  - Diet controlled and not on any medication at home  - Hyperglycemic on admission  - Hemoglobin A1c is 7.1  - Adjusted insulin regimen as needed to achieve glucose between 140-180     Hyperphosphatemia  -  As per nephrology     Elevated troponin  - See above     Hypothermia  - See above     Acute hypoxemic respiratory failure  - See above     Metabolic acidosis, increased anion gap  - See above     Hyperkalemia  - K >9 on presentation, shifted in ED  - Shifted and monitored, and treated with dialysis  - As per Nephrology     ESRD on dialysis  - Trialysis line placement done  - As per Nephrology          Goals of Care Treatment Preferences:  Code Status: DNR    Health care agent: [place]  [facility] agent number: [phone]          What is most important right now is to focus on symptom/pain control, quality of life, even if it means sacrificing a little time.  Accordingly, we have decided that the best plan to meet the patient's goals includes enrolling in hospice care.      Consults:   Consults (From admission, onward)        Status Ordering Provider     Inpatient consult to Palliative Care  Once        Provider:  (Not yet assigned)    Completed LAW [facility]     Inpatient consult to [facility]  Once        Provider:  (Not yet assigned)    Completed LAW LONDONO     Pulmonology   Once        Provider:  (Not yet assigned)    Completed LAW L[place]ONO     Inpatient consult to [facility]iology[facility]  Once        Provider:  (Not yet assigned)    Completed LAW L[place]ONO          Service: Hospital Medicine    Final Active Diagnoses:    Diagnosis Date Noted POA    PRINCIPAL PROBLEM:  Cardiac arrest [I46.9] 05/24/2022 Yes    Comfort measures only status [Z51.5] 05/27/2022 Not Applicable    Metabolic acidosis [E87.2]  Yes    ESRD on dialysis [N18.6, Z99.2] 05/25/2022 Not Applicable    Hyperkalemia [E87.5] 05/25/2022 Yes    Metabolic acidosis, increased anion gap [E87.2] 05/25/2022 Yes    Acute hypoxemic respiratory failure [J96.01] 05/25/2022 Yes    Hypothermia [T68.XXXA] 05/25/2022 Yes    Elevated troponin [R77.8] 05/25/2022 Yes    Hyperphosphatemia [E83.39] 05/25/2022 Yes    DM2 (diabetes mellitus, type 2) [E11.9] 05/25/2022 Yes    Shock [R57.9]  No    ESRD (end stage renal disease) [N18.6]  Yes    Lactic acidosis [E87.2]  Yes    Essential hypertension [I10]  Yes      Problems Resolved During this Admission:       Discharged Condition: critical    Disposition: Hospice/Medical Facility    Follow Up: Inpatient hospice    Patient Instructions:      Diet NPO     Activity as tolerated       Significant Diagnostic Studies:    Pending Diagnostic Studies:     Procedure Component Value Units Date/Time    APTT [[id number]] Collected: 05/25/22 1016    Order Status: Sent Lab Status: In process Updated: 05/25/22 1017    Specimen: Blood     CBC auto differential [[id number]] Collected: 05/25/22 1016    Order Status: Sent Lab Status: In process Updated: 05/25/22 1017    Specimen: Blood     Hepatitis B Surface Antibody, Qual/Quant [[id number]] Collected: 05/26/22 0554    Order Status: Sent Lab Status: In process Updated: 05/26/22 1345    Specimen: Blood     Neuron Specific Enolase, Serum [[id number]] Collected: 05/27/22 0531    Order Status: Sent Lab Status: In process Updated: 05/27/22 1943     Specimen: Blood     Neuron Specific Enolase, Serum [[id number]] Collected: 05/26/22 0554    Order Status: Sent Lab Status: In process Updated: 05/26/22 1711    Specimen: Blood     Neuron Specific Enolase, Serum [[id number]] Collected: 05/25/22 0226    Order Status: Sent Lab Status: In process Updated: 05/25/22 0936    Specimen: Blood     Protime-INR [[id number]] Collected: 05/25/22 1016    Order Status: Sent Lab Status: In process Updated: 05/25/22 1017    Specimen: Blood          Medications:  To inpatient hospice    Indwelling Lines/Drains at time of discharge:   Lines/Drains/Airways     Central Venous Catheter Line  Duration           Trialysis (Dialysis) Catheter 05/25/22 0945 left internal jugular 3 days          Drain  Duration                Hemodialysis AV Fistula Left upper arm -- days         Urethral Catheter 05/24/22 2356 Double-lumen 16 Fr. 3 days                Time spent on the discharge of patient: 35 minutes      Sadaf Grier MD  Department of Hospital Medicine  [facility] - Intensive Care

## 2022-05-30 LAB
BACTERIA BLD CULT: NORMAL
BACTERIA BLD CULT: NORMAL
HBV SURFACE AB SER QL IA: POSITIVE
HBV SURFACE AB SERPL IA-ACNC: NORMAL MIU/ML

## 2022-05-31 LAB
NSE SERPL-MCNC: 110 NG/ML
NSE SERPL-MCNC: 199 NG/ML
NSE SERPL-MCNC: NORMAL UG/L

## 2022-06-02 LAB — POCT GLUCOSE: 174 MG/DL (ref 70–110)

## 2022-09-07 ENCOUNTER — PATIENT OUTREACH (OUTPATIENT)
Dept: ADMINISTRATIVE | Facility: HOSPITAL | Age: 72
End: 2022-09-07
Payer: MEDICARE

## 2022-10-19 ENCOUNTER — PATIENT OUTREACH (OUTPATIENT)
Dept: ADMINISTRATIVE | Facility: HOSPITAL | Age: 72
End: 2022-10-19
Payer: MEDICARE

## 2022-11-30 ENCOUNTER — PATIENT OUTREACH (OUTPATIENT)
Dept: ADMINISTRATIVE | Facility: HOSPITAL | Age: 72
End: 2022-11-30
Payer: MEDICARE

## 2023-01-11 NOTE — PLAN OF CARE
Problem: Adult Inpatient Plan of Care  Goal: Plan of Care Review  Outcome: Ongoing (interventions implemented as appropriate)  Pt AAO x 4. VSS. And. Pt oriented to room and VN. Cardiac and glucose monitored. Safety maintained. Will continue to monitor.        2

## 2023-05-08 NOTE — PROGRESS NOTES
"[facility] Medicine Progress Note     Admitting Team: [facility] Hospitalist Team B  Attending Physician: Kris Reyes MD  Resident: Dr. Morgan Pierre  Intern: Dr. Marisa العراقي     Date of Admit: 2018    Subjective:      No complaints this AM. Sitting up comfortably having breakfast. UOP 500cc yesterday     Objective:   Last 24 Hour Vital Signs:  BP  Min: 128/71  Max: 165/83  Temp  Av.6 °F (36.4 °C)  Min: 96.7 °F (35.9 °C)  Max: 98.9 °F (37.2 °C)  Pulse  Av.8  Min: 82  Max: 95  Resp  Av  Min: 18  Max: 18  SpO2  Av.3 %  Min: 97 %  Max: 98 %  Weight  Av.8 kg (173 lb 11.6 oz)  Min: 78.8 kg (173 lb 11.6 oz)  Max: 78.8 kg (173 lb 11.6 oz)  Body mass index is 30.77 kg/m².  I/O last 3 completed shifts:  In: 2610 [P.O.:380; I.V.:100; IV Piggyback:2130]  Out: 541 [Urine:541]    Physical Examination:    BP (!) 148/81 (BP Location: Right arm, Patient Position: Lying)   Pulse 88   Temp 98.9 °F (37.2 °C) (Oral)   Resp 18   Ht 5' 3" (1.6 m)   Wt 78.8 kg (173 lb 11.6 oz)   SpO2 97%   Breastfeeding? No   BMI 30.77 kg/m²     General Appearance:    Alert, cooperative, no distress, appears older than stated age   Head:    Normocephalic, without obvious abnormality, atraumatic   Eyes:    conjunctiva/corneas clear, EOM's intact   Nose:   Nares normal, septum midline, mucosa normal, no drainage    Throat:   Lips, mucosa, and tongue normal; dentures in place, MMM   Neck:   Supple, symmetrical, normal ROM   Lungs:     Clear to auscultation bilaterally, respirations unlabored    Heart:    Regular rate and rhythm, S1 and S2 normal, no murmur, rub   or gallop   Abdomen:     Soft, non-tender, bowel sounds active   Extremities:   Extremities normal, atraumatic, no cyanosis, BLE edema, 2+ radial and DP pulses bilaterally   Skin:   Warm and dry, no rashes   Neurologic:   A&Ox3, moving all extremities, no facial asymmetry         Laboratory:  Most Recent Data:  CBC:   Lab Results   Component Value Date    WBC 5.79 " Patient with over 40  pack-year history  Counseling provided on cessation, nicotine patch ordered 12/20/2018    HGB 10.2 (L) 12/20/2018    HCT 32.0 (L) 12/20/2018     12/20/2018    MCV 86 12/20/2018    RDW 15.3 (H) 12/20/2018     WBC Differential: 58.9 % N, 22.4 % L, 14.3 % M, 3.9 % Eo, 0.4 % Baso    BMP:   Lab Results   Component Value Date     (L) 12/20/2018    K 4.8 12/20/2018     12/20/2018    CO2 16 (L) 12/20/2018    BUN 61 (H) 12/20/2018    CREATININE 3.2 (H) 12/20/2018     (H) 12/20/2018    CALCIUM 9.9 12/20/2018    MG 1.7 12/20/2018    PHOS 4.2 12/20/2018     LFTs:   Lab Results   Component Value Date    PROT 6.1 12/20/2018    ALBUMIN 2.4 (L) 12/20/2018    BILITOT 0.3 12/20/2018    AST 15 12/20/2018    ALKPHOS 104 12/20/2018    ALT 12 12/20/2018     Coags: No results found for: INR, PROTIME, PTT  FLP: No results found for: CHOL, HDL, LDLCALC, TRIG, CHOLHDL  DM:   Lab Results   Component Value Date    HGBA1C 6.8 (H) 12/19/2018    CREATININE 3.2 (H) 12/20/2018     Thyroid:   Lab Results   Component Value Date    TSH 1.497 12/18/2018     Anemia:   Lab Results   Component Value Date    IRON 37 12/19/2018    TIBC 277 12/19/2018    FERRITIN 94 12/19/2018    FOLATE 5.4 12/19/2018     Cardiac:   Lab Results   Component Value Date    TROPONINI 0.015 12/18/2018     (H) 12/18/2018     Urinalysis:   Lab Results   Component Value Date    COLORU Yellow 12/18/2018    SPECGRAV >=1.030 (A) 12/18/2018    NITRITE Negative 12/18/2018    KETONESU 1+ (A) 12/18/2018    UROBILINOGEN 1.0 12/18/2018    WBCUA 1 12/18/2018       Trended Lab Data:  Recent Labs   Lab 12/18/18  2003 12/19/18  0510 12/20/18  0441   WBC 7.64 6.27 5.79   HGB 11.9* 9.8* 10.2*   HCT 37.5 30.3* 32.0*   * 328 331   MCV 86 86 86   RDW 14.9* 15.0* 15.3*    135* 134*   K 5.2* 4.7 4.8    109 109   CO2 16* 16* 16*   BUN 64* 64* 61*   CREATININE 3.6* 3.5* 3.2*   * 106 126*   PROT 7.4 6.0 6.1   ALBUMIN 3.1* 2.4* 2.4*   BILITOT 0.5 0.4 0.3   AST 20 17 15   ALKPHOS 138* 103 104   ALT 17 13 12       Trended  Cardiac Data:  Recent Labs   Lab 12/18/18 2003 12/18/18  2341   TROPONINI 0.006 0.015   *  --        Microbiology Data:  None      Radiology:  Imaging Results          X-Ray Abdomen AP 1 View (KUB) (Final result)  Result time 12/18/18 22:16:06    Final result by Thuan Easton MD (12/18/18 22:16:06)                 Impression:      Nonobstructive bowel gas pattern.      Electronically signed by: Thuan Easton MD  Date:    12/18/2018  Time:    22:16             Narrative:    EXAMINATION:  XR ABDOMEN AP 1 VIEW    CLINICAL HISTORY:  Unspecified abdominal pain    TECHNIQUE:  AP View(s) of the abdomen was performed.    COMPARISON:  None    FINDINGS:  Monitoring leads overlie the lower thorax and abdomen.  Scattered air is seen in nondilated loops of small and large bowel. No central small bowel air fluid levels are appreciated.  No definite large volume of free intraperitoneal air allowing for technique.  Surgical clips project over the right upper abdomen.  There are degenerative changes of the visualized thoracolumbar spine as well as the bilateral sacroiliac joints and hips.  There is platelike atelectasis or scarring at the right lung base.  No large pleural effusion appreciated.                               X-Ray Chest AP Portable (Final result)  Result time 12/18/18 21:11:03    Final result by Raven Barrientos MD (12/18/18 21:11:03)                 Impression:      Minimal right basilar scarring or atelectasis, otherwise no acute cardiopulmonary process identified.      Electronically signed by: Raven Brarientos MD  Date:    12/18/2018  Time:    21:11             Narrative:    EXAMINATION:  XR CHEST AP PORTABLE    CLINICAL HISTORY:  weakness;    TECHNIQUE:  Single frontal view of the chest was performed.    COMPARISON:  None    FINDINGS:  Cardiac silhouette is normal in size.  There is elevation of the right hemidiaphragm.  Lungs are expanded.  Minimal scarring or plate atelectasis is seen within the  right lower lobe.  No evidence of focal consolidative process, pneumothorax, or significant effusion.  No acute osseous abnormality identified.  Degenerative changes are noted involving the bilateral AC joints.                                 Assessment:     Brittaney Joseph is a 68 y.o. female with:  Patient Active Problem List    Diagnosis Date Noted    Hypertensive emergency 12/18/2018        Plan:     Hypertensive emergency  - creatinine of 3.6/ proteinuria as possible evidence of end organ damage   - /86 on presentation to ED  194/95 on admit  - troponin 0.006    lactate 1  TSH 1.497  urinalysis with 3+ protein  - CXR without signs of congestion or edema   - continue home amlodipine, clonidine, and losartan     CKD with possible ARIES  - Cr 3.6, GFR 14  unclear what patient's baseline is as there are no other labs listed prior to today  urinalysis with 3+ protein  - IVF bolus given in ED  - avoid nephrotoxic agents and renal dose meds as appropriate  - urine sodium 30  urine urea 545  no eosinophils on becker's stain  - urine ultrasound with evidence of cortical thinning  - nephrology consulted     Non-Anion Gap Metabolic Acidosis  - unclear etiology; denying diarrhea, received a small amount of NS infusion   - urine electrolytes ordered     Nausea/Vomiting  - patient not currently nauseous and has not vomited since day prior to admission  - abd xray without signs of obstruction  + BS, abdomen soft, non-distended  - continue to monitor and consider zofran prn if patient develops nausea    Protein gap  - total protein 7.4  albumin 3.1  -HIV, hepatitis panel negative  -f/u SPEP, UPEP     Hyperkalemia, resolved  - K 5.2 on admission  no concerning features on EKG  - resolved     Hyperphosphatemia  - Phos 5.2 on admission  - likely secondary to CKD    Normocytic anemia  - Hg 11.9, MCV 86  - likely related to CKD   - stable, continue to monitor    Thrombocytosis, resolved  -  platelets 399 on admission  - likely due to hemoconcentration     Diabetes type 2 complicated by CKD not requiring insulin   - patient taking glipizide at home  no A1c in chart, will order one  - hold glipizide while inpatient   - accuchecks AC & HS with low dose SSI  - A1c 6.8    Lower back pain  - patient stated she has arthritis and chronic back pain but for the last 2 weeks has been having to take percocet daily for it  - takes 2.5 mg percocet twice a day  -continue percocet prn for her back pain  f/u sPEP and uPEP    Diet: Diabetic  DVT ppx: heparin    Dispo:  Nephrology, PT/OT recs      Code Status:     Full    Lina العراقي MD  [facility] Internal Medicine HO-1    [facility] Medicine Hospitalist Pager numbers:   [facility] Hospitalist Medicine Team A (Joon/Raysa): [phone]  [facility] Hospitalist Medicine Team B (Amy/Surya):  [phone]

## 2024-10-15 NOTE — PROGRESS NOTES
"Bedside report given to oncoming nurse. Patient sister at bedside with visiting. Patient states, "she feel like she needs to void". Patient assisted with getting on the bedpan, noted she voids about 200cc clear yellow urine.  " 135
